# Patient Record
Sex: MALE | Race: WHITE | NOT HISPANIC OR LATINO | URBAN - METROPOLITAN AREA
[De-identification: names, ages, dates, MRNs, and addresses within clinical notes are randomized per-mention and may not be internally consistent; named-entity substitution may affect disease eponyms.]

---

## 2018-12-18 ENCOUNTER — TELEPHONE (OUTPATIENT)
Dept: GASTROENTEROLOGY | Facility: CLINIC | Age: 68
End: 2018-12-18

## 2018-12-18 PROBLEM — Z12.11 SCREEN FOR COLON CANCER: Status: ACTIVE | Noted: 2018-12-18

## 2018-12-18 NOTE — TELEPHONE ENCOUNTER
12/18/18  Screened by: Jose Mendez    Referring Provider [facility]    Pre- Screening: There is no height or weight on file to calculate BMI  Has patient been referred for a routine screening Colonoscopy? yes  Is the patient between 39-70 years old? yes    SCHEDULING STAFF   If the patient is between 45yrs-49yrs, please advise patient to confirm benefits/coverage with their insurance company for a routine screening colonoscopy, some insurance carriers will only cover at [address] or older   If the patient is over 66years old, please schedule an office visit  Do you have any of the following symptoms? NO    Have you had a coronary or vascular stent within the last year? no    Have you had a heart attack or stroke in the last 6 months? no    Have you had intestinal surgery in the last 3 months? no    Do you have problems with: NO    Do you use: NO    Have you been hospitalized in the last Month? no    Have you been diagnosed with a bleeding disorder or anemia? no    Have you had chest pain (angina) or breathing problems  (COPD) in the last 3 months? no    Do you have any difficulty walking up a flight of stairs? no    Have you had Kidney failure or insufficiency? no    Have you had heart valve surgery? no    Are you confined to a wheelchair? no    Do you take NO    Have you been diagnosed with Diabetes or are you taking any   Diabetic medications? no    : If patient answers NO to medical questions, then schedule procedure  If patient answers YES to medical questions, then schedule office appointment  Previous Colonoscopy yes  Date and Facility of last colonoscopy?  4/2016    Comments:

## 2019-04-04 ENCOUNTER — ANESTHESIA EVENT (OUTPATIENT)
Dept: PERIOP | Facility: HOSPITAL | Age: 69
End: 2019-04-04
Payer: OTHER GOVERNMENT

## 2019-04-04 RX ORDER — LANOLIN ALCOHOL/MO/W.PET/CERES
CREAM (GRAM) TOPICAL DAILY
COMMUNITY

## 2019-04-04 RX ORDER — HYDROCHLOROTHIAZIDE 25 MG/1
25 TABLET ORAL DAILY
COMMUNITY

## 2019-04-04 RX ORDER — OMEPRAZOLE 20 MG/1
20 CAPSULE, DELAYED RELEASE ORAL DAILY
COMMUNITY

## 2019-04-04 RX ORDER — UREA 10 %
400 LOTION (ML) TOPICAL DAILY
COMMUNITY

## 2019-04-04 RX ORDER — LISINOPRIL 20 MG/1
20 TABLET ORAL DAILY
COMMUNITY

## 2019-04-04 RX ORDER — LORATADINE 10 MG/1
10 TABLET ORAL DAILY
COMMUNITY

## 2019-04-04 RX ORDER — ATORVASTATIN CALCIUM 20 MG/1
10 TABLET, FILM COATED ORAL DAILY
COMMUNITY

## 2019-04-05 ENCOUNTER — ANESTHESIA (OUTPATIENT)
Dept: PERIOP | Facility: HOSPITAL | Age: 69
End: 2019-04-05
Payer: OTHER GOVERNMENT

## 2019-04-05 ENCOUNTER — HOSPITAL ENCOUNTER (OUTPATIENT)
Facility: HOSPITAL | Age: 69
Setting detail: OUTPATIENT SURGERY
Discharge: HOME | End: 2019-04-05
Attending: INTERNAL MEDICINE | Admitting: INTERNAL MEDICINE
Payer: OTHER GOVERNMENT

## 2019-04-05 VITALS
OXYGEN SATURATION: 97 % | HEIGHT: 69 IN | DIASTOLIC BLOOD PRESSURE: 78 MMHG | TEMPERATURE: 97.3 F | HEART RATE: 89 BPM | RESPIRATION RATE: 15 BRPM | SYSTOLIC BLOOD PRESSURE: 118 MMHG | WEIGHT: 227.51 LBS | BODY MASS INDEX: 33.7 KG/M2

## 2019-04-05 DIAGNOSIS — Z12.11 SCREEN FOR COLON CANCER: ICD-10-CM

## 2019-04-05 PROCEDURE — 88305 TISSUE EXAM BY PATHOLOGIST: CPT | Performed by: PATHOLOGY

## 2019-04-05 PROCEDURE — 45385 COLONOSCOPY W/LESION REMOVAL: CPT | Performed by: INTERNAL MEDICINE

## 2019-04-05 RX ORDER — SODIUM CHLORIDE, SODIUM LACTATE, POTASSIUM CHLORIDE, CALCIUM CHLORIDE 600; 310; 30; 20 MG/100ML; MG/100ML; MG/100ML; MG/100ML
125 INJECTION, SOLUTION INTRAVENOUS CONTINUOUS
Status: DISCONTINUED | OUTPATIENT
Start: 2019-04-05 | End: 2019-04-05 | Stop reason: HOSPADM

## 2019-04-05 RX ORDER — PROPOFOL 10 MG/ML
INJECTION, EMULSION INTRAVENOUS AS NEEDED
Status: DISCONTINUED | OUTPATIENT
Start: 2019-04-05 | End: 2019-04-05 | Stop reason: SURG

## 2019-04-05 RX ADMIN — PROPOFOL 40 MG: 10 INJECTION, EMULSION INTRAVENOUS at 12:26

## 2019-04-05 RX ADMIN — PROPOFOL 30 MG: 10 INJECTION, EMULSION INTRAVENOUS at 12:28

## 2019-04-05 RX ADMIN — SODIUM CHLORIDE, SODIUM LACTATE, POTASSIUM CHLORIDE, AND CALCIUM CHLORIDE: .6; .31; .03; .02 INJECTION, SOLUTION INTRAVENOUS at 12:12

## 2019-04-05 RX ADMIN — PROPOFOL 100 MG: 10 INJECTION, EMULSION INTRAVENOUS at 12:23

## 2019-04-05 RX ADMIN — PROPOFOL 10 MG: 10 INJECTION, EMULSION INTRAVENOUS at 12:34

## 2019-04-05 RX ADMIN — PROPOFOL 20 MG: 10 INJECTION, EMULSION INTRAVENOUS at 12:31

## 2019-04-05 RX ADMIN — SODIUM CHLORIDE, SODIUM LACTATE, POTASSIUM CHLORIDE, AND CALCIUM CHLORIDE 125 ML/HR: .6; .31; .03; .02 INJECTION, SOLUTION INTRAVENOUS at 11:51

## 2019-06-21 ENCOUNTER — OFFICE VISIT (OUTPATIENT)
Dept: DERMATOLOGY | Facility: CLINIC | Age: 69
End: 2019-06-21
Payer: OTHER GOVERNMENT

## 2019-06-21 DIAGNOSIS — Z13.89 SCREENING FOR SKIN CONDITION: ICD-10-CM

## 2019-06-21 DIAGNOSIS — B35.4 TINEA CORPORIS: Primary | ICD-10-CM

## 2019-06-21 DIAGNOSIS — B35.1 ONYCHOMYCOSIS: ICD-10-CM

## 2019-06-21 PROCEDURE — 99204 OFFICE O/P NEW MOD 45 MIN: CPT | Performed by: DERMATOLOGY

## 2019-06-21 RX ORDER — TERBINAFINE HYDROCHLORIDE 250 MG/1
250 TABLET ORAL DAILY
Qty: 30 TABLET | Refills: 2 | Status: SHIPPED | OUTPATIENT
Start: 2019-06-21 | End: 2019-09-19

## 2019-07-29 ENCOUNTER — APPOINTMENT (OUTPATIENT)
Dept: LAB | Facility: CLINIC | Age: 69
End: 2019-07-29
Payer: OTHER GOVERNMENT

## 2019-07-29 ENCOUNTER — OFFICE VISIT (OUTPATIENT)
Dept: DERMATOLOGY | Facility: CLINIC | Age: 69
End: 2019-07-29
Payer: COMMERCIAL

## 2019-07-29 DIAGNOSIS — B35.1 ONYCHOMYCOSIS: ICD-10-CM

## 2019-07-29 DIAGNOSIS — B35.4 TINEA CORPORIS: Primary | ICD-10-CM

## 2019-07-29 DIAGNOSIS — B35.4 TINEA CORPORIS: ICD-10-CM

## 2019-07-29 LAB
ALBUMIN SERPL BCP-MCNC: 4.2 G/DL (ref 3.5–5)
ALP SERPL-CCNC: 78 U/L (ref 46–116)
ALT SERPL W P-5'-P-CCNC: 37 U/L (ref 12–78)
AST SERPL W P-5'-P-CCNC: 29 U/L (ref 5–45)
BASOPHILS # BLD AUTO: 0.03 THOUSANDS/ΜL (ref 0–0.1)
BASOPHILS NFR BLD AUTO: 1 % (ref 0–1)
BILIRUB DIRECT SERPL-MCNC: 0.13 MG/DL (ref 0–0.2)
BILIRUB SERPL-MCNC: 0.46 MG/DL (ref 0.2–1)
EOSINOPHIL # BLD AUTO: 0.18 THOUSAND/ΜL (ref 0–0.61)
EOSINOPHIL NFR BLD AUTO: 3 % (ref 0–6)
ERYTHROCYTE [DISTWIDTH] IN BLOOD BY AUTOMATED COUNT: 12.6 % (ref 11.6–15.1)
HCT VFR BLD AUTO: 44.5 % (ref 36.5–49.3)
HGB BLD-MCNC: 14.4 G/DL (ref 12–17)
IMM GRANULOCYTES # BLD AUTO: 0.03 THOUSAND/UL (ref 0–0.2)
IMM GRANULOCYTES NFR BLD AUTO: 1 % (ref 0–2)
LYMPHOCYTES # BLD AUTO: 1.14 THOUSANDS/ΜL (ref 0.6–4.47)
LYMPHOCYTES NFR BLD AUTO: 20 % (ref 14–44)
MCH RBC QN AUTO: 31.6 PG (ref 26.8–34.3)
MCHC RBC AUTO-ENTMCNC: 32.4 G/DL (ref 31.4–37.4)
MCV RBC AUTO: 98 FL (ref 82–98)
MONOCYTES # BLD AUTO: 0.68 THOUSAND/ΜL (ref 0.17–1.22)
MONOCYTES NFR BLD AUTO: 12 % (ref 4–12)
NEUTROPHILS # BLD AUTO: 3.52 THOUSANDS/ΜL (ref 1.85–7.62)
NEUTS SEG NFR BLD AUTO: 63 % (ref 43–75)
NRBC BLD AUTO-RTO: 0 /100 WBCS
PLATELET # BLD AUTO: 259 THOUSANDS/UL (ref 149–390)
PMV BLD AUTO: 9.8 FL (ref 8.9–12.7)
PROT SERPL-MCNC: 7.9 G/DL (ref 6.4–8.2)
RBC # BLD AUTO: 4.55 MILLION/UL (ref 3.88–5.62)
WBC # BLD AUTO: 5.58 THOUSAND/UL (ref 4.31–10.16)

## 2019-07-29 PROCEDURE — 85025 COMPLETE CBC W/AUTO DIFF WBC: CPT

## 2019-07-29 PROCEDURE — 36415 COLL VENOUS BLD VENIPUNCTURE: CPT

## 2019-07-29 PROCEDURE — 99213 OFFICE O/P EST LOW 20 MIN: CPT | Performed by: DERMATOLOGY

## 2019-07-29 PROCEDURE — 80076 HEPATIC FUNCTION PANEL: CPT

## 2019-07-29 NOTE — PATIENT INSTRUCTIONS
Will obtain blood work at this point to make sure is no has no problems with the terbinafine continue treatment for his 90 days if no problems with is noted

## 2019-07-29 NOTE — PROGRESS NOTES
[facility] DERMATOLOGY  [address] [place] [id number]  [phone]     MRN: [id number] : 1950  Encounter: [id number]  Patient Information: Brigid Akbar  Chief complaint:  Follow-up for onychomycosis tinea corporis    History of present illness:  70-year-old male presents for follow-up secondary to sun ago mycosis and tinea corporis doing well tolerating the medication noted has some type of flare up for an id type reaction about 2 weeks ago but now subsequent appears to be getting better tolerating medication no problems noted  Past Medical History:   Diagnosis Date    GERD (gastroesophageal reflux disease)     Hyperlipidemia     Hypertension      Past Surgical History:   Procedure Laterality Date    COLONOSCOPY      HERNIA REPAIR      [place] COLONOSCOPY FLX DX W/COLLJ SPEC WHEN PFRMD N/A 2019    Procedure: COLONOSCOPY;  Surgeon: Casimiro Veronica DO;  [facility]; Service: Gastroenterology    ROTATOR CUFF REPAIR Left     TONSILLECTOMY       Social History   Social History     Substance and Sexual Activity   Alcohol Use Yes    Alcohol/week: 6 0 standard drinks    Types: 6 Cans of beer per week    Frequency: 2-3 times a week     Social History     Substance and Sexual Activity   Drug Use Not on file     Social History     Tobacco Use   Smoking Status Never Smoker   Smokeless Tobacco Never Used     History reviewed  No pertinent family history  Meds/Allergies   No Known Allergies    Meds:  Prior to Admission medications    Medication Sig Start Date End Date Taking?  Authorizing Provider   atorvastatin (LIPITOR) 20 mg tablet Take 10 mg by mouth daily   Yes Historical Provider, MD   calcium-vitamin D (OSCAL) 250-125 MG-UNIT per tablet Take 1 tablet by mouth daily   Yes Historical Provider, MD   cyanocobalamin (VITAMIN B-12) 1,000 mcg tablet Take by mouth daily   Yes Historical Provider, MD   folic acid (FOLVITE) 847 MCG tablet Take 400 mcg by mouth daily   Yes Historical Provider, MD   hydrochlorothiazide (HYDRODIURIL) 25 mg tablet Take 25 mg by mouth daily   Yes Historical Provider, MD   lisinopril (ZESTRIL) 20 mg tablet Take 20 mg by mouth daily   Yes Historical Provider, MD   loratadine (CLARITIN) 10 mg tablet Take 10 mg by mouth daily   Yes Historical Provider, MD   omeprazole (PriLOSEC) 20 mg delayed release capsule Take 20 mg by mouth daily   Yes Historical Provider, MD   terbinafine (LamISIL) 250 mg tablet Take 1 tablet (250 mg total) by mouth daily for 90 days 6/21/19 9/19/19 Yes Latisha Fitzpatrick MD       Subjective:     Review of Systems:    General: negative for - chills, fatigue, fever,  weight gain or weight loss  Psychological: negative for - anxiety, behavioral disorder, concentration difficulties, decreased libido, depression, irritability, memory difficulties, mood swings, sleep disturbances or suicidal ideation  ENT: negative for - hearing difficulties , nasal congestion, nasal discharge, oral lesions, sinus pain, sneezing, sore throat  Allergy and Immunology: negative for - hives, insect bite sensitivity,  Hematological and Lymphatic: negative for - bleeding problems, blood clots,bruising, swollen lymph nodes  Endocrine: negative for - hair pattern changes, hot flashes, malaise/lethargy, mood swings, palpitations, polydipsia/polyuria, skin changes, temperature intolerance or unexpected weight change  Respiratory: negative for - cough, hemoptysis, orthopnea, shortness of breath, or wheezing  Cardiovascular: negative for - chest pain, dyspnea on exertion, edema,  Gastrointestinal: negative for - abdominal pain, nausea/vomiting  Genito-Urinary: negative for - dysuria, incontinence, irregular/heavy menses or urinary frequency/urgency  Musculoskeletal: negative for - gait disturbance, joint pain, joint stiffness, joint swelling, muscle pain, muscular weakness  Dermatological:  As in HPI  Neurological: negative for confusion, dizziness, headaches, impaired coordination/balance, memory loss, numbness/tingling, seizures, speech problems, tremors or weakness       Objective: There were no vitals taken for this visit  Physical Exam:    General Appearance:    Alert, cooperative, no distress   Head:    Normocephalic, without obvious abnormality, atraumatic   Lymphatics:    No lymphadenopathy noted      Abdomen:   No hepatosplenomegaly   Skin:   A full skin exam was performed including scalp, head scalp, eyes, ears, nose, lips, neck, chest, axilla, abdomen, back, buttocks, bilateral upper extremities, bilateral lower extremities, hands, feet, fingers, toes, fingernails, and toenails no real discernible fungus in the skin nails as noted previously     Assessment:     1  Tinea corporis     2  Onychomycosis           Plan: Will obtain blood work at this point to make sure is no has no problems with the terbinafine continue treatment for his 90 days if no problems with is noted    Luma Patterson MD  7/29/2019,4:38 PM    Portions of the record may have been created with voice recognition software   Occasional wrong word or "sound a like" substitutions may have occurred due to the inherent limitations of voice recognition software   Read the chart carefully and recognize, using context, where substitutions have occurred

## 2019-07-30 ENCOUNTER — TELEPHONE (OUTPATIENT)
Dept: DERMATOLOGY | Facility: CLINIC | Age: 69
End: 2019-07-30

## 2019-09-18 ENCOUNTER — OFFICE VISIT (OUTPATIENT)
Dept: DERMATOLOGY | Facility: CLINIC | Age: 69
End: 2019-09-18
Payer: COMMERCIAL

## 2019-09-18 DIAGNOSIS — B35.4 TINEA CORPORIS: Primary | ICD-10-CM

## 2019-09-18 DIAGNOSIS — Z13.89 SCREENING FOR SKIN CONDITION: ICD-10-CM

## 2019-09-18 DIAGNOSIS — R21 RASH: ICD-10-CM

## 2019-09-18 PROCEDURE — 88312 SPECIAL STAINS GROUP 1: CPT | Performed by: PATHOLOGY

## 2019-09-18 PROCEDURE — 99213 OFFICE O/P EST LOW 20 MIN: CPT | Performed by: DERMATOLOGY

## 2019-09-18 PROCEDURE — 88305 TISSUE EXAM BY PATHOLOGIST: CPT | Performed by: PATHOLOGY

## 2019-09-18 PROCEDURE — 11102 TANGNTL BX SKIN SINGLE LES: CPT | Performed by: DERMATOLOGY

## 2019-09-18 NOTE — PROGRESS NOTES
[facility] DERMATOLOGY  [facility] [address]  [phone]  [phone]     MRN: [id number] : 1950  Encounter: [id number]  Patient Information: Rajan Willis  Chief complaint:  Skin rash    History of present illness:  78-year-old male presents for follow-up secondary to continued problems with itching burning irritated skin seems to come and go at times patient was noted  Patient notes that and his problems again started last February at which point he had blistering type rash on on his legs and was given both prednisone and topical steroids for treatment which seems to come down not process however by the time the patient came to see me in  he had a widespread dermatophyte infection which we had treated with terbinafine patient is about to complete his course and requested more prescription because he still feels stinging burning sensation in the skin  Past Medical History:   Diagnosis Date    GERD (gastroesophageal reflux disease)     Hyperlipidemia     Hypertension      Past Surgical History:   Procedure Laterality Date    COLONOSCOPY      HERNIA REPAIR      [place] COLONOSCOPY FLX DX W/COLLJ SPEC WHEN PFRMD N/A 2019    Procedure: COLONOSCOPY;  Surgeon: Glenn Smith DO;  Location[facility]; Service: Gastroenterology    ROTATOR CUFF REPAIR Left     TONSILLECTOMY       Social History   Social History     Substance and Sexual Activity   Alcohol Use Yes    Alcohol/week: 6 0 standard drinks    Types: 6 Cans of beer per week    Frequency: 2-3 times a week    Drinks per session: 1 or 2    Binge frequency: Never     Social History     Substance and Sexual Activity   Drug Use Not on file     Social History     Tobacco Use   Smoking Status Never Smoker   Smokeless Tobacco Never Used     History reviewed  No pertinent family history    Meds/Allergies   No Known Allergies    Meds:  Prior to Admission medications    Medication Sig Start Date End Date Taking?  Authorizing Provider   atorvastatin (LIPITOR) 20 mg tablet Take 10 mg by mouth daily   Yes Historical Provider, MD   calcium-vitamin D (OSCAL) 250-125 MG-UNIT per tablet Take 1 tablet by mouth daily   Yes Historical Provider, MD   cyanocobalamin (VITAMIN B-12) 1,000 mcg tablet Take by mouth daily   Yes Historical Provider, MD   folic acid (FOLVITE) 305 MCG tablet Take 400 mcg by mouth daily   Yes Historical Provider, MD   hydrochlorothiazide (HYDRODIURIL) 25 mg tablet Take 25 mg by mouth daily   Yes Historical Provider, MD   lisinopril (ZESTRIL) 20 mg tablet Take 20 mg by mouth daily   Yes Historical Provider, MD   loratadine (CLARITIN) 10 mg tablet Take 10 mg by mouth daily   Yes Historical Provider, MD   omeprazole (PriLOSEC) 20 mg delayed release capsule Take 20 mg by mouth daily   Yes Historical Provider, MD   terbinafine (LamISIL) 250 mg tablet Take 1 tablet (250 mg total) by mouth daily for 90 days 6/21/19 9/19/19 Yes Daphnie Pacheco MD       Subjective:     Review of Systems:    General: negative for - chills, fatigue, fever,  weight gain or weight loss  Psychological: negative for - anxiety, behavioral disorder, concentration difficulties, decreased libido, depression, irritability, memory difficulties, mood swings, sleep disturbances or suicidal ideation  ENT: negative for - hearing difficulties , nasal congestion, nasal discharge, oral lesions, sinus pain, sneezing, sore throat  Allergy and Immunology: negative for - hives, insect bite sensitivity,  Hematological and Lymphatic: negative for - bleeding problems, blood clots,bruising, swollen lymph nodes  Endocrine: negative for - hair pattern changes, hot flashes, malaise/lethargy, mood swings, palpitations, polydipsia/polyuria, skin changes, temperature intolerance or unexpected weight change  Respiratory: negative for - cough, hemoptysis, orthopnea, shortness of breath, or wheezing  Cardiovascular: negative for - chest pain, dyspnea on exertion, edema,  Gastrointestinal: negative for - abdominal pain, nausea/vomiting  Genito-Urinary: negative for - dysuria, incontinence, irregular/heavy menses or urinary frequency/urgency  Musculoskeletal: negative for - gait disturbance, joint pain, joint stiffness, joint swelling, muscle pain, muscular weakness  Dermatological:  As in HPI  Neurological: negative for confusion, dizziness, headaches, impaired coordination/balance, memory loss, numbness/tingling, seizures, speech problems, tremors or weakness       Objective: There were no vitals taken for this visit  Physical Exam:    General Appearance:    Alert, cooperative, no distress   Head:    Normocephalic, without obvious abnormality, atraumatic           Skin:   A full skin exam was performed including scalp, head scalp, eyes, ears, nose, lips, neck, chest, axilla, abdomen, back, buttocks, bilateral upper extremities, bilateral lower extremities, hands, feet, fingers, toes, fingernails, and toenails erythematous scaling patches noted on the buttocks on arms slightly in slightly on the legs not really discernible plaques or arciform appearance at this point we did a KOH prep which was negative  Shave Biopsy Procedure Note    Pre-operative Diagnosis:  Eczematous process rule out atypia    Plan:  1  Instructed to keep the wound dry and covered for 24 and clean thereafter  2  Warning signs of infection were reviewed  3  Recommended that the patient use OTC acetaminophen as needed for pain  4  Return  Pending results of biopsy(ies)    Locations:  Left buttocks  Indications:  Delineate rash    Anesthesia: Lidocaine 1% without epinephrine without added sodium bicarbonate    Procedure Details     Patient informed of the risks (including bleeding and infection) and benefits of the   procedure and Verbal informed consent obtained      The lesion and surrounding area were given a sterile prep using alcohol and draped in the usual sterile fashion  A Blue blade razor was used to obtain a specimen  Hemostasis achieved with aluminum chloride  Petrolatum and a sterile dressing applied  The specimen was sent for pathologic examination  The patient tolerated the procedure(s) well  Complications:  none  Assessment:     1  Tinea corporis     2  Rash     3  Screening for skin condition           Plan:   Previous noted tinea corporis appears to be resolved at this probably was exacerbated by his steroid treatment however the rash that he was initially concerned with a seems to be still present  This appears to be consistent with an eczematous process some not clear will hold off on any intervention at this point and see if the biopsy issues this anything more specific will plan follow-up again once we get the results are will go from there    Jeffrey Edwards MD  9/18/2019,2:30 PM    Portions of the record may have been created with voice recognition software   Occasional wrong word or "sound a like" substitutions may have occurred due to the inherent limitations of voice recognition software   Read the chart carefully and recognize, using context, where substitutions have occurred

## 2019-09-18 NOTE — PATIENT INSTRUCTIONS
Previous noted tinea corporis appears to be resolved at this probably was exacerbated by his steroid treatment however the rash that he was initially concerned with a seems to be still present  This appears to be consistent with an eczematous process some not clear will hold off on any intervention at this point and see if the biopsy issues this anything more specific will plan follow-up again once we get the results are will go from there

## 2019-10-07 NOTE — RESULT ENCOUNTER NOTE
Discussed with patient   Patient advise if the rash does not go away in Another 2 weeks let me know he has to use the triamcinolone on the area

## 2022-08-01 ENCOUNTER — OFFICE VISIT (OUTPATIENT)
Dept: GASTROENTEROLOGY | Facility: CLINIC | Age: 72
End: 2022-08-01
Payer: OTHER GOVERNMENT

## 2022-08-01 VITALS
OXYGEN SATURATION: 100 % | WEIGHT: 240 LBS | HEART RATE: 88 BPM | BODY MASS INDEX: 35.55 KG/M2 | DIASTOLIC BLOOD PRESSURE: 82 MMHG | HEIGHT: 69 IN | SYSTOLIC BLOOD PRESSURE: 130 MMHG

## 2022-08-01 DIAGNOSIS — Z86.010 HISTORY OF COLON POLYPS: ICD-10-CM

## 2022-08-01 DIAGNOSIS — Z12.11 SCREEN FOR COLON CANCER: Primary | ICD-10-CM

## 2022-08-01 PROCEDURE — 99213 OFFICE O/P EST LOW 20 MIN: CPT | Performed by: PHYSICIAN ASSISTANT

## 2022-08-01 RX ORDER — FLUTICASONE PROPIONATE 50 MCG
SPRAY, SUSPENSION (ML) NASAL
COMMUNITY
Start: 2022-06-14

## 2022-08-01 RX ORDER — ALBUTEROL SULFATE 90 UG/1
AEROSOL, METERED RESPIRATORY (INHALATION)
COMMUNITY
Start: 2022-06-14

## 2022-08-01 NOTE — PROGRESS NOTES
[facility] - Outpatient Follow-up Note  Prisca Rutherford 67 y o  male MRN: [id number]  Encounter: [id number]          ASSESSMENT AND PLAN:      1  Screen for colon cancer  2  History of colon polyps  His last colonoscopy was in April of 2019 with a small hyperplastic polyp removed  He has no personal or family history of colon cancer  He is not due for a colonoscopy until April of 2024 - recall is in system for this  Reviewed with patient who is agreeable to this plan    ______________________________________________________________________    SUBJECTIVE:  75-year-old male with a history colon polyps presents for evaluation prior to scheduling a screening colonoscopy  His last colonoscopy was in April 2019 with a small hyperplastic polyp removed  He denies any gastrointestinal symptoms at present such as abdominal pain, diarrhea, constipation rectal bleeding  He denies any family history of colorectal cancer  He denies any personal history of colon cancer  REVIEW OF SYSTEMS IS OTHERWISE NEGATIVE  Historical Information   Past Medical History:   Diagnosis Date    GERD (gastroesophageal reflux disease)     Hyperlipidemia     Hypertension      Past Surgical History:   Procedure Laterality Date    COLONOSCOPY      HERNIA REPAIR      [place] COLONOSCOPY FLX DX W/COLLJ SPEC WHEN PFRMD N/A 4/5/2019    Procedure: COLONOSCOPY;  Surgeon: Nya Harrison DO;  Location: [facility]; Service: Gastroenterology    ROTATOR CUFF REPAIR Left     TONSILLECTOMY       Social History   Social History     Substance and Sexual Activity   Alcohol Use Yes    Alcohol/week: 6 0 standard drinks    Types: 6 Cans of beer per week     Social History     Substance and Sexual Activity   Drug Use Never     Social History     Tobacco Use   Smoking Status Never Smoker   Smokeless Tobacco Never Used     History reviewed  No pertinent family history      Meds/Allergies       Current Outpatient Medications:   albuterol (PROVENTIL HFA,VENTOLIN HFA) 90 mcg/act inhaler    atorvastatin (LIPITOR) 20 mg tablet    calcium-vitamin D (OSCAL) 250-125 MG-UNIT per tablet    cyanocobalamin (VITAMIN B-12) 1,000 mcg tablet    fluticasone (FLONASE) 50 mcg/act nasal spray    folic acid (FOLVITE) 111 MCG tablet    hydrochlorothiazide (HYDRODIURIL) 25 mg tablet    lisinopril (ZESTRIL) 20 mg tablet    loratadine (CLARITIN) 10 mg tablet    omeprazole (PriLOSEC) 20 mg delayed release capsule    No Known Allergies        Objective     Blood pressure 130/82, pulse 88, height 5' 9" (1 753 m), weight 109 kg (240 lb), SpO2 100 %  Body mass index is 35 44 kg/m²  PHYSICAL EXAM:      General Appearance:   Alert, cooperative, no distress   HEENT:   Normocephalic, atraumatic, anicteric      Neck:  Supple, symmetrical, trachea midline   Lungs:   Clear to auscultation bilaterally; no rales, rhonchi or wheezing; respirations unlabored    Heart[de-identified]   Regular rate and rhythm; no murmur, rub, or gallop  Abdomen:   Soft, non-tender, non-distended; normal bowel sounds; no masses, no organomegaly    Genitalia:   Deferred    Rectal:   Deferred    Extremities:  No cyanosis, clubbing or edema    Pulses:  2+ and symmetric    Skin:  No jaundice, rashes, or lesions    Lymph nodes:  No palpable cervical lymphadenopathy        Lab Results:   No visits with results within 1 Day(s) from this visit     Latest known visit with results is:   Office Visit on 09/18/2019   Component Date Value    Case Report 09/18/2019                      Value:Surgical Pathology Report                         Case: [id number]                                   Authorizing Provider:  Hakan Garces MD          Collected:           09/18/2019 1449              Ordering Location:     [facility]           Received:            09/18/2019 [address] [facility]                                                           Pathologist:           Brian Higginbotham MD                                                             Specimen:    Skin, Other, lower back                                                                    Final Diagnosis 09/18/2019                      Value: This result contains rich text formatting which cannot be displayed here   Additional Information 09/18/2019                      Value: This result contains rich text formatting which cannot be displayed here  [facility] Description 09/18/2019                      Value: This result contains rich text formatting which cannot be displayed here   Clinical Information 09/18/2019                      Value:eczematous process r/o atypia[shave         Radiology Results:   No results found

## 2023-05-17 LAB — HBA1C MFR BLD HPLC: 5.9 %

## 2023-06-13 ENCOUNTER — HOSPITAL ENCOUNTER (OUTPATIENT)
Dept: MRI IMAGING | Facility: HOSPITAL | Age: 73
Discharge: HOME | End: 2023-06-13
Payer: COMMERCIAL

## 2023-06-13 DIAGNOSIS — M54.12 CERVICAL RADICULOPATHY: ICD-10-CM

## 2023-06-13 PROCEDURE — 72141 MRI NECK SPINE W/O DYE: CPT

## 2023-06-13 PROCEDURE — G1004 CDSM NDSC: HCPCS

## 2023-06-30 ENCOUNTER — HOSPITAL ENCOUNTER (OUTPATIENT)
Dept: CT IMAGING | Facility: CLINIC | Age: 73
Discharge: HOME | End: 2023-06-30
Payer: COMMERCIAL

## 2023-06-30 DIAGNOSIS — R93.89 ABNORMAL X-RAY: ICD-10-CM

## 2023-06-30 PROCEDURE — 71260 CT THORAX DX C+: CPT

## 2023-06-30 RX ADMIN — IOHEXOL 85 ML: 350 INJECTION, SOLUTION INTRAVENOUS at 11:38

## 2023-08-21 ENCOUNTER — OFFICE VISIT (OUTPATIENT)
Dept: NEUROSURGERY | Facility: CLINIC | Age: 73
End: 2023-08-21
Payer: COMMERCIAL

## 2023-08-21 VITALS
HEART RATE: 100 BPM | DIASTOLIC BLOOD PRESSURE: 71 MMHG | SYSTOLIC BLOOD PRESSURE: 112 MMHG | OXYGEN SATURATION: 97 % | TEMPERATURE: 98.1 F

## 2023-08-21 DIAGNOSIS — M47.12 OSTEOARTHRITIS OF CERVICAL SPINE WITH MYELOPATHY: Primary | ICD-10-CM

## 2023-08-21 PROCEDURE — 99204 OFFICE O/P NEW MOD 45 MIN: CPT | Performed by: PHYSICIAN ASSISTANT

## 2023-08-21 NOTE — PROGRESS NOTES
MRI shows signal change opposite C4-6. (report states approx C3/4)  There is slight reversal of the normal cervical lordosis  I would like to see cervical flex/ext X-rays  On exam, he has decreased intrinsic fxn in his L > R hand, with difficulty buttoning and tying. His gait is abnormal, worse on steps. He has brisk reflexes. See Heath's note for details  He would be a candidate for posterior cervical decompression and fusion, C3-C6 at a minimum, possibly to include C7, T1 after review of X-rays  He has symptomatic cervical spondylosis with myelopathy (spinal cord dysfunction) from stenosis, narrowing in the spinal canal.   I explained, to him and his sister with Penelope Magana present,  that this will continue to get worse neurologically without surgical decompression of the cervical spinal cord. There is no other modality that will make more room for her spinal cord. PT, chiropractic, injections, medications and other cannot make more room. There is signal change in the spinal cord. This is a combination of swelling, which has the potential to heal, and scarring, which does not. There is no way to know how much of each is present. It could be all scar. Therefore, while surgery gives the best chance at improvement, this cannot be guaranteed. The goal of surgery is to prevent neurologic decline, and this can almost always be accomplished, but is also not guaranteed. He would like some time to digest all of this, to use his words  He was also told that he has neuropathy in his feet and hands. There is probably some overlap of these complaints with cervical cord compression. There is likely a component of both myelopathy and neuropathy  I will see him back in approx 2 weeks with X-rays for further discussions.   He knows to call sooner with any questions or worsening  I answered all of his and his sister's questions to the best of my abilities and in layman's terms

## 2023-08-21 NOTE — PROGRESS NOTES
Neurosurgery Office Note  Magdalena Halsted 68 y.o. male MRN: [id number]      Assessment/Plan      Patient is a 68 yrs old pleasant right-handed gentleman with Hx of HTN controlled with antihypertensive medications, and chronic osteoarthritis of cervical spine referred by PCP for evaluation of progressive neck pain with signs of myelopathy. Patient reports history of fall seated on chair in March and since then he gradually developed paresthesias in his bilateral feet and then gradually fingertips. Has also weakness, numbness left more than right upper extremity, associated with numbness more in the left arm and also left lower extremity. Has fine motor dysfunction and also  weakness more in the left hand. progressive gait issues, difficulty  going up stairs and intermittent bilateral LEs twitchings/spasms. He was told he has peripheral neuropathy. Patient denies radicular pain to upper extremities. No B/B issues. He had MRI of cervical spine on 6/13/2023 which demonstrates diffuse degenerative disc disease and facet osteoarthritis with marked central canal narrowing at C4-5 and C5-6 with cord compression. Also noted abnormal cord signal intensity from C3-4 through C4-C5, compatible with edema and/or myelomalacia. Marked foraminal narrowing from C3-4 through C5-6 with mild exiting nerve root impingement. Patient tried Aleve without much help. No pain management or physical therapy. Exam-A&OX3, Saúl, strength: Left  , iOS, elbow flexion extension and shoulder abduction 4/5, decreased LT in the left forearm, hands and fingers. Left LE strength 4+/5 including DF/PF/and KF/KE. DTR 2+ in the right UE, 3+ in the LUE, no clonus. Unstable gait with tilting to the right side , non tender on palpation of posterior cervical spine. Hx, PEx and images reviewed with the patient. Mx plan discussed. Dr Diana Underwood also discussed with the patient and his sister, who accompanied the patient.  Given significant images findings and a corresponding myelopathic symptoms, surgical option is indicated to prevent further worsening of cord injury and myelopathic  Symptoms. Patient wants to think over and re-read about the procedure and all factors related and come back with in 2 weeks to see Dr Trudi Bell. Flexion -extension of cervical spine x-rays ordered to be completed prior to his next visit. All questions and concerns were answered to patient's satisfaction. Both Patient and his sister  expressed their understandings and agreed with the plan. Plan:  1. Flexion-extension cervical spine x-rays-Already ordered by Dr Trudi Bell  2. F/U with Dr Trudi Bell in 2 weeks to discuss surgical options  3. Fall precautions, avoid strenuous activities  4. Call with questions or concerns      I have spent a total time of 45  minutes on 08/21/23 in caring for this patient including Diagnostic results, Prognosis, Risks and benefits of tx options, Instructions for management, Patient and family education, Importance of tx compliance, Risk factor reductions, Impressions, Counseling / Coordination of care, Documenting in the medical record, Reviewing / ordering tests, medicine, procedures   and Obtaining or reviewing history  . Chief Complaint   Patient presents with   • Consult     NP-- Spinal Stenosis   Neck pain, non radiating x 4 months. History of Present Illness     C/C: " Patient referred by his PCP for evaluation of cervical myelopathy"    HPI    See detailed discussion above      REVIEW OF SYSTEMS  ROS personally reviwed and updated as follows:  Review of Systems   Constitutional: Negative. HENT: Negative. Eyes: Negative. Respiratory: Negative. Cardiovascular: Negative. Gastrointestinal: Negative. Endocrine: Negative. Genitourinary: Negative. Musculoskeletal: Positive for myalgias, neck pain and neck stiffness.         NP-- Spinal Stenosis   Neck pain, non radiating x 4 months. + N/W on left arm/ B/L hands and B/l Feet. Difficulty raising his arms. His pain is a intermittent, throbbing pain and he rates it a 9/10 today. He also has Difficulty fine finger motion on left hand, difficulty with , buttoning, drops things. Decrerase ROM, worse when turning his head to the left and looking up. Difficulty walking, BLE N/W and swelling. Ocassionally R>L gives out but hasn't has any falls  NO CONSERVATIVE MANAGEMENT   Chiropractor 1x wk for 2 months- No relief. C/S MRI  DONE 6/13/23   Luis Daniel 81m/ Non Smoker/ No previous C/S Sx   Skin: Negative. Allergic/Immunologic: Negative. Neurological: Positive for weakness, numbness and headaches. Hematological: Negative. Psychiatric/Behavioral: Positive for sleep disturbance (due to pain). All other systems reviewed and are negative. ROS obtained by MA. Reviewed. See HPI. Meds/Allergies     Current Outpatient Medications   Medication Sig Dispense Refill   • albuterol (PROVENTIL HFA,VENTOLIN HFA) 90 mcg/act inhaler INHALE 2 PUFFS BY MOUTH EVERY 6 HOURS AS NEEDED FOR WHEEZING AND SHORTNESS OF BREATH     • atorvastatin (LIPITOR) 20 mg tablet Take 10 mg by mouth daily     • calcium-vitamin D (OSCAL) 250-125 MG-UNIT per tablet Take 1 tablet by mouth daily     • cyanocobalamin (VITAMIN B-12) 1,000 mcg tablet Take by mouth daily     • fluticasone (FLONASE) 50 mcg/act nasal spray into each nostril     • folic acid (FOLVITE) 903 MCG tablet Take 400 mcg by mouth daily     • hydrochlorothiazide (HYDRODIURIL) 25 mg tablet Take 25 mg by mouth daily     • lisinopril (ZESTRIL) 20 mg tablet Take 20 mg by mouth daily     • loratadine (CLARITIN) 10 mg tablet Take 10 mg by mouth daily     • omeprazole (PriLOSEC) 20 mg delayed release capsule Take 20 mg by mouth daily       No current facility-administered medications for this visit.        No Known Allergies    PAST HISTORY    Past Medical History:   Diagnosis Date   • GERD (gastroesophageal reflux disease)    • Hyperlipidemia    • Hypertension        Past Surgical History:   Procedure Laterality Date   • COLONOSCOPY     • HERNIA REPAIR     • CA COLONOSCOPY FLX DX W/COLLJ SPEC WHEN PFRMD N/A 4/5/2019    Procedure: COLONOSCOPY;  Surgeon: Jc Lora DO;  Location: [facility]; Service: Gastroenterology   • ROTATOR CUFF REPAIR Left    • TONSILLECTOMY         Social History     Tobacco Use   • Smoking status: Never   • Smokeless tobacco: Never   Vaping Use   • Vaping Use: Never used   Substance Use Topics   • Alcohol use: Yes     Alcohol/week: 6.0 standard drinks of alcohol     Types: 6 Cans of beer per week   • Drug use: Never       History reviewed. No pertinent family history. Above history personally reviewed. EXAM    Vitals:Blood pressure 112/71, pulse 100, temperature 98.1 °F (36.7 °C), temperature source Temporal, SpO2 97 %. ,There is no height or weight on file to calculate BMI. Physical Exam  Constitutional:       Appearance: Normal appearance. HENT:      Head: Normocephalic and atraumatic. Cardiovascular:      Rate and Rhythm: Normal rate. Pulses: Normal pulses. Pulmonary:      Effort: Pulmonary effort is normal.   Musculoskeletal:         General: No tenderness. Cervical back: No tenderness. Neurological:      Mental Status: He is alert and oriented to person, place, and time. GCS: GCS eye subscore is 4. GCS verbal subscore is 5. GCS motor subscore is 6. Cranial Nerves: Cranial nerves 2-12 are intact. Sensory: Sensory deficit present. Motor: Weakness present. Coordination: Coordination abnormal.      Gait: Gait abnormal.      Deep Tendon Reflexes: Reflexes abnormal.      Reflex Scores:       Tricep reflexes are 2+ on the right side and 3+ on the left side. Bicep reflexes are 2+ on the right side and 3+ on the left side. Brachioradialis reflexes are 2+ on the right side and 3+ on the left side.        Patellar reflexes are 2+ on the right side and 3+ on the left side. Achilles reflexes are 2+ on the right side and 3+ on the left side. Psychiatric:         Speech: Speech normal.         Neurologic Exam     Mental Status   Oriented to person, place, and time. Speech: speech is normal   Level of consciousness: alert    Cranial Nerves   Cranial nerves II through XII intact. CN III, IV, VI   Nystagmus: none     CN XI   CN XI normal.     Motor Exam   Muscle bulk: normal  Overall muscle tone: normal  Right arm tone: normal  Left arm tone: normal  Right arm pronator drift: absent  Left arm pronator drift: absent  Right leg tone: normal  Left leg tone: normal    Sensory Exam   Right arm light touch: normal  Left arm light touch: decreased from elbow  Right leg light touch: normal  Left leg light touch: normal    Gait, Coordination, and Reflexes     Reflexes   Right brachioradialis: 2+  Left brachioradialis: 3+  Right biceps: 2+  Left biceps: 3+  Right triceps: 2+  Left triceps: 3+  Right patellar: 2+  Left patellar: 3+  Right achilles: 2+  Left achilles: 3+  Right Bonilla: absent  Left Bonilla: present  Right ankle clonus: absent  Left pendular knee jerk: absent        MEDICAL DECISION MAKING    Imaging Studies:     No results found.     I have personally reviewed pertinent reports.  , I have personally reviewed pertinent films in PACS and I have personally reviewed pertinent films in PACS with a Radiologist.

## 2023-08-22 ENCOUNTER — APPOINTMENT (OUTPATIENT)
Dept: RADIOLOGY | Facility: CLINIC | Age: 73
End: 2023-08-22
Payer: COMMERCIAL

## 2023-08-22 DIAGNOSIS — M47.12 OSTEOARTHRITIS OF CERVICAL SPINE WITH MYELOPATHY: ICD-10-CM

## 2023-08-22 PROCEDURE — 72050 X-RAY EXAM NECK SPINE 4/5VWS: CPT

## 2023-09-11 ENCOUNTER — HOSPITAL ENCOUNTER (OUTPATIENT)
Dept: RADIOLOGY | Facility: HOSPITAL | Age: 73
Discharge: HOME | End: 2023-09-11
Payer: COMMERCIAL

## 2023-09-11 ENCOUNTER — OFFICE VISIT (OUTPATIENT)
Dept: NEUROSURGERY | Facility: CLINIC | Age: 73
End: 2023-09-11
Payer: COMMERCIAL

## 2023-09-11 VITALS
TEMPERATURE: 98.2 F | BODY MASS INDEX: 36.29 KG/M2 | WEIGHT: 245 LBS | SYSTOLIC BLOOD PRESSURE: 118 MMHG | DIASTOLIC BLOOD PRESSURE: 77 MMHG | OXYGEN SATURATION: 97 % | HEIGHT: 69 IN | HEART RATE: 100 BPM

## 2023-09-11 DIAGNOSIS — M47.812 SPONDYLOSIS OF CERVICAL REGION WITHOUT MYELOPATHY OR RADICULOPATHY: Primary | ICD-10-CM

## 2023-09-11 DIAGNOSIS — M47.812 SPONDYLOSIS OF CERVICAL REGION WITHOUT MYELOPATHY OR RADICULOPATHY: ICD-10-CM

## 2023-09-11 PROCEDURE — 99213 OFFICE O/P EST LOW 20 MIN: CPT | Performed by: NEUROLOGICAL SURGERY

## 2023-09-11 PROCEDURE — 72050 X-RAY EXAM NECK SPINE 4/5VWS: CPT

## 2023-09-11 RX ORDER — CHLORHEXIDINE GLUCONATE ORAL RINSE 1.2 MG/ML
15 SOLUTION DENTAL ONCE
OUTPATIENT
Start: 2023-09-11 | End: 2023-09-11

## 2023-09-11 NOTE — PROGRESS NOTES
Review of Systems   Constitutional: Negative. HENT: Negative. Eyes: Negative. Respiratory: Negative. Cardiovascular: Negative. Gastrointestinal: Negative. Endocrine: Negative. Genitourinary: Negative. Musculoskeletal: Positive for gait problem, neck pain and neck stiffness. 2 Wk F/U - Discuss surgical options-- Spinal Stenosis   Neck pain, non radiating x 4 months. + N/W on left arm/ B/L hands and B/l Feet. Difficulty raising his arms. His pain is a intermittent, throbbing pain and he rates it a 5/10 today. He also has Difficulty fine finger motion on left hand, difficulty with , buttoning, drops things. Decrease ROM, worse when turning his head to the left and looking up. Difficulty walking, BLE N/W and swelling. Ocassionally R>L gives out but hasn't has any falls  NO CONSERVATIVE MANAGEMENT   Chiropractor 1x wk for 2 months- No relief. C/S MRI  DONE 6/13/23   Luis Daniel 81m/ Non Smoker/ No previous C/S Sx   Skin: Negative. Allergic/Immunologic: Negative. Neurological: Positive for weakness and numbness. Hematological: Negative. Psychiatric/Behavioral: Negative. All other systems reviewed and are negative.          Current Outpatient Medications:   •  albuterol (PROVENTIL HFA,VENTOLIN HFA) 90 mcg/act inhaler, INHALE 2 PUFFS BY MOUTH EVERY 6 HOURS AS NEEDED FOR WHEEZING AND SHORTNESS OF BREATH, Disp: , Rfl:   •  atorvastatin (LIPITOR) 20 mg tablet, Take 10 mg by mouth daily, Disp: , Rfl:   •  calcium-vitamin D (OSCAL) 250-125 MG-UNIT per tablet, Take 1 tablet by mouth daily, Disp: , Rfl:   •  cyanocobalamin (VITAMIN B-12) 1,000 mcg tablet, Take by mouth daily, Disp: , Rfl:   •  fluticasone (FLONASE) 50 mcg/act nasal spray, into each nostril, Disp: , Rfl:   •  folic acid (FOLVITE) 327 MCG tablet, Take 400 mcg by mouth daily, Disp: , Rfl:   •  hydrochlorothiazide (HYDRODIURIL) 25 mg tablet, Take 25 mg by mouth daily, Disp: , Rfl:   •  lisinopril (ZESTRIL) 20 mg tablet, Take 20 mg by mouth daily, Disp: , Rfl:   •  loratadine (CLARITIN) 10 mg tablet, Take 10 mg by mouth daily, Disp: , Rfl:   •  omeprazole (PriLOSEC) 20 mg delayed release capsule, Take 20 mg by mouth daily, Disp: , Rfl:

## 2023-09-11 NOTE — PROGRESS NOTES
X-ray Images and Report of the Cervical were not done with flex/ext. There is degenerative change at multiple levels    I sent him for flex/ext cervical today and reviewed those images. There is no instability at C2/3 or any other level. He is having increasing difficulty buttoning buttons on his shirt and walking steady. I think he is a good candidate for C3-6 posterior decompression and fusion. He has symptomatic cervical myelopathy. I explained that this will continue to get worse neurologically without surgical decompression of the cervical spinal cord. There is no other modality that will make more room for her spinal cord. PT, chiropractic, injections, medications and other cannot make more room. There is signal change in the spinal cord. This is a combination of swelling, which has the potential to heal, and scarring, which does not. There is no way to know how much of each is present. It could be all scar. Therefore, while surgery gives the best chance at improvement, this cannot be guaranteed. The goal of surgery is to prevent neurologic decline, and this can almost always be accomplished, but is also not guaranteed. I discussed the nature of the procedure and reasonable expectations with the patient and his sister. The risks benefits and alternatives were explained, including but not limited to: general anesthesia, bleeding, infection, stroke, coma, death, CSF leak, nerve damage, brain damage, spinal cord damage, failure to improve, failure of hardware, failure of fusion, neurologic deficit including paralysis, need for reoperation. All questions were answered. No guarantees were given.     20 minutes were spent caring for this patient

## 2023-10-05 ENCOUNTER — HOSPITAL ENCOUNTER (OUTPATIENT)
Dept: RADIOLOGY | Facility: HOSPITAL | Age: 73
End: 2023-10-05
Payer: COMMERCIAL

## 2023-10-05 ENCOUNTER — LAB REQUISITION (OUTPATIENT)
Dept: LAB | Facility: HOSPITAL | Age: 73
End: 2023-10-05
Payer: COMMERCIAL

## 2023-10-05 ENCOUNTER — OFFICE VISIT (OUTPATIENT)
Dept: LAB | Facility: HOSPITAL | Age: 73
End: 2023-10-05
Payer: COMMERCIAL

## 2023-10-05 ENCOUNTER — APPOINTMENT (OUTPATIENT)
Dept: LAB | Facility: HOSPITAL | Age: 73
End: 2023-10-05
Payer: COMMERCIAL

## 2023-10-05 DIAGNOSIS — M47.812 SPONDYLOSIS OF CERVICAL REGION WITHOUT MYELOPATHY OR RADICULOPATHY: ICD-10-CM

## 2023-10-05 DIAGNOSIS — Z01.818 ENCOUNTER FOR OTHER PREPROCEDURAL EXAMINATION: ICD-10-CM

## 2023-10-05 DIAGNOSIS — Z01.818 PRE-PROCEDURAL EXAMINATION: ICD-10-CM

## 2023-10-05 DIAGNOSIS — Z01.818 PRE-OP EVALUATION: ICD-10-CM

## 2023-10-05 LAB
ABO GROUP BLD: NORMAL
ALBUMIN SERPL BCP-MCNC: 4.2 G/DL (ref 3.5–5)
ALP SERPL-CCNC: 60 U/L (ref 34–104)
ALT SERPL W P-5'-P-CCNC: 23 U/L (ref 7–52)
ANION GAP SERPL CALCULATED.3IONS-SCNC: 5 MMOL/L
APTT PPP: 30 SECONDS (ref 23–37)
AST SERPL W P-5'-P-CCNC: 27 U/L (ref 13–39)
ATRIAL RATE: 93 BPM
BASOPHILS # BLD AUTO: 0.04 THOUSANDS/ÂΜL (ref 0–0.1)
BASOPHILS NFR BLD AUTO: 1 % (ref 0–1)
BILIRUB SERPL-MCNC: 0.68 MG/DL (ref 0.2–1)
BLD GP AB SCN SERPL QL: NEGATIVE
BUN SERPL-MCNC: 18 MG/DL (ref 5–25)
CALCIUM SERPL-MCNC: 9.5 MG/DL (ref 8.4–10.2)
CHLORIDE SERPL-SCNC: 96 MMOL/L (ref 96–108)
CO2 SERPL-SCNC: 33 MMOL/L (ref 21–32)
CREAT SERPL-MCNC: 1.06 MG/DL (ref 0.6–1.3)
EOSINOPHIL # BLD AUTO: 0.12 THOUSAND/ÂΜL (ref 0–0.61)
EOSINOPHIL NFR BLD AUTO: 2 % (ref 0–6)
ERYTHROCYTE [DISTWIDTH] IN BLOOD BY AUTOMATED COUNT: 12.3 % (ref 11.6–15.1)
EST. AVERAGE GLUCOSE BLD GHB EST-MCNC: 126 MG/DL
GFR SERPL CREATININE-BSD FRML MDRD: 69 ML/MIN/1.73SQ M
GLUCOSE P FAST SERPL-MCNC: 102 MG/DL (ref 65–99)
HBA1C MFR BLD: 6 %
HCT VFR BLD AUTO: 41.9 % (ref 36.5–49.3)
HGB BLD-MCNC: 14.1 G/DL (ref 12–17)
IMM GRANULOCYTES # BLD AUTO: 0.03 THOUSAND/UL (ref 0–0.2)
IMM GRANULOCYTES NFR BLD AUTO: 0 % (ref 0–2)
INR PPP: 1.04 (ref 0.84–1.19)
LYMPHOCYTES # BLD AUTO: 0.91 THOUSANDS/ÂΜL (ref 0.6–4.47)
LYMPHOCYTES NFR BLD AUTO: 14 % (ref 14–44)
MCH RBC QN AUTO: 32.6 PG (ref 26.8–34.3)
MCHC RBC AUTO-ENTMCNC: 33.7 G/DL (ref 31.4–37.4)
MCV RBC AUTO: 97 FL (ref 82–98)
MONOCYTES # BLD AUTO: 0.55 THOUSAND/ÂΜL (ref 0.17–1.22)
MONOCYTES NFR BLD AUTO: 8 % (ref 4–12)
NEUTROPHILS # BLD AUTO: 5.03 THOUSANDS/ÂΜL (ref 1.85–7.62)
NEUTS SEG NFR BLD AUTO: 75 % (ref 43–75)
NRBC BLD AUTO-RTO: 0 /100 WBCS
P AXIS: 41 DEGREES
PLATELET # BLD AUTO: 233 THOUSANDS/UL (ref 149–390)
PMV BLD AUTO: 9.3 FL (ref 8.9–12.7)
POTASSIUM SERPL-SCNC: 4.4 MMOL/L (ref 3.5–5.3)
PR INTERVAL: 192 MS
PROT SERPL-MCNC: 6.8 G/DL (ref 6.4–8.4)
PROTHROMBIN TIME: 14.2 SECONDS (ref 11.6–14.5)
QRS AXIS: -29 DEGREES
QRSD INTERVAL: 94 MS
QT INTERVAL: 350 MS
QTC INTERVAL: 435 MS
RBC # BLD AUTO: 4.33 MILLION/UL (ref 3.88–5.62)
RH BLD: POSITIVE
SODIUM SERPL-SCNC: 134 MMOL/L (ref 135–147)
SPECIMEN EXPIRATION DATE: NORMAL
T WAVE AXIS: 18 DEGREES
VENTRICULAR RATE: 93 BPM
WBC # BLD AUTO: 6.68 THOUSAND/UL (ref 4.31–10.16)

## 2023-10-05 PROCEDURE — 85730 THROMBOPLASTIN TIME PARTIAL: CPT

## 2023-10-05 PROCEDURE — 86850 RBC ANTIBODY SCREEN: CPT | Performed by: NEUROLOGICAL SURGERY

## 2023-10-05 PROCEDURE — 86900 BLOOD TYPING SEROLOGIC ABO: CPT | Performed by: NEUROLOGICAL SURGERY

## 2023-10-05 PROCEDURE — 36415 COLL VENOUS BLD VENIPUNCTURE: CPT

## 2023-10-05 PROCEDURE — 93010 ELECTROCARDIOGRAM REPORT: CPT | Performed by: INTERNAL MEDICINE

## 2023-10-05 PROCEDURE — 85610 PROTHROMBIN TIME: CPT

## 2023-10-05 PROCEDURE — 85025 COMPLETE CBC W/AUTO DIFF WBC: CPT

## 2023-10-05 PROCEDURE — 93005 ELECTROCARDIOGRAM TRACING: CPT

## 2023-10-05 PROCEDURE — 86901 BLOOD TYPING SEROLOGIC RH(D): CPT | Performed by: NEUROLOGICAL SURGERY

## 2023-10-05 PROCEDURE — 80053 COMPREHEN METABOLIC PANEL: CPT

## 2023-10-05 PROCEDURE — 83036 HEMOGLOBIN GLYCOSYLATED A1C: CPT

## 2023-10-05 PROCEDURE — 87081 CULTURE SCREEN ONLY: CPT

## 2023-10-05 PROCEDURE — 71046 X-RAY EXAM CHEST 2 VIEWS: CPT

## 2023-10-06 LAB — MRSA NOSE QL CULT: NORMAL

## 2023-10-20 ENCOUNTER — HOSPITAL ENCOUNTER (INPATIENT)
Facility: HOSPITAL | Age: 73
LOS: 2 days | DRG: 552 | End: 2023-10-23
Attending: EMERGENCY MEDICINE | Admitting: STUDENT IN AN ORGANIZED HEALTH CARE EDUCATION/TRAINING PROGRAM
Payer: MEDICARE

## 2023-10-20 ENCOUNTER — APPOINTMENT (EMERGENCY)
Dept: RADIOLOGY | Facility: HOSPITAL | Age: 73
DRG: 552 | End: 2023-10-20
Payer: MEDICARE

## 2023-10-20 ENCOUNTER — APPOINTMENT (EMERGENCY)
Dept: CT IMAGING | Facility: HOSPITAL | Age: 73
DRG: 552 | End: 2023-10-20
Payer: MEDICARE

## 2023-10-20 DIAGNOSIS — R26.2 AMBULATORY DYSFUNCTION: Primary | ICD-10-CM

## 2023-10-20 DIAGNOSIS — R53.1 WEAKNESS: ICD-10-CM
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
## 2023-10-20 PROBLEM — K21.9 GASTROESOPHAGEAL REFLUX DISEASE: Status: ACTIVE | Noted: 2023-10-20

## 2023-10-20 PROBLEM — M54.9 DORSALGIA, UNSPECIFIED: Status: ACTIVE | Noted: 2023-10-20

## 2023-10-20 PROBLEM — E78.5 HYPERLIPIDEMIA: Status: ACTIVE | Noted: 2023-10-20

## 2023-10-20 PROBLEM — I10 BENIGN ESSENTIAL HYPERTENSION: Status: ACTIVE | Noted: 2023-10-20

## 2023-10-20 PROBLEM — D64.9 ANEMIA: Status: ACTIVE | Noted: 2023-10-20

## 2023-10-20 LAB
ALBUMIN SERPL BCP-MCNC: 4.3 G/DL (ref 3.5–5)
ALP SERPL-CCNC: 59 U/L (ref 34–104)
ALT SERPL W P-5'-P-CCNC: 28 U/L (ref 7–52)
ANION GAP SERPL CALCULATED.3IONS-SCNC: 9 MMOL/L
AST SERPL W P-5'-P-CCNC: 33 U/L (ref 13–39)
ATRIAL RATE: 115 BPM
ATRIAL RATE: 115 BPM
BASOPHILS # BLD AUTO: 0.05 THOUSANDS/ÂΜL (ref 0–0.1)
BASOPHILS NFR BLD AUTO: 1 % (ref 0–1)
BILIRUB SERPL-MCNC: 0.69 MG/DL (ref 0.2–1)
BUN SERPL-MCNC: 22 MG/DL (ref 5–25)
CALCIUM SERPL-MCNC: 9.6 MG/DL (ref 8.4–10.2)
CHLORIDE SERPL-SCNC: 97 MMOL/L (ref 96–108)
CO2 SERPL-SCNC: 29 MMOL/L (ref 21–32)
CREAT SERPL-MCNC: 1.13 MG/DL (ref 0.6–1.3)
EOSINOPHIL # BLD AUTO: 0.16 THOUSAND/ÂΜL (ref 0–0.61)
EOSINOPHIL NFR BLD AUTO: 2 % (ref 0–6)
ERYTHROCYTE [DISTWIDTH] IN BLOOD BY AUTOMATED COUNT: 12.4 % (ref 11.6–15.1)
FLUAV RNA RESP QL NAA+PROBE: NEGATIVE
FLUBV RNA RESP QL NAA+PROBE: NEGATIVE
FOLATE SERPL-MCNC: 20.7 NG/ML
GFR SERPL CREATININE-BSD FRML MDRD: 64 ML/MIN/1.73SQ M
GLUCOSE SERPL-MCNC: 103 MG/DL (ref 65–140)
HCT VFR BLD AUTO: 41.8 % (ref 36.5–49.3)
HGB BLD-MCNC: 13.9 G/DL (ref 12–17)
IMM GRANULOCYTES # BLD AUTO: 0.03 THOUSAND/UL (ref 0–0.2)
IMM GRANULOCYTES NFR BLD AUTO: 0 % (ref 0–2)
LYMPHOCYTES # BLD AUTO: 1.44 THOUSANDS/ÂΜL (ref 0.6–4.47)
LYMPHOCYTES NFR BLD AUTO: 20 % (ref 14–44)
MCH RBC QN AUTO: 32.3 PG (ref 26.8–34.3)
MCHC RBC AUTO-ENTMCNC: 33.3 G/DL (ref 31.4–37.4)
MCV RBC AUTO: 97 FL (ref 82–98)
MONOCYTES # BLD AUTO: 0.68 THOUSAND/ÂΜL (ref 0.17–1.22)
MONOCYTES NFR BLD AUTO: 9 % (ref 4–12)
NEUTROPHILS # BLD AUTO: 5.02 THOUSANDS/ÂΜL (ref 1.85–7.62)
NEUTS SEG NFR BLD AUTO: 68 % (ref 43–75)
NRBC BLD AUTO-RTO: 0 /100 WBCS
P AXIS: 113 DEGREES
P AXIS: 40 DEGREES
PLATELET # BLD AUTO: 240 THOUSANDS/UL (ref 149–390)
PMV BLD AUTO: 9.8 FL (ref 8.9–12.7)
POTASSIUM SERPL-SCNC: 4.1 MMOL/L (ref 3.5–5.3)
PR INTERVAL: 176 MS
PR INTERVAL: 208 MS
PROT SERPL-MCNC: 7.3 G/DL (ref 6.4–8.4)
QRS AXIS: -22 DEGREES
QRS AXIS: 227 DEGREES
QRSD INTERVAL: 82 MS
QRSD INTERVAL: 86 MS
QT INTERVAL: 334 MS
QT INTERVAL: 340 MS
QTC INTERVAL: 462 MS
QTC INTERVAL: 470 MS
RBC # BLD AUTO: 4.31 MILLION/UL (ref 3.88–5.62)
RSV RNA RESP QL NAA+PROBE: NEGATIVE
SARS-COV-2 RNA RESP QL NAA+PROBE: NEGATIVE
SODIUM SERPL-SCNC: 135 MMOL/L (ref 135–147)
T WAVE AXIS: 16 DEGREES
T WAVE AXIS: 160 DEGREES
TSH SERPL DL<=0.05 MIU/L-ACNC: 1.19 UIU/ML (ref 0.45–4.5)
VENTRICULAR RATE: 115 BPM
VENTRICULAR RATE: 115 BPM
VIT B12 SERPL-MCNC: 403 PG/ML (ref 180–914)
WBC # BLD AUTO: 7.38 THOUSAND/UL (ref 4.31–10.16)

## 2023-10-20 PROCEDURE — 93005 ELECTROCARDIOGRAM TRACING: CPT

## 2023-10-20 PROCEDURE — 99285 EMERGENCY DEPT VISIT HI MDM: CPT

## 2023-10-20 PROCEDURE — 36415 COLL VENOUS BLD VENIPUNCTURE: CPT

## 2023-10-20 PROCEDURE — 72131 CT LUMBAR SPINE W/O DYE: CPT

## 2023-10-20 PROCEDURE — 72128 CT CHEST SPINE W/O DYE: CPT

## 2023-10-20 PROCEDURE — 72125 CT NECK SPINE W/O DYE: CPT

## 2023-10-20 PROCEDURE — 99222 1ST HOSP IP/OBS MODERATE 55: CPT | Performed by: PHYSICIAN ASSISTANT

## 2023-10-20 PROCEDURE — G1004 CDSM NDSC: HCPCS

## 2023-10-20 PROCEDURE — 73564 X-RAY EXAM KNEE 4 OR MORE: CPT

## 2023-10-20 PROCEDURE — 82607 VITAMIN B-12: CPT | Performed by: STUDENT IN AN ORGANIZED HEALTH CARE EDUCATION/TRAINING PROGRAM

## 2023-10-20 PROCEDURE — 82746 ASSAY OF FOLIC ACID SERUM: CPT | Performed by: STUDENT IN AN ORGANIZED HEALTH CARE EDUCATION/TRAINING PROGRAM

## 2023-10-20 PROCEDURE — 93010 ELECTROCARDIOGRAM REPORT: CPT | Performed by: INTERNAL MEDICINE

## 2023-10-20 PROCEDURE — 99222 1ST HOSP IP/OBS MODERATE 55: CPT | Performed by: STUDENT IN AN ORGANIZED HEALTH CARE EDUCATION/TRAINING PROGRAM

## 2023-10-20 PROCEDURE — 85025 COMPLETE CBC W/AUTO DIFF WBC: CPT

## 2023-10-20 PROCEDURE — 80053 COMPREHEN METABOLIC PANEL: CPT

## 2023-10-20 PROCEDURE — 84443 ASSAY THYROID STIM HORMONE: CPT | Performed by: STUDENT IN AN ORGANIZED HEALTH CARE EDUCATION/TRAINING PROGRAM

## 2023-10-20 PROCEDURE — 70450 CT HEAD/BRAIN W/O DYE: CPT

## 2023-10-20 PROCEDURE — 0241U HB NFCT DS VIR RESP RNA 4 TRGT: CPT | Performed by: STUDENT IN AN ORGANIZED HEALTH CARE EDUCATION/TRAINING PROGRAM

## 2023-10-20 RX ORDER — LORAZEPAM 2 MG/ML
1 INJECTION INTRAMUSCULAR ONCE
Status: COMPLETED | OUTPATIENT
Start: 2023-10-20 | End: 2023-10-21

## 2023-10-20 RX ORDER — LISINOPRIL 20 MG/1
20 TABLET ORAL DAILY
Status: DISCONTINUED | OUTPATIENT
Start: 2023-10-20 | End: 2023-10-23 | Stop reason: HOSPADM

## 2023-10-20 RX ORDER — OXYCODONE HYDROCHLORIDE 10 MG/1
10 TABLET ORAL EVERY 6 HOURS PRN
Status: DISCONTINUED | OUTPATIENT
Start: 2023-10-20 | End: 2023-10-23 | Stop reason: HOSPADM

## 2023-10-20 RX ORDER — HYDROCHLOROTHIAZIDE 25 MG/1
25 TABLET ORAL DAILY
Status: DISCONTINUED | OUTPATIENT
Start: 2023-10-20 | End: 2023-10-23 | Stop reason: HOSPADM

## 2023-10-20 RX ORDER — FLUTICASONE PROPIONATE 50 MCG
2 SPRAY, SUSPENSION (ML) NASAL DAILY PRN
Status: DISCONTINUED | OUTPATIENT
Start: 2023-10-20 | End: 2023-10-23 | Stop reason: HOSPADM

## 2023-10-20 RX ORDER — PANTOPRAZOLE SODIUM 40 MG/1
40 TABLET, DELAYED RELEASE ORAL DAILY
Status: DISCONTINUED | OUTPATIENT
Start: 2023-10-20 | End: 2023-10-23 | Stop reason: HOSPADM

## 2023-10-20 RX ORDER — GABAPENTIN 100 MG/1
100 CAPSULE ORAL 3 TIMES DAILY
Status: DISCONTINUED | OUTPATIENT
Start: 2023-10-20 | End: 2023-10-23 | Stop reason: HOSPADM

## 2023-10-20 RX ORDER — ATORVASTATIN CALCIUM 10 MG/1
10 TABLET, FILM COATED ORAL DAILY
Status: DISCONTINUED | OUTPATIENT
Start: 2023-10-20 | End: 2023-10-23 | Stop reason: HOSPADM

## 2023-10-20 RX ORDER — OXYCODONE HYDROCHLORIDE 5 MG/1
5 TABLET ORAL EVERY 4 HOURS PRN
Status: DISCONTINUED | OUTPATIENT
Start: 2023-10-20 | End: 2023-10-23 | Stop reason: HOSPADM

## 2023-10-20 RX ORDER — ENOXAPARIN SODIUM 100 MG/ML
40 INJECTION SUBCUTANEOUS DAILY
Status: DISCONTINUED | OUTPATIENT
Start: 2023-10-20 | End: 2023-10-23 | Stop reason: HOSPADM

## 2023-10-20 RX ORDER — LANOLIN ALCOHOL/MO/W.PET/CERES
400 CREAM (GRAM) TOPICAL DAILY
Status: DISCONTINUED | OUTPATIENT
Start: 2023-10-20 | End: 2023-10-23 | Stop reason: HOSPADM

## 2023-10-20 RX ORDER — BACITRACIN, NEOMYCIN, POLYMYXIN B 400; 3.5; 5 [USP'U]/G; MG/G; [USP'U]/G
1 OINTMENT TOPICAL ONCE
Status: COMPLETED | OUTPATIENT
Start: 2023-10-20 | End: 2023-10-20

## 2023-10-20 RX ORDER — LANOLIN ALCOHOL/MO/W.PET/CERES
3 CREAM (GRAM) TOPICAL
Status: DISCONTINUED | OUTPATIENT
Start: 2023-10-20 | End: 2023-10-23 | Stop reason: HOSPADM

## 2023-10-20 RX ORDER — MAGNESIUM HYDROXIDE/ALUMINUM HYDROXICE/SIMETHICONE 120; 1200; 1200 MG/30ML; MG/30ML; MG/30ML
30 SUSPENSION ORAL EVERY 6 HOURS PRN
Status: DISCONTINUED | OUTPATIENT
Start: 2023-10-20 | End: 2023-10-23 | Stop reason: HOSPADM

## 2023-10-20 RX ORDER — LORATADINE 10 MG/1
10 TABLET ORAL DAILY
Status: DISCONTINUED | OUTPATIENT
Start: 2023-10-20 | End: 2023-10-23 | Stop reason: HOSPADM

## 2023-10-20 RX ORDER — TRAMADOL HYDROCHLORIDE 50 MG/1
50 TABLET ORAL EVERY 4 HOURS PRN
Status: DISCONTINUED | OUTPATIENT
Start: 2023-10-20 | End: 2023-10-23 | Stop reason: HOSPADM

## 2023-10-20 RX ORDER — ALBUTEROL SULFATE 90 UG/1
2 AEROSOL, METERED RESPIRATORY (INHALATION) EVERY 6 HOURS PRN
Status: DISCONTINUED | OUTPATIENT
Start: 2023-10-20 | End: 2023-10-23 | Stop reason: HOSPADM

## 2023-10-20 RX ORDER — METHOCARBAMOL 500 MG/1
500 TABLET, FILM COATED ORAL EVERY 8 HOURS SCHEDULED
Status: DISCONTINUED | OUTPATIENT
Start: 2023-10-20 | End: 2023-10-23 | Stop reason: HOSPADM

## 2023-10-20 RX ORDER — ACETAMINOPHEN 325 MG/1
650 TABLET ORAL EVERY 6 HOURS PRN
Status: DISCONTINUED | OUTPATIENT
Start: 2023-10-20 | End: 2023-10-23 | Stop reason: HOSPADM

## 2023-10-20 RX ADMIN — BACITRACIN ZINC, NEOMYCIN, POLYMYXIN B 1 SMALL APPLICATION: 400; 3.5; 5 OINTMENT TOPICAL at 05:40

## 2023-10-20 RX ADMIN — GABAPENTIN 100 MG: 100 CAPSULE ORAL at 17:48

## 2023-10-20 RX ADMIN — ENOXAPARIN SODIUM 40 MG: 40 INJECTION SUBCUTANEOUS at 08:40

## 2023-10-20 RX ADMIN — LORATADINE 10 MG: 10 TABLET ORAL at 08:37

## 2023-10-20 RX ADMIN — ATORVASTATIN CALCIUM 10 MG: 10 TABLET, FILM COATED ORAL at 08:39

## 2023-10-20 RX ADMIN — METHOCARBAMOL 500 MG: 500 TABLET ORAL at 22:07

## 2023-10-20 RX ADMIN — Medication 400 MCG: at 08:40

## 2023-10-20 RX ADMIN — PANTOPRAZOLE SODIUM 40 MG: 40 TABLET, DELAYED RELEASE ORAL at 08:40

## 2023-10-20 RX ADMIN — GABAPENTIN 100 MG: 100 CAPSULE ORAL at 22:07

## 2023-10-20 RX ADMIN — HYDROCHLOROTHIAZIDE 25 MG: 25 TABLET ORAL at 08:39

## 2023-10-20 RX ADMIN — METHOCARBAMOL 500 MG: 500 TABLET ORAL at 17:48

## 2023-10-20 RX ADMIN — Medication 3 MG: at 22:07

## 2023-10-20 NOTE — ASSESSMENT & PLAN NOTE
41-year-old male patient with past medical history of cervical radiculopathy, C4-C6 cord with marked stenosis with compression and has outpatient surgery scheduled on 10/30/23  Status post multiple falls at home due to legs giving out, reports hitting head with fall  Denies worsening numbness or tingling from baseline or any worse then over the last few months tonight, denies incontinence of bowels or urine  CT spine and CT head negative for acute change, x-ray left knee without fracture    Currently patient is afebrile, hemodynamically stable. Patient reports that he is " miserable"  Complaining of bilateral lower extremity involuntary muscle twitching at times, neuropathic pain weakness. Follow-up with thoracic and lumbar MRI. Fall precautions  PT/OT eval  Continue with analgesics as needed.

## 2023-10-20 NOTE — ASSESSMENT & PLAN NOTE
Present on admission history of hypertension. Blood pressure is reasonably controlled. Resume home dose of hydrochlorothiazide, lisinopril.

## 2023-10-20 NOTE — PHYSICAL THERAPY NOTE
Physical Therapy Cancellation Note    PT order received. Chart review performed. At this time, PT evaluation cancelled as pt pending thoracic and lumbar spine MRI. PT will follow and evaluate as appropriate. 10/20/23 1203   PT Last Visit   PT Visit Date 10/20/23   Note Type   Note type Evaluation; Cancelled Session   Cancel Reasons Other         Leslee Prescott, PT

## 2023-10-20 NOTE — PROGRESS NOTES
[address]  Progress Note  Name: Preethi Slaughter  MRN: [id number]  Unit/Bed#: -01 I Date of Admission: 10/20/2023   Date of Service: 10/21/2023 I Hospital Day: 0    Assessment/Plan   * Ambulatory dysfunction  Assessment & Plan  66-year-old male patient with past medical history of cervical radiculopathy, C4-C6 cord with marked stenosis with compression and has outpatient surgery scheduled on 10/30/23  Status post multiple falls at home due to legs giving out, reports hitting head with fall  Denies worsening numbness or tingling from baseline or any worse then over the last few months tonight, denies incontinence of bowels or urine  CT spine and CT head negative for acute change, x-ray left knee without fracture    Currently patient is afebrile, hemodynamically stable. Patient reports that he is " miserable"  Complaining of bilateral lower extremity involuntary muscle twitching at times, neuropathic pain weakness. Follow-up with thoracic and lumbar MRI. Fall precautions  PT/OT eval  Continue with analgesics as needed. Osteoarthritis of cervical spine with myelopathy  Assessment & Plan  Patient currently scheduled for cervical laminectomy on 10/30 at [facility] with neurosurgery    Urinary retention  Assessment & Plan  Patient is having urinary retention requiring straight catheter x2 per nursing report. Likely secondary to immobilization and pain. Currently on retention protocol. place Christensen catheter if noted with retention with repeat bladder scan this afternoon. Hyperlipidemia  Assessment & Plan  Present on admission with history of hyperlipidemia. Resume home dose of Lipitor. Gastroesophageal reflux disease  Assessment & Plan  Present on admission history of GERD. Continue Protonix. Benign essential hypertension  Assessment & Plan  Present on admission history of hypertension. Blood pressure is reasonably controlled.   Resume home dose of hydrochlorothiazide, lisinopril. VTE Pharmacologic Prophylaxis: VTE Score: 3 Moderate Risk (Score 3-4) - Pharmacological DVT Prophylaxis Ordered: enoxaparin (Lovenox). Patient Centered Rounds: I performed bedside rounds with nursing staff today. Education and Discussions with Family / Patient: Patient declined call to . Offered to call family however patient declined stating he has already informed his sister. Current Length of Stay: 0 day(s)  Current Patient Status: Inpatient   Certification Statement: The patient will continue to require additional inpatient hospital stay due to awaiting thoracic and lumbar MRI. Discharge Plan: Anticipate discharge in 24-48 hrs to discharge location to be determined pending rehab evaluations. Code Status: Level 1 - Full Code    Subjective:   Seen during AM rounds. Patient appears comfortable and not in distress however fidgeting in bed trying to find comfortable position. Reports that he is " miserable" due to lower extremity pain as well as back pain. Denies chest pain, dyspnea, fever, chills, nausea, vomiting, any other illness. Seems that patient was noted to have urinary retention on nursing evaluation. No other events reported. Objective:     Vitals:   Temp (24hrs), Av.6 °F (36.4 °C), Min:97.2 °F (36.2 °C), Max:98.1 °F (36.7 °C)    Temp:  [97.2 °F (36.2 °C)-98.1 °F (36.7 °C)] 97.2 °F (36.2 °C)  HR:  [74-89] 74  Resp:  [20-22] 20  BP: ()/(57-84) 122/73  SpO2:  [93 %-99 %] 99 %  Body mass index is 36.14 kg/m². Input and Output Summary (last 24 hours): Intake/Output Summary (Last 24 hours) at 10/21/2023 1307  Last data filed at 10/21/2023 0810  Gross per 24 hour   Intake 940 ml   Output 2075 ml   Net -1135 ml       Physical Exam:   Physical Exam  Constitutional:       General: He is not in acute distress. Appearance: Normal appearance. He is obese. He is not ill-appearing, toxic-appearing or diaphoretic. HENT:      Head: Normocephalic and atraumatic. Eyes:      Pupils: Pupils are equal, round, and reactive to light. Cardiovascular:      Rate and Rhythm: Normal rate. Pulses: Normal pulses. Pulmonary:      Effort: Pulmonary effort is normal. No respiratory distress. Breath sounds: Normal breath sounds. No wheezing. Abdominal:      General: Bowel sounds are normal. There is distension. Palpations: Abdomen is soft. Tenderness: There is no abdominal tenderness. Musculoskeletal:      Cervical back: No rigidity. Neurological:      Mental Status: He is alert and oriented to person, place, and time. Mental status is at baseline.    Psychiatric:         Mood and Affect: Mood normal.         Behavior: Behavior normal.          Additional Data:     Labs:  Results from last 7 days   Lab Units 10/20/23  0323   WBC Thousand/uL 7.38   HEMOGLOBIN g/dL 13.9   HEMATOCRIT % 41.8   PLATELETS Thousands/uL 240   NEUTROS PCT % 68   LYMPHS PCT % 20   MONOS PCT % 9   EOS PCT % 2     Results from last 7 days   Lab Units 10/20/23  0323   SODIUM mmol/L 135   POTASSIUM mmol/L 4.1   CHLORIDE mmol/L 97   CO2 mmol/L 29   BUN mg/dL 22   CREATININE mg/dL 1.13   ANION GAP mmol/L 9   CALCIUM mg/dL 9.6   ALBUMIN g/dL 4.3   TOTAL BILIRUBIN mg/dL 0.69   ALK PHOS U/L 59   ALT U/L 28   AST U/L 33   GLUCOSE RANDOM mg/dL 103                       Lines/Drains:  Invasive Devices       Peripheral Intravenous Line  Duration             Peripheral IV 10/20/23 Distal;Right;Upper;Ventral (anterior) Arm 1 day              Drain  Duration             External Urinary Catheter Medium <1 day                          Recent Cultures (last 7 days):         Last 24 Hours Medication List:   Current Facility-Administered Medications   Medication Dose Route Frequency Provider Last Rate    acetaminophen  650 mg Oral Q6H PRN Laura Galloway PA-C      albuterol  2 puff Inhalation Q6H PRN Laura Galloway PA-C      aluminum-magnesium hydroxide-simethicone  30 mL Oral Q6H PRN Jennifer Coleman PA-C      atorvastatin  10 mg Oral Daily Jennifer Coleman PA-C      enoxaparin  40 mg Subcutaneous Daily Jennifer Coleman PA-C      fluticasone  2 spray Each Nare Daily PRN Jennifer Coleman PA-C      folic acid  548 mcg Oral Daily Cristela Basurto PA-C      gabapentin  100 mg Oral TID Jasmine SMITH MD      hydrochlorothiazide  25 mg Oral Daily Jennifer Coleman PA-C      lisinopril  20 mg Oral Daily Jennifer Coleman PA-C      loratadine  10 mg Oral Daily Cristela Basurto PA-C      melatonin  3 mg Oral HS Sharon Baptiste PA-C      methocarbamol  500 mg Oral Q8H [address] Rafael SMITH MD      oxyCODONE  5 mg Oral Q4H PRN Rafael SMITH MD      Or    oxyCODONE  10 mg Oral Q6H PRN Rafael SMITH MD      pantoprazole  40 mg Oral Daily Cristela Basurto PA-C      traMADol  50 mg Oral Q4H PRN Patrick Lopez MD          Today, Patient Was Seen By: Patrick Lopez MD    **Please Note: This note may have been constructed using a voice recognition system. **

## 2023-10-20 NOTE — OCCUPATIONAL THERAPY NOTE
Occupational Therapy Cancellation Note        Patient Name: Lisa Dunaway  [id number] Date: 10/20/2023        OT consult received. Chart reviewed, OT to cancel patient as pt is pending MRI of thoracic and lumbar spine. Will continue to follow as appropriate.      Namita Worthington MS, OTR/L

## 2023-10-20 NOTE — ED PROVIDER NOTES
History  Chief Complaint   Patient presents with    Fall     Pt came by EMS c/o a fall onto carpet that happened about 1 hour ago. +HS on dresser, -LOC. Pt takes 1 ASA per day. Pt states he is getting progressively weaker. History of Present Illness   68 y.o. male with PMHx of hypertension, hyperlipidemia, cervical radiculopathy, extremity weakness and numbness bilaterally presents to the ED after unwitnessed fall. Patient fell striking his head and had no loss of consciousness. Patient states the fall occurred in his house around 1:40am and 2am. Patient states that he fell around 1:40 AM, he then got up and went to the bathroom. Coming back from the bathroom the patient proceeded to fall again around 2 AM hitting his head on a dresser. Patient states that he felt like his legs gave out both times leading to the falls. Patient currently complains of left knee pain. ROS: Patient denies any headache, abdominal pain, chest pain, fever/chils, nausea/vomiting, visual changes, diarrhea, dyspnea, cough, arthralgia, dysuria. All other systems reviewed and are negative. PHYSICAL EXAM:   Primary Exam   A: Patent   B: Bilateral equal breath sounds   C: Pulses intact in all extremities, no active bleeding   D: No signs of gross motor or cognitive neurologic impairment     Secondary Exam   Constitutional: No acute distress. HENT: Normocephalic and atraumatic. Normal pharyngeal exam. No hemotympanum, raccoon eyes or Ferrer sign. Eyes: No hyphema. EOMI. PERRL. Neck: No midline tenderness, supple. No midline tenderness. CV: Regular rate and rhythm, no murmur. Peripheral pulses intact. Respiratory: No traumatic findings. Lungs clear to auscultation bilaterally. Chest nontender. Abdomen: No traumatic findings. Soft, Non-tender, non-distended. Back: No vertebral tenderness, step-offs or crepitus. Skin: Normal color, warm and dry   Extremities: Non-tender, no deformities.    Neuro: Awake, alert, no gross sensory or motor deficits          History provided by:  Patient   used: No    Fall  Mechanism of injury: fall    Injury location:  Head/neck and leg  Head/neck injury location:  Head (Left occipital region)  Leg injury location:  L knee  Fall:     Fall occurred:  Standing    Impact surface:  Furniture and carpet    Point of impact:  Head and knees    Entrapped after fall: no    Suspicion of alcohol use: no    Suspicion of drug use: no    Associated symptoms: back pain and neck pain    Associated symptoms: no abdominal pain, no chest pain, no headaches, no nausea, no seizures and no vomiting        Prior to Admission Medications   Prescriptions Last Dose Informant Patient Reported? Taking?    albuterol (PROVENTIL HFA,VENTOLIN HFA) 90 mcg/act inhaler  Self Yes No   Sig: INHALE 2 PUFFS BY MOUTH EVERY 6 HOURS AS NEEDED FOR WHEEZING AND SHORTNESS OF BREATH   aspirin (ECOTRIN LOW STRENGTH) 81 mg EC tablet   Yes No   Sig: Take 81 mg by mouth daily   atorvastatin (LIPITOR) 20 mg tablet  Self Yes No   Sig: Take 10 mg by mouth daily   calcium-vitamin D (OSCAL) 250-125 MG-UNIT per tablet  Self Yes No   Sig: Take 1 tablet by mouth daily   cyanocobalamin (VITAMIN B-12) 1,000 mcg tablet  Self Yes No   Sig: Take by mouth daily   fluticasone (FLONASE) 50 mcg/act nasal spray  Self Yes No   Sig: into each nostril   folic acid (FOLVITE) 466 MCG tablet  Self Yes No   Sig: Take 400 mcg by mouth daily   hydrochlorothiazide (HYDRODIURIL) 25 mg tablet  Self Yes No   Sig: Take 25 mg by mouth daily   lisinopril (ZESTRIL) 20 mg tablet  Self Yes No   Sig: Take 20 mg by mouth daily   loratadine (CLARITIN) 10 mg tablet  Self Yes No   Sig: Take 10 mg by mouth daily   omeprazole (PriLOSEC) 20 mg delayed release capsule  Self Yes No   Sig: Take 20 mg by mouth daily      Facility-Administered Medications: None       Past Medical History:   Diagnosis Date    GERD (gastroesophageal reflux disease)     Hyperlipidemia Hypertension        Past Surgical History:   Procedure Laterality Date    COLONOSCOPY      HERNIA REPAIR      ID COLONOSCOPY FLX DX W/COLLJ SPEC WHEN PFRMD N/A 4/5/2019    Procedure: COLONOSCOPY;  Surgeon: Jerilyn Holman DO;  Location: [facility]; Service: Gastroenterology    ROTATOR CUFF REPAIR Left     TONSILLECTOMY         No family history on file. I have reviewed and agree with the history as documented. E-Cigarette/Vaping    E-Cigarette Use Never User      E-Cigarette/Vaping Substances    Nicotine No     THC No     CBD No     Flavoring No     Other No     Unknown No      Social History     Tobacco Use    Smoking status: Never    Smokeless tobacco: Never   Vaping Use    Vaping Use: Never used   Substance Use Topics    Alcohol use: Yes     Alcohol/week: 6.0 standard drinks of alcohol     Types: 6 Cans of beer per week     Comment: beer 4-5 days a week    Drug use: Never       Review of Systems   Constitutional:  Negative for activity change, appetite change, chills, fatigue and fever. HENT:  Negative for drooling, facial swelling, mouth sores, sinus pressure, sinus pain and tinnitus. Eyes:  Negative for photophobia, redness and visual disturbance. Respiratory:  Negative for cough, chest tightness, shortness of breath, wheezing and stridor. Cardiovascular:  Negative for chest pain, palpitations and leg swelling. Gastrointestinal:  Negative for abdominal distention, abdominal pain, constipation, diarrhea, nausea and vomiting. Genitourinary:  Negative for difficulty urinating, flank pain, frequency, hematuria and urgency. Musculoskeletal:  Positive for arthralgias, back pain, gait problem and neck pain. Negative for joint swelling and myalgias. Neurological:  Positive for weakness and numbness. Negative for dizziness, tremors, seizures, syncope, facial asymmetry, speech difficulty, light-headedness and headaches. Psychiatric/Behavioral:  Negative for confusion.         Physical Exam  Physical Exam  Constitutional:       Appearance: Normal appearance. HENT:      Head: Normocephalic. Abrasion and contusion present. No raccoon eyes, Ferrer's sign, right periorbital erythema, left periorbital erythema or laceration. Jaw: No trismus or tenderness. Right Ear: Tympanic membrane normal.      Left Ear: Tympanic membrane normal.      Nose: Nose normal. No nasal deformity, laceration or rhinorrhea. Right Nostril: No epistaxis. Left Nostril: No epistaxis. Eyes:      Extraocular Movements: Extraocular movements intact. Conjunctiva/sclera: Conjunctivae normal.      Pupils: Pupils are equal, round, and reactive to light. Cardiovascular:      Rate and Rhythm: Normal rate. Pulses:           Radial pulses are 2+ on the right side and 2+ on the left side. Dorsalis pedis pulses are 1+ on the right side and 1+ on the left side. Heart sounds: Normal heart sounds. Pulmonary:      Effort: Pulmonary effort is normal.      Breath sounds: Normal breath sounds. Abdominal:      General: Abdomen is flat. Bowel sounds are normal. There is no distension. Palpations: Abdomen is soft. There is no mass. Tenderness: There is no abdominal tenderness. There is no right CVA tenderness, left CVA tenderness, guarding or rebound. Negative signs include Vogel's sign, Rovsing's sign and McBurney's sign. Musculoskeletal:      Right knee: No lacerations. No tenderness. Left knee: Laceration and bony tenderness present. Tenderness present over the medial joint line. Neurological:      Mental Status: He is alert.          Vital Signs  ED Triage Vitals   Temperature Pulse Respirations Blood Pressure SpO2   10/20/23 0255 10/20/23 0250 10/20/23 0250 10/20/23 0250 10/20/23 0250   97.9 °F (36.6 °C) 98 21 121/72 93 %      Temp Source Heart Rate Source Patient Position - Orthostatic VS BP Location FiO2 (%)   10/20/23 0255 10/20/23 0250 10/20/23 0250 10/20/23 0250 --   Oral Monitor Lying Right arm       Pain Score       --                  Vitals:    10/20/23 0250 10/20/23 0330 10/20/23 0430 10/20/23 0530   BP: 121/72 159/82 139/87 127/63   Pulse: 98 94 100 99   Patient Position - Orthostatic VS: Lying   Lying         Visual Acuity  Visual Acuity      Flowsheet Row Most Recent Value   L Pupil Size (mm) 3   R Pupil Size (mm) 3            ED Medications  Medications   albuterol (PROVENTIL HFA,VENTOLIN HFA) inhaler 2 puff (has no administration in time range)   aspirin (ECOTRIN LOW STRENGTH) EC tablet 81 mg (has no administration in time range)   atorvastatin (LIPITOR) tablet 10 mg (has no administration in time range)   fluticasone (FLONASE) 50 mcg/act nasal spray 2 spray (has no administration in time range)   folic acid (FOLVITE) tablet 400 mcg (has no administration in time range)   hydrochlorothiazide (HYDRODIURIL) tablet 25 mg (has no administration in time range)   lisinopril (ZESTRIL) tablet 20 mg (has no administration in time range)   loratadine (CLARITIN) tablet 10 mg (has no administration in time range)   pantoprazole (PROTONIX) EC tablet 40 mg (has no administration in time range)   acetaminophen (TYLENOL) tablet 650 mg (has no administration in time range)   aluminum-magnesium hydroxide-simethicone (MAALOX) oral suspension 30 mL (has no administration in time range)   enoxaparin (LOVENOX) subcutaneous injection 40 mg (has no administration in time range)   neomycin-bacitracin-polymyxin b (NEOSPORIN) ointment 1 small application (1 small application Topical Given 10/20/23 1940)       Diagnostic Studies  Results Reviewed       Procedure Component Value Units Date/Time    Comprehensive metabolic panel [[id number]] Collected: 10/20/23 6376    Lab Status: Final result Specimen: Blood from Arm, Right Updated: 10/20/23 0346     Sodium 135 mmol/L      Potassium 4.1 mmol/L      Chloride 97 mmol/L      CO2 29 mmol/L      ANION GAP 9 mmol/L      BUN 22 mg/dL      Creatinine 1.13 mg/dL      Glucose 103 mg/dL      Calcium 9.6 mg/dL      AST 33 U/L      ALT 28 U/L      Alkaline Phosphatase 59 U/L      Total Protein 7.3 g/dL      Albumin 4.3 g/dL      Total Bilirubin 0.69 mg/dL      eGFR 64 ml/min/1.73sq m     Narrative:      [place] guidelines for Chronic Kidney Disease (CKD):     Stage 1 with normal or high GFR (GFR > 90 mL/min/1.73 square meters)    Stage 2 Mild CKD (GFR = 60-89 mL/min/1.73 square meters)    Stage 3A Moderate CKD (GFR = 45-59 mL/min/1.73 square meters)    Stage 3B Moderate CKD (GFR = 30-44 mL/min/1.73 square meters)    Stage 4 Severe CKD (GFR = 15-29 mL/min/1.73 square meters)    Stage 5 End Stage CKD (GFR <15 mL/min/1.73 square meters)  Note: GFR calculation is accurate only with a steady state creatinine    CBC and differential [[id number]] Collected: 10/20/23 0323    Lab Status: Final result Specimen: Blood from Arm, Right Updated: 10/20/23 0327     WBC 7.38 Thousand/uL      RBC 4.31 Million/uL      Hemoglobin 13.9 g/dL      Hematocrit 41.8 %      MCV 97 fL      MCH 32.3 pg      MCHC 33.3 g/dL      RDW 12.4 %      MPV 9.8 fL      Platelets 276 Thousands/uL      nRBC 0 /100 WBCs      Neutrophils Relative 68 %      Immat GRANS % 0 %      Lymphocytes Relative 20 %      Monocytes Relative 9 %      Eosinophils Relative 2 %      Basophils Relative 1 %      Neutrophils Absolute 5.02 Thousands/µL      Immature Grans Absolute 0.03 Thousand/uL      Lymphocytes Absolute 1.44 Thousands/µL      Monocytes Absolute 0.68 Thousand/µL      Eosinophils Absolute 0.16 Thousand/µL      Basophils Absolute 0.05 Thousands/µL                    XR knee 4+ vw left injury   ED Interpretation by Ethan Ross PA-C (10/20 7493)   No acute osseous abnormalities noted, narrowing of joint space likely secondary to arthritis       CT head without contrast   Final Result by Del Montez MD (10/20 0712)      No intracranial hemorrhage or calvarial fracture.          Workstation performed: [id number] CT spine cervical without contrast   Final Result by Lelo Sullivan MD (10/20 0402)      No cervical spine fracture or traumatic malalignment. Workstation performed: [id number]         CT spine thoracic & lumbar wo contrast   Final Result by Lelo Sullivan MD (10/20 0402)      No acute fracture or traumatic listhesis of the visualized thoracic and lumbar spine      Workstation performed: [id number]                    Procedures  Procedures         ED Course  ED Course as of 10/20/23 0704   Fri Oct 20, 2023   0330 CBC and differential  normal   0348 Creatinine: 1.13   0352 XR knee 4+ vw left injury   0352 Comprehensive metabolic panel  normal   6245 CT head without contrast  No intracranial hemorrhage or calvarial fracture. 5139 CT spine cervical without contrast  No cervical spine fracture or traumatic malalignment. 8925 CT spine thoracic & lumbar wo contrast  No acute fracture or traumatic listhesis of the visualized thoracic and lumbar spine                           SBIRT 22yo+      Flowsheet Row Most Recent Value   Initial Alcohol Screen: US AUDIT-C     1. How often do you have a drink containing alcohol? 0 Filed at: 10/20/2023 0252   2. How many drinks containing alcohol do you have on a typical day you are drinking? 2 Filed at: 10/20/2023 0252   3b. FEMALE Any Age, or MALE 65+: How often do you have 4 or more drinks on one occassion? 4 Filed at: 10/20/2023 0252   Audit-C Score 6 Filed at: 10/20/2023 6771                      Medical Decision Making  Medical Decision Making: This is a 68-year-old male PMHx of hypertension, hyperlipidemia, cervical radiculopathy, extremity weakness and numbness bilaterally presents for falls with head strike. Patient on baby aspirin. Patient has a baseline weakness and numbness in all four extremities which he is seeing neurosurgery for, he is not complaining of nay new or worsening numbness or weakness.  Patient denies loss of bladder or bowel control, constipation, urinary retention, fever, chills, night sweats, immunosuppression, headache. Impression:   Fall   - Patient has no focal neurological deficits on exam  - No new numbness, weakness, or dyspnea. Plan:   - CBC, CMP, CT head and spine w/o contrast   - Xray of the left knee shows no osseous abnormalities with some joint space narrowing likely secondary to arthritis   - Ct head/spine show no acute abnormalities; degenerative disc disease  - Discussed with [facility] and will admit patient under obs under Dr. Susana Rivas  - Discussed having case management speak with the patient about help at home with hygiene given increasing weakness and falls  - Discussed possibility of PT and rehab with patient who is amenable to the idea given his decreasing independence; patient states that he has surgery on October 30th    Amount and/or Complexity of Data Reviewed  Labs: ordered. Decision-making details documented in ED Course. Radiology: ordered and independent interpretation performed. Decision-making details documented in ED Course. Risk  Decision regarding hospitalization. Disposition  Final diagnoses:   Fall, initial encounter   Weakness     Time reflects when diagnosis was documented in both MDM as applicable and the Disposition within this note       Time User Action Codes Description Comment    10/20/2023  4:25 AM Karley Levin Add [I84. DRQM] Fall, initial encounter     10/20/2023  4:25 AM Rachid Lyons Add [R53.1] Weakness     10/20/2023  4:53 AM Oral Bald Add [R26.2] Ambulatory dysfunction           ED Disposition       ED Disposition   Admit    Condition   Stable    Date/Time   Fri Oct 20, 2023 0425    Comment   Case was discussed with Oral Bald and the patient's admission status was agreed to be Admission Status: observation status to the service of Dr. Susana Rivas .                Follow-up Information    None         Patient's Medications   Discharge Prescriptions    No medications on file       No discharge procedures on file.     PDMP Review       None            ED Provider  Electronically Signed by             Mag Hay PA-C  10/20/23 9000

## 2023-10-20 NOTE — ASSESSMENT & PLAN NOTE
Patient currently scheduled for cervical laminectomy on 10/30 at [facility] with neurosurgery

## 2023-10-20 NOTE — H&P
[address]  H&P  Name: Magdalena Halsted 68 y.o. male I MRN: [id number]  Unit/Bed#: ED 15 I Date of Admission: 10/20/2023   Date of Service: 10/20/2023 I Hospital Day: 0      Assessment/Plan   * Ambulatory dysfunction  Assessment & Plan  Status post multiple falls tonight at home due to legs giving out, reports hitting head with fall  Denies worsening numbness or tingling from baseline or any worse then over the last few months tonight, denies incontinence of bowels or urine  CT spine and CT head negative for acute change, x-ray left knee without fracture  Fall precautions  PT/OT eval  Case management consult to help with discharge planning    Osteoarthritis of cervical spine with myelopathy  Assessment & Plan  Patient currently scheduled for cervical laminectomy on 10/30 at [facility] with neurosurgery           VTE Pharmacologic Prophylaxis: VTE Score: 3 Moderate Risk (Score 3-4) - Pharmacological DVT Prophylaxis Ordered: enoxaparin (Lovenox). Code Status: Level 1 - Full Code   Discussion with family:  pt. Anticipated Length of Stay: Patient will be admitted on an observation basis with an anticipated length of stay of less than 2 midnights secondary to see above. Total Time Spent on Date of Encounter in care of patient: 60 mins. This time was spent on one or more of the following: performing physical exam; counseling and coordination of care; obtaining or reviewing history; documenting in the medical record; reviewing/ordering tests, medications or procedures; communicating with other healthcare professionals and discussing with patient's family/caregivers. Chief Complaint:    Chief Complaint   Patient presents with    Fall     Pt came by EMS c/o a fall onto carpet that happened about 1 hour ago. +HS on dresser, -LOC. Pt takes 1 ASA per day. Pt states he is getting progressively weaker.          History of Present Illness:  Magdalena Halsted is a 68 y.o. male with a PMH of cervical spine osteoarthritis with myelopathy, hypertension, GERD, hyperlipidemia who presents with complaint of sudden onset multiple falls that happened early this morning. Patient reports over the last few months he has had gradual worsening of numbness and tingling in left lower extremity and now over the last month or so worsening in right lower extremity. He reports he did see neurosurgery for an appointment about 5 weeks ago to a month ago and is scheduled for a laminectomy at 10/30 at [facility]. He denies tonight any sudden onset worsening numbness or tingling from his most recent baseline, denies any worsening pain in legs. Denies any incontinence of stool or urine. He reports initially he woke up tonight to go to the bathroom and fell going to the bathroom and then was able to get himself back up and sit on the bed and then he tried to get up and fell again. He does report he hit the left side of his head when he fell. Denies any headache, chest pain, abdominal pain, hip pain. Does also admit he has had difficulty with some ADLs due to limited mobility and he lives alone. Review of Systems:  Review of Systems   Constitutional:  Negative for activity change. Cardiovascular:  Negative for chest pain. Gastrointestinal:  Negative for abdominal pain. Musculoskeletal:  Positive for arthralgias, gait problem and neck pain. Neurological:  Positive for numbness. Negative for weakness and headaches. Past Medical and Surgical History:   Past Medical History:   Diagnosis Date    GERD (gastroesophageal reflux disease)     Hyperlipidemia     Hypertension        Past Surgical History:   Procedure Laterality Date    COLONOSCOPY      HERNIA REPAIR      FL COLONOSCOPY FLX DX W/COLLJ SPEC WHEN PFRMD N/A 4/5/2019    Procedure: COLONOSCOPY;  Surgeon: Francisco Herrmann DO;  Location: [facility];   Service: Gastroenterology    ROTATOR CUFF REPAIR Left     TONSILLECTOMY         Meds/Allergies:  Prior to Admission medications    Medication Sig Start Date End Date Taking? Authorizing Provider   albuterol (PROVENTIL HFA,VENTOLIN HFA) 90 mcg/act inhaler INHALE 2 PUFFS BY MOUTH EVERY 6 HOURS AS NEEDED FOR WHEEZING AND SHORTNESS OF BREATH 6/14/22   Historical Provider, MD   aspirin (ECOTRIN LOW STRENGTH) 81 mg EC tablet Take 81 mg by mouth daily    Historical Provider, MD   atorvastatin (LIPITOR) 20 mg tablet Take 10 mg by mouth daily    Historical Provider, MD   calcium-vitamin D (OSCAL) 250-125 MG-UNIT per tablet Take 1 tablet by mouth daily    Historical Provider, MD   cyanocobalamin (VITAMIN B-12) 1,000 mcg tablet Take by mouth daily    Historical Provider, MD   fluticasone (FLONASE) 50 mcg/act nasal spray into each nostril 6/14/22   Historical Provider, MD   folic acid (FOLVITE) 994 MCG tablet Take 400 mcg by mouth daily    Historical Provider, MD   hydrochlorothiazide (HYDRODIURIL) 25 mg tablet Take 25 mg by mouth daily    Historical Provider, MD   lisinopril (ZESTRIL) 20 mg tablet Take 20 mg by mouth daily    Historical Provider, MD   loratadine (CLARITIN) 10 mg tablet Take 10 mg by mouth daily    Historical Provider, MD   omeprazole (PriLOSEC) 20 mg delayed release capsule Take 20 mg by mouth daily    Historical Provider, MD     I have reviewed her medications per review of chart and partially with patient.      Allergies: No Known Allergies    Social History:  Marital Status: Single     Patient Pre-hospital Living Situation: Home  Patient Pre-hospital Level of Mobility: walks  Patient Pre-hospital Diet Restrictions: n/a  Substance Use History:   Social History     Substance and Sexual Activity   Alcohol Use Yes    Alcohol/week: 6.0 standard drinks of alcohol    Types: 6 Cans of beer per week    Comment: beer 4-5 days a week     Social History     Tobacco Use   Smoking Status Never   Smokeless Tobacco Never     Social History     Substance and Sexual Activity   Drug Use Never       Family History:  No family history on file. Physical Exam:     Vitals:   Blood Pressure: 159/82 (10/20/23 0330)  Pulse: 94 (10/20/23 0330)  Temperature: 97.9 °F (36.6 °C) (10/20/23 0255)  Temp Source: Oral (10/20/23 0255)  Respirations: 22 (10/20/23 0330)  SpO2: 95 % (10/20/23 0330)    Physical Exam  Vitals and nursing note reviewed. Constitutional:       General: He is not in acute distress. Appearance: Normal appearance. He is not diaphoretic. HENT:      Head: Normocephalic and atraumatic. Nose: Nose normal.   Cardiovascular:      Rate and Rhythm: Normal rate and regular rhythm. Pulmonary:      Effort: Pulmonary effort is normal. No respiratory distress. Breath sounds: Normal breath sounds. Abdominal:      General: Abdomen is flat. Bowel sounds are normal.      Palpations: Abdomen is soft. Musculoskeletal:         General: No tenderness. Right lower leg: Edema present. Left lower leg: Edema present. Comments: Small abrasions noted on left knee and left second toe. No tenderness to palpation of bilateral lower extremities. Skin:     General: Skin is warm and dry. Neurological:      General: No focal deficit present. Mental Status: He is alert and oriented to person, place, and time. Sensory: No sensory deficit. Psychiatric:         Mood and Affect: Mood normal.         Behavior: Behavior normal.         Thought Content:  Thought content normal.          Additional Data:     Lab Results:  Results from last 7 days   Lab Units 10/20/23  0323   WBC Thousand/uL 7.38   HEMOGLOBIN g/dL 13.9   HEMATOCRIT % 41.8   PLATELETS Thousands/uL 240   NEUTROS PCT % 68   LYMPHS PCT % 20   MONOS PCT % 9   EOS PCT % 2     Results from last 7 days   Lab Units 10/20/23  0323   SODIUM mmol/L 135   POTASSIUM mmol/L 4.1   CHLORIDE mmol/L 97   CO2 mmol/L 29   BUN mg/dL 22   CREATININE mg/dL 1.13   ANION GAP mmol/L 9   CALCIUM mg/dL 9.6   ALBUMIN g/dL 4.3   TOTAL BILIRUBIN mg/dL 0.69   ALK PHOS U/L 59   ALT U/L 28   AST U/L 33   GLUCOSE RANDOM mg/dL 103                       Lines/Drains:  Invasive Devices       None                       Imaging: Reviewed radiology reports from this admission including: CT head and CT C-spine and CT T-spine and Personally reviewed the following imaging: xray(s)  XR knee 4+ vw left injury   ED Interpretation by Love Rider PA-C (10/20 0711)   No acute osseous abnormalities noted, narrowing of joint space likely secondary to arthritis       CT head without contrast   Final Result by Carlo Hackett MD (10/20 0402)      No intracranial hemorrhage or calvarial fracture. Workstation performed: [id number]         CT spine cervical without contrast   Final Result by Carlo Hackett MD (10/20 0402)      No cervical spine fracture or traumatic malalignment. Workstation performed: [id number]         CT spine thoracic & lumbar wo contrast   Final Result by Carlo Hackett MD (10/20 0402)      No acute fracture or traumatic listhesis of the visualized thoracic and lumbar spine      Workstation performed: [id number]             EKG and Other Studies Reviewed on Admission:   EKG:  Sinus tachycardia. ** Please Note: This note has been constructed using a voice recognition system.  **

## 2023-10-20 NOTE — ASSESSMENT & PLAN NOTE
Patient currently scheduled for cervical laminectomy on 10/30 at [facility] with neurosurgery

## 2023-10-20 NOTE — ASSESSMENT & PLAN NOTE
Status post multiple falls tonight at home due to legs giving out, reports hitting head with fall  Denies worsening numbness or tingling from baseline or any worse then over the last few months tonight, denies incontinence of bowels or urine  CT spine and CT head negative for acute change, x-ray left knee without fracture  Fall precautions  PT/OT eval  Case management consult to help with discharge planning

## 2023-10-21 ENCOUNTER — APPOINTMENT (INPATIENT)
Dept: MRI IMAGING | Facility: HOSPITAL | Age: 73
DRG: 552 | End: 2023-10-21
Payer: MEDICARE

## 2023-10-21 PROBLEM — R33.9 URINARY RETENTION: Status: ACTIVE | Noted: 2023-10-21

## 2023-10-21 PROCEDURE — 72148 MRI LUMBAR SPINE W/O DYE: CPT

## 2023-10-21 PROCEDURE — 72146 MRI CHEST SPINE W/O DYE: CPT

## 2023-10-21 PROCEDURE — 94664 DEMO&/EVAL PT USE INHALER: CPT

## 2023-10-21 PROCEDURE — 99233 SBSQ HOSP IP/OBS HIGH 50: CPT | Performed by: STUDENT IN AN ORGANIZED HEALTH CARE EDUCATION/TRAINING PROGRAM

## 2023-10-21 PROCEDURE — 94760 N-INVAS EAR/PLS OXIMETRY 1: CPT

## 2023-10-21 RX ADMIN — LISINOPRIL 20 MG: 20 TABLET ORAL at 08:25

## 2023-10-21 RX ADMIN — LORATADINE 10 MG: 10 TABLET ORAL at 08:25

## 2023-10-21 RX ADMIN — ENOXAPARIN SODIUM 40 MG: 40 INJECTION SUBCUTANEOUS at 08:25

## 2023-10-21 RX ADMIN — METHOCARBAMOL 500 MG: 500 TABLET ORAL at 21:15

## 2023-10-21 RX ADMIN — LORAZEPAM 1 MG: 2 INJECTION INTRAMUSCULAR; INTRAVENOUS at 10:19

## 2023-10-21 RX ADMIN — OXYCODONE HYDROCHLORIDE 5 MG: 5 TABLET ORAL at 10:18

## 2023-10-21 RX ADMIN — HYDROCHLOROTHIAZIDE 25 MG: 25 TABLET ORAL at 08:25

## 2023-10-21 RX ADMIN — GABAPENTIN 100 MG: 100 CAPSULE ORAL at 08:25

## 2023-10-21 RX ADMIN — ATORVASTATIN CALCIUM 10 MG: 10 TABLET, FILM COATED ORAL at 08:25

## 2023-10-21 RX ADMIN — GABAPENTIN 100 MG: 100 CAPSULE ORAL at 21:15

## 2023-10-21 RX ADMIN — Medication 400 MCG: at 08:25

## 2023-10-21 RX ADMIN — Medication 3 MG: at 21:15

## 2023-10-21 RX ADMIN — METHOCARBAMOL 500 MG: 500 TABLET ORAL at 15:18

## 2023-10-21 RX ADMIN — GABAPENTIN 100 MG: 100 CAPSULE ORAL at 15:18

## 2023-10-21 RX ADMIN — PANTOPRAZOLE SODIUM 40 MG: 40 TABLET, DELAYED RELEASE ORAL at 08:25

## 2023-10-21 RX ADMIN — METHOCARBAMOL 500 MG: 500 TABLET ORAL at 05:30

## 2023-10-21 NOTE — PLAN OF CARE
Problem: SAFETY ADULT  Goal: Patient will remain free of falls  Description: INTERVENTIONS:  - Educate patient/family on patient safety including physical limitations  - Instruct patient to call for assistance with activity   - Consult OT/PT to assist with strengthening/mobility   - Keep Call bell within reach  - Keep bed low and locked with side rails adjusted as appropriate  - Keep care items and personal belongings within reach  - Initiate and maintain comfort rounds  - Make Fall Risk Sign visible to staff  - Offer Toileting  - Initiate/Maintain bed alarm  - Obtain necessary fall risk management equipment:   - Apply yellow socks and bracelet for high fall risk patients  - Consider moving patient to room near nurses station  10/21/2023 0437 by Joan Villarreal RN  Outcome: Progressing  10/21/2023 0437 by Joan Villarreal RN  Outcome: Progressing     Problem: Knowledge Deficit  Goal: Patient/family/caregiver demonstrates understanding of disease process, treatment plan, medications, and discharge instructions  Description: Complete learning assessment and assess knowledge base.   Interventions:  - Provide teaching at level of understanding  - Provide teaching via preferred learning methods  Outcome: Progressing

## 2023-10-21 NOTE — ASSESSMENT & PLAN NOTE
Patient is having urinary retention requiring straight catheter x2 per nursing report. Likely secondary to immobilization and pain. Currently on retention protocol. place Christensen catheter if noted with retention with repeat bladder scan this afternoon.

## 2023-10-21 NOTE — PLAN OF CARE
Problem: Prexisting or High Potential for Compromised Skin Integrity  Goal: Skin integrity is maintained or improved  Description: INTERVENTIONS:  - Identify patients at risk for skin breakdown  - Assess and monitor skin integrity  - Assess and monitor nutrition and hydration status  - Monitor labs   - Assess for incontinence   - Turn and reposition patient  - Assist with mobility/ambulation  - Relieve pressure over bony prominences  - Avoid friction and shearing  - Provide appropriate hygiene as needed including keeping skin clean and dry  - Evaluate need for skin moisturizer/barrier cream  - Collaborate with interdisciplinary team   - Patient/family teaching  - Consider wound care consult   Outcome: Progressing     Problem: Knowledge Deficit  Goal: Patient/family/caregiver demonstrates understanding of disease process, treatment plan, medications, and discharge instructions  Description: Complete learning assessment and assess knowledge base.   Interventions:  - Provide teaching at level of understanding  - Provide teaching via preferred learning methods  Outcome: Progressing

## 2023-10-21 NOTE — RESPIRATORY THERAPY NOTE
RT Protocol Note  Lana Sim 68 y.o. male MRN: [id number]  Unit/Bed#: -01 Encounter: [id number]    Assessment    Principal Problem:    Ambulatory dysfunction  Active Problems:    Osteoarthritis of cervical spine with myelopathy    Benign essential hypertension    Gastroesophageal reflux disease    Hyperlipidemia      Home Pulmonary Medications:  Prn albuterol       Past Medical History:   Diagnosis Date    GERD (gastroesophageal reflux disease)     Hyperlipidemia     Hypertension      Social History     Socioeconomic History    Marital status: Single     Spouse name: None    Number of children: None    Years of education: None    Highest education level: None   Occupational History    None   Tobacco Use    Smoking status: Never    Smokeless tobacco: Never   Vaping Use    Vaping Use: Never used   Substance and Sexual Activity    Alcohol use: Yes     Alcohol/week: 6.0 standard drinks of alcohol     Types: 6 Cans of beer per week     Comment: beer 4-5 days a week    Drug use: Never    Sexual activity: None   Other Topics Concern    None   Social History Narrative    None     Social Determinants of Health     Financial Resource Strain: Not on file   Food Insecurity: Not on file   Transportation Needs: Not on file   Physical Activity: Not on file   Stress: Not on file   Social Connections: Not on file   Intimate Partner Violence: Not on file   Housing Stability: Not on file       Subjective         Objective    Physical Exam:        Vitals:  Blood pressure 122/73, pulse 74, temperature (!) 97.2 °F (36.2 °C), resp. rate 20, height 5' 9" (1.753 m), weight 111 kg (244 lb 11.4 oz), SpO2 96 %. Imaging and other studies: I have personally reviewed pertinent reports.             Plan    Respiratory Plan: Home Bronchodilator Patient pathway        Resp Comments: pt with no resp hx but uses albuterol prn at home  will continue with this order

## 2023-10-21 NOTE — PHYSICAL THERAPY NOTE
10/21/23 1000   PT Last Visit   PT Visit Date 10/20/23   Note Type   Note type Evaluation; Cancelled Session   Cancel Reasons Other   Additional Comments PT order received. Chart review performed. At this time, PT evaluation cancelled as pt pending thoracic and lumbar spine MRI. PT will follow and evaluate as appropriate.

## 2023-10-22 PROCEDURE — 94664 DEMO&/EVAL PT USE INHALER: CPT

## 2023-10-22 PROCEDURE — 94760 N-INVAS EAR/PLS OXIMETRY 1: CPT

## 2023-10-22 PROCEDURE — 97163 PT EVAL HIGH COMPLEX 45 MIN: CPT

## 2023-10-22 PROCEDURE — 99232 SBSQ HOSP IP/OBS MODERATE 35: CPT | Performed by: STUDENT IN AN ORGANIZED HEALTH CARE EDUCATION/TRAINING PROGRAM

## 2023-10-22 RX ADMIN — ATORVASTATIN CALCIUM 10 MG: 10 TABLET, FILM COATED ORAL at 09:00

## 2023-10-22 RX ADMIN — ENOXAPARIN SODIUM 40 MG: 40 INJECTION SUBCUTANEOUS at 09:17

## 2023-10-22 RX ADMIN — METHOCARBAMOL 500 MG: 500 TABLET ORAL at 14:30

## 2023-10-22 RX ADMIN — METHOCARBAMOL 500 MG: 500 TABLET ORAL at 05:17

## 2023-10-22 RX ADMIN — OXYCODONE HYDROCHLORIDE 5 MG: 5 TABLET ORAL at 14:30

## 2023-10-22 RX ADMIN — Medication 3 MG: at 23:48

## 2023-10-22 RX ADMIN — TRAMADOL HYDROCHLORIDE 50 MG: 50 TABLET, COATED ORAL at 09:00

## 2023-10-22 RX ADMIN — LORATADINE 10 MG: 10 TABLET ORAL at 09:00

## 2023-10-22 RX ADMIN — PANTOPRAZOLE SODIUM 40 MG: 40 TABLET, DELAYED RELEASE ORAL at 09:01

## 2023-10-22 RX ADMIN — METHOCARBAMOL 500 MG: 500 TABLET ORAL at 23:48

## 2023-10-22 RX ADMIN — GABAPENTIN 100 MG: 100 CAPSULE ORAL at 22:07

## 2023-10-22 RX ADMIN — OXYCODONE HYDROCHLORIDE 5 MG: 5 TABLET ORAL at 22:07

## 2023-10-22 RX ADMIN — GABAPENTIN 100 MG: 100 CAPSULE ORAL at 16:31

## 2023-10-22 RX ADMIN — Medication 400 MCG: at 09:00

## 2023-10-22 RX ADMIN — GABAPENTIN 100 MG: 100 CAPSULE ORAL at 08:56

## 2023-10-22 NOTE — ASSESSMENT & PLAN NOTE
Patient is having urinary retention requiring straight catheter x2 per nursing report. Likely secondary to immobilization and pain. patient does report having good urine output.   Continue retention protocol and frequent bladder scanning

## 2023-10-22 NOTE — PLAN OF CARE
Problem: PHYSICAL THERAPY ADULT  Goal: Performs mobility at highest level of function for planned discharge setting. See evaluation for individualized goals. Description: Treatment/Interventions: Functional transfer training, LE strengthening/ROM, Elevations, Therapeutic exercise, Endurance training, Cognitive reorientation, Patient/family training, Gait training, Continued evaluation, Spoke to nursing          See flowsheet documentation for full assessment, interventions and recommendations. Outcome: Progressing  Note: Prognosis: Good  Problem List: Decreased strength, Decreased range of motion, Impaired balance, Decreased coordination, Pain, Decreased endurance  Assessment: Pt is 68 y.o. male seen for PT evaluation s/p admit to [address] [facility] on 10/20/2023 w/ Ambulatory dysfunction. PT consulted to assess pt's functional mobility and d/c needs. Order placed for PT eval and tx, w/ up w/ A order. Comorbidities affecting pt's physical performance at time of assessment include: cervical myelopathy, multiple falls, urinary retention, HLD, GERD, HTN. PTA, pt was independent w/ all functional mobility w/ out AD, ambulates community distances and elevations, ambulates unrestricted distances and all terrain, ambulates household distances, lives in multi-level home, lives alone in single level house, and retired. Personal factors affecting pt at time of IE include: inaccessible home environment, inability to ambulate household distances, inability to navigate community distances, inability to navigate level surfaces w/o external assistance, unable to perform dynamic tasks in community, limited home support, positive fall history, and anxiety.  Please find objective findings from PT assessment regarding body systems outlined above with impairments and limitations including weakness, decreased ROM, impaired balance, decreased endurance, impaired coordination, gait deviations, pain, decreased activity tolerance, altered sensation, and fall risk. The following objective measures performed on IE also reveal limitations: Barthel Index: 50/100, Modified [place]: 4 (moderate/severe disability), and AM-PAC 6-Clicks: 00/22. Pt's clinical presentation is currently unstable/unpredictable seen in pt's presentation of of continued need of medical management and monitoring, decreased strength and balance, leading to an increased risk of falls, decreased sensation, increased need for assistance to transfer and complete ADL's, decreased endurance and activity tolerance limiting mobility. Pt to benefit from continued PT tx to address deficits as defined above and maximize level of functional independent mobility and consistency. From PT/mobility standpoint, recommendation at time of d/c would be post acute rehabilitation services pending progress in order to facilitate return to PLOF. Barriers to Discharge: Inaccessible home environment, Decreased caregiver support     PT Discharge Recommendation: Post acute rehabilitation services    See flowsheet documentation for full assessment.

## 2023-10-22 NOTE — RESPIRATORY THERAPY NOTE
RT Protocol Note  Juan Carlos Pearson 68 y.o. male MRN: [id number]  Unit/Bed#: -01 Encounter: [id number]    Assessment    Principal Problem:    Ambulatory dysfunction  Active Problems:    Osteoarthritis of cervical spine with myelopathy    Benign essential hypertension    Gastroesophageal reflux disease    Hyperlipidemia    Urinary retention      Home Pulmonary Medications:  Albuterol PRN       Past Medical History:   Diagnosis Date    GERD (gastroesophageal reflux disease)     Hyperlipidemia     Hypertension      Social History     Socioeconomic History    Marital status: Single     Spouse name: None    Number of children: None    Years of education: None    Highest education level: None   Occupational History    None   Tobacco Use    Smoking status: Never    Smokeless tobacco: Never   Vaping Use    Vaping Use: Never used   Substance and Sexual Activity    Alcohol use: Yes     Alcohol/week: 6.0 standard drinks of alcohol     Types: 6 Cans of beer per week     Comment: beer 4-5 days a week    Drug use: Never    Sexual activity: None   Other Topics Concern    None   Social History Narrative    None     Social Determinants of Health     Financial Resource Strain: Not on file   Food Insecurity: Not on file   Transportation Needs: Not on file   Physical Activity: Not on file   Stress: Not on file   Social Connections: Not on file   Intimate Partner Violence: Not on file   Housing Stability: Not on file       Subjective         Objective    Physical Exam:   Respiratory Pattern: Normal  Chest Assessment: Chest expansion symmetrical  Bilateral Breath Sounds: Clear    Vitals:  Blood pressure 110/64, pulse 73, temperature (!) 97 °F (36.1 °C), resp. rate 18, height 5' 9" (1.753 m), weight 111 kg (244 lb 11.4 oz), SpO2 95 %. Imaging and other studies: I have personally reviewed pertinent reports.             Plan    Respiratory Plan: Home Bronchodilator Patient pathway        Resp Comments: patient has no pulmonary histoy was [facility].  Baudilio with home meds

## 2023-10-22 NOTE — ASSESSMENT & PLAN NOTE
70-year-old male patient with past medical history of cervical radiculopathy, C4-C6 cord with marked stenosis with compression and has outpatient surgery scheduled on 10/30/23  Status post multiple falls at home due to legs giving out, reports hitting head with fall  Denies worsening numbness or tingling from baseline or any worse then over the last few months tonight, denies incontinence of bowels or urine  CT spine and CT head negative for acute change, x-ray left knee without fracture    Currently patient is afebrile, hemodynamically stable. Patient reports that he is " miserable"  Complaining of bilateral lower extremity involuntary muscle twitching at times, neuropathic pain weakness. Thoracic and lumbar MRI ordered. Case was discussed with on-call neurosurgery and currently recommendation is to transfer patient to [facility] to schedule surgery earlier next week. Patient is in agreement with the plan. Fall precautions  PT/OT eval  Continue with analgesics as needed.

## 2023-10-22 NOTE — PHYSICAL THERAPY NOTE
Physical Therapy Evaluation     Patient's Name: Lora Bailey [facility]       10/22/23 6176   PT Last Visit   PT Visit Date 10/22/23   Note Type   Note type Evaluation   Pain Assessment   Pain Assessment Tool 0-10   Pain Score 5   Pain Location/Orientation Location: Neck   Hospital Pain Intervention(s) Medication (See MAR)   Restrictions/Precautions   Weight Bearing Precautions Per Order No   Braces or Orthoses   (None)   Other Precautions Telemetry; Fall Risk;Multiple lines   Home Living   Type of Home Apartment   Home Layout One level;Stairs to enter without rails  (1 Small step to enter from patio to door.)   Bathroom Shower/Tub Walk-in shower  (slight lip to get in)   [facility]   (None)   [facility]   (None)   Prior Function   Level of [place] Independent with ADLs; Independent with functional mobility  (Pt has really seen a decline in the ability to do things like buttoning a shirt and doing grocery shopping in the past week.)   Lives With [address] [place] [address] Dr Help From Family   IADLs Independent with driving; Independent with medication management; Independent with meal prep   Falls in the last 6 months 1 to 4  (2 Falls PTA, patient also had a fall off a chair in late April or Early May)   Vocational Retired   General   Additional Pertinent History Pt reports that once he a fall from the back of the chairm his numbness has become a lot worse. Falls in the bathroom PTA really was when things came to a head. Pt was not using an assistive device when he fell in the bathroom   Family/Caregiver Present No   Cognition   Overall Cognitive Status WFL   Arousal/Participation Alert   Orientation Level Oriented X4   Memory Within functional limits   Following Commands Follows all commands and directions without difficulty   Subjective   Subjective " I do not feel safe at home".    RLE Assessment   RLE Assessment X   Strength RLE   RLE Overall Strength 4-/5   LLE Assessment   LLE Assessment X   Strength LLE   LLE Overall Strength 3+/5   Vision-Basic Assessment   Current Vision Wears glasses only for reading   Light Touch   RLE Light Touch Impaired   LLE Light Touch Impaired   Transfers   Sit to Stand 4  Minimal assistance   Additional items Assist x 1; Increased time required;Verbal cues  (VC for hand placement)   Stand to Sit 4  Minimal assistance   Additional items Assist x 1; Increased time required;Verbal cues  (VC for hand placement)   Toilet transfer 4  Minimal assistance   Additional items Assist x 1; Increased time required;Verbal cues  (Vc for hand placement)   Ambulation/Elevation   Gait pattern Decreased foot clearance;Shuffling; Short stride; Excessively slow;Decreased hip extension;Decreased heel strike;Decreased toe off;Step through pattern   Gait Assistance 4  Minimal assist   Additional items Assist x 1;Verbal cues   Assistive Device Rolling walker   Distance 20 ft x 2, 15 ft   Stair Management Assistance Not tested   Balance   Static Sitting Good   Dynamic Sitting Fair +   Static Standing Fair +   Dynamic Standing Fair   Ambulatory Fair -   Endurance Deficit   Endurance Deficit Yes   Activity Tolerance   Activity Tolerance Patient limited by fatigue   Nurse Made Aware RN aware   Assessment   Prognosis Good   Problem List Decreased strength;Decreased range of motion; Impaired balance;Decreased coordination;Pain;Decreased endurance   Assessment Pt is 68 y.o. male seen for PT evaluation s/p admit to [address] [facility] on 10/20/2023 w/ Ambulatory dysfunction. PT consulted to assess pt's functional mobility and d/c needs. Order placed for PT eval and tx, w/ up w/ A order. Comorbidities affecting pt's physical performance at time of assessment include: cervical myelopathy, multiple falls, urinary retention, HLD, GERD, HTN.  PTA, pt was independent w/ all functional mobility w/ out AD, ambulates community distances and elevations, ambulates unrestricted distances and all terrain, ambulates household distances, lives in multi-level home, lives alone in single level house, and retired. Personal factors affecting pt at time of IE include: inaccessible home environment, inability to ambulate household distances, inability to navigate community distances, inability to navigate level surfaces w/o external assistance, unable to perform dynamic tasks in community, limited home support, positive fall history, and anxiety. Please find objective findings from PT assessment regarding body systems outlined above with impairments and limitations including weakness, decreased ROM, impaired balance, decreased endurance, impaired coordination, gait deviations, pain, decreased activity tolerance, altered sensation, and fall risk. The following objective measures performed on IE also reveal limitations: Barthel Index: 50/100, Modified [place]: 4 (moderate/severe disability), and AM-PAC 6-Clicks: 93/45. Pt's clinical presentation is currently unstable/unpredictable seen in pt's presentation of of continued need of medical management and monitoring, decreased strength and balance, leading to an increased risk of falls, decreased sensation, increased need for assistance to transfer and complete ADL's, decreased endurance and activity tolerance limiting mobility. Pt to benefit from continued PT tx to address deficits as defined above and maximize level of functional independent mobility and consistency. From PT/mobility standpoint, recommendation at time of d/c would be post acute rehabilitation services pending progress in order to facilitate return to PLOF. Barriers to Discharge Inaccessible home environment;Decreased caregiver support   Goals   Patient Goals "I want to be safer and not fall".    STG Expiration Date 11/01/23   Short Term Goal #1 In 7-10 days: Increase bilateral LE strength 1 grade to facilitate independent mobility, Perform all bed mobility tasks with distant S to decrease caregiver burden, Perform all transfers with distant S to improve independence, Ambulate > 100 ft. with RW with distant S w/o LOB and w/ normalized gait pattern 100% of the time, Navigate 2 stairs with distant S with unilateral handrail to facilitate return to previous living environment, and Increase all balance 1 grade to decrease risk for falls   Plan   Treatment/Interventions Functional transfer training;LE strengthening/ROM; Elevations; Therapeutic exercise; Endurance training;Cognitive reorientation;Patient/family training;Gait training;Continued evaluation;Spoke to nursing   PT Frequency 5-7x/wk   Discharge Recommendation   PT Discharge Recommendation Post acute rehabilitation services   AM-PAC Basic Mobility Inpatient   Turning in Flat Bed Without Bedrails 4   Lying on Back to Sitting on Edge of Flat Bed Without Bedrails 4   Moving Bed to Chair 4   Standing Up From Chair Using Arms 3   Walk in Room 3   Climb 3-5 Stairs With Railing 1   Basic Mobility Inpatient Raw Score 19   Basic Mobility Standardized Score 42.48   Highest Level Of Mobility   JH-HLM Goal 6: Walk 10 steps or more   JH-HLM Achieved 7: Walk 25 feet or more   Modified Rodney Scale   Modified Max Scale 4   Barthel Index   Feeding 5   Bathing 5   Grooming Score 0   Dressing Score 5   Bladder Score 10   Bowels Score 10   Toilet Use Score 5   Transfers (Bed/Chair) Score 10   Mobility (Level Surface) Score 0   Stairs Score 0   Barthel Index Score 50   End of Consult   Patient Position at End of Consult Other (comment)  (Pt was left in bathroom, and asked for nurse to help him get cleaned up. PT made PCA aware and she helped patient get back to recliner.)   Admitting Diagnosis  Weakness [R53.1]  Fall, initial encounter [W19. XXXA]  Ambulatory dysfunction [R26.2]    Problem List  Patient Active Problem List   Diagnosis    Screen for colon cancer    Tinea corporis    Onychomycosis    Osteoarthritis of cervical spine with myelopathy Ambulatory dysfunction    Anemia    Benign essential hypertension    Dorsalgia, unspecified    Gastroesophageal reflux disease    Hyperlipidemia    Urinary retention       Past Medical History  Past Medical History:   Diagnosis Date    GERD (gastroesophageal reflux disease)     Hyperlipidemia     Hypertension        Past Surgical History  Past Surgical History:   Procedure Laterality Date    COLONOSCOPY      HERNIA REPAIR      HI COLONOSCOPY FLX DX W/COLLJ SPEC WHEN PFRMD N/A 4/5/2019    Procedure: COLONOSCOPY;  Surgeon: Autumn Miles DO;  Location: [facility];   Service: Gastroenterology    ROTATOR CUFF REPAIR Left     TONSILLECTOMY               Marleen Salazar, PT

## 2023-10-22 NOTE — ASSESSMENT & PLAN NOTE
Patient currently scheduled for cervical laminectomy on 10/30 at [facility] with neurosurgery  Discussed with neurosurgery and given his symptoms of myelopathy, falls patient is being transferred to [facility].

## 2023-10-22 NOTE — CASE MANAGEMENT
Case Management Assessment & Discharge Planning Note    Patient name Jyoti Rollins  Location /-13 MRN [id number]  : 1950 Date 10/22/2023       Current Admission Date: 10/20/2023  Current Admission Diagnosis:Ambulatory dysfunction   Patient Active Problem List    Diagnosis Date Noted    Urinary retention 10/21/2023    Ambulatory dysfunction 10/20/2023    Anemia 10/20/2023    Benign essential hypertension 10/20/2023    Dorsalgia, unspecified 10/20/2023    Gastroesophageal reflux disease 10/20/2023    Hyperlipidemia 10/20/2023    Osteoarthritis of cervical spine with myelopathy 2023    Tinea corporis 2019    Onychomycosis 2019    Screen for colon cancer 2018      LOS (days): 1  Geometric Mean LOS (GMLOS) (days):   Days to GMLOS:     OBJECTIVE:    Risk of Unplanned Readmission Score: 10.05         Current admission status: Inpatient       Preferred Pharmacy:   [address] [place] [address]  Phone: [phone] Fax: [phone]    Primary Care Provider: Eduardo Hall MD    Primary Insurance: [facility]  Secondary Insurance:     ASSESSMENT:  [facility] Proxies    There are no active Health Care Proxies on file.        Advance Directives  Does patient have a [address]?: No  Was patient offered paperwork?: Yes (declined)  Does patient currently have a Health Care decision maker?: Yes, please see Health Care Proxy section  Does patient have Advance Directives?: No  Was patient offered paperwork?: Yes (declined)  Primary Contact: sister Cristi Quiroga         Readmission Root Cause  30 Day Readmission: No    Patient Information  Admitted from[de-identified] Home  Mental Status: Alert  During Assessment patient was accompanied by: Not accompanied during assessment  Assessment information provided by[de-identified] Patient  Primary Caregiver: Self  Support Systems: Family members (sister Cristi Quiroga)  [facility]: [place]  What city do you live in?: [address] [place] [address] entry access options.  Select all that apply.: No steps to enter home  Type of Current Residence: [place]  In the last 12 months, was there a time when you were not able to pay the mortgage or rent on time?: No  In the last 12 months, how many places have you lived?: 1  In the last 12 months, was there a time when you did not have a steady place to sleep or slept in a shelter (including now)?: No  Homeless/housing insecurity resource given?: No  Living Arrangements: Lives Alone  Is patient a ?: Yes  Is patient active with [facility])?: Yes  Is patient service connected?: Yes    Activities of Daily Living Prior to Admission  Functional Status: Independent  Completes ADLs independently?: Yes  Ambulates independently?: Yes  Does patient use assisted devices?: No  Does patient currently own DME?: No  Does patient have a history of Outpatient Therapy (PT/OT)?: No  Does the patient have a history of Short-Term Rehab?: No  Does patient have a history of HHC?: No  Does patient currently have [facility]?: No         Patient Information Continued  Income Source: Unemployed  Does patient have prescription coverage?: Yes  Within the past 12 months, you worried that your food would run out before you got the money to buy more.: Never true  Within the past 12 months, the food you bought just didn't last and you didn't have money to get more.: Never true  Food insecurity resource given?: No  Does patient receive dialysis treatments?: No  Does patient have a history of substance abuse?: No  Does patient have a history of Mental Health Diagnosis?: No    PHQ 2/9 Screening   Reviewed PHQ 2/9 Depression Screening Score?: No    Means of Transportation  Means of Transport to Appts[de-identified] Drives Self  In the past 12 months, has lack of transportation kept you from medical appointments or from getting medications?: No  In the past 12 months, has lack of transportation kept you from meetings, work, or from getting things needed for daily living?: No  Was application for public transport provided?: No        DISCHARGE DETAILS:    Discharge planning discussed with[de-identified] patient  Freedom of Choice: Yes  Comments - Freedom of Choice: Pt is scheduled for a surgical clearance appointment for neck surgery on 10/25 at the [facility] with surgery scheduled for 10/30/23 at [facility]. Testing has been done and pt waiting to see if he will be transferred to [facility] or if he will be d/marina from [facility] and made to wait until 10/30. Pt agrees that he is not able to care for himself in his present condition and would need STR while waiting for surgery. He has fallen 2 x at home prior to this admission. His legs just give out on him. Pt is interested in [facility] and a referral will be placed.   CM will continue to follow  CM contacted family/caregiver?: No- see comments (pt will update his sister himself)  Were Treatment Team discharge recommendations reviewed with patient/caregiver?: Yes  Did patient/caregiver verbalize understanding of patient care needs?: Yes  Were patient/caregiver advised of the risks associated with not following Treatment Team discharge recommendations?: Yes    Contacts  Patient Contacts: Og Dorantes  Relationship to Patient[de-identified] [facility]          Is the patient interested in [address] [facility] at discharge?: No    DME Referral Provided  Referral made for DME?: No    Other Referral/Resources/Interventions Provided:  Interventions: Short Term Rehab  Referral Comments: [facility] pended    Would you like to participate in our [address] [facility] service program?  : No - Declined    Treatment Team Recommendation: Short Term Rehab  Discharge Destination Plan[de-identified] Short Term Rehab  Transport at Discharge : Wheelchair Jose Alcantara

## 2023-10-22 NOTE — PROGRESS NOTES
[address]  Progress Note  Name: Aaaksh Simmons  MRN: [id number]  Unit/Bed#: -01 I Date of Admission: 10/20/2023   Date of Service: 10/22/2023 I Hospital Day: 1    Assessment/Plan   * Ambulatory dysfunction  Assessment & Plan  80-year-old male patient with past medical history of cervical radiculopathy, C4-C6 cord with marked stenosis with compression and has outpatient surgery scheduled on 10/30/23  Status post multiple falls at home due to legs giving out, reports hitting head with fall  Denies worsening numbness or tingling from baseline or any worse then over the last few months tonight, denies incontinence of bowels or urine  CT spine and CT head negative for acute change, x-ray left knee without fracture    Currently patient is afebrile, hemodynamically stable. Patient reports that he is " miserable"  Complaining of bilateral lower extremity involuntary muscle twitching at times, neuropathic pain weakness. Thoracic and lumbar MRI ordered. Case was discussed with on-call neurosurgery and currently recommendation is to transfer patient to \[facility]"" to schedule surgery earlier next week. Patient is in agreement with the plan. Fall precautions  PT/OT eval  Continue with analgesics as needed. Osteoarthritis of cervical spine with myelopathy  Assessment & Plan  Patient currently scheduled for cervical laminectomy on 10/30 at [facility] with neurosurgery  Discussed with neurosurgery and given his symptoms of myelopathy, falls patient is being transferred to \[facility]\"". Urinary retention  Assessment & Plan  Patient is having urinary retention requiring straight catheter x2 per nursing report. Likely secondary to immobilization and pain. patient does report having good urine output. Continue retention protocol and frequent bladder scanning        Hyperlipidemia  Assessment & Plan  Present on admission with history of hyperlipidemia.   Resume home dose of Lipitor. Gastroesophageal reflux disease  Assessment & Plan  Present on admission history of GERD. Continue Protonix. Benign essential hypertension  Assessment & Plan  Present on admission history of hypertension. Blood pressure is reasonably controlled. Resume home dose of hydrochlorothiazide, lisinopril. VTE Pharmacologic Prophylaxis: VTE Score: 3 Moderate Risk (Score 3-4) - Pharmacological DVT Prophylaxis Ordered: enoxaparin (Lovenox). Patient Centered Rounds: I performed bedside rounds with nursing staff today. Current Length of Stay: 1 day(s)  Current Patient Status: Inpatient   Certification Statement: The patient will continue to require additional inpatient hospital stay due to awaiting transfer to [facility]. Discharge Plan:  awaiting transfer to [facility]    Code Status: Level 1 - Full Code    Subjective:   Currently patient denies any other new complaints or any worsening neurological complaints. Currently has external/[place] Christ[place] catheter with urine output. No other events reported. Awaiting transfer to [facility]. Objective:     Vitals:   Temp (24hrs), Av.4 °F (36.3 °C), Min:97 °F (36.1 °C), Max:97.7 °F (36.5 °C)    Temp:  [97 °F (36.1 °C)-97.7 °F (36.5 °C)] 97.7 °F (36.5 °C)  HR:  [73-84] 84  Resp:  [18] 18  BP: (109-110)/(64-66) 110/66  SpO2:  [95 %-97 %] 97 %  Body mass index is 36.14 kg/m². Input and Output Summary (last 24 hours): Intake/Output Summary (Last 24 hours) at 10/22/2023 1817  Last data filed at 10/22/2023 1727  Gross per 24 hour   Intake 630 ml   Output 1625 ml   Net -995 ml       Physical Exam:   Physical Exam  Constitutional:       General: He is not in acute distress. Appearance: Normal appearance. He is obese. He is not ill-appearing, toxic-appearing or diaphoretic. HENT:      Head: Normocephalic and atraumatic. Eyes:      Pupils: Pupils are equal, round, and reactive to light. Cardiovascular:      Rate and Rhythm: Normal rate.       Pulses: Normal pulses. Pulmonary:      Effort: Pulmonary effort is normal. No respiratory distress. Breath sounds: Normal breath sounds. No wheezing. Abdominal:      General: Bowel sounds are normal. There is distension. Palpations: Abdomen is soft. Tenderness: There is no abdominal tenderness. Musculoskeletal:      Cervical back: No rigidity. Neurological:      Mental Status: He is alert and oriented to person, place, and time. Mental status is at baseline.    Psychiatric:         Mood and Affect: Mood normal.         Behavior: Behavior normal.          Additional Data:     Labs:  Results from last 7 days   Lab Units 10/20/23  0323   WBC Thousand/uL 7.38   HEMOGLOBIN g/dL 13.9   HEMATOCRIT % 41.8   PLATELETS Thousands/uL 240   NEUTROS PCT % 68   LYMPHS PCT % 20   MONOS PCT % 9   EOS PCT % 2     Results from last 7 days   Lab Units 10/20/23  0323   SODIUM mmol/L 135   POTASSIUM mmol/L 4.1   CHLORIDE mmol/L 97   CO2 mmol/L 29   BUN mg/dL 22   CREATININE mg/dL 1.13   ANION GAP mmol/L 9   CALCIUM mg/dL 9.6   ALBUMIN g/dL 4.3   TOTAL BILIRUBIN mg/dL 0.69   ALK PHOS U/L 59   ALT U/L 28   AST U/L 33   GLUCOSE RANDOM mg/dL 103                       Lines/Drains:  Invasive Devices       Peripheral Intravenous Line  Duration             Peripheral IV 10/20/23 Distal;Right;Upper;Ventral (anterior) Arm 2 days              Drain  Duration             External Urinary Catheter Medium 2 days                            Recent Cultures (last 7 days):         Last 24 Hours Medication List:   Current Facility-Administered Medications   Medication Dose Route Frequency Provider Last Rate    acetaminophen  650 mg Oral Q6H PRN Joo Hernandez PA-C      albuterol  2 puff Inhalation Q6H PRN Joo Hernandez PA-C      aluminum-magnesium hydroxide-simethicone  30 mL Oral Q6H PRN Joo Hernandez PA-C      atorvastatin  10 mg Oral Daily Cristela Basurto PA-C      enoxaparin  40 mg Subcutaneous Daily Joo Hernandez PA-C fluticasone  2 spray Each Nare Daily PRN Chaitanya Kasper PA-C      folic acid  912 mcg Oral Daily Cristela Basurto PA-C      gabapentin  100 mg Oral TID Judy SMITH MD      hydrochlorothiazide  25 mg Oral Daily Chaitanya Kasper PA-C      lisinopril  20 mg Oral Daily Chaitanya Kasper PA-C      loratadine  10 mg Oral Daily Cristela Basurto PA-C      melatonin  3 mg Oral HS Sharon Baptiste PA-C      methocarbamol  500 mg Oral Q8H [facility] Rafael SMITH MD      oxyCODONE  5 mg Oral Q4H PRN Rafael SMITH MD      Or    oxyCODONE  10 mg Oral Q6H PRN Rafael SMITH MD      pantoprazole  40 mg Oral Daily Cristela Basurto PA-C      traMADol  50 mg Oral Q4H PRN Sola Moses MD          Today, Patient Was Seen By: Sola Moses MD    **Please Note: This note may have been constructed using a voice recognition system. **

## 2023-10-23 ENCOUNTER — HOSPITAL ENCOUNTER (INPATIENT)
Facility: HOSPITAL | Age: 73
LOS: 9 days | DRG: 471 | End: 2023-11-01
Attending: STUDENT IN AN ORGANIZED HEALTH CARE EDUCATION/TRAINING PROGRAM | Admitting: STUDENT IN AN ORGANIZED HEALTH CARE EDUCATION/TRAINING PROGRAM
Payer: MEDICARE

## 2023-10-23 VITALS
WEIGHT: 244.71 LBS | SYSTOLIC BLOOD PRESSURE: 120 MMHG | HEART RATE: 77 BPM | DIASTOLIC BLOOD PRESSURE: 79 MMHG | OXYGEN SATURATION: 97 % | RESPIRATION RATE: 18 BRPM | BODY MASS INDEX: 36.24 KG/M2 | HEIGHT: 69 IN | TEMPERATURE: 96.1 F

## 2023-10-23 DIAGNOSIS — M47.12 OSTEOARTHRITIS OF CERVICAL SPINE WITH MYELOPATHY: Primary | ICD-10-CM

## 2023-10-23 DIAGNOSIS — R26.2 AMBULATORY DYSFUNCTION: ICD-10-CM

## 2023-10-23 PROBLEM — Z01.818 PREOPERATIVE CLEARANCE: Status: ACTIVE | Noted: 2023-10-23

## 2023-10-23 LAB
PLATELET # BLD AUTO: 228 THOUSANDS/UL (ref 149–390)
PMV BLD AUTO: 9.8 FL (ref 8.9–12.7)

## 2023-10-23 PROCEDURE — 99223 1ST HOSP IP/OBS HIGH 75: CPT | Performed by: STUDENT IN AN ORGANIZED HEALTH CARE EDUCATION/TRAINING PROGRAM

## 2023-10-23 PROCEDURE — 99239 HOSP IP/OBS DSCHRG MGMT >30: CPT | Performed by: STUDENT IN AN ORGANIZED HEALTH CARE EDUCATION/TRAINING PROGRAM

## 2023-10-23 PROCEDURE — 85049 AUTOMATED PLATELET COUNT: CPT | Performed by: STUDENT IN AN ORGANIZED HEALTH CARE EDUCATION/TRAINING PROGRAM

## 2023-10-23 RX ORDER — LORATADINE 10 MG/1
10 TABLET ORAL DAILY
Status: DISCONTINUED | OUTPATIENT
Start: 2023-10-24 | End: 2023-11-01 | Stop reason: HOSPADM

## 2023-10-23 RX ORDER — PANTOPRAZOLE SODIUM 40 MG/1
40 TABLET, DELAYED RELEASE ORAL DAILY
Status: CANCELLED | OUTPATIENT
Start: 2023-10-24

## 2023-10-23 RX ORDER — OXYCODONE HYDROCHLORIDE 5 MG/1
5 TABLET ORAL EVERY 4 HOURS PRN
Status: CANCELLED | OUTPATIENT
Start: 2023-10-23

## 2023-10-23 RX ORDER — HYDROCHLOROTHIAZIDE 25 MG/1
25 TABLET ORAL DAILY
Status: DISCONTINUED | OUTPATIENT
Start: 2023-10-24 | End: 2023-10-24

## 2023-10-23 RX ORDER — ALBUTEROL SULFATE 90 UG/1
2 AEROSOL, METERED RESPIRATORY (INHALATION) EVERY 6 HOURS PRN
Status: CANCELLED | OUTPATIENT
Start: 2023-10-23

## 2023-10-23 RX ORDER — ENOXAPARIN SODIUM 100 MG/ML
40 INJECTION SUBCUTANEOUS DAILY
Status: DISCONTINUED | OUTPATIENT
Start: 2023-10-24 | End: 2023-10-25

## 2023-10-23 RX ORDER — ENOXAPARIN SODIUM 100 MG/ML
40 INJECTION SUBCUTANEOUS DAILY
Status: CANCELLED | OUTPATIENT
Start: 2023-10-24

## 2023-10-23 RX ORDER — ATORVASTATIN CALCIUM 10 MG/1
10 TABLET, FILM COATED ORAL DAILY
Status: DISCONTINUED | OUTPATIENT
Start: 2023-10-24 | End: 2023-11-01 | Stop reason: HOSPADM

## 2023-10-23 RX ORDER — TRAMADOL HYDROCHLORIDE 50 MG/1
50 TABLET ORAL EVERY 4 HOURS PRN
Status: CANCELLED | OUTPATIENT
Start: 2023-10-23

## 2023-10-23 RX ORDER — TRAMADOL HYDROCHLORIDE 50 MG/1
50 TABLET ORAL EVERY 4 HOURS PRN
Status: DISCONTINUED | OUTPATIENT
Start: 2023-10-23 | End: 2023-10-25

## 2023-10-23 RX ORDER — METHOCARBAMOL 500 MG/1
500 TABLET, FILM COATED ORAL EVERY 8 HOURS SCHEDULED
Status: CANCELLED | OUTPATIENT
Start: 2023-10-23

## 2023-10-23 RX ORDER — LANOLIN ALCOHOL/MO/W.PET/CERES
400 CREAM (GRAM) TOPICAL DAILY
Status: CANCELLED | OUTPATIENT
Start: 2023-10-24

## 2023-10-23 RX ORDER — FLUTICASONE PROPIONATE 50 MCG
2 SPRAY, SUSPENSION (ML) NASAL DAILY PRN
Status: CANCELLED | OUTPATIENT
Start: 2023-10-23

## 2023-10-23 RX ORDER — LANOLIN ALCOHOL/MO/W.PET/CERES
3 CREAM (GRAM) TOPICAL
Status: DISCONTINUED | OUTPATIENT
Start: 2023-10-23 | End: 2023-11-01 | Stop reason: HOSPADM

## 2023-10-23 RX ORDER — LISINOPRIL 20 MG/1
20 TABLET ORAL DAILY
Status: CANCELLED | OUTPATIENT
Start: 2023-10-24

## 2023-10-23 RX ORDER — PANTOPRAZOLE SODIUM 40 MG/1
40 TABLET, DELAYED RELEASE ORAL DAILY
Status: DISCONTINUED | OUTPATIENT
Start: 2023-10-24 | End: 2023-11-01 | Stop reason: HOSPADM

## 2023-10-23 RX ORDER — ATORVASTATIN CALCIUM 10 MG/1
10 TABLET, FILM COATED ORAL DAILY
Status: CANCELLED | OUTPATIENT
Start: 2023-10-24

## 2023-10-23 RX ORDER — METHOCARBAMOL 500 MG/1
500 TABLET, FILM COATED ORAL EVERY 8 HOURS SCHEDULED
Status: DISCONTINUED | OUTPATIENT
Start: 2023-10-23 | End: 2023-10-25

## 2023-10-23 RX ORDER — ALBUTEROL SULFATE 90 UG/1
2 AEROSOL, METERED RESPIRATORY (INHALATION) EVERY 6 HOURS PRN
Status: DISCONTINUED | OUTPATIENT
Start: 2023-10-23 | End: 2023-11-01 | Stop reason: HOSPADM

## 2023-10-23 RX ORDER — FLUTICASONE PROPIONATE 50 MCG
2 SPRAY, SUSPENSION (ML) NASAL DAILY PRN
Status: DISCONTINUED | OUTPATIENT
Start: 2023-10-23 | End: 2023-11-01 | Stop reason: HOSPADM

## 2023-10-23 RX ORDER — GABAPENTIN 100 MG/1
100 CAPSULE ORAL 3 TIMES DAILY
Status: CANCELLED | OUTPATIENT
Start: 2023-10-23

## 2023-10-23 RX ORDER — ACETAMINOPHEN 325 MG/1
650 TABLET ORAL EVERY 6 HOURS PRN
Status: CANCELLED | OUTPATIENT
Start: 2023-10-23

## 2023-10-23 RX ORDER — LISINOPRIL 20 MG/1
20 TABLET ORAL DAILY
Status: DISCONTINUED | OUTPATIENT
Start: 2023-10-24 | End: 2023-10-28

## 2023-10-23 RX ORDER — OXYCODONE HYDROCHLORIDE 10 MG/1
10 TABLET ORAL EVERY 6 HOURS PRN
Status: DISCONTINUED | OUTPATIENT
Start: 2023-10-23 | End: 2023-10-25

## 2023-10-23 RX ORDER — ACETAMINOPHEN 325 MG/1
650 TABLET ORAL EVERY 6 HOURS PRN
Status: DISCONTINUED | OUTPATIENT
Start: 2023-10-23 | End: 2023-10-24

## 2023-10-23 RX ORDER — OXYCODONE HYDROCHLORIDE 10 MG/1
10 TABLET ORAL EVERY 6 HOURS PRN
Status: CANCELLED | OUTPATIENT
Start: 2023-10-23

## 2023-10-23 RX ORDER — LANOLIN ALCOHOL/MO/W.PET/CERES
400 CREAM (GRAM) TOPICAL DAILY
Status: DISCONTINUED | OUTPATIENT
Start: 2023-10-24 | End: 2023-11-01 | Stop reason: HOSPADM

## 2023-10-23 RX ORDER — OXYCODONE HYDROCHLORIDE 5 MG/1
5 TABLET ORAL EVERY 4 HOURS PRN
Status: DISCONTINUED | OUTPATIENT
Start: 2023-10-23 | End: 2023-10-25

## 2023-10-23 RX ORDER — MAGNESIUM HYDROXIDE/ALUMINUM HYDROXICE/SIMETHICONE 120; 1200; 1200 MG/30ML; MG/30ML; MG/30ML
30 SUSPENSION ORAL EVERY 6 HOURS PRN
Status: CANCELLED | OUTPATIENT
Start: 2023-10-23

## 2023-10-23 RX ORDER — LANOLIN ALCOHOL/MO/W.PET/CERES
3 CREAM (GRAM) TOPICAL
Status: CANCELLED | OUTPATIENT
Start: 2023-10-23

## 2023-10-23 RX ORDER — LORATADINE 10 MG/1
10 TABLET ORAL DAILY
Status: CANCELLED | OUTPATIENT
Start: 2023-10-24

## 2023-10-23 RX ORDER — HYDROCHLOROTHIAZIDE 25 MG/1
25 TABLET ORAL DAILY
Status: CANCELLED | OUTPATIENT
Start: 2023-10-24

## 2023-10-23 RX ORDER — MAGNESIUM HYDROXIDE/ALUMINUM HYDROXICE/SIMETHICONE 120; 1200; 1200 MG/30ML; MG/30ML; MG/30ML
30 SUSPENSION ORAL EVERY 6 HOURS PRN
Status: DISCONTINUED | OUTPATIENT
Start: 2023-10-23 | End: 2023-11-01 | Stop reason: HOSPADM

## 2023-10-23 RX ORDER — GABAPENTIN 100 MG/1
100 CAPSULE ORAL 3 TIMES DAILY
Status: DISCONTINUED | OUTPATIENT
Start: 2023-10-23 | End: 2023-11-01 | Stop reason: HOSPADM

## 2023-10-23 RX ADMIN — LISINOPRIL 20 MG: 20 TABLET ORAL at 09:00

## 2023-10-23 RX ADMIN — GABAPENTIN 100 MG: 100 CAPSULE ORAL at 15:01

## 2023-10-23 RX ADMIN — METHOCARBAMOL 500 MG: 500 TABLET ORAL at 14:07

## 2023-10-23 RX ADMIN — OXYCODONE HYDROCHLORIDE 5 MG: 5 TABLET ORAL at 14:07

## 2023-10-23 RX ADMIN — METHOCARBAMOL 500 MG: 500 TABLET ORAL at 09:16

## 2023-10-23 RX ADMIN — ATORVASTATIN CALCIUM 10 MG: 10 TABLET, FILM COATED ORAL at 09:16

## 2023-10-23 RX ADMIN — HYDROCHLOROTHIAZIDE 25 MG: 25 TABLET ORAL at 09:16

## 2023-10-23 RX ADMIN — GABAPENTIN 100 MG: 100 CAPSULE ORAL at 21:55

## 2023-10-23 RX ADMIN — LORATADINE 10 MG: 10 TABLET ORAL at 09:17

## 2023-10-23 RX ADMIN — OXYCODONE HYDROCHLORIDE 5 MG: 5 TABLET ORAL at 18:19

## 2023-10-23 RX ADMIN — Medication 400 MCG: at 09:16

## 2023-10-23 RX ADMIN — ENOXAPARIN SODIUM 40 MG: 40 INJECTION SUBCUTANEOUS at 09:21

## 2023-10-23 RX ADMIN — METHOCARBAMOL 500 MG: 500 TABLET ORAL at 21:55

## 2023-10-23 RX ADMIN — GABAPENTIN 100 MG: 100 CAPSULE ORAL at 09:16

## 2023-10-23 RX ADMIN — OXYCODONE HYDROCHLORIDE 5 MG: 5 TABLET ORAL at 09:20

## 2023-10-23 RX ADMIN — PANTOPRAZOLE SODIUM 40 MG: 40 TABLET, DELAYED RELEASE ORAL at 09:16

## 2023-10-23 NOTE — PLAN OF CARE
Problem: MOBILITY - ADULT  Goal: Maintain or return to baseline ADL function  Description: INTERVENTIONS:  -  Assess patient's ability to carry out ADLs; assess patient's baseline for ADL function and identify physical deficits which impact ability to perform ADLs (bathing, care of mouth/teeth, toileting, grooming, dressing, etc.)  - Assess/evaluate cause of self-care deficits   - Assess range of motion  - Assess patient's mobility; develop plan if impaired  - Assess patient's need for assistive devices and provide as appropriate  - Encourage maximum independence but intervene and supervise when necessary  - Involve family in performance of ADLs  - Assess for home care needs following discharge   - Consider OT consult to assist with ADL evaluation and planning for discharge  - Provide patient education as appropriate  Outcome: Progressing    Problem: Knowledge Deficit  Goal: Patient/family/caregiver demonstrates understanding of disease process, treatment plan, medications, and discharge instructions  Description: Complete learning assessment and assess knowledge base.   Interventions:  - Provide teaching at level of understanding  - Provide teaching via preferred learning methods  Outcome: Progressing

## 2023-10-23 NOTE — ASSESSMENT & PLAN NOTE
Patient presented to [address] [place] [address] Road for multiple falls at home. CT spine and CT head negative for any acute changes. Continue fall precautions. PT/OT eval.  Conservative management for symptom relief.

## 2023-10-23 NOTE — ASSESSMENT & PLAN NOTE
Patient is on omeprazole at home, continue alternative equivalent pantoprazole while inpatient. Will also start on MiraLAX as needed.

## 2023-10-23 NOTE — ASSESSMENT & PLAN NOTE
Pt denies any cardiac or pulmonary complaints. Is able to ambulate at baseline with no shortness of breath. Patient has tolerated anesthesia in the past very well without any anticipated side effects. Patient is class II risk on RCRI for medium risk procedure.

## 2023-10-23 NOTE — ASSESSMENT & PLAN NOTE
Patient noted to have urinary retention, [facility]. Continue retention protocol. Voiding trials once ambulation and pain improves, if fails will need urology evaluation.

## 2023-10-23 NOTE — H&P
[address] [facility]  H&P  Name: Nitin Zarate 68 y.o. male I MRN: [id number]  Unit/Bed#: [facility] 711-01 I Date of Admission: 10/23/2023   Date of Service: 10/23/2023  Hospital Day: 0      Assessment/Plan   * Osteoarthritis of cervical spine with myelopathy  Assessment & Plan  Patient has known history of cervical radiculopathy, C4-C6 cord with marked stenosis and compression. Follows with neurosurgery in outpatient setting, was scheduled for outpatient decompression procedure on 10/30 however presented to hospital for frequent falls. MRI thoracic spine with cord compression at C4-5, mild thoracic degenerative changes without significant stenosis. Case was discussed with neurosurgery team, given concerns of worsening myelopathy symptoms including bilateral lower extremity involuntary muscle twitching, neuropathic pain and weakness, decision was made to transfer to hospitals for earlier inpatient procedure. Continue fall precautions. Continue multimodal pain management. Ambulatory dysfunction  Assessment & Plan  Patient presented to [address] [place] [address] for multiple falls at home. CT spine and CT head negative for any acute changes. Continue fall precautions. PT/OT eval.  Conservative management for symptom relief. Preoperative clearance  Assessment & Plan  Pt denies any cardiac or pulmonary complaints. Is able to ambulate at baseline with no shortness of breath. Patient has tolerated anesthesia in the past very well without any anticipated side effects. Patient is class II risk on RCRI for medium risk procedure. Urinary retention  Assessment & Plan  Patient noted to have urinary retention, needing straight cath at [address] [place] [address]. Continue retention protocol. Voiding trials once ambulation and pain improves, if fails will need urology evaluation. Hyperlipidemia  Assessment & Plan  Patient is on atorvastatin 10 mg at home, continue same.     Gastroesophageal reflux disease  Assessment & Plan  Patient is on omeprazole at home, continue alternative equivalent pantoprazole while inpatient. Will also start on MiraLAX as needed. Benign essential hypertension  Assessment & Plan  Blood pressure well controlled on lisinopril 20 mg and hydrochlorothiazide 25, continue same. VTE Pharmacologic Prophylaxis: VTE Score: 3 Moderate Risk (Score 3-4) - Pharmacological DVT Prophylaxis Ordered: enoxaparin (Lovenox). Code Status: Level 1 - Full Code per patient   Discussion with family: Patient declined call to . Anticipated Length of Stay: Patient will be admitted on an inpatient basis with an anticipated length of stay of greater than 2 midnights secondary to transferred for Neurosurgery eval..    Total Time Spent on Date of Encounter in care of patient: 65 mins. This time was spent on one or more of the following: performing physical exam; counseling and coordination of care; obtaining or reviewing history; documenting in the medical record; reviewing/ordering tests, medications or procedures; communicating with other healthcare professionals and discussing with patient's family/caregivers. Chief Complaint: Ambulatory dysfunction and back pain. History of Present Illness:  Lana Sim is a 68 y.o. male with a PMH of hypertension, hyperlipidemia, GERD and cervical radiculopathy who presents with ambulatory dysfunction and multiple falls. Post fall patient had bilateral lower extremity pain, numbness tingling and weakness, that has ongoing since last months but noted worsening of symptoms. Patient follows with neurosurgery and was scheduled for decompression procedure on 10/13, given worsening symptoms and ambulatory dysfunction neurosurgery recommended transfer to [facility] for earlier inpatient procedure. During his hospital stay at [facility] had MRI spine done that was remarkable for cord compression at C4-5, mild thoracic degenerative changes.   His hospital course complicated by urinary retention, needed straight cath twice now Christensen is placed. At time of my evaluation reports pain controlled on current regimen but has been having worsening numbness and tingling associated with weakness making ambulation very hard for him. He has to shuffle a few steps before being able to walk. Does not use any cane or walker at home but has been using support from furniture and walls more often. Given he was scheduled for surgery had appointment with his PCP for preop clearance but unfortunately due to worsening symptoms presented to hospital.  Preop exam done with H&P. Review of Systems:  Review of Systems   Constitutional:  Positive for activity change and fatigue. Cardiovascular:  Negative for chest pain and palpitations. Musculoskeletal:  Positive for back pain. Neurological:  Positive for weakness and numbness. All other systems reviewed and are negative. Past Medical and Surgical History:   Past Medical History:   Diagnosis Date    GERD (gastroesophageal reflux disease)     Hyperlipidemia     Hypertension        Past Surgical History:   Procedure Laterality Date    COLONOSCOPY      HERNIA REPAIR      OK COLONOSCOPY FLX DX W/COLLJ SPEC WHEN PFRMD N/A 4/5/2019    Procedure: COLONOSCOPY;  Surgeon: Gigi Marcus DO;  Location: [facility]; Service: Gastroenterology    ROTATOR CUFF REPAIR Left     TONSILLECTOMY         Meds/Allergies:  Prior to Admission medications    Medication Sig Start Date End Date Taking?  Authorizing Provider   albuterol (PROVENTIL HFA,VENTOLIN HFA) 90 mcg/act inhaler INHALE 2 PUFFS BY MOUTH EVERY 6 HOURS AS NEEDED FOR WHEEZING AND SHORTNESS OF BREATH 6/14/22   Historical Provider, MD   aspirin (ECOTRIN LOW STRENGTH) 81 mg EC tablet Take 81 mg by mouth daily    Historical Provider, MD   atorvastatin (LIPITOR) 20 mg tablet Take 10 mg by mouth daily    Historical Provider, MD   calcium-vitamin D (OSCAL) 250-125 MG-UNIT per tablet Take 1 tablet by mouth daily    Historical Provider, MD   cyanocobalamin (VITAMIN B-12) 1,000 mcg tablet Take by mouth daily    Historical Provider, MD   fluticasone (FLONASE) 50 mcg/act nasal spray into each nostril 6/14/22   Historical Provider, MD   folic acid (FOLVITE) 239 MCG tablet Take 400 mcg by mouth daily    Historical Provider, MD   hydrochlorothiazide (HYDRODIURIL) 25 mg tablet Take 25 mg by mouth daily    Historical Provider, MD   lisinopril (ZESTRIL) 20 mg tablet Take 20 mg by mouth daily    Historical Provider, MD   loratadine (CLARITIN) 10 mg tablet Take 10 mg by mouth daily    Historical Provider, MD   omeprazole (PriLOSEC) 20 mg delayed release capsule Take 20 mg by mouth daily    Historical Provider, MD     I have reviewed home medications with patient personally. Allergies: No Known Allergies    Social History:  Marital Status: Single   Occupation: retired  Patient Pre-hospital Living Situation: Home  Patient Pre-hospital Level of Mobility: walks  Patient Pre-hospital Diet Restrictions: none  Substance Use History:   Social History     Substance and Sexual Activity   Alcohol Use Yes    Alcohol/week: 6.0 standard drinks of alcohol    Types: 6 Cans of beer per week    Comment: beer 4-5 days a week     Social History     Tobacco Use   Smoking Status Never   Smokeless Tobacco Never     Social History     Substance and Sexual Activity   Drug Use Never       Family History:  No family history on file. Physical Exam:     Vitals:   Blood Pressure: 104/72 (10/23/23 1705)  Pulse: 87 (10/23/23 1705)  Temperature: 97.9 °F (36.6 °C) (10/23/23 1705)  Respirations: 19 (10/23/23 1705)  SpO2: 95 % (10/23/23 1705)    Physical Exam  Constitutional:       Appearance: Normal appearance. HENT:      Head: Normocephalic and atraumatic. Mouth/Throat:      Mouth: Mucous membranes are dry. Pharynx: Oropharynx is clear. Eyes:      Extraocular Movements: Extraocular movements intact. Conjunctiva/sclera: Conjunctivae normal.   Neck:      Comments: Tenderness elicited on moving neck. Cardiovascular:      Rate and Rhythm: Normal rate and regular rhythm. Pulses: Normal pulses. Heart sounds: Normal heart sounds. Pulmonary:      Effort: Pulmonary effort is normal.      Breath sounds: Normal breath sounds. Abdominal:      General: Bowel sounds are normal.      Palpations: Abdomen is soft. Musculoskeletal:         General: Normal range of motion. Cervical back: Normal range of motion. Skin:     General: Skin is warm and dry. Neurological:      Mental Status: He is alert and oriented to person, place, and time. Mental status is at baseline. Psychiatric:         Mood and Affect: Mood normal.         Behavior: Behavior normal.          Additional Data:     Lab Results:  Results from last 7 days   Lab Units 10/20/23  0323   WBC Thousand/uL 7.38   HEMOGLOBIN g/dL 13.9   HEMATOCRIT % 41.8   PLATELETS Thousands/uL 240   NEUTROS PCT % 68   LYMPHS PCT % 20   MONOS PCT % 9   EOS PCT % 2     Results from last 7 days   Lab Units 10/20/23  0323   SODIUM mmol/L 135   POTASSIUM mmol/L 4.1   CHLORIDE mmol/L 97   CO2 mmol/L 29   BUN mg/dL 22   CREATININE mg/dL 1.13   ANION GAP mmol/L 9   CALCIUM mg/dL 9.6   ALBUMIN g/dL 4.3   TOTAL BILIRUBIN mg/dL 0.69   ALK PHOS U/L 59   ALT U/L 28   AST U/L 33   GLUCOSE RANDOM mg/dL 103                       Lines/Drains:  Invasive Devices       Peripheral Intravenous Line  Duration             Peripheral IV 10/20/23 Distal;Right;Upper;Ventral (anterior) Arm 3 days              Drain  Duration             External Urinary Catheter Medium 3 days                        Imaging: Reviewed radiology reports from this admission including: MRI spine  No orders to display       EKG and Other Studies Reviewed on Admission:   EKG: Sinus Tachycardia. .    ** Please Note: This note has been constructed using a voice recognition system.  **

## 2023-10-23 NOTE — ASSESSMENT & PLAN NOTE
Patient has known history of cervical radiculopathy, C4-C6 cord with marked stenosis and compression. Follows with neurosurgery in outpatient setting, was scheduled for outpatient decompression procedure on 10/30 however presented to hospital for frequent falls. MRI thoracic spine with cord compression at C4-5, mild thoracic degenerative changes without significant stenosis. Case was discussed with neurosurgery team, given concerns of worsening myelopathy symptoms including bilateral lower extremity involuntary muscle twitching, neuropathic pain and weakness, decision was made to transfer to [facility] for earlier inpatient procedure. Continue fall precautions. Continue multimodal pain management.

## 2023-10-23 NOTE — CASE MANAGEMENT
Case Management Progress Note    Patient name Gale Martínez  Location /-18 MRN [id number]  : 1950 Date 10/23/2023       LOS (days): 2  Geometric Mean LOS (GMLOS) (days):   Days to [address]:        OBJECTIVE:        Current admission status: Inpatient  Preferred Pharmacy:   [address]  Phone: [phone] Fax: [phone]    Primary Care Provider: Mal Colon MD    Primary Insurance: [facility]  Secondary Insurance: MEDICARE    PROGRESS NOTE:  [facility] and will not make appt on 10/25. Spoke with Dr Edvin Boothe office to relay information.

## 2023-10-23 NOTE — ASSESSMENT & PLAN NOTE
Patient currently scheduled for cervical laminectomy on 10/30 at [facility] with neurosurgery  Discussed with neurosurgery and given his symptoms of myelopathy, falls patient is being transferred to [facility].

## 2023-10-23 NOTE — ASSESSMENT & PLAN NOTE
79-year-old male patient with past medical history of cervical radiculopathy, C4-C6 cord with marked stenosis with compression and has outpatient surgery scheduled on 10/30/23  Status post multiple falls at home due to legs giving out, reports hitting head with fall  Denies worsening numbness or tingling from baseline or any worse then over the last few months tonight, denies incontinence of bowels or urine  CT spine and CT head negative for acute change, x-ray left knee without fracture    Currently patient is afebrile, hemodynamically stable. Patient reports that he is " miserable"  Complaining of bilateral lower extremity involuntary muscle twitching at times, neuropathic pain weakness. Thoracic and lumbar MRI ordered. Case was discussed with on-call neurosurgery and currently recommendation is to transfer patient to \[facility]\"" to schedule surgery earlier next week. Patient is in agreement with the plan. Fall precautions  PT/OT eval  Continue with analgesics as needed.

## 2023-10-23 NOTE — DISCHARGE SUMMARY
[address]  Discharge- [address] 1950, 68 y.o. male MRN: [id number]  Unit/Bed#: -[facility] Encounter: [id number]  Primary Care Provider: Hong Mcdonald MD   Date and time admitted to hospital: 10/20/2023  2:46 AM    * Ambulatory dysfunction  Assessment & Plan  63-year-old male patient with past medical history of cervical radiculopathy, C4-C6 cord with marked stenosis with compression and has outpatient surgery scheduled on 10/30/23  Status post multiple falls at home due to legs giving out, reports hitting head with fall  Denies worsening numbness or tingling from baseline or any worse then over the last few months tonight, denies incontinence of bowels or urine  CT spine and CT head negative for acute change, x-ray left knee without fracture    Currently patient is afebrile, hemodynamically stable. Patient reports that he is " miserable"  Complaining of bilateral lower extremity involuntary muscle twitching at times, neuropathic pain weakness. Thoracic and lumbar MRI ordered. Case was discussed with on-call neurosurgery and currently recommendation is to transfer patient to [facility] to schedule surgery earlier next week. Patient is in agreement with the plan. Patient is being transitioned to [facility]      Osteoarthritis of cervical spine with myelopathy  Assessment & Plan  Patient currently scheduled for cervical laminectomy on 10/30 at [facility] with neurosurgery  Discussed with neurosurgery and given his symptoms of myelopathy, falls patient is being transferred to [facility]. Urinary retention  Assessment & Plan  Patient is having urinary retention requiring straight catheter x2 per nursing report. Likely secondary to immobilization and pain. patient does report having good urine output. Continue retention protocol and frequent bladder scanning        Hyperlipidemia  Assessment & Plan  Present on admission with history of hyperlipidemia.   Resume home dose of Lipitor. Gastroesophageal reflux disease  Assessment & Plan  Present on admission history of GERD. Continue Protonix. Benign essential hypertension  Assessment & Plan  Present on admission history of hypertension. Blood pressure is reasonably controlled. Resume home dose of hydrochlorothiazide, lisinopril. Discharging Physician / Practitioner: Kartik Ayala MD  PCP: Bianca Espinosa MD  Admission Date:   Admission Orders (From admission, onward)       Ordered        10/21/23 0843  Inpatient Admission  Once            10/20/23 0431  Place in Observation  Once                          Discharge Date: 10/23/23    Medical Problems       Resolved Problems  Date Reviewed: 10/23/2023   None           Reason for Admission: Fall due to bilateral extremity pain, numbness, tingling, weakness. Hospital Course:     Mariah March is a 68 y.o. male patient with past medical history of obesity, cervical radiculopathy with C5-C6 stenosis with compression with planned decompression surgery with Dr. Nereyda Easley on 10/30/2023, hypertension, hyperlipidemia GERD, who originally presented to the hospital on 10/20/2023 due to fall due to bilateral lower extremity pain, numbness, tingling, weakness that has been ongoing for few months and now gradually worsening. Patient reports that he is feeling overall miserable. COVID-19/flu/RSV negative. CBC, CMP within normal limits. CT head negative for acute finding. CT cervical spine reported negative for acute finding, CT thoracic and lumbar spine noted with moderate multilevel degenerative changes. Thoracic MRI report noted with severe canal stenosis with cord compression at C 4-5, lumbar MRI reported with degenerative spondylosis most pronounced at L5-S1 and S1-S2. Case was discussed with neurosurgery and recommendation is to transfer him to [place] on campus for inpatient intervention.   While hospitalized patient was noted with episodes of urinary retention requiring straight catheter x2. Recommended to continue bladder scan and retention protocol. Patient is requesting and not keen on having indwelling Christensen catheter as long as possible. Patient is in agreement with above plan going to [facility]. Kendrick Lennox Patient had been awaiting bed over the weekend and today he is being transitioned to [facility]. No other events reported. Refer to earlier notes shortness of clarification. Please see above list of diagnoses and related plan for additional information. Condition at Discharge: good     Discharge Day Visit / Exam:     Subjective: Appears comfortable nondistressed. Denies chest pain, dyspnea, fever, chills, nausea, vomiting, any other new comments. Being transitioned to [facility] today. Vitals: Blood Pressure: 120/79 (10/23/23 0753)  Pulse: 77 (10/23/23 0753)  Temperature: (!) 96.1 °F (35.6 °C) (10/23/23 0753)  Temp Source: Oral (10/20/23 1544)  Respirations: 18 (10/22/23 1414)  Height: 5' 9" (175.3 cm) (10/20/23 1544)  Weight - Scale: 111 kg (244 lb 11.4 oz) (10/20/23 1544)  SpO2: 97 % (10/23/23 0753)  Exam:   Physical Exam  Constitutional:       General: He is not in acute distress. Appearance: Normal appearance. He is obese. He is not ill-appearing, toxic-appearing or diaphoretic. HENT:      Head: Normocephalic and atraumatic. Eyes:      Pupils: Pupils are equal, round, and reactive to light. Cardiovascular:      Rate and Rhythm: Normal rate. Pulses: Normal pulses. Pulmonary:      Effort: Pulmonary effort is normal. No respiratory distress. Breath sounds: Normal breath sounds. No wheezing. Abdominal:      General: Bowel sounds are normal. There is distension. Palpations: Abdomen is soft. Tenderness: There is no abdominal tenderness. Musculoskeletal:      Cervical back: No rigidity. Neurological:      Mental Status: He is alert and oriented to person, place, and time. Mental status is at baseline.    Psychiatric:         Mood and Affect: Mood normal.         Behavior: Behavior normal.           Discharge instructions/Information to patient and family:   See after visit summary for information provided to patient and family. Provisions for Follow-Up Care:  See after visit summary for information related to follow-up care and any pertinent home health orders. Disposition:     Other: [facility]    Planned Readmission:      Discharge Statement:  I spent 35 minutes discharging the patient. This time was spent on the day of discharge. I had direct contact with the patient on the day of discharge. Greater than 50% of the total time was spent examining patient, answering all patient questions, arranging and discussing plan of care with patient as well as directly providing post-discharge instructions. Additional time then spent on discharge activities. Discharge Medications:  See after visit summary for reconciled discharge medications provided to patient and family.       ** Please Note: This note has been constructed using a voice recognition system **

## 2023-10-24 ENCOUNTER — ANESTHESIA EVENT (INPATIENT)
Dept: PERIOP | Facility: HOSPITAL | Age: 73
DRG: 471 | End: 2023-10-24
Payer: MEDICARE

## 2023-10-24 ENCOUNTER — TELEPHONE (OUTPATIENT)
Dept: NEUROSURGERY | Facility: CLINIC | Age: 73
End: 2023-10-24

## 2023-10-24 PROBLEM — K59.00 CONSTIPATION: Status: ACTIVE | Noted: 2023-10-24

## 2023-10-24 LAB
ABO GROUP BLD: NORMAL
ANION GAP SERPL CALCULATED.3IONS-SCNC: 6 MMOL/L
BLD GP AB SCN SERPL QL: NEGATIVE
BUN SERPL-MCNC: 19 MG/DL (ref 5–25)
CALCIUM SERPL-MCNC: 9 MG/DL (ref 8.4–10.2)
CHLORIDE SERPL-SCNC: 91 MMOL/L (ref 96–108)
CO2 SERPL-SCNC: 32 MMOL/L (ref 21–32)
CREAT SERPL-MCNC: 0.9 MG/DL (ref 0.6–1.3)
ERYTHROCYTE [DISTWIDTH] IN BLOOD BY AUTOMATED COUNT: 12.5 % (ref 11.6–15.1)
GFR SERPL CREATININE-BSD FRML MDRD: 84 ML/MIN/1.73SQ M
GLUCOSE SERPL-MCNC: 82 MG/DL (ref 65–140)
GLUCOSE SERPL-MCNC: 88 MG/DL (ref 65–140)
HCT VFR BLD AUTO: 40 % (ref 36.5–49.3)
HGB BLD-MCNC: 13.3 G/DL (ref 12–17)
MCH RBC QN AUTO: 32.5 PG (ref 26.8–34.3)
MCHC RBC AUTO-ENTMCNC: 33.3 G/DL (ref 31.4–37.4)
MCV RBC AUTO: 98 FL (ref 82–98)
OSMOLALITY UR: 735 MMOL/KG
PLATELET # BLD AUTO: 222 THOUSANDS/UL (ref 149–390)
PMV BLD AUTO: 10.3 FL (ref 8.9–12.7)
POTASSIUM SERPL-SCNC: 3.9 MMOL/L (ref 3.5–5.3)
RBC # BLD AUTO: 4.09 MILLION/UL (ref 3.88–5.62)
RH BLD: POSITIVE
SODIUM 24H UR-SCNC: 46 MOL/L
SODIUM SERPL-SCNC: 129 MMOL/L (ref 135–147)
SPECIMEN EXPIRATION DATE: NORMAL
URATE SERPL-MCNC: 4.2 MG/DL (ref 3.5–8.5)
WBC # BLD AUTO: 6.81 THOUSAND/UL (ref 4.31–10.16)

## 2023-10-24 PROCEDURE — 80048 BASIC METABOLIC PNL TOTAL CA: CPT | Performed by: STUDENT IN AN ORGANIZED HEALTH CARE EDUCATION/TRAINING PROGRAM

## 2023-10-24 PROCEDURE — 87081 CULTURE SCREEN ONLY: CPT | Performed by: NURSE PRACTITIONER

## 2023-10-24 PROCEDURE — 85027 COMPLETE CBC AUTOMATED: CPT | Performed by: STUDENT IN AN ORGANIZED HEALTH CARE EDUCATION/TRAINING PROGRAM

## 2023-10-24 PROCEDURE — 63015 REMOVE SPINE LAMINA >2 CRVCL: CPT | Performed by: STUDENT IN AN ORGANIZED HEALTH CARE EDUCATION/TRAINING PROGRAM

## 2023-10-24 PROCEDURE — 82948 REAGENT STRIP/BLOOD GLUCOSE: CPT

## 2023-10-24 PROCEDURE — 99223 1ST HOSP IP/OBS HIGH 75: CPT | Performed by: STUDENT IN AN ORGANIZED HEALTH CARE EDUCATION/TRAINING PROGRAM

## 2023-10-24 PROCEDURE — 86850 RBC ANTIBODY SCREEN: CPT | Performed by: NURSE PRACTITIONER

## 2023-10-24 PROCEDURE — 86900 BLOOD TYPING SEROLOGIC ABO: CPT | Performed by: NURSE PRACTITIONER

## 2023-10-24 PROCEDURE — 99232 SBSQ HOSP IP/OBS MODERATE 35: CPT | Performed by: INTERNAL MEDICINE

## 2023-10-24 PROCEDURE — 84300 ASSAY OF URINE SODIUM: CPT | Performed by: INTERNAL MEDICINE

## 2023-10-24 PROCEDURE — 86901 BLOOD TYPING SEROLOGIC RH(D): CPT | Performed by: NURSE PRACTITIONER

## 2023-10-24 PROCEDURE — 84550 ASSAY OF BLOOD/URIC ACID: CPT | Performed by: INTERNAL MEDICINE

## 2023-10-24 PROCEDURE — 83935 ASSAY OF URINE OSMOLALITY: CPT | Performed by: INTERNAL MEDICINE

## 2023-10-24 RX ORDER — BISACODYL 10 MG
10 SUPPOSITORY, RECTAL RECTAL DAILY PRN
Status: DISCONTINUED | OUTPATIENT
Start: 2023-10-24 | End: 2023-11-01 | Stop reason: HOSPADM

## 2023-10-24 RX ORDER — POLYETHYLENE GLYCOL 3350 17 G/17G
17 POWDER, FOR SOLUTION ORAL 2 TIMES DAILY
Status: DISCONTINUED | OUTPATIENT
Start: 2023-10-24 | End: 2023-11-01 | Stop reason: HOSPADM

## 2023-10-24 RX ORDER — CEFAZOLIN SODIUM 2 G/50ML
2000 SOLUTION INTRAVENOUS ONCE
Status: COMPLETED | OUTPATIENT
Start: 2023-10-25 | End: 2023-10-25

## 2023-10-24 RX ORDER — POLYETHYLENE GLYCOL 3350 17 G/17G
17 POWDER, FOR SOLUTION ORAL DAILY
Status: DISCONTINUED | OUTPATIENT
Start: 2023-10-24 | End: 2023-10-24

## 2023-10-24 RX ORDER — ACETAMINOPHEN 325 MG/1
975 TABLET ORAL EVERY 8 HOURS SCHEDULED
Status: DISCONTINUED | OUTPATIENT
Start: 2023-10-24 | End: 2023-11-01 | Stop reason: HOSPADM

## 2023-10-24 RX ORDER — CHLORHEXIDINE GLUCONATE ORAL RINSE 1.2 MG/ML
15 SOLUTION DENTAL ONCE
Status: COMPLETED | OUTPATIENT
Start: 2023-10-24 | End: 2023-10-25

## 2023-10-24 RX ORDER — LIDOCAINE 50 MG/G
1 PATCH TOPICAL DAILY
Status: DISCONTINUED | OUTPATIENT
Start: 2023-10-24 | End: 2023-11-01 | Stop reason: HOSPADM

## 2023-10-24 RX ORDER — SODIUM CHLORIDE 9 MG/ML
125 INJECTION, SOLUTION INTRAVENOUS CONTINUOUS
Status: DISCONTINUED | OUTPATIENT
Start: 2023-10-24 | End: 2023-10-25

## 2023-10-24 RX ORDER — AMOXICILLIN 250 MG
1 CAPSULE ORAL 2 TIMES DAILY
Status: DISCONTINUED | OUTPATIENT
Start: 2023-10-24 | End: 2023-11-01 | Stop reason: HOSPADM

## 2023-10-24 RX ADMIN — OXYCODONE HYDROCHLORIDE 5 MG: 5 TABLET ORAL at 00:33

## 2023-10-24 RX ADMIN — POLYETHYLENE GLYCOL 3350 17 G: 17 POWDER, FOR SOLUTION ORAL at 10:32

## 2023-10-24 RX ADMIN — PANTOPRAZOLE SODIUM 40 MG: 40 TABLET, DELAYED RELEASE ORAL at 05:15

## 2023-10-24 RX ADMIN — MELATONIN 3 MG: at 00:33

## 2023-10-24 RX ADMIN — GABAPENTIN 100 MG: 100 CAPSULE ORAL at 17:26

## 2023-10-24 RX ADMIN — ENOXAPARIN SODIUM 40 MG: 40 INJECTION SUBCUTANEOUS at 08:19

## 2023-10-24 RX ADMIN — LIDOCAINE 1 PATCH: 50 PATCH TOPICAL at 13:52

## 2023-10-24 RX ADMIN — METHOCARBAMOL 500 MG: 500 TABLET ORAL at 05:15

## 2023-10-24 RX ADMIN — ACETAMINOPHEN 975 MG: 325 TABLET, FILM COATED ORAL at 13:52

## 2023-10-24 RX ADMIN — FOLIC ACID TAB 400 MCG 400 MCG: 400 TAB at 08:20

## 2023-10-24 RX ADMIN — OXYCODONE HYDROCHLORIDE 5 MG: 5 TABLET ORAL at 11:39

## 2023-10-24 RX ADMIN — GABAPENTIN 100 MG: 100 CAPSULE ORAL at 21:54

## 2023-10-24 RX ADMIN — SENNOSIDES, DOCUSATE SODIUM 1 TABLET: 8.6; 5 TABLET ORAL at 10:32

## 2023-10-24 RX ADMIN — METHOCARBAMOL 500 MG: 500 TABLET ORAL at 21:54

## 2023-10-24 RX ADMIN — GABAPENTIN 100 MG: 100 CAPSULE ORAL at 08:20

## 2023-10-24 RX ADMIN — ACETAMINOPHEN 975 MG: 325 TABLET, FILM COATED ORAL at 21:54

## 2023-10-24 RX ADMIN — ATORVASTATIN CALCIUM 10 MG: 10 TABLET, FILM COATED ORAL at 08:20

## 2023-10-24 RX ADMIN — LORATADINE 10 MG: 10 TABLET ORAL at 08:21

## 2023-10-24 RX ADMIN — OXYCODONE HYDROCHLORIDE 5 MG: 5 TABLET ORAL at 17:28

## 2023-10-24 RX ADMIN — METHOCARBAMOL 500 MG: 500 TABLET ORAL at 13:52

## 2023-10-24 NOTE — ASSESSMENT & PLAN NOTE
Patient has known history of cervical radiculopathy, C4-C6 cord with marked stenosis and compression. Follows with neurosurgery in outpatient setting, was scheduled for outpatient decompression procedure on 10/30 however presented to hospital for frequent falls. MRI thoracic spine with cord compression at C4-5, mild thoracic degenerative changes without significant stenosis. Case was discussed with neurosurgery team, given concerns of worsening myelopathy symptoms including bilateral lower extremity involuntary muscle twitching, neuropathic pain and weakness, decision was made to transfer to [facility] for earlier inpatient procedure. Neurosurgery following, patient is scheduled for cervical decompression and fusion on 1025. Preop clearance provided by medicine team provider on 10/23/23. Continue fall precautions. Continue multimodal pain management.

## 2023-10-24 NOTE — RESTORATIVE TECHNICIAN NOTE
Restorative Technician Note      Patient Name: Francesca Mcmillan     Note Type: Mobility  Patient Position Upon Consult: Supine  Activity Performed: Ambulated; [id number]; Stood  Assistive Device: Roller walker  Education Provided: Yes  Patient Position at [employer] of Consult: Bedside chair;  All needs within reach; Bed/Chair alarm activated      Rocio PUENTES, Restorative Technician, [employer]

## 2023-10-24 NOTE — ASSESSMENT & PLAN NOTE
Blood pressure on the lower side  on lisinopril 20 mg with holding parameters  HCTZ discontinued given hyponatremia.

## 2023-10-24 NOTE — CONSULTS
[address] [facility]  Consult  Name: Atul Leone 68 y.o. male I MRN: [id number]  Unit/Bed#: [facility] 711-01 I Date of Admission: 10/23/2023   Date of Service: 10/24/2023 I Hospital Day: 1    Inpatient consult to Neurosurgery  Consult performed by: AAKASH Valencia  Consult ordered by: Charisma Francis MD          Assessment/Plan   Preoperative clearance  Assessment & Plan  Previously on baby aspirin daily, last dose 10/19  Per primary team patient is class II risk on RCRI for medium risk procedure. No previous reactions to anesthesia. Denies any shortness of breath. * Osteoarthritis of cervical spine with myelopathy  Assessment & Plan  C4-C6 severe central cord compression/stenosis  Follows with Dr. Eliza Villaseñor and was scheduled for posterior cervical decompression 10/30. Unfortunately sustained 2 falls at home (lives by himself) and presented to [address] [facility] ED for evaluation on 10/20. Transferred to [facility] on 10/23 for surgical evaluation. On exam left upper extremity weakness 4/5, left lower extremity weakness +4/5, bilateral Dinora's, ataxia. Paresthesias to bilateral hands and legs, worse on left. Grossly myelopathic. Imaging:  CT cervical spine, 10/20/2023: Moderate multilevel cervical degenerative changes are noted. No critical central canal stenosis. MRI cervical spine w/o, 6/13/2023: Diffuse degenerative disc disease and facet osteoarthritis. Details above. Marked central canal narrowing at C4-C5 and C5-C6 with cord compression. Abnormal cord signal intensity from C3-C4 through C4-C5 most compatible with edema and/or myelomalacia. Marked foraminal narrowing from C3-C4 through C5-C6 with probable exiting nerve root impingement. Plan:  Continue to monitor neurologic exam closely. Continue to hold aspirin. Per primary team patient is class II risk on RCRI for medium risk procedure.   Plan for OR tomorrow with Dr. Grayson Alexis, C3-C6/7 posterior cervical decompression and fusion 10/25. Continue to mobilize as tolerated with PT/OT. Urinary retention protocol. Multimodal pain regimen. DVT prophylaxis: SCDs, Lovenox. Neurosurgery will continue to follow. Plan for OR tomorrow 10/25. Call with questions. History of Present Illness     HPI: Bebeto Yoon is a 68 y.o. male with PMH including hypertension, hyperlipidemia, GERD, who is known to our practice is a patient of Dr. Dada Mayorga for history of cervical canal stenosis and myelopathy. He was scheduled for outpatient posterior cervical decompression and fusion on 10/30. He lives alone and unfortunately over the past week sustained 2 falls secondary to his bilateral lower extremity pain, numbness tingling and weakness. He relates that the symptoms all began in April and have progressively worsened. He has also developed some urinary retention and constipation. He presented to [address] emergency department on 10/20 for evaluation of the symptoms. He had MRIs of his thoracic and lumbar spines completed which were unremarkable. He was then transferred to [address] [place] [address] for consideration of performing his elective surgery more emergently while inpatient secondary to the severity of his symptoms. He relates that his symptoms remain unchanged. He is right-hand dominant. He has been on gabapentin. He describes paresthesias and numbness and tingling to his bilateral hands and bilateral lower extremities. He is weak in his legs and has trouble walking and falls frequently. He has to hold onto furniture. He unfortunately lives alone. Review of Systems   Constitutional: Negative. Negative for activity change, appetite change and fatigue. HENT:  Negative for ear pain, hearing loss, nosebleeds, postnasal drip, tinnitus, trouble swallowing and voice change. Eyes:  Negative for pain and visual disturbance.    Respiratory:  Negative for chest tightness and shortness of breath. Cardiovascular:  Negative for chest pain, palpitations and leg swelling. Gastrointestinal:  Positive for constipation. Negative for abdominal pain, diarrhea, nausea and vomiting. Genitourinary:  Positive for decreased urine volume. Musculoskeletal:  Positive for neck pain. Negative for back pain and neck stiffness. Skin:  Negative for color change and pallor. Neurological:  Positive for weakness and numbness. Negative for dizziness, tremors, seizures, syncope, facial asymmetry, speech difficulty, light-headedness and headaches. Psychiatric/Behavioral:  Negative for agitation, behavioral problems and confusion. Historical Information   Past Medical History:   Diagnosis Date    GERD (gastroesophageal reflux disease)     Hyperlipidemia     Hypertension      Past Surgical History:   Procedure Laterality Date    COLONOSCOPY      HERNIA REPAIR      IN COLONOSCOPY FLX DX W/COLLJ SPEC WHEN PFRMD N/A 4/5/2019    Procedure: COLONOSCOPY;  Surgeon: El Osborne DO;  Location: [facility]; Service: Gastroenterology    ROTATOR CUFF REPAIR Left     TONSILLECTOMY       Social History     Substance and Sexual Activity   Alcohol Use Yes    Alcohol/week: 6.0 standard drinks of alcohol    Types: 6 Cans of beer per week    Comment: beer 4-5 days a week     Social History     Substance and Sexual Activity   Drug Use Never     Social History     Tobacco Use   Smoking Status Never   Smokeless Tobacco Never     No family history on file.     Meds/Allergies   all current active meds have been reviewed and current meds:   Current Facility-Administered Medications   Medication Dose Route Frequency    acetaminophen (TYLENOL) tablet 650 mg  650 mg Oral Q6H PRN    albuterol (PROVENTIL HFA,VENTOLIN HFA) inhaler 2 puff  2 puff Inhalation Q6H PRN    aluminum-magnesium hydroxide-simethicone (MAALOX) oral suspension 30 mL  30 mL Oral Q6H PRN    atorvastatin (LIPITOR) tablet 10 mg  10 mg Oral Daily    enoxaparin (LOVENOX) subcutaneous injection 40 mg  40 mg Subcutaneous Daily    fluticasone (FLONASE) 50 mcg/act nasal spray 2 spray  2 spray Each Nare Daily PRN    folic acid (FOLVITE) tablet 400 mcg  400 mcg Oral Daily    gabapentin (NEURONTIN) capsule 100 mg  100 mg Oral TID    lisinopril (ZESTRIL) tablet 20 mg  20 mg Oral Daily    loratadine (CLARITIN) tablet 10 mg  10 mg Oral Daily    melatonin tablet 3 mg  3 mg Oral HS    methocarbamol (ROBAXIN) tablet 500 mg  500 mg Oral Q8H [address]    oxyCODONE (ROXICODONE) IR tablet 5 mg  5 mg Oral Q4H PRN    Or    oxyCODONE (ROXICODONE) immediate release tablet 10 mg  10 mg Oral Q6H PRN    pantoprazole (PROTONIX) EC tablet 40 mg  40 mg Oral Daily    polyethylene glycol (MIRALAX) packet 17 g  17 g Oral Daily    senna-docusate sodium (SENOKOT S) 8.6-50 mg per tablet 1 tablet  1 tablet Oral BID    traMADol (ULTRAM) tablet 50 mg  50 mg Oral Q4H PRN     No Known Allergies    Objective   I/O         10/22 0701  10/23 0700 10/23 0701  10/24 0700 10/24 0701  10/25 0700    P. O.   240    Total Intake   240    Urine  675     Total Output  675     Net  -675 +240                   Physical Exam  Constitutional:       General: He is not in acute distress. Appearance: He is well-developed. He is obese. He is not diaphoretic. Eyes:      General:         Right eye: No discharge. Left eye: No discharge. Extraocular Movements: EOM normal.      Conjunctiva/sclera: Conjunctivae normal.      Pupils: Pupils are equal, round, and reactive to light. Pulmonary:      Effort: Pulmonary effort is normal. No respiratory distress. Abdominal:      General: There is no distension. Tenderness: There is no abdominal tenderness. Musculoskeletal:         General: Normal range of motion. Cervical back: Normal range of motion. Skin:     General: Skin is warm and dry. Neurological:      Mental Status: He is alert and oriented to person, place, and time. Mental status is at baseline. Cranial Nerves: Cranial nerves 2-12 are intact. No cranial nerve deficit. Sensory: Sensory deficit present. Motor: Weakness present. Coordination: Coordination abnormal. Finger-Nose-Finger Test normal.      Gait: Gait abnormal.      Deep Tendon Reflexes: Reflexes abnormal.      Reflex Scores:       Tricep reflexes are 2+ on the right side and 2+ on the left side. Bicep reflexes are 2+ on the right side and 2+ on the left side. Brachioradialis reflexes are 2+ on the right side and 2+ on the left side. Patellar reflexes are 2+ on the right side and 2+ on the left side. Achilles reflexes are 2+ on the right side and 2+ on the left side. Psychiatric:         Speech: Speech normal.         Behavior: Behavior normal.         Thought Content: Thought content normal.         Judgment: Judgment normal.       Neurologic Exam     Mental Status   Oriented to person, place, and time. Oriented to person. Oriented to place. Oriented to time. Oriented to year, month and date. Attention: normal. Concentration: normal.   Speech: speech is normal   Level of consciousness: alert    Cranial Nerves   Cranial nerves II through XII intact. CN III, IV, VI   Pupils are equal, round, and reactive to light. Extraocular motions are normal.   Right pupil: Size: 3 mm. Shape: regular. Reactivity: brisk. Consensual response: intact. Accommodation: intact. Left pupil: Size: 3 mm. Shape: regular. Reactivity: brisk. Consensual response: intact. Accommodation: intact. Nystagmus: none   Diplopia: none  Conjugate gaze: present    CN V   Right facial sensation deficit: none  Left facial sensation deficit: none    CN VII   Facial expression full, symmetric.      CN VIII   Hearing: intact    CN IX, X   Palate: symmetric    CN XI   Right sternocleidomastoid strength: normal  Left sternocleidomastoid strength: normal  Right trapezius strength: normal  Left trapezius strength: normal    CN XII   Tongue: not atrophic  Fasciculations: absent  Tongue deviation: none    Motor Exam   Muscle bulk: normal  Overall muscle tone: normal  Right arm pronator drift: absent  Left arm pronator drift: absent    Strength   Right deltoid: 5/5  Left deltoid: 4/5  Right biceps: 5/5  Left biceps: 4/5  Right triceps: 5/5  Left triceps: 4/5  Right wrist flexion: 5/5  Left wrist flexion: 3/5  Right wrist extension: 5/5  Left wrist extension: 3/5  Right interossei: 5/5  Left interossei: 3/5  Right iliopsoas: 5/5  Left iliopsoas: 4/5  Right quadriceps: 5/5  Left quadriceps: 4/5  Right hamstrin/5  Left hamstrin/5  Right glutei: 5/5  Left glutei: 4/5  Right anterior tibial: 5/5  Left anterior tibial: 4/5  Right posterior tibial: 5/5  Left posterior tibial: 4/5  Right peroneal: 5/5  Left peroneal: 4/5  Right gastroc: 5/5  Left gastroc: 4/5    Sensory Exam   Right arm light touch: decreased from fingers  Left arm light touch: decreased from fingers  Right leg light touch: decreased from toes  Left leg light touch: decreased from toes  Proprioception normal.   Right arm pinprick: decreased from fingers  Left arm pinprick: decreased from fingers  Right leg pinprick: decreased from toes  Left leg pinprick: decreased from toes    Gait, Coordination, and Reflexes     Coordination   Finger to nose coordination: normal    Tremor   Resting tremor: absent  Intention tremor: absent  Action tremor: absent    Reflexes   Right brachioradialis: 2+  Left brachioradialis: 2+  Right biceps: 2+  Left biceps: 2+  Right triceps: 2+  Left triceps: 2+  Right patellar: 2+  Left patellar: 2+  Right achilles: 2+  Left achilles: 2+  Right : 2+  Left : 2+  Right Bonilla: present  Left Bonilla: present  Right ankle clonus: absent  Left ankle clonus: absent      Vitals:Blood pressure 95/59, pulse 71, temperature 97.9 °F (36.6 °C), resp. rate 16, SpO2 90 %. ,There is no height or weight on file to calculate BMI.      Lab Results:   Results from last 7 days Lab Units 10/24/23  0514 10/23/23  1927 10/20/23  0323   WBC Thousand/uL 6.81  --  7.38   HEMOGLOBIN g/dL 13.3  --  13.9   HEMATOCRIT % 40.0  --  41.8   PLATELETS Thousands/uL 222 228 240   NEUTROS PCT %  --   --  68   MONOS PCT %  --   --  9   EOS PCT %  --   --  2     Results from last 7 days   Lab Units 10/24/23  0514 10/20/23  0323   POTASSIUM mmol/L 3.9 4.1   CHLORIDE mmol/L 91* 97   CO2 mmol/L 32 29   BUN mg/dL 19 22   CREATININE mg/dL 0.90 1.13   CALCIUM mg/dL 9.0 9.6   ALK PHOS U/L  --  59   ALT U/L  --  28   AST U/L  --  33                 No results found for: "TROPONINT"  ABG:No results found for: "PHART", "ZMF3CDK", "PO2ART", "LEI9XGU", "H8QPCLQK", "BEART", "SOURCE"    Imaging Studies: I have personally reviewed pertinent reports. and I have personally reviewed pertinent films in PACS    No results found. EKG, Pathology, and Other Studies: I have personally reviewed pertinent reports. and I have personally reviewed pertinent films in PACS    VTE Prophylaxis: Sequential compression device (Venodyne)  and Enoxaparin (Lovenox)    Code Status: Level 1 - Full Code  Advance Directive and Living Will:      Power of :    POLST:      Counseling / Coordination of Care  I spent 20 minutes with the patient.

## 2023-10-24 NOTE — ASSESSMENT & PLAN NOTE
Patient presented to [address] [place] [address] Road for multiple falls at home. CT spine and CT head negative for any acute changes. For neurosurgery intervention on 10.25  Continue fall precautions. PT/OT eval.  Conservative management for symptom relief.

## 2023-10-24 NOTE — APP STUDENT NOTE
JIGNESH STUDENT  Inpatient Progress Note for TRAINING ONLY  Not Part of Legal Medical Record     Progress Note - Daniela Busby 68 y.o. male MRN: [id number]  Unit/Bed#: [facility] 711-01 Encounter: [id number]       Assessment/Plan:   Osteoarthritis of cervical spine with myelopathy:   Patient has known cervical radiculopathy and stenosis of C4-C6 presented with frequent falls  Follows outpatient neurosurgery with scheduled decompression procedure on 10/30  Neurosurgery consulted. Recommended moving scheduled procedure forward. Planning for surgery tomorrow. NPO at midnight   Continue fall precautions. Continue multimodal pain management. Case management for post-operative rehab. Patient lives alone. Unknown if he has support. Ambulatory dysfunction:   Patient history of falls due to progressive leg weakness  PT/OT consulted. Hyponatremia  Serum NA: 129   Likely secondary to urinary retention. Urine sodium and osmolality pending    Urinary retention:   Patient noted urinary retention since injury on 10/20/2023  Continue retention protocol  Urology consult if continues to persists    Hyperlipidemia: Continue atorvastatin 10 mg daily    GERD: Continue pantoprazole 40 mg prn. Benign essential hypertension:   Continue lisinopril 20mg  Hold SBP <110    VTE Pharmacologic Prophylaxis:   Pharmacologic: Enoxaparin (Lovenox)  Mechanical VTE Prophylaxis in Place: Yes    Patient Centered Rounds: I have performed bedside rounds with nursing staff today. Discussions with Specialists or Other Care Team Provider: Neurosurgical team    Education and Discussions with Family / Patient:     Time Spent for Care: 30 minutes. More than 50% of total time spent on counseling and coordination of care as described above.     Current Length of Stay: 1 day(s)    Current Patient Status: Inpatient   Certification Statement: The patient will continue to require additional inpatient hospital stay due to posterior laminectomy coordinating with neurosurgery    Discharge Plan: Patients discharge likely within 72 hours pending surgical treatment and recovery. Code Status: Level 1 - Full Code    Subjective:   Patient reports he is feeling "okay". His pain today is 6/10 with occasional radiation to his shoulder. Patient reports he has had 3 falls within the past year. He states he has had progressive leg weakness and feels as if they give out on him which has led to his hospitalization. He states he feels more stable with utilization of a walker. Patient lives alone in an apartment. He additionally is complaining of urinary retention since his injury. He states he has the sensation to urinate but is not able to. He has also been constipated x2 days. He denies neck numbness or tingling, dizziness, SOB, CP, palpitations, fever, chills, headache, vision changes, vomiting, diarrhea, or abdominal pain. Objective:     Vitals:   Temp (24hrs), Av.9 °F (36.6 °C), Min:97.9 °F (36.6 °C), Max:97.9 °F (36.6 °C)    Temp:  [97.9 °F (36.6 °C)] 97.9 °F (36.6 °C)  HR:  [71-87] 71  Resp:  [16-19] 16  BP: ()/(59-72) 95/59  SpO2:  [90 %-98 %] 90 %  There is no height or weight on file to calculate BMI. Input and Output Summary (last 24 hours): Intake/Output Summary (Last 24 hours) at 10/24/2023 1116  Last data filed at 10/24/2023 0702  Gross per 24 hour   Intake 240 ml   Output 675 ml   Net -435 ml       Physical Exam:     Physical Exam  Constitutional:       Appearance: Normal appearance. Comments: Patient sitting in bed side chair comfortably   HENT:      Head: Normocephalic and atraumatic. Mouth/Throat:      Mouth: Mucous membranes are moist.   Cardiovascular:      Rate and Rhythm: Normal rate and regular rhythm. Pulses: Normal pulses. Heart sounds: Normal heart sounds. Pulmonary:      Effort: Pulmonary effort is normal.      Breath sounds: Normal breath sounds.    Musculoskeletal:         General: Normal range of motion. Cervical back: Normal range of motion and neck supple. Comments: Sensation in B/L upper extremity intact  Sensation decreased in L lower extremity at baseline     Sensation in B/L LE intact  Strength 5/5 in B/L LE   Skin:     General: Skin is warm and dry. Capillary Refill: Capillary refill takes less than 2 seconds. Neurological:      Mental Status: He is alert and oriented to person, place, and time. Psychiatric:         Mood and Affect: Mood normal.       (  Be Sure to Include Physical Exam: Delete this entire line when you have entered your exam)    Historical Information   Past Medical History:   Diagnosis Date    GERD (gastroesophageal reflux disease)     Hyperlipidemia     Hypertension      Past Surgical History:   Procedure Laterality Date    COLONOSCOPY      HERNIA REPAIR      OR COLONOSCOPY FLX DX W/COLLJ SPEC WHEN PFRMD N/A 4/5/2019    Procedure: COLONOSCOPY;  Surgeon: Francisco Herrmann DO;  Location: [facility]; Service: Gastroenterology    ROTATOR CUFF REPAIR Left     TONSILLECTOMY       Social History   Social History     Substance and Sexual Activity   Alcohol Use Yes    Alcohol/week: 6.0 standard drinks of alcohol    Types: 6 Cans of beer per week    Comment: beer 4-5 days a week     Social History     Substance and Sexual Activity   Drug Use Never     Social History     Tobacco Use   Smoking Status Never   Smokeless Tobacco Never     Family History: {LOUIS FONTANEZ FAM HISTORY SMARTLIST:[id number]}    Meds/Allergies   {LOUIS FONTANEZ H&P ZJAK:[id number]}  No Known Allergies    Additional Data:     Labs:    Results from last 7 days   Lab Units 10/24/23  0514 10/23/23  1927 10/20/23  0323   WBC Thousand/uL 6.81  --  7.38   HEMOGLOBIN g/dL 13.3  --  13.9   HEMATOCRIT % 40.0  --  41.8   PLATELETS Thousands/uL 222   < > 240   NEUTROS PCT %  --   --  68   LYMPHS PCT %  --   --  20   MONOS PCT %  --   --  9   EOS PCT %  --   --  2    < > = values in this interval not displayed.      Results from last 7 days   Lab Units 10/24/23  0514 10/20/23  0323   SODIUM mmol/L 129* 135   POTASSIUM mmol/L 3.9 4.1   CHLORIDE mmol/L 91* 97   CO2 mmol/L 32 29   BUN mg/dL 19 22   CREATININE mg/dL 0.90 1.13   ANION GAP mmol/L 6 9   CALCIUM mg/dL 9.0 9.6   ALBUMIN g/dL  --  4.3   TOTAL BILIRUBIN mg/dL  --  0.69   ALK PHOS U/L  --  59   ALT U/L  --  28   AST U/L  --  33   GLUCOSE RANDOM mg/dL 88 103                         * I Have Reviewed All Lab Data Listed Above.   * Additional Pertinent Lab Tests Reviewed: {Labreview:[id number]}    Imaging:    Imaging Reports Reviewed Today Include: ***  Imaging Personally Reviewed by Myself Includes:  ***    Recent Cultures (last 7 days):           Last 24 Hours Medication List:   Current Facility-Administered Medications   Medication Dose Route Frequency Provider Last Rate    acetaminophen  650 mg Oral Q6H PRN Tawny Frazier MD      albuterol  2 puff Inhalation Q6H PRN Tawny Frazier MD      aluminum-magnesium hydroxide-simethicone  30 mL Oral Q6H PRN Tawny Frazier MD      atorvastatin  10 mg Oral Daily Tawny Frazier MD      enoxaparin  40 mg Subcutaneous Daily Tawny Frazier MD      fluticasone  2 spray Each Nare Daily PRN Tawny Frazier MD      folic acid  383 mcg Oral Daily Tawny Frazier MD      gabapentin  100 mg Oral TID Tawny Frazier MD      lisinopril  20 mg Oral Daily Tawny Frazier MD      loratadine  10 mg Oral Daily Tawny Frazier MD      melatonin  3 mg Oral HS Tawny Frazier MD      methocarbamol  500 mg Oral Q8H Rosita Soliman MD      oxyCODONE  5 mg Oral Q4H PRN Tawny Frazier MD      Or    oxyCODONE  10 mg Oral Q6H PRN Tawny Frazier MD      pantoprazole  40 mg Oral Daily Tawny Frazier MD      polyethylene glycol  17 g Oral Daily Umer River MD      senna-docusate sodium  1 tablet Oral BID Umer River MD      traMADol  50 mg Oral Q4H PRN Tawny Frazier MD          Today, Patient Was Seen By: Natalie Casillas    ** Please Note: Dictation voice to text software may have been used in the creation of this document.  **

## 2023-10-24 NOTE — ASSESSMENT & PLAN NOTE
Previously on baby aspirin daily, last dose 10/19  Per primary team patient is class II risk on RCRI for medium risk procedure. No previous reactions to anesthesia. Denies any shortness of breath.

## 2023-10-24 NOTE — PROGRESS NOTES
[address] [facility]  Progress Note  Name: Andre Campa  MRN: [id number]  [facility] Date of Admission: 10/23/2023   Date of Service: 10/24/2023 I Hospital Day: 1    Assessment/Plan   * Osteoarthritis of cervical spine with myelopathy  Assessment & Plan  Patient has known history of cervical radiculopathy, C4-C6 cord with marked stenosis and compression. Follows with neurosurgery in outpatient setting, was scheduled for outpatient decompression procedure on 10/30 however presented to hospital for frequent falls. MRI thoracic spine with cord compression at C4-5, mild thoracic degenerative changes without significant stenosis. Case was discussed with neurosurgery team, given concerns of worsening myelopathy symptoms including bilateral lower extremity involuntary muscle twitching, neuropathic pain and weakness, decision was made to transfer to [facility] for earlier inpatient procedure. Neurosurgery following, patient is scheduled for cervical decompression and fusion on 1025. Preop clearance provided by medicine team provider on 10/23/23. Continue fall precautions. Continue multimodal pain management. Constipation  Assessment & Plan  Bowel regimen including senna, colace and miralax  Prn suppository    Urinary retention  Assessment & Plan  Patient noted to have urinary retention, needing straight cath at [address] [place] [address]. Continue retention protocol. Now with Christensen catheter given multiple straight cath attempts. Likely in setting of myelopathy. Plan for neurosurgical intervention tomorrow. Hyperlipidemia  Assessment & Plan  Patient is on atorvastatin 10 mg at home, continue same. Gastroesophageal reflux disease  Assessment & Plan  Patient is on omeprazole at home, continue alternative equivalent pantoprazole while inpatient.       Benign essential hypertension  Assessment & Plan  Blood pressure on the lower side  on lisinopril 20 mg with holding parameters  HCTZ discontinued given hyponatremia. Ambulatory dysfunction  Assessment & Plan  Patient presented to [address] [place] [address] for multiple falls at home. CT spine and CT head negative for any acute changes. For neurosurgery intervention on 10.25  Continue fall precautions. PT/OT eval.  Conservative management for symptom relief. VTE Pharmacologic Prophylaxis:   Pharmacologic: Enoxaparin (Lovenox)  Mechanical VTE Prophylaxis in Place: Yes    Patient Centered Rounds: I have performed bedside rounds with nursing staff today. Discussions with Specialists or Other Care Team Provider: Neurosurgery,     Education and Discussions with Family / Patient: Cussed plan of care with patient. He declined to call . Time Spent for Care: 30 minutes. More than 50% of total time spent on counseling and coordination of care as described above. Current Length of Stay: 1 day(s)    Current Patient Status: Inpatient   Certification Statement: The patient will continue to require additional inpatient hospital stay due to not medically stable given plan for or tomorrow    Discharge Plan: Neurosurgical intervention tomorrow, needs PT OT eval after neurosurgical intervention    Code Status: Level 1 - Full Code      Subjective:   Quired straight cath overnight couple of times. Total for straight cath required so far. Also reports constipation. Denies any abdominal pain. No new weakness or numbness. Objective:     Vitals:   Temp (24hrs), Av.9 °F (36.6 °C), Min:97.9 °F (36.6 °C), Max:97.9 °F (36.6 °C)    Temp:  [97.9 °F (36.6 °C)] 97.9 °F (36.6 °C)  HR:  [71-85] 85  Resp:  [16] 16  BP: ()/(59-71) 93/66  SpO2:  [90 %-98 %] 97 %  There is no height or weight on file to calculate BMI. Input and Output Summary (last 24 hours):        Intake/Output Summary (Last 24 hours) at 10/24/2023 1727  Last data filed at 10/24/2023 1139  Gross per 24 hour   Intake 898 ml   Output 675 ml Net 223 ml       Physical Exam:     Physical Exam  Constitutional:       General: He is not in acute distress. Cardiovascular:      Rate and Rhythm: Normal rate and regular rhythm. Heart sounds: Normal heart sounds. No murmur heard. Pulmonary:      Effort: No respiratory distress. Breath sounds: Normal breath sounds. No wheezing or rales. Abdominal:      General: Bowel sounds are normal. There is no distension. Palpations: Abdomen is soft. Tenderness: There is no abdominal tenderness. Skin:     General: Skin is warm. Neurological:      Mental Status: He is alert. Comments: Awake alert communicative  Moves all extremities            Additional Data:     Labs:    Results from last 7 days   Lab Units 10/24/23  0514 10/23/23  1927 10/20/23  0323   WBC Thousand/uL 6.81  --  7.38   HEMOGLOBIN g/dL 13.3  --  13.9   HEMATOCRIT % 40.0  --  41.8   PLATELETS Thousands/uL 222   < > 240   NEUTROS PCT %  --   --  68   LYMPHS PCT %  --   --  20   MONOS PCT %  --   --  9   EOS PCT %  --   --  2    < > = values in this interval not displayed.      Results from last 7 days   Lab Units 10/24/23  0514 10/20/23  0323   SODIUM mmol/L 129* 135   POTASSIUM mmol/L 3.9 4.1   CHLORIDE mmol/L 91* 97   CO2 mmol/L 32 29   BUN mg/dL 19 22   CREATININE mg/dL 0.90 1.13   ANION GAP mmol/L 6 9   CALCIUM mg/dL 9.0 9.6   ALBUMIN g/dL  --  4.3   TOTAL BILIRUBIN mg/dL  --  0.69   ALK PHOS U/L  --  59   ALT U/L  --  28   AST U/L  --  33   GLUCOSE RANDOM mg/dL 88 103                           Recent Cultures (last 7 days):           Last 24 Hours Medication List:   Current Facility-Administered Medications   Medication Dose Route Frequency Provider Last Rate    acetaminophen  975 mg Oral Q8H Leola Anton MD      albuterol  2 puff Inhalation Q6H PRN Elen Ortega MD      aluminum-magnesium hydroxide-simethicone  30 mL Oral Q6H PRN Elen Ortega MD      atorvastatin  10 mg Oral Daily Elen Ortega MD      bisacodyl  10 mg Rectal Daily PRN Jennifer Cancino MD      [START ON 10/25/2023] cefazolin  2,000 mg Intravenous Once AAKASH Brito      chlorhexidine  15 mL Swish & Spit Once [employer] Health SolutionsAAKASH      enoxaparin  40 mg Subcutaneous Daily Oral MD Brittany      fluticasone  2 spray Each Nare Daily PRN Oral MD Brittany      folic acid  110 mcg Oral Daily Oral Staggleny, MD      gabapentin  100 mg Oral TID Oral Brittany, MD      lidocaine  1 patch Topical Daily Jennifer Cancino MD      lisinopril  20 mg Oral Daily Oral StaggMD leny      loratadine  10 mg Oral Daily Oral StaggMD leny      melatonin  3 mg Oral HS Oral StaMD john      methocarbamol  500 mg Oral [facility] Oral StaggMD leny      oxyCODONE  5 mg Oral Q4H PRN Oral MD Brittany      Or    oxyCODONE  10 mg Oral Q6H PRN Oral MD Brittany      pantoprazole  40 mg Oral Daily Oral MD Brittany      polyethylene glycol  17 g Oral BID Jennifer Cancino MD      senna-docusate sodium  1 tablet Oral BID Jennifer Cancino MD      sodium chloride  125 mL/hr Intravenous Continuous AAKASH Brito      traMADol  50 mg Oral Q4H PRN Oral MD Brittany          Today, Patient Was Seen By: Jennifer Cancino MD    ** Please Note: Dictation voice to text software may have been used in the creation of this document.  **

## 2023-10-24 NOTE — PLAN OF CARE
Problem: MOBILITY - ADULT  Goal: Maintain or return to baseline ADL function  Description: INTERVENTIONS:  -  Assess patient's ability to carry out ADLs; assess patient's baseline for ADL function and identify physical deficits which impact ability to perform ADLs (bathing, care of mouth/teeth, toileting, grooming, dressing, etc.)  - Assess/evaluate cause of self-care deficits   - Assess range of motion  - Assess patient's mobility; develop plan if impaired  - Assess patient's need for assistive devices and provide as appropriate  - Encourage maximum independence but intervene and supervise when necessary  - Involve family in performance of ADLs  - Assess for home care needs following discharge   - Consider OT consult to assist with ADL evaluation and planning for discharge  - Provide patient education as appropriate  Outcome: Progressing  Goal: Maintains/Returns to pre admission functional level  Description: INTERVENTIONS:  - Perform BMAT or MOVE assessment daily.   - Set and communicate daily mobility goal to care team and patient/family/caregiver.    - Collaborate with rehabilitation services on mobility goals if consulted  - Out of bed for toileting  - Record patient progress and toleration of activity level   Outcome: Progressing     Problem: PAIN - ADULT  Goal: Verbalizes/displays adequate comfort level or baseline comfort level  Description: Interventions:  - Encourage patient to monitor pain and request assistance  - Assess pain using appropriate pain scale  - Administer analgesics based on type and severity of pain and evaluate response  - Implement non-pharmacological measures as appropriate and evaluate response  - Consider cultural and social influences on pain and pain management  - Notify physician/advanced practitioner if interventions unsuccessful or patient reports new pain  Outcome: Progressing     Problem: INFECTION - ADULT  Goal: Absence or prevention of progression during hospitalization  Description: INTERVENTIONS:  - Assess and monitor for signs and symptoms of infection  - Monitor lab/diagnostic results  - Monitor all insertion sites, i.e. indwelling lines, tubes, and drains  - Monitor endotracheal if appropriate and nasal secretions for changes in amount and color  - [place] appropriate cooling/warming therapies per order  - Administer medications as ordered  - Instruct and encourage patient and family to use good hand hygiene technique  - Identify and instruct in appropriate isolation precautions for identified infection/condition  Outcome: Progressing  Goal: Absence of fever/infection during neutropenic period  Description: INTERVENTIONS:  - Monitor WBC    Outcome: Progressing     Problem: SAFETY ADULT  Goal: Maintain or return to baseline ADL function  Description: INTERVENTIONS:  -  Assess patient's ability to carry out ADLs; assess patient's baseline for ADL function and identify physical deficits which impact ability to perform ADLs (bathing, care of mouth/teeth, toileting, grooming, dressing, etc.)  - Assess/evaluate cause of self-care deficits   - Assess range of motion  - Assess patient's mobility; develop plan if impaired  - Assess patient's need for assistive devices and provide as appropriate  - Encourage maximum independence but intervene and supervise when necessary  - Involve family in performance of ADLs  - Assess for home care needs following discharge   - Consider OT consult to assist with ADL evaluation and planning for discharge  - Provide patient education as appropriate  Outcome: Progressing  Goal: Maintains/Returns to pre admission functional level  Description: INTERVENTIONS:  - Perform BMAT or MOVE assessment daily.   - Set and communicate daily mobility goal to care team and patient/family/caregiver.    - Collaborate with rehabilitation services on mobility goals if consulted  - Ambulate patient 3 times a day    - Out of bed for toileting  - Record patient progress and toleration of activity level   Outcome: Progressing  Goal: Patient will remain free of falls  Description: INTERVENTIONS:  - Educate patient/family on patient safety including physical limitations  - Instruct patient to call for assistance with activity   - Consult OT/PT to assist with strengthening/mobility   - Keep Call bell within reach  - Keep bed low and locked with side rails adjusted as appropriate  - Keep care items and personal belongings within reach  - Initiate and maintain comfort rounds  - Make Fall Risk Sign visible to staff  - Offer Toileting every 2 Hours, in advance of need  - Initiate/Maintain BED alarm  - Obtain necessary fall risk management equipment:   - Apply yellow socks and bracelet for high fall risk patients  - Consider moving patient to room near nurses station  Outcome: Progressing     Problem: DISCHARGE PLANNING  Goal: Discharge to home or other facility with appropriate resources  Description: INTERVENTIONS:  - Identify barriers to discharge w/patient and caregiver  - Arrange for needed discharge resources and transportation as appropriate  - Identify discharge learning needs (meds, wound care, etc.)  - Arrange for interpretive services to assist at discharge as needed  - Refer to Case Management Department for coordinating discharge planning if the patient needs post-hospital services based on physician/advanced practitioner order or complex needs related to functional status, cognitive ability, or social support system  Outcome: Progressing     Problem: Knowledge Deficit  Goal: Patient/family/caregiver demonstrates understanding of disease process, treatment plan, medications, and discharge instructions  Description: Complete learning assessment and assess knowledge base.   Interventions:  - Provide teaching at level of understanding  - Provide teaching via preferred learning methods  Outcome: Progressing

## 2023-10-24 NOTE — ASSESSMENT & PLAN NOTE
C4-C6 severe central cord compression/stenosis  Follows with Dr. Jannet Fernanedz and was scheduled for posterior cervical decompression 10/30. Unfortunately sustained 2 falls at home (lives by himself) and presented to [address] [facility] ED for evaluation on 10/20. Transferred to [address] [place] [address] on 10/23 for surgical evaluation. On exam left upper extremity weakness 4/5, left lower extremity weakness +4/5, bilateral Dinora's, ataxia. Paresthesias to bilateral hands and legs, worse on left. Grossly myelopathic. Imaging:  CT cervical spine, 10/20/2023: Moderate multilevel cervical degenerative changes are noted. No critical central canal stenosis. MRI cervical spine w/o, 6/13/2023: Diffuse degenerative disc disease and facet osteoarthritis. Details above. Marked central canal narrowing at C4-C5 and C5-C6 with cord compression. Abnormal cord signal intensity from C3-C4 through C4-C5 most compatible with edema and/or myelomalacia. Marked foraminal narrowing from C3-C4 through C5-C6 with probable exiting nerve root impingement. Plan:  Continue to monitor neurologic exam closely. Continue to hold aspirin. Per primary team patient is class II risk on RCRI for medium risk procedure. Plan for OR tomorrow with Dr. Nica Nails, C3-C6/7 posterior cervical decompression and fusion 10/25. Continue to mobilize as tolerated with PT/OT. Urinary retention protocol. Multimodal pain regimen. DVT prophylaxis: SCDs, Lovenox. Neurosurgery will continue to follow. Plan for OR tomorrow 10/25. Call with questions.

## 2023-10-24 NOTE — OCCUPATIONAL THERAPY NOTE
Occupational Therapy Cancel Note        Patient Name: Lori Wong  FMNNU'V Date: 10/24/2023       10/24/23 8927   OT Last Visit   OT Visit Date 10/24/23   Note Type   Note type Cancelled Session   Cancel Reasons Other     OT consulted, chart reviewed. Pt admitted with osteoarthritis of cervical spine awaiting neurosurgery consult for possible surgical intervention. Will continue to follow.  Gini OLEA, OTR/L

## 2023-10-24 NOTE — TELEPHONE ENCOUNTER
10/26/2023-PT [address]    10/25/2023-PT [address]    10/24/2023-PT STILL IN HOSPITAL  11/13/2023-2 WK POV W/NURSE IN [facility]   12/11/2023-6 WK POV W/GOLDBERG IN [facility]

## 2023-10-24 NOTE — PHYSICAL THERAPY NOTE
PHYSICAL THERAPY CANCEL NOTE          Patient Name: Demetrio Webster  ZKNXT'A Date: 10/24/2023       10/24/23 0744   PT Last Visit   PT Visit Date 10/24/23   Note Type   Note type Cancelled Session; Evaluation   Cancel Reasons Other   Additional Comments Pt transfered from [facility] to [facility] for surgical intervention. Pending neurosx plan. Will follow as appropriate. Thank you.      Radha Rudd, PT, DPT

## 2023-10-24 NOTE — ASSESSMENT & PLAN NOTE
Patient noted to have urinary retention, needing straight cath at [facility]. Continue retention protocol. Now with Christensen catheter given multiple straight cath attempts. Likely in setting of myelopathy. Plan for neurosurgical intervention tomorrow.

## 2023-10-25 ENCOUNTER — ANESTHESIA (INPATIENT)
Dept: PERIOP | Facility: HOSPITAL | Age: 73
DRG: 471 | End: 2023-10-25
Payer: MEDICARE

## 2023-10-25 ENCOUNTER — APPOINTMENT (INPATIENT)
Dept: RADIOLOGY | Facility: HOSPITAL | Age: 73
DRG: 471 | End: 2023-10-25
Payer: MEDICARE

## 2023-10-25 PROBLEM — E87.1 HYPONATREMIA: Status: ACTIVE | Noted: 2023-10-25

## 2023-10-25 PROBLEM — R20.2 PARESTHESIA: Status: ACTIVE | Noted: 2023-10-25

## 2023-10-25 PROBLEM — R29.898 HAND WEAKNESS: Status: ACTIVE | Noted: 2023-10-25

## 2023-10-25 LAB
APTT PPP: 31 SECONDS (ref 23–37)
GLUCOSE SERPL-MCNC: 109 MG/DL (ref 65–140)
GLUCOSE SERPL-MCNC: 154 MG/DL (ref 65–140)
INR PPP: 0.96 (ref 0.84–1.19)
MRSA NOSE QL CULT: NORMAL
PROTHROMBIN TIME: 12.7 SECONDS (ref 11.6–14.5)

## 2023-10-25 PROCEDURE — C1713 ANCHOR/SCREW BN/BN,TIS/BN: HCPCS | Performed by: STUDENT IN AN ORGANIZED HEALTH CARE EDUCATION/TRAINING PROGRAM

## 2023-10-25 PROCEDURE — 20936 SP BONE AGRFT LOCAL ADD-ON: CPT | Performed by: STUDENT IN AN ORGANIZED HEALTH CARE EDUCATION/TRAINING PROGRAM

## 2023-10-25 PROCEDURE — 63015 REMOVE SPINE LAMINA >2 CRVCL: CPT | Performed by: PHYSICIAN ASSISTANT

## 2023-10-25 PROCEDURE — 22842 INSERT SPINE FIXATION DEVICE: CPT | Performed by: PHYSICIAN ASSISTANT

## 2023-10-25 PROCEDURE — 20930 SP BONE ALGRFT MORSEL ADD-ON: CPT | Performed by: STUDENT IN AN ORGANIZED HEALTH CARE EDUCATION/TRAINING PROGRAM

## 2023-10-25 PROCEDURE — 72040 X-RAY EXAM NECK SPINE 2-3 VW: CPT

## 2023-10-25 PROCEDURE — 22842 INSERT SPINE FIXATION DEVICE: CPT | Performed by: STUDENT IN AN ORGANIZED HEALTH CARE EDUCATION/TRAINING PROGRAM

## 2023-10-25 PROCEDURE — 82948 REAGENT STRIP/BLOOD GLUCOSE: CPT

## 2023-10-25 PROCEDURE — 22614 ARTHRD PST TQ 1NTRSPC EA ADD: CPT | Performed by: PHYSICIAN ASSISTANT

## 2023-10-25 PROCEDURE — 63015 REMOVE SPINE LAMINA >2 CRVCL: CPT | Performed by: STUDENT IN AN ORGANIZED HEALTH CARE EDUCATION/TRAINING PROGRAM

## 2023-10-25 PROCEDURE — 20936 SP BONE AGRFT LOCAL ADD-ON: CPT | Performed by: PHYSICIAN ASSISTANT

## 2023-10-25 PROCEDURE — 20930 SP BONE ALGRFT MORSEL ADD-ON: CPT | Performed by: PHYSICIAN ASSISTANT

## 2023-10-25 PROCEDURE — 85730 THROMBOPLASTIN TIME PARTIAL: CPT | Performed by: NURSE PRACTITIONER

## 2023-10-25 PROCEDURE — 01N10ZZ RELEASE CERVICAL NERVE, OPEN APPROACH: ICD-10-PCS | Performed by: STUDENT IN AN ORGANIZED HEALTH CARE EDUCATION/TRAINING PROGRAM

## 2023-10-25 PROCEDURE — 22600 ARTHRD PST TQ 1NTRSPC CRV: CPT | Performed by: PHYSICIAN ASSISTANT

## 2023-10-25 PROCEDURE — 22614 ARTHRD PST TQ 1NTRSPC EA ADD: CPT | Performed by: STUDENT IN AN ORGANIZED HEALTH CARE EDUCATION/TRAINING PROGRAM

## 2023-10-25 PROCEDURE — 4A11X4G MONITORING OF PERIPHERAL NERVOUS ELECTRICAL ACTIVITY, INTRAOPERATIVE, EXTERNAL APPROACH: ICD-10-PCS | Performed by: STUDENT IN AN ORGANIZED HEALTH CARE EDUCATION/TRAINING PROGRAM

## 2023-10-25 PROCEDURE — 85610 PROTHROMBIN TIME: CPT | Performed by: NURSE PRACTITIONER

## 2023-10-25 PROCEDURE — 22600 ARTHRD PST TQ 1NTRSPC CRV: CPT | Performed by: STUDENT IN AN ORGANIZED HEALTH CARE EDUCATION/TRAINING PROGRAM

## 2023-10-25 PROCEDURE — 0RG20KJ FUSION OF 2 OR MORE CERVICAL VERTEBRAL JOINTS WITH NONAUTOLOGOUS TISSUE SUBSTITUTE, POSTERIOR APPROACH, ANTERIOR COLUMN, OPEN APPROACH: ICD-10-PCS | Performed by: STUDENT IN AN ORGANIZED HEALTH CARE EDUCATION/TRAINING PROGRAM

## 2023-10-25 PROCEDURE — 99232 SBSQ HOSP IP/OBS MODERATE 35: CPT | Performed by: INTERNAL MEDICINE

## 2023-10-25 DEVICE — DBM T42275 8MMX1CMX10CM 2 EACH GRAFTON M
Type: IMPLANTABLE DEVICE | Site: SPINE CERVICAL | Status: FUNCTIONAL
Brand: GRAFTON®AND GRAFTON PLUS®DEMINERALIZED BONE MATRIX (DBM)

## 2023-10-25 DEVICE — MULTI AXIAL SCREW 3603514 3.5 X 14MM
Type: IMPLANTABLE DEVICE | Site: SPINE CERVICAL | Status: FUNCTIONAL
Brand: INFINITY™ OCCIPITOCERVICAL UPPER THORACIC SYSTEM

## 2023-10-25 RX ORDER — SODIUM CHLORIDE 9 MG/ML
INJECTION, SOLUTION INTRAVENOUS CONTINUOUS PRN
Status: DISCONTINUED | OUTPATIENT
Start: 2023-10-25 | End: 2023-10-25

## 2023-10-25 RX ORDER — OXYCODONE HYDROCHLORIDE 10 MG/1
10 TABLET ORAL EVERY 4 HOURS PRN
Status: DISCONTINUED | OUTPATIENT
Start: 2023-10-25 | End: 2023-11-01 | Stop reason: HOSPADM

## 2023-10-25 RX ORDER — SUCCINYLCHOLINE/SOD CL,ISO/PF 100 MG/5ML
SYRINGE (ML) INTRAVENOUS AS NEEDED
Status: DISCONTINUED | OUTPATIENT
Start: 2023-10-25 | End: 2023-10-25

## 2023-10-25 RX ORDER — SODIUM CHLORIDE, SODIUM LACTATE, POTASSIUM CHLORIDE, CALCIUM CHLORIDE 600; 310; 30; 20 MG/100ML; MG/100ML; MG/100ML; MG/100ML
INJECTION, SOLUTION INTRAVENOUS CONTINUOUS PRN
Status: DISCONTINUED | OUTPATIENT
Start: 2023-10-25 | End: 2023-10-25

## 2023-10-25 RX ORDER — HYDROMORPHONE HCL IN WATER/PF 6 MG/30 ML
0.2 PATIENT CONTROLLED ANALGESIA SYRINGE INTRAVENOUS
Status: DISCONTINUED | OUTPATIENT
Start: 2023-10-25 | End: 2023-10-25 | Stop reason: HOSPADM

## 2023-10-25 RX ORDER — ONDANSETRON 2 MG/ML
4 INJECTION INTRAMUSCULAR; INTRAVENOUS ONCE
Status: DISCONTINUED | OUTPATIENT
Start: 2023-10-25 | End: 2023-10-25

## 2023-10-25 RX ORDER — FENTANYL CITRATE 50 UG/ML
INJECTION, SOLUTION INTRAMUSCULAR; INTRAVENOUS AS NEEDED
Status: DISCONTINUED | OUTPATIENT
Start: 2023-10-25 | End: 2023-10-25

## 2023-10-25 RX ORDER — ONDANSETRON 2 MG/ML
4 INJECTION INTRAMUSCULAR; INTRAVENOUS ONCE AS NEEDED
Status: DISCONTINUED | OUTPATIENT
Start: 2023-10-25 | End: 2023-10-25 | Stop reason: HOSPADM

## 2023-10-25 RX ORDER — DEXAMETHASONE SODIUM PHOSPHATE 10 MG/ML
INJECTION, SOLUTION INTRAMUSCULAR; INTRAVENOUS AS NEEDED
Status: DISCONTINUED | OUTPATIENT
Start: 2023-10-25 | End: 2023-10-25

## 2023-10-25 RX ORDER — ONDANSETRON 2 MG/ML
INJECTION INTRAMUSCULAR; INTRAVENOUS AS NEEDED
Status: DISCONTINUED | OUTPATIENT
Start: 2023-10-25 | End: 2023-10-25

## 2023-10-25 RX ORDER — GLYCOPYRROLATE 0.2 MG/ML
INJECTION INTRAMUSCULAR; INTRAVENOUS AS NEEDED
Status: DISCONTINUED | OUTPATIENT
Start: 2023-10-25 | End: 2023-10-25

## 2023-10-25 RX ORDER — CEPHALEXIN 500 MG/1
500 CAPSULE ORAL EVERY 8 HOURS SCHEDULED
Status: COMPLETED | OUTPATIENT
Start: 2023-10-26 | End: 2023-10-31

## 2023-10-25 RX ORDER — BACITRACIN, NEOMYCIN, POLYMYXIN B 400; 3.5; 5 [USP'U]/G; MG/G; [USP'U]/G
OINTMENT TOPICAL AS NEEDED
Status: DISCONTINUED | OUTPATIENT
Start: 2023-10-25 | End: 2023-10-25 | Stop reason: HOSPADM

## 2023-10-25 RX ORDER — CEFAZOLIN SODIUM 2 G/50ML
2000 SOLUTION INTRAVENOUS EVERY 8 HOURS
Status: COMPLETED | OUTPATIENT
Start: 2023-10-25 | End: 2023-10-26

## 2023-10-25 RX ORDER — HYDROMORPHONE HCL/PF 1 MG/ML
0.2 SYRINGE (ML) INJECTION
Status: DISCONTINUED | OUTPATIENT
Start: 2023-10-25 | End: 2023-10-29

## 2023-10-25 RX ORDER — OXYCODONE HYDROCHLORIDE 5 MG/1
5 TABLET ORAL EVERY 4 HOURS PRN
Status: DISCONTINUED | OUTPATIENT
Start: 2023-10-25 | End: 2023-11-01 | Stop reason: HOSPADM

## 2023-10-25 RX ORDER — PROPOFOL 10 MG/ML
INJECTION, EMULSION INTRAVENOUS CONTINUOUS PRN
Status: DISCONTINUED | OUTPATIENT
Start: 2023-10-25 | End: 2023-10-25

## 2023-10-25 RX ORDER — ONDANSETRON 2 MG/ML
4 INJECTION INTRAMUSCULAR; INTRAVENOUS EVERY 6 HOURS PRN
Status: DISCONTINUED | OUTPATIENT
Start: 2023-10-25 | End: 2023-11-01 | Stop reason: HOSPADM

## 2023-10-25 RX ORDER — SODIUM CHLORIDE 9 MG/ML
75 INJECTION, SOLUTION INTRAVENOUS CONTINUOUS
Status: DISCONTINUED | OUTPATIENT
Start: 2023-10-25 | End: 2023-10-26

## 2023-10-25 RX ORDER — LIDOCAINE HYDROCHLORIDE AND EPINEPHRINE 10; 10 MG/ML; UG/ML
INJECTION, SOLUTION INFILTRATION; PERINEURAL AS NEEDED
Status: DISCONTINUED | OUTPATIENT
Start: 2023-10-25 | End: 2023-10-25 | Stop reason: HOSPADM

## 2023-10-25 RX ORDER — TIZANIDINE 4 MG/1
4 TABLET ORAL 3 TIMES DAILY
Status: DISCONTINUED | OUTPATIENT
Start: 2023-10-25 | End: 2023-11-01 | Stop reason: HOSPADM

## 2023-10-25 RX ORDER — HEPARIN SODIUM 5000 [USP'U]/ML
5000 INJECTION, SOLUTION INTRAVENOUS; SUBCUTANEOUS EVERY 8 HOURS SCHEDULED
Status: DISCONTINUED | OUTPATIENT
Start: 2023-10-26 | End: 2023-11-01 | Stop reason: HOSPADM

## 2023-10-25 RX ORDER — HYDROMORPHONE HCL/PF 1 MG/ML
0.5 SYRINGE (ML) INJECTION
Status: DISCONTINUED | OUTPATIENT
Start: 2023-10-25 | End: 2023-10-25

## 2023-10-25 RX ORDER — ALBUMIN, HUMAN INJ 5% 5 %
SOLUTION INTRAVENOUS CONTINUOUS PRN
Status: DISCONTINUED | OUTPATIENT
Start: 2023-10-25 | End: 2023-10-25

## 2023-10-25 RX ORDER — FENTANYL CITRATE/PF 50 MCG/ML
25 SYRINGE (ML) INJECTION
Status: DISCONTINUED | OUTPATIENT
Start: 2023-10-25 | End: 2023-10-25

## 2023-10-25 RX ADMIN — OXYCODONE HYDROCHLORIDE 10 MG: 10 TABLET ORAL at 18:09

## 2023-10-25 RX ADMIN — MELATONIN 3 MG: at 00:11

## 2023-10-25 RX ADMIN — PANTOPRAZOLE SODIUM 40 MG: 40 TABLET, DELAYED RELEASE ORAL at 05:01

## 2023-10-25 RX ADMIN — METHOCARBAMOL 500 MG: 500 TABLET ORAL at 05:01

## 2023-10-25 RX ADMIN — PHENYLEPHRINE HYDROCHLORIDE 100 MCG: 10 INJECTION INTRAVENOUS at 12:17

## 2023-10-25 RX ADMIN — PROPOFOL 120 MCG/KG/MIN: 10 INJECTION, EMULSION INTRAVENOUS at 12:17

## 2023-10-25 RX ADMIN — GABAPENTIN 100 MG: 100 CAPSULE ORAL at 22:47

## 2023-10-25 RX ADMIN — GABAPENTIN 100 MG: 100 CAPSULE ORAL at 08:36

## 2023-10-25 RX ADMIN — PHENYLEPHRINE HYDROCHLORIDE 120 MCG: 10 INJECTION INTRAVENOUS at 12:23

## 2023-10-25 RX ADMIN — HYDROMORPHONE HYDROCHLORIDE 0.2 MG: 0.2 INJECTION, SOLUTION INTRAMUSCULAR; INTRAVENOUS; SUBCUTANEOUS at 16:51

## 2023-10-25 RX ADMIN — ACETAMINOPHEN 975 MG: 325 TABLET, FILM COATED ORAL at 22:47

## 2023-10-25 RX ADMIN — HYDROMORPHONE HYDROCHLORIDE 0.2 MG: 0.2 INJECTION, SOLUTION INTRAMUSCULAR; INTRAVENOUS; SUBCUTANEOUS at 17:15

## 2023-10-25 RX ADMIN — LORATADINE 10 MG: 10 TABLET ORAL at 08:36

## 2023-10-25 RX ADMIN — SODIUM CHLORIDE, SODIUM LACTATE, POTASSIUM CHLORIDE, AND CALCIUM CHLORIDE: .6; .31; .03; .02 INJECTION, SOLUTION INTRAVENOUS at 12:05

## 2023-10-25 RX ADMIN — FENTANYL CITRATE 25 MCG: 50 INJECTION INTRAMUSCULAR; INTRAVENOUS at 16:36

## 2023-10-25 RX ADMIN — FENTANYL CITRATE 50 MCG: 50 INJECTION, SOLUTION INTRAMUSCULAR; INTRAVENOUS at 12:09

## 2023-10-25 RX ADMIN — ATORVASTATIN CALCIUM 10 MG: 10 TABLET, FILM COATED ORAL at 08:36

## 2023-10-25 RX ADMIN — ENOXAPARIN SODIUM 40 MG: 40 INJECTION SUBCUTANEOUS at 08:36

## 2023-10-25 RX ADMIN — PHENYLEPHRINE HYDROCHLORIDE 100 MCG: 10 INJECTION INTRAVENOUS at 12:32

## 2023-10-25 RX ADMIN — NOREPINEPHRINE BITARTRATE 8 MCG: 1 INJECTION INTRAVENOUS at 12:58

## 2023-10-25 RX ADMIN — MELATONIN 3 MG: at 22:48

## 2023-10-25 RX ADMIN — SODIUM CHLORIDE: 0.9 INJECTION, SOLUTION INTRAVENOUS at 12:20

## 2023-10-25 RX ADMIN — TIZANIDINE 4 MG: 4 TABLET ORAL at 22:47

## 2023-10-25 RX ADMIN — DEXAMETHASONE SODIUM PHOSPHATE 10 MG: 10 INJECTION, SOLUTION INTRAMUSCULAR; INTRAVENOUS at 12:10

## 2023-10-25 RX ADMIN — CEFAZOLIN SODIUM 2000 MG: 2 SOLUTION INTRAVENOUS at 12:50

## 2023-10-25 RX ADMIN — OXYCODONE HYDROCHLORIDE 5 MG: 5 TABLET ORAL at 08:36

## 2023-10-25 RX ADMIN — ACETAMINOPHEN 975 MG: 325 TABLET, FILM COATED ORAL at 05:01

## 2023-10-25 RX ADMIN — FOLIC ACID TAB 400 MCG 400 MCG: 400 TAB at 08:36

## 2023-10-25 RX ADMIN — ALBUMIN (HUMAN): 12.5 INJECTION, SOLUTION INTRAVENOUS at 13:27

## 2023-10-25 RX ADMIN — PROPOFOL 50 MG: 10 INJECTION, EMULSION INTRAVENOUS at 12:29

## 2023-10-25 RX ADMIN — PROPOFOL 50 MCG/KG/MIN: 10 INJECTION, EMULSION INTRAVENOUS at 12:23

## 2023-10-25 RX ADMIN — FENTANYL CITRATE 50 MCG: 50 INJECTION, SOLUTION INTRAMUSCULAR; INTRAVENOUS at 12:29

## 2023-10-25 RX ADMIN — SODIUM CHLORIDE 75 ML/HR: 0.9 INJECTION, SOLUTION INTRAVENOUS at 17:36

## 2023-10-25 RX ADMIN — Medication 180 MG: at 12:10

## 2023-10-25 RX ADMIN — LISINOPRIL 20 MG: 20 TABLET ORAL at 08:36

## 2023-10-25 RX ADMIN — SENNOSIDES, DOCUSATE SODIUM 1 TABLET: 8.6; 5 TABLET ORAL at 08:36

## 2023-10-25 RX ADMIN — CEFAZOLIN SODIUM 2000 MG: 2 SOLUTION INTRAVENOUS at 22:49

## 2023-10-25 RX ADMIN — REMIFENTANIL HYDROCHLORIDE 0.2 MCG/KG/MIN: 1 INJECTION, POWDER, LYOPHILIZED, FOR SOLUTION INTRAVENOUS at 12:17

## 2023-10-25 RX ADMIN — CHLORHEXIDINE GLUCONATE 15 ML: 1.2 SOLUTION ORAL at 06:37

## 2023-10-25 RX ADMIN — OXYCODONE HYDROCHLORIDE 5 MG: 5 TABLET ORAL at 00:11

## 2023-10-25 RX ADMIN — FENTANYL CITRATE 25 MCG: 50 INJECTION INTRAMUSCULAR; INTRAVENOUS at 16:27

## 2023-10-25 RX ADMIN — ONDANSETRON 4 MG: 2 INJECTION INTRAMUSCULAR; INTRAVENOUS at 15:45

## 2023-10-25 RX ADMIN — SODIUM CHLORIDE 125 ML/HR: 0.9 INJECTION, SOLUTION INTRAVENOUS at 00:11

## 2023-10-25 NOTE — PLAN OF CARE
Problem: MOBILITY - ADULT  Goal: Maintain or return to baseline ADL function  Description: INTERVENTIONS:  -  Assess patient's ability to carry out ADLs; assess patient's baseline for ADL function and identify physical deficits which impact ability to perform ADLs (bathing, care of mouth/teeth, toileting, grooming, dressing, etc.)  - Assess/evaluate cause of self-care deficits   - Assess range of motion  - Assess patient's mobility; develop plan if impaired  - Assess patient's need for assistive devices and provide as appropriate  - Encourage maximum independence but intervene and supervise when necessary  - Involve family in performance of ADLs  - Assess for home care needs following discharge   - Consider OT consult to assist with ADL evaluation and planning for discharge  - Provide patient education as appropriate  Outcome: Progressing  Goal: Maintains/Returns to pre admission functional level  Description: INTERVENTIONS:  - Perform BMAT or MOVE assessment daily.   - Set and communicate daily mobility goal to care team and patient/family/caregiver. - Collaborate with rehabilitation services on mobility goals if consulted  - Perform Range of Motion  times a day. - Reposition patient every  hours.   - Dangle patient  times a day  - Stand patient  times a day  - Ambulate patient  times a day  - Out of bed to chair  times a day   - Out of bed for meals  times a day  - Out of bed for toileting  - Record patient progress and toleration of activity level   Outcome: Progressing     Problem: PAIN - ADULT  Goal: Verbalizes/displays adequate comfort level or baseline comfort level  Description: Interventions:  - Encourage patient to monitor pain and request assistance  - Assess pain using appropriate pain scale  - Administer analgesics based on type and severity of pain and evaluate response  - Implement non-pharmacological measures as appropriate and evaluate response  - Consider cultural and social influences on pain and pain management  - Notify physician/advanced practitioner if interventions unsuccessful or patient reports new pain  Outcome: Progressing     Problem: INFECTION - ADULT  Goal: Absence or prevention of progression during hospitalization  Description: INTERVENTIONS:  - Assess and monitor for signs and symptoms of infection  - Monitor lab/diagnostic results  - Monitor all insertion sites, i.e. indwelling lines, tubes, and drains  - Monitor endotracheal if appropriate and nasal secretions for changes in amount and color  - [place] appropriate cooling/warming therapies per order  - Administer medications as ordered  - Instruct and encourage patient and family to use good hand hygiene technique  - Identify and instruct in appropriate isolation precautions for identified infection/condition  Outcome: Progressing  Goal: Absence of fever/infection during neutropenic period  Description: INTERVENTIONS:  - Monitor WBC    Outcome: Progressing     Problem: SAFETY ADULT  Goal: Maintain or return to baseline ADL function  Description: INTERVENTIONS:  -  Assess patient's ability to carry out ADLs; assess patient's baseline for ADL function and identify physical deficits which impact ability to perform ADLs (bathing, care of mouth/teeth, toileting, grooming, dressing, etc.)  - Assess/evaluate cause of self-care deficits   - Assess range of motion  - Assess patient's mobility; develop plan if impaired  - Assess patient's need for assistive devices and provide as appropriate  - Encourage maximum independence but intervene and supervise when necessary  - Involve family in performance of ADLs  - Assess for home care needs following discharge   - Consider OT consult to assist with ADL evaluation and planning for discharge  - Provide patient education as appropriate  Outcome: Progressing  Goal: Maintains/Returns to pre admission functional level  Description: INTERVENTIONS:  - Perform BMAT or MOVE assessment daily.   - Set and communicate daily mobility goal to care team and patient/family/caregiver. - Collaborate with rehabilitation services on mobility goals if consulted  - Perform Range of Motion  times a day. - Reposition patient every  hours.   - Dangle patient  times a day  - Stand patient  times a day  - Ambulate patient  times a day  - Out of bed to chair  times a day   - Out of bed for meals  times a day  - Out of bed for toileting  - Record patient progress and toleration of activity level   Outcome: Progressing  Goal: Patient will remain free of falls  Description: INTERVENTIONS:  - Educate patient/family on patient safety including physical limitations  - Instruct patient to call for assistance with activity   - Consult OT/PT to assist with strengthening/mobility   - Keep Call bell within reach  - Keep bed low and locked with side rails adjusted as appropriate  - Keep care items and personal belongings within reach  - Initiate and maintain comfort rounds  - Make Fall Risk Sign visible to staff  - Offer Toileting every  Hours, in advance of need  - Initiate/Maintain alarm  - Obtain necessary fall risk management equipment:   - Apply yellow socks and bracelet for high fall risk patients  - Consider moving patient to room near nurses station  Outcome: Progressing     Problem: DISCHARGE PLANNING  Goal: Discharge to home or other facility with appropriate resources  Description: INTERVENTIONS:  - Identify barriers to discharge w/patient and caregiver  - Arrange for needed discharge resources and transportation as appropriate  - Identify discharge learning needs (meds, wound care, etc.)  - Arrange for interpretive services to assist at discharge as needed  - Refer to Case Management Department for coordinating discharge planning if the patient needs post-hospital services based on physician/advanced practitioner order or complex needs related to functional status, cognitive ability, or social support system  Outcome: Progressing Problem: Knowledge Deficit  Goal: Patient/family/caregiver demonstrates understanding of disease process, treatment plan, medications, and discharge instructions  Description: Complete learning assessment and assess knowledge base.   Interventions:  - Provide teaching at level of understanding  - Provide teaching via preferred learning methods  Outcome: Progressing     Problem: Prexisting or High Potential for Compromised Skin Integrity  Goal: Skin integrity is maintained or improved  Description: INTERVENTIONS:  - Identify patients at risk for skin breakdown  - Assess and monitor skin integrity  - Assess and monitor nutrition and hydration status  - Monitor labs   - Assess for incontinence   - Turn and reposition patient  - Assist with mobility/ambulation  - Relieve pressure over bony prominences  - Avoid friction and shearing  - Provide appropriate hygiene as needed including keeping skin clean and dry  - Evaluate need for skin moisturizer/barrier cream  - Collaborate with interdisciplinary team   - Patient/family teaching  - Consider wound care consult   Outcome: Progressing

## 2023-10-25 NOTE — ANESTHESIA POSTPROCEDURE EVALUATION
Post-Op Assessment Note    CV Status:  Stable  Pain Score: 0    Pain management: adequate     Mental Status:  Alert and awake   Hydration Status:  Euvolemic   PONV Controlled:  Controlled   Airway Patency:  Patent      Post Op Vitals Reviewed: Yes      Staff: CRNA   Comments: aox3, following commands, HOB elevated,         There were no known notable events for this encounter.     /57 (10/25/23 1616)    Temp 97.6 °F (36.4 °C) (10/25/23 1616)    Pulse (!) 107 (10/25/23 1616)   Resp 12 (10/25/23 1616)    SpO2 99 % (10/25/23 1616)

## 2023-10-25 NOTE — ANESTHESIA PROCEDURE NOTES
Arterial Line Insertion    Performed by: Desi Da Silva CRNA  Authorized by: Cristino Quinones CRNA  Consent: Verbal consent obtained. Written consent obtained. Risks and benefits: risks, benefits and alternatives were discussed  Consent given by: patient  Patient understanding: patient states understanding of the procedure being performed  Patient consent: the patient's understanding of the procedure matches consent given  Procedure consent: procedure consent matches procedure scheduled  Relevant documents: relevant documents present and verified  Site marked: the operative site was marked  Preparation: Patient was prepped and draped in the usual sterile fashion.   Indications: hemodynamic monitoring  Orientation:  Right  Location: radial artery  Procedure Details:  Needle gauge: 20  Seldinger technique: Seldinger technique used  Number of attempts: 1    Post-procedure:  Post-procedure: dressing applied  Waveform: good waveform  Post-procedure CNS: normal and unchanged  Patient tolerance: patient tolerated the procedure well with no immediate complications

## 2023-10-25 NOTE — ASSESSMENT & PLAN NOTE
Patient presented to [facility] for multiple falls at home. CT spine and CT head negative for any acute changes. For neurosurgery intervention on 10.25  Continue fall precautions. PT/OT eval.  Conservative management for symptom relief.

## 2023-10-25 NOTE — RESTORATIVE TECHNICIAN NOTE
Restorative Technician Note      Patient Name: Humaira Chapa     Note Type: Mobility  Patient Position Upon Consult: Supine  Activity Performed: Ambulated; ZAYRFQL; Stood  Assistive Device: Roller walker  Education Provided: Yes  Patient Position at [employer] of Consult: Bedside chair;  All needs within reach; Bed/Chair alarm activated    Nia Carlisle BS, Restorative Technician, [employer]

## 2023-10-25 NOTE — ASSESSMENT & PLAN NOTE
Patient has known history of cervical radiculopathy, C4-C6 cord with marked stenosis and compression. Follows with neurosurgery in outpatient setting, was scheduled for outpatient decompression procedure on 10/30 however presented to hospital for frequent falls. MRI thoracic spine with cord compression at C4-5, mild thoracic degenerative changes without significant stenosis. Case was discussed with neurosurgery team, given concerns of worsening myelopathy symptoms including bilateral lower extremity involuntary muscle twitching, neuropathic pain and weakness, decision was made to transfer to [facility] for earlier inpatient procedure. Neurosurgery following, patient is scheduled for cervical decompression and fusion on 10/25. Preop clearance provided by medicine team provider on 10/23/23. Continue fall precautions. Continue multimodal pain management.

## 2023-10-25 NOTE — ASSESSMENT & PLAN NOTE
Suspect SIADH. Note hydrochlorothiazide use and pain. Uric acid normal, urine sodium 46, urine osm 735  Hold HCTZ  Trend BMP  Fluid Restriction when on diet.

## 2023-10-25 NOTE — ASSESSMENT & PLAN NOTE
Patient noted to have urinary retention, needing straight cath at [email or web address]. Continue retention protocol. Now with Christensen catheter given multiple straight cath attempts. Likely in setting of myelopathy. Plan for neurosurgical intervention tomorrow.

## 2023-10-25 NOTE — ASSESSMENT & PLAN NOTE
[id number] Patient is on omeprazole at home, continue alternative equivalent pantoprazole while inpatient.

## 2023-10-25 NOTE — OCCUPATIONAL THERAPY NOTE
Occupational Therapy Cancel Note        Patient Name: Jyoti Rollins  JKLSX'B Date: 10/25/2023       10/25/23 0700   OT Last Visit   OT Visit Date 10/25/23   Note Type   Note type Cancelled Session; Evaluation   Cancel Reasons Patient to operating room   OT consulted, chart reviewed. Pt admitted with osteoarthritis of cervical spine with myelopathy. Per Neurosurgery pt to OR today for spinal decompression and fusion. Will continue to follow.  Alfonso French MOT, OTR/L

## 2023-10-25 NOTE — PLAN OF CARE
Problem: PAIN - ADULT  Goal: Verbalizes/displays adequate comfort level or baseline comfort level  Description: Interventions:  - Encourage patient to monitor pain and request assistance  - Assess pain using appropriate pain scale  - Administer analgesics based on type and severity of pain and evaluate response  - Implement non-pharmacological measures as appropriate and evaluate response  - Consider cultural and social influences on pain and pain management  - Notify physician/advanced practitioner if interventions unsuccessful or patient reports new pain  Outcome: Progressing     Problem: INFECTION - ADULT  Goal: Absence or prevention of progression during hospitalization  Description: INTERVENTIONS:  - Assess and monitor for signs and symptoms of infection  - Monitor lab/diagnostic results  - Monitor all insertion sites, i.e. indwelling lines, tubes, and drains  - Monitor endotracheal if appropriate and nasal secretions for changes in amount and color  - [place] appropriate cooling/warming therapies per order  - Administer medications as ordered  - Instruct and encourage patient and family to use good hand hygiene technique  - Identify and instruct in appropriate isolation precautions for identified infection/condition  Outcome: Progressing     Problem: DISCHARGE PLANNING  Goal: Discharge to home or other facility with appropriate resources  Description: INTERVENTIONS:  - Identify barriers to discharge w/patient and caregiver  - Arrange for needed discharge resources and transportation as appropriate  - Identify discharge learning needs (meds, wound care, etc.)  - Arrange for interpretive services to assist at discharge as needed  - Refer to Case Management Department for coordinating discharge planning if the patient needs post-hospital services based on physician/advanced practitioner order or complex needs related to functional status, cognitive ability, or social support system  Outcome: Progressing Problem: Knowledge Deficit  Goal: Patient/family/caregiver demonstrates understanding of disease process, treatment plan, medications, and discharge instructions  Description: Complete learning assessment and assess knowledge base. Interventions:  - Provide teaching at level of understanding  - Provide teaching via preferred learning methods  Outcome: Progressing     Problem: NEUROSENSORY - ADULT  Goal: Achieves maximal functionality and self care  Description: INTERVENTIONS  - Monitor swallowing and airway patency with patient fatigue and changes in neurological status  - Encourage and assist patient to increase activity and self care.    - Encourage visually impaired, hearing impaired and aphasic patients to use assistive/communication devices  Outcome: Progressing     Problem: CARDIOVASCULAR - ADULT  Goal: Maintains optimal cardiac output and hemodynamic stability  Description: INTERVENTIONS:  - Monitor I/O, vital signs and rhythm  - Monitor for S/S and trends of decreased cardiac output  - Administer and titrate ordered vasoactive medications to optimize hemodynamic stability  - Assess quality of pulses, skin color and temperature  - Assess for signs of decreased coronary artery perfusion  - Instruct patient to report change in severity of symptoms  Outcome: Progressing  Goal: Absence of cardiac dysrhythmias or at baseline rhythm  Description: INTERVENTIONS:  - Continuous cardiac monitoring, vital signs, obtain 12 lead EKG if ordered  - Administer antiarrhythmic and heart rate control medications as ordered  - Monitor electrolytes and administer replacement therapy as ordered  Outcome: Progressing     Problem: METABOLIC, FLUID AND ELECTROLYTES - ADULT  Goal: Electrolytes maintained within normal limits  Description: INTERVENTIONS:  - Monitor labs and assess patient for signs and symptoms of electrolyte imbalances  - Administer electrolyte replacement as ordered  - Monitor response to electrolyte replacements, including repeat lab results as appropriate  - Instruct patient on fluid and nutrition as appropriate  Outcome: Progressing     Problem: MUSCULOSKELETAL - ADULT  Goal: Maintain or return mobility to safest level of function  Description: INTERVENTIONS:  - Assess patient's ability to carry out ADLs; assess patient's baseline for ADL function and identify physical deficits which impact ability to perform ADLs (bathing, care of mouth/teeth, toileting, grooming, dressing, etc.)  - Assess/evaluate cause of self-care deficits   - Assess range of motion  - Assess patient's mobility  - Assess patient's need for assistive devices and provide as appropriate  - Encourage maximum independence but intervene and supervise when necessary  - Involve family in performance of ADLs  - Assess for home care needs following discharge   - Consider OT consult to assist with ADL evaluation and planning for discharge  - Provide patient education as appropriate  Outcome: Progressing  Goal: Maintain proper alignment of affected body part  Description: INTERVENTIONS:  - Support, maintain and protect limb and body alignment  - Provide patient/ family with appropriate education  Outcome: Progressing

## 2023-10-25 NOTE — PROGRESS NOTES
[facility]  Progress Note  Name: Ted Goins  MRN: [id number]  Unit/Bed#: OR POOL I Date of Admission: 10/23/2023   Date of Service: 10/25/2023 I Hospital Day: 2    Assessment/Plan   * Osteoarthritis of cervical spine with myelopathy  Assessment & Plan  Patient has known history of cervical radiculopathy, C4-C6 cord with marked stenosis and compression. Follows with neurosurgery in outpatient setting, was scheduled for outpatient decompression procedure on 10/30 however presented to hospital for frequent falls. MRI thoracic spine with cord compression at C4-5, mild thoracic degenerative changes without significant stenosis. Case was discussed with neurosurgery team, given concerns of worsening myelopathy symptoms including bilateral lower extremity involuntary muscle twitching, neuropathic pain and weakness, decision was made to transfer to [facility] for earlier inpatient procedure. Neurosurgery following, patient is scheduled for cervical decompression and fusion on 10/25. Preop clearance provided by medicine team provider on 10/23/23. Continue fall precautions. Continue multimodal pain management. Hyponatremia  Assessment & Plan  Suspect SIADH. Note hydrochlorothiazide use and pain. Uric acid normal, urine sodium 46, urine osm 735  Hold HCTZ  Trend BMP  Fluid Restriction when on diet. Constipation  Assessment & Plan  Bowel regimen including senna, colace and miralax  Prn suppository    Urinary retention  Assessment & Plan  Patient noted to have urinary retention, needing straight cath at [address] [place] [address] Road. Continue retention protocol. Now with Christensen catheter given multiple straight cath attempts. Likely in setting of myelopathy. Plan for neurosurgical intervention tomorrow. Hyperlipidemia  Assessment & Plan  Patient is on atorvastatin 10 mg at home, continue same.     Gastroesophageal reflux disease  Assessment & Plan  Patient is on omeprazole at home, continue alternative equivalent pantoprazole while inpatient. Benign essential hypertension  Assessment & Plan  Blood pressure on the lower side  on lisinopril 20 mg with holding parameters  HCTZ discontinued given hyponatremia. Ambulatory dysfunction  Assessment & Plan  Patient presented to [address] [place] [address] for multiple falls at home. CT spine and CT head negative for any acute changes. For neurosurgery intervention on 10.25  Continue fall precautions. PT/OT eval.  Conservative management for symptom relief. VTE Pharmacologic Prophylaxis:   Pharmacologic:  Hold for OR  Mechanical VTE Prophylaxis in Place: Yes    Patient Centered Rounds: I have performed bedside rounds with nursing staff today. Discussions with Specialists or Other Care Team Provider:     Education and Discussions with Family / Patient: Plan of care with patient. Still in the OR, he declined to call family prior to OR they are aware that he is going for surgery today. Time Spent for Care: 30 minutes. More than 50% of total time spent on counseling and coordination of care as described above. Current Length of Stay: 2 day(s)    Current Patient Status: Inpatient   Certification Statement: The patient will continue to require additional inpatient hospital stay due to not medically stable pending OR today    Discharge Plan: PT OT evaluation post OR    Code Status: Level 1 - Full Code      Subjective:   Overnight events. He is anxious about surgery today. No other acute discomfort. Still with constipation. No nausea or abdominal pain. Objective:     Vitals:   Temp (24hrs), Av.9 °F (36.6 °C), Min:96.7 °F (35.9 °C), Max:98.6 °F (37 °C)    Temp:  [96.7 °F (35.9 °C)-98.6 °F (37 °C)] 97.6 °F (36.4 °C)  HR:  [] 98  Resp:  [12-19] 19  BP: ()/(57-68) 103/59  SpO2:  [94 %-99 %] 97 %  There is no height or weight on file to calculate BMI.      Input and Output Summary (last 24 hours): Intake/Output Summary (Last 24 hours) at 10/25/2023 1636  Last data filed at 10/25/2023 1622  Gross per 24 hour   Intake 3174.09 ml   Output 5700 ml   Net -2525.91 ml       Physical Exam:     Physical Exam  Constitutional:       General: He is not in acute distress. Cardiovascular:      Rate and Rhythm: Normal rate and regular rhythm. Heart sounds: Normal heart sounds. No murmur heard. Pulmonary:      Effort: No respiratory distress. Breath sounds: Normal breath sounds. No wheezing or rales. Abdominal:      General: Bowel sounds are normal. There is no distension. Palpations: Abdomen is soft. Tenderness: There is no abdominal tenderness. Skin:     General: Skin is warm. Neurological:      Mental Status: He is alert. Comments: Awake alert communicative  Moves all extremities      Additional Data:     Labs:    Results from last 7 days   Lab Units 10/24/23  0514 10/23/23  1927 10/20/23  0323   WBC Thousand/uL 6.81  --  7.38   HEMOGLOBIN g/dL 13.3  --  13.9   HEMATOCRIT % 40.0  --  41.8   PLATELETS Thousands/uL 222   < > 240   NEUTROS PCT %  --   --  68   LYMPHS PCT %  --   --  20   MONOS PCT %  --   --  9   EOS PCT %  --   --  2    < > = values in this interval not displayed.      Results from last 7 days   Lab Units 10/24/23  0514 10/20/23  0323   SODIUM mmol/L 129* 135   POTASSIUM mmol/L 3.9 4.1   CHLORIDE mmol/L 91* 97   CO2 mmol/L 32 29   BUN mg/dL 19 22   CREATININE mg/dL 0.90 1.13   ANION GAP mmol/L 6 9   CALCIUM mg/dL 9.0 9.6   ALBUMIN g/dL  --  4.3   TOTAL BILIRUBIN mg/dL  --  0.69   ALK PHOS U/L  --  59   ALT U/L  --  28   AST U/L  --  33   GLUCOSE RANDOM mg/dL 88 103     Results from last 7 days   Lab Units 10/25/23  0500   INR  0.96     Results from last 7 days   Lab Units 10/25/23  1621 10/25/23  0708 10/24/23  2127   POC GLUCOSE mg/dl 154* 109 82                 Recent Cultures (last 7 days):           Last 24 Hours Medication List:   Current Facility-Administered Medications   Medication Dose Route Frequency Provider Last Rate    acetaminophen  975 mg Oral Q8H [facility] Maureen Ching PA-C      albuterol  2 puff Inhalation Q6H PRN Maureen Ching PA-C      aluminum-magnesium hydroxide-simethicone  30 mL Oral Q6H PRN Maureen Chnig PA-C      atorvastatin  10 mg Oral Daily Cullen Day ZION Henry      bisacodyl  10 mg Rectal Daily PRN Maureen Ching PA-C      cefazolin  2,000 mg Intravenous Q8H Kadie Henry PA-C      [START ON 10/26/2023] cephalexin  500 mg Oral Q8H [facility] Kadie Henry PA-C      fluticasone  2 spray Each Nare Daily PRN Maureen Ching PA-C      folic acid  385 mcg Oral Daily Maureen Ching, ZION      gabapentin  100 mg Oral TID Maureen Ching PA-C      [START ON 10/26/2023] heparin (porcine)  5,000 Units Subcutaneous Q8H [facility] Cullen Day ZION Henry      HYDROmorphone  0.2 mg Intravenous Q1H PRN Maureen Ching PA-C      lidocaine  1 patch Topical Daily Kadie Henry PA-C      lisinopril  20 mg Oral Daily Cullen Day ZION Henry      loratadine  10 mg Oral Daily Kadie Henry PA-C      melatonin  3 mg Oral HS Kadie Henry PA-C      ondansetron  4 mg Intravenous Q6H PRN Maureennette Ching PA-C      oxyCODONE  5 mg Oral Q4H PRN Maureen Ching PA-C      Or    oxyCODONE  10 mg Oral Q4H PRN Maureen Ching PA-C      pantoprazole  40 mg Oral Daily Kadie Henry PA-C      polyethylene glycol  17 g Oral BID Maureennette Ching PA-C      senna-docusate sodium  1 tablet Oral BID Cullen Day Carl, ZION      sodium chloride  75 mL/hr Intravenous Continuous Maureen Ching PA-C      tiZANidine  4 mg Oral TID Maureen Ching PA-C          Today, Patient Was Seen By: Jerry Natarajan MD    ** Please Note: Dictation voice to text software may have been used in the creation of this document.  **

## 2023-10-25 NOTE — ANESTHESIA PREPROCEDURE EVALUATION
Procedure:  C3-6 PCDF (Spine Cervical)    Relevant Problems   ANESTHESIA (within normal limits)      CARDIO   (+) Benign essential hypertension   (+) Hyperlipidemia      ENDO (within normal limits)      GI/HEPATIC   (+) Gastroesophageal reflux disease      /RENAL (within normal limits)      HEMATOLOGY   (+) Anemia      MUSCULOSKELETAL   (+) Dorsalgia, unspecified   (+) Osteoarthritis of cervical spine with myelopathy      NEURO/PSYCH   (+) Paresthesia   (+) Weakness of extremity      PULMONARY (within normal limits)      Genitourinary   (+) Urinary retention      Other   (+) Ambulatory dysfunction      EKG 10/20/2023:  Sinus tachycardia with occasional and consecutive Premature ventricular complexes and Fusion complexes  Inferior infarct (cited on or before 20-OCT-2023)  Abnormal ECG    MRI Cervical Spine 6/13/2023:  iffuse degenerative disc disease and facet osteoarthritis. Details above. Marked central canal narrowing at C4-C5 and C5-C6 with cord compression. Abnormal cord signal intensity from C3-C4 through C4-C5 most compatible with edema and/or myelomalacia. Marked foraminal narrowing from C3-C4 through C5-C6 with probable exiting nerve root impingement.     Lab Results   Component Value Date    WBC 6.81 10/24/2023    HGB 13.3 10/24/2023    HCT 40.0 10/24/2023    MCV 98 10/24/2023     10/24/2023     Lab Results   Component Value Date    SODIUM 129 (L) 10/24/2023    K 3.9 10/24/2023    CL 91 (L) 10/24/2023    CO2 32 10/24/2023    BUN 19 10/24/2023    CREATININE 0.90 10/24/2023    GLUC 88 10/24/2023    CALCIUM 9.0 10/24/2023     Lab Results   Component Value Date    INR 0.96 10/25/2023    INR 1.04 10/05/2023    PROTIME 12.7 10/25/2023    PROTIME 14.2 10/05/2023     Lab Results   Component Value Date    HGBA1C 6.0 (H) 10/05/2023          Physical Exam    Airway    Mallampati score: II  TM Distance: >3 FB  Neck ROM: limited     Dental   No notable dental hx     Cardiovascular  Cardiovascular exam normal    Pulmonary  Pulmonary exam normal     Other Findings        Anesthesia Plan  ASA Score- 3     Anesthesia Type- general with ASA Monitors. Additional Monitors: arterial line. Airway Plan: ETT. Comment: Discussed with patient plan for anesthesia, as well as risks/benefits, including likely chance of PONV and sore throat, as well as rare possibilities of dental/oropharyngeal/ocular injuries, aspiration, and severe/life-threatening surgical and anesthetic emergencies. Patient expressed understanding and agreement. .       Plan Factors-    Chart reviewed. EKG reviewed. Imaging results reviewed. Existing labs reviewed. Patient summary reviewed. Induction- intravenous. Postoperative Plan- Plan for postoperative opioid use. Planned trial extubation    Informed Consent- Anesthetic plan and risks discussed with patient. I personally reviewed this patient with the CRNA. Discussed and agreed on the Anesthesia Plan with the CRNA. Betito Rock

## 2023-10-25 NOTE — OP NOTE
OPERATIVE REPORT  PATIENT NAME: Lisa Box    :  1950  MRN: [id number]  Pt Location:  OR ROOM 09    SURGERY DATE: 10/25/2023    Surgeon(s) and Role:     * Chuck Em MD - Primary    Preop Diagnosis:  Osteoarthritis of cervical spine with myelopathy [M47.12]    Post-Op Diagnosis Codes:     * Osteoarthritis of cervical spine with myelopathy [M47.12]    Procedure(s):  Posterior cervical laminectomies at C4, C5, C6, partial C3 laminectomy  Posterior cervical arthrodesis from C3-C6  Posterior cervical instrumentation from C3-C6  With use of allograft for fusion  With use of intraoperative neuromonitoring (SSEPs, motors)  With use of intra-operative fluoroscopic guidance    Specimen(s):  None    Estimated Blood Loss:   Minimal    Drains:  DIANA x 1    Anesthesia Type:   General    Operative Indications:  Osteoarthritis of cervical spine with myelopathy [M47.12]  This is a 68year old with known cervical stenosis from C3-C6 with myelopathic symptoms including gait instability and weakness in his arms, hands and legs. He was originally seen in clinic on 23 and signed up for elective C3-6PCDF. Unfortunately in the previous weeks he has fallen multiple times and feels that his weakness was worsening. He was admitted to the hospital with recommendation to proceed with surgical intervention in a more urgent manner given his worsening symptoms. We discussed C3-6 PCDF along with the risks and benefits and he was agreeable to proceed. Operative Findings:  Good decompression of the spinal cord from the inferior aspect of C3 through C6. Hardware placed without issue. Intra-operative monitoring remained stable throughout the case. Complications:   None    Procedure and Technique:    The patient was brought to the OR on a stretcher and placed under general anesthesia. He was then positioned prone on a regular table with gel rolls in a Gil head clamp.  Neuromonitoring was established at this time with SSEPs and motors signals. SSEPs were noted to be significantly dampened at baseline. Fluoroscopy was used for incision planning and to insure adequate visualization of C3-6. He was then prepped and draped in standard fashion. A timeout was held to confirm patient identity and surgical plan. To begin the incision was infiltrated with local anesthetic. The incision was opened sharply with a 15 blade and carried down through fascia to the presumed spinous processes of C3 through C6. The soft tissue and muscle was dissected away from the spinous processes, lamina and lateral masses at these levels. Fluoroscopy was used to confirm these levels as C3 through C6. The spinous process and the middle aspect of the lamina at C4, C5, and C6 were bitten away with a leksell rongeur. The laminectomies at these levels were completed by drilling away the lamina at C4, C5, and C6 and using kerrison punch to carry the laminectomies out laterally. The inferior aspect of the C3 lamina was bitten away with kerrison rongeur to complete a partial C3 laminectomy as the stenosis at the top of this level was not as significant radiographically. The cord appeared well decompressed after completion of the laminectomies. Attention was then turned to placing the lateral mass screws. Entry points for each lateral mass screw were identified just infero-medially to the central aspect of the lateral massess. In sequence with fluoroscopic guidance a  hole with drill with the high speed drill at C3, C4, C5, C6 on the right, and subsequently at C3-6 on the left. A 14mm drill bit was used to drill the trajectory of the lateral mass screws at each of these levels bilaterally. There was a questionable breach at the right C3 drilled trajectory depth, and so it was planned to place 12mm screws at this level.  Subsequently 12mm 3.5 lateral mass screws were placed bilaterally at C3, 14mm 3.5 lateral mass screws were placed at C4,  14mm 3.5 lateral mass screws were placed at C5,  14mm 3.5 lateral mass screws were placed at C6 bilaterally. A 3.5mm x 60mm paulette was placed bilaterally into the lateral mass screws, secured with caps and final tightened. Neuromonitoring remained stable after completion of the instrumentation. The site was copiously irrigated. For arthrodesis, ground up autograft from the spinous processes and lamina was mixed with allograft and placed along the posterolateral aspects along the instrumentation. Vancomycin powder was placed in the wound. A DIANA drain was passed out of the inferior aspect of the wound and placed in the subfascial space. The muscle and fascial layers were closed with 0-0 vicryls. The deep dermal layer was closed with 2-0 vicryls. The skin was closed with staples. Final neuromonitoring remained at baseline. A dressing was placed over the wound. The drapes were taken down and he was flipped supine on a stretcher and taken out of the Sinton head clamp. He was extubated and transferred to the PACU in stable condition. All counts were correct. Anshul LUQUE was present for the entire procedure, and provided essential assistance with with proper exposure, retraction, hemostasis, and wound closure. BALBINA was present for the entire procedure. Patient Disposition:  PACU         SIGNATURE: Fiona Agosto MD  DATE: October 25, 2023  TIME: 10:59 AM    Implant Name Type Inv.  Item Serial No.  Lot No. LRB No. Used Action   SCREW 3.5 X 12MM INFINITY - [id number]  SCREW 3.5 X 12MM INFINITY  [employer]  N/A 2 Implanted   BRAD STRIP 8MM X 1 X 10CM - [id number]  BRAD STRIP 8MM X 1 X 10CM [id number] [employer]  N/A 1 Implanted   SCREW 3.5 X 14MM INFINITY - [id number]  SCREW 3.5 X 14MM INFINITY  [employer]  N/A 6 Implanted   SCREW SET M6 - [id number]  SCREW SET M6  [employer]  N/A 8 Implanted   PAULETTE PRBNT 3.5 X 60MM - [id number]  PAULETTE PRBNT 3.5 X 60MM  [employer]  N/A 2 Implanted

## 2023-10-26 ENCOUNTER — APPOINTMENT (INPATIENT)
Dept: RADIOLOGY | Facility: HOSPITAL | Age: 73
DRG: 471 | End: 2023-10-26
Payer: MEDICARE

## 2023-10-26 LAB
ANION GAP SERPL CALCULATED.3IONS-SCNC: 3 MMOL/L
BUN SERPL-MCNC: 18 MG/DL (ref 5–25)
CALCIUM SERPL-MCNC: 8.3 MG/DL (ref 8.4–10.2)
CHLORIDE SERPL-SCNC: 98 MMOL/L (ref 96–108)
CO2 SERPL-SCNC: 31 MMOL/L (ref 21–32)
CREAT SERPL-MCNC: 0.84 MG/DL (ref 0.6–1.3)
ERYTHROCYTE [DISTWIDTH] IN BLOOD BY AUTOMATED COUNT: 12.5 % (ref 11.6–15.1)
GFR SERPL CREATININE-BSD FRML MDRD: 86 ML/MIN/1.73SQ M
GLUCOSE SERPL-MCNC: 120 MG/DL (ref 65–140)
HCT VFR BLD AUTO: 33.9 % (ref 36.5–49.3)
HGB BLD-MCNC: 11 G/DL (ref 12–17)
MCH RBC QN AUTO: 32.6 PG (ref 26.8–34.3)
MCHC RBC AUTO-ENTMCNC: 32.4 G/DL (ref 31.4–37.4)
MCV RBC AUTO: 101 FL (ref 82–98)
PLATELET # BLD AUTO: 207 THOUSANDS/UL (ref 149–390)
PMV BLD AUTO: 10.4 FL (ref 8.9–12.7)
POTASSIUM SERPL-SCNC: 4.5 MMOL/L (ref 3.5–5.3)
RBC # BLD AUTO: 3.37 MILLION/UL (ref 3.88–5.62)
SODIUM SERPL-SCNC: 132 MMOL/L (ref 135–147)
WBC # BLD AUTO: 9.45 THOUSAND/UL (ref 4.31–10.16)

## 2023-10-26 PROCEDURE — 99232 SBSQ HOSP IP/OBS MODERATE 35: CPT | Performed by: INTERNAL MEDICINE

## 2023-10-26 PROCEDURE — 80048 BASIC METABOLIC PNL TOTAL CA: CPT | Performed by: PHYSICIAN ASSISTANT

## 2023-10-26 PROCEDURE — 85027 COMPLETE CBC AUTOMATED: CPT | Performed by: PHYSICIAN ASSISTANT

## 2023-10-26 PROCEDURE — 97167 OT EVAL HIGH COMPLEX 60 MIN: CPT

## 2023-10-26 PROCEDURE — 72040 X-RAY EXAM NECK SPINE 2-3 VW: CPT

## 2023-10-26 PROCEDURE — 97163 PT EVAL HIGH COMPLEX 45 MIN: CPT

## 2023-10-26 PROCEDURE — 99024 POSTOP FOLLOW-UP VISIT: CPT | Performed by: STUDENT IN AN ORGANIZED HEALTH CARE EDUCATION/TRAINING PROGRAM

## 2023-10-26 PROCEDURE — 97530 THERAPEUTIC ACTIVITIES: CPT

## 2023-10-26 RX ADMIN — POLYETHYLENE GLYCOL 3350 17 G: 17 POWDER, FOR SOLUTION ORAL at 17:31

## 2023-10-26 RX ADMIN — CEFAZOLIN SODIUM 2000 MG: 2 SOLUTION INTRAVENOUS at 04:42

## 2023-10-26 RX ADMIN — ACETAMINOPHEN 975 MG: 325 TABLET, FILM COATED ORAL at 15:00

## 2023-10-26 RX ADMIN — OXYCODONE HYDROCHLORIDE 10 MG: 10 TABLET ORAL at 15:02

## 2023-10-26 RX ADMIN — ATORVASTATIN CALCIUM 10 MG: 10 TABLET, FILM COATED ORAL at 08:38

## 2023-10-26 RX ADMIN — OXYCODONE HYDROCHLORIDE 10 MG: 10 TABLET ORAL at 04:41

## 2023-10-26 RX ADMIN — CEPHALEXIN 500 MG: 500 CAPSULE ORAL at 15:00

## 2023-10-26 RX ADMIN — GABAPENTIN 100 MG: 100 CAPSULE ORAL at 08:38

## 2023-10-26 RX ADMIN — GABAPENTIN 100 MG: 100 CAPSULE ORAL at 15:00

## 2023-10-26 RX ADMIN — TIZANIDINE 4 MG: 4 TABLET ORAL at 08:39

## 2023-10-26 RX ADMIN — ACETAMINOPHEN 975 MG: 325 TABLET, FILM COATED ORAL at 05:02

## 2023-10-26 RX ADMIN — GABAPENTIN 100 MG: 100 CAPSULE ORAL at 21:18

## 2023-10-26 RX ADMIN — HEPARIN SODIUM 5000 UNITS: 5000 INJECTION INTRAVENOUS; SUBCUTANEOUS at 21:18

## 2023-10-26 RX ADMIN — TIZANIDINE 4 MG: 4 TABLET ORAL at 21:18

## 2023-10-26 RX ADMIN — FOLIC ACID TAB 400 MCG 400 MCG: 400 TAB at 08:39

## 2023-10-26 RX ADMIN — ACETAMINOPHEN 975 MG: 325 TABLET, FILM COATED ORAL at 21:18

## 2023-10-26 RX ADMIN — TIZANIDINE 4 MG: 4 TABLET ORAL at 15:00

## 2023-10-26 RX ADMIN — CEPHALEXIN 500 MG: 500 CAPSULE ORAL at 21:18

## 2023-10-26 RX ADMIN — OXYCODONE HYDROCHLORIDE 10 MG: 10 TABLET ORAL at 08:38

## 2023-10-26 RX ADMIN — PANTOPRAZOLE SODIUM 40 MG: 40 TABLET, DELAYED RELEASE ORAL at 05:02

## 2023-10-26 RX ADMIN — LORATADINE 10 MG: 10 TABLET ORAL at 08:38

## 2023-10-26 RX ADMIN — SODIUM CHLORIDE 75 ML/HR: 0.9 INJECTION, SOLUTION INTRAVENOUS at 08:50

## 2023-10-26 RX ADMIN — HEPARIN SODIUM 5000 UNITS: 5000 INJECTION INTRAVENOUS; SUBCUTANEOUS at 15:00

## 2023-10-26 RX ADMIN — SENNOSIDES, DOCUSATE SODIUM 1 TABLET: 8.6; 5 TABLET ORAL at 17:32

## 2023-10-26 NOTE — PROGRESS NOTES
[facility]  Progress Note  Name: Emory Banerjee  MRN: [id number]  Unit/Bed#: PPHP 039-18 I Date of Admission: 10/23/2023   Date of Service: 10/26/2023 I Hospital Day: 3    Assessment/Plan   Preoperative clearance  Assessment & Plan  Previously on baby aspirin daily, last dose 10/19  Per primary team patient is class II risk on RCRI for medium risk procedure. No previous reactions to anesthesia. Denies any shortness of breath. Ambulatory dysfunction  Assessment & Plan  See above. * Osteoarthritis of cervical spine with myelopathy  Assessment & Plan  POD 1 C3-6 PCDF (BK, 10/25)  C4-C6 severe central cord compression/stenosis  Follows with Dr. Leonides Ordaz and was scheduled for posterior cervical decompression 10/30. Unfortunately sustained 2 falls at home (lives by himself) and presented to [id number] [place] [address] ED for evaluation on 10/20. Transferred to [address] [place] [address] on 10/23 for surgical evaluation. On exam left upper extremity weakness 4/5, left lower extremity weakness +4/5, bilateral Dinora's, ataxia. Paresthesias to bilateral hands and legs, worse on left. Grossly myelopathic. Imaging:  Postoperative cervical x-rays, 10/26/2023: read pending. Per my review, stable appearance C3-6 posterior fusion hardware. Plan:  Continue to monitor neurologic exam closely. Continue to hold aspirin x 2 weeks postoperatively. No bracing needed. 1 DIANA drain to full suction - 175 mL bloody drainage since OR. Continue to mobilize as tolerated with PT/OT. Urinary retention protocol - fountain d/c, attempting voiding trial.  Multimodal pain regimen. DVT prophylaxis: SCDs, Lovenox. Neurosurgery will continue to follow. Call with questions. Subjective/Objective   Chief Complaint: My neck hurts a lot    Subjective: Patient complains of significant incisional pain. Relates that he does not feel that differently in terms of weakness or numbness.   Continues to endorse paresthesias to his bilateral hands and legs. Worse on the left. He has been tolerating p.o. He is currently undergoing voiding trial.    Objective: NAD. Posterior cervical incision dressing clean dry and intact. 1 DIANA drain in place to full suction. I/O         10/24 0701  10/25 0700 10/25 0701  10/26 0700 10/26 0701  10/27 0700    P. O. 898 60 240    I.V. (mL/kg)  2874.1 (26.1)     IV Piggyback  300     Total Intake(mL/kg) 898 3234.1 (29.4) 240 (2.2)    Urine (mL/kg/hr) 2100 4350 (1.6)     Drains  175     Total Output 2100 4525     Net -1202 -1290.9 +240                   Invasive Devices       Peripheral Intravenous Line  Duration             Peripheral IV 10/24/23 Distal;Right;Ventral (anterior) Forearm 2 days    Peripheral IV 10/25/23 Left Wrist <1 day    Peripheral IV 10/25/23 Right Hand <1 day              Drain  Duration             Urethral Catheter 16 Fr. 1 day    Closed/Suction Drain Right Back Bulb 7 Fr. <1 day                    Physical Exam:  Vitals: Blood pressure 93/54, pulse 86, temperature 98.1 °F (36.7 °C), resp. rate 16, weight 110 kg (242 lb 15.2 oz), SpO2 94 %. ,Body mass index is 35.88 kg/m².         General appearance: alert, appears stated age, cooperative and no distress  Head: Normocephalic, without obvious abnormality, atraumatic  Eyes: EOMI, PERRL  Neck: Posterior cervical incision clean dry and intact, 1 DIANA drain in place  Back: no kyphosis present, no tenderness to percussion or palpation  Lungs: non labored breathing  Heart: regular heart rate  Neurologic:   Mental status: Alert, oriented x3, thought content appropriate  Cranial nerves: grossly intact (Cranial nerves II-XII)  Sensory: Bilateral paresthesias to hands and lower extremities  Motor: R UE: 5/5, L UE delt/tri/bi: 4/5, L wrist/IO: 3/5, bilateral LE: 4/5  Reflexes: 2+ and symmetric, bilateral Bonilla's  Coordination: ataxia      Lab Results:  Results from last 7 days   Lab Units 10/26/23  0459 10/24/23  8312 10/23/23  1927 10/20/23  0323   WBC Thousand/uL 9.45 6.81  --  7.38   HEMOGLOBIN g/dL 11.0* 13.3  --  13.9   HEMATOCRIT % 33.9* 40.0  --  41.8   PLATELETS Thousands/uL 207 222 228 240   NEUTROS PCT %  --   --   --  68   MONOS PCT %  --   --   --  9   EOS PCT %  --   --   --  2     Results from last 7 days   Lab Units 10/26/23  0459 10/24/23  0514 10/20/23  0323   SODIUM mmol/L 132* 129* 135   POTASSIUM mmol/L 4.5 3.9 4.1   CHLORIDE mmol/L 98 91* 97   CO2 mmol/L 31 32 29   BUN mg/dL 18 19 22   CREATININE mg/dL 0.84 0.90 1.13   CALCIUM mg/dL 8.3* 9.0 9.6   ALK PHOS U/L  --   --  59   ALT U/L  --   --  28   AST U/L  --   --  33             Results from last 7 days   Lab Units 10/25/23  0500   INR  0.96   PTT seconds 31     No results found for: "TROPONINT"  ABG:No results found for: "PHART", "BUO5POV", "PO2ART", "WLM8WXU", "S5MWYKOP", "BEART", "SOURCE"    Imaging Studies: I have personally reviewed pertinent reports. and I have personally reviewed pertinent films in PACS    XR spine cervical 2 or 3 vw injury    Result Date: 10/25/2023  Impression: Fluoroscopic guidance provided for surgical procedure. Please refer to the separate procedure notes for additional details. Localization procedure was performed, with the OR notified of the level at approximately 1:50 p.m. on  10/25/2023 via telephone conversation with the OR x-ray technologist . Workstation performed: [id number]       EKG, Pathology, and Other Studies: I have personally reviewed pertinent reports.       VTE Pharmacologic Prophylaxis: Sequential compression device (Venodyne)  and Enoxaparin (Lovenox)    VTE Mechanical Prophylaxis: sequential compression device

## 2023-10-26 NOTE — PHYSICAL THERAPY NOTE
Physical Therapy Evaluation    Patient Name: Faisal Salgado    KTMEV'R Date: 10/26/2023     Problem List  Principal Problem:    Osteoarthritis of cervical spine with myelopathy  Active Problems:    Ambulatory dysfunction    Benign essential hypertension    Gastroesophageal reflux disease    Hyperlipidemia    Urinary retention    Preoperative clearance    Constipation    Paresthesia    Weakness of extremity    Hyponatremia       Past Medical History  Past Medical History:   Diagnosis Date    GERD (gastroesophageal reflux disease)     Hyperlipidemia     Hypertension         Past Surgical History  Past Surgical History:   Procedure Laterality Date    COLONOSCOPY      HERNIA REPAIR      DC COLONOSCOPY FLX DX W/COLLJ SPEC WHEN PFRMD N/A 4/5/2019    Procedure: COLONOSCOPY;  Surgeon: Gregory Tao DO;  Location: [facility]; Service: Gastroenterology    ROTATOR CUFF REPAIR Left     TONSILLECTOMY          10/26/23 1050   PT Last Visit   PT Visit Date 10/26/23   Note Type   Note type Evaluation   Pain Assessment   Pain Assessment Tool 0-10   Pain Location/Orientation Location: Neck;Orientation: Bilateral   Restrictions/Precautions   Weight Bearing Precautions Per Order No   Other Precautions Telemetry; Bed Alarm; Chair Alarm;Cognitive;Spinal precautions; Fall Risk;Pain;Multiple lines   Home Living   Type of [address] [facility]   (0STE first floor apartment)   Bathroom Shower/Tub Walk-in shower   Bathroom Toilet Standard   Bathroom Accessibility Accessible   Additional Comments no AD use PTA   Prior Function   Level of [place] Independent with functional mobility; Independent with ADLs; Independent with IADLS   Lives With (S)  Alone   Receives Help From Family;Friend(s)  (Sister and Friend)   IADLs Independent with driving; Independent with meal prep; Independent with medication management   Falls in the last 6 months 1 to 4  (3)   Vocational Retired   [place] Family/Caregiver Present No   Cognition   Overall Cognitive Status WFL   Arousal/Participation Cooperative   Attention Attends with cues to redirect   Orientation Level Oriented X4   Memory Within functional limits   Following Commands Follows all commands and directions without difficulty   Subjective   Subjective (S)  "My neck is really bothering me"   RLE Assessment   RLE Assessment WFL   Strength RLE   RLE Overall Strength 4/5   R Hip Flexion 4-/5   R Knee Flexion 4+/5   R Knee Extension 4+/5   R Hip ADduction 4+/5   R Ankle Dorsiflexion 4+/5   R Ankle Plantar Flexion 4+/5   LLE Assessment   LLE Assessment WFL   Strength LLE   LLE Overall Strength 4/5   L Hip Flexion 4-/5   L Knee Flexion 4+/5   L Knee Extension 4+/5   L Ankle Dorsiflexion 4+/5   L Ankle Plantar Flexion 4+/5   Vision-Basic Assessment   Current Vision Wears glasses only for reading   Coordination   Movements are Fluid and Coordinated 0   Light Touch   RLE Light Touch Impaired   LLE Light Touch Impaired   LLE Light Touch Comments L worse than R   Sharp/Dull   RLE Sharp/Dull Impaired  (foot-reports neuropathy)   LLE Sharp/Dull Impaired  (foot- reports neuropathy)   Proprioception   RLE Proprioception Grossly intact   LLE Proprioception Impaired  (L Great toe assessed, Completely absent)   Bed Mobility   Rolling R 3  Moderate assistance   Additional items Assist x 2; Increased time required;LE management;HOB elevated   Supine to Sit 3  Moderate assistance   Additional items HOB elevated; Increased time required;Verbal cues;LE management   Sit to Supine Unable to assess   Transfers   Sit to Stand 3  Moderate assistance   Additional items Assist x 2   Stand to Sit 3  Moderate assistance   Additional items Assist x 2   Additional Comments c RW   Ambulation/Elevation   Gait pattern Improper Weight shift;Decreased foot clearance; Short stride; Step to;Excessively slow   Gait Assistance 3  Moderate assist   Additional items Assist x 2;Verbal cues Assistive Device Rolling walker   Distance 3'   Balance   Static Sitting Fair -   Dynamic Sitting Fair -   Static Standing Poor   Dynamic Standing Poor   Ambulatory Poor -   Endurance Deficit   Endurance Deficit Yes   Endurance Deficit Description weakness, pain, fatigue   Activity Tolerance   Activity Tolerance Patient limited by pain; Patient limited by fatigue   Medical Staff Made Aware OT CHRISTAL REYES-assisted with documentation   Nurse Made Aware RN aware   Assessment   Prognosis Good   Problem List Decreased strength;Decreased range of motion;Decreased endurance; Impaired balance;Decreased coordination;Decreased mobility;Pain; Impaired sensation;Orthopedic restrictions   Assessment Pt is a 68 y.o. male transferred from [facility] and admitted to [facility] for surgery on 10/23/2023 with frequent falls and concern for myelopathy. Pt received a primary medical dx of osteoarthritis of c/s with myelopathy. Pt has the following comorbidities which affect their treatment: active problems listed above  has a past medical history of GERD (gastroesophageal reflux disease), Hyperlipidemia, and Hypertension. , as well as personal factors including living alone. Pt has a high complexity clinical presentation due to Ongoing medical management for primary dx, Increased reliance on more restrictive AD compared to baseline, Decreased activity tolerance compared to baseline, Fall risk, Increased assistance needed from caregiver at current time, Ongoing telemetry monitoring, Trending lab values, Spinal precautions at current time, Diagnostic imaging pending, Continuous pulse oximetry monitoring , DIANA drain in place at current time, s/p surgical intervention , and PMH. PT was consulted to evaluate pt's functional mobility and discharge needs. Upon evaluation, patient required modAx2 for all mobility as outlined above.  Body system impairments include impaired b/l UE>LE strength, impaired L>R light touch and sharp dull sensation, impaired proprioception, impaired neck ROM. Pt's functional activity impairments include: impaired balance, endurance, activity tolerance, and mobility. Participation restrictions include inability to safety access home/community, perform ADLS/IADLS. At conclusion of eval, pt remained seated in chair with chair alarm, phone, call bell, and all other personal needs within reach. Pt would benefit from skilled PT to address their functional mobility limitations. The patient's AM-PAC Basic Mobility Inpatient Short Form Raw Score is 11. A Raw score of less than or equal to 16 suggests the patient may benefit from discharge to post-acute rehabilitation services. Please also refer to the recommendation of the Physical Therapist for safe discharge planning. D/C recommendations are acute rehab. Barriers to Discharge Inaccessible home environment;Decreased caregiver support   Goals   Patient Goals to improve   STG Expiration Date 11/09/23   Short Term Goal #1 In 14 days, pt will: 1) perform bed mobility with S to promote functional independence and decrease caregiver burden. 2) perform transfers to<>from all surfaces with S to promote functional independence and decrease caregiver burden. 3) ambulate 100' with S and least restrictive device to promote safe return to home. 4) negotiate 3 stairs with S to promote safe access of community. 5) improve balance grades by 1/2 to promote safety with functional mobility. 6) improve strength grades by 1/2 to promote safety with functional mobility. PT Treatment Day 0   Plan   Treatment/Interventions Functional transfer training;LE strengthening/ROM; Endurance training;Gait training;Bed mobility;Spoke to nursing;Spoke to case management;OT; Therapeutic exercise;Patient/family training;Equipment eval/education   PT Frequency 3-5x/wk   Discharge Recommendation   PT Discharge Recommendation Post acute rehabilitation services  (PMR)   Equipment Recommended Michelle Torres Package Recommended Wheeled walker   AM-PAC Basic Mobility Inpatient   Turning in Flat Bed Without Bedrails 2   Lying on Back to Sitting on Edge of Flat Bed Without Bedrails 2   Moving Bed to Chair 2   Standing Up From Chair Using Arms 2   Walk in Room 2   Climb 3-5 Stairs With Railing 1   Basic Mobility Inpatient Raw Score 11   Basic Mobility Standardized Score 30.25   Highest Level Of Mobility   -[facility] Goal 4: Move to chair/commode   -HL Achieved 5: Stand (1 or more minutes)   Modified Rodney Scale   Modified Rodney Scale 4   Barthel Index   Feeding 5   Bathing 0   Grooming Score 0   Dressing Score 0   Bladder Score 5   Bowels Score 5   Toilet Use Score 5   Transfers (Bed/Chair) Score 5   Mobility (Level Surface) Score 0   Stairs Score 0   Barthel Index Score 25   Elvin Mcghee, PT, DPT

## 2023-10-26 NOTE — ASSESSMENT & PLAN NOTE
Patient noted to have urinary retention, needing straight [facility]. Continue retention protocol.   Was with fountain, removed on 10/26>> monitor on retention protcol

## 2023-10-26 NOTE — ASSESSMENT & PLAN NOTE
POD 1 C3-6 PCDF (BK, 10/25)  C4-C6 severe central cord compression/stenosis  Follows with Dr. Leonides Ordaz and was scheduled for posterior cervical decompression 10/30. Unfortunately sustained 2 falls at home (lives by himself) and presented to [facility] ED for evaluation on 10/20. Transferred to [address] [place] [address] on 10/23 for surgical evaluation. On exam left upper extremity weakness 4/5, left lower extremity weakness +4/5, bilateral Dinora's, ataxia. Paresthesias to bilateral hands and legs, worse on left. Grossly myelopathic. Imaging:  Postoperative cervical x-rays, 10/26/2023: read pending. Per my review, stable appearance C3-6 posterior fusion hardware. Plan:  Continue to monitor neurologic exam closely. Continue to hold aspirin x 2 weeks postoperatively. No bracing needed. 1 DIANA drain to full suction - 175 mL bloody drainage since OR. Continue to mobilize as tolerated with PT/OT. Urinary retention protocol - fountain d/c, attempting voiding trial.  Multimodal pain regimen. DVT prophylaxis: SCDs, Lovenox. Neurosurgery will continue to follow. Call with questions.

## 2023-10-26 NOTE — ASSESSMENT & PLAN NOTE
Patient presented to [address] [place] [address] for multiple falls at home. CT spine and CT head negative for any acute changes. Status post neurosurgery intervention on 10.25  Continue fall precautions. PT/OT eval.>> acute rehab on dc  Conservative management for symptom relief.

## 2023-10-26 NOTE — APP STUDENT NOTE
JIGNESH STUDENT  Inpatient Progress Note for TRAINING ONLY  Not Part of Legal Medical Record     Progress Note - Pushpa Gibbons 68 y.o. male MRN: [id number]  Unit/Bed#: [facility] 711-01 Encounter: [id number]         Osteoarthritis of cervical spine with myelopathy   POD 1 from C3-C6 PCDF   Continue pain management    Continue fall precautions    Hyponatremia   Improving with discontinuation of HCTZ   Most recent value 132   Urine sodium and osmolality WNL   Trend BMP   Fluids d/c    Constipation   Patient has not had BM since admission   Continue bowel regimen of senna, colace, and miralax   Patient passing gas    Urinary retention   Christensen catheter in place   Retention protocol in place   Likely due to myelopathy. Monitor after PT/OT evaluations  GERD   Continue pantoprazole 40mg PO daily    Benign essential HTN   Blood pressure on lower end of normal   Lisinopril currently held within parameters   HCTZ d/c due to hyponatremia    Ambulatory dysfunction   Patient hx of multiple falls at home which has led to current hospitalization   CT spine and head negative   PT/OT eval      VTE Pharmacologic Prophylaxis:   Pharmacologic: Heparin  Mechanical VTE Prophylaxis in Place: Yes    Patient Centered Rounds: {Patient Centered Rounds:61228}    Discussions with Specialists or Other Care Team Provider: ***    Education and Discussions with Family / Patient: ***    Time Spent for Care: {Time; Time 15 min - 1 hour:95377}. More than 50% of total time spent on counseling and coordination of care as described above. Current Length of Stay: 3 day(s)    Current Patient Status: Inpatient   Certification Statement: {Certification [id number]}    Discharge Plan: ***    Code Status: Level 1 - Full Code    Subjective:   Patient is doing well. His pain is maintained at a 6/10 which he states feels well controlled. He has yet to have a bowel movement and would like to hold off on laxatives until he attempts moving with PT.  He is passing gas. He is still experiencing urinary rentention. He is tolerating eating and drinking well. He denies fever, chills, nausea, vomiting, chest pain, SOB. Objective:     Vitals:   Temp (24hrs), Av.6 °F (36.4 °C), Min:96.7 °F (35.9 °C), Max:98.1 °F (36.7 °C)    Temp:  [96.7 °F (35.9 °C)-98.1 °F (36.7 °C)] 98.1 °F (36.7 °C)  HR:  [] 96  Resp:  [12-22] 16  BP: (101-121)/(57-77) 101/61  SpO2:  [92 %-99 %] 92 %  Body mass index is 35.88 kg/m². Input and Output Summary (last 24 hours): Intake/Output Summary (Last 24 hours) at 10/26/2023 0901  Last data filed at 10/26/2023 0601  Gross per 24 hour   Intake 3234.09 ml   Output 4525 ml   Net -1290.91 ml       Physical Exam:     Physical Exam  Constitutional:       Appearance: Normal appearance. HENT:      Head: Normocephalic and atraumatic. Mouth/Throat:      Mouth: Mucous membranes are moist.   Cardiovascular:      Rate and Rhythm: Normal rate and regular rhythm. Pulses: Normal pulses. Heart sounds: Normal heart sounds. Pulmonary:      Effort: Pulmonary effort is normal.      Breath sounds: Normal breath sounds. Abdominal:      General: Bowel sounds are normal.      Palpations: Abdomen is soft. Musculoskeletal:      Cervical back: Normal range of motion. Skin:     General: Skin is warm. Neurological:      Mental Status: He is alert and oriented to person, place, and time. ( Be Sure to Include Physical Exam: Delete this entire line when you have entered your exam)    Historical Information   Past Medical History:   Diagnosis Date    GERD (gastroesophageal reflux disease)     Hyperlipidemia     Hypertension      Past Surgical History:   Procedure Laterality Date    COLONOSCOPY      HERNIA REPAIR      [place] COLONOSCOPY FLX DX W/COLLJ SPEC WHEN PFRMD N/A 2019    Procedure: COLONOSCOPY;  Surgeon: Sushma Shay DO;  Location: [facility];   Service: Gastroenterology    ROTATOR CUFF REPAIR Left     TONSILLECTOMY Social History   Social History     Substance and Sexual Activity   Alcohol Use Yes    Alcohol/week: 6.0 standard drinks of alcohol    Types: 6 Cans of beer per week    Comment: beer 4-5 days a week     Social History     Substance and Sexual Activity   Drug Use Never     Social History     Tobacco Use   Smoking Status Never   Smokeless Tobacco Never     Family History:  non-contributory     Meds/Allergies      No Known Allergies. Additional Data:     Labs:    Results from last 7 days   Lab Units 10/26/23  0459 10/23/23  1927 10/20/23  0323   WBC Thousand/uL 9.45   < > 7.38   HEMOGLOBIN g/dL 11.0*   < > 13.9   HEMATOCRIT % 33.9*   < > 41.8   PLATELETS Thousands/uL 207   < > 240   NEUTROS PCT %  --   --  68   LYMPHS PCT %  --   --  20   MONOS PCT %  --   --  9   EOS PCT %  --   --  2    < > = values in this interval not displayed. Results from last 7 days   Lab Units 10/26/23  0459 10/24/23  0514 10/20/23  0323   SODIUM mmol/L 132*   < > 135   POTASSIUM mmol/L 4.5   < > 4.1   CHLORIDE mmol/L 98   < > 97   CO2 mmol/L 31   < > 29   BUN mg/dL 18   < > 22   CREATININE mg/dL 0.84   < > 1.13   ANION GAP mmol/L 3   < > 9   CALCIUM mg/dL 8.3*   < > 9.6   ALBUMIN g/dL  --   --  4.3   TOTAL BILIRUBIN mg/dL  --   --  0.69   ALK PHOS U/L  --   --  59   ALT U/L  --   --  28   AST U/L  --   --  33   GLUCOSE RANDOM mg/dL 120   < > 103    < > = values in this interval not displayed. Results from last 7 days   Lab Units 10/25/23  0500   INR  0.96     Results from last 7 days   Lab Units 10/25/23  1621 10/25/23  0708 10/24/23  2127   POC GLUCOSE mg/dl 154* 109 82                 * I Have Reviewed All Lab Data Listed Above. * Additional Pertinent Lab Tests Reviewed:  [facility] Admission Reviewed    Imaging:    Imaging Reports Reviewed Today Include:   Imaging Personally Reviewed by Myself Includes:      Recent Cultures (last 7 days):           Last 24 Hours Medication List:   Current Facility-Administered Medications   Medication Dose Route Frequency Provider Last Rate    acetaminophen  975 mg Oral Q8H [facility] Diana Schultz PA-C      albuterol  2 puff Inhalation Q6H PRN Diana EvZION schilling      aluminum-magnesium hydroxide-simethicone  30 mL Oral Q6H PRN Diana Evtonie, ZION      atorvastatin  10 mg Oral Daily Kadie Henry PA-C      bisacodyl  10 mg Rectal Daily PRN Diana Eves, ZION      cephalexin  500 mg Oral Q8H [facility] Kadie Henry PA-C      fluticasone  2 spray Each Nare Daily PRN Diana EvZION schilling      folic acid  086 mcg Oral Daily Edwige Henry PA-C      gabapentin  100 mg Oral TID Diana EvZION schilling      heparin (porcine)  5,000 Units Subcutaneous Q8H [facility] Diana ZION Schultz      HYDROmorphone  0.2 mg Intravenous Q1H PRN Diana EvZION schilling      lidocaine  1 patch Topical Daily Kadie Henry PA-C      lisinopril  20 mg Oral Daily Edwige Henry PA-C      loratadine  10 mg Oral Daily Kadie Henry PA-C      melatonin  3 mg Oral HS Kadie Henry PA-C      ondansetron  4 mg Intravenous Q6H PRN Diana EvesZION      oxyCODONE  5 mg Oral Q4H PRN Diana ZION Schultz      Or    oxyCODONE  10 mg Oral Q4H PRN Diana EvesZION      pantoprazole  40 mg Oral Daily Kadie Henry PA-C      polyethylene glycol  17 g Oral BID Diana EvZION schilling      senna-docusate sodium  1 tablet Oral BID Edwige Henry PA-C      sodium chloride  75 mL/hr Intravenous Continuous Diana ZION Schultz 75 mL/hr (10/26/23 0850)    tiZANidine  4 mg Oral TID Diana ZION Schultz          Today, Patient Was Seen By: Aidan Valdez    ** Please Note: Dictation voice to text software may have been used in the creation of this document.  **

## 2023-10-26 NOTE — PLAN OF CARE
Problem: OCCUPATIONAL THERAPY ADULT  Goal: Performs self-care activities at highest level of function for planned discharge setting. See evaluation for individualized goals. Description: Treatment Interventions: ADL retraining, Functional transfer training, UE strengthening/ROM, Endurance training, Cognitive reorientation, Patient/family training, Equipment evaluation/education, Fine motor coordination activities, Compensatory technique education, Continued evaluation, Energy conservation, Activityengagement  Equipment Recommended: Bedside commode, Shower/Tub chair with back ($) (if discharges home)       See flowsheet documentation for full assessment, interventions and recommendations. Note: Limitation: Decreased ADL status, Decreased UE ROM, Decreased UE strength, Decreased Safe judgement during ADL, Decreased endurance, Decreased sensation, Decreased fine motor control, Decreased self-care trans, Decreased high-level ADLs  Prognosis: Good  Assessment: Pt is a 68 y.o. male who was admitted to [address] [place] [address] on 10/23/2023 with falls legs giving out +HS, numbness/tingling to B/L UE Osteoarthritis of cervical spine with myelopathy  s/p Posterior cervical laminectomies at C4, C5, C6, partial C3 laminectomy on 10/25 +cervical spinal precautions, ambulatory dysfunction . Patient  has a past medical history of GERD (gastroesophageal reflux disease), Hyperlipidemia, and Hypertension. At baseline pt was completing mod I with ADL's/IADL's, no AD with functional mobility. Pt lives alone in a first floor apartment with 0STE. Currently pt requires  for overall ADLS and  for functional mobility/transfers. Pt currently presents with impairments in the following categories -limited home support, difficulty performing ADLS, and difficulty performing IADLS \ activity tolerance and endurance.  These impairments, as well as pt's fatigue, pain, spinal precautions, (R) UE dysmetria, (L) UE dysmetria , and risk for falls limit pt's ability to safely engage in all baseline areas of occupation, includingeating, grooming, bathing, dressing, toileting, functional mobility/transfers, community mobility, laundry , driving, house maintenance, medication management, meal prep, cleaning, social participation , and leisure activities  The patient's raw score on the AM-PAC Daily Activity Inpatient Short Form is 13. A raw score of less than 19 suggests the patient may benefit from discharge to post-acute rehabilitation services. Please refer to the recommendation of the Occupational Therapist for safe discharge planning. From OT standpoint, recommend STR upon D/C. OT will continue to follow to address the below stated goals.      OT Discharge Recommendation: Post acute rehabilitation services

## 2023-10-26 NOTE — PLAN OF CARE
Problem: PHYSICAL THERAPY ADULT  Goal: Performs mobility at highest level of function for planned discharge setting. See evaluation for individualized goals. Description: Treatment/Interventions: Functional transfer training, LE strengthening/ROM, Endurance training, Gait training, Bed mobility, Spoke to nursing, Spoke to case management, OT, Therapeutic exercise, Patient/family training, Equipment eval/education  Equipment Recommended: Gaye Juarez       See flowsheet documentation for full assessment, interventions and recommendations. Note: Prognosis: Good  Problem List: Decreased strength, Decreased range of motion, Decreased endurance, Impaired balance, Decreased coordination, Decreased mobility, Pain, Impaired sensation, Orthopedic restrictions  Assessment: Pt is a 68 y.o. male transferred from [facility] and [facility] to [facility] for surgery on 10/23/2023 with frequent falls and concern for myelopathy. Pt received a primary medical dx of osteoarthritis of c/s with myelopathy. Pt has the following comorbidities which affect their treatment: active problems listed above  has a past medical history of GERD (gastroesophageal reflux disease), Hyperlipidemia, and Hypertension. , as well as personal factors including living alone. Pt has a high complexity clinical presentation due to Ongoing medical management for primary dx, Increased reliance on more restrictive AD compared to baseline, Decreased activity tolerance compared to baseline, Fall risk, Increased assistance needed from caregiver at current time, Ongoing telemetry monitoring, Trending lab values, Spinal precautions at current time, Diagnostic imaging pending, Continuous pulse oximetry monitoring , DIANA drain in place at current time, s/p surgical intervention , and PMH. PT was consulted to evaluate pt's functional mobility and discharge needs. Upon evaluation, patient required modAx2 for all mobility as outlined above.  Body system impairments include impaired b/l UE>LE strength, impaired L>R light touch and sharp dull sensation, impaired proprioception, impaired neck ROM. Pt's functional activity impairments include: impaired balance, endurance, activity tolerance, and mobility. Participation restrictions include inability to safety access home/community, perform ADLS/IADLS. At conclusion of eval, pt remained seated in chair with chair alarm, phone, call bell, and all other personal needs within reach. Pt would benefit from skilled PT to address their functional mobility limitations. The patient's AM-[facility] Basic Mobility Inpatient Short Form Raw Score is 11. A Raw score of less than or equal to 16 suggests the patient may benefit from discharge to post-acute rehabilitation services. Please also refer to the recommendation of the Physical Therapist for safe discharge planning. D/C recommendations are acute rehab. Barriers to Discharge: Inaccessible home environment, Decreased caregiver support     PT Discharge Recommendation: Post acute rehabilitation services (PMR)    See flowsheet documentation for full assessment.

## 2023-10-26 NOTE — ASSESSMENT & PLAN NOTE
Suspect SIADH. Note hydrochlorothiazide use and pain.   Uric acid normal, urine sodium 46, urine osm 735  129>> 132  Hold HCTZ  Trend BMP  Fluid Restriction

## 2023-10-26 NOTE — RESTORATIVE TECHNICIAN NOTE
Restorative Technician Note      Patient Name: Theo Rubalcava     Note Type: Mobility  Patient Position Upon Consult: Supine  Activity Performed: Ambulated; OTNIHHF; Stood  Assistive Device: Roller walker  Education Provided: Yes  Patient Position at [employer] of Consult: Bedside chair;  All needs within reach; Bed/Chair alarm activated      Sunil PUENTES, Restorative Technician, [employer]

## 2023-10-26 NOTE — ASSESSMENT & PLAN NOTE
Blood pressure acceptable  on lisinopril 20 mg with holding parameters  HCTZ discontinued given hyponatremia.

## 2023-10-26 NOTE — PROGRESS NOTES
[facility]  Progress Note  Name: Nilda Jane  MRN: [id number]  [facility] Date of Admission: 10/23/2023   Date of Service: 10/26/2023 I Hospital Day: 3    Assessment/Plan   * Osteoarthritis of cervical spine with myelopathy  Assessment & Plan  Patient has known history of cervical radiculopathy, C4-C6 cord with marked stenosis and compression. Follows with neurosurgery in outpatient setting, was scheduled for outpatient decompression procedure on 10/30 however presented to hospital for frequent falls. MRI thoracic spine with cord compression at C4-5, mild thoracic degenerative changes without significant stenosis. Case was discussed with neurosurgery team, given concerns of worsening myelopathy symptoms including bilateral lower extremity involuntary muscle twitching, neuropathic pain and weakness, decision was made to transfer to [facility] for earlier inpatient procedure. Neurosurgery following, patient is status post  cervical decompression and fusion on 10/25. DIANA drain management per neurosurgery. Fountain removed >> monitor on retention protocol     Continue fall precautions. Continue multimodal pain management. Acute rehab on discharge    Hyponatremia  Assessment & Plan  Suspect SIADH. Note hydrochlorothiazide use and pain. Uric acid normal, urine sodium 46, urine osm 735  129>> 132  Hold HCTZ  Trend BMP  Fluid Restriction    Constipation  Assessment & Plan  Bowel regimen including senna, colace and miralax  Prn suppository    Urinary retention  Assessment & Plan  Patient noted to have urinary retention, needing straight cath at [address] [place] [address] Road. Continue retention protocol. Was with fountain, removed on 10/26>> monitor on retention protcol    Hyperlipidemia  Assessment & Plan  Patient is on atorvastatin 10 mg at home, continue same.     Gastroesophageal reflux disease  Assessment & Plan  Patient is on omeprazole at home, continue alternative equivalent pantoprazole while inpatient. Benign essential hypertension  Assessment & Plan  Blood pressure acceptable  on lisinopril 20 mg with holding parameters  HCTZ discontinued given hyponatremia. Ambulatory dysfunction  Assessment & Plan  Patient presented to [address] [place] [address] for multiple falls at home. CT spine and CT head negative for any acute changes. Status post neurosurgery intervention on 10.25  Continue fall precautions. PT/OT eval.>> acute rehab on dc  Conservative management for symptom relief. VTE Pharmacologic Prophylaxis:   Pharmacologic: Heparin  Mechanical VTE Prophylaxis in Place: Yes    Patient Centered Rounds: I have performed bedside rounds with nursing staff today. Discussions with Specialists or Other Care Team Provider: CM, neurosurgery    Education and Discussions with Family / Patient: plan of acre, patient. He declined to call family for updates today. Time Spent for Care: 30 minutes. More than 50% of total time spent on counseling and coordination of care as described above. Current Length of Stay: 3 day(s)    Current Patient Status: Inpatient   Certification Statement: The patient will continue to require additional inpatient hospital stay due to will need acute rehab pending neurosurgery clearance    Discharge Plan: acute rehab when cleared by neurosurgery, likely will be here for 48 hours      Code Status: Level 1 - Full Code      Subjective:   No overnight events. No BM yet. Out of bed in chair. He is comfortable. Drain in place. Christensen was removed this morning. Objective:     Vitals:   Temp (24hrs), Av.9 °F (36.6 °C), Min:97.7 °F (36.5 °C), Max:98.1 °F (36.7 °C)    Temp:  [97.7 °F (36.5 °C)-98.1 °F (36.7 °C)] 98.1 °F (36.7 °C)  HR:  [] 74  Resp:  [12-22] 16  BP: ()/(51-77) 104/55  SpO2:  [92 %-99 %] 96 %  Body mass index is 35.88 kg/m². Input and Output Summary (last 24 hours):        Intake/Output Summary (Last 24 hours) at 10/26/2023 5 [facility] filed at 10/26/2023 1100  Gross per 24 hour   Intake 780 ml   Output 925 ml   Net -145 ml       Physical Exam:   Constitutional:       General: He is not in acute distress. Cardiovascular:      Rate and Rhythm: Normal rate and regular rhythm. Heart sounds: Normal heart sounds. No murmur heard. Pulmonary:      Effort: No respiratory distress. Breath sounds: Normal breath sounds. No wheezing or rales. Abdominal:      General: Bowel sounds are normal. There is no distension. Palpations: Abdomen is soft. Tenderness: There is no abdominal tenderness. Skin:     General: Skin is warm. Neurological:      Mental Status: He is alert. Comments: Awake alert communicative  Moves all extremities           Additional Data:     Labs:    Results from last 7 days   Lab Units 10/26/23  0459 10/23/23  1927 10/20/23  0323   WBC Thousand/uL 9.45   < > 7.38   HEMOGLOBIN g/dL 11.0*   < > 13.9   HEMATOCRIT % 33.9*   < > 41.8   PLATELETS Thousands/uL 207   < > 240   NEUTROS PCT %  --   --  68   LYMPHS PCT %  --   --  20   MONOS PCT %  --   --  9   EOS PCT %  --   --  2    < > = values in this interval not displayed. Results from last 7 days   Lab Units 10/26/23  0459 10/24/23  0514 10/20/23  0323   SODIUM mmol/L 132*   < > 135   POTASSIUM mmol/L 4.5   < > 4.1   CHLORIDE mmol/L 98   < > 97   CO2 mmol/L 31   < > 29   BUN mg/dL 18   < > 22   CREATININE mg/dL 0.84   < > 1.13   ANION GAP mmol/L 3   < > 9   CALCIUM mg/dL 8.3*   < > 9.6   ALBUMIN g/dL  --   --  4.3   TOTAL BILIRUBIN mg/dL  --   --  0.69   ALK PHOS U/L  --   --  59   ALT U/L  --   --  28   AST U/L  --   --  33   GLUCOSE RANDOM mg/dL 120   < > 103    < > = values in this interval not displayed.      Results from last 7 days   Lab Units 10/25/23  0500   INR  0.96     Results from last 7 days   Lab Units 10/25/23  1621 10/25/23  0708 10/24/23  2127   POC GLUCOSE mg/dl 154* 109 82                   Recent Cultures (last 7 days): Last 24 Hours Medication List:   Current Facility-Administered Medications   Medication Dose Route Frequency Provider Last Rate    acetaminophen  975 mg Oral Q8H [facility] Jinger Session, ZION      albuterol  2 puff Inhalation Q6H PRN Jinger Session, ZION      aluminum-magnesium hydroxide-simethicone  30 mL Oral Q6H PRN Jinger Session, ZION      atorvastatin  10 mg Oral Daily Kadie  Carl, ZION      bisacodyl  10 mg Rectal Daily PRN Jinger Session, ZION      cephalexin  500 mg Oral Q8H [facility] Kadie RUBEN Henry, ZION      fluticasone  2 spray Each Nare Daily PRN Jinger Session, ZION      folic acid  399 mcg Oral Daily Angie Flakes [place], ZION      gabapentin  100 mg Oral TID Jinger Session, ZION      heparin (porcine)  5,000 Units Subcutaneous Q8H [facility], ZION      HYDROmorphone  0.2 mg Intravenous Q1H PRN Jinger Session, ZION      lidocaine  1 patch Topical Daily Kadie Godinezma, ZION      lisinopril  20 mg Oral Daily [facility], ZION      loratadine  10 mg Oral Daily [place]  Herbertma, ZION      melatonin  3 mg Oral HS Kadie  Herbertma, ZION      ondansetron  4 mg Intravenous Q6H PRN Jinger Session, ZION      oxyCODONE  5 mg Oral Q4H PRN Jinger Session, ZION      Or    oxyCODONE  10 mg Oral Q4H PRN Jinger Session, ZION      pantoprazole  40 mg Oral Daily Kadie Godinezma, ZION      polyethylene glycol  17 g Oral BID Jinger Session, ZION      senna-docusate sodium  1 tablet Oral BID Jinger Session, IZON      tiZANidine  4 mg Oral TID Jinger Session, ZION          Today, Patient Was Seen By: Lurlene Fleischer, MD    ** Please Note: Dictation voice to text software may have been used in the creation of this document.  **

## 2023-10-26 NOTE — OCCUPATIONAL THERAPY NOTE
Occupational Therapy Evaluation     Patient Name: Lori Wong  JLOOF'N Date: 10/26/2023  Problem List  Principal Problem:    Osteoarthritis of cervical spine with myelopathy  Active Problems:    Ambulatory dysfunction    Benign essential hypertension    Gastroesophageal reflux disease    Hyperlipidemia    Urinary retention    Preoperative clearance    Constipation    Paresthesia    Weakness of extremity    Hyponatremia    Past Medical History  Past Medical History:   Diagnosis Date    GERD (gastroesophageal reflux disease)     Hyperlipidemia     Hypertension      Past Surgical History  Past Surgical History:   Procedure Laterality Date    COLONOSCOPY      HERNIA REPAIR      SC COLONOSCOPY FLX DX W/COLLJ SPEC WHEN PFRMD N/A 4/5/2019    Procedure: COLONOSCOPY;  Surgeon: Chayito Camacho DO;  Location: [facility]; Service: Gastroenterology    ROTATOR CUFF REPAIR Left     TONSILLECTOMY           10/26/23 1010   OT Last Visit   OT Visit Date 10/26/23   Note Type   Note type Evaluation   Pain Assessment   Pain Assessment Tool 0-10   Pain Score 6   Pain Location/Orientation Location: Neck  (surgical site)   Hospital Pain Intervention(s) Emotional support; Ambulation/increased activity;Repositioned; Rest   Restrictions/Precautions   Weight Bearing Precautions Per Order No   Braces or Orthoses (Per neurosurgery no Cervical brace needed.)   Other Precautions Chair Alarm;Cognitive; Bed Alarm;Telemetry;Multiple lines; Fall Risk;Pain;Spinal precautions  (+DIANA drain)   Home Living   Type of [address] [place] [address] Avenue One level;Stairs to enter with rails   Bathroom Shower/Tub Walk-in shower   Bathroom Toilet Standard   Bathroom Accessibility Accessible   Prior Function   Level of [place] Independent with ADLs; Independent with functional mobility   Lives With Alone   Receives Help From Family   IADLs Independent with driving; Independent with medication management; Independent with meal prep   Falls in the last 6 months 1 to 4   Vocational Retired   Lifestyle   Autonomy Mod I with increased to for self care tasks (with recent symptoms)/mod I with ADL's, no AD with functional mobility, +drives   Reciprocal Relationships friend, brother/SNL   Service to Others retired   Intrinsic Gratification spending time outside   [facility] Present No   ADL   Eating Assistance 5  Supervision/Setup   Grooming Assistance 4  [address] [place] 3  Moderate Assistance    [address] 2  Maximal [place] 3  Moderate Assistance   [address] [facility] [address] 2  Maximal Assistance   LB Dressing Deficit Don/doff R sock; Don/doff L sock  (assistance to cross right leg over left unable to thread socks able to pull up over heel with min a)   Toileting Assistance  2  Maximal Assistance   Bed Mobility   Supine to Sit 3  Moderate assistance   Additional items Assist x 2   Sit to Supine 3  Moderate assistance   Additional items Assist x 2   Transfers   Sit to Stand 3  Moderate assistance   Additional items Assist x 2   Stand to Sit 3  Moderate assistance   Additional items Assist x 2   Stand pivot 3  Moderate assistance   Additional items Assist x 2; Increased time required;Verbal cues   Additional Comments +RW   Functional Mobility   Functional Mobility 3  Moderate assistance   Additional Comments mod a x 2 with RW 2 small steps from bed to chair and increased time. Balance   Static Sitting Fair -   Dynamic Sitting Poor +   Static Standing Poor   Dynamic Standing Poor   Ambulatory Poor -   Activity Tolerance   Activity Tolerance Patient limited by fatigue;Patient limited by pain   Medical Staff Made Aware PT kike due to the patient's co-morbidities, clinically unstable presentation, and present impairments which are a regression from the patient's baseline.     Nurse Made Aware RN cleared pt for therapy   RUE Assessment   RUE Assessment X Right hand dominant shoulder strength 2/5, elbow 4-/5,  4/5   LUE Assessment   LUE Assessment X shoulder strength 2/5, elbow 3-/5,  3+/5  right UE strength>left UE   Hand Function   Gross Motor Coordination Impaired  (finger to nose impaired B/l Hands improved with repetition.)   Fine Motor Coordination Impaired-pt reports recent deficits of numbness with diffiuclty buttoning shirts/pants. (impaired B/l Hands left >right +dorsal edema to hand unable to oppose thumb to small finger. pt provided edema managment education -BUE exercises/HEP and elevated on pillow at end of session.)   Sensation   Light Touch Partial deficits in the RUE;Partial deficits in the LUE  (left>right)   Sharp/Dull Severe deficits in the RUE;Severe deficits in the LUE  (left>right WFL at shoulder otherwise impaired throughout UE's.)   Vision-Basic Assessment   Current Vision Wears glasses only for reading   Perception   Inattention/Neglect Appears intact   Cognition   Overall Cognitive Status [facility]   Arousal/Participation Alert; Responsive; Cooperative   Attention Within functional limits   Orientation Level Oriented X4   Memory Within functional limits   Following Commands Follows all commands and directions without difficulty   Comments pt pleasant and cooperative with therapy, good carryover of new learning with RW/cervical spinal precautions, good attention/memory with evaluaton. Assessment   Limitation Decreased ADL status; Decreased UE ROM; Decreased UE strength;Decreased Safe judgement during ADL;Decreased endurance;Decreased sensation;Decreased fine motor control;Decreased self-care trans;Decreased high-level ADLs   Prognosis Good   Assessment Pt is a 68 y.o. male who was admitted to [facility] on 10/23/2023 with falls legs giving out +HS, numbness/tingling to B/L UE Osteoarthritis of cervical spine with myelopathy  s/p Posterior cervical laminectomies at C4, C5, C6, partial C3 laminectomy on 10/25 +cervical spinal precautions, ambulatory dysfunction .  Patient  has a past medical history of GERD (gastroesophageal reflux disease), Hyperlipidemia, and Hypertension. At baseline pt was completing mod I with ADL's/IADL's, no AD with functional mobility. Pt lives alone in a first floor apartment with 0STE. Currently pt requires  for overall ADLS and  for functional mobility/transfers. Pt currently presents with impairments in the following categories -limited home support, difficulty performing ADLS, and difficulty performing IADLS \ activity tolerance and endurance. These impairments, as well as pt's fatigue, pain, spinal precautions, (R) UE dysmetria, (L) UE dysmetria , and risk for falls  limit pt's ability to safely engage in all baseline areas of occupation, includingeating, grooming, bathing, dressing, toileting, functional mobility/transfers, community mobility, laundry , driving, house maintenance, medication management, meal prep, cleaning, social participation , and leisure activities  The patient's raw score on the -PAC Daily Activity Inpatient Short Form is 13. A raw score of less than 19 suggests the patient may benefit from discharge to post-acute rehabilitation services. Please refer to the recommendation of the Occupational Therapist for safe discharge planning. From OT standpoint, recommend STR upon D/C. OT will continue to follow to address the below stated goals. Goals   Patient Goals get stronger   LTG Time Frame 10-14   Long Term Goal #1 see goals below   Plan   Treatment Interventions ADL retraining;Functional transfer training;UE strengthening/ROM; Endurance training;Cognitive reorientation;Patient/family training;Equipment evaluation/education; Fine motor coordination activities; Compensatory technique education;Continued evaluation; Energy conservation; Activityengagement   Goal Expiration Date 11/09/23   OT Frequency 2-3x/wk   Discharge Recommendation   OT Discharge Recommendation Post acute rehabilitation services   Equipment Recommended Bedside commode; Shower/Tub chair with back ($)  (if discharges home)   Commode Type Standard   AM-PAC Daily Activity Inpatient   Lower Body Dressing 2   Bathing 2   Toileting 2   Upper Body Dressing 2   Grooming 2   Eating 3   Daily Activity Raw Score 13   Daily Activity Standardized Score (Calc for Raw Score >=11) 32.03   AM-PAC Applied Cognition Inpatient   Following a Speech/Presentation 4   Understanding Ordinary Conversation 4   Taking Medications 4   Remembering Where Things Are Placed or Put Away 4   Remembering List of 4-5 Errands 4   Taking Care of Complicated Tasks 4   Applied Cognition Raw Score 24   Applied Cognition Standardized Score 62.21   End of Consult   Patient Position at End of Consult Bed/Chair alarm activated; All needs within reach; Bedside chair   Nurse Communication Nurse aware of consult  Initiated educated on HEP for B UE strengthening/edema management   Additional treatment session: ([id number]) pt seen for a OT session for transfer training. Pt performed sit to stand with min a x 2 with RW from beside chair with mod a x 1 with RW 3 steps to stretcher, min a supine to sit onto stretcher. Pt making progress with session, continues to function below baseline  levels. Continue plan of care to maximize functional I with all ADL's/mobility tasks. Occupational Therapy Goals:    *Mod I with bed mobility to engage in functional tasks.   *Mod I Adl's after setup with use of AE PRN  *Mod I toileting and clothing management   *Mod I functional mobility and transfers to/from all surfaces with Fair + dynamic balance and safety for participation in dynamic adls and iadl tasks   *Demonstrate good carryover with safe use of RW during functional tasks   *Assess DME needs   *Increase activity tolerance to 25-30 minutes for participation in adls and enjoyable activities  *Pt to participate in further cognitive testing with good attention and participation to assist with safe d/c recommendations  *Demonstrate good carryover of pt/family education and training with good tolerance for increased safety and independence with ADL's/ADl's. *Pt will improve standing balance to 4-5 minutes with functional tasks to increase I with toileting/transfers.   *Patient will demonstrate 100% carryover of energy conservation techniques t/o functional I/ADL/leisure tasks w/o cues s/p skilled education to increase endurance during functional tasks    Ann Elliott MOT, OTR/L

## 2023-10-26 NOTE — ASSESSMENT & PLAN NOTE
Patient has known history of cervical radiculopathy, C4-C6 cord with marked stenosis and compression. Follows with neurosurgery in outpatient setting, was scheduled for outpatient decompression procedure on 10/30 however presented to hospital for frequent falls. MRI thoracic spine with cord compression at C4-5, mild thoracic degenerative changes without significant stenosis. Case was discussed with neurosurgery team, given concerns of worsening myelopathy symptoms including bilateral lower extremity involuntary muscle twitching, neuropathic pain and weakness, decision was made to transfer to \[facility]\"" for earlier inpatient procedure. Neurosurgery following, patient is status post  cervical decompression and fusion on 10/25. DIANA drain management per neurosurgery. Christensen removed >> monitor on retention protocol     Continue fall precautions. Continue multimodal pain management.   Acute rehab on discharge

## 2023-10-27 LAB
ANION GAP SERPL CALCULATED.3IONS-SCNC: 5 MMOL/L
BUN SERPL-MCNC: 23 MG/DL (ref 5–25)
CALCIUM SERPL-MCNC: 8.5 MG/DL (ref 8.4–10.2)
CHLORIDE SERPL-SCNC: 96 MMOL/L (ref 96–108)
CO2 SERPL-SCNC: 30 MMOL/L (ref 21–32)
CREAT SERPL-MCNC: 0.75 MG/DL (ref 0.6–1.3)
ERYTHROCYTE [DISTWIDTH] IN BLOOD BY AUTOMATED COUNT: 12.6 % (ref 11.6–15.1)
GFR SERPL CREATININE-BSD FRML MDRD: 91 ML/MIN/1.73SQ M
GLUCOSE SERPL-MCNC: 87 MG/DL (ref 65–140)
HCT VFR BLD AUTO: 33.8 % (ref 36.5–49.3)
HGB BLD-MCNC: 10.9 G/DL (ref 12–17)
MCH RBC QN AUTO: 32 PG (ref 26.8–34.3)
MCHC RBC AUTO-ENTMCNC: 32.2 G/DL (ref 31.4–37.4)
MCV RBC AUTO: 99 FL (ref 82–98)
PLATELET # BLD AUTO: 190 THOUSANDS/UL (ref 149–390)
PMV BLD AUTO: 10.7 FL (ref 8.9–12.7)
POTASSIUM SERPL-SCNC: 4.1 MMOL/L (ref 3.5–5.3)
RBC # BLD AUTO: 3.41 MILLION/UL (ref 3.88–5.62)
SODIUM SERPL-SCNC: 131 MMOL/L (ref 135–147)
WBC # BLD AUTO: 10.62 THOUSAND/UL (ref 4.31–10.16)

## 2023-10-27 PROCEDURE — 99232 SBSQ HOSP IP/OBS MODERATE 35: CPT | Performed by: INTERNAL MEDICINE

## 2023-10-27 PROCEDURE — 99024 POSTOP FOLLOW-UP VISIT: CPT | Performed by: STUDENT IN AN ORGANIZED HEALTH CARE EDUCATION/TRAINING PROGRAM

## 2023-10-27 PROCEDURE — 85027 COMPLETE CBC AUTOMATED: CPT | Performed by: INTERNAL MEDICINE

## 2023-10-27 PROCEDURE — 80048 BASIC METABOLIC PNL TOTAL CA: CPT | Performed by: INTERNAL MEDICINE

## 2023-10-27 RX ADMIN — POLYETHYLENE GLYCOL 3350 17 G: 17 POWDER, FOR SOLUTION ORAL at 08:03

## 2023-10-27 RX ADMIN — OXYCODONE HYDROCHLORIDE 10 MG: 10 TABLET ORAL at 13:39

## 2023-10-27 RX ADMIN — OXYCODONE HYDROCHLORIDE 10 MG: 10 TABLET ORAL at 08:03

## 2023-10-27 RX ADMIN — CEPHALEXIN 500 MG: 500 CAPSULE ORAL at 05:39

## 2023-10-27 RX ADMIN — HEPARIN SODIUM 5000 UNITS: 5000 INJECTION INTRAVENOUS; SUBCUTANEOUS at 05:40

## 2023-10-27 RX ADMIN — ACETAMINOPHEN 975 MG: 325 TABLET, FILM COATED ORAL at 05:39

## 2023-10-27 RX ADMIN — ACETAMINOPHEN 975 MG: 325 TABLET, FILM COATED ORAL at 22:14

## 2023-10-27 RX ADMIN — LORATADINE 10 MG: 10 TABLET ORAL at 08:03

## 2023-10-27 RX ADMIN — CEPHALEXIN 500 MG: 500 CAPSULE ORAL at 22:17

## 2023-10-27 RX ADMIN — SENNOSIDES, DOCUSATE SODIUM 1 TABLET: 8.6; 5 TABLET ORAL at 08:03

## 2023-10-27 RX ADMIN — FOLIC ACID TAB 400 MCG 400 MCG: 400 TAB at 08:03

## 2023-10-27 RX ADMIN — POLYETHYLENE GLYCOL 3350 17 G: 17 POWDER, FOR SOLUTION ORAL at 17:06

## 2023-10-27 RX ADMIN — HEPARIN SODIUM 5000 UNITS: 5000 INJECTION INTRAVENOUS; SUBCUTANEOUS at 13:34

## 2023-10-27 RX ADMIN — TIZANIDINE 4 MG: 4 TABLET ORAL at 17:06

## 2023-10-27 RX ADMIN — ACETAMINOPHEN 975 MG: 325 TABLET, FILM COATED ORAL at 13:34

## 2023-10-27 RX ADMIN — GABAPENTIN 100 MG: 100 CAPSULE ORAL at 17:06

## 2023-10-27 RX ADMIN — HEPARIN SODIUM 5000 UNITS: 5000 INJECTION INTRAVENOUS; SUBCUTANEOUS at 22:17

## 2023-10-27 RX ADMIN — MELATONIN 3 MG: at 00:21

## 2023-10-27 RX ADMIN — CEPHALEXIN 500 MG: 500 CAPSULE ORAL at 13:34

## 2023-10-27 RX ADMIN — MELATONIN 3 MG: at 22:17

## 2023-10-27 RX ADMIN — SENNOSIDES, DOCUSATE SODIUM 1 TABLET: 8.6; 5 TABLET ORAL at 17:06

## 2023-10-27 RX ADMIN — GABAPENTIN 100 MG: 100 CAPSULE ORAL at 22:17

## 2023-10-27 RX ADMIN — OXYCODONE HYDROCHLORIDE 10 MG: 10 TABLET ORAL at 00:27

## 2023-10-27 RX ADMIN — PANTOPRAZOLE SODIUM 40 MG: 40 TABLET, DELAYED RELEASE ORAL at 05:39

## 2023-10-27 RX ADMIN — TIZANIDINE 4 MG: 4 TABLET ORAL at 08:03

## 2023-10-27 RX ADMIN — LISINOPRIL 20 MG: 20 TABLET ORAL at 08:03

## 2023-10-27 RX ADMIN — GABAPENTIN 100 MG: 100 CAPSULE ORAL at 08:03

## 2023-10-27 RX ADMIN — ATORVASTATIN CALCIUM 10 MG: 10 TABLET, FILM COATED ORAL at 08:03

## 2023-10-27 RX ADMIN — TIZANIDINE 4 MG: 4 TABLET ORAL at 22:17

## 2023-10-27 NOTE — CASE MANAGEMENT
Case Management Discharge Planning Note    Patient name Atul Leone  Location [facility] 711/[facility] 955-66 MRN [id number]  : 1950 Date 10/27/2023       Current Admission Date: 10/23/2023  Current Admission Diagnosis:Osteoarthritis of cervical spine with myelopathy   Patient Active Problem List    Diagnosis Date Noted    Paresthesia 10/25/2023    Weakness of extremity 10/25/2023    Hyponatremia 10/25/2023    Constipation 10/24/2023    Preoperative clearance 10/23/2023    Urinary retention 10/21/2023    Ambulatory dysfunction 10/20/2023    Anemia 10/20/2023    Benign essential hypertension 10/20/2023    Dorsalgia, unspecified 10/20/2023    Gastroesophageal reflux disease 10/20/2023    Hyperlipidemia 10/20/2023    Osteoarthritis of cervical spine with myelopathy 2023    Tinea corporis 2019    Onychomycosis 2019    Screen for colon cancer 2018      LOS (days): 4  Geometric Mean LOS (GMLOS) (days):   Days to GMLOS:     OBJECTIVE:  Risk of Unplanned Readmission Score: 14.7         Current admission status: Inpatient   Preferred Pharmacy:   [facility]  Phone: [phone] Fax: [phone]    Primary Care Provider: Sonia Reina MD    Primary Insurance: [facility]  Secondary Insurance:     DISCHARGE DETAILS:    Discharge planning discussed with[de-identified] Patient  Freedom of Choice: Yes  Comments - Freedom of Choice: FOC discussed     Other Referral/Resources/Interventions Provided:  Referral Comments: CM spoke with pt at bedside. Pt is not sure if he is service connected, call was made to [address] [place] [address] to verify, left message for call back. Pt then spoke to this CM and said he does have Medicare A. It is not listed on the FS so CM sent email to confirm insurance. Pt requested referral be sent to [facility]. Referral sent in Aidin. Pending insurance confirmation, bed, and transportation.

## 2023-10-27 NOTE — CASE MANAGEMENT
Case Management Assessment & Discharge Planning Note    Patient name Humaira Chapa  Location [facility] 711/[facility] 997-29 MRN [id number]  : 1950 Date 10/27/2023       Current Admission Date: 10/23/2023  Current Admission Diagnosis:Osteoarthritis of cervical spine with myelopathy   Patient Active Problem List    Diagnosis Date Noted    Paresthesia 10/25/2023    Weakness of extremity 10/25/2023    Hyponatremia 10/25/2023    Constipation 10/24/2023    Preoperative clearance 10/23/2023    Urinary retention 10/21/2023    Ambulatory dysfunction 10/20/2023    Anemia 10/20/2023    Benign essential hypertension 10/20/2023    Dorsalgia, unspecified 10/20/2023    Gastroesophageal reflux disease 10/20/2023    Hyperlipidemia 10/20/2023    Osteoarthritis of cervical spine with myelopathy 2023    Tinea corporis 2019    Onychomycosis 2019    Screen for colon cancer 2018      LOS (days): 4  Geometric Mean LOS (GMLOS) (days):   Days to GMLOS:     OBJECTIVE:    Risk of Unplanned Readmission Score: 16.87         Current admission status: Inpatient       Preferred Pharmacy:   [address] [place] [address]  Phone: [phone] Fax: [phone]    Primary Care Provider: Jasper Johns MD    Primary Insurance: [facility]  Secondary Insurance:     ASSESSMENT:  Active Health Care Proxies    There are no active Health Care Proxies on file. Patient Information  Mental Status: Alert         DISCHARGE DETAILS:       Other Referral/Resources/Interventions Provided:  Referral Comments: See Open from 10/22/23, all is up to date, no changes. Choice is [facility], reserved in Aidin. Pending medical clearance.

## 2023-10-27 NOTE — ASSESSMENT & PLAN NOTE
Suspect SIADH. Note hydrochlorothiazide use and pain.   Uric acid normal, urine sodium 46, urine osm 735  129>> 132>> 131  Hold HCTZ  Trend BMP  Fluid Restriction

## 2023-10-27 NOTE — PROGRESS NOTES
[facility]  Progress Note  Name: Elvia Martinez  MRN: [id number]  Unit/Bed#: [facility] 547-21 I Date of Admission: 10/23/2023   Date of Service: 10/27/2023 I Hospital Day: 4    Assessment/Plan   * Osteoarthritis of cervical spine with myelopathy  Assessment & Plan  Patient has known history of cervical radiculopathy, C4-C6 cord with marked stenosis and compression. Follows with neurosurgery in outpatient setting, was scheduled for outpatient decompression procedure on 10/30 however presented to hospital for frequent falls. MRI thoracic spine with cord compression at C4-5, mild thoracic degenerative changes without significant stenosis. Case was discussed with neurosurgery team, given concerns of worsening myelopathy symptoms including bilateral lower extremity involuntary muscle twitching, neuropathic pain and weakness, decision was made to transfer to [facility] for earlier inpatient procedure. Neurosurgery following, patient is status post  cervical decompression and fusion on 10/25. DIANA drain removed on 10/27 by neurosurgery. Fountain removed >> monitor on retention protocol     Continue fall precautions. Continue multimodal pain management. Acute rehab on discharge, cm following, dc planning once he has a BM. Hyponatremia  Assessment & Plan  Suspect SIADH. Note hydrochlorothiazide use and pain. Uric acid normal, urine sodium 46, urine osm 735  129>> 132>> 131  Hold HCTZ  Trend BMP  Fluid Restriction    Constipation  Assessment & Plan  Bowel regimen including senna, colace and miralax  Prn suppository    Urinary retention  Assessment & Plan  Patient noted to have urinary retention, needing straight cath at [employer]. Continue retention protocol. Was with fountain, removed on 10/26>> monitor on retention protcol    Hyperlipidemia  Assessment & Plan  Patient is on atorvastatin 10 mg at home, continue same.     Gastroesophageal reflux disease  Assessment & Plan  Patient is on omeprazole at home, continue alternative equivalent pantoprazole while inpatient. Benign essential hypertension  Assessment & Plan  Blood pressure acceptable  on lisinopril 20 mg with holding parameters  HCTZ discontinued given hyponatremia. Ambulatory dysfunction  Assessment & Plan  Patient presented to [address] [place] [address] for multiple falls at home. CT spine and CT head negative for any acute changes. Status post neurosurgery intervention on 10.25  Continue fall precautions. PT/OT eval.>> acute rehab on dc  Conservative management for symptom relief. VTE Pharmacologic Prophylaxis:   Pharmacologic: Heparin  Mechanical VTE Prophylaxis in Place: Yes    Patient Centered Rounds: I have performed bedside rounds with nursing staff today. Discussions with Specialists or Other Care Team Provider: neurosurgery, cm    Education and Discussions with Family / Patient: plan of care, patient. He declined to call family for updates. Time Spent for Care: 30 minutes. More than 50% of total time spent on counseling and coordination of care as described above. Current Length of Stay: 4 day(s)    Current Patient Status: Inpatient   Certification Statement: The patient will continue to require additional inpatient hospital stay due to pending BM and rehab placement    Discharge Plan: pending BM and rehab placement    Code Status: Level 1 - Full Code      Subjective:   Pain is controlled. Drain has been removed. Voiding well per patient. Still no BM, but is passing lot of gas. Objective:     Vitals:   Temp (24hrs), Av.8 °F (36.6 °C), Min:97.5 °F (36.4 °C), Max:98.1 °F (36.7 °C)    Temp:  [97.5 °F (36.4 °C)-98.1 °F (36.7 °C)] 97.5 °F (36.4 °C)  HR:  [] 102  Resp:  [19] 19  BP: (100-114)/(55-63) 114/63  SpO2:  [95 %-97 %] 97 %  Body mass index is 36.18 kg/m². Input and Output Summary (last 24 hours):        Intake/Output Summary (Last 24 hours) at 10/27/2023 1326  Last data filed at 10/27/2023 0800  Gross per 24 hour   Intake 580 ml   Output 822.5 ml   Net -242.5 ml       Physical Exam:     Physical Exam  Constitutional:       General: He is not in acute distress. Cardiovascular:      Rate and Rhythm: Normal rate and regular rhythm. Heart sounds: Normal heart sounds. No murmur heard. Pulmonary:      Effort: No respiratory distress. Breath sounds: Normal breath sounds. No wheezing or rales. Abdominal:      General: Bowel sounds are normal. There is no distension. Palpations: Abdomen is soft. Tenderness: There is no abdominal tenderness. Skin:     General: Skin is warm. Neurological:      Mental Status: He is alert.       Comments: Awake alert communicative  Moves all extremities      Additional Data:     Labs:    Results from last 7 days   Lab Units 10/27/23  0538   WBC Thousand/uL 10.62*   HEMOGLOBIN g/dL 10.9*   HEMATOCRIT % 33.8*   PLATELETS Thousands/uL 190     Results from last 7 days   Lab Units 10/27/23  0538   SODIUM mmol/L 131*   POTASSIUM mmol/L 4.1   CHLORIDE mmol/L 96   CO2 mmol/L 30   BUN mg/dL 23   CREATININE mg/dL 0.75   ANION GAP mmol/L 5   CALCIUM mg/dL 8.5   GLUCOSE RANDOM mg/dL 87     Results from last 7 days   Lab Units 10/25/23  0500   INR  0.96     Results from last 7 days   Lab Units 10/25/23  1621 10/25/23  0708 10/24/23  2127   POC GLUCOSE mg/dl 154* 109 82                   Recent Cultures (last 7 days):           Last 24 Hours Medication List:   Current Facility-Administered Medications   Medication Dose Route Frequency Provider Last Rate    acetaminophen  975 mg Oral Q8H [address] Charu Hodgson PA-C      albuterol  2 puff Inhalation Q6H PRN Robb Dominik Henry PA-C      aluminum-magnesium hydroxide-simethicone  30 mL Oral Q6H PRN Charu Hodgson PA-C      atorvastatin  10 mg Oral Daily Kadie Henry PA-C      bisacodyl  10 mg Rectal Daily PRN Charu Hodgson PA-C      cephalexin  500 mg Oral Q8H [address] Kadie Henry PA-C      fluticasone  2 spray Each Nare Daily PRN Myranda Orozco PA-C      folic acid  515 mcg Oral Daily Sukhdev Henry PA-C      gabapentin  100 mg Oral TID Myranda Orozco PA-C      heparin (porcine)  5,000 Units Subcutaneous [facility] Sukhdev Henry PA-C      HYDROmorphone  0.2 mg Intravenous Q1H PRN Myranda Orozco PA-C      lidocaine  1 patch Topical Daily Sukhdev [place] ZION Henry      lisinopril  20 mg Oral Daily Sukhdev [place] ZION Henry      loratadine  10 mg Oral Daily Kadie Henry PA-C      melatonin  3 mg Oral HS Kadie Henry PA-C      ondansetron  4 mg Intravenous Q6H PRN Myranda Orozco PA-C      oxyCODONE  5 mg Oral Q4H PRN Myranda Orozco PA-C      Or    oxyCODONE  10 mg Oral Q4H PRN Myranda Orozco PA-C      pantoprazole  40 mg Oral Daily Kadie Henry PA-C      polyethylene glycol  17 g Oral BID Myranda Orozco PA-C      senna-docusate sodium  1 tablet Oral BID Myranda Orozco PA-C      tiZANidine  4 mg Oral TID Myranda Orozco PA-C          Today, Patient Was Seen By: Amy Nj MD    ** Please Note: Dictation voice to text software may have been used in the creation of this document.  **

## 2023-10-27 NOTE — ASSESSMENT & PLAN NOTE
Patient presented to [address] [place] [address] for multiple falls at home. CT spine and CT head negative for any acute changes. Status post neurosurgery intervention on 10.25  Continue fall precautions. PT/OT eval.>> acute rehab on dc  Conservative management for symptom relief.

## 2023-10-27 NOTE — ASSESSMENT & PLAN NOTE
POD 2 C3-6 PCDF (BK, 10/25)  C4-C6 severe central cord compression/stenosis  Follows with Dr. Nabor Loving and was scheduled for posterior cervical decompression 10/30. Unfortunately sustained 2 falls at home (lives by himself) and presented to [address] [facility] ED for evaluation on 10/20. Transferred to [address] [place] [address] on 10/23 for surgical evaluation. On exam left upper extremity weakness 4/5, left lower extremity weakness +4/5, bilateral Dinora's, ataxia. Paresthesias to bilateral hands and legs, L > R significantly improving since surgery. Imaging:  Postoperative cervical x-rays, 10/26/2023: read pending. Per my review, stable appearance C3-6 posterior fusion hardware. Plan:  Continue to monitor neurologic exam closely. Continue to hold aspirin x 2 weeks postoperatively. No bracing needed. DIANA drain discontinued 10/27. Continue to mobilize as tolerated with PT/OT - recommending acute rehab. Multimodal pain regimen. DVT prophylaxis: SCDs, Lovenox. Neurosurgery will sign off. Follow up as scheduled for 2 and 6 week postoperative visits. Call with questions.

## 2023-10-27 NOTE — ASSESSMENT & PLAN NOTE
Patient has known history of cervical radiculopathy, C4-C6 cord with marked stenosis and compression. Follows with neurosurgery in outpatient setting, was scheduled for outpatient decompression procedure on 10/30 however presented to hospital for frequent falls. MRI thoracic spine with cord compression at C4-5, mild thoracic degenerative changes without significant stenosis. Case was discussed with neurosurgery team, given concerns of worsening myelopathy symptoms including bilateral lower extremity involuntary muscle twitching, neuropathic pain and weakness, decision was made to transfer to [facility] for earlier inpatient procedure. Neurosurgery following, patient is status post  cervical decompression and fusion on 10/25. DIANA drain removed on 10/27 by neurosurgery. Christensen removed >> monitor on retention protocol     Continue fall precautions. Continue multimodal pain management. Acute rehab on discharge, cm following, dc planning once he has a BM.

## 2023-10-27 NOTE — [facility] ADMISSION
Referral received for consideration of patient for Inpatient Acute Rehab, will review patients case and continue to follow patients functional progress until a determination can be made.

## 2023-10-27 NOTE — PROGRESS NOTES
[address] [facility]  Progress Note  Name: Berto Wade  MRN: [id number]  Unit/Bed#: [facility] 319-51 I Date of Admission: 10/23/2023   Date of Service: 10/27/2023 I Hospital Day: 4    Assessment/Plan   Preoperative clearance  Assessment & Plan  Previously on baby aspirin daily, last dose 10/19  Per primary team patient is class II risk on RCRI for medium risk procedure. No previous reactions to anesthesia. Denies any shortness of breath. Ambulatory dysfunction  Assessment & Plan  See above. * Osteoarthritis of cervical spine with myelopathy  Assessment & Plan  POD 2 C3-6 PCDF (BK, 10/25)  C4-C6 severe central cord compression/stenosis  Follows with Dr. Smitha Osborne and was scheduled for posterior cervical decompression 10/30. Unfortunately sustained 2 falls at home (lives by himself) and presented to [address] [place] [address] ED for evaluation on 10/20. Transferred to [facility] on 10/23 for surgical evaluation. On exam left upper extremity weakness 4/5, left lower extremity weakness +4/5, bilateral Dinora's, ataxia. Paresthesias to bilateral hands and legs, L > R significantly improving since surgery. Imaging:  Postoperative cervical x-rays, 10/26/2023: read pending. Per my review, stable appearance C3-6 posterior fusion hardware. Plan:  Continue to monitor neurologic exam closely. Continue to hold aspirin x 2 weeks postoperatively. No bracing needed. DIANA drain discontinued 10/27. Continue to mobilize as tolerated with PT/OT - recommending acute rehab. Multimodal pain regimen. DVT prophylaxis: SCDs, Lovenox. Neurosurgery will sign off. Follow up as scheduled for 2 and 6 week postoperative visits. Call with questions. Subjective/Objective   Chief Complaint: I am doing okay. Subjective: Patient relates that numbness and tingling in hands and legs has gotten significantly better since his surgery.   Continues to endorse some incisional pain but states that it is well controlled with medications. He has been ambulating with his walker and with physical therapy. Objective: NAD. Sitting in recliner. Posterior cervical incision clean dry and intact. DIANA drain discontinued. No dressing needed. I/O         10/25 0701  10/26 0700 10/26 0701  10/27 0700 10/27 0701  10/28 0700    P. O. 60 1060 240    I.V. (mL/kg) 2874.1 (26.1)      IV Piggyback 300      Total Intake(mL/kg) 3234.1 (29.4) 1060 (9.6) 240 (2.2)    Urine (mL/kg/hr) 4350 (1.6) 740 (0.3)     Drains 175 82.5     Total Output 4525 822.5     Net -1290.9 +237.5 +240                   Invasive Devices       Peripheral Intravenous Line  Duration             Peripheral IV 10/24/23 Distal;Right;Ventral (anterior) Forearm 3 days    Peripheral IV 10/25/23 Right Hand 1 day              Drain  Duration             Closed/Suction Drain Right Back Bulb 7 Fr. 1 day                    Physical Exam:  Vitals: Blood pressure 114/63, pulse 102, temperature 97.5 °F (36.4 °C), resp. rate 19, height 5' 9" (1.753 m), weight 111 kg (245 lb), SpO2 97 %. ,Body mass index is 36.18 kg/m². General appearance: alert, appears stated age, cooperative and no distress  Head: Normocephalic, without obvious abnormality, atraumatic  Eyes: EOMI, PERRL  Neck: Posterior cervical incision clean dry and intact  Back: no kyphosis present, no tenderness to percussion or palpation  Lungs: non labored breathing  Heart: regular heart rate  Neurologic:   Mental status: Alert, oriented x3, thought content appropriate  Cranial nerves: grossly intact (Cranial nerves II-XII)  Sensory: normal to LT  Motor: Left delt/tri/biceps 4/5, wrist/IO 3/5, bilateral LE 4/5.  RUE 5/5  Reflexes: bilateral Bonilla's        Lab Results:  Results from last 7 days   Lab Units 10/27/23  0538 10/26/23  0459 10/24/23  0514   WBC Thousand/uL 10.62* 9.45 6.81   HEMOGLOBIN g/dL 10.9* 11.0* 13.3   HEMATOCRIT % 33.8* 33.9* 40.0   PLATELETS Thousands/uL 190 207 222 Results from last 7 days   Lab Units 10/27/23  0538 10/26/23  0459 10/24/23  0514   SODIUM mmol/L 131* 132* 129*   POTASSIUM mmol/L 4.1 4.5 3.9   CHLORIDE mmol/L 96 98 91*   CO2 mmol/L 30 31 32   BUN mg/dL 23 18 19   CREATININE mg/dL 0.75 0.84 0.90   CALCIUM mg/dL 8.5 8.3* 9.0             Results from last 7 days   Lab Units 10/25/23  0500   INR  0.96   PTT seconds 31     No results found for: "TROPONINT"  ABG:No results found for: "PHART", "XDD6ZIY", "PO2ART", "NSV1SDY", "C6FNOOHJ", "BEART", "SOURCE"    Imaging Studies: I have personally reviewed pertinent reports. and I have personally reviewed pertinent films in PACS    XR spine cervical 2 or 3 vw injury    Result Date: 10/25/2023  Impression: Fluoroscopic guidance provided for surgical procedure. Please refer to the separate procedure notes for additional details. Localization procedure was performed, with the OR notified of the level at approximately 1:50 p.m. on  10/25/2023 via telephone conversation with the OR x-ray technologist . Workstation performed: [id number]       EKG, Pathology, and Other Studies: I have personally reviewed pertinent reports.       VTE Pharmacologic Prophylaxis: Sequential compression device (Venodyne)  and Heparin    VTE Mechanical Prophylaxis: sequential compression device

## 2023-10-27 NOTE — RESTORATIVE TECHNICIAN NOTE
Restorative Technician Note      Patient Name: Nathaniel Whitten     Note Type: Mobility  Patient Position Upon Consult: Supine  Activity Performed: Ambulated; ODFHJBN; Stood  Assistive Device: Roller walker  Education Provided: Yes  Patient Position at [employer] of Consult: Bedside chair;  All needs within reach; Bed/Chair alarm activated    Sia PUENTES, Restorative Technician, [employer]

## 2023-10-27 NOTE — APP STUDENT NOTE
Osteoarthritis of cervical spine with myelopathy    POD 2 from C3-C6 PCDF   Continue pain management   Continue fall precautions   Drain was removed by neurosurgery this AM   Continue incentive spirometry 10x Q1H     Hyponatremia   Most recent value 131   Likely SIADH and secondary to HCTZ   HCTZ held   Monitor bmp    Constipation    Continue bowel regimen senna, colace, miralax   Prn suppository    Urinary retention    Patient had a straight cath before bed   Patient was able to urinate this AM   Continue to monitor PVR    Hyperlipidemia    Continue atorvastatin 10mg    Gastroesophageal reflux disease    Continue pantoprazole 40mg PO daily    Benign essential hypertension    Patients BP in normal range   Continue Lisinopril for SBP>110    Ambulatory dysfunction    POD 2 C3-C6 PCDF   Patient reports tingling has decreased from baseline in legs and hands     VTE Pharmacologic Prophylaxis: VTE Score: 3  Heparin    Patient Centered Rounds: I performed bedside rounds with nursing staff today. Discussions with Specialists or Other Care Team Provider: Neurosurgery    Education and Discussions with Family / Patient:   . Total Time Spent on Date of Encounter in care of patient: 30 mins. This time was spent on one or more of the following: performing physical exam; counseling and coordination of care; obtaining or reviewing history; documenting in the medical record; reviewing/ordering tests, medications or procedures; communicating with other healthcare professionals and discussing with patient's family/caregivers. Current Length of Stay: 4 day(s)  Current Patient Status: Inpatient   Certification Statement: The patient will continue to require additional inpatient hospital stay due to clearance from neurosurgery. Discharge Plan: Anticipate discharge in 48 hrs to rehab facility. Code Status: Level 1 - Full Code    Subjective:   Patient feels well overall. His pain is improving and remaining well-controlled. Patient was able to urinate on his own this morning. Patient has not had a bowel movement yet. He states he feels like he's doing well with PT/OT. He is tolerating eating and drinking. He denies fever, chills, dizziness, CP, palpitations, SOB, cough. Objective:     Vitals:   Temp (24hrs), Av.8 °F (36.6 °C), Min:97.5 °F (36.4 °C), Max:98.1 °F (36.7 °C)    Temp:  [97.5 °F (36.4 °C)-98.1 °F (36.7 °C)] 97.5 °F (36.4 °C)  HR:  [] 102  Resp:  [19] 19  BP: ()/(51-63) 114/63  SpO2:  [94 %-97 %] 97 %  Body mass index is 35.91 kg/m². Input and Output Summary (last 24 hours): Intake/Output Summary (Last 24 hours) at 10/27/2023 0832  Last data filed at 10/27/2023 0530  Gross per 24 hour   Intake 1060 ml   Output 822.5 ml   Net 237.5 ml       Physical Exam:   Physical Exam  Constitutional:       Appearance: Normal appearance. HENT:      Mouth/Throat:      Mouth: Mucous membranes are moist.   Cardiovascular:      Rate and Rhythm: Normal rate and regular rhythm. Pulses: Normal pulses. Heart sounds: Normal heart sounds. Pulmonary:      Effort: Pulmonary effort is normal.      Breath sounds: Normal breath sounds. Abdominal:      General: Bowel sounds are normal.      Palpations: Abdomen is soft. Musculoskeletal:         General: Normal range of motion. Cervical back: Normal range of motion and neck supple. Comments: Strength 5/5 B/L LE   Skin:     General: Skin is warm. Neurological:      Mental Status: He is alert and oriented to person, place, and time.       Comments: Sensation intact on B/L LE           Additional Data:     Labs:  Results from last 7 days   Lab Units 10/27/23  0538   WBC Thousand/uL 10.62*   HEMOGLOBIN g/dL 10.9*   HEMATOCRIT % 33.8*   PLATELETS Thousands/uL 190     Results from last 7 days   Lab Units 10/26/23  0459   SODIUM mmol/L 132*   POTASSIUM mmol/L 4.5   CHLORIDE mmol/L 98   CO2 mmol/L 31   BUN mg/dL 18   CREATININE mg/dL 0.84   ANION GAP mmol/L 3 CALCIUM mg/dL 8.3*   GLUCOSE RANDOM mg/dL 120     Results from last 7 days   Lab Units 10/25/23  0500   INR  0.96     Results from last 7 days   Lab Units 10/25/23  1621 10/25/23  0708 10/24/23  2127   POC GLUCOSE mg/dl 154* 109 82               Lines/Drains:  Invasive Devices       Peripheral Intravenous Line  Duration             Peripheral IV 10/24/23 Distal;Right;Ventral (anterior) Forearm 3 days    Peripheral IV 10/25/23 Right Hand 1 day              Drain  Duration             Closed/Suction Drain Right Back Bulb 7 Fr. 1 day                          Imaging: Reviewed radiology reports from this admission including: xray(s)    Recent Cultures (last 7 days):         Last 24 Hours Medication List:   Current Facility-Administered Medications   Medication Dose Route Frequency Provider Last Rate    acetaminophen  975 mg Oral Q8H [address] [facility] ZION Saleh      albuterol  2 puff Inhalation Q6H PRN [facility] JanZION      aluminum-magnesium hydroxide-simethicone  30 mL Oral Q6H PRN [facility] JanZION      atorvastatin  10 mg Oral Daily Kadie Henry PA-C      bisacodyl  10 mg Rectal Daily PRN [facility] JanZION      cephalexin  500 mg Oral Q8H [address] [facility] Kadie Henry PA-C      fluticasone  2 spray Each Nare Daily PRN [facility] JanZION      folic acid  158 mcg Oral Daily [facility] ZION Henry      gabapentin  100 mg Oral TID [facility] JanZION      heparin (porcine)  5,000 Units Subcutaneous Q8H [address] [facility] ZION Saleh      HYDROmorphone  0.2 mg Intravenous Q1H PRN [facility] JanZION      lidocaine  1 patch Topical Daily Kadie Henry PA-C      lisinopril  20 mg Oral Daily Kadie Henry PA-C      loratadine  10 mg Oral Daily Kadie Henry PA-C      melatonin  3 mg Oral HS Kadie Henry PA-C      ondansetron  4 mg Intravenous Q6H PRN [facility] JanZION      oxyCODONE  5 mg Oral Q4H PRN [facility] JanZION      Or    oxyCODONE  10 mg Oral Q4H PRN [facility] JanZION      pantoprazole  40 mg Oral Daily [facility] Jan ZION      polyethylene glycol  17 g Oral BID Valetta ParisZION      senna-docusate sodium  1 tablet Oral BID Kati Henry PA-C      tiZANidine  4 mg Oral TID Valetta Paris, PA-C          Today, Patient Was Seen By: Phu WATSON    **Please Note: This note may have been constructed using a voice recognition system. **

## 2023-10-27 NOTE — ASSESSMENT & PLAN NOTE
Patient noted to have urinary retention, [facility]. Continue retention protocol.   Was with fountain, removed on 10/26>> monitor on retention protcol

## 2023-10-27 NOTE — PROGRESS NOTES
Patient:    MRN:  [id number]    Coral Request ID:  [id number]    Level of care reserved:  [address]    Partner Reserved:  [id number] [facility] [place] ([place]), [address] [facility] [phone]    Clinical needs requested:    Geography searched:  10 miles around [id number]    Start of Service:    Request sent:  9:12am EDT on 10/22/2023 by Valerie Todd    Partner reserved:  10:15am EDT on 10/27/2023 by Urszula Topete    Choice list shared:  10:15am EDT on 10/27/2023 by Urszula Topete

## 2023-10-28 LAB
ANION GAP SERPL CALCULATED.3IONS-SCNC: 5 MMOL/L
BASOPHILS # BLD AUTO: 0.03 THOUSANDS/ÂΜL (ref 0–0.1)
BASOPHILS NFR BLD AUTO: 0 % (ref 0–1)
BUN SERPL-MCNC: 28 MG/DL (ref 5–25)
CALCIUM SERPL-MCNC: 8.2 MG/DL (ref 8.4–10.2)
CHLORIDE SERPL-SCNC: 95 MMOL/L (ref 96–108)
CO2 SERPL-SCNC: 30 MMOL/L (ref 21–32)
CORTIS SERPL-MCNC: 19.4 UG/DL
CORTIS SERPL-MCNC: 24.3 UG/DL
CORTIS SERPL-MCNC: 6.1 UG/DL
CREAT SERPL-MCNC: 1.01 MG/DL (ref 0.6–1.3)
EOSINOPHIL # BLD AUTO: 0.1 THOUSAND/ÂΜL (ref 0–0.61)
EOSINOPHIL NFR BLD AUTO: 1 % (ref 0–6)
ERYTHROCYTE [DISTWIDTH] IN BLOOD BY AUTOMATED COUNT: 12.7 % (ref 11.6–15.1)
GFR SERPL CREATININE-BSD FRML MDRD: 73 ML/MIN/1.73SQ M
GLUCOSE SERPL-MCNC: 99 MG/DL (ref 65–140)
HCT VFR BLD AUTO: 30.6 % (ref 36.5–49.3)
HGB BLD-MCNC: 10 G/DL (ref 12–17)
IMM GRANULOCYTES # BLD AUTO: 0.04 THOUSAND/UL (ref 0–0.2)
IMM GRANULOCYTES NFR BLD AUTO: 1 % (ref 0–2)
LYMPHOCYTES # BLD AUTO: 1.01 THOUSANDS/ÂΜL (ref 0.6–4.47)
LYMPHOCYTES NFR BLD AUTO: 14 % (ref 14–44)
MCH RBC QN AUTO: 33.1 PG (ref 26.8–34.3)
MCHC RBC AUTO-ENTMCNC: 32.7 G/DL (ref 31.4–37.4)
MCV RBC AUTO: 101 FL (ref 82–98)
MONOCYTES # BLD AUTO: 0.77 THOUSAND/ÂΜL (ref 0.17–1.22)
MONOCYTES NFR BLD AUTO: 11 % (ref 4–12)
NEUTROPHILS # BLD AUTO: 5.41 THOUSANDS/ÂΜL (ref 1.85–7.62)
NEUTS SEG NFR BLD AUTO: 73 % (ref 43–75)
NRBC BLD AUTO-RTO: 0 /100 WBCS
OSMOLALITY UR/SERPL-RTO: 278 MMOL/KG (ref 282–298)
PLATELET # BLD AUTO: 195 THOUSANDS/UL (ref 149–390)
PMV BLD AUTO: 10.2 FL (ref 8.9–12.7)
POTASSIUM SERPL-SCNC: 4.1 MMOL/L (ref 3.5–5.3)
RBC # BLD AUTO: 3.02 MILLION/UL (ref 3.88–5.62)
SODIUM SERPL-SCNC: 130 MMOL/L (ref 135–147)
WBC # BLD AUTO: 7.36 THOUSAND/UL (ref 4.31–10.16)

## 2023-10-28 PROCEDURE — 85025 COMPLETE CBC W/AUTO DIFF WBC: CPT | Performed by: INTERNAL MEDICINE

## 2023-10-28 PROCEDURE — 99232 SBSQ HOSP IP/OBS MODERATE 35: CPT | Performed by: INTERNAL MEDICINE

## 2023-10-28 PROCEDURE — 80048 BASIC METABOLIC PNL TOTAL CA: CPT | Performed by: INTERNAL MEDICINE

## 2023-10-28 PROCEDURE — 82533 TOTAL CORTISOL: CPT | Performed by: INTERNAL MEDICINE

## 2023-10-28 PROCEDURE — 82533 TOTAL CORTISOL: CPT | Performed by: STUDENT IN AN ORGANIZED HEALTH CARE EDUCATION/TRAINING PROGRAM

## 2023-10-28 PROCEDURE — 83930 ASSAY OF BLOOD OSMOLALITY: CPT | Performed by: INTERNAL MEDICINE

## 2023-10-28 RX ORDER — COSYNTROPIN 0.25 MG/ML
0.25 INJECTION, POWDER, FOR SOLUTION INTRAMUSCULAR; INTRAVENOUS ONCE
Status: COMPLETED | OUTPATIENT
Start: 2023-10-28 | End: 2023-10-28

## 2023-10-28 RX ORDER — SODIUM CHLORIDE 1 G/1
1 TABLET ORAL 2 TIMES DAILY WITH MEALS
Status: DISCONTINUED | OUTPATIENT
Start: 2023-10-28 | End: 2023-10-28

## 2023-10-28 RX ORDER — LACTULOSE 20 G/30ML
30 SOLUTION ORAL ONCE
Status: COMPLETED | OUTPATIENT
Start: 2023-10-28 | End: 2023-10-28

## 2023-10-28 RX ORDER — LISINOPRIL 10 MG/1
10 TABLET ORAL DAILY
Status: DISCONTINUED | OUTPATIENT
Start: 2023-10-29 | End: 2023-11-01 | Stop reason: HOSPADM

## 2023-10-28 RX ORDER — SODIUM CHLORIDE 1 G/1
1 TABLET ORAL
Status: DISCONTINUED | OUTPATIENT
Start: 2023-10-28 | End: 2023-10-29

## 2023-10-28 RX ADMIN — ATORVASTATIN CALCIUM 10 MG: 10 TABLET, FILM COATED ORAL at 08:43

## 2023-10-28 RX ADMIN — CEPHALEXIN 500 MG: 500 CAPSULE ORAL at 13:19

## 2023-10-28 RX ADMIN — TIZANIDINE 4 MG: 4 TABLET ORAL at 16:34

## 2023-10-28 RX ADMIN — SODIUM CHLORIDE 1 G: 1 TABLET ORAL at 16:34

## 2023-10-28 RX ADMIN — HEPARIN SODIUM 5000 UNITS: 5000 INJECTION INTRAVENOUS; SUBCUTANEOUS at 06:49

## 2023-10-28 RX ADMIN — POLYETHYLENE GLYCOL 3350 17 G: 17 POWDER, FOR SOLUTION ORAL at 18:12

## 2023-10-28 RX ADMIN — POLYETHYLENE GLYCOL 3350 17 G: 17 POWDER, FOR SOLUTION ORAL at 08:43

## 2023-10-28 RX ADMIN — HEPARIN SODIUM 5000 UNITS: 5000 INJECTION INTRAVENOUS; SUBCUTANEOUS at 13:19

## 2023-10-28 RX ADMIN — SENNOSIDES, DOCUSATE SODIUM 1 TABLET: 8.6; 5 TABLET ORAL at 08:43

## 2023-10-28 RX ADMIN — ACETAMINOPHEN 975 MG: 325 TABLET, FILM COATED ORAL at 06:49

## 2023-10-28 RX ADMIN — TIZANIDINE 4 MG: 4 TABLET ORAL at 21:06

## 2023-10-28 RX ADMIN — GABAPENTIN 100 MG: 100 CAPSULE ORAL at 16:34

## 2023-10-28 RX ADMIN — GABAPENTIN 100 MG: 100 CAPSULE ORAL at 21:06

## 2023-10-28 RX ADMIN — MELATONIN 3 MG: at 21:06

## 2023-10-28 RX ADMIN — LORATADINE 10 MG: 10 TABLET ORAL at 08:44

## 2023-10-28 RX ADMIN — OXYCODONE HYDROCHLORIDE 10 MG: 10 TABLET ORAL at 22:31

## 2023-10-28 RX ADMIN — ACETAMINOPHEN 975 MG: 325 TABLET, FILM COATED ORAL at 21:06

## 2023-10-28 RX ADMIN — OXYCODONE HYDROCHLORIDE 10 MG: 10 TABLET ORAL at 18:13

## 2023-10-28 RX ADMIN — FOLIC ACID TAB 400 MCG 400 MCG: 400 TAB at 08:44

## 2023-10-28 RX ADMIN — CEPHALEXIN 500 MG: 500 CAPSULE ORAL at 06:49

## 2023-10-28 RX ADMIN — ACETAMINOPHEN 975 MG: 325 TABLET, FILM COATED ORAL at 13:19

## 2023-10-28 RX ADMIN — HEPARIN SODIUM 5000 UNITS: 5000 INJECTION INTRAVENOUS; SUBCUTANEOUS at 21:06

## 2023-10-28 RX ADMIN — COSYNTROPIN 0.25 MG: 0.25 INJECTION, POWDER, LYOPHILIZED, FOR SOLUTION INTRAVENOUS at 19:57

## 2023-10-28 RX ADMIN — SENNOSIDES, DOCUSATE SODIUM 1 TABLET: 8.6; 5 TABLET ORAL at 18:13

## 2023-10-28 RX ADMIN — CEPHALEXIN 500 MG: 500 CAPSULE ORAL at 21:06

## 2023-10-28 RX ADMIN — GABAPENTIN 100 MG: 100 CAPSULE ORAL at 08:43

## 2023-10-28 RX ADMIN — PANTOPRAZOLE SODIUM 40 MG: 40 TABLET, DELAYED RELEASE ORAL at 06:49

## 2023-10-28 RX ADMIN — OXYCODONE HYDROCHLORIDE 10 MG: 10 TABLET ORAL at 08:43

## 2023-10-28 RX ADMIN — OXYCODONE HYDROCHLORIDE 5 MG: 5 TABLET ORAL at 13:19

## 2023-10-28 RX ADMIN — LACTULOSE 30 G: 20 SOLUTION ORAL at 12:13

## 2023-10-28 RX ADMIN — TIZANIDINE 4 MG: 4 TABLET ORAL at 08:44

## 2023-10-28 NOTE — ASSESSMENT & PLAN NOTE
Blood pressure on the lower side  on lisinopril 20 mg with holding parameters, decrease dose  HCTZ discontinued given hyponatremia.

## 2023-10-28 NOTE — ASSESSMENT & PLAN NOTE
Patient has known history of cervical radiculopathy, C4-C6 cord with marked stenosis and compression. Follows with neurosurgery in outpatient setting, was scheduled for outpatient decompression procedure on 10/30 however presented to hospital for frequent falls. MRI thoracic spine with cord compression at C4-5, mild thoracic degenerative changes without significant stenosis. Case was discussed with neurosurgery team, given concerns of worsening myelopathy symptoms including bilateral lower extremity involuntary muscle twitching, neuropathic pain and weakness, decision was made to [facility]. Neurosurgery following, patient is status post  cervical decompression and fusion on 10/25. DIANA drain removed on 10/27 by neurosurgery. Christensen removed >> monitor on retention protocol     Continue fall precautions. Continue multimodal pain management. Acute rehab on discharge, cm following, dc planning once he has a BM. Likely dc plan to [facility].

## 2023-10-28 NOTE — ASSESSMENT & PLAN NOTE
Bowel regimen including senna, colace and miralax  Prn suppository  Will add lactulose today as patient does not want to try suppository yet and he is passing gas   Might need enema

## 2023-10-28 NOTE — [facility] ADMISSION
Reviewed patient's case with [facility] physician - patient is approved for [facility] pending medical stability, functional progress and bed availability. PM&R consult has also been requested. CM has been updated. Will continue to follow patient's case at this time.

## 2023-10-28 NOTE — ASSESSMENT & PLAN NOTE
Patient presented to [address] [place] [address] for multiple falls at home. CT spine and CT head negative for any acute changes. Status post neurosurgery intervention on 10.25  Continue fall precautions.   PT/OT eval.>> acute rehab on dc

## 2023-10-28 NOTE — ASSESSMENT & PLAN NOTE
Suspect SIADH. Note hydrochlorothiazide use and pain.   Uric acid normal, urine sodium 46, urine osm 735, serum osm 278  129>> 132>> 131>>130  Hold HCTZ  Trend BMP  Fluid Restriction  Added salt tabs  Cortisol 6.2>> given low BP and hypoNa, will obtain cosyntropin stim test.

## 2023-10-28 NOTE — PROGRESS NOTES
[address] [facility]  Progress Note  Name: Wu Mares  MRN: [id number]  [facility] Date of Admission: 10/23/2023   Date of Service: 10/28/2023 I Hospital Day: 5    Assessment/Plan   * Osteoarthritis of cervical spine with myelopathy  Assessment & Plan  Patient has known history of cervical radiculopathy, C4-C6 cord with marked stenosis and compression. Follows with neurosurgery in outpatient setting, was scheduled for outpatient decompression procedure on 10/30 however presented to hospital for frequent falls. MRI thoracic spine with cord compression at C4-5, mild thoracic degenerative changes without significant stenosis. Case was discussed with neurosurgery team, given concerns of worsening myelopathy symptoms including bilateral lower extremity involuntary muscle twitching, neuropathic pain and weakness, decision was made to transfer to [facility] for earlier inpatient procedure. Neurosurgery following, patient is status post  cervical decompression and fusion on 10/25. DIANA drain removed on 10/27 by neurosurgery. Christensen removed >> monitor on retention protocol     Continue fall precautions. Continue multimodal pain management. Acute rehab on discharge, cm following, dc planning once he has a BM. Likely dc plan to [facility]. Hyponatremia  Assessment & Plan  Suspect SIADH. Note hydrochlorothiazide use and pain. Uric acid normal, urine sodium 46, urine osm 735, serum osm 278  129>> 132>> 131>>130  Hold HCTZ  Trend BMP  Fluid Restriction  Added salt tabs  Cortisol 6.2>> given low BP and hypoNa, will obtain cosyntropin stim test.     Constipation  Assessment & Plan  Bowel regimen including senna, colace and miralax  Prn suppository  Will add lactulose today as patient does not want to try suppository yet and he is passing gas   Might need enema     Hyperlipidemia  Assessment & Plan  Patient is on atorvastatin 10 mg at home, continue same.     Gastroesophageal reflux disease  Assessment & Plan  Patient is on omeprazole at home, continue alternative equivalent pantoprazole while inpatient. Benign essential hypertension  Assessment & Plan  Blood pressure on the lower side  on lisinopril 20 mg with holding parameters, decrease dose  HCTZ discontinued given hyponatremia. Ambulatory dysfunction  Assessment & Plan  Patient presented to [address] [place] [address] for multiple falls at home. CT spine and CT head negative for any acute changes. Status post neurosurgery intervention on 10.25  Continue fall precautions. PT/OT eval.>> acute rehab on dc               ,VTE Pharmacologic Prophylaxis:   Pharmacologic: Heparin  Mechanical VTE Prophylaxis in Place: Yes    Patient Centered Rounds: I have performed bedside rounds with nursing staff today. Discussions with Specialists or Other Care Team Provider: CM    Education and Discussions with Family / Patient: plan of care, patient. Patient declined to call family for updates. Time Spent for Care: 30 minutes. More than 50% of total time spent on counseling and coordination of care as described above. Current Length of Stay: 5 day(s)    Current Patient Status: Inpatient   Certification Statement: The patient will continue to require additional inpatient hospital stay due to pending Na improvement and rehab placement    Discharge Plan: likely to [facility] when has a BM, when na is stable and when bed available     Code Status: Level 1 - Full Code      Subjective:   No overnight events. Patient did not have a BM yet. He is passing a lot of gas. Also had some retention this morning but di not require straight cath yet per nursing staff. Objective:     Vitals:   Temp (24hrs), Av °F (36.7 °C), Min:97.6 °F (36.4 °C), Max:98.1 °F (36.7 °C)    Temp:  [97.6 °F (36.4 °C)-98.1 °F (36.7 °C)] 98.1 °F (36.7 °C)  HR:  [72-94] 90  Resp:  [14-18] 18  BP: ()/(50-64) 92/53  SpO2:  [95 %-97 %] 96 %  Body mass index is 36.17 kg/m².      Input and Output Summary (last 24 hours): Intake/Output Summary (Last 24 hours) at 10/28/2023 1500  Last data filed at 10/28/2023 1345  Gross per 24 hour   Intake 360 ml   Output 1516 ml   Net -1156 ml       Physical Exam:     Physical Exam  Constitutional:       General: He is not in acute distress. Cardiovascular:      Rate and Rhythm: Normal rate and regular rhythm. Heart sounds: Normal heart sounds. No murmur heard. Pulmonary:      Effort: No respiratory distress. Breath sounds: Normal breath sounds. No wheezing or rales. Abdominal:      General: Bowel sounds are normal. There is no distension. Palpations: Abdomen is soft. Tenderness: There is no abdominal tenderness. Skin:     General: Skin is warm. Neurological:      Mental Status: He is alert.       Comments: Awake alert communicative  Moves all extremities      Additional Data:     Labs:    Results from last 7 days   Lab Units 10/28/23  1245   WBC Thousand/uL 7.36   HEMOGLOBIN g/dL 10.0*   HEMATOCRIT % 30.6*   PLATELETS Thousands/uL 195   NEUTROS PCT % 73   LYMPHS PCT % 14   MONOS PCT % 11   EOS PCT % 1     Results from last 7 days   Lab Units 10/28/23  1021   SODIUM mmol/L 130*   POTASSIUM mmol/L 4.1   CHLORIDE mmol/L 95*   CO2 mmol/L 30   BUN mg/dL 28*   CREATININE mg/dL 1.01   ANION GAP mmol/L 5   CALCIUM mg/dL 8.2*   GLUCOSE RANDOM mg/dL 99     Results from last 7 days   Lab Units 10/25/23  0500   INR  0.96     Results from last 7 days   Lab Units 10/25/23  1621 10/25/23  0708 10/24/23  2127   POC GLUCOSE mg/dl 154* 109 82           Recent Cultures (last 7 days):           Last 24 Hours Medication List:   Current Facility-Administered Medications   Medication Dose Route Frequency Provider Last Rate    acetaminophen  975 mg Oral Q8H [address] Kadie Henry PA-C      albuterol  2 puff Inhalation Q6H PRN Tmi Henry PA-C      aluminum-magnesium hydroxide-simethicone  30 mL Oral Q6H PRN Dharmesh Maravilla PA-C      atorvastatin  10 mg Oral Daily Cullen Day Carl, ZION      bisacodyl  10 mg Rectal Daily PRN Maureen Ching, ZION      cephalexin  500 mg Oral [facility], ZION      cosyntropin  0.25 mg Intravenous Once Jerry Natarajan MD      fluticasone  2 spray Each Nare Daily PRN Maureen Ching, ZION      folic acid  095 mcg Oral Daily Cullen Day Carl, ZION      gabapentin  100 mg Oral TID Maureen Ching PA-C      heparin (porcine)  5,000 Units Subcutaneous [facility] Maureen Jeane, ZION      HYDROmorphone  0.2 mg Intravenous Q1H PRN Maureennette Ching, ZION      lidocaine  1 patch Topical Daily Maureen Ching PA-C      [START ON 10/29/2023] lisinopril  10 mg Oral Daily Jerry Natarajan MD      loratadine  10 mg Oral Daily Cullen Day Carl, ZION      melatonin  3 mg Oral HS Kadie Henry PA-C      ondansetron  4 mg Intravenous Q6H PRN Maureen ZION Ching      oxyCODONE  5 mg Oral Q4H PRN Maureennette Ching PA-C      Or    oxyCODONE  10 mg Oral Q4H PRN Maureennette Ching, ZION      pantoprazole  40 mg Oral Daily Kadie Henry PA-C      polyethylene glycol  17 g Oral BID Maureennette Ching PA-C      senna-docusate sodium  1 tablet Oral BID Maureen Jeane, ZION      sodium chloride  1 g Oral BID With Meals Jerry Natarajan MD      tiZANidine  4 mg Oral TID Maureen Ching PA-C          Today, Patient Was Seen By: Jerry Natarajan MD    ** Please Note: Dictation voice to text software may have been used in the creation of this document.  **

## 2023-10-29 LAB
ANION GAP SERPL CALCULATED.3IONS-SCNC: 5 MMOL/L
BUN SERPL-MCNC: 24 MG/DL (ref 5–25)
CALCIUM SERPL-MCNC: 8.3 MG/DL (ref 8.4–10.2)
CHLORIDE SERPL-SCNC: 93 MMOL/L (ref 96–108)
CO2 SERPL-SCNC: 29 MMOL/L (ref 21–32)
CREAT SERPL-MCNC: 0.93 MG/DL (ref 0.6–1.3)
GFR SERPL CREATININE-BSD FRML MDRD: 81 ML/MIN/1.73SQ M
GLUCOSE SERPL-MCNC: 93 MG/DL (ref 65–140)
POTASSIUM SERPL-SCNC: 4.4 MMOL/L (ref 3.5–5.3)
SODIUM SERPL-SCNC: 127 MMOL/L (ref 135–147)

## 2023-10-29 PROCEDURE — 97112 NEUROMUSCULAR REEDUCATION: CPT

## 2023-10-29 PROCEDURE — 97535 SELF CARE MNGMENT TRAINING: CPT

## 2023-10-29 PROCEDURE — 99232 SBSQ HOSP IP/OBS MODERATE 35: CPT | Performed by: INTERNAL MEDICINE

## 2023-10-29 PROCEDURE — 97530 THERAPEUTIC ACTIVITIES: CPT

## 2023-10-29 PROCEDURE — 80048 BASIC METABOLIC PNL TOTAL CA: CPT | Performed by: INTERNAL MEDICINE

## 2023-10-29 PROCEDURE — 97116 GAIT TRAINING THERAPY: CPT

## 2023-10-29 RX ORDER — SODIUM CHLORIDE 1 G/1
2 TABLET ORAL
Status: DISCONTINUED | OUTPATIENT
Start: 2023-10-29 | End: 2023-10-30

## 2023-10-29 RX ADMIN — ACETAMINOPHEN 975 MG: 325 TABLET, FILM COATED ORAL at 22:11

## 2023-10-29 RX ADMIN — SODIUM CHLORIDE 2 G: 1 TABLET ORAL at 11:48

## 2023-10-29 RX ADMIN — SODIUM CHLORIDE 1 G: 1 TABLET ORAL at 08:27

## 2023-10-29 RX ADMIN — PANTOPRAZOLE SODIUM 40 MG: 40 TABLET, DELAYED RELEASE ORAL at 05:11

## 2023-10-29 RX ADMIN — GABAPENTIN 100 MG: 100 CAPSULE ORAL at 08:26

## 2023-10-29 RX ADMIN — GABAPENTIN 100 MG: 100 CAPSULE ORAL at 22:13

## 2023-10-29 RX ADMIN — FOLIC ACID TAB 400 MCG 400 MCG: 400 TAB at 08:27

## 2023-10-29 RX ADMIN — SODIUM CHLORIDE 2 G: 1 TABLET ORAL at 17:13

## 2023-10-29 RX ADMIN — CEPHALEXIN 500 MG: 500 CAPSULE ORAL at 05:11

## 2023-10-29 RX ADMIN — ACETAMINOPHEN 975 MG: 325 TABLET, FILM COATED ORAL at 14:29

## 2023-10-29 RX ADMIN — SENNOSIDES, DOCUSATE SODIUM 1 TABLET: 8.6; 5 TABLET ORAL at 08:26

## 2023-10-29 RX ADMIN — SENNOSIDES, DOCUSATE SODIUM 1 TABLET: 8.6; 5 TABLET ORAL at 17:13

## 2023-10-29 RX ADMIN — OXYCODONE HYDROCHLORIDE 10 MG: 10 TABLET ORAL at 05:14

## 2023-10-29 RX ADMIN — LISINOPRIL 10 MG: 10 TABLET ORAL at 08:31

## 2023-10-29 RX ADMIN — TIZANIDINE 4 MG: 4 TABLET ORAL at 17:13

## 2023-10-29 RX ADMIN — HEPARIN SODIUM 5000 UNITS: 5000 INJECTION INTRAVENOUS; SUBCUTANEOUS at 14:29

## 2023-10-29 RX ADMIN — OXYCODONE HYDROCHLORIDE 5 MG: 5 TABLET ORAL at 17:20

## 2023-10-29 RX ADMIN — HEPARIN SODIUM 5000 UNITS: 5000 INJECTION INTRAVENOUS; SUBCUTANEOUS at 05:16

## 2023-10-29 RX ADMIN — POLYETHYLENE GLYCOL 3350 17 G: 17 POWDER, FOR SOLUTION ORAL at 17:12

## 2023-10-29 RX ADMIN — LORATADINE 10 MG: 10 TABLET ORAL at 08:28

## 2023-10-29 RX ADMIN — POLYETHYLENE GLYCOL 3350 17 G: 17 POWDER, FOR SOLUTION ORAL at 08:26

## 2023-10-29 RX ADMIN — HEPARIN SODIUM 5000 UNITS: 5000 INJECTION INTRAVENOUS; SUBCUTANEOUS at 22:11

## 2023-10-29 RX ADMIN — TIZANIDINE 4 MG: 4 TABLET ORAL at 22:13

## 2023-10-29 RX ADMIN — CEPHALEXIN 500 MG: 500 CAPSULE ORAL at 14:29

## 2023-10-29 RX ADMIN — OXYCODONE HYDROCHLORIDE 5 MG: 5 TABLET ORAL at 08:26

## 2023-10-29 RX ADMIN — ACETAMINOPHEN 975 MG: 325 TABLET, FILM COATED ORAL at 05:11

## 2023-10-29 RX ADMIN — ATORVASTATIN CALCIUM 10 MG: 10 TABLET, FILM COATED ORAL at 08:26

## 2023-10-29 RX ADMIN — CEPHALEXIN 500 MG: 500 CAPSULE ORAL at 22:11

## 2023-10-29 RX ADMIN — MELATONIN 3 MG: at 22:11

## 2023-10-29 RX ADMIN — TIZANIDINE 4 MG: 4 TABLET ORAL at 08:28

## 2023-10-29 RX ADMIN — GABAPENTIN 100 MG: 100 CAPSULE ORAL at 17:14

## 2023-10-29 NOTE — PROGRESS NOTES
[address] [facility]  Progress Note  Name: Nataliia Khan  MRN: [id number]  [facility] Date of Admission: 10/23/2023   Date of Service: 10/29/2023 I Hospital Day: 6    Assessment/Plan   * Osteoarthritis of cervical spine with myelopathy  Assessment & Plan  Patient has known history of cervical radiculopathy, C4-C6 cord with marked stenosis and compression. Follows with neurosurgery in outpatient setting, was scheduled for outpatient decompression procedure on 10/30 however presented to hospital for frequent falls. MRI thoracic spine with cord compression at C4-5, mild thoracic degenerative changes without significant stenosis. Case was discussed with neurosurgery team, given concerns of worsening myelopathy symptoms including bilateral lower extremity involuntary muscle twitching, neuropathic pain and weakness, decision was made to transfer to [facility] for earlier inpatient procedure. Neurosurgery following, patient is status post  cervical decompression and fusion on 10/25. DIANA drain removed on 10/27 by neurosurgery. Christensen removed >> monitor on retention protocol     Continue fall precautions. Continue multimodal pain management. Acute rehab on discharge, cm following, Likely dc plan to [facility]. If Na improving. Hyponatremia  Assessment & Plan  Patient reports that this has been a chronic issue. Suspect SIADH. Note hydrochlorothiazide use and pain. Uric acid normal, urine sodium 46, urine osm 735, serum osm 278  129>> 132>> 131>>130>> 127  Hold HCTZ  Trend BMP  Fluid Restriction to 1800cc  increased salt tabs  Might need to add low dose lasix if continues to remain low  Cortisol 6.2>> given low BP and hypoNa, cosyntropin stim test obtained>> not concerning for adrenal insufficieny .      Constipation  Assessment & Plan  Last BM 10/28/23  Bowel regimen including senna, colace and miralax  Prn suppository      Urinary retention  Assessment & Plan  Patient noted to have urinary retention, needing straight cath at [employer]. Continue retention protocol. Was with fountain, removed on 10/26>> monitor on retention protocol. Voiding but also retaining urine during PVR checks, monitor on retention protocol for now. Hopefully retention will improve with improvement in constipation. Hyperlipidemia  Assessment & Plan  Patient is on atorvastatin 10 mg at home, continue same. Gastroesophageal reflux disease  Assessment & Plan  Patient is on omeprazole at home, continue alternative equivalent pantoprazole while inpatient. Benign essential hypertension  Assessment & Plan  Blood pressure on the lower side  on lisinopril 20 mg with holding parameters, decrease dose  HCTZ discontinued given hyponatremia. Ambulatory dysfunction  Assessment & Plan  Patient presented to [facility] [employer] for multiple falls at home. CT spine and CT head negative for any acute changes. Status post neurosurgery intervention on 10.25  Continue fall precautions. PT/OT eval.>> acute rehab on dc             VTE Pharmacologic Prophylaxis:   Pharmacologic: Heparin  Mechanical VTE Prophylaxis in Place: Yes    Patient Centered Rounds: I have performed bedside rounds with nursing staff today. Discussions with Specialists or Other Care Team Provider: CM    Education and Discussions with Family / Patient: plan of care, patient,. He declined to call family fr updates. Time Spent for Care: 30 minutes. More than 50% of total time spent on counseling and coordination of care as described above. Current Length of Stay: 6 day(s)    Current Patient Status: Inpatient   Certification Statement: to acute rehab when na is improving    Discharge Plan: likely dc to rehab in next 24-48 hours once Na is improving     Code Status: Level 1 - Full Code      Subjective:   Required straight cath yesterday. Had couple of bowel movements yesterday. Pain is relatively controlled.   Has been trying to ambulate as much as he can. He reports that  he has chronic issue with hyponatremia. Objective:     Vitals:   Temp (24hrs), Av.3 °F (36.8 °C), Min:98.2 °F (36.8 °C), Max:98.3 °F (36.8 °C)    Temp:  [98.2 °F (36.8 °C)-98.3 °F (36.8 °C)] 98.3 °F (36.8 °C)  HR:  [48-91] 91  Resp:  [18] 18  BP: ()/(53-74) 112/74  SpO2:  [81 %-98 %] 98 %  Body mass index is 36.17 kg/m². Input and Output Summary (last 24 hours): Intake/Output Summary (Last 24 hours) at 10/29/2023 1410  Last data filed at 10/29/2023 1152  Gross per 24 hour   Intake 1172 ml   Output 1673 ml   Net -501 ml       Physical Exam:     Physical Exam  Constitutional:       General: He is not in acute distress. Cardiovascular:      Rate and Rhythm: Normal rate and regular rhythm. Heart sounds: Normal heart sounds. No murmur heard. Pulmonary:      Effort: No respiratory distress. Breath sounds: Normal breath sounds. No wheezing or rales. Abdominal:      General: Bowel sounds are normal. There is no distension. Palpations: Abdomen is soft. Tenderness: There is no abdominal tenderness. Skin:     General: Skin is warm. Neurological:      Mental Status: He is alert.       Comments: Awake alert communicative  Moves all extremities        Additional Data:     Labs:    Results from last 7 days   Lab Units 10/28/23  1245   WBC Thousand/uL 7.36   HEMOGLOBIN g/dL 10.0*   HEMATOCRIT % 30.6*   PLATELETS Thousands/uL 195   NEUTROS PCT % 73   LYMPHS PCT % 14   MONOS PCT % 11   EOS PCT % 1     Results from last 7 days   Lab Units 10/29/23  0508   SODIUM mmol/L 127*   POTASSIUM mmol/L 4.4   CHLORIDE mmol/L 93*   CO2 mmol/L 29   BUN mg/dL 24   CREATININE mg/dL 0.93   ANION GAP mmol/L 5   CALCIUM mg/dL 8.3*   GLUCOSE RANDOM mg/dL 93     Results from last 7 days   Lab Units 10/25/23  0500   INR  0.96     Results from last 7 days   Lab Units 10/25/23  1621 10/25/23  0708 10/24/23  2127   POC GLUCOSE mg/dl 154* 109 82                   Recent Cultures (last 7 days):           Last 24 Hours Medication List:   Current Facility-Administered Medications   Medication Dose Route Frequency Provider Last Rate    acetaminophen  975 mg Oral Q8H [address] [facility] ZION Saleh      albuterol  2 puff Inhalation Q6H PRN [facility] JanZION      aluminum-magnesium hydroxide-simethicone  30 mL Oral Q6H PRN [facility] JanZION      atorvastatin  10 mg Oral Daily Kadie Henry PA-C      bisacodyl  10 mg Rectal Daily PRN [facility] JanZION      cephalexin  500 mg Oral Q8H [address] Kdaie Henry PA-C      fluticasone  2 spray Each Nare Daily PRN [facility] JanZION      folic acid  936 mcg Oral Daily [facility]      gabapentin  100 mg Oral TID [facility] JanZION      heparin (porcine)  5,000 Units Subcutaneous Q8H [address] [facility] ZION Saelh      lidocaine  1 patch Topical Daily Manjit Shawnee Henry PA-C      lisinopril  10 mg Oral Daily Jesus Donis MD      loratadine  10 mg Oral Daily Kadie Henry PA-C      melatonin  3 mg Oral HS Kadie Henry PA-C      ondansetron  4 mg Intravenous Q6H PRN [facility] JanZION      oxyCODONE  5 mg Oral Q4H PRN [facility] JanZION      Or    oxyCODONE  10 mg Oral Q4H PRN [facility] JanZION      pantoprazole  40 mg Oral Daily Kadie Henry PA-C      polyethylene glycol  17 g Oral BID [facility] JanZION      senna-docusate sodium  1 tablet Oral BID [facility]      sodium chloride  2 g Oral TID With Meals Jesus Donis MD      tiZANidine  4 mg Oral TID [facility] ZION Saleh          Today, Patient Was Seen By: Jesus Donis MD    ** Please Note: Dictation voice to text software may have been used in the creation of this document.  **

## 2023-10-29 NOTE — OCCUPATIONAL THERAPY NOTE
Occupational Therapy Treatment Note:      10/29/23 1555   OT Last Visit   OT Visit Date 10/29/23   Note Type   Note Type Treatment   Pain Assessment   Pain Assessment Tool 0-10   Pain Score 6   Lifestyle   Reciprocal Relationships pt reports golfing several times a week. meets his friends out at times   ADL   Where Assessed Chair   Grooming Assistance 3  Moderate Assistance   UB Dressing Assistance 3  Moderate Assistance   LB Dressing Assistance 2  Maximal [address]  2  Maximal Assistance   Transfers   Sit to Stand 3  Moderate assistance   Additional items   (rw, limited hand placement)   Stand to Sit 3  Moderate assistance   Functional Mobility   Functional Mobility 3  Moderate assistance   Additional Comments rw mod a x 1 close chair follow   Cognition   Overall Cognitive Status WFL   Activity Tolerance   Activity Tolerance Patient tolerated treatment well   Assessment   Assessment pt participated in pm ot session and was seen focusing on  adl tasks, functional mobility and transfers. pt tolerated standing for 1 min x 2 c cues for fotting / balance. pt required mod asst x 1 to stand from armchair. pt tends to "pull" on walker to rise. pt with limited r shoulder arom unable to lift arm much above head during dressing. l shoulder has more arom but is also limited. pt with slightly less swelling b hands. pt is motivated and cooperative. he was oob all day in chair and walks to bathroom with nsg. will follow focusing on goals from ie. pt has limited hand coordination and function. was able to grasp walker however remains weak. min difficulty with l 5th digit during finger opposition. Plan   Treatment Interventions ADL retraining;Functional transfer training; Endurance training;Patient/family training;Equipment evaluation/education; Activityengagement   Goal Expiration Date 11/09/23   OT Treatment Day 1   OT Frequency 2-3x/wk   Discharge Recommendation   OT Discharge Recommendation Post acute rehabilitation services   AM-PAC Daily Activity Inpatient   Bathing 2   Toileting 2   Upper Body Dressing 2   Grooming 2   Eating 3   AM-PAC Applied Cognition Inpatient   Following a Speech/Presentation 4   Understanding Ordinary Conversation 4   Taking Medications 4   Remembering Where Things Are Placed or Put Away 4   Remembering List of 4-5 Errands 4   Taking Care of Complicated Tasks 4   Applied Cognition Raw Score 24   Applied Cognition Standardized Score 62.21     April A Storm

## 2023-10-29 NOTE — ASSESSMENT & PLAN NOTE
Patient reports that this has been a chronic issue. Suspect SIADH. Note hydrochlorothiazide use and pain. Uric acid normal, urine sodium 46, urine osm 735, serum osm 278  129>> 132>> 131>>130>> 127  Hold HCTZ  Trend BMP  Fluid Restriction to 1800cc  increased salt tabs  Might need to add low dose lasix if continues to remain low  Cortisol 6.2>> given low BP and hypoNa, cosyntropin stim test obtained>> not concerning for adrenal insufficieny .

## 2023-10-29 NOTE — ASSESSMENT & PLAN NOTE
Patient noted to have urinary retention, needing straight cath at [employer]. Continue retention protocol. Was with fountain, removed on 10/26>> monitor on retention protocol. Voiding but also retaining urine during PVR checks, monitor on retention protocol for now. Hopefully retention will improve with improvement in constipation.

## 2023-10-29 NOTE — PLAN OF CARE
Problem: PHYSICAL THERAPY ADULT  Goal: Performs mobility at highest level of function for planned discharge setting. See evaluation for individualized goals. Description: Treatment/Interventions: Functional transfer training, LE strengthening/ROM, Endurance training, Gait training, Bed mobility, Spoke to nursing, Spoke to case management, OT, Therapeutic exercise, Patient/family training, Equipment eval/education  Equipment Recommended: Cholo Casanova       See flowsheet documentation for full assessment, interventions and recommendations. Outcome: Progressing  Note: Prognosis: Good  Problem List: Decreased strength, Decreased range of motion, Decreased endurance, Impaired balance, Decreased coordination, Decreased mobility, Pain, Impaired sensation, Orthopedic restrictions  Assessment: pt demonstrated improved functional mobiilty, performing all transfers with decreased assist, then ambulated short distances with use of RW & no LOB. Does demosntrate LE weakness & impaired funcitonal endurance, thus remains RW reliant at this time. Recovers with brief standing rests during each trial to regain upright posture & reduce UE reliance on UEs. Pt demonstrated improved pace, stride length & RW control during 2nd trial, but remains limited overall by acute deficits. Anticipate pt will continue to make steady progress in all aspects of mobilty with practice & ongoing mobiilty outside of therapy sessions. Continue to recommend rehab at d/c to address deficits & progress to PLOF. Barriers to Discharge: Inaccessible home environment, Decreased caregiver support     PT Discharge Recommendation: Post acute rehabilitation services (PMR consult)    See flowsheet documentation for full assessment.

## 2023-10-29 NOTE — PHYSICAL THERAPY NOTE
Physical Therapy Progress Note     10/29/23 1238   PT Last Visit   PT Visit Date 10/29/23   Note Type   Note Type Treatment   Pain Assessment   Pain Score No Pain   Restrictions/Precautions   Other Precautions Chair Alarm; Fall Risk;Pain;Spinal precautions   Subjective   Subjective pt encountered seated in recliner, where he reported sleeping overnight. He states he has walked to & from bathroom multiple times this AM with staff. Reports controlled pain when prompted. Demonstrates dyspnea with activity, but denies change in status. Transfers   Sit to Stand 3  Moderate assistance   Additional items Assist x 1; Armrests; Increased time required   Stand to Sit 3  Moderate assistance   Additional items Assist x 1   Ambulation/Elevation   Gait pattern Short stride; Step to; Inconsistent kami; Shuffling;Decreased L stance;Decreased foot clearance; Antalgic; Improper Weight shift; Poor UE support; Foward flexed   Gait Assistance 3  Moderate assist   Additional items Assist x 1  (+ chair follow)   Assistive Device Rolling walker  (may need cooper RW for wide stance/proximity to aD)   Distance 10', 13'   Balance   Static Sitting Fair   Static Standing Poor +   Ambulatory Poor   Activity Tolerance   Activity Tolerance Patient tolerated treatment well;Patient limited by fatigue;Patient limited by pain   Nurse Made Aware yes   Assessment   Prognosis Good   Problem List Decreased strength;Decreased range of motion;Decreased endurance; Impaired balance;Decreased coordination;Decreased mobility;Pain; Impaired sensation;Orthopedic restrictions   Assessment pt demonstrated improved functional mobiilty, performing all transfers with decreased assist, then ambulated short distances with use of RW & no LOB. Does demosntrate LE weakness & impaired funcitonal endurance, thus remains RW reliant at this time. Recovers with brief standing rests during each trial to regain upright posture & reduce UE reliance on UEs.   Pt demonstrated improved pace, stride length & RW control during 2nd trial, but remains limited overall by acute deficits. Anticipate pt will continue to make steady progress in all aspects of mobilty with practice & ongoing mobiilty outside of therapy sessions. Continue to recommend rehab at d/c to address deficits & progress to PLOF. Goals   Patient Goals to get better & be able to take care of himself   STG Expiration Date 11/09/23   PT Treatment Day 1   Plan   Treatment/Interventions Functional transfer training;LE strengthening/ROM; Elevations; Therapeutic exercise; Endurance training;Patient/family training;Equipment eval/education; Bed mobility;Gait training   Progress Progressing toward goals   PT Frequency 3-5x/wk   Discharge Recommendation   PT Discharge Recommendation Post acute rehabilitation services  (PMR consult)   Equipment Recommended [address] [facility] [facility] Recommended Wheeled walker   AM-PAC Basic Mobility Inpatient   Turning in Flat Bed Without Bedrails 2   Lying on Back to Sitting on Edge of Flat Bed Without Bedrails 2   Moving Bed to Chair 2   Standing Up From Chair Using Arms 2   Walk in Room 2   Climb 3-5 Stairs With Railing 1   Basic Mobility Inpatient Raw Score 11   Basic Mobility Standardized Score 30.25   Highest Level Of Mobility   JH-HLM Goal 4: Move to chair/commode   JH-HLM Achieved 6: Walk 10 steps or more       Petty Hartline, PTA    An [facility] Basic Mobility Raw Score less than 17 suggests pt would benefit from post acute rehab. Please also refer to the recommendation of the Physical Therapist for safe discharge planning. Bexarotene Pregnancy And Lactation Text: This medication is Pregnancy Category X and should not be given to women who are pregnant or may become pregnant. This medication should not be used if you are breast feeding.

## 2023-10-29 NOTE — ASSESSMENT & PLAN NOTE
Patient presented to [address] [place] [address] for multiple falls at home. CT spine and CT head negative for any acute changes. Status post neurosurgery intervention on 10.25  Continue fall precautions.   PT/OT eval.>> acute rehab on dc

## 2023-10-29 NOTE — ASSESSMENT & PLAN NOTE
Patient has known history of cervical radiculopathy, C4-C6 cord with marked stenosis and compression. Follows with neurosurgery in outpatient setting, was scheduled for outpatient decompression procedure on 10/30 however presented to hospital for frequent falls. MRI thoracic spine with cord compression at C4-5, mild thoracic degenerative changes without significant stenosis. Case was discussed with neurosurgery team, given concerns of worsening myelopathy symptoms including bilateral lower extremity involuntary muscle twitching, neuropathic pain and weakness, decision was made to transfer to [facility] for earlier inpatient procedure. Neurosurgery following, patient is status post  cervical decompression and fusion on 10/25. DIANA drain removed on 10/27 by neurosurgery. Christensen removed >> monitor on retention protocol     Continue fall precautions. Continue multimodal pain management. Acute rehab on discharge, cm following, Likely dc plan to [facility]. If Na improving.

## 2023-10-30 LAB
ANION GAP SERPL CALCULATED.3IONS-SCNC: 7 MMOL/L
BUN SERPL-MCNC: 23 MG/DL (ref 5–25)
CALCIUM SERPL-MCNC: 8.6 MG/DL (ref 8.4–10.2)
CHLORIDE SERPL-SCNC: 95 MMOL/L (ref 96–108)
CO2 SERPL-SCNC: 30 MMOL/L (ref 21–32)
CREAT SERPL-MCNC: 0.84 MG/DL (ref 0.6–1.3)
FLUAV RNA RESP QL NAA+PROBE: NEGATIVE
FLUBV RNA RESP QL NAA+PROBE: NEGATIVE
GFR SERPL CREATININE-BSD FRML MDRD: 86 ML/MIN/1.73SQ M
GLUCOSE SERPL-MCNC: 101 MG/DL (ref 65–140)
POTASSIUM SERPL-SCNC: 4.4 MMOL/L (ref 3.5–5.3)
RSV RNA RESP QL NAA+PROBE: NEGATIVE
SARS-COV-2 RNA RESP QL NAA+PROBE: NEGATIVE
SODIUM SERPL-SCNC: 132 MMOL/L (ref 135–147)

## 2023-10-30 PROCEDURE — 97116 GAIT TRAINING THERAPY: CPT

## 2023-10-30 PROCEDURE — 80048 BASIC METABOLIC PNL TOTAL CA: CPT | Performed by: INTERNAL MEDICINE

## 2023-10-30 PROCEDURE — 97530 THERAPEUTIC ACTIVITIES: CPT

## 2023-10-30 PROCEDURE — 0241U HB NFCT DS VIR RESP RNA 4 TRGT: CPT | Performed by: INTERNAL MEDICINE

## 2023-10-30 PROCEDURE — 99232 SBSQ HOSP IP/OBS MODERATE 35: CPT | Performed by: INTERNAL MEDICINE

## 2023-10-30 PROCEDURE — 99222 1ST HOSP IP/OBS MODERATE 55: CPT | Performed by: PHYSICAL MEDICINE & REHABILITATION

## 2023-10-30 RX ORDER — SODIUM CHLORIDE 1 G/1
1 TABLET ORAL 2 TIMES DAILY WITH MEALS
Status: DISCONTINUED | OUTPATIENT
Start: 2023-10-30 | End: 2023-10-30

## 2023-10-30 RX ORDER — SODIUM CHLORIDE 1 G/1
1 TABLET ORAL 2 TIMES DAILY WITH MEALS
Status: COMPLETED | OUTPATIENT
Start: 2023-10-30 | End: 2023-10-31

## 2023-10-30 RX ORDER — FINASTERIDE 5 MG/1
5 TABLET, FILM COATED ORAL DAILY
Status: DISCONTINUED | OUTPATIENT
Start: 2023-10-31 | End: 2023-11-01 | Stop reason: HOSPADM

## 2023-10-30 RX ORDER — LACTULOSE 20 G/30ML
30 SOLUTION ORAL ONCE
Status: COMPLETED | OUTPATIENT
Start: 2023-10-30 | End: 2023-10-30

## 2023-10-30 RX ORDER — SODIUM CHLORIDE 1 G/1
1 TABLET ORAL
Status: DISCONTINUED | OUTPATIENT
Start: 2023-10-30 | End: 2023-10-30

## 2023-10-30 RX ADMIN — POLYETHYLENE GLYCOL 3350 17 G: 17 POWDER, FOR SOLUTION ORAL at 16:49

## 2023-10-30 RX ADMIN — LISINOPRIL 10 MG: 10 TABLET ORAL at 10:13

## 2023-10-30 RX ADMIN — GABAPENTIN 100 MG: 100 CAPSULE ORAL at 10:06

## 2023-10-30 RX ADMIN — POLYETHYLENE GLYCOL 3350 17 G: 17 POWDER, FOR SOLUTION ORAL at 10:06

## 2023-10-30 RX ADMIN — HEPARIN SODIUM 5000 UNITS: 5000 INJECTION INTRAVENOUS; SUBCUTANEOUS at 06:06

## 2023-10-30 RX ADMIN — CEPHALEXIN 500 MG: 500 CAPSULE ORAL at 06:06

## 2023-10-30 RX ADMIN — LORATADINE 10 MG: 10 TABLET ORAL at 10:07

## 2023-10-30 RX ADMIN — MELATONIN 3 MG: at 22:32

## 2023-10-30 RX ADMIN — ACETAMINOPHEN 975 MG: 325 TABLET, FILM COATED ORAL at 15:06

## 2023-10-30 RX ADMIN — LACTULOSE 30 G: 20 SOLUTION ORAL at 11:54

## 2023-10-30 RX ADMIN — CEPHALEXIN 500 MG: 500 CAPSULE ORAL at 22:32

## 2023-10-30 RX ADMIN — OXYCODONE HYDROCHLORIDE 5 MG: 5 TABLET ORAL at 15:06

## 2023-10-30 RX ADMIN — OXYCODONE HYDROCHLORIDE 5 MG: 5 TABLET ORAL at 00:50

## 2023-10-30 RX ADMIN — TIZANIDINE 4 MG: 4 TABLET ORAL at 22:32

## 2023-10-30 RX ADMIN — ACETAMINOPHEN 975 MG: 325 TABLET, FILM COATED ORAL at 22:32

## 2023-10-30 RX ADMIN — HEPARIN SODIUM 5000 UNITS: 5000 INJECTION INTRAVENOUS; SUBCUTANEOUS at 15:06

## 2023-10-30 RX ADMIN — OXYCODONE HYDROCHLORIDE 5 MG: 5 TABLET ORAL at 10:07

## 2023-10-30 RX ADMIN — CEPHALEXIN 500 MG: 500 CAPSULE ORAL at 15:06

## 2023-10-30 RX ADMIN — SODIUM CHLORIDE 1 G: 1 TABLET ORAL at 16:55

## 2023-10-30 RX ADMIN — TIZANIDINE 4 MG: 4 TABLET ORAL at 10:06

## 2023-10-30 RX ADMIN — GABAPENTIN 100 MG: 100 CAPSULE ORAL at 16:49

## 2023-10-30 RX ADMIN — SODIUM CHLORIDE 1 G: 1 TABLET ORAL at 11:54

## 2023-10-30 RX ADMIN — TIZANIDINE 4 MG: 4 TABLET ORAL at 16:49

## 2023-10-30 RX ADMIN — HEPARIN SODIUM 5000 UNITS: 5000 INJECTION INTRAVENOUS; SUBCUTANEOUS at 22:32

## 2023-10-30 RX ADMIN — PANTOPRAZOLE SODIUM 40 MG: 40 TABLET, DELAYED RELEASE ORAL at 06:06

## 2023-10-30 RX ADMIN — ACETAMINOPHEN 975 MG: 325 TABLET, FILM COATED ORAL at 06:06

## 2023-10-30 RX ADMIN — OXYCODONE HYDROCHLORIDE 10 MG: 10 TABLET ORAL at 22:32

## 2023-10-30 RX ADMIN — SENNOSIDES, DOCUSATE SODIUM 1 TABLET: 8.6; 5 TABLET ORAL at 16:49

## 2023-10-30 RX ADMIN — GABAPENTIN 100 MG: 100 CAPSULE ORAL at 22:32

## 2023-10-30 RX ADMIN — ATORVASTATIN CALCIUM 10 MG: 10 TABLET, FILM COATED ORAL at 10:06

## 2023-10-30 RX ADMIN — SENNOSIDES, DOCUSATE SODIUM 1 TABLET: 8.6; 5 TABLET ORAL at 10:06

## 2023-10-30 RX ADMIN — FOLIC ACID TAB 400 MCG 400 MCG: 400 TAB at 10:07

## 2023-10-30 NOTE — ASSESSMENT & PLAN NOTE
Blood pressure was on the lower side  on lisinopril 20 mg with holding parameters, decrease dose  HCTZ discontinued given hyponatremia.

## 2023-10-30 NOTE — ASSESSMENT & PLAN NOTE
Last BM 10/28/23  Bowel regimen including senna, colace and miralax  Lactulose added intermittently  Prn suppository

## 2023-10-30 NOTE — ASSESSMENT & PLAN NOTE
Patient has known history of cervical radiculopathy, C4-C6 cord with marked stenosis and compression. Follows with neurosurgery in outpatient setting, was scheduled for outpatient decompression procedure on 10/30 however presented to hospital for frequent falls. MRI thoracic spine with cord compression at C4-5, mild thoracic degenerative changes without significant stenosis. Case was discussed with neurosurgery team, given concerns of worsening myelopathy symptoms including bilateral lower extremity involuntary muscle twitching, neuropathic pain and weakness, decision was made to transfer to \[facility]\"" for earlier inpatient procedure. Neurosurgery following, patient is status post  cervical decompression and fusion on 10/25. DIANA drain removed on 10/27 by neurosurgery. Christensen removed >> monitor on retention protocol     Continue fall precautions. Continue multimodal pain management. Acute rehab on discharge, cm following, Likely dc plan to [facility].

## 2023-10-30 NOTE — ASSESSMENT & PLAN NOTE
Patient reports that this has been a chronic issue. Suspect SIADH. Note hydrochlorothiazide use and pain. Uric acid normal, urine sodium 46, urine osm 735, serum osm 278  129>> 132>> 131>>130>> 127>>132  TSH normal  Hold HCTZ  Trend BMP  Fluid Restriction to 1800cc  Continue salt tabs  Cortisol 6.2>> given low BP and hypoNa, cosyntropin stim test obtained>> not concerning for adrenal insufficieny .

## 2023-10-30 NOTE — ASSESSMENT & PLAN NOTE
Patient noted to have urinary retention, needing straight cath at [facility]. Continue retention protocol. Was with fountain, removed on 10/26>> monitor on retention protocol. Voiding but also retaining urine during PVR checks, monitor on retention protocol for now. Hopefully retention will improve with improvement in constipation.     Now again with fountain on 10.30>> TOV at [facility] once more ambulatory   Start finasteride  OP urology follow up

## 2023-10-30 NOTE — PHYSICAL THERAPY NOTE
PHYSICAL THERAPY NOTE          Patient Name: Keyonna HERRING Date: 10/30/2023       10/30/23 1328   PT Last Visit   PT Visit Date 10/30/23   Note Type   Note Type Treatment   Pain Assessment   Pain Assessment Tool 0-10   Pain Score 6   Pain Location/Orientation Location: Neck   Pain Onset/Description Onset: Ongoing;Frequency: Constant/Continuous; Descriptor: Aching;Descriptor: Discomfort   Effect of Pain on Daily Activities limits comfort   Patient's Stated Pain Goal No pain   Hospital Pain Intervention(s) Repositioned; Ambulation/increased activity; Emotional support   Restrictions/Precautions   Weight Bearing Precautions Per Order No   Other Precautions Chair Alarm; Fall Risk;Pain;Spinal precautions   General   Chart Reviewed Yes   Family/Caregiver Present No   Cognition   Overall Cognitive Status WFL   Arousal/Participation Alert; Responsive; Cooperative   Attention Attends with cues to redirect   Orientation Level Oriented X4   Memory Within functional limits   Following Commands Follows all commands and directions without difficulty   Comments Patient pleasant and cooperative   Subjective   Subjective Patient agreeable to PT tx   Bed Mobility   Additional Comments OOB in bathroom on arrival   Transfers   Sit to Stand 3  Moderate assistance   Additional items Assist x 1; Increased time required;Verbal cues   Stand to Sit 3  Moderate assistance   Additional items Assist x 1; Increased time required;Verbal cues   Stand pivot 3  Moderate assistance   Additional items Assist x 1; Increased time required;Verbal cues   Toilet transfer 2  Maximal assistance   Additional items Assist x 1; Increased time required;Verbal cues;Standard toilet   Additional Comments RW   Ambulation/Elevation   Gait pattern Short stride; Step to; Inconsistent kami; Shuffling;Decreased L stance;Decreased foot clearance; Antalgic; Improper Weight shift; Poor UE support; Foward flexed   Gait Assistance 3  Moderate assist   Additional items Assist x 1;Verbal cues  (+ chair follow)   Assistive Device Rolling walker   Distance 83'+74'+97'   Stair Management Assistance Not tested   Ambulation/Elevation Additional Comments cues needed for slowing gait speed to improve upright stability and safety   Balance   Static Sitting Fair   Dynamic Sitting Fair -   Static Standing Poor +   Dynamic Standing Poor   Ambulatory Poor   Endurance Deficit   Endurance Deficit Yes   Endurance Deficit Description weakness, pain, fatigue   Activity Tolerance   Activity Tolerance Patient tolerated treatment well;Patient limited by fatigue;Patient limited by pain   Medical Staff Made Aware Restorative   Nurse Made Aware RN cleared   Assessment   Prognosis Good   Problem List Decreased strength;Decreased range of motion;Decreased endurance; Impaired balance;Decreased coordination;Decreased mobility;Pain; Impaired sensation;Orthopedic restrictions   Assessment Patient received in bathroom. Patient agreeable to therapy. Patient performs toilet transfer from std toilet with Max Ax1 using RW. Patient performs functional transfers with moderate assistance x1 using rolling walker. Patient performs ambulation with moderate assistance x1 using rolling walker, 10'+25'+15'. Standing rest periods between gait trials. Patient left in bedside chair with all needs met and call bell/personal items within reach. The patient's AM-PAC Basic Mobility Inpatient Short Form Raw Score is 11 showing further need for skilled PT services in order to improve functional mobility, decrease need for assistance, and return to PLOF. PT continues to recommend Post Acute Rehab Services on d/c. Will continue to follow as able.    Barriers to Discharge Inaccessible home environment;Decreased caregiver support   Goals   Patient Goals to improve independence   PT Treatment Day 2   Plan   Treatment/Interventions Functional transfer training;LE strengthening/ROM; Elevations; Therapeutic exercise; Endurance training;Patient/family training;Equipment eval/education; Bed mobility;Gait training   Progress Progressing toward goals   PT Frequency 3-5x/wk   Discharge Recommendation   Rehab Resource Intensity Level, PT I (Maximum Resource Intensity)   Equipment Recommended Walker   Walker Package Recommended Wheeled walker   AM-PAC Basic Mobility Inpatient   Turning in Flat Bed Without Bedrails 2   Lying on Back to Sitting on Edge of Flat Bed Without Bedrails 2   Moving Bed to Chair 2   Standing Up From Chair Using Arms 2   Walk in Room 2   Climb 3-5 Stairs With Railing 1   Basic Mobility Inpatient Raw Score 11   Basic Mobility Standardized Score 30.25   Highest Level Of Mobility   JH-HLM Goal 4: Move to chair/commode   JH-HLM Achieved 7: Walk 25 feet or more   End of Consult   Patient Position at End of Consult Bedside chair; All needs within reach;Bed/Chair alarm activated     Terrance Goldman, PT, DPT

## 2023-10-30 NOTE — PROGRESS NOTES
PHYSICAL MEDICINE AND REHABILITATION   PREADMISSION ASSESSMENT     Projected [facility] Diagnoses:  Impairment of mobility, safety and Activities of Daily Living (ADLs) due to Spinal Cord Dysfunction:  Non-Traumatic:  04.130 Other Non-Traumatic    Etiologic: Non-traumatic incomplete tetraplegia s/p C3-C6 PCDF   Date of Onset: 10/23/23  Date of surgery: 10/25/23    PATIENT INFORMATION  Name: Francesca Mcmillan Phone #: [phone] (home)   Address: [facility] [address]  YOB: 1950 Age: 68 y.o. #   Marital Status: Single  Ethnicity: White  Employment Status: retired  Extended Emergency Contact Information  Primary Emergency Contact: [address] [facility] Sudhakar Moralesd Phone: [phone]  Mobile Phone: [phone]  Relation: Sister  Secondary Emergency Contact: [facility]  Mobile Phone: [phone]  Relation: Nephew  Advance Directive: Level 1 Full Code (no ACP docs)    INSURANCE/COVERAGE:     Primary Payor: MEDICARE / Plan: MEDICARE PART A ONLY / Product Type: Medicare /   [employer] [place]  ID# [id number]   Payer Contact:  Payer Contact:   Contact Phone:  Contact Phone:       MEDICARE #: [id number]  Medicare Days: 60/30/60  Medical Record #: [id number]    REFERRAL SOURCE:   Referring provider: Laz Wilder MD  Referring facility: [address] [place] [address]  Room: [place] [address]/[place] [address]  PCP: Di Holter, MD PCP phone number: [phone]    MEDICAL INFORMATION  HPI: : Pt is a 68year old male with PMH including hypertension, hyperlipidemia, GERD, who has known history of cervical radiculopathy, C4-C6 cord with marked stenosis and compression. Follows with neurosurgery in outpatient setting, was scheduled for outpatient decompression procedure on 10/30 however presented to hospital on 10/23 for frequent falls.  MRI thoracic spine with cord compression at C4-5, mild thoracic degenerative changes without significant stenosis. Case was discussed with neurosurgery team, given concerns of worsening myelopathy symptoms including bilateral lower extremity involuntary muscle twitching, neuropathic pain and weakness, decision was made to transfer to [facility] for earlier inpatient procedure. Patient is status post cervical decompression and fusion on 10/25. DIANA drain removed on 10/27 by neurosurgery. Paresthesias to bilateral hands and legs, L > R significantly improving since surgery. Postoperative cervical x-rays, 10/26/2023, stable appearance C3-6 posterior fusion hardware. Continue to hold aspirin x 2 weeks postoperatively. No bracing needed. Hyponatremia- Suspect SIADH. Note hydrochlorothiazide use and pain. Currently 132. His fountain was removed and void trial attempted which he failed. Fountain was placed. Pt with Hyponatremia- patient reports that this has been a chronic issue. Suspect SIADH. Note hydrochlorothiazide use and pain. Fluid Restriction to 1800cc. Continue salt tabs. Cortisol 6.2>> given low BP and hypoNa, cosyntropin stim test obtained>> not concerning for adrenal insufficieny . PT and OT have been consulted and are recommending post-acute rehab services. Patient's case has been reviewed with [facility] medical director, patient meets medical criteria for acute rehab and has demonstrated the ability to tolerate three or more hours of therapy per day. Patient is medically stable and ready for discharge to [facility]. Past Medical History:   Past Surgical History:    Allergies:     Past Medical History:   Diagnosis Date    GERD (gastroesophageal reflux disease)     Hyperlipidemia     Hypertension     Past Surgical History:   Procedure Laterality Date    COLONOSCOPY      HERNIA REPAIR      ND ARTHRD PST/PSTLAT TQ 1NTRSPC CRV BELW C2 SEGMENT N/A 10/25/2023    Procedure: C3-6 PCDF;  Surgeon: Natalie Negrete MD;  Location: [facility];  Service: Neurosurgery    ND COLONOSCOPY FLX DX W/COLLJ SPEC WHEN PFRMD N/A 4/5/2019    Procedure: COLONOSCOPY;  Surgeon: Mike Murray DO;  Location: [facility]; Service: Gastroenterology    ROTATOR CUFF REPAIR Left     TONSILLECTOMY       No Known Allergies      Medical/functional conditions requiring inpatient rehabilitation: Non-traumatic incomplete tetraplegia s/p C3-C6 PCDF, Impaired mobility and self care, Impaired cognition     Risk for medical/clinical complications: Risk for falls, Risk for skin breakdown secondary to decreased mobility, Risk for hypertensive episodes, Risk for increased pain    Comorbidities:   Urinary Retention/Possible Neurogenic Bladder   Constipation/Possible Neurogenic Bowel   Post-operative pain   Hypotension   Hyponatremia   Skin/Pressure Injury Prevention  DVT Prophylaxis: HSQ, SCDs  GI Prophylaxis: PPI for GERD    Surgeries in the last 100 days: On 10/25, Dr. Julia Matute performed a PCDF C3-6 (laminectomes C4,5,6 and partial C3)     CURRENT VITAL SIGNS:   Temp:  [98.1 °F (36.7 °C)-98.7 °F (37.1 °C)] 98.1 °F (36.7 °C)  HR:  [78-88] 88  Resp:  [16] 16  BP: ()/(61-86) 144/86   Intake/Output Summary (Last 24 hours) at 11/1/2023 1129  Last data filed at 11/1/2023 1123  Gross per 24 hour   Intake 960 ml   Output 1250 ml   Net -290 ml        LABORATORY RESULTS:      Lab Results   Component Value Date    HGB 10.0 (L) 10/28/2023    HCT 30.6 (L) 10/28/2023    WBC 7.36 10/28/2023     Lab Results   Component Value Date    BUN 20 10/31/2023    K 4.3 10/31/2023    CL 91 (L) 10/31/2023    CREATININE 0.87 10/31/2023     Lab Results   Component Value Date    PROTIME 12.7 10/25/2023    INR 0.96 10/25/2023        DIAGNOSTIC STUDIES:  XR cervical spine 2 or 3 views    Result Date: 10/27/2023  Impression: Straightening with posterior spinal fixation from C3-C6 is in alignment with soft tissue changes Workstation performed: [id number]     XR spine cervical 2 or 3 vw injury    Result Date: 10/25/2023  Impression: Fluoroscopic guidance provided for surgical procedure.   Please refer to the separate procedure notes for additional details. Localization procedure was performed, with the OR notified of the level at approximately 1:50 p.m. on  10/25/2023 via telephone conversation with the OR x-ray technologist . Workstation performed: [id number]       PRECAUTIONS/SPECIAL NEEDS:  Anticoagulation:  heparin, Edema Management, Safety Concerns, Pain Management, Dietary Restrictions: Cardiac Diet, Fluid restriction 1800ML, and Language Preference: English.  Fall precautions    MEDICATIONS:     Current Facility-Administered Medications:     acetaminophen (TYLENOL) tablet 975 mg, 975 mg, Oral, Q8H [address], REBEL Bradshaw-TITO, 975 mg at 11/01/23 0522    albuterol (PROVENTIL HFA,VENTOLIN HFA) inhaler 2 puff, 2 puff, Inhalation, Q6H PRN, Robb Henry PA-C    aluminum-magnesium hydroxide-simethicone (MAALOX) oral suspension 30 mL, 30 mL, Oral, Q6H PRN, Robb Henry PA-C    atorvastatin (LIPITOR) tablet 10 mg, 10 mg, Oral, Daily, REBEL Wang-TITO, 10 mg at 11/01/23 0810    bisacodyl (DULCOLAX) rectal suppository 10 mg, 10 mg, Rectal, Daily PRN, Charu Hodgson PA-C    finasteride (PROSCAR) tablet 5 mg, 5 mg, Oral, Daily, Tapan De Santiago MD, 5 mg at 11/01/23 0809    fluticasone (FLONASE) 50 mcg/act nasal spray 2 spray, 2 spray, Each Nare, Daily PRN, Charu Hodgson PA-C    folic acid (FOLVITE) tablet 400 mcg, 400 mcg, Oral, Daily, Charu Hodgson PA-C, 400 mcg at 11/01/23 0810    furosemide (LASIX) tablet 40 mg, 40 mg, Oral, Daily, AAKASH Quarles, 40 mg at 11/01/23 1054    gabapentin (NEURONTIN) capsule 100 mg, 100 mg, Oral, TID, REBEL Wang-C, 100 mg at 11/01/23 0809    heparin (porcine) subcutaneous injection 5,000 Units, 5,000 Units, Subcutaneous, Q8H [address], 5,000 Units at 11/01/23 0522 **AND** [CANCELED] Platelet count, , , AM Draw, Robb Henry PA-C    lactulose oral solution 20 g, 20 g, Oral, BID, Love M AAKASH Tenorio, 20 g at 11/01/23 1054    lidocaine (LIDODERM) 5 % patch 1 patch, 1 patch, Topical, Daily, Robb Second Ouma, PA-C, 1 patch at 10/24/23 1352    lisinopril (ZESTRIL) tablet 10 mg, 10 mg, Oral, Daily, Tapan De Santiago MD, 10 mg at 11/01/23 0809    loratadine (CLARITIN) tablet 10 mg, 10 mg, Oral, Daily, Robb Second Ouma, PA-C, 10 mg at 11/01/23 0809    melatonin tablet 3 mg, 3 mg, Oral, HS, Robb Second Ouma, PA-C, 3 mg at 10/31/23 2246    ondansetron (ZOFRAN) injection 4 mg, 4 mg, Intravenous, Q6H PRN, Robb Second Ouma, PA-C    oxyCODONE (ROXICODONE) IR tablet 5 mg, 5 mg, Oral, Q4H PRN, 5 mg at 10/31/23 2246 **OR** oxyCODONE (ROXICODONE) immediate release tablet 10 mg, 10 mg, Oral, Q4H PRN, Robb Second Ouma, PA-C, 10 mg at 11/01/23 0750    pantoprazole (PROTONIX) EC tablet 40 mg, 40 mg, Oral, Daily, Robb Second Ouma, PA-C, 40 mg at 11/01/23 0522    polyethylene glycol (MIRALAX) packet 17 g, 17 g, Oral, BID, Robb Second Ouma, PA-C, 17 g at 11/01/23 8483    senna-docusate sodium (SENOKOT S) 8.6-50 mg per tablet 1 tablet, 1 tablet, Oral, BID, Robb Second Ouma, PA-C, 1 tablet at 11/01/23 1053    tiZANidine (ZANAFLEX) tablet 4 mg, 4 mg, Oral, TID, Robb Second Ouma, PA-C, 4 mg at 11/01/23 0809    SKIN INTEGRITY:   Wound 10/20/23 Abrasion(s) Knee Anterior; Left  Wound 10/20/23 Abrasion(s) Toe (Comment which one) Anterior; Left  Wound 10/25/23 Head Bilateral  Wound 10/25/23 Cervical Neck Posterior    PRIOR LEVEL OF FUNCTION:  He lives in [facility] apartment  Nena Castrejon is single and lives alone. Self Care: Independent, Indoor Mobility: Independent, Stairs (in/outdoor): Independent, and Cognition: Independent    FALLS IN THE LAST 6 MONTHS: 3    HOME ENVIRONMENT:  The living area: can live on one level  There are No steps to enter the home. The patient will have 24 hour supervision/physical assistance available upon discharge.     PREVIOUS DME:  Equipment in home (previous DME): None    FUNCTIONAL STATUS:  Physical Therapy Occupational Therapy Speech Therapy   10/30/23 1328    PT Last Visit   PT Visit Date 10/30/23 Note Type   Note Type Treatment   Pain Assessment   Pain Assessment Tool 0-10   Pain Score 6   Pain Location/Orientation Location: Neck   Pain Onset/Description Onset: Ongoing;Frequency: Constant/Continuous; Descriptor: Aching;Descriptor: Discomfort   Effect of Pain on Daily Activities limits comfort   Patient's Stated Pain Goal No pain   Hospital Pain Intervention(s) Repositioned; Ambulation/increased activity; Emotional support   Restrictions/Precautions   Weight Bearing Precautions Per Order No   Other Precautions Chair Alarm; Fall Risk;Pain;Spinal precautions   General   Chart Reviewed Yes   Family/Caregiver Present No   Cognition   Overall Cognitive Status WFL   Arousal/Participation Alert; Responsive; Cooperative   Attention Attends with cues to redirect   Orientation Level Oriented X4   Memory Within functional limits   Following Commands Follows all commands and directions without difficulty   Comments Patient pleasant and cooperative   Subjective   Subjective Patient agreeable to PT tx   Bed Mobility   Additional Comments OOB in bathroom on arrival   Transfers   Sit to Stand 3  Moderate assistance   Additional items Assist x 1; Increased time required;Verbal cues   Stand to Sit 3  Moderate assistance   Additional items Assist x 1; Increased time required;Verbal cues   Stand pivot 3  Moderate assistance   Additional items Assist x 1; Increased time required;Verbal cues   Toilet transfer 2  Maximal assistance   Additional items Assist x 1; Increased time required;Verbal cues;Standard toilet   Additional Comments RW   Ambulation/Elevation   Gait pattern Short stride; Step to; Inconsistent kami; Shuffling;Decreased L stance;Decreased foot clearance; Antalgic; Improper Weight shift; Poor UE support; Foward flexed   Gait Assistance 3  Moderate assist   Additional items Assist x 1;Verbal cues  (+ chair follow)   Assistive Device Rolling walker   Distance 23'+01'+66'   Stair Management Assistance Not tested Ambulation/Elevation Additional Comments cues needed for slowing gait speed to improve upright stability and safety   Balance   Static Sitting Fair   Dynamic Sitting Fair -   Static Standing Poor +   Dynamic Standing Poor   Ambulatory Poor   Endurance Deficit   Endurance Deficit Yes   Endurance Deficit Description weakness, pain, fatigue   Activity Tolerance   Activity Tolerance Patient tolerated treatment well;Patient limited by fatigue;Patient limited by pain   Medical Staff Made Aware Restorative   Nurse Made Aware RN cleared   Assessment   Prognosis Good   Problem List Decreased strength;Decreased range of motion;Decreased endurance; Impaired balance;Decreased coordination;Decreased mobility;Pain; Impaired sensation;Orthopedic restrictions   Assessment Patient received in bathroom. Patient agreeable to therapy. Patient performs toilet transfer from [facility] toilet with Max Ax1 using RW. Patient performs functional transfers with moderate assistance x1 using rolling walker. Patient performs ambulation with moderate assistance x1 using rolling walker, 10'+25'+15'. Standing rest periods between gait trials. Patient left in bedside chair with all needs met and call bell/personal items within reach. The patient's AM-PAC Basic Mobility Inpatient Short Form Raw Score is 11 showing further need for skilled PT services in order to improve functional mobility, decrease need for assistance, and return to PLOF. PT continues to recommend Post Acute Rehab Services on d/c. Will continue to follow as able. 10/31/23 1418    OT Last Visit   OT Visit Date 10/31/23   Note Type   Note Type Treatment   Pain Assessment   Pain Assessment Tool 0-10   Pain Score No Pain   Restrictions/Precautions   Weight Bearing Precautions Per Order No   Braces or Orthoses    (Per neurosurgery no Cervical brace needed.)   Other Precautions Chair Alarm; Fall Risk;Pain;Spinal precautions   Lifestyle   Autonomy Mod I with increased to for self care tasks (with recent symptoms)/mod I with ADL's, no AD with functional mobility, +drives   Reciprocal Relationships pt reports golfing several times a week. meets his friends out at times   Service to Others retired   Intrinsic Gratification spending time outside   ADL   Where [place] of bed   [address] [facility] 5  [address] [facility] 4  Minimal Assistance   UB Bathing Deficit Chest;Right arm;Left arm; Abdomen   LB Bathing Assistance 2  Maximal Assistance   LB Bathing Deficit Buttocks; Perineal area;Right upper leg;Left upper leg   UB Dressing Assistance 4  Minimal Assistance   UB Dressing Deficit Thread RUE; Thread LUE;Pull over head   LB Dressing Assistance 3  Moderate Assistance   LB Dressing Deficit Thread RLE into pants; Thread LLE into pants   Toileting Assistance  2  Maximal Assistance   Transfers   Sit to Stand 3  Moderate assistance   Additional items Assist x 1   Stand to Sit 4  Minimal assistance   Additional items Assist x 1   Toilet transfer 3  Moderate assistance   Functional Mobility   Functional Mobility 3  Moderate assistance   Additional items Rolling walker   Cognition   Overall Cognitive Status WFL   Arousal/Participation Alert; Responsive; Cooperative   Attention Attends with cues to redirect   Orientation Level Oriented X4   Memory Within functional limits   Following Commands Follows all commands and directions without difficulty   Activity Tolerance   Activity Tolerance Patient tolerated treatment well   Assessment   Assessment Pt seen for Occupational Therapy session with focus on activity tolerance, functional transfers/mob, sitting balance and tolerance and standing tolerance and balance for pt engagement in UB/LB self-care tasks. Pt cleared by RN/ Kiersten Quiroz for pt participated in OT session. Pt presented sitting out of bed to bedside chair and pt identifiers confirmed. Pt reported his/her therapy goal to become independent and return home alone.  Pt required assist for UB/LB self-care 2* limited activity tolerance/pt was easily short of breath with self-care tasks, decreased strength, balance and coordination. He was able to tolerate sitting edge of pt bed for LB dressing. Pt will require post acute rehab service to continue to address these above noted pt deficit which currently impair pt ADL and functional mob. Pt set up to bedside chair post session, chair alarm activated and all needs within pt reach         CARE SCORES:  Self Care:  Eatin: Supervision or touching  assistance  Oral hygiene: 04: Supervision or touching  assistance  Toilet hygiene: 02: Substantial/maximal assistance  Shower/bathing self: 02: Substantial/maximal assistance  Upper body dressin: Partial/moderate assistance  Lower body dressin: Partial/moderate assistance  Putting on/taking off footwear: 02: Substantial/maximal assistance  Transfers:  Roll left and right: 03: Partial/moderate assistance  Sit to lyin: Partial/moderate assistance  Lying to sitting on side of bed: 03: Partial/moderate assistance  Sit to stand: 03: Partial/moderate assistance  Chair/bed to chair transfer: 03: Partial/moderate assistance  Toilet transfer: 02: Substantial/maximal assistance  Mobility:  Walk 10 ft: 03: Partial/moderate assistance  Walk 50 ft with two turns: 88: Not attempted due to medical conditions or safety concerns  Walk 150ft: 88: Not attempted due to medical conditions or safety concerns    CURRENT GAP IN FUNCTION  Prior to Admission: Functional Status: Patient was independent with mobility/ambulation, transfers, ADL's, IADL's. Expected functional outcomes: It is expected that with skilled acute rehabilitation services the patient will progress to Independent for self care and Independent for mobility     Estimated length of stay: 10 to 14 days    Anticipated Post-Discharge Disposition/Treatment  Disposition: Return to previous home/apartment.   Outpatient Services: Physical Therapy (PT) and Occupational Therapy (OT)    BARRIERS TO DISCHARGE  Weakness, Pain, Balance Difficulty, Fatigue, Home Accessibility, Caregiver Accessibility, and Equipment Needs    INTERVENTIONS FOR DISCHARGE  Adaptive equipment, Patient/Family/Caregiver Education, [employer], Support Group, Arrange DME needs, Therapy exercises, and Energy conservation education     REQUIRED THERAPY:  Patient will require PT and OT 90 minutes each per day, five days per week to achieve rehab goals. REQUIRED FUNCTIONAL AND MEDICAL MANAGEMENT FOR INPATIENT REHABILITATION:  Skin:  Monitor skin for breakdown, Pain Management: Overall pain is moderately controlled, Deep Vein Thrombosis (DVT) Prophylaxis:  Per MD orders , further internal medicine management of additional medical conditions while on ARC, PT/OT intervention, patient/family education and training, and any needed consults PRN. RECOMMENDED LEVEL OF CARE:    Pt is a 68 y.o. male with medical history of GERD< HTN, HLD, cervical stenosis with myelopathy who presented to [facility] on 10/20 after a fall. He got up, and he fell againk, hitting his head on the dresser - both times his legs giving out from under him. He has baseline weakness for which he sees Dr. Lulu Howe and was scheduled for surgery (elective PCDF) on 10/30 at [facility]. UA negative, CBC, CMP unremarkable, negative for COVID/Flu/RSV, CTH unremarkable, CT C-Spine without acute findings, and CT T-L spine showed moderate multilevel degenerative changes. L Knee XR similarly with degenerative changes. He had some difficulty urinating on admission and required straight catheterization and ultimately fountain placement. Thoracic and lumbar MRI were ordered and he was transferred to [facility]. MRI showed severe stenosis C3-4, C4-5 and C5-6 with mild cord signal changes. On 10/25, Dr. Suzi Harrington performed a PCDF C3-6 (laminectomes C4,5,6 and partial C3). At baseline, SSEPs noted to be dampened.  Post-op no bracing required, but has cervical precautions. Course c/b pain and hyponatremia and hypotension (felt 2/2 SIADH, also getting cosyntropin stim test which was not concerning for adrenal insufficiency). His fountain was removed and void trial attempted which he failed. Fountain was placed. Pt was independent PTA with transfers, amb, and ADLs. Pt lives with alone in an apartment with 0 KARLA. Pt is currently functioning below baseline needing Max A for ADLs and Mod A for transfers/amb. Pt would benefit from [facility] admission to have close medical management while participating in 3 hours of therapy per day that will include physical and occupational therapy. Physical and occupational therapists will address functional mobility deficits and assist patient in improving their strength, endurance, ROM, and self care. Pt will have 24/7 nursing care to monitor routine vitals, blood sugars, I/Os, skin integrity, and overall condition. The rehab nursing staff will follow therapy recommendations to have 24 hour follow through during non-therapy hours. PM&R to maximize function and provide medical oversight. The MD will monitor patient co-morbidities while on the unit as well as order any additional tests, labs, and consults needed. The [facility] specialized interdisciplinary team will meet weekly to discuss patient overall medical status and rehab goals in preparation for D/C home. Inpatient acute rehab is recommended for patient to maximize overall strength, endurance, self care, and mobility for a safe and timely transition back home.

## 2023-10-30 NOTE — CASE MANAGEMENT
Case Management Discharge Planning Note    Patient name Lisa First  [facility] MRN [id number]  : 1950 Date 10/30/2023       Current Admission Date: 10/23/2023  Current Admission Diagnosis:Osteoarthritis of cervical spine with myelopathy   Patient Active Problem List    Diagnosis Date Noted    Paresthesia 10/25/2023    Weakness of extremity 10/25/2023    Hyponatremia 10/25/2023    Constipation 10/24/2023    Preoperative clearance 10/23/2023    Urinary retention 10/21/2023    Ambulatory dysfunction 10/20/2023    Anemia 10/20/2023    Benign essential hypertension 10/20/2023    Dorsalgia, unspecified 10/20/2023    Gastroesophageal reflux disease 10/20/2023    Hyperlipidemia 10/20/2023    Osteoarthritis of cervical spine with myelopathy 2023    Tinea corporis 2019    Onychomycosis 2019    Screen for colon cancer 2018      LOS (days): 7  Geometric Mean LOS (GMLOS) (days):   Days to GMLOS:     OBJECTIVE:  Risk of Unplanned Readmission Score: 15.41         Current admission status: Inpatient   Preferred Pharmacy:   [address]  Phone: [phone] Fax: [phone]    Primary Care Provider: Lida Gilman MD    Primary Insurance: [facility]  Secondary Insurance: MEDICARE    DISCHARGE DETAILS:     CM received message from [address] [place] [address] stating pt primary insurance is [address] and Medicare is primary. They did not know if pt could use Medicare for ARC. Email was sent to Inpatient insurance verifiers to see if they had feedback, they are now waiting to see if [place] or Medicare is primary. Pt is currently using [facility] with [facility], call placed to [facility] at [phone], This writer was told that patient is 0% service connected and I was transferred to Eligibility and benefits, left a detailed message on a secure line. Pending call back.   DC pending which insurance will cover rehab.

## 2023-10-30 NOTE — PLAN OF CARE
Problem: PHYSICAL THERAPY ADULT  Goal: Performs mobility at highest level of function for planned discharge setting. See evaluation for individualized goals. Description: Treatment/Interventions: Functional transfer training, LE strengthening/ROM, Endurance training, Gait training, Bed mobility, Spoke to nursing, Spoke to case management, OT, Therapeutic exercise, Patient/family training, Equipment eval/education  Equipment Recommended: [employer]       See flowsheet documentation for full assessment, interventions and recommendations. Outcome: Progressing  Note: Prognosis: Good  Problem List: Decreased strength, Decreased range of motion, Decreased endurance, Impaired balance, Decreased coordination, Decreased mobility, Pain, Impaired sensation, Orthopedic restrictions  Assessment: Patient received in bathroom. Patient agreeable to therapy. Patient performs toilet transfer from std toilet with Max Ax1 using RW. Patient performs functional transfers with moderate assistance x1 using rolling walker. Patient performs ambulation with moderate assistance x1 using rolling walker, 10'+25'+15'. Standing rest periods between gait trials. Patient left in bedside chair with all needs met and call bell/personal items within reach. The patient's AM-[facility] Basic Mobility Inpatient Short Form Raw Score is 11 showing further need for skilled PT services in order to improve functional mobility, decrease need for assistance, and return to PLOF. PT continues to recommend Post Acute Rehab Services on d/c. Will continue to follow as able. Barriers to Discharge: Inaccessible home environment, Decreased caregiver support     Rehab Resource Intensity Level, PT: I (Maximum Resource Intensity)    See flowsheet documentation for full assessment.

## 2023-10-30 NOTE — RESTORATIVE TECHNICIAN NOTE
Restorative Technician Note      Patient Name: Altagracia Hand     Note Type: Mobility  Patient Position Upon Consult: Other (Comment) (Seated on toilet.)  Activity Performed: Ambulated; Dangled; Stood  Assistive Device: Roller walker; Other (Comment) (Assist x1 with chair follow. Assisted PT Madeline Reyes)  Education Provided: Yes  Patient Position at End of Consult: Bedside chair;  All needs within reach; Bed/Chair alarm activated    Steph PUENTES, Restorative Technician, [employer]

## 2023-10-30 NOTE — PLAN OF CARE
Problem: PAIN - ADULT  Goal: Verbalizes/displays adequate comfort level or baseline comfort level  Description: Interventions:  - Encourage patient to monitor pain and request assistance  - Assess pain using appropriate pain scale  - Administer analgesics based on type and severity of pain and evaluate response  - Implement non-pharmacological measures as appropriate and evaluate response  - Consider cultural and social influences on pain and pain management  - Notify physician/advanced practitioner if interventions unsuccessful or patient reports new pain  Outcome: Progressing     Problem: INFECTION - ADULT  Goal: Absence or prevention of progression during hospitalization  Description: INTERVENTIONS:  - Assess and monitor for signs and symptoms of infection  - Monitor lab/diagnostic results  - Monitor all insertion sites, i.e. indwelling lines, tubes, and drains  - Monitor endotracheal if appropriate and nasal secretions for changes in amount and color  - [place] appropriate cooling/warming therapies per order  - Administer medications as ordered  - Instruct and encourage patient and family to use good hand hygiene technique  - Identify and instruct in appropriate isolation precautions for identified infection/condition  Outcome: Progressing     Problem: DISCHARGE PLANNING  Goal: Discharge to home or other facility with appropriate resources  Description: INTERVENTIONS:  - Identify barriers to discharge w/patient and caregiver  - Arrange for needed discharge resources and transportation as appropriate  - Identify discharge learning needs (meds, wound care, etc.)  - Arrange for interpretive services to assist at discharge as needed  - Refer to Case Management Department for coordinating discharge planning if the patient needs post-hospital services based on physician/advanced practitioner order or complex needs related to functional status, cognitive ability, or social support system  Outcome: Progressing Problem: NEUROSENSORY - ADULT  Goal: Achieves maximal functionality and self care  Description: INTERVENTIONS  - Monitor swallowing and airway patency with patient fatigue and changes in neurological status  - Encourage and assist patient to increase activity and self care.    - Encourage visually impaired, hearing impaired and aphasic patients to use assistive/communication devices  Outcome: Progressing

## 2023-10-30 NOTE — CONSULTS
PHYSICAL MEDICINE AND REHABILITATION CONSULT NOTE  Magdalena Halsted 68 y.o. male MRN: [id number]  Unit/Bed#: [facility] 711-01 Encounter: [id number]    Requested by (Physician/Service): Jonathon Christy MD  Reason for Consultation:  Assessment of rehabilitation needs  Chief Complaint:  Sensory impairment, weakness most notable LUE, urinary retention    Assessment:  Rehabilitation Diagnosis:   Non-traumatic incomplete tetraplegia s/p C3-C6 PCDF  Examines C5 AIS D   Proprioception intact in great toes   LT intact in UE, PP impaired starting C7 on the R and C6 on the L. Possible neurogenic bladder with urinary retention, and post-op vs. Neurogenic bowel with constipation requiring bowel regimen  Has hypotension as well - which could be related to his myelopathy, risk of autonomic dysfunction including orthostasis. Impaired mobility and self care  Impaired cognition     Recommendations:  Rehabilitation Plan:  Continue PT/OT (SLP) while on acute care. Should have good recovery, does not need to go to specialty SCI unit, his preference is [facility]. He has been admitted to [address] [place] [address]. Requires close medical oversight for: hyponatremia, hypotension (new), bowel/bladder dysfunction as detailed below, pain management, incisional care. At this time can medically be managed at post-acute setting. Medical Co-morbidities Pending Issues:    Urinary Retention/Possible Neurogenic Bladder  - Last required straight cath yesterday for 750cc  - Random scan today 443. Unclear if he voided or cathed for 500cc  - Regardless fountain replaced  - At rehab would institute Timed Voids Q6hr, and check strict PVRs to better evaluate retention  - Straight cath > 500cc and record catheterized volume. Constipation/Possible Neurogenic Bowel  Last  10/28  - Senokot-S 1 tablet BID and Miralax BID  - Has PRN suppository.      Post-operative pain  Scheduled Tylenol  Gabapentin 100mg TID  Lidoderm patch   Tizanidine 4mg TID (please note this can also lower BP - monitor). Oxycodone 5-10mg Q4hr PRN     Hypotension  - Recommend abdominal binder/TEDs if symptomatic or orthostatic when OOB. - BP normal to high prior to admission. Around 10/23 has had SBP 80-low 100s. - Could be related to cervical myelopathy as well as meds (scheduled Tizanidine started during hospitalization)    Hyponatremia  - FR, Salt tabs  - May need Lasix and eval by Nephrology  - 10/30 Sodium improved to 132. #Skin/Pressure Injury Prevention: Turn Q2hr in bed, with weight shifts [id number] in wheelchair. #DVT Prophylaxis: HSQ, SCDs  #GI Prophylaxis: PPI for GERD    Thank you for allowing the PM&R service to participate in the care of this patient. We will continue to follow [facility] [place] progress with you. Please do not hesitate to call with questions or concerns    History of Present Illness:  Gale Martínez is a 68 y.o. male with medical history of GERD< HTN, HLD, cervical stenosis with myelopathy who presented to [facility] on 10/20 after a fall. He got up, and he fell againk, hitting his head on the dresser - both times his legs giving out from under him. He has baseline weakness for which he sees Dr. Sandra Parkinson and was scheduled for surgery (elective PCDF) on 10/30 at [facility]. UA negative, CBC, CMP unremarkable, negative for COVID/Flu/RSV, CTH unremarkable, CT C-Spine without acute findings, and CT T-L spine showed moderate multilevel degenerative changes. L Knee XR similarly with degenerative changes. He had some difficulty urinating on admission and required straight catheterization and ultimately fountain placement. Thoracic and lumbar MRI were ordered and he was transferred to [facility]. MRI showed severe stenosis C3-4, C4-5 and C5-6 with mild cord signal changes. On 10/25, Dr. Troy Proctor performed a PCDF C3-6 (laminectomes C4,5,6 and partial C3). At baseline, SSEPs noted to be dampened. Post-op no bracing required, but has cervical precautions.  Course c/b pain and hyponatremia and hypotension (felt 2/2 SIADH, also getting cosyntropin stim test which was not concerning for adrenal insufficiency). His fountain was removed and void trial attempted which he failed. Fountain was placed. He denies any dizziness/lightheadedness. He is R handed, reports decreased strength in LUE and paresthesias worse in L hand. He denies any new CP, SOB, fevers, chills, N/V, abdominal pain. NO positional dizziness/lightheadedness. He denies issues with bladder leading up to hospitalization, and reports some worsening constipation leading up to this hospitalization. He feels strength in LLE is improving. He did better with therapy over the weekend. Review of Systems: 10 point ROS negative except for what is noted in HPI    CURRENT GAP IN FUNCTION     Prior to Admission:   Independent with ADLs, functional mobility, and IADLs. But had noticed decline in hand dexterity and ambulation in the past week leading up to hospitalization. FUNCTIONAL STATUS:  Physical Therapy: modA x1 bed mobility, modA x1 transfers, modA x1 ambulation 10-13'    Occupational Therapy: Sup eating, Tonja grooming, modA UB bathing/dressing, maxA LB bathing/dressing     Social History:    Eloise Buerger Lives with: lives alone. He lives in [facility]  The living area: can live on one level  Equipment in home:  walk in shower  There 1 step to enter the home. Patient/family's goals: Return to previous home/apartment. The patient will not have 24 hour [facility] Supervision/physical assistance: supervision/physical assistance available upon discharge.   Social History     Socioeconomic History    Marital status: Single     Spouse name: Not on file    Number of children: Not on file    Years of education: Not on file    Highest education level: Not on file   Occupational History    Not on file   Tobacco Use    Smoking status: Never    Smokeless tobacco: Never   Vaping Use    Vaping Use: Never used   Substance and Sexual Activity    Alcohol use: Yes     Alcohol/week: 6.0 standard drinks of alcohol     Types: 6 Cans of beer per week     Comment: beer 4-5 days a week    Drug use: Never    Sexual activity: Not on file   Other Topics Concern    Not on file   Social History Narrative    Not on file     Social Determinants of Health     Financial Resource Strain: Not on file   Food Insecurity: No Food Insecurity (10/22/2023)    Hunger Vital Sign     Worried About Running Out of Food in the Last Year: Never true     Ran Out of Food in the Last Year: Never true   Transportation Needs: No Transportation Needs (10/22/2023)    PRAPARE - Transportation     Lack of Transportation (Medical): No     Lack of Transportation (Non-Medical): No   Physical Activity: Not on file   Stress: Not on file   Social Connections: Not on file   Intimate Partner Violence: Not on file   Housing Stability: Low Risk  (10/22/2023)    Housing Stability Vital Sign     Unable to Pay for Housing in the Last Year: No     Number of Places Lived in the Last Year: 1     Unstable Housing in the Last Year: No        Family History:    No family history on file.       MEDICATIONS:     Current Facility-Administered Medications:     acetaminophen (TYLENOL) tablet 975 mg, 975 mg, Oral, Q8H [address], REBEL [place]-T[place]O, 975 mg at 10/30/23 0606    albuterol (PROVENTIL HFA,VENTOLIN HFA) inhaler 2 puff, 2 puff, Inhalation, Q6H PRN, Velasquez Henry PA-C    aluminum-magnesium hydroxide-simethicone (MAALOX) oral suspension 30 mL, 30 mL, Oral, Q6H PRN, REBEL Almonte-C    atorvastatin (LIPITOR) tablet 10 mg, 10 mg, Oral, Daily, Velasquez Henry PA-C, 10 mg at 10/29/23 5765    bisacodyl (DULCOLAX) rectal suppository 10 mg, 10 mg, Rectal, Daily PRN, REBEL Almonte-C    cephalexin (KEFLEX) capsule 500 mg, 500 mg, Oral, Q8H [facility], Kadie Henry PA-C, 500 mg at 10/30/23 0606    fluticasone (FLONASE) 50 mcg/act nasal spray 2 spray, 2 spray, Each Nare, Daily PRN, Velasquez Henry PA-C    folic acid (FOLVITE) tablet 400 mcg, 400 mcg, Oral, Daily, Cristin Harvey PA-C, 400 mcg at 10/29/23 0827    gabapentin (NEURONTIN) capsule 100 mg, 100 mg, Oral, TID, Lovely Schirmer Ouma, PA-C, 100 mg at 10/29/23 2213    heparin (porcine) subcutaneous injection 5,000 Units, 5,000 Units, Subcutaneous, Q8H [facility], 5,000 Units at 10/30/23 0606 **AND** [CANCELED] Platelet count, , , AM Draw, Lovely Schirmer Ouma, PA-C    lidocaine (LIDODERM) 5 % patch 1 patch, 1 patch, Topical, Daily, Lovely Schirmer Ouma, PA-C, 1 patch at 10/24/23 1352    lisinopril (ZESTRIL) tablet 10 mg, 10 mg, Oral, Daily, Umer River MD, 10 mg at 10/29/23 0831    loratadine (CLARITIN) tablet 10 mg, 10 mg, Oral, Daily, Lovely Schirmer Ouma, PA-C, 10 mg at 10/29/23 9605    melatonin tablet 3 mg, 3 mg, Oral, HS, Lovely Schirmer Ouma, PA-C, 3 mg at 10/29/23 2211    ondansetron (ZOFRAN) injection 4 mg, 4 mg, Intravenous, Q6H PRN, Lovely Schirmer Ouma, PA-C    oxyCODONE (ROXICODONE) IR tablet 5 mg, 5 mg, Oral, Q4H PRN, 5 mg at 10/30/23 0050 **OR** oxyCODONE (ROXICODONE) IR tablet 10 mg, 10 mg, Oral, Q4H PRN, Lovely Schirmer Ouma, PA-C, 10 mg at 10/29/23 0514    pantoprazole (PROTONIX) EC tablet 40 mg, 40 mg, Oral, Daily, Lovely Schirmer Ouma, PA-C, 40 mg at 10/30/23 0606    polyethylene glycol (MIRALAX) packet 17 g, 17 g, Oral, BID, Kadie Henry PA-C, 17 g at 10/29/23 1712    senna-docusate sodium (SENOKOT S) 8.6-50 mg per tablet 1 tablet, 1 tablet, Oral, BID, Lovely Schirmer Ouma, PA-C, 1 tablet at 10/29/23 1713    sodium chloride tablet 2 g, 2 g, Oral, TID With Meals, Umer River MD, 2 g at 10/29/23 1713    tiZANidine (ZANAFLEX) tablet 4 mg, 4 mg, Oral, TID, Cristin Harvey PA-C, 4 mg at 10/29/23 4133    Past Medical History:     Past Medical History:   Diagnosis Date    GERD (gastroesophageal reflux disease)     Hyperlipidemia     Hypertension         Past Surgical History:     Past Surgical History:   Procedure Laterality Date    COLONOSCOPY      HERNIA REPAIR      NV ARTHRD PST/PSTLAT TQ 1NTRSPC CRV BELW C2 SEGMENT N/A 10/25/2023 Procedure: C3-6 PCDF;  Surgeon: Andrae Johnson MD;  Location: [facility];  Service: Neurosurgery    CA COLONOSCOPY FLX DX W/COLLJ [facility] INPATIENT REHABILITATION WHEN PFRMD N/A 4/5/2019    Procedure: COLONOSCOPY;  Surgeon: Lex Galindo DO;  Location: [facility]; Service: Gastroenterology    ROTATOR CUFF REPAIR Left     TONSILLECTOMY           Allergies:     No Known Allergies        Physical Exam:  /66   Pulse 77   Temp 98.4 °F (36.9 °C)   Resp 16   Ht 5' 9" (1.753 m)   Wt 111 kg (244 lb 14.9 oz)   SpO2 99%   BMI 36.17 kg/m²        Intake/Output Summary (Last 24 hours) at 10/30/2023 0919  Last data filed at 10/30/2023 0601  Gross per 24 hour   Intake 1076 ml   Output 2023 ml   Net -947 ml       Body mass index is 36.17 kg/m². Gen: No acute distress, Well-nourished, well-appearing. HEENT: Moist mucus membranes, Normocephalic/Atraumatic  Cardiovascular: Regular rate, rhythm, S1/S2. Distal pulses palpable  Heme/Extr: LUE edema. Pulmonary: Non-labored breathing. Lungs CTAB  : + fountain  GI: Soft, non-tender, non-distended. BS+  MSK: ROM is WFL in all extremities. No effusions or deformities. Bulk is symmetric. See below for MMT scores. Integumentary: Skin is warm, dry. Neuro: AAOx3, CN 2-12 intact. Sensation intact to LT from C2-T1 bilaterally, Pinp[place] impaired starting at C7 on the R and C6 on the L (expedited ISNCSCI only). Appropriate to questioning. Tone is normal. Babinski up on the L, down on the R, no clonus. Rectal exam deferred. Patient currently in chair. ISNCSCI strength:  R: [id number]  L: [id number]  Psych: Normal mood and affect.        LABORATORY RESULTS:      Lab Results   Component Value Date    HGB 10.0 (L) 10/28/2023    HCT 30.6 (L) 10/28/2023    WBC 7.36 10/28/2023     Lab Results   Component Value Date    BUN 24 10/29/2023    K 4.4 10/29/2023    CL 93 (L) 10/29/2023    CREATININE 0.93 10/29/2023     Lab Results   Component Value Date    PROTIME 12.7 10/25/2023    INR 0.96 10/25/2023 DIAGNOSTIC STUDIES: Reviewed  XR cervical spine 2 or 3 views    Result Date: 10/27/2023  Impression: Straightening with posterior spinal fixation from C3-C6 is in alignment with soft tissue changes Workstation performed: [id number]     XR spine cervical 2 or 3 vw injury    Result Date: 10/25/2023  Impression: Fluoroscopic guidance provided for surgical procedure. Please refer to the separate procedure notes for additional details. Localization procedure was performed, with the OR notified of the level at approximately 1:50 p.m. on  10/25/2023 via telephone conversation with the OR x-ray technologist . Workstation performed: [id number]     6/13 MRI C-Spine:  Diffuse degenerative disc disease and facet osteoarthritis. Details above. Marked central canal narrowing at C4-C5 and C5-C6 with cord compression. Abnormal cord signal intensity from C3-C4 through C4-C5 most compatible with edema and/or myelomalacia. Marked foraminal narrowing from C3-C4 through C5-C6 with probable exiting nerve root impingement. 10/20 CTH:  No intracranial hemorrhage or calvarial fracture. 10/20 CT C-Spine:  No cervical spine fracture or traumatic malalignment. 10/20 CT T/L Spine:  No acute fracture or traumatic listhesis of the visualized thoracic and lumbar spine     10/20 XR L Knee:  No acute osseous abnormality. Degenerative changes as described. 10/20 MRI T-Spine:  Mild thoracic degenerative changes without significant stenosis or compression of the thoracic cord. Severe canal stenosis with cord compression at C4-5 suggested on sagittal large field-of-view localizer sequence with questionable abnormal cord signal. Incompletely assessed. Recommend MRI of the cervical spine for further evaluation. 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   Degenerative spondylosis most pronounced at L5-S1 and S1-S2.

## 2023-10-30 NOTE — PROGRESS NOTES
[facility]  Progress Note  Name: Elizabeth Mustafa  MRN: [id number]  Unit/Bed#: [facility]PHP 977-11 I Date of Admission: 10/23/2023   Date of Service: 10/30/2023 I Hospital Day: 7    Assessment/Plan   * Osteoarthritis of cervical spine with myelopathy  Assessment & Plan  Patient has known history of cervical radiculopathy, C4-C6 cord with marked stenosis and compression. Follows with neurosurgery in outpatient setting, was scheduled for outpatient decompression procedure on 10/30 however presented to hospital for frequent falls. MRI thoracic spine with cord compression at C4-5, mild thoracic degenerative changes without significant stenosis. Case was discussed with neurosurgery team, given concerns of worsening myelopathy symptoms including bilateral lower extremity involuntary muscle twitching, neuropathic pain and weakness, decision was made to transfer to [facility] for earlier inpatient procedure. Neurosurgery following, patient is status post  cervical decompression and fusion on 10/25. DIANA drain removed on 10/27 by neurosurgery. Christensen removed >> monitor on retention protocol     Continue fall precautions. Continue multimodal pain management. Acute rehab on discharge, cm following, Likely dc plan to [facility]. Hyponatremia  Assessment & Plan  Patient reports that this has been a chronic issue. Suspect SIADH. Note hydrochlorothiazide use and pain. Uric acid normal, urine sodium 46, urine osm 735, serum osm 278  129>> 132>> 131>>130>> 127>>132  TSH normal  Hold HCTZ  Trend BMP  Fluid Restriction to 1800cc  Continue salt tabs  Cortisol 6.2>> given low BP and hypoNa, cosyntropin stim test obtained>> not concerning for adrenal insufficieny .      Constipation  Assessment & Plan  Last BM 10/28/23  Bowel regimen including senna, colace and miralax  Lactulose added intermittently  Prn suppository      Urinary retention  Assessment & Plan  Patient noted to have urinary retention, needing straight cath at [facility]. Continue retention protocol. Was with fountain, removed on 10/26>> monitor on retention protocol. Voiding but also retaining urine during PVR checks, monitor on retention protocol for now. Hopefully retention will improve with improvement in constipation. Now again with fountain on 10.30>> TOV at [facility] once more ambulatory   Start finasteride  OP urology follow up    Gastroesophageal reflux disease  Assessment & Plan  Patient is on omeprazole at home, continue alternative equivalent pantoprazole while inpatient. Benign essential hypertension  Assessment & Plan  Blood pressure was on the lower side  on lisinopril 20 mg with holding parameters, decrease dose  HCTZ discontinued given hyponatremia. VTE Pharmacologic Prophylaxis:   Pharmacologic: Heparin  Mechanical VTE Prophylaxis in Place: Yes    Patient Centered Rounds: I have performed bedside rounds with nursing staff today. Discussions with Specialists or Other Care Team Provider: AMARILIS    Education and Discussions with Family / Patient: plan  of care, patient. He declined to call family for updates. Time Spent for Care: 30 minutes. More than 50% of total time spent on counseling and coordination of care as described above. Current Length of Stay: 7 day(s)    Current Patient Status: Inpatient   Certification Statement: The patient will continue to require additional inpatient hospital stay due to pending placement    Discharge Plan: pending placement to [facility] pending insurance approval    Code Status: Level 1 - Full Code      Subjective: And ended up with Fountain catheter again due to urinary retention. Had a bowel movement on day before yesterday. Still feeling constipated. No nausea or vomiting or abdominal pain but he has to go. He is passing flatus. Ambulated with therapy today.     Objective:     Vitals:   Temp (24hrs), Av.2 °F (36.8 °C), Min:98 °F (36.7 °C), Max:98.4 °F (36.9 °C)    Temp:  [18 °F (36.7 °C)-98.4 °F (36.9 °C)] 98 °F (36.7 °C)  HR:  [69-80] 80  Resp:  [16] 16  BP: ()/(50-81) 134/79  SpO2:  [93 %-99 %] 93 %  Body mass index is 36.17 kg/m². Input and Output Summary (last 24 hours): Intake/Output Summary (Last 24 hours) at 10/30/2023 1704  Last data filed at 10/30/2023 1700  Gross per 24 hour   Intake 1058 ml   Output 1800 ml   Net -742 ml       Physical Exam:     Physical Exam  Constitutional:       General: He is not in acute distress. Cardiovascular:      Rate and Rhythm: Normal rate and regular rhythm. Heart sounds: Normal heart sounds. No murmur heard. Pulmonary:      Effort: No respiratory distress. Breath sounds: Normal breath sounds. No wheezing or rales. Abdominal:      General: Bowel sounds are normal. There is no distension. Palpations: Abdomen is soft. Tenderness: There is no abdominal tenderness. Skin:     General: Skin is warm. Neurological:      Mental Status: He is alert.       Comments: Awake alert communicative  Moves all extremities      Additional Data:     Labs:    Results from last 7 days   Lab Units 10/28/23  1245   WBC Thousand/uL 7.36   HEMOGLOBIN g/dL 10.0*   HEMATOCRIT % 30.6*   PLATELETS Thousands/uL 195   NEUTROS PCT % 73   LYMPHS PCT % 14   MONOS PCT % 11   EOS PCT % 1     Results from last 7 days   Lab Units 10/30/23  0605   SODIUM mmol/L 132*   POTASSIUM mmol/L 4.4   CHLORIDE mmol/L 95*   CO2 mmol/L 30   BUN mg/dL 23   CREATININE mg/dL 0.84   ANION GAP mmol/L 7   CALCIUM mg/dL 8.6   GLUCOSE RANDOM mg/dL 101     Results from last 7 days   Lab Units 10/25/23  0500   INR  0.96     Results from last 7 days   Lab Units 10/25/23  1621 10/25/23  0708 10/24/23  2127   POC GLUCOSE mg/dl 154* 109 82                 Recent Cultures (last 7 days):           Last 24 Hours Medication List:   Current Facility-Administered Medications   Medication Dose Route Frequency Provider Last Rate    acetaminophen  975 mg Oral Q8H [facility] John Sic Carl, ZION      albuterol  2 puff Inhalation Q6H PRN Murlean Cake, PA-C      aluminum-magnesium hydroxide-simethicone  30 mL Oral Q6H PRN Murlean Cake, PA-C      atorvastatin  10 mg Oral Daily [facility] Carl, PA-TITO      bisacodyl  10 mg Rectal Daily PRN Murlean Cake, PA-C      cephalexin  500 mg Oral Q8H [address] Nathalielean Cake, ZION      [START ON 10/31/2023] finasteride  5 mg Oral Daily Karen Whatley MD      fluticasone  2 spray Each Nare Daily PRN Murlean Cake, PA-TITO      folic acid  772 mcg Oral Daily [facility] Carl, ZION      gabapentin  100 mg Oral TID Murlean Cake, ZION      heparin (porcine)  5,000 Units [facility] Murlean Cake, PA-C      lidocaine  1 patch Topical Daily [facility] Carl, ZION      lisinopril  10 mg Oral Daily Karen Whatley MD      loratadine  10 mg Oral Daily [facility] Carl, ZION      melatonin  3 mg Oral HS Kadie Henry PA-C      ondansetron  4 mg Intravenous Q6H PRN Murlean Cake, PA-TITO      oxyCODONE  5 mg Oral Q4H PRN Murlean Cake, ZION      Or    oxyCODONE  10 mg Oral Q4H PRN Murlean Cake, PA-C      pantoprazole  40 mg Oral Daily Kadie Henry PA-C      polyethylene glycol  17 g Oral BID Murlean Cake, PA-TITO      senna-docusate sodium  1 tablet Oral BID Murlean Cake, PA-C      sodium chloride  1 g Oral BID With Meals Karen Whatley MD      tiZANidine  4 mg Oral TID Murlean Cake, ZION          Today, Patient Was Seen By: Karen Whatley MD    ** Please Note: Dictation voice to text software may have been used in the creation of this document.  **

## 2023-10-31 LAB
ANION GAP SERPL CALCULATED.3IONS-SCNC: 7 MMOL/L
BUN SERPL-MCNC: 20 MG/DL (ref 5–25)
CALCIUM SERPL-MCNC: 8.8 MG/DL (ref 8.4–10.2)
CHLORIDE SERPL-SCNC: 91 MMOL/L (ref 96–108)
CO2 SERPL-SCNC: 31 MMOL/L (ref 21–32)
CREAT SERPL-MCNC: 0.87 MG/DL (ref 0.6–1.3)
GFR SERPL CREATININE-BSD FRML MDRD: 85 ML/MIN/1.73SQ M
GLUCOSE SERPL-MCNC: 97 MG/DL (ref 65–140)
POTASSIUM SERPL-SCNC: 4.3 MMOL/L (ref 3.5–5.3)
SODIUM SERPL-SCNC: 129 MMOL/L (ref 135–147)

## 2023-10-31 PROCEDURE — 80048 BASIC METABOLIC PNL TOTAL CA: CPT | Performed by: INTERNAL MEDICINE

## 2023-10-31 PROCEDURE — 99232 SBSQ HOSP IP/OBS MODERATE 35: CPT | Performed by: FAMILY MEDICINE

## 2023-10-31 PROCEDURE — 97535 SELF CARE MNGMENT TRAINING: CPT

## 2023-10-31 RX ADMIN — GABAPENTIN 100 MG: 100 CAPSULE ORAL at 16:37

## 2023-10-31 RX ADMIN — ACETAMINOPHEN 975 MG: 325 TABLET, FILM COATED ORAL at 22:46

## 2023-10-31 RX ADMIN — FINASTERIDE 5 MG: 5 TABLET, FILM COATED ORAL at 09:08

## 2023-10-31 RX ADMIN — TIZANIDINE 4 MG: 4 TABLET ORAL at 22:46

## 2023-10-31 RX ADMIN — HEPARIN SODIUM 5000 UNITS: 5000 INJECTION INTRAVENOUS; SUBCUTANEOUS at 06:06

## 2023-10-31 RX ADMIN — POLYETHYLENE GLYCOL 3350 17 G: 17 POWDER, FOR SOLUTION ORAL at 09:09

## 2023-10-31 RX ADMIN — CEPHALEXIN 500 MG: 500 CAPSULE ORAL at 06:06

## 2023-10-31 RX ADMIN — POLYETHYLENE GLYCOL 3350 17 G: 17 POWDER, FOR SOLUTION ORAL at 16:37

## 2023-10-31 RX ADMIN — SENNOSIDES, DOCUSATE SODIUM 1 TABLET: 8.6; 5 TABLET ORAL at 16:36

## 2023-10-31 RX ADMIN — SODIUM CHLORIDE 1 G: 1 TABLET ORAL at 09:08

## 2023-10-31 RX ADMIN — GABAPENTIN 100 MG: 100 CAPSULE ORAL at 22:46

## 2023-10-31 RX ADMIN — SODIUM CHLORIDE 1 G: 1 TABLET ORAL at 16:37

## 2023-10-31 RX ADMIN — OXYCODONE HYDROCHLORIDE 5 MG: 5 TABLET ORAL at 09:12

## 2023-10-31 RX ADMIN — MELATONIN 3 MG: at 22:46

## 2023-10-31 RX ADMIN — GABAPENTIN 100 MG: 100 CAPSULE ORAL at 09:08

## 2023-10-31 RX ADMIN — ATORVASTATIN CALCIUM 10 MG: 10 TABLET, FILM COATED ORAL at 09:08

## 2023-10-31 RX ADMIN — TIZANIDINE 4 MG: 4 TABLET ORAL at 09:08

## 2023-10-31 RX ADMIN — OXYCODONE HYDROCHLORIDE 5 MG: 5 TABLET ORAL at 22:46

## 2023-10-31 RX ADMIN — HEPARIN SODIUM 5000 UNITS: 5000 INJECTION INTRAVENOUS; SUBCUTANEOUS at 14:20

## 2023-10-31 RX ADMIN — FOLIC ACID TAB 400 MCG 400 MCG: 400 TAB at 09:08

## 2023-10-31 RX ADMIN — PANTOPRAZOLE SODIUM 40 MG: 40 TABLET, DELAYED RELEASE ORAL at 06:06

## 2023-10-31 RX ADMIN — SENNOSIDES, DOCUSATE SODIUM 1 TABLET: 8.6; 5 TABLET ORAL at 09:08

## 2023-10-31 RX ADMIN — ACETAMINOPHEN 975 MG: 325 TABLET, FILM COATED ORAL at 14:20

## 2023-10-31 RX ADMIN — LISINOPRIL 10 MG: 10 TABLET ORAL at 09:08

## 2023-10-31 RX ADMIN — HEPARIN SODIUM 5000 UNITS: 5000 INJECTION INTRAVENOUS; SUBCUTANEOUS at 22:45

## 2023-10-31 RX ADMIN — LORATADINE 10 MG: 10 TABLET ORAL at 09:08

## 2023-10-31 RX ADMIN — TIZANIDINE 4 MG: 4 TABLET ORAL at 16:37

## 2023-10-31 RX ADMIN — ACETAMINOPHEN 975 MG: 325 TABLET, FILM COATED ORAL at 06:06

## 2023-10-31 NOTE — OCCUPATIONAL THERAPY NOTE
Occupational Therapy Progress Note     Patient Name: Bebeto Yoon  [id number] Date: 10/31/2023  Problem List  Principal Problem:    Osteoarthritis of cervical spine with myelopathy  Active Problems:    Ambulatory dysfunction    Benign essential hypertension    Gastroesophageal reflux disease    Hyperlipidemia    Urinary retention    Preoperative clearance    Constipation    Paresthesia    Weakness of extremity    Hyponatremia         Occupational Therapy Treatment Note     10/31/23 1418   OT Last Visit   OT Visit Date 10/31/23   Note Type   Note Type Treatment   Pain Assessment   Pain Assessment Tool 0-10   Pain Score No Pain   Restrictions/Precautions   Weight Bearing Precautions Per Order No   Braces or Orthoses   (Per neurosurgery no Cervical brace needed.)   Other Precautions Chair Alarm; Fall Risk;Pain;Spinal precautions   Lifestyle   Autonomy Mod I with increased to for self care tasks (with recent symptoms)/mod I with ADL's, no AD with functional mobility, +drives   Reciprocal Relationships pt reports golfing several times a week. meets his friends out at times   Service to Others retired   Intrinsic Gratification spending time outside   ADL   Where [place] of bed   [address] [facility] [address]  [address] [place] [address] 4  Minimal Assistance   UB Bathing Deficit Chest;Right arm;Left arm; Abdomen   LB Bathing Assistance 2  Maximal Assistance   LB Bathing Deficit Buttocks; Perineal area;Right upper leg;Left upper leg   UB Dressing Assistance 4  Minimal Assistance   UB Dressing Deficit Thread RUE; Thread LUE;Pull over head   LB Dressing Assistance 3  Moderate Assistance   LB Dressing Deficit Thread RLE into pants; Thread LLE into pants   Toileting Assistance  2  Maximal Assistance   Transfers   Sit to Stand 3  Moderate assistance   Additional items Assist x 1   Stand to Sit 4  Minimal assistance   Additional items Assist x 1   Toilet transfer 3  Moderate assistance   Functional Mobility Functional Mobility 3  Moderate assistance   Additional items Rolling walker   Cognition   Overall Cognitive Status WFL   Arousal/Participation Alert; Responsive; Cooperative   Attention Attends with cues to redirect   Orientation Level Oriented X4   Memory Within functional limits   Following Commands Follows all commands and directions without difficulty   Activity Tolerance   Activity Tolerance Patient tolerated treatment well   Assessment   Assessment Pt seen for Occupational Therapy session with focus on activity tolerance, functional transfers/mob, sitting balance and tolerance and standing tolerance and balance for pt engagement in UB/LB self-care tasks. Pt cleared by RN/ Karley Hanna for pt participated in OT session. Pt presented sitting out of bed to bedside chair and pt identifiers confirmed. Pt reported his/her therapy goal to become independent and return home alone. Pt required assist for UB/LB self-care 2* limited activity tolerance/pt was easily short of breath with self-care tasks, decreased strength, balance and coordination. He was able to tolerate sitting edge of pt bed for LB dressing. Pt will require post acute rehab service to continue to address these above noted pt deficit which currently impair pt ADL and functional mob.  Pt set up to bedside chair post session, chair alarm activated and all needs within pt reach   Plan   Treatment Interventions ADL retraining   Goal Expiration Date 11/09/23   OT Treatment Day 2   OT Frequency 2-3x/wk   Discharge Recommendation   Rehab Resource Intensity Level, OT I (Maximum Resource Intensity)   AM-PAC Daily Activity Inpatient   Lower Body Dressing 2   Bathing 2   Toileting 2   Upper Body Dressing 2   Grooming 2   Eating 3   Daily Activity Raw Score 13   Daily Activity Standardized Score (Calc for Raw Score >=11) 32.03   AM-PAC Applied Cognition Inpatient   Following a Speech/Presentation 4   Understanding Ordinary Conversation 4   Taking Medications 4 Remembering Where Things Are Placed or Put Away 4   Remembering List of 4-5 Errands 4   Taking Care of Complicated Tasks 4   Applied Cognition Raw Score 24   Applied Cognition Standardized Score 62.21   Barthel Index   Feeding 5   Bathing 0   Grooming Score 0   Dressing Score 0   Bladder Score 5   Bowels Score 5   Toilet Use Score 5   Transfers (Bed/Chair) Score 5   Mobility (Level Surface) Score 0   Stairs Score 0   Barthel Index Score 25   Modified [place] Scale   Modified Rodney Scale 4       Yanely ZAPIEN/JANAK

## 2023-10-31 NOTE — PLAN OF CARE
Problem: MOBILITY - ADULT  Goal: Maintain or return to baseline ADL function  Description: INTERVENTIONS:  -  Assess patient's ability to carry out ADLs; assess patient's baseline for ADL function and identify physical deficits which impact ability to perform ADLs (bathing, care of mouth/teeth, toileting, grooming, dressing, etc.)  - Assess/evaluate cause of self-care deficits   - Assess range of motion  - Assess patient's mobility; develop plan if impaired  - Assess patient's need for assistive devices and provide as appropriate  - Encourage maximum independence but intervene and supervise when necessary  - Involve family in performance of ADLs  - Assess for home care needs following discharge   - Consider OT consult to assist with ADL evaluation and planning for discharge  - Provide patient education as appropriate  Outcome: Progressing  Goal: Maintains/Returns to pre admission functional level  Description: INTERVENTIONS:  - Perform BMAT or MOVE assessment daily.   - Set and communicate daily mobility goal to care team and patient/family/caregiver. - Collaborate with rehabilitation services on mobility goals if consulted  - Perform Range of Motion 3 times a day. - Reposition patient every 2 hours.   - Dangle patient 3 times a day  - Stand patient 3 times a day  - Ambulate patient 3 times a day  - Out of bed to chair 3 times a day   - Out of bed for meals 3 times a day  - Out of bed for toileting  - Record patient progress and toleration of activity level   Outcome: Progressing     Problem: PAIN - ADULT  Goal: Verbalizes/displays adequate comfort level or baseline comfort level  Description: Interventions:  - Encourage patient to monitor pain and request assistance  - Assess pain using appropriate pain scale  - Administer analgesics based on type and severity of pain and evaluate response  - Implement non-pharmacological measures as appropriate and evaluate response  - Consider cultural and social influences on pain and pain management  - Notify physician/advanced practitioner if interventions unsuccessful or patient reports new pain  Outcome: Progressing     Problem: INFECTION - ADULT  Goal: Absence or prevention of progression during hospitalization  Description: INTERVENTIONS:  - Assess and monitor for signs and symptoms of infection  - Monitor lab/diagnostic results  - Monitor all insertion sites, i.e. indwelling lines, tubes, and drains  - Monitor endotracheal if appropriate and nasal secretions for changes in amount and color  - [place] appropriate cooling/warming therapies per order  - Administer medications as ordered  - Instruct and encourage patient and family to use good hand hygiene technique  - Identify and instruct in appropriate isolation precautions for identified infection/condition  Outcome: Progressing     Problem: DISCHARGE PLANNING  Goal: Discharge to home or other facility with appropriate resources  Description: INTERVENTIONS:  - Identify barriers to discharge w/patient and caregiver  - Arrange for needed discharge resources and transportation as appropriate  - Identify discharge learning needs (meds, wound care, etc.)  - Arrange for interpretive services to assist at discharge as needed  - Refer to Case Management Department for coordinating discharge planning if the patient needs post-hospital services based on physician/advanced practitioner order or complex needs related to functional status, cognitive ability, or social support system  Outcome: Progressing

## 2023-10-31 NOTE — ASSESSMENT & PLAN NOTE
Patient noted to have urinary retention, needing straight cath at [facility] [employer] [facility] [employer]. Continue retention protocol. Was with fountain, removed on 10/26>> monitor on retention protocol. Voiding but also retaining urine during PVR checks, monitor on retention protocol for now. Hopefully retention will improve with improvement in constipation.     Now again with fountain on 10.30>> TOV at [facility] once more ambulatory   Start finasteride  OP urology follow up

## 2023-10-31 NOTE — RESTORATIVE TECHNICIAN NOTE
Restorative Technician Note      Patient Name: Lana Sim     Note Type: Mobility  Patient Position Upon Consult: Bedside chair  Activity Performed: Ambulated; APYLVSF; Stood  Assistive Device: Roller walker  Education Provided: Yes  Patient Position at [employer] of Consult: Bedside chair;  All needs within reach; Bed/Chair alarm activated    [facility] ELVIS PUENTES, Restorative Technician, [employer]

## 2023-10-31 NOTE — PLAN OF CARE
Problem: OCCUPATIONAL THERAPY ADULT  Goal: Performs self-care activities at highest level of function for planned discharge setting. See evaluation for individualized goals. Description: Treatment Interventions: ADL retraining, Functional transfer training, UE strengthening/ROM, Endurance training, Cognitive reorientation, Patient/family training, Equipment evaluation/education, Fine motor coordination activities, Compensatory technique education, Continued evaluation, Energy conservation, Activityengagement  Equipment Recommended: Bedside commode, Shower/Tub chair with back ($) (if discharges home)       See flowsheet documentation for full assessment, interventions and recommendations. 10/31/2023 1536 by CURRY See  Outcome: Progressing  Note: Limitation: Decreased ADL status, Decreased UE ROM, Decreased UE strength, Decreased Safe judgement during ADL, Decreased endurance, Decreased sensation, Decreased fine motor control, Decreased self-care trans, Decreased high-level ADLs  Prognosis: Good  Assessment: Pt seen for Occupational Therapy session with focus on activity tolerance, functional transfers/mob, sitting balance and tolerance and standing tolerance and balance for pt engagement in UB/LB self-care tasks. Pt cleared by ROSY/ Lisa Dela Cruz for pt participated in OT session. Pt presented sitting out of bed to bedside chair and pt identifiers confirmed. Pt reported his/her therapy goal to become independent and return home alone. Pt required assist for UB/LB self-care 2* limited activity tolerance/pt was easily short of breath with self-care tasks, decreased strength, balance and coordination. He was able to tolerate sitting edge of pt bed for LB dressing. Pt will require post acute rehab service to continue to address these above noted pt deficit which currently impair pt ADL and functional mob.  Pt set up to bedside chair post session, chair alarm activated and all needs within pt reach     Rehab Resource Intensity Level, OT: I (Maximum Resource Intensity)       10/31/2023 1536 by Myrtice Dancer, COTA  Outcome: Progressing  Note: Limitation: Decreased ADL status, Decreased UE ROM, Decreased UE strength, Decreased Safe judgement during ADL, Decreased endurance, Decreased sensation, Decreased fine motor control, Decreased self-care trans, Decreased high-level ADLs  Prognosis: Good  Assessment: Pt seen for Occupational Therapy session with focus on activity tolerance, functional transfers/mob, sitting balance and tolerance and standing tolerance and balance for pt engagement in UB/LB self-care tasks. Pt cleared by RN/ Angelita Lee for pt participated in OT session. Pt presented sitting out of bed to bedside chair and pt identifiers confirmed. Pt reported his/her therapy goal to become independent and return home alone. Pt required assist for UB/LB self-care 2* limited activity tolerance/pt was easily short of breath with self-care tasks, decreased strength, balance and coordination. He was able to tolerate sitting edge of pt bed for LB dressing. Pt will require post acute rehab service to continue to address these above noted pt deficit which currently impair pt ADL and functional mob.  Pt set up to bedside chair post session, chair alarm activated and all needs within pt reach     Rehab Resource Intensity Level, OT: I (Maximum Resource Intensity)

## 2023-10-31 NOTE — PLAN OF CARE
Problem: OCCUPATIONAL THERAPY ADULT  Goal: Performs self-care activities at highest level of function for planned discharge setting. See evaluation for individualized goals. Description: Treatment Interventions: ADL retraining, Functional transfer training, UE strengthening/ROM, Endurance training, Cognitive reorientation, Patient/family training, Equipment evaluation/education, Fine motor coordination activities, Compensatory technique education, Continued evaluation, Energy conservation, Activityengagement  Equipment Recommended: Bedside commode, Shower/Tub chair with back ($) (if discharges home)       See flowsheet documentation for full assessment, interventions and recommendations. Outcome: Progressing  Note: Limitation: Decreased ADL status, Decreased UE ROM, Decreased UE strength, Decreased Safe judgement during ADL, Decreased endurance, Decreased sensation, Decreased fine motor control, Decreased self-care trans, Decreased high-level ADLs  Prognosis: Good  Assessment: Pt seen for Occupational Therapy session with focus on activity tolerance, functional transfers/mob, sitting balance and tolerance and standing tolerance and balance for pt engagement in UB/LB self-care tasks. Pt cleared by RN/ Saulo Links for pt participated in OT session. Pt presented sitting out of bed to bedside chair and pt identifiers confirmed. Pt reported his/her therapy goal to become independent and return home alone. Pt required assist for UB/LB self-care 2* limited activity tolerance/pt was easily short of breath with self-care tasks, decreased strength, balance and coordination. He was able to tolerate sitting edge of pt bed for LB dressing. Pt will require post acute rehab service to continue to address these above noted pt deficit which currently impair pt ADL and functional mob.  Pt set up to bedside chair post session, chair alarm activated and all needs within pt reach     Rehab Resource Intensity Level, OT: No post-acute rehabilitation needs

## 2023-10-31 NOTE — RESTORATIVE TECHNICIAN NOTE
Restorative Technician Note      Patient Name: Lisa Dunaway     Note Type: Mobility  Patient Position Upon Consult: Bedside chair  Activity Performed: Ambulated; BOCHSNV; Stood  Assistive Device: Roller walker  Education Provided: Yes  Patient Position at [employer] of Consult: Bedside chair;  All needs within reach; Bed/Chair alarm activated    Frank PUENTES, Restorative Technician, [employer]

## 2023-10-31 NOTE — PROGRESS NOTES
[address] [facility]  Progress Note  Name: Ted Goins  MRN: [id number]  [facility] Date of Admission: 10/23/2023   Date of Service: 10/31/2023 I Hospital Day: 8    Assessment/Plan   * Osteoarthritis of cervical spine with myelopathy  Assessment & Plan  Patient has known history of cervical radiculopathy, C4-C6 cord with marked stenosis and compression. Follows with neurosurgery in outpatient setting, was scheduled for outpatient decompression procedure on 10/30 however presented to hospital for frequent falls. MRI thoracic spine with cord compression at C4-5, mild thoracic degenerative changes without significant stenosis. Case was discussed with neurosurgery team, given concerns of worsening myelopathy symptoms including bilateral lower extremity involuntary muscle twitching, neuropathic pain and weakness, decision was made to transfer to [facility] for earlier inpatient procedure. Neurosurgery following, patient is status post  cervical decompression and fusion on 10/25. DIANA drain removed on 10/27 by neurosurgery. Christensen removed >> monitor on retention protocol     Continue fall precautions. Continue multimodal pain management. Acute rehab on discharge, cm following, Likely dc plan to [facility]. Hyponatremia  Assessment & Plan  Patient reports that this has been a chronic issue. Suspect SIADH. Note hydrochlorothiazide use and pain. Uric acid normal, urine sodium 46, urine osm 735, serum osm 278  129>> 132>> 131>>130>> 127>>132  TSH normal  Hold HCTZ  Trend BMP  Fluid Restriction to 1800cc  Continue salt tabs  Cortisol 6.2>> given low BP and hypoNa, cosyntropin stim test obtained>> not concerning for adrenal insufficieny .      Constipation  Assessment & Plan  Last BM 10/28/23  Bowel regimen including senna, colace and miralax  Lactulose added intermittently  Prn suppository      Preoperative clearance  Assessment & Plan  Pt denies any cardiac or pulmonary complaints. Is able to ambulate at baseline with no shortness of breath. Patient has tolerated anesthesia in the past very well without any anticipated side effects. Patient is class II risk on RCRI for medium risk procedure. Urinary retention  Assessment & Plan  Patient noted to have urinary retention, needing straight cath at [address] [place] [address]. Continue retention protocol. Was with fountain, removed on 10/26>> monitor on retention protocol. Voiding but also retaining urine during PVR checks, monitor on retention protocol for now. Hopefully retention will improve with improvement in constipation. Now again with fountain on 10.30>> TOV at [facility] once more ambulatory   Start finasteride  OP urology follow up    Hyperlipidemia  Assessment & Plan  Patient is on atorvastatin 10 mg at home, continue same. Gastroesophageal reflux disease  Assessment & Plan  Patient is on omeprazole at home, continue alternative equivalent pantoprazole while inpatient. Benign essential hypertension  Assessment & Plan  Blood pressure was on the lower side  on lisinopril 20 mg with holding parameters, decrease dose  HCTZ discontinued given hyponatremia. Ambulatory dysfunction  Assessment & Plan  Patient presented to [address] [place] [address] for multiple falls at home. CT spine and CT head negative for any acute changes. Status post neurosurgery intervention on 10.25  Continue fall precautions. PT/OT eval.>> acute rehab on dc                 VTE Pharmacologic Prophylaxis: VTE Score: 3 Moderate Risk (Score 3-4) - Pharmacological DVT Prophylaxis Ordered: heparin. Patient Centered Rounds: I performed bedside rounds with nursing staff today. Discussions with Specialists or Other Care Team Provider:     Education and Discussions with Family / Patient: Patient declined call to . Total Time Spent on Date of Encounter in care of patient: 45 mins.  This time was spent on one or more of the following: performing physical exam; counseling and coordination of care; obtaining or reviewing history; documenting in the medical record; reviewing/ordering tests, medications or procedures; communicating with other healthcare professionals and discussing with patient's family/caregivers. Current Length of Stay: 8 day(s)  Current Patient Status: Inpatient   Certification Statement: The patient will continue to require additional inpatient hospital stay due to rehab  Discharge Plan: Anticipate discharge in 24-48 hrs to rehab facility. Code Status: Level 1 - Full Code    Subjective:   Patient seen and examined. No events overnight. Reported that he is feeling well. Plan for rehab noted. Waiting for insurance authorization for rehab    Objective:     Vitals:   Temp (24hrs), Av.5 °F (36.9 °C), Min:97.9 °F (36.6 °C), Max:99.1 °F (37.3 °C)    Temp:  [97.9 °F (36.6 °C)-99.1 °F (37.3 °C)] 97.9 °F (36.6 °C)  HR:  [82-84] 84  BP: (136-139)/(81-83) 139/83  SpO2:  [97 %-98 %] 98 %  Body mass index is 38.29 kg/m². Input and Output Summary (last 24 hours): Intake/Output Summary (Last 24 hours) at 10/31/2023 1616  Last data filed at 10/31/2023 1355  Gross per 24 hour   Intake 358 ml   Output 600 ml   Net -242 ml       Physical Exam:   Physical Exam  Constitutional:       General: He is not in acute distress. HENT:      Head: Normocephalic and atraumatic. Nose: Nose normal.   Eyes:      Conjunctiva/sclera: Conjunctivae normal.   Neck:      Comments: Posterior neck surgical site with staples. Cardiovascular:      Rate and Rhythm: Normal rate and regular rhythm. Heart sounds: Normal heart sounds. Pulmonary:      Comments: Decreased breath sounds bilateral  Abdominal:      General: Bowel sounds are normal.   Musculoskeletal:         General: No swelling. Normal range of motion. Skin:     General: Skin is warm. Neurological:      Mental Status: He is alert. Mental status is at baseline.          Additional Data:     Labs:  Results from last 7 days   Lab Units 10/28/23  1245   WBC Thousand/uL 7.36   HEMOGLOBIN g/dL 10.0*   HEMATOCRIT % 30.6*   PLATELETS Thousands/uL 195   NEUTROS PCT % 73   LYMPHS PCT % 14   MONOS PCT % 11   EOS PCT % 1     Results from last 7 days   Lab Units 10/31/23  0528   SODIUM mmol/L 129*   POTASSIUM mmol/L 4.3   CHLORIDE mmol/L 91*   CO2 mmol/L 31   BUN mg/dL 20   CREATININE mg/dL 0.87   ANION GAP mmol/L 7   CALCIUM mg/dL 8.8   GLUCOSE RANDOM mg/dL 97     Results from last 7 days   Lab Units 10/25/23  0500   INR  0.96     Results from last 7 days   Lab Units 10/25/23  1621 10/25/23  0708 10/24/23  2127   POC GLUCOSE mg/dl 154* 109 82               Lines/Drains:  Invasive Devices       Peripheral Intravenous Line  Duration             Peripheral IV 10/28/23 Distal;Dorsal (posterior); Left Forearm 3 days              Drain  Duration             Urethral Catheter 16 Fr. 1 day                  Urinary Catheter:  Goal for removal: Voiding trial when ambulation improves               Imaging: Reviewed radiology reports from this admission including: xray(s)    Recent Cultures (last 7 days):         Last 24 Hours Medication List:   Current Facility-Administered Medications   Medication Dose Route Frequency Provider Last Rate    acetaminophen  975 mg Oral Q8H [facility] Regina Arnold PA-C      albuterol  2 puff Inhalation Q6H PRN Regina Arnold PA-C      aluminum-magnesium hydroxide-simethicone  30 mL Oral Q6H PRN Regina Arnold PA-C      atorvastatin  10 mg Oral Daily Kadie Henry PA-C      bisacodyl  10 mg Rectal Daily PRN Regina Arnold PA-C      finasteride  5 mg Oral Daily Nyasia Morgan MD      fluticasone  2 spray Each Nare Daily PRN Regina Arnold PA-C      folic acid  030 mcg Oral Daily Jaiden Henry PA-C      gabapentin  100 mg Oral TID Regina Arnold PA-C      heparin (porcine)  5,000 Units Subcutaneous Q8H [facility] Jaiden Henry PA-C      lidocaine  1 patch Topical Daily Kadie Henry PA-C      lisinopril  10 mg Oral Daily Ryder Read MD      loratadine  10 mg Oral Daily Liane Henry PA-C      melatonin  3 mg Oral HS Liane Henry PA-C      ondansetron  4 mg Intravenous Q6H PRN Donna Orr PA-C      oxyCODONE  5 mg Oral Q4H PRN Donna Orr PA-C      Or    oxyCODONE  10 mg Oral Q4H PRN Donna Orr PA-C      pantoprazole  40 mg Oral Daily Kadie Henry PA-C      polyethylene glycol  17 g Oral BID Donna Orr PA-C      senna-docusate sodium  1 tablet Oral BID Donna Orr PA-C      sodium chloride  1 g Oral BID With Meals Ryder Read MD      tiZANidine  4 mg Oral TID Donna Orr PA-C          Today, Patient Was Seen By: Bipin Hutchins MD    **Please Note: This note may have been constructed using a voice recognition system. **

## 2023-10-31 NOTE — PLAN OF CARE
Problem: MOBILITY - ADULT  Goal: Maintain or return to baseline ADL function  Description: INTERVENTIONS:  -  Assess patient's ability to carry out ADLs; assess patient's baseline for ADL function and identify physical deficits which impact ability to perform ADLs (bathing, care of mouth/teeth, toileting, grooming, dressing, etc.)  - Assess/evaluate cause of self-care deficits   - Assess range of motion  - Assess patient's mobility; develop plan if impaired  - Assess patient's need for assistive devices and provide as appropriate  - Encourage maximum independence but intervene and supervise when necessary  - Involve family in performance of ADLs  - Assess for home care needs following discharge   - Consider OT consult to assist with ADL evaluation and planning for discharge  - Provide patient education as appropriate  Outcome: Progressing  Goal: Maintains/Returns to pre admission functional level  Description: INTERVENTIONS:  - Perform BMAT or MOVE assessment daily.   - Set and communicate daily mobility goal to care team and patient/family/caregiver.    - Collaborate with rehabilitation services on mobility goals if  - Out of bed for toileting  - Record patient progress and toleration of activity level   Outcome: Progressing     Problem: PAIN - ADULT  Goal: Verbalizes/displays adequate comfort level or baseline comfort level  Description: Interventions:  - Encourage patient to monitor pain and request assistance  - Assess pain using appropriate pain scale  - Administer analgesics based on type and severity of pain and evaluate response  - Implement non-pharmacological measures as appropriate and evaluate response  - Consider cultural and social influences on pain and pain management  - Notify physician/advanced practitioner if interventions unsuccessful or patient reports new pain  Outcome: Progressing     Problem: INFECTION - ADULT  Goal: Absence or prevention of progression during hospitalization  Description: INTERVENTIONS:  - Assess and monitor for signs and symptoms of infection  - Monitor lab/diagnostic results  - Monitor all insertion sites, i.e. indwelling lines, tubes, and drains  - Monitor endotracheal if appropriate and nasal secretions for changes in amount and color  - [place] appropriate cooling/warming therapies per order  - Administer medications as ordered  - Instruct and encourage patient and family to use good hand hygiene technique  - Identify and instruct in appropriate isolation precautions for identified infection/condition  Outcome: Progressing     Problem: DISCHARGE PLANNING  Goal: Discharge to home or other facility with appropriate resources  Description: INTERVENTIONS:  - Identify barriers to discharge w/patient and caregiver  - Arrange for needed discharge resources and transportation as appropriate  - Identify discharge learning needs (meds, wound care, etc.)  - Arrange for interpretive services to assist at discharge as needed  - Refer to Case Management Department for coordinating discharge planning if the patient needs post-hospital services based on physician/advanced practitioner order or complex needs related to functional status, cognitive ability, or social support system  Outcome: Progressing     Problem: Knowledge Deficit  Goal: Patient/family/caregiver demonstrates understanding of disease process, treatment plan, medications, and discharge instructions  Description: Complete learning assessment and assess knowledge base. Interventions:  - Provide teaching at level of understanding  - Provide teaching via preferred learning methods  Outcome: Progressing     Problem: NEUROSENSORY - ADULT  Goal: Achieves maximal functionality and self care  Description: INTERVENTIONS  - Monitor swallowing and airway patency with patient fatigue and changes in neurological status  - Encourage and assist patient to increase activity and self care.    - Encourage visually impaired, hearing impaired and aphasic patients to use assistive/communication devices  Outcome: Progressing     Problem: METABOLIC, FLUID AND ELECTROLYTES - ADULT  Goal: Electrolytes maintained within normal limits  Description: INTERVENTIONS:  - Monitor labs and assess patient for signs and symptoms of electrolyte imbalances  - Administer electrolyte replacement as ordered  - Monitor response to electrolyte replacements, including repeat lab results as appropriate  - Instruct patient on fluid and nutrition as appropriate  Outcome: Progressing  Goal: Fluid balance maintained  Description: INTERVENTIONS:  - Monitor labs   - Monitor I/O and WT  - Instruct patient on fluid and nutrition as appropriate  - Assess for signs & symptoms of volume excess or deficit  Outcome: Progressing     Problem: MUSCULOSKELETAL - ADULT  Goal: Maintain or return mobility to safest level of function  Description: INTERVENTIONS:  - Assess patient's ability to carry out ADLs; assess patient's baseline for ADL function and identify physical deficits which impact ability to perform ADLs (bathing, care of mouth/teeth, toileting, grooming, dressing, etc.)  - Assess/evaluate cause of self-care deficits   - Assess range of motion  - Assess patient's mobility  - Assess patient's need for assistive devices and provide as appropriate  - Encourage maximum independence but intervene and supervise when necessary  - Involve family in performance of ADLs  - Assess for home care needs following discharge   - Consider OT consult to assist with ADL evaluation and planning for discharge  - Provide patient education as appropriate  Outcome: Progressing  Goal: Maintain proper alignment of affected body part  Description: INTERVENTIONS:  - Support, maintain and protect limb and body alignment  - Provide patient/ family with appropriate education  Outcome: Progressing     Problem: Potential for Falls  Goal: Patient will remain free of falls  Description: INTERVENTIONS:  - Educate patient/family on patient safety including physical limitations  - Instruct patient to call for assistance with activity   - Consult OT/PT to assist with strengthening/mobility   - Keep Call bell within reach  - Keep bed low and locked with side rails adjusted as appropriate  - Keep care items and personal belongings within reach  - Initiate and maintain comfort rounds  - Make Fall Risk Sign visible to staff  - Offer Toileting every 2 Hours, in advance of need  - Initiate/Maintain bed alarm  - Obtain necessary fall risk management equipment:   - Apply yellow socks and bracelet for high fall risk patients  - Consider moving patient to room near nurses station  Outcome: Progressing     Problem: RESPIRATORY - ADULT  Goal: Achieves optimal ventilation and oxygenation  Description: INTERVENTIONS:  - Assess for changes in respiratory status  - Assess for changes in mentation and behavior  - Position to facilitate oxygenation and minimize respiratory effort  - Oxygen administered by appropriate delivery if ordered  - Initiate smoking cessation education as indicated  - Encourage broncho-pulmonary hygiene including cough, deep breathe, Incentive Spirometry  - Assess the need for suctioning and aspirate as needed  - Assess and instruct to report SOB or any respiratory difficulty  - Respiratory Therapy support as indicated  Outcome: Progressing     Problem: GASTROINTESTINAL - ADULT  Goal: Maintains or returns to baseline bowel function  Description: INTERVENTIONS:  - Assess bowel function  - Encourage oral fluids to ensure adequate hydration  - Administer IV fluids if ordered to ensure adequate hydration  - Administer ordered medications as needed  - Encourage mobilization and activity  - Consider nutritional services referral to assist patient with adequate nutrition and appropriate food choices  Outcome: Progressing     Problem: GENITOURINARY - ADULT  Goal: Maintains or returns to baseline urinary function  Description: INTERVENTIONS:  - Assess urinary function  - Encourage oral fluids to ensure adequate hydration if ordered  - Administer IV fluids as ordered to ensure adequate hydration  - Administer ordered medications as needed  - Offer frequent toileting  - Follow urinary retention protocol if ordered  Outcome: Progressing  Goal: Urinary catheter remains patent  Description: INTERVENTIONS:  - Assess patency of urinary catheter  - If patient has a chronic fountain, consider changing catheter if non-functioning  - Follow guidelines for intermittent irrigation of non-functioning urinary catheter  Outcome: Progressing     Problem: SKIN/TISSUE INTEGRITY - ADULT  Goal: Incision(s), wounds(s) or drain site(s) healing without S/S of infection  Description: INTERVENTIONS  - Assess and document dressing, incision, wound bed, drain sites and surrounding tissue  - Provide patient and family education  - Perform skin care/dressing changes every   Outcome: Progressing

## 2023-10-31 NOTE — ASSESSMENT & PLAN NOTE
Patient presented to [address] [place] [address] for multiple falls at home. CT spine and CT head negative for any acute changes. Status post neurosurgery intervention on 10.25  Continue fall precautions.   PT/OT eval.>> acute rehab on dc

## 2023-10-31 NOTE — ASSESSMENT & PLAN NOTE
Patient has known history of cervical radiculopathy, C4-C6 cord with marked stenosis and compression. Follows with neurosurgery in outpatient setting, was scheduled for outpatient decompression procedure on 10/30 however presented to hospital for frequent falls. MRI thoracic spine with cord compression at C4-5, mild thoracic degenerative changes without significant stenosis. Case was discussed with neurosurgery team, given concerns of worsening myelopathy symptoms including bilateral lower extremity involuntary muscle twitching, neuropathic pain and weakness, decision was made to [facility]. Neurosurgery following, patient is status post  cervical decompression and fusion on 10/25. DIANA drain removed on 10/27 by neurosurgery. Christensen removed >> monitor on retention protocol     Continue fall precautions. Continue multimodal pain management. Acute rehab on discharge, cm following, Likely dc plan to [facility].

## 2023-10-31 NOTE — PLAN OF CARE
Problem: MOBILITY - ADULT  Goal: Maintain or return to baseline ADL function  Description: INTERVENTIONS:  -  Assess patient's ability to carry out ADLs; assess patient's baseline for ADL function and identify physical deficits which impact ability to perform ADLs (bathing, care of mouth/teeth, toileting, grooming, dressing, etc.)  - Assess/evaluate cause of self-care deficits   - Assess range of motion  - Assess patient's mobility; develop plan if impaired  - Assess patient's need for assistive devices and provide as appropriate  - Encourage maximum independence but intervene and supervise when necessary  - Involve family in performance of ADLs  - Assess for home care needs following discharge   - Consider OT consult to assist with ADL evaluation and planning for discharge  - Provide patient education as appropriate  Outcome: Progressing  Goal: Maintains/Returns to pre admission functional level  Description: INTERVENTIONS:  - Perform BMAT or MOVE assessment daily.   - Set and communicate daily mobility goal to care team and patient/family/caregiver.    - Collaborate with rehabilitation services on mobility goals if consulted  - Ambulate patient 3 times a day  - Out of bed to chair 3 times a day   - Out of bed for meals 3 times a day  - Out of bed for toileting  - Record patient progress and toleration of activity level   Outcome: Progressing     Problem: PAIN - ADULT  Goal: Verbalizes/displays adequate comfort level or baseline comfort level  Description: Interventions:  - Encourage patient to monitor pain and request assistance  - Assess pain using appropriate pain scale  - Administer analgesics based on type and severity of pain and evaluate response  - Implement non-pharmacological measures as appropriate and evaluate response  - Consider cultural and social influences on pain and pain management  - Notify physician/advanced practitioner if interventions unsuccessful or patient reports new pain  Outcome: Progressing     Problem: INFECTION - ADULT  Goal: Absence or prevention of progression during hospitalization  Description: INTERVENTIONS:  - Assess and monitor for signs and symptoms of infection  - Monitor lab/diagnostic results  - Monitor all insertion sites, i.e. indwelling lines, tubes, and drains  - Monitor endotracheal if appropriate and nasal secretions for changes in amount and color  - [place] appropriate cooling/warming therapies per order  - Administer medications as ordered  - Instruct and encourage patient and family to use good hand hygiene technique  - Identify and instruct in appropriate isolation precautions for identified infection/condition  Outcome: Progressing     Problem: DISCHARGE PLANNING  Goal: Discharge to home or other facility with appropriate resources  Description: INTERVENTIONS:  - Identify barriers to discharge w/patient and caregiver  - Arrange for needed discharge resources and transportation as appropriate  - Identify discharge learning needs (meds, wound care, etc.)  - Arrange for interpretive services to assist at discharge as needed  - Refer to Case Management Department for coordinating discharge planning if the patient needs post-hospital services based on physician/advanced practitioner order or complex needs related to functional status, cognitive ability, or social support system  Outcome: Progressing     Problem: Knowledge Deficit  Goal: Patient/family/caregiver demonstrates understanding of disease process, treatment plan, medications, and discharge instructions  Description: Complete learning assessment and assess knowledge base.   Interventions:  - Provide teaching at level of understanding  - Provide teaching via preferred learning methods  Outcome: Progressing     Problem: NEUROSENSORY - ADULT  Goal: Achieves maximal functionality and self care  Description: INTERVENTIONS  - Monitor swallowing and airway patency with patient fatigue and changes in neurological status  - Encourage and assist patient to increase activity and self care.    - Encourage visually impaired, hearing impaired and aphasic patients to use assistive/communication devices  Outcome: Progressing     Problem: METABOLIC, FLUID AND ELECTROLYTES - ADULT  Goal: Electrolytes maintained within normal limits  Description: INTERVENTIONS:  - Monitor labs and assess patient for signs and symptoms of electrolyte imbalances  - Administer electrolyte replacement as ordered  - Monitor response to electrolyte replacements, including repeat lab results as appropriate  - Instruct patient on fluid and nutrition as appropriate  Outcome: Progressing  Goal: Fluid balance maintained  Description: INTERVENTIONS:  - Monitor labs   - Monitor I/O and WT  - Instruct patient on fluid and nutrition as appropriate  - Assess for signs & symptoms of volume excess or deficit  Outcome: Progressing     Problem: MUSCULOSKELETAL - ADULT  Goal: Maintain or return mobility to safest level of function  Description: INTERVENTIONS:  - Assess patient's ability to carry out ADLs; assess patient's baseline for ADL function and identify physical deficits which impact ability to perform ADLs (bathing, care of mouth/teeth, toileting, grooming, dressing, etc.)  - Assess/evaluate cause of self-care deficits   - Assess range of motion  - Assess patient's mobility  - Assess patient's need for assistive devices and provide as appropriate  - Encourage maximum independence but intervene and supervise when necessary  - Involve family in performance of ADLs  - Assess for home care needs following discharge   - Consider OT consult to assist with ADL evaluation and planning for discharge  - Provide patient education as appropriate  Outcome: Progressing  Goal: Maintain proper alignment of affected body part  Description: INTERVENTIONS:  - Support, maintain and protect limb and body alignment  - Provide patient/ family with appropriate education  Outcome: Progressing     Problem: Potential for Falls  Goal: Patient will remain free of falls  Description: INTERVENTIONS:  - Educate patient/family on patient safety including physical limitations  - Instruct patient to call for assistance with activity   - Consult OT/PT to assist with strengthening/mobility   - Keep Call bell within reach  - Keep bed low and locked with side rails adjusted as appropriate  - Keep care items and personal belongings within reach  - Initiate and maintain comfort rounds  - Make Fall Risk Sign visible to staff  - Offer Toileting every 2 Hours, in advance of need  - Initiate/Maintain bed/chair alarm  - Obtain necessary fall risk management equipment: alarm  - Apply yellow socks and bracelet for high fall risk patients  - Consider moving patient to room near nurses station  Outcome: Progressing     Problem: RESPIRATORY - ADULT  Goal: Achieves optimal ventilation and oxygenation  Description: INTERVENTIONS:  - Assess for changes in respiratory status  - Assess for changes in mentation and behavior  - Position to facilitate oxygenation and minimize respiratory effort  - Oxygen administered by appropriate delivery if ordered  - Initiate smoking cessation education as indicated  - Encourage broncho-pulmonary hygiene including cough, deep breathe, Incentive Spirometry  - Assess the need for suctioning and aspirate as needed  - Assess and instruct to report SOB or any respiratory difficulty  - Respiratory Therapy support as indicated  Outcome: Progressing     Problem: GASTROINTESTINAL - ADULT  Goal: Maintains or returns to baseline bowel function  Description: INTERVENTIONS:  - Assess bowel function  - Encourage oral fluids to ensure adequate hydration  - Administer IV fluids if ordered to ensure adequate hydration  - Administer ordered medications as needed  - Encourage mobilization and activity  - Consider nutritional services referral to assist patient with adequate nutrition and appropriate food choices  Outcome: Progressing     Problem: GENITOURINARY - ADULT  Goal: Urinary catheter remains patent  Description: INTERVENTIONS:  - Assess patency of urinary catheter  - If patient has a chronic fountain, consider changing catheter if non-functioning  - Follow guidelines for intermittent irrigation of non-functioning urinary catheter  Outcome: Progressing     Problem: SKIN/TISSUE INTEGRITY - ADULT  Goal: Incision(s), wounds(s) or drain site(s) healing without S/S of infection  Description: INTERVENTIONS  - Assess and document dressing, incision, wound bed, drain sites and surrounding tissue  - Provide patient and family education  - Perform skin care/dressing changes every day  Outcome: Progressing     Problem: GENITOURINARY - ADULT  Goal: Maintains or returns to baseline urinary function  Description: INTERVENTIONS:  - Assess urinary function  - Encourage oral fluids to ensure adequate hydration if ordered  - Administer IV fluids as ordered to ensure adequate hydration  - Administer ordered medications as needed  - Offer frequent toileting  - Follow urinary retention protocol if ordered  Outcome: Not Progressing     Problem: CARDIOVASCULAR - ADULT  Goal: Maintains optimal cardiac output and hemodynamic stability  Description: INTERVENTIONS:  - Monitor I/O, vital signs and rhythm  - Monitor for S/S and trends of decreased cardiac output  - Administer and titrate ordered vasoactive medications to optimize hemodynamic stability  - Assess quality of pulses, skin color and temperature  - Assess for signs of decreased coronary artery perfusion  - Instruct patient to report change in severity of symptoms  Outcome: Completed  Goal: Absence of cardiac dysrhythmias or at baseline rhythm  Description: INTERVENTIONS:  - Continuous cardiac monitoring, vital signs, obtain 12 lead EKG if ordered  - Administer antiarrhythmic and heart rate control medications as ordered  - Monitor electrolytes and administer replacement therapy as ordered  Outcome: Completed

## 2023-10-31 NOTE — OCCUPATIONAL THERAPY NOTE
Occupational Therapy Progress Note     Patient Name: Humaira Chapa  YTLZN'N Date: 10/31/2023  Problem List  Principal Problem:    Osteoarthritis of cervical spine with myelopathy  Active Problems:    Ambulatory dysfunction    Benign essential hypertension    Gastroesophageal reflux disease    Hyperlipidemia    Urinary retention    Preoperative clearance    Constipation    Paresthesia    Weakness of extremity    Hyponatremia         Occupational Therapy Treatment Note     10/31/23 1418   OT Last Visit   OT Visit Date 10/31/23   Note Type   Note Type Treatment   Pain Assessment   Pain Assessment Tool 0-10   Pain Score No Pain   Restrictions/Precautions   Weight Bearing Precautions Per Order No   Braces or Orthoses   (Per neurosurgery no Cervical brace needed.)   Other Precautions Chair Alarm; Fall Risk;Pain;Spinal precautions   Lifestyle   Autonomy Mod I with increased to for self care tasks (with recent symptoms)/mod I with ADL's, no AD with functional mobility, +drives   Reciprocal Relationships pt reports golfing several times a week. meets his friends out at times   Service to Others retired   Intrinsic Gratification spending time outside   ADL   Where [place] of bed   [address] [facility] [address]  [address] [place] [address] 4  Minimal Assistance   UB Bathing Deficit Chest;Right arm;Left arm; Abdomen   LB Bathing Assistance 2  Maximal Assistance   LB Bathing Deficit Buttocks; Perineal area;Right upper leg;Left upper leg   UB Dressing Assistance 4  Minimal Assistance   UB Dressing Deficit Thread RUE; Thread LUE;Pull over head   LB Dressing Assistance 3  Moderate Assistance   LB Dressing Deficit Thread RLE into pants; Thread LLE into pants   Toileting Assistance  2  Maximal Assistance   Transfers   Sit to Stand 3  Moderate assistance   Additional items Assist x 1   Stand to Sit 4  Minimal assistance   Additional items Assist x 1   Toilet transfer 3  Moderate assistance   Functional Mobility Functional Mobility 3  Moderate assistance   Additional items Rolling walker   Cognition   Overall Cognitive Status WFL   Arousal/Participation Alert; Responsive; Cooperative   Attention Attends with cues to redirect   Orientation Level Oriented X4   Memory Within functional limits   Following Commands Follows all commands and directions without difficulty   Activity Tolerance   Activity Tolerance Patient tolerated treatment well   Assessment   Assessment Pt seen for Occupational Therapy session with focus on activity tolerance, functional transfers/mob, sitting balance and tolerance and standing tolerance and balance for pt engagement in UB/LB self-care tasks. Pt cleared by RN/ Karley Hanna for pt participated in OT session. Pt presented sitting out of bed to bedside chair and pt identifiers confirmed. Pt reported his/her therapy goal to become independent and return home alone. Pt required assist for UB/LB self-care 2* limited activity tolerance/pt was easily short of breath with self-care tasks, decreased strength, balance and coordination. He was able to tolerate sitting edge of pt bed for LB dressing. Pt will require post acute rehab service to continue to address these above noted pt deficit which currently impair pt ADL and functional mob.  Pt set up to bedside chair post session, chair alarm activated and all needs within pt reach   Plan   Treatment Interventions ADL retraining   Goal Expiration Date 11/09/23   OT Treatment Day 2   OT Frequency 2-3x/wk   Discharge Recommendation   Rehab Resource Intensity Level, OT No post-acute rehabilitation needs   AM-PAC Daily Activity Inpatient   Lower Body Dressing 2   Bathing 2   Toileting 2   Upper Body Dressing 2   Grooming 2   Eating 3   Daily Activity Raw Score 13   Daily Activity Standardized Score (Calc for Raw Score >=11) 32.03   AM-PAC Applied Cognition Inpatient   Following a Speech/Presentation 4   Understanding Ordinary Conversation 4   Taking Medications 4 Remembering Where Things Are Placed or Put Away 4   Remembering List of 4-5 Errands 4   Taking Care of Complicated Tasks 4   Applied Cognition Raw Score 24   Applied Cognition Standardized Score 62.21   Barthel Index   Feeding 5   Bathing 0   Grooming Score 0   Dressing Score 0   Bladder Score 5   Bowels Score 5   Toilet Use Score 5   Transfers (Bed/Chair) Score 5   Mobility (Level Surface) Score 0   Stairs Score 0   Barthel Index Score 25   Modified [place] Scale   Modified Rodney Scale 4       Sabine MUNOZ

## 2023-11-01 ENCOUNTER — HOSPITAL ENCOUNTER (INPATIENT)
Facility: HOSPITAL | Age: 73
LOS: 1 days | End: 2023-11-02
Attending: PHYSICAL MEDICINE & REHABILITATION | Admitting: PHYSICAL MEDICINE & REHABILITATION
Payer: MEDICARE

## 2023-11-01 VITALS
HEART RATE: 88 BPM | HEIGHT: 69 IN | SYSTOLIC BLOOD PRESSURE: 144 MMHG | TEMPERATURE: 98.1 F | DIASTOLIC BLOOD PRESSURE: 86 MMHG | OXYGEN SATURATION: 96 % | RESPIRATION RATE: 16 BRPM | BODY MASS INDEX: 38.4 KG/M2 | WEIGHT: 259.26 LBS

## 2023-11-01 DIAGNOSIS — E87.1 HYPONATREMIA: Primary | ICD-10-CM

## 2023-11-01 DIAGNOSIS — M47.12 OSTEOARTHRITIS OF CERVICAL SPINE WITH MYELOPATHY: ICD-10-CM

## 2023-11-01 DIAGNOSIS — I10 BENIGN ESSENTIAL HYPERTENSION: ICD-10-CM

## 2023-11-01 DIAGNOSIS — K59.00 CONSTIPATION, UNSPECIFIED CONSTIPATION TYPE: ICD-10-CM

## 2023-11-01 PROBLEM — E78.5 HYPERLIPIDEMIA: Chronic | Status: ACTIVE | Noted: 2023-10-20

## 2023-11-01 PROBLEM — Z12.11 SCREEN FOR COLON CANCER: Status: RESOLVED | Noted: 2018-12-18 | Resolved: 2023-11-01

## 2023-11-01 PROBLEM — Z78.9 IMPAIRED MOBILITY AND ACTIVITIES OF DAILY LIVING: Status: ACTIVE | Noted: 2023-10-20

## 2023-11-01 PROBLEM — K59.2 NEUROGENIC BOWEL: Status: ACTIVE | Noted: 2023-11-01

## 2023-11-01 PROBLEM — J30.2 SEASONAL ALLERGIES: Chronic | Status: ACTIVE | Noted: 2023-11-01

## 2023-11-01 PROBLEM — Z74.09 IMPAIRED MOBILITY AND ACTIVITIES OF DAILY LIVING: Status: ACTIVE | Noted: 2023-10-20

## 2023-11-01 PROBLEM — R60.1 ANASARCA: Status: ACTIVE | Noted: 2023-11-01

## 2023-11-01 PROBLEM — N31.9 NEUROGENIC BLADDER: Status: ACTIVE | Noted: 2023-11-01

## 2023-11-01 PROBLEM — Z01.818 PREOPERATIVE CLEARANCE: Status: RESOLVED | Noted: 2023-10-23 | Resolved: 2023-11-01

## 2023-11-01 LAB — SARS-COV-2 RNA RESP QL NAA+PROBE: NEGATIVE

## 2023-11-01 PROCEDURE — NC001 PR NO CHARGE

## 2023-11-01 PROCEDURE — 97530 THERAPEUTIC ACTIVITIES: CPT

## 2023-11-01 PROCEDURE — 97535 SELF CARE MNGMENT TRAINING: CPT

## 2023-11-01 PROCEDURE — 99239 HOSP IP/OBS DSCHRG MGMT >30: CPT | Performed by: NURSE PRACTITIONER

## 2023-11-01 PROCEDURE — 97116 GAIT TRAINING THERAPY: CPT

## 2023-11-01 PROCEDURE — 99222 1ST HOSP IP/OBS MODERATE 55: CPT | Performed by: INTERNAL MEDICINE

## 2023-11-01 PROCEDURE — 87635 SARS-COV-2 COVID-19 AMP PRB: CPT | Performed by: NURSE PRACTITIONER

## 2023-11-01 PROCEDURE — 99223 1ST HOSP IP/OBS HIGH 75: CPT | Performed by: PHYSICAL MEDICINE & REHABILITATION

## 2023-11-01 RX ORDER — BISACODYL 10 MG
10 SUPPOSITORY, RECTAL RECTAL DAILY PRN
Status: DISCONTINUED | OUTPATIENT
Start: 2023-11-01 | End: 2023-11-02 | Stop reason: HOSPADM

## 2023-11-01 RX ORDER — ATORVASTATIN CALCIUM 10 MG/1
10 TABLET, FILM COATED ORAL
Status: DISCONTINUED | OUTPATIENT
Start: 2023-11-01 | End: 2023-11-02 | Stop reason: HOSPADM

## 2023-11-01 RX ORDER — POLYETHYLENE GLYCOL 3350 17 G/17G
17 POWDER, FOR SOLUTION ORAL DAILY
Status: DISCONTINUED | OUTPATIENT
Start: 2023-11-02 | End: 2023-11-02 | Stop reason: HOSPADM

## 2023-11-01 RX ORDER — SENNOSIDES 8.6 MG
2 TABLET ORAL
Status: DISCONTINUED | OUTPATIENT
Start: 2023-11-01 | End: 2023-11-02 | Stop reason: HOSPADM

## 2023-11-01 RX ORDER — OXYCODONE HYDROCHLORIDE 10 MG/1
10 TABLET ORAL EVERY 4 HOURS PRN
Status: DISCONTINUED | OUTPATIENT
Start: 2023-11-01 | End: 2023-11-02 | Stop reason: HOSPADM

## 2023-11-01 RX ORDER — TIZANIDINE 4 MG/1
4 TABLET ORAL 3 TIMES DAILY
Refills: 0 | Status: ON HOLD
Start: 2023-11-01

## 2023-11-01 RX ORDER — LACTULOSE 20 G/30ML
20 SOLUTION ORAL 2 TIMES DAILY
Status: DISCONTINUED | OUTPATIENT
Start: 2023-11-01 | End: 2023-11-01 | Stop reason: HOSPADM

## 2023-11-01 RX ORDER — HEPARIN SODIUM 5000 [USP'U]/ML
5000 INJECTION, SOLUTION INTRAVENOUS; SUBCUTANEOUS EVERY 8 HOURS SCHEDULED
Qty: 1 ML | Refills: 0 | Status: ON HOLD
Start: 2023-11-01

## 2023-11-01 RX ORDER — POLYETHYLENE GLYCOL 3350 17 G/17G
17 POWDER, FOR SOLUTION ORAL 2 TIMES DAILY
Refills: 0
Start: 2023-11-01 | End: 2023-11-02

## 2023-11-01 RX ORDER — OXYCODONE HYDROCHLORIDE 5 MG/1
5 TABLET ORAL EVERY 4 HOURS PRN
Status: DISCONTINUED | OUTPATIENT
Start: 2023-11-01 | End: 2023-11-02 | Stop reason: HOSPADM

## 2023-11-01 RX ORDER — LACTULOSE 20 G/30ML
20 SOLUTION ORAL 2 TIMES DAILY
Refills: 0
Start: 2023-11-01 | End: 2023-11-02

## 2023-11-01 RX ORDER — ACETAMINOPHEN 325 MG/1
975 TABLET ORAL EVERY 8 HOURS SCHEDULED
Refills: 0 | Status: ON HOLD
Start: 2023-11-01

## 2023-11-01 RX ORDER — LIDOCAINE 50 MG/G
1 PATCH TOPICAL DAILY
Refills: 0
Start: 2023-11-02 | End: 2023-11-02

## 2023-11-01 RX ORDER — LANOLIN ALCOHOL/MO/W.PET/CERES
3 CREAM (GRAM) TOPICAL
Refills: 0 | Status: ON HOLD
Start: 2023-11-01

## 2023-11-01 RX ORDER — FUROSEMIDE 40 MG/1
40 TABLET ORAL DAILY
Qty: 2 TABLET | Refills: 0 | Status: ON HOLD
Start: 2023-11-02 | End: 2023-11-04

## 2023-11-01 RX ORDER — MAGNESIUM HYDROXIDE/ALUMINUM HYDROXICE/SIMETHICONE 120; 1200; 1200 MG/30ML; MG/30ML; MG/30ML
30 SUSPENSION ORAL EVERY 6 HOURS PRN
Status: DISCONTINUED | OUTPATIENT
Start: 2023-11-01 | End: 2023-11-02 | Stop reason: HOSPADM

## 2023-11-01 RX ORDER — MAGNESIUM HYDROXIDE/ALUMINUM HYDROXICE/SIMETHICONE 120; 1200; 1200 MG/30ML; MG/30ML; MG/30ML
30 SUSPENSION ORAL EVERY 6 HOURS PRN
Refills: 0 | Status: ON HOLD
Start: 2023-11-01

## 2023-11-01 RX ORDER — LANOLIN ALCOHOL/MO/W.PET/CERES
3 CREAM (GRAM) TOPICAL
Status: DISCONTINUED | OUTPATIENT
Start: 2023-11-01 | End: 2023-11-02 | Stop reason: HOSPADM

## 2023-11-01 RX ORDER — BISACODYL 10 MG
10 SUPPOSITORY, RECTAL RECTAL ONCE
Status: COMPLETED | OUTPATIENT
Start: 2023-11-01 | End: 2023-11-01

## 2023-11-01 RX ORDER — FINASTERIDE 5 MG/1
5 TABLET, FILM COATED ORAL DAILY
Refills: 0 | Status: ON HOLD
Start: 2023-11-02

## 2023-11-01 RX ORDER — OXYCODONE HYDROCHLORIDE 5 MG/1
5 TABLET ORAL EVERY 4 HOURS PRN
Refills: 0
Start: 2023-11-01 | End: 2023-11-02

## 2023-11-01 RX ORDER — FUROSEMIDE 40 MG/1
40 TABLET ORAL DAILY
Status: DISCONTINUED | OUTPATIENT
Start: 2023-11-01 | End: 2023-11-01 | Stop reason: HOSPADM

## 2023-11-01 RX ORDER — GABAPENTIN 100 MG/1
100 CAPSULE ORAL 3 TIMES DAILY
Refills: 0 | Status: ON HOLD
Start: 2023-11-01

## 2023-11-01 RX ORDER — ACETAMINOPHEN 325 MG/1
975 TABLET ORAL EVERY 8 HOURS SCHEDULED
Status: DISCONTINUED | OUTPATIENT
Start: 2023-11-01 | End: 2023-11-02 | Stop reason: HOSPADM

## 2023-11-01 RX ORDER — OXYCODONE HYDROCHLORIDE 10 MG/1
10 TABLET ORAL EVERY 4 HOURS PRN
Refills: 0
Start: 2023-11-01 | End: 2023-11-02

## 2023-11-01 RX ORDER — BISACODYL 10 MG
10 SUPPOSITORY, RECTAL RECTAL DAILY PRN
Qty: 12 SUPPOSITORY | Refills: 0 | Status: ON HOLD
Start: 2023-11-01

## 2023-11-01 RX ORDER — LIDOCAINE 50 MG/G
1 PATCH TOPICAL DAILY
Status: DISCONTINUED | OUTPATIENT
Start: 2023-11-02 | End: 2023-11-02 | Stop reason: HOSPADM

## 2023-11-01 RX ORDER — LISINOPRIL 10 MG/1
10 TABLET ORAL DAILY
Refills: 0
Start: 2023-11-02 | End: 2023-11-02

## 2023-11-01 RX ORDER — HEPARIN SODIUM 5000 [USP'U]/ML
5000 INJECTION, SOLUTION INTRAVENOUS; SUBCUTANEOUS EVERY 8 HOURS SCHEDULED
Status: DISCONTINUED | OUTPATIENT
Start: 2023-11-01 | End: 2023-11-02 | Stop reason: HOSPADM

## 2023-11-01 RX ORDER — LISINOPRIL 10 MG/1
10 TABLET ORAL DAILY
Status: DISCONTINUED | OUTPATIENT
Start: 2023-11-02 | End: 2023-11-02 | Stop reason: HOSPADM

## 2023-11-01 RX ORDER — LORATADINE 10 MG/1
10 TABLET ORAL DAILY
Status: DISCONTINUED | OUTPATIENT
Start: 2023-11-02 | End: 2023-11-02 | Stop reason: HOSPADM

## 2023-11-01 RX ORDER — LISINOPRIL 10 MG/1
10 TABLET ORAL DAILY
Status: DISCONTINUED | OUTPATIENT
Start: 2023-11-02 | End: 2023-11-01

## 2023-11-01 RX ORDER — TIZANIDINE 4 MG/1
4 TABLET ORAL 3 TIMES DAILY
Status: DISCONTINUED | OUTPATIENT
Start: 2023-11-01 | End: 2023-11-02 | Stop reason: HOSPADM

## 2023-11-01 RX ORDER — FUROSEMIDE 40 MG/1
40 TABLET ORAL DAILY
Status: DISCONTINUED | OUTPATIENT
Start: 2023-11-02 | End: 2023-11-01

## 2023-11-01 RX ORDER — FUROSEMIDE 40 MG/1
40 TABLET ORAL DAILY
Status: DISCONTINUED | OUTPATIENT
Start: 2023-11-02 | End: 2023-11-02 | Stop reason: HOSPADM

## 2023-11-01 RX ORDER — FLUTICASONE PROPIONATE 50 MCG
2 SPRAY, SUSPENSION (ML) NASAL DAILY PRN
Status: DISCONTINUED | OUTPATIENT
Start: 2023-11-01 | End: 2023-11-02 | Stop reason: HOSPADM

## 2023-11-01 RX ORDER — LANOLIN ALCOHOL/MO/W.PET/CERES
400 CREAM (GRAM) TOPICAL DAILY
Status: DISCONTINUED | OUTPATIENT
Start: 2023-11-02 | End: 2023-11-02 | Stop reason: HOSPADM

## 2023-11-01 RX ORDER — GABAPENTIN 100 MG/1
100 CAPSULE ORAL 3 TIMES DAILY
Status: DISCONTINUED | OUTPATIENT
Start: 2023-11-01 | End: 2023-11-02 | Stop reason: HOSPADM

## 2023-11-01 RX ORDER — ALBUTEROL SULFATE 90 UG/1
2 AEROSOL, METERED RESPIRATORY (INHALATION) EVERY 6 HOURS PRN
Status: DISCONTINUED | OUTPATIENT
Start: 2023-11-01 | End: 2023-11-02 | Stop reason: HOSPADM

## 2023-11-01 RX ORDER — AMOXICILLIN 250 MG
1 CAPSULE ORAL 2 TIMES DAILY
Refills: 0
Start: 2023-11-01 | End: 2023-11-02

## 2023-11-01 RX ORDER — LACTULOSE 20 G/30ML
20 SOLUTION ORAL 2 TIMES DAILY
Status: DISCONTINUED | OUTPATIENT
Start: 2023-11-01 | End: 2023-11-02 | Stop reason: HOSPADM

## 2023-11-01 RX ORDER — FINASTERIDE 5 MG/1
5 TABLET, FILM COATED ORAL DAILY
Status: DISCONTINUED | OUTPATIENT
Start: 2023-11-02 | End: 2023-11-02 | Stop reason: HOSPADM

## 2023-11-01 RX ADMIN — ACETAMINOPHEN 975 MG: 325 TABLET, FILM COATED ORAL at 05:22

## 2023-11-01 RX ADMIN — TIZANIDINE 4 MG: 4 TABLET ORAL at 22:01

## 2023-11-01 RX ADMIN — ATORVASTATIN CALCIUM 10 MG: 10 TABLET, FILM COATED ORAL at 17:19

## 2023-11-01 RX ADMIN — Medication 3 MG: at 22:02

## 2023-11-01 RX ADMIN — ATORVASTATIN CALCIUM 10 MG: 10 TABLET, FILM COATED ORAL at 08:10

## 2023-11-01 RX ADMIN — TIZANIDINE 4 MG: 4 TABLET ORAL at 16:17

## 2023-11-01 RX ADMIN — FINASTERIDE 5 MG: 5 TABLET, FILM COATED ORAL at 08:09

## 2023-11-01 RX ADMIN — SENNOSIDES 17.2 MG: 8.6 TABLET, FILM COATED ORAL at 22:02

## 2023-11-01 RX ADMIN — OXYCODONE HYDROCHLORIDE 10 MG: 10 TABLET ORAL at 07:50

## 2023-11-01 RX ADMIN — GABAPENTIN 100 MG: 100 CAPSULE ORAL at 22:02

## 2023-11-01 RX ADMIN — HEPARIN SODIUM 5000 UNITS: 5000 INJECTION INTRAVENOUS; SUBCUTANEOUS at 22:02

## 2023-11-01 RX ADMIN — PANTOPRAZOLE SODIUM 40 MG: 40 TABLET, DELAYED RELEASE ORAL at 05:22

## 2023-11-01 RX ADMIN — FOLIC ACID TAB 400 MCG 400 MCG: 400 TAB at 08:10

## 2023-11-01 RX ADMIN — OXYCODONE HYDROCHLORIDE 10 MG: 10 TABLET ORAL at 11:48

## 2023-11-01 RX ADMIN — GABAPENTIN 100 MG: 100 CAPSULE ORAL at 08:09

## 2023-11-01 RX ADMIN — ACETAMINOPHEN 975 MG: 325 TABLET, FILM COATED ORAL at 22:01

## 2023-11-01 RX ADMIN — TIZANIDINE 4 MG: 4 TABLET ORAL at 08:09

## 2023-11-01 RX ADMIN — LISINOPRIL 10 MG: 10 TABLET ORAL at 08:09

## 2023-11-01 RX ADMIN — LORATADINE 10 MG: 10 TABLET ORAL at 08:09

## 2023-11-01 RX ADMIN — POLYETHYLENE GLYCOL 3350 17 G: 17 POWDER, FOR SOLUTION ORAL at 08:07

## 2023-11-01 RX ADMIN — GABAPENTIN 100 MG: 100 CAPSULE ORAL at 16:17

## 2023-11-01 RX ADMIN — HEPARIN SODIUM 5000 UNITS: 5000 INJECTION INTRAVENOUS; SUBCUTANEOUS at 05:22

## 2023-11-01 RX ADMIN — FUROSEMIDE 40 MG: 40 TABLET ORAL at 10:54

## 2023-11-01 RX ADMIN — SENNOSIDES, DOCUSATE SODIUM 1 TABLET: 8.6; 5 TABLET ORAL at 10:53

## 2023-11-01 RX ADMIN — LACTULOSE 20 G: 20 SOLUTION ORAL at 10:54

## 2023-11-01 RX ADMIN — BISACODYL 10 MG: 10 SUPPOSITORY RECTAL at 10:56

## 2023-11-01 RX ADMIN — ACETAMINOPHEN 975 MG: 325 TABLET, FILM COATED ORAL at 16:17

## 2023-11-01 NOTE — PLAN OF CARE
Problem: OCCUPATIONAL THERAPY ADULT  Goal: Performs self-care activities at highest level of function for planned discharge setting. See evaluation for individualized goals. Description: Treatment Interventions: ADL retraining, Functional transfer training, UE strengthening/ROM, Endurance training, Cognitive reorientation, Patient/family training, Equipment evaluation/education, Fine motor coordination activities, Compensatory technique education, Continued evaluation, Energy conservation, Activityengagement  Equipment Recommended: Bedside commode, Shower/Tub chair with back ($) (if discharges home)       See flowsheet documentation for full assessment, interventions and recommendations. Outcome: Progressing  Note: Limitation: Decreased ADL status, Decreased UE ROM, Decreased UE strength, Decreased Safe judgement during ADL, Decreased endurance, Decreased sensation, Decreased fine motor control, Decreased self-care trans, Decreased high-level ADLs  Prognosis: Good  Assessment: PT SEEN FOR OT TX SESSION WITH FOCUS ON ADLS, FUNCTIONAL TXFS/MOBILITY AND PT EDUCATION. PT REQUIRED ADDITIONAL ASSIST WITH UB DRESSING TO MOD A LEVEL AND LB DRESSING TO MAX A LEVEL COMPARED TO PREVIOUS SESSION. PT CONTS TO REQUIRE MOD A FOR TXFS INCLUDING TOILET TXFS. IMPROVEMENT NOTED IN FUNCTIONAL MOBILITY TO MIN A WITH USE OF RW. PT REMAINS SIGNIFICANTLY LIMITED 2' PAIN, FATIGUE, IMPAIRED BALANCE, EDEMA, AND DECREASED STRENGTH AND ENDURANCE. PT REMAINS MOTIVATED TO PARTICIPATE. PT EDUCATED ON D/C PLAN WITH ALL CURRENT QUESTIONS/CONCERNS ADDRESSED. CONT TO RECOMMEND LEVEL 1 RESOURCES UPON D/C. WILL CONT TO FOLLOW TO ADDRESS THE INITIALLY ESTABLISHED OT GOALS WITH POC UPDATED TO 3-5X/WK.      Rehab Resource Intensity Level, OT: I (Maximum Resource Intensity)

## 2023-11-01 NOTE — PLAN OF CARE
Problem: MOBILITY - ADULT  Goal: Maintain or return to baseline ADL function  Description: INTERVENTIONS:  -  Assess patient's ability to carry out ADLs; assess patient's baseline for ADL function and identify physical deficits which impact ability to perform ADLs (bathing, care of mouth/teeth, toileting, grooming, dressing, etc.)  - Assess/evaluate cause of self-care deficits   - Assess range of motion  - Assess patient's mobility; develop plan if impaired  - Assess patient's need for assistive devices and provide as appropriate  - Encourage maximum independence but intervene and supervise when necessary  - Involve family in performance of ADLs  - Assess for home care needs following discharge   - Consider OT consult to assist with ADL evaluation and planning for discharge  - Provide patient education as appropriate  Outcome: Adequate for Discharge  Goal: Maintains/Returns to pre admission functional level  Description: INTERVENTIONS:  - Perform BMAT or MOVE assessment daily.   - Set and communicate daily mobility goal to care team and patient/family/caregiver.    - Collaborate with rehabilitation services on mobility goals if consulted    Problem: PAIN - ADULT  Goal: Verbalizes/displays adequate comfort level or baseline comfort level  Description: Interventions:  - Encourage patient to monitor pain and request assistance  - Assess pain using appropriate pain scale  - Administer analgesics based on type and severity of pain and evaluate response  - Implement non-pharmacological measures as appropriate and evaluate response  - Consider cultural and social influences on pain and pain management  - Notify physician/advanced practitioner if interventions unsuccessful or patient reports new pain  Outcome: Adequate for Discharge     - Out of bed for toileting  - Record patient progress and toleration of activity level   Outcome: Adequate for Discharge

## 2023-11-01 NOTE — ASSESSMENT & PLAN NOTE
- Rehabilitation medicine physician for daily monitoring of care, 24 hour availability for acute  medical issues, medication management, and therapeutic and diagnostic assessments.   - 24 hour rehabilitation nursing 7 days per week for: management/teaching of medications,  bowel/bladder routine, skin care.  - PT, OT for 2-3 hours per day, 5 to 6 days per week; 15 hours per week  - Rehabilitation Psychology as needed for adjustment and coping  - MSW for barriers to discharge, community resources, and family support  - Discharge planning following to help ensure a safe and efficient discharge

## 2023-11-01 NOTE — PLAN OF CARE
Problem: PAIN - ADULT  Goal: Verbalizes/displays adequate comfort level or baseline comfort level  Description: Interventions:  - Encourage patient to monitor pain and request assistance  - Assess pain using appropriate pain scale  - Administer analgesics based on type and severity of pain and evaluate response  - Implement non-pharmacological measures as appropriate and evaluate response  - Consider cultural and social influences on pain and pain management  - Notify physician/advanced practitioner if interventions unsuccessful or patient reports new pain  Outcome: Progressing     Problem: INFECTION - ADULT  Goal: Absence or prevention of progression during hospitalization  Description: INTERVENTIONS:  - Assess and monitor for signs and symptoms of infection  - Monitor lab/diagnostic results  - Monitor all insertion sites, i.e. indwelling lines, tubes, and drains  - Monitor endotracheal if appropriate and nasal secretions for changes in amount and color  - [place] appropriate cooling/warming therapies per order  - Administer medications as ordered  - Instruct and encourage patient and family to use good hand hygiene technique  - Identify and instruct in appropriate isolation precautions for identified infection/condition  Outcome: Progressing  Goal: Absence of fever/infection during neutropenic period  Description: INTERVENTIONS:  - Monitor WBC    Outcome: Progressing     Problem: SAFETY ADULT  Goal: Patient will remain free of falls  Description: INTERVENTIONS:  - Educate patient/family on patient safety including physical limitations  - Instruct patient to call for assistance with activity   - Consult OT/PT to assist with strengthening/mobility   - Keep Call bell within reach  - Keep bed low and locked with side rails adjusted as appropriate  - Keep care items and personal belongings within reach  - Initiate and maintain comfort rounds  - Make Fall Risk Sign visible to staff  - Offer Toileting every 2 Hours, in advance of need  - Initiate/Maintain bed/chair alarm  - Obtain necessary fall risk management equipment: nonskid socks  - Apply yellow socks and bracelet for high fall risk patients  - Consider moving patient to room near nurses station  Outcome: Progressing  Goal: Maintain or return to baseline ADL function  Description: INTERVENTIONS:  -  Assess patient's ability to carry out ADLs; assess patient's baseline for ADL function and identify physical deficits which impact ability to perform ADLs (bathing, care of mouth/teeth, toileting, grooming, dressing, etc.)  - Assess/evaluate cause of self-care deficits   - Assess range of motion  - Assess patient's mobility; develop plan if impaired  - Assess patient's need for assistive devices and provide as appropriate  - Encourage maximum independence but intervene and supervise when necessary  - Involve family in performance of ADLs  - Assess for home care needs following discharge   - Consider OT consult to assist with ADL evaluation and planning for discharge  - Provide patient education as appropriate  Outcome: Progressing  Goal: Maintains/Returns to pre admission functional level  Description: INTERVENTIONS:  - Perform BMAT or MOVE assessment daily.   - Set and communicate daily mobility goal to care team and patient/family/caregiver. - Collaborate with rehabilitation services on mobility goals if consulted  - Perform Range of Motion 3 times a day. - Reposition patient every 2 hours.   - Dangle patient 3 times a day  - Stand patient 3 times a day  - Ambulate patient 3 times a day  - Out of bed to chair 3 times a day   - Out of bed for meals 3 times a day  - Out of bed for toileting  - Record patient progress and toleration of activity level   Outcome: Progressing     Problem: DISCHARGE PLANNING  Goal: Discharge to home or other facility with appropriate resources  Description: INTERVENTIONS:  - Identify barriers to discharge w/patient and caregiver  - Arrange for needed discharge resources and transportation as appropriate  - Identify discharge learning needs (meds, wound care, etc.)  - Arrange for interpretive services to assist at discharge as needed  - Refer to Case Management Department for coordinating discharge planning if the patient needs post-hospital services based on physician/advanced practitioner order or complex needs related to functional status, cognitive ability, or social support system  Outcome: Progressing

## 2023-11-01 NOTE — OCCUPATIONAL THERAPY NOTE
Occupational Therapy Progress Note     Patient Name: Juan Carlos MUNOZ Date: 11/1/2023  Problem List  Principal Problem:    Osteoarthritis of cervical spine with myelopathy  Active Problems:    Impaired mobility and activities of daily living    Benign essential hypertension    Gastroesophageal reflux disease    Hyperlipidemia    Urinary retention    Paresthesia    Weakness of extremity    Hyponatremia        11/01/23 1101   OT Last Visit   OT Visit Date 11/01/23   Note Type   Note Type Treatment   Pain Assessment   Pain Assessment Tool 0-10   Pain Score 8   Pain Location/Orientation Location: Neck   Patient's Stated Pain Goal No pain   Hospital Pain Intervention(s) Repositioned; Ambulation/increased activity; Emotional support   Restrictions/Precautions   Weight Bearing Precautions Per Order No   Braces or Orthoses   (none)   Other Precautions Cognitive; Chair Alarm;Multiple lines; Fall Risk;Pain;Spinal precautions   Lifestyle   Autonomy Mod I with increased to for self care tasks (with recent symptoms)/mod I with ADL's, no AD with functional mobility, +drives   Reciprocal Relationships pt reports golfing several times a week. meets his friends out at times   Service to Others retired   Intrinsic Gratification spending time outside   [address] 3  Moderate Assistance   [address]; Thread LUE;Pull around back   LB Dressing Assistance 2  Maximal Assistance   LB Dressing Deficit Setup;Supervision/safety; Increased time to complete; Don/doff R sock; Don/doff L sock   Toileting Assistance  2  Maximal Assistance   Toileting Deficit Setup; Clothing management up;Clothing management down;Perineal hygiene   Transfers   Sit to Stand 3  Moderate assistance   Additional items Assist x 1; Increased time required;Verbal cues   Stand to Sit 3  Moderate assistance   Additional items Assist x 1; Increased time required;Verbal cues   Functional Mobility   Functional Mobility 4  Minimal assistance   Additional items Rolling walker   Toilet Transfers   Toilet Transfer From Rolling walker   Toilet Transfer Type To   Toilet Transfer to [employer] Transfers Moderate assistance   Cognition   Overall Cognitive Status WFL   Arousal/Participation Cooperative   Attention Attends with cues to redirect   Orientation Level Oriented X4   Memory Within functional limits   Following Commands Follows multistep commands without difficulty   Comments PT IS PLEASANT AND COOPERATIVE   Activity Tolerance   Activity Tolerance Patient limited by pain   Medical Staff Made Aware PT SEEN FOR PORTION OF CO-SESSION WITH SKILLED PHYSICAL THERAPIST WITH DIFFERENT AREAS OF FOCUS 2' CLINICALLY UNSTABLE PRESENTATION,  NEW PRECAUTIONS/LIMITATIONS, LIMITED ACTIVITY TOLERANCE AND PRESENT IMPAIRMENTS WHICH ARE A REGRESSION FROM THE PT'S BASELINE AND IMPACTING OVERALL OCCUPATIONAL PERFORMANCE. Assessment   Assessment PT SEEN FOR OT TX SESSION WITH FOCUS ON ADLS, FUNCTIONAL TXFS/MOBILITY AND PT EDUCATION. PT REQUIRED ADDITIONAL ASSIST WITH UB DRESSING TO MOD A LEVEL AND LB DRESSING TO MAX A LEVEL COMPARED TO PREVIOUS SESSION. PT CONTS TO REQUIRE MOD A FOR TXFS INCLUDING TOILET TXFS. IMPROVEMENT NOTED IN FUNCTIONAL MOBILITY TO MIN A WITH USE OF RW. PT REMAINS SIGNIFICANTLY LIMITED 2' PAIN, FATIGUE, IMPAIRED BALANCE, EDEMA, AND DECREASED STRENGTH AND ENDURANCE. PT REMAINS MOTIVATED TO PARTICIPATE. PT EDUCATED ON D/C PLAN WITH ALL CURRENT QUESTIONS/CONCERNS ADDRESSED. CONT TO RECOMMEND LEVEL 1 RESOURCES UPON D/C. WILL CONT TO FOLLOW TO ADDRESS THE INITIALLY ESTABLISHED OT GOALS WITH POC UPDATED TO 3-5X/WK. Plan   Treatment Interventions ADL retraining;Functional transfer training; Endurance training;Cognitive reorientation;Patient/family training;Equipment evaluation/education; Compensatory technique education; Energy conservation; Activityengagement   Goal Expiration Date 11/09/23   OT Treatment Day 3   OT Frequency 3-5x/wk   Discharge Recommendation   Rehab Resource Intensity Level, OT I (Maximum Resource Intensity)   AM-PAC Daily Activity Inpatient   Lower Body Dressing 2   Bathing 2   Toileting 2   Upper Body Dressing 2   Grooming 3   Eating 3   Daily Activity Raw Score 14   Daily Activity Standardized Score (Calc for Raw Score >=11) 33.39   AM-PAC Applied Cognition Inpatient   Following a Speech/Presentation 4   Understanding Ordinary Conversation 4   Taking Medications 4   Remembering Where Things Are Placed or Put Away 4   Remembering List of 4-5 Errands 4   Taking Care of Complicated Tasks 4   Applied Cognition Raw Score 24   Applied Cognition Standardized Score 62.21       Documentation completed by EMMIE Fried, OTR/L  [address] [place] [address] Certified ID# [id number]

## 2023-11-01 NOTE — CASE MANAGEMENT
Case Management Discharge Planning Note    Patient name Nitin Zarate  Location [facility] 629/[facility] 889-65 MRN [id number]  : 1950 Date 2023       Current Admission Date: 10/23/2023  Current Admission Diagnosis:Osteoarthritis of cervical spine with myelopathy   Patient Active Problem List    Diagnosis Date Noted    Paresthesia 10/25/2023    Weakness of extremity 10/25/2023    Hyponatremia 10/25/2023    Constipation 10/24/2023    Preoperative clearance 10/23/2023    Urinary retention 10/21/2023    Ambulatory dysfunction 10/20/2023    Anemia 10/20/2023    Benign essential hypertension 10/20/2023    Dorsalgia, unspecified 10/20/2023    Gastroesophageal reflux disease 10/20/2023    Hyperlipidemia 10/20/2023    Osteoarthritis of cervical spine with myelopathy 2023    Tinea corporis 2019    Onychomycosis 2019    Screen for colon cancer 2018      LOS (days): 9  Geometric Mean LOS (GMLOS) (days):   Days to GMLOS:     OBJECTIVE:  Risk of Unplanned Readmission Score: 14.25         Current admission status: Inpatient   Preferred Pharmacy:   [address]  [address] [place] [address]  Phone: [phone] Fax: [phone]    Primary Care Provider: Estephanie Doshi MD    Primary Insurance: MEDICARE  Secondary Insurance: VETERANS    DISCHARGE DETAILS: Patient will DC today to [facility] room 968 at 1300, patient aware and does not need this CM to call anyone, as he will notify [facility]

## 2023-11-01 NOTE — PHYSICAL THERAPY NOTE
PHYSICAL THERAPY NOTE          Patient Name: Faisal Salgado  PSVAS'B Date: 11/1/2023 11/01/23 1104   Note Type   Note Type Treatment   Pain Assessment   Pain Assessment Tool 0-10   Pain Score 2   Pain Location/Orientation Location: Neck   Pain Onset/Description Onset: Ongoing;Frequency: Constant/Continuous; Descriptor: Aching   Effect of Pain on Daily Activities increased pain with activity   Patient's Stated Pain Goal No pain   Hospital Pain Intervention(s) Ambulation/increased activity;Repositioned   Restrictions/Precautions   Weight Bearing Precautions Per Order No   Braces or Orthoses   (no c/s collar required per neurosx)   Other Precautions Chair Alarm; Bed Alarm; Fall Risk;Pain;Spinal precautions   General   Chart Reviewed Yes   Response to Previous Treatment Patient with no complaints from previous session. Family/Caregiver Present No   Cognition   Overall Cognitive Status WFL   Arousal/Participation Alert   Attention Within functional limits   Orientation Level Oriented X4   Memory Within functional limits   Following Commands Follows all commands and directions without difficulty   Subjective   Subjective Patient willing and motivated participate in PT   Bed Mobility   Additional Comments NA, Pt seated OOB in chair at time of PT treatment session   Transfers   Sit to Stand 3  Moderate assistance   Additional items Assist x 1; Increased time required;Verbal cues   Stand to Sit 3  Moderate assistance   Additional items Assist x 1; Increased time required;Verbal cues   Ambulation/Elevation   Gait pattern Excessively slow; Short stride; Foward flexed; Inconsistent kami   Gait Assistance 4  Minimal assist   Additional items Assist x 1   Assistive Device Rolling walker   Distance 15ft x 2  (limited due to need to have a BM)   Balance   Static Sitting Fair   Static Standing Fair -   Ambulatory Poor +   Endurance Deficit Endurance Deficit Yes   Endurance Deficit Description fatigue   Activity Tolerance   Activity Tolerance Patient limited by fatigue   Nurse Made Aware Patient appropriate to be seen and mobilize per nursing   Assessment   Prognosis Good   Problem List Decreased strength;Decreased range of motion;Decreased endurance;Decreased mobility;Pain;Orthopedic restrictions   Assessment Patient resting out of bed in chair at time PT treatment session. Patient is able perform all transfers with mod a x1 with slight improved compared to previous session however slight cueing still required for proper hand placement during transfers. Patient was able to tolerate ambulation with min a x1 however distances were limited due to patient requesting to try to have a BM. Patient was assisted onto toilet due to patient requesting increased time to try to have a BM patient was instructed that to pull call bell cord when finished. Nursing was made aware patient was on toilet and that patient was to call for nursing when finished. Overall patient continues to make progress with PT and continue to benefit from skilled PT services during hospital stay. D/C recommendation when medically cleared is rehab. Goals   Patient Goals " to go to rehab"   STG Expiration Date 11/09/23   PT Treatment Day 3   Plan   Treatment/Interventions Functional transfer training;LE strengthening/ROM; Elevations; Therapeutic exercise; Endurance training;Patient/family training;Equipment eval/education; Bed mobility;Gait training;Spoke to nursing;OT   Progress Progressing toward goals   PT Frequency 3-5x/wk   Discharge Recommendation   Rehab Resource Intensity Level, PT I (Maximum Resource Intensity)   Equipment Recommended [facility] Recommended Wheeled walker   AM-PAC Basic Mobility Inpatient   Turning in Flat Bed Without Bedrails 2   Lying on Back to Sitting on Edge of Flat Bed Without Bedrails 2   Moving Bed to Chair 2   Standing Up From Chair Using Arms 2   Walk in Room 3   Climb 3-5 Stairs With Railing 3   Basic Mobility Inpatient Raw Score 14   Basic Mobility Standardized Score 35.55   Highest Level Of Mobility   -HLM Goal 4: Move to chair/commode   JH-HLM Achieved 6: Walk 10 steps or more     Portions of the documentation may have been created using voice recognition software. Occasional wrong word or sound alike substitutions may have occurred due to the inherent limitations of the voice recognition software. Read the chart carefully and recognize, using context, where substitutions have occurred.     Walter Clonts, PT, DPT

## 2023-11-01 NOTE — PLAN OF CARE
Problem: PHYSICAL THERAPY ADULT  Goal: Performs mobility at highest level of function for planned discharge setting. See evaluation for individualized goals. Description: Treatment/Interventions: Functional transfer training, LE strengthening/ROM, Endurance training, Gait training, Bed mobility, Spoke to nursing, Spoke to case management, OT, Therapeutic exercise, Patient/family training, Equipment eval/education  Equipment Recommended: Eva Bolton       See flowsheet documentation for full assessment, interventions and recommendations. Outcome: Progressing  Note: Prognosis: Good  Problem List: Decreased strength, Decreased range of motion, Decreased endurance, Decreased mobility, Pain, Orthopedic restrictions  Assessment: Patient resting out of bed in chair at time PT treatment session. Patient is able perform all transfers with mod a x1 with slight improved compared to previous session however slight cueing still required for proper hand placement during transfers. Patient was able to tolerate ambulation with min a x1 however distances were limited due to patient requesting to try to have a BM. Patient was assisted onto toilet due to patient requesting increased time to try to have a BM patient was instructed that to pull call bell cord when finished. Nursing was made aware patient was on toilet and that patient was to call for nursing when finished. Overall patient continues to make progress with PT and continue to benefit from skilled PT services during hospital stay. D/C recommendation when medically cleared is rehab. Barriers to Discharge: Inaccessible home environment, Decreased caregiver support     Rehab Resource Intensity Level, PT: I (Maximum Resource Intensity)    See flowsheet documentation for full assessment.

## 2023-11-01 NOTE — DISCHARGE SUMMARY
Discharge Summary - [facility]    Patient Information: Juan Carlos Pearson 68 y.o. male MRN: [id number]  Unit/Bed#: [facility] 629-01 Encounter: [id number]    Discharging Physician / Practitioner: Raza Shepherd, [address] [place] [address]  PCP: Aimee Aschoff, MD  Admission Date: 10/23/2023  Discharge Date: 11/01/23    Reason for Admission: Cervical stenosis and myelopathy s/p cervical decompression with nsx 10/25    Discharge Diagnoses:     Principal Problem:    Osteoarthritis of cervical spine with myelopathy  Active Problems:    Ambulatory dysfunction    Benign essential hypertension    Gastroesophageal reflux disease    Hyperlipidemia    Urinary retention    Preoperative clearance    Constipation    Paresthesia    Weakness of extremity    Hyponatremia  Resolved Problems:    * No resolved hospital problems. *      Consultations During Hospital Stay:  Neurosurgery  PMR  PT/OT   Case management     Procedures Performed:     cord compression at C4-5, mild thoracic degenerative changes without significant stenosis. Significant Findings / Test Results:     CTH without negative  CT C spine without negative  CT Thoracic and lumbar without negative  Xray left knee degenerative changes  MRI thoracic spine without Mild thoracic degenerative changes without significant stenosis or compression of the thoracic cord. Severe canal stenosis with cord compression at C4-5 suggested on sagittal large field-of-view localizer sequence with questionable abnormal cord signal. Incompletely assessed. Recommend MRI of the cervical spine for further evaluation   MRI lumbar spine without Transitional S1 vertebra with hypoplastic ribs at L1. Degenerative spondylosis most pronounced at L5-S1 and S1-S2.    Xray cervical spine Straightening with posterior spinal fixation from C3-C6 is in alignment with soft tissue changes     Incidental Findings:   None      Test Results Pending at Discharge (will require follow up):   none     Outpatient Tests Requested: Outpatient f/u with PCP  Outpatient f/u with NSX as scheduled for post op visits, 11/13 and 77/80    Complications:  none     Hospital Course:     Eloise Buerger is a 68 y.o. male patient with a past medical history of cervical radiculopathy known to neurosurgery outpatient, hyponatremia, hypertension, GERD, hyperlipidemia who originally presented to the hospital on 10/23/2023 due to falls. Was scheduled for outpatient decompression procedure with neurosurgery 10/30. MRI imaging showed cord compression, was seen by neurosurgery and underwent cervical decompression and fusion 10/25. Had DIANA drain postoperatively which was removed 10/27. He had a Christensen catheter postop which was removed however failed void trial and needed to be replaced. Will plan for void trial at rehab once constipation and ambulatory function improved. Patient has history of hyponatremia, uric acid and TSH normal.  Urine sodium, urine osmolality and serum osmolality. Has been overall stable however dropping slightly today with peripheral edema noted. We decrease fluid restriction to 1500. We will give 3 days of oral Lasix and monitor. Would recommend BMP tomorrow morning. Patient is stable for discharge to acute rehab today. He has follow-up scheduled with neurosurgery 11/13 and again 12/11. No bracing needed. Mobilize as tolerated. Condition at Discharge: stable     Discharge Day Visit / Exam:     Physical Exam  Vitals and nursing note reviewed. Constitutional:       General: He is not in acute distress. Cardiovascular:      Rate and Rhythm: Normal rate. Pulmonary:      Effort: No respiratory distress. Breath sounds: Normal breath sounds. Abdominal:      General: There is distension. Palpations: Abdomen is soft. Tenderness: There is no abdominal tenderness.    Genitourinary:     Comments: Christensen catheter draining clear yellow urine  Musculoskeletal:         General: Swelling (bilateral hands, bilateral LE) present. Skin:     General: Skin is warm. Capillary Refill: Capillary refill takes less than 2 seconds. Findings: Erythema (B/L hands) present. Neurological:      Mental Status: He is alert and oriented to person, place, and time. Mental status is at baseline. Psychiatric:         Mood and Affect: Mood normal.          Discussion with Family: Patient, Declined update to family. Discharge instructions/Information to patient and family:   See after visit summary for information provided to patient and family. Provisions for Follow-Up Care:  See after visit summary for information related to follow-up care and any pertinent home health orders. Disposition:     Acute Rehab at [address] [place] [address] to [address] [place] [address] SNF:   Not Applicable to this Patient - Not Applicable to this Patient    Planned Readmission: no    Discharge Statement:  I spent 45 minutes discharging the patient. This time was spent on the day of discharge. I had direct contact with the patient on the day of discharge. Greater than 50% of the total time was spent examining patient, answering all patient questions, arranging and discussing plan of care with patient as well as directly providing post-discharge instructions. Additional time then spent on discharge activities. Discharge Medications:  See after visit summary for reconciled discharge medications provided to patient and family. ** Please Note: This note has been constructed using a voice recognition system.  **

## 2023-11-01 NOTE — PROGRESS NOTES
Patient:    MRN:  [id number]    Coral Request ID:  [id number]    Level of care reserved:  Inpatient [facility]    Partner Reserved:  [facility], [facility] [phone]    Clinical needs requested:    Geography searched:  10 miles around [address] [place] [address] 53 of Service:    Request sent:  3:38pm EDT on 10/27/2023 by Cesar Carlin    Partner reserved:  9:06am EDT on 11/1/2023 by Abigail Tobin    Choice list shared:  10:23am EDT on 10/31/2023 by Cesar Cariln

## 2023-11-01 NOTE — PLAN OF CARE
Problem: MOBILITY - ADULT  Goal: Maintain or return to baseline ADL function  Description: INTERVENTIONS:  -  Assess patient's ability to carry out ADLs; assess patient's baseline for ADL function and identify physical deficits which impact ability to perform ADLs (bathing, care of mouth/teeth, toileting, grooming, dressing, etc.)  - Assess/evaluate cause of self-care deficits   - Assess range of motion  - Assess patient's mobility; develop plan if impaired  - Assess patient's need for assistive devices and provide as appropriate  - Encourage maximum independence but intervene and supervise when necessary  - Involve family in performance of ADLs  - Assess for home care needs following discharge   - Consider OT consult to assist with ADL evaluation and planning for discharge  - Provide patient education as appropriate  Outcome: Progressing  Goal: Maintains/Returns to pre admission functional level  Description: INTERVENTIONS:  - Perform BMAT or MOVE assessment daily.   - Set and communicate daily mobility goal to care team and patient/family/caregiver. - Collaborate with rehabilitation services on mobility goals if consulted  - Perform Range of Motion 3 times a day. - Reposition patient every 2 hours.   - Dangle patient 3 times a day  - Stand patient 3 times a day  - Ambulate patient 3 times a day  - Out of bed to chair 3 times a day   - Out of bed for meals 3 times a day  - Out of bed for toileting  - Record patient progress and toleration of activity level   Outcome: Progressing     Problem: PAIN - ADULT  Goal: Verbalizes/displays adequate comfort level or baseline comfort level  Description: Interventions:  - Encourage patient to monitor pain and request assistance  - Assess pain using appropriate pain scale  - Administer analgesics based on type and severity of pain and evaluate response  - Implement non-pharmacological measures as appropriate and evaluate response  - Consider cultural and social influences on pain and pain management  - Notify physician/advanced practitioner if interventions unsuccessful or patient reports new pain  Outcome: Progressing     Problem: INFECTION - ADULT  Goal: Absence or prevention of progression during hospitalization  Description: INTERVENTIONS:  - Assess and monitor for signs and symptoms of infection  - Monitor lab/diagnostic results  - Monitor all insertion sites, i.e. indwelling lines, tubes, and drains  - Monitor endotracheal if appropriate and nasal secretions for changes in amount and color  - [place] appropriate cooling/warming therapies per order  - Administer medications as ordered  - Instruct and encourage patient and family to use good hand hygiene technique  - Identify and instruct in appropriate isolation precautions for identified infection/condition  Outcome: Progressing     Problem: DISCHARGE PLANNING  Goal: Discharge to home or other facility with appropriate resources  Description: INTERVENTIONS:  - Identify barriers to discharge w/patient and caregiver  - Arrange for needed discharge resources and transportation as appropriate  - Identify discharge learning needs (meds, wound care, etc.)  - Arrange for interpretive services to assist at discharge as needed  - Refer to Case Management Department for coordinating discharge planning if the patient needs post-hospital services based on physician/advanced practitioner order or complex needs related to functional status, cognitive ability, or social support system  Outcome: Progressing     Problem: Knowledge Deficit  Goal: Patient/family/caregiver demonstrates understanding of disease process, treatment plan, medications, and discharge instructions  Description: Complete learning assessment and assess knowledge base.   Interventions:  - Provide teaching at level of understanding  - Provide teaching via preferred learning methods  Outcome: Progressing     Problem: NEUROSENSORY - ADULT  Goal: Achieves maximal functionality and self care  Description: INTERVENTIONS  - Monitor swallowing and airway patency with patient fatigue and changes in neurological status  - Encourage and assist patient to increase activity and self care.    - Encourage visually impaired, hearing impaired and aphasic patients to use assistive/communication devices  Outcome: Progressing     Problem: METABOLIC, FLUID AND ELECTROLYTES - ADULT  Goal: Electrolytes maintained within normal limits  Description: INTERVENTIONS:  - Monitor labs and assess patient for signs and symptoms of electrolyte imbalances  - Administer electrolyte replacement as ordered  - Monitor response to electrolyte replacements, including repeat lab results as appropriate  - Instruct patient on fluid and nutrition as appropriate  Outcome: Progressing  Goal: Fluid balance maintained  Description: INTERVENTIONS:  - Monitor labs   - Monitor I/O and WT  - Instruct patient on fluid and nutrition as appropriate  - Assess for signs & symptoms of volume excess or deficit  Outcome: Progressing     Problem: MUSCULOSKELETAL - ADULT  Goal: Maintain or return mobility to safest level of function  Description: INTERVENTIONS:  - Assess patient's ability to carry out ADLs; assess patient's baseline for ADL function and identify physical deficits which impact ability to perform ADLs (bathing, care of mouth/teeth, toileting, grooming, dressing, etc.)  - Assess/evaluate cause of self-care deficits   - Assess range of motion  - Assess patient's mobility  - Assess patient's need for assistive devices and provide as appropriate  - Encourage maximum independence but intervene and supervise when necessary  - Involve family in performance of ADLs  - Assess for home care needs following discharge   - Consider OT consult to assist with ADL evaluation and planning for discharge  - Provide patient education as appropriate  Outcome: Progressing  Goal: Maintain proper alignment of affected body part  Description: INTERVENTIONS:  - Support, maintain and protect limb and body alignment  - Provide patient/ family with appropriate education  Outcome: Progressing     Problem: Potential for Falls  Goal: Patient will remain free of falls  Description: INTERVENTIONS:  - Educate patient/family on patient safety including physical limitations  - Instruct patient to call for assistance with activity   - Consult OT/PT to assist with strengthening/mobility   - Keep Call bell within reach  - Keep bed low and locked with side rails adjusted as appropriate  - Keep care items and personal belongings within reach  - Initiate and maintain comfort rounds  - Make Fall Risk Sign visible to staff  - Offer Toileting every 2 Hours, in advance of need  - Initiate/Maintain alarm  - Obtain necessary fall risk management equipment:   - Apply yellow socks and bracelet for high fall risk patients  - Consider moving patient to room near nurses station  Outcome: Progressing     Problem: RESPIRATORY - ADULT  Goal: Achieves optimal ventilation and oxygenation  Description: INTERVENTIONS:  - Assess for changes in respiratory status  - Assess for changes in mentation and behavior  - Position to facilitate oxygenation and minimize respiratory effort  - Oxygen administered by appropriate delivery if ordered  - Initiate smoking cessation education as indicated  - Encourage broncho-pulmonary hygiene including cough, deep breathe, Incentive Spirometry  - Assess the need for suctioning and aspirate as needed  - Assess and instruct to report SOB or any respiratory difficulty  - Respiratory Therapy support as indicated  Outcome: Progressing     Problem: GASTROINTESTINAL - ADULT  Goal: Maintains or returns to baseline bowel function  Description: INTERVENTIONS:  - Assess bowel function  - Encourage oral fluids to ensure adequate hydration  - Administer IV fluids if ordered to ensure adequate hydration  - Administer ordered medications as needed  - Encourage mobilization and activity  - Consider nutritional services referral to assist patient with adequate nutrition and appropriate food choices  Outcome: Progressing     Problem: GENITOURINARY - ADULT  Goal: Maintains or returns to baseline urinary function  Description: INTERVENTIONS:  - Assess urinary function  - Encourage oral fluids to ensure adequate hydration if ordered  - Administer IV fluids as ordered to ensure adequate hydration  - Administer ordered medications as needed  - Offer frequent toileting  - Follow urinary retention protocol if ordered  Outcome: Progressing  Goal: Urinary catheter remains patent  Description: INTERVENTIONS:  - Assess patency of urinary catheter  - If patient has a chronic fountain, consider changing catheter if non-functioning  - Follow guidelines for intermittent irrigation of non-functioning urinary catheter  Outcome: Progressing     Problem: SKIN/TISSUE INTEGRITY - ADULT  Goal: Incision(s), wounds(s) or drain site(s) healing without S/S of infection  Description: INTERVENTIONS  - Assess and document dressing, incision, wound bed, drain sites and surrounding tissue  - Provide patient and family education  - Perform skin care/dressing changes every   Outcome: Progressing

## 2023-11-01 NOTE — CONSULTS
Internal Medicine Consultation Note    Patient: Francesca Mcmillan  Age/sex: 68 y.o. male  Medical Record #: [id number]      Assessment/Plan:     Cervical spine stenosis with radiculopathy  Had a fall PTA 2/2 bilateral leg pain with numbness, tingling and weakness that had been worsening. He had been to see Neurosurgery as an outpatient and was scheduled for a PCDF on 10/30/23. 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 Depending on response may need BID. HLD  Continue statin    Urine retention  Failed VT in hospital and Flomax was started  VT here per PMR    ABLA  No transfusion given  Will check CBC 11/2/23    Discharge date:  Team      Subjective/HPI:     Lillian Rivera  is a 68year old patient with a history of C4-6 cervical stenosis/cord compression, HTN, HLD and GERD who presented to [facility] on 10/20/23 after a fall with head strike. He fell 2/2 bilateral leg pain with numbness, tingling and weakness that had been worsening. He had been to see Neurosurgery as an outpatient and was scheduled for a PCDF on 10/30/23. MRI on admission showed cord compression at C4-5. He underwent a PCDF C3-6 on 10/25/23. During his hospital stay, he developed hyponatremia. Uric acid was normal, urine sodium 46, urine osmolarity, TSH normal.  Random Cortisol was 6.2 but cosyntropin stimulation test was done but not indicative of adrenal insufficiency. HCTZ was stopped. He was placed on a fluid restriction, salt tabs and to get Lasix 40mg qd x 3. His BPs were running a bit soft, so his Lisinopril was decreased from 20mg daily to 10mg daily. He failed a void trial and had his fountain reinserted and Flomax started. He is for a repeat void trial here in [facility]. Patient is now in [facility] for inpatient acute rehabilitation and we are asked to assist with medical management. Currently there are no complaints of CP, SOB, dizziness, N/V/D. +edema hands/legs/scrotum/abdomen    ROS:   A 10 point ROS was performed; negative except as noted above. Social History:    Substance Use History:   Social History     Substance and Sexual Activity   Alcohol Use Yes    Alcohol/week: 6.0 standard drinks of alcohol    Types: 6 Cans of beer per week    Comment: beer 4-5 days a week     Social History     Tobacco Use   Smoking Status Never   Smokeless Tobacco Never     Social History     Substance and Sexual Activity   Drug Use Never       Family History:    No family history on file.       Review of Scheduled Meds:  Current Facility-Administered Medications   Medication Dose Route Frequency Provider Last Rate    acetaminophen  975 mg Oral [facility]ri Scott MD      albuterol  2 puff Inhalation Q6H PRN Carri Scott MD      aluminum-magnesium hydroxide-simethicone  30 mL Oral Q6H PRN Carri Scott MD      atorvastatin  10 mg Oral After Kiley De Santiago MD      bisacodyl  10 mg Rectal Daily PRN Carri Scott MD      [START ON 11/2/2023] finasteride  5 mg Oral Daily Carri Scott MD      fluticasone  2 spray Each Nare Daily PRN Carri Scott MD      [START ON 17/1/6725] folic acid  781 mcg Oral Daily Carri Scott MD      [START ON 11/2/2023] furosemide  40 mg Oral Daily Carri Scott MD      gabapentin  100 mg Oral TID Carri Scott MD      heparin (porcine)  5,000 Units Subcutaneous [facility] Carri Scott MD      influenza vaccine  0.7 mL Intramuscular Once Carri Scott MD      lactulose  20 g Oral BID Carri Scott MD      [START ON 11/2/2023] lidocaine  1 patch Topical Daily Carri Scott MD      [START ON 11/2/2023] lisinopril  10 mg Oral Daily Reina Garrido MD      [START ON 2023] loratadine  10 mg Oral Daily Reina Garrido MD      melatonin  3 mg Oral HS Reina Garrido MD      oxyCODONE  5 mg Oral Q4H PRN Reina Garrido MD      Or    oxyCODONE  10 mg Oral Q4H PRN Reina Garrido MD      [START ON 2023] polyethylene glycol  17 g Oral Daily Reina Garrido MD      senna  2 tablet Oral HS Reina Garrido MD      tiZANidine  4 mg Oral TID Reina Garrido MD         Labs:     Results from last 7 days   Lab Units 10/28/23  1245 10/27/23  0538   WBC Thousand/uL 7.36 10.62*   HEMOGLOBIN g/dL 10.0* 10.9*   HEMATOCRIT % 30.6* 33.8*   PLATELETS Thousands/uL 195 190     Results from last 7 days   Lab Units 10/31/23  0528 10/30/23  0605   SODIUM mmol/L 129* 132*   POTASSIUM mmol/L 4.3 4.4   CHLORIDE mmol/L 91* 95*   CO2 mmol/L 31 30   BUN mg/dL 20 23   CREATININE mg/dL 0.87 0.84   CALCIUM mg/dL 8.8 8.6                      No results found for: "Mosetta Sieve", "Reinaldo Kawasaki", "WOUNDCULT", "SPUTUMCULTUR"    Input and Output Summary (last 24 hours):     No intake or output data in the 24 hours ending 23 7594    Imaging:     No orders to display       *Labs /Radiology studiesReviewed, no new imaging  *Medications reviewed and reconciled as needed  *Please refer to order section for additional ordered labs studies  *Case discussed with primary attending during morning huddle case rounds    Vitals:   Temp (24hrs), Av.3 °F (36.8 °C), Min:98 °F (36.7 °C), Max:98.7 °F (37.1 °C)    Temp:  [98 °F (36.7 °C)-98.7 °F (37.1 °C)] 98 °F (36.7 °C)  HR:  [78-93] 93  Resp:  [16] 16  BP: ()/(61-86) 116/76  SpO2:  [94 %-96 %] 96 %  Body mass index is 38.25 kg/m².      Physical Exam:   General Appearance: no distress, non toxic appearing  HEENT: PERRLA, conjuctiva normal; oropharynx clear; mucous membranes moist   Neck:  incision well approximated w/o erythema/drainge  Lungs: CTA, normal respiratory effort, no retractions, expiratory effort normal  CV: regular rate and rhythm; no rubs/murmurs/gallops, PMI normal   ABD: very firm and distended, nontender; +BS  EXT: +  edema legs into scrotum/abdominal wall/sacrum. Also has edema hands and lower arms  Skin: normal turgor, normal texture, no rashes  Psych: affect normal, mood normal  Neuro: AAO          Invasive Devices       Peripheral Intravenous Line  Duration             Peripheral IV 10/28/23 Distal;Dorsal (posterior); Left Forearm 4 days              Drain  Duration             Urethral Catheter 16 Fr. 2 days                     VTE Pharmacologic Prophylaxis: Heparin  Code Status: Level 1 - Full Code  Current Length of Stay: 0 day(s)    Total floor / unit time spent today 30 minutes with more than 50% spent counseling/coordinating care. Counseling includes discussion with patient re: progress  and discussion with patient of his/her current medical state/information. Coordination of patient's care was performed in conjunction with primary service. Time invested included review of patient's labs, vitals, and management of their comorbidities with continued monitoring. In addition, this patient was discussed with medical team including physician and advanced extenders. The care of the patient was extensively discussed and appropriate treatment plan was formulated unique for this patient by supervising physician unless stated otherwise in their attestation statement. ** Please Note: voice to text software may have been used in the creation of this document.  Audio transcription errors may occur**

## 2023-11-02 ENCOUNTER — APPOINTMENT (INPATIENT)
Dept: RADIOLOGY | Facility: HOSPITAL | Age: 73
End: 2023-11-02
Payer: MEDICARE

## 2023-11-02 ENCOUNTER — APPOINTMENT (INPATIENT)
Dept: RADIOLOGY | Facility: HOSPITAL | Age: 73
DRG: 641 | End: 2023-11-02
Payer: COMMERCIAL

## 2023-11-02 ENCOUNTER — APPOINTMENT (INPATIENT)
Dept: NON INVASIVE DIAGNOSTICS | Facility: HOSPITAL | Age: 73
DRG: 641 | End: 2023-11-02
Payer: COMMERCIAL

## 2023-11-02 ENCOUNTER — HOSPITAL ENCOUNTER (INPATIENT)
Facility: HOSPITAL | Age: 73
LOS: 8 days | DRG: 641 | End: 2023-11-10
Attending: INTERNAL MEDICINE | Admitting: INTERNAL MEDICINE
Payer: COMMERCIAL

## 2023-11-02 VITALS
HEART RATE: 91 BPM | TEMPERATURE: 97.2 F | SYSTOLIC BLOOD PRESSURE: 113 MMHG | WEIGHT: 259 LBS | RESPIRATION RATE: 26 BRPM | OXYGEN SATURATION: 96 % | HEIGHT: 69 IN | BODY MASS INDEX: 38.36 KG/M2 | DIASTOLIC BLOOD PRESSURE: 70 MMHG

## 2023-11-02 DIAGNOSIS — M47.12 OSTEOARTHRITIS OF CERVICAL SPINE WITH MYELOPATHY: ICD-10-CM

## 2023-11-02 DIAGNOSIS — I82.443 ACUTE DEEP VEIN THROMBOSIS (DVT) OF TIBIAL VEIN OF BOTH LOWER EXTREMITIES (HCC): ICD-10-CM

## 2023-11-02 DIAGNOSIS — R19.7 DIARRHEA: ICD-10-CM

## 2023-11-02 DIAGNOSIS — Z74.09 IMPAIRED MOBILITY AND ACTIVITIES OF DAILY LIVING: Primary | ICD-10-CM

## 2023-11-02 DIAGNOSIS — Z78.9 IMPAIRED MOBILITY AND ACTIVITIES OF DAILY LIVING: Primary | ICD-10-CM

## 2023-11-02 PROBLEM — R33.9 URINARY RETENTION: Status: ACTIVE | Noted: 2023-11-01

## 2023-11-02 PROBLEM — R06.89 ACUTE RESPIRATORY INSUFFICIENCY: Status: ACTIVE | Noted: 2023-11-02

## 2023-11-02 PROBLEM — K56.7 ILEUS (HCC): Status: ACTIVE | Noted: 2023-11-02

## 2023-11-02 LAB
ANION GAP SERPL CALCULATED.3IONS-SCNC: 10 MMOL/L
ANION GAP SERPL CALCULATED.3IONS-SCNC: 8 MMOL/L
AORTIC ROOT: 2.8 CM
APICAL FOUR CHAMBER EJECTION FRACTION: 65 %
BUN SERPL-MCNC: 22 MG/DL (ref 5–25)
BUN SERPL-MCNC: 25 MG/DL (ref 5–25)
C DIFF TOX B TCDB STL QL NAA+PROBE: NEGATIVE
CALCIUM SERPL-MCNC: 8.5 MG/DL (ref 8.4–10.2)
CALCIUM SERPL-MCNC: 9.3 MG/DL (ref 8.4–10.2)
CHLORIDE SERPL-SCNC: 91 MMOL/L (ref 96–108)
CHLORIDE SERPL-SCNC: 92 MMOL/L (ref 96–108)
CO2 SERPL-SCNC: 27 MMOL/L (ref 21–32)
CO2 SERPL-SCNC: 28 MMOL/L (ref 21–32)
CREAT SERPL-MCNC: 0.84 MG/DL (ref 0.6–1.3)
CREAT SERPL-MCNC: 0.94 MG/DL (ref 0.6–1.3)
E WAVE DECELERATION TIME: 162 MS
E/A RATIO: 0.93
ERYTHROCYTE [DISTWIDTH] IN BLOOD BY AUTOMATED COUNT: 12.4 % (ref 11.6–15.1)
FRACTIONAL SHORTENING: 37 (ref 28–44)
GFR SERPL CREATININE-BSD FRML MDRD: 80 ML/MIN/1.73SQ M
GFR SERPL CREATININE-BSD FRML MDRD: 86 ML/MIN/1.73SQ M
GLUCOSE SERPL-MCNC: 109 MG/DL (ref 65–140)
GLUCOSE SERPL-MCNC: 115 MG/DL (ref 65–140)
GLUCOSE SERPL-MCNC: 124 MG/DL (ref 65–140)
HCT VFR BLD AUTO: 37.1 % (ref 36.5–49.3)
HGB BLD-MCNC: 12 G/DL (ref 12–17)
INTERVENTRICULAR SEPTUM IN DIASTOLE (PARASTERNAL SHORT AXIS VIEW): 1.1 CM
INTERVENTRICULAR SEPTUM: 1.1 CM (ref 0.6–1.1)
LAAS-AP2: 18 CM2
LAAS-AP4: 21.5 CM2
LEFT ATRIUM SIZE: 3.5 CM
LEFT ATRIUM VOLUME (MOD BIPLANE): 59 ML
LEFT ATRIUM VOLUME INDEX (MOD BIPLANE): 25.3 ML/M2
LEFT INTERNAL DIMENSION IN SYSTOLE: 2.2 CM (ref 2.1–4)
LEFT VENTRICLE DIASTOLIC VOLUME (MOD BIPLANE): 45 ML
LEFT VENTRICLE SYSTOLIC VOLUME (MOD BIPLANE): 18 ML
LEFT VENTRICULAR INTERNAL DIMENSION IN DIASTOLE: 3.5 CM (ref 3.5–6)
LEFT VENTRICULAR POSTERIOR WALL IN END DIASTOLE: 1.1 CM
LEFT VENTRICULAR STROKE VOLUME: 34 ML
LV EF: 60 %
LVSV (TEICH): 34 ML
MCH RBC QN AUTO: 31.7 PG (ref 26.8–34.3)
MCHC RBC AUTO-ENTMCNC: 32.3 G/DL (ref 31.4–37.4)
MCV RBC AUTO: 98 FL (ref 82–98)
MV E'TISSUE VEL-SEP: 10 CM/S
MV PEAK A VEL: 0.61 M/S
MV PEAK E VEL: 57 CM/S
MV STENOSIS PRESSURE HALF TIME: 47 MS
MV VALVE AREA P 1/2 METHOD: 4.68
PLATELET # BLD AUTO: 332 THOUSANDS/UL (ref 149–390)
PMV BLD AUTO: 9.2 FL (ref 8.9–12.7)
POTASSIUM SERPL-SCNC: 4.1 MMOL/L (ref 3.5–5.3)
POTASSIUM SERPL-SCNC: 4.7 MMOL/L (ref 3.5–5.3)
RBC # BLD AUTO: 3.78 MILLION/UL (ref 3.88–5.62)
RIGHT ATRIUM AREA SYSTOLE A4C: 18.5 CM2
SL CV LEFT ATRIUM LENGTH A2C: 5.2 CM
SL CV LV EF: 65
SL CV PED ECHO LEFT VENTRICLE DIASTOLIC VOLUME (MOD BIPLANE) 2D: 51 ML
SL CV PED ECHO LEFT VENTRICLE SYSTOLIC VOLUME (MOD BIPLANE) 2D: 17 ML
SODIUM SERPL-SCNC: 127 MMOL/L (ref 135–147)
SODIUM SERPL-SCNC: 129 MMOL/L (ref 135–147)
TRICUSPID ANNULAR PLANE SYSTOLIC EXCURSION: 2.6 CM
WBC # BLD AUTO: 10.51 THOUSAND/UL (ref 4.31–10.16)

## 2023-11-02 PROCEDURE — 80048 BASIC METABOLIC PNL TOTAL CA: CPT | Performed by: NURSE PRACTITIONER

## 2023-11-02 PROCEDURE — 94760 N-INVAS EAR/PLS OXIMETRY 1: CPT

## 2023-11-02 PROCEDURE — 94664 DEMO&/EVAL PT USE INHALER: CPT

## 2023-11-02 PROCEDURE — 87493 C DIFF AMPLIFIED PROBE: CPT | Performed by: INTERNAL MEDICINE

## 2023-11-02 PROCEDURE — G1004 CDSM NDSC: HCPCS

## 2023-11-02 PROCEDURE — 93306 TTE W/DOPPLER COMPLETE: CPT

## 2023-11-02 PROCEDURE — 80048 BASIC METABOLIC PNL TOTAL CA: CPT | Performed by: INTERNAL MEDICINE

## 2023-11-02 PROCEDURE — 82948 REAGENT STRIP/BLOOD GLUCOSE: CPT

## 2023-11-02 PROCEDURE — 74177 CT ABD & PELVIS W/CONTRAST: CPT

## 2023-11-02 PROCEDURE — 99223 1ST HOSP IP/OBS HIGH 75: CPT | Performed by: INTERNAL MEDICINE

## 2023-11-02 PROCEDURE — 93005 ELECTROCARDIOGRAM TRACING: CPT

## 2023-11-02 PROCEDURE — 94640 AIRWAY INHALATION TREATMENT: CPT

## 2023-11-02 PROCEDURE — 71045 X-RAY EXAM CHEST 1 VIEW: CPT

## 2023-11-02 PROCEDURE — 93306 TTE W/DOPPLER COMPLETE: CPT | Performed by: INTERNAL MEDICINE

## 2023-11-02 PROCEDURE — RECHECK: Performed by: PHYSICIAN ASSISTANT

## 2023-11-02 PROCEDURE — 99232 SBSQ HOSP IP/OBS MODERATE 35: CPT | Performed by: EMERGENCY MEDICINE

## 2023-11-02 PROCEDURE — 85027 COMPLETE CBC AUTOMATED: CPT | Performed by: INTERNAL MEDICINE

## 2023-11-02 PROCEDURE — 74018 RADEX ABDOMEN 1 VIEW: CPT

## 2023-11-02 RX ORDER — SENNOSIDES 8.6 MG
17.2 TABLET ORAL
Refills: 0 | Status: ON HOLD
Start: 2023-11-02

## 2023-11-02 RX ORDER — LIDOCAINE 50 MG/G
1 PATCH TOPICAL DAILY
Refills: 0 | Status: ON HOLD
Start: 2023-11-02

## 2023-11-02 RX ORDER — FUROSEMIDE 10 MG/ML
20 INJECTION INTRAMUSCULAR; INTRAVENOUS ONCE
Status: DISCONTINUED | OUTPATIENT
Start: 2023-11-02 | End: 2023-11-02 | Stop reason: HOSPADM

## 2023-11-02 RX ORDER — LANOLIN ALCOHOL/MO/W.PET/CERES
3 CREAM (GRAM) TOPICAL
Status: DISCONTINUED | OUTPATIENT
Start: 2023-11-02 | End: 2023-11-10 | Stop reason: HOSPADM

## 2023-11-02 RX ORDER — LORATADINE 10 MG/1
10 TABLET ORAL DAILY
Status: DISCONTINUED | OUTPATIENT
Start: 2023-11-02 | End: 2023-11-10 | Stop reason: HOSPADM

## 2023-11-02 RX ORDER — FUROSEMIDE 10 MG/ML
40 INJECTION INTRAMUSCULAR; INTRAVENOUS 2 TIMES DAILY
Status: DISCONTINUED | OUTPATIENT
Start: 2023-11-02 | End: 2023-11-04

## 2023-11-02 RX ORDER — ATORVASTATIN CALCIUM 10 MG/1
10 TABLET, FILM COATED ORAL DAILY
Status: DISCONTINUED | OUTPATIENT
Start: 2023-11-02 | End: 2023-11-10 | Stop reason: HOSPADM

## 2023-11-02 RX ORDER — FUROSEMIDE 10 MG/ML
40 INJECTION INTRAMUSCULAR; INTRAVENOUS ONCE
Status: COMPLETED | OUTPATIENT
Start: 2023-11-02 | End: 2023-11-02

## 2023-11-02 RX ORDER — HYDROMORPHONE HCL IN WATER/PF 6 MG/30 ML
0.2 PATIENT CONTROLLED ANALGESIA SYRINGE INTRAVENOUS EVERY 4 HOURS PRN
Status: DISCONTINUED | OUTPATIENT
Start: 2023-11-02 | End: 2023-11-03

## 2023-11-02 RX ORDER — OXYCODONE HYDROCHLORIDE 10 MG/1
10 TABLET ORAL EVERY 4 HOURS PRN
Refills: 0 | Status: ON HOLD
Start: 2023-11-02 | End: 2023-11-12

## 2023-11-02 RX ORDER — ACETAMINOPHEN 325 MG/1
650 TABLET ORAL EVERY 6 HOURS PRN
Status: DISCONTINUED | OUTPATIENT
Start: 2023-11-02 | End: 2023-11-10 | Stop reason: HOSPADM

## 2023-11-02 RX ORDER — HYDROMORPHONE HCL/PF 1 MG/ML
0.5 SYRINGE (ML) INJECTION EVERY 4 HOURS PRN
Status: DISCONTINUED | OUTPATIENT
Start: 2023-11-02 | End: 2023-11-03

## 2023-11-02 RX ORDER — TIZANIDINE 4 MG/1
4 TABLET ORAL 3 TIMES DAILY
Status: DISCONTINUED | OUTPATIENT
Start: 2023-11-02 | End: 2023-11-10 | Stop reason: HOSPADM

## 2023-11-02 RX ORDER — LANOLIN ALCOHOL/MO/W.PET/CERES
400 CREAM (GRAM) TOPICAL DAILY
Status: DISCONTINUED | OUTPATIENT
Start: 2023-11-02 | End: 2023-11-10 | Stop reason: HOSPADM

## 2023-11-02 RX ORDER — LISINOPRIL 10 MG/1
10 TABLET ORAL DAILY
Status: DISCONTINUED | OUTPATIENT
Start: 2023-11-02 | End: 2023-11-02

## 2023-11-02 RX ORDER — LISINOPRIL 10 MG/1
10 TABLET ORAL DAILY
Refills: 0 | Status: ON HOLD
Start: 2023-11-02

## 2023-11-02 RX ORDER — ALBUTEROL SULFATE 2.5 MG/3ML
SOLUTION RESPIRATORY (INHALATION)
Status: COMPLETED
Start: 2023-11-02 | End: 2023-11-02

## 2023-11-02 RX ORDER — FINASTERIDE 5 MG/1
5 TABLET, FILM COATED ORAL DAILY
Status: DISCONTINUED | OUTPATIENT
Start: 2023-11-02 | End: 2023-11-10 | Stop reason: HOSPADM

## 2023-11-02 RX ORDER — LACTULOSE 20 G/30ML
20 SOLUTION ORAL 2 TIMES DAILY
Refills: 0 | Status: ON HOLD
Start: 2023-11-02

## 2023-11-02 RX ORDER — OXYCODONE HYDROCHLORIDE 5 MG/1
5 TABLET ORAL EVERY 4 HOURS PRN
Refills: 0 | Status: ON HOLD
Start: 2023-11-02 | End: 2023-11-12

## 2023-11-02 RX ORDER — MAGNESIUM HYDROXIDE/ALUMINUM HYDROXICE/SIMETHICONE 120; 1200; 1200 MG/30ML; MG/30ML; MG/30ML
30 SUSPENSION ORAL EVERY 6 HOURS PRN
Status: DISCONTINUED | OUTPATIENT
Start: 2023-11-02 | End: 2023-11-02

## 2023-11-02 RX ORDER — GABAPENTIN 100 MG/1
100 CAPSULE ORAL 3 TIMES DAILY
Status: DISCONTINUED | OUTPATIENT
Start: 2023-11-02 | End: 2023-11-10 | Stop reason: HOSPADM

## 2023-11-02 RX ORDER — FLUTICASONE PROPIONATE 50 MCG
1 SPRAY, SUSPENSION (ML) NASAL 2 TIMES DAILY
Status: DISCONTINUED | OUTPATIENT
Start: 2023-11-02 | End: 2023-11-10 | Stop reason: HOSPADM

## 2023-11-02 RX ORDER — HEPARIN SODIUM 5000 [USP'U]/ML
5000 INJECTION, SOLUTION INTRAVENOUS; SUBCUTANEOUS EVERY 8 HOURS SCHEDULED
Status: DISCONTINUED | OUTPATIENT
Start: 2023-11-02 | End: 2023-11-03

## 2023-11-02 RX ORDER — ONDANSETRON 2 MG/ML
4 INJECTION INTRAMUSCULAR; INTRAVENOUS EVERY 6 HOURS PRN
Status: DISCONTINUED | OUTPATIENT
Start: 2023-11-02 | End: 2023-11-10 | Stop reason: HOSPADM

## 2023-11-02 RX ORDER — POLYETHYLENE GLYCOL 3350 17 G/17G
17 POWDER, FOR SOLUTION ORAL DAILY
Refills: 0 | Status: ON HOLD
Start: 2023-11-02

## 2023-11-02 RX ADMIN — GABAPENTIN 100 MG: 100 CAPSULE ORAL at 22:27

## 2023-11-02 RX ADMIN — TIZANIDINE 4 MG: 4 TABLET ORAL at 22:27

## 2023-11-02 RX ADMIN — FUROSEMIDE 40 MG: 10 INJECTION, SOLUTION INTRAMUSCULAR; INTRAVENOUS at 03:48

## 2023-11-02 RX ADMIN — HEPARIN SODIUM 5000 UNITS: 5000 INJECTION INTRAVENOUS; SUBCUTANEOUS at 06:40

## 2023-11-02 RX ADMIN — FUROSEMIDE 40 MG: 10 INJECTION, SOLUTION INTRAMUSCULAR; INTRAVENOUS at 17:45

## 2023-11-02 RX ADMIN — MELATONIN 3 MG: at 22:27

## 2023-11-02 RX ADMIN — ALBUTEROL SULFATE: 2.5 SOLUTION RESPIRATORY (INHALATION) at 03:43

## 2023-11-02 RX ADMIN — HEPARIN SODIUM 5000 UNITS: 5000 INJECTION INTRAVENOUS; SUBCUTANEOUS at 22:26

## 2023-11-02 RX ADMIN — IOHEXOL 100 ML: 350 INJECTION, SOLUTION INTRAVENOUS at 06:18

## 2023-11-02 RX ADMIN — HEPARIN SODIUM 5000 UNITS: 5000 INJECTION INTRAVENOUS; SUBCUTANEOUS at 13:51

## 2023-11-02 NOTE — ASSESSMENT & PLAN NOTE
Patient previously reported this was chronic issue, however sodium 127 now down from previous 132  Work-up in prior hospitalization revealing normal uric acid, urine sodium 46, urine awesome 7035, serum awesome 278, TSH normal.  Cortisol was noted low however subsequent cosyntropin stimulation test was not concerning for adrenal insufficiency  Suspected SIADH as etiology, however also suspect volume overload is playing role  IV diuresis as above, patient will be n.p.o. for now.   Initiate fluid restriction once okay for oral diet  Repeat BMP this afternoon

## 2023-11-02 NOTE — H&P
[facility] [facility]  H&P  Name: Mukund Hale 68 y.o. male I MRN: [id number]  [facility] I Date of Admission: 11/2/2023   Date of Service: 11/2/2023 I Hospital Day: 0      Assessment/Plan   * Acute respiratory insufficiency  Assessment & Plan  Patient was RRT at [facility] [address] [place] [address] with respiratory distress, mildly hypoxic requiring 2 LNC to maintain appropriate saturations  Suspect in the setting of volume overload/anasarca with at least ileus as below contributing, see plans below for management with diuresis, further imaging, and n.p.o./pain control  Transferred to [address] [facility] for further management given situation  Continue supplemental oxygen as needed to maintain SaO2 at least 88%  Incentive spirometry, pulmonary toileting    Anasarca  Assessment & Plan  Patient complaining of increased diffuse edema, appears he has gained at least 17 pounds since hospitalization and appears grossly volume overloaded on examination with CXR with some evidence of pulmonary vascular congestion.   Patient himself denies any prior history of cardiac disease  S/p 40 mg IV Lasix during rapid response, continue IV diuresis with Lasix 40 mg twice daily for now, adjust dependent on response  Check TTE  Monitor daily weights, I's/O, volume status  Patient currently n.p.o. given concomitant ileus  Daily BMP on IV diuresis, replace electrolytes as needed    Ileus [facility])  Assessment & Plan  Patient reporting marked abdominal distention, now with at least 3 episodes of watery diarrhea this admission  X-ray abdomen wet read with significantly distended colon, suspect is contributing to respiratory insufficiency  Obtain CT abdomen/pelvis to better delineate  Keep n.p.o. for now  Receiving IV Lasix for anasarca as above  Pain control regimen initiated       Urinary retention  Assessment & Plan  S/p Christensen catheter placement at prior hospitalization, initially removed 10/26 however replaced 10/30 as patient failing urinary retention protocol  Maintain Christensen catheter for now and continue finasteride. TOV once more ambulatory  Eventually will need outpatient urology follow-up    Hyponatremia  Assessment & Plan  Patient previously reported this was chronic issue, however sodium 127 now down from previous 132  Work-up in prior hospitalization revealing normal uric acid, urine sodium 46, urine awesome 7035, serum awesome 278, TSH normal.  Cortisol was noted low however subsequent cosyntropin stimulation test was not concerning for adrenal insufficiency  Suspected SIADH as etiology, however also suspect volume overload is playing role  IV diuresis as above, patient will be n.p.o. for now. Initiate fluid restriction once okay for oral diet  Repeat BMP this afternoon    Benign essential hypertension  Assessment & Plan  Restart lisinopril once okay for p.o. Monitor blood pressure per protocol  Previously patient was on HCTZ however discontinued due to hyponatremia    Osteoarthritis of cervical spine with myelopathy  Assessment & Plan  Patient s/p C3-C6 PCDF 10/25/2023 in setting of cervical stenosis/radiculopathy with cord compression at C4-5 level with severe canal stenosis  Continue with fall precautions, multimodal pain regimen  CM consult for assistance with disposition once stabilized, potential return to [facility]           VTE Prophylaxis: Heparin  / sequential compression device   Code Status: Level 1 - Full Code   POLST: POLST form is not discussed and not completed at this time. Discussion with family: Patient declines at this time    Anticipated Length of Stay:  Patient will be admitted on an Inpatient basis with an anticipated length of stay of greater than 2 midnights. Justification for Hospital Stay: Please see detailed plans noted above.     Chief Complaint:     Shortness of breath  History of Present Illness:  Mendel Moloney is a 68 y.o. male who originally presented here in transfer from [address] [place] [address] 10/23/2023 due to falls noted with cervical cord compression, transferred here for C3-C6 PCDF performed 10/25/2023 by neurosurgery team with DIANA drain placed postoperatively 10/27/2023. Hospitalization was complicated by development of hyponatremia suspected SIADH requiring HCTZ discontinuation and fluid restriction, additionally experiencing urinary retention requiring indwelling Christensen catheter placement after successful trial of spontaneous voiding and constipation requiring intensification of bowel regimen. Ultimately he was discharged 11/1/2023 to [facility] for inpatient acute rehabilitation. This evening however patient was a rapid response due to degree of shortness of breath with increased work of breathing, noted unable to complete long sentences. He additionally stated increased abdominal pain and distention but noted he was passing gas and had at least 2-3 rounds diarrhea this evening. Concern was noted for anasarca and potential ileus, and patient received IV Lasix with albuterol breathing treatment and transferred back to medicine service for further management given the degree of shortness of breath additionally requiring 2 LNC. Currently he is lying in bed uncomfortable appearing, reporting ongoing shortness of breath and abdominal distention. Denies fever/chills, chest pain/pressure, dizziness/lightheadedness, or nausea/vomiting. Expresses concern regarding his edema believing he has gained at least 15-17 pounds since his hospitalization, and additionally with his abdominal distention stating he had never been this big before. Denies personal history of cardiac or pulmonary disease.     Review of Systems:    Constitutional:  Denies fever or chills   Eyes:  Denies change in visual acuity   HENT:  Denies nasal congestion or sore throat   Respiratory:  Denies cough but reports shortness of breath  Cardiovascular:  Denies chest pain but reports edema  GI:  Denies nausea, vomiting, bloody stools but reports abdominal pain, abdominal distention, and diarrhea  :  Denies dysuria   Musculoskeletal:  Denies back pain or joint pain   Integument:  Denies rash   Neurologic:  Denies headache, focal weakness or sensory changes   Endocrine:  Denies polyuria or polydipsia   Lymphatic:  Denies swollen glands   Psychiatric:  Denies depression or anxiety     Past Medical and Surgical History:   Past Medical History:   Diagnosis Date    GERD (gastroesophageal reflux disease)     Hyperlipidemia     Hypertension      Past Surgical History:   Procedure Laterality Date    COLONOSCOPY      HERNIA REPAIR      WI ARTHRD PST/PSTLAT TQ 1NTRSPC CRV BELW C2 SEGMENT N/A 10/25/2023    Procedure: C3-6 PCDF;  Surgeon: Tyesha Perez MD;  Location: [facility];  Service: Neurosurgery    WI COLONOSCOPY FLX DX W/[facility]IT[facility] WHEN PFRMD N/A 4/5/2019    Procedure: COLONOSCOPY;  Surgeon: Jc Lora DO;  Location: [facility];   Service: Gastroenterology    ROTATOR CUFF REPAIR Left     TONSILLECTOMY         Meds/Allergies:  Medications Prior to Admission   Medication    acetaminophen (TYLENOL) 325 mg tablet    albuterol (PROVENTIL HFA,VENTOLIN HFA) 90 mcg/act inhaler    aluminum-magnesium hydroxide-simethicone (MAALOX) 5025-2183-774 mg/30 mL suspension    atorvastatin (LIPITOR) 20 mg tablet    bisacodyl (DULCOLAX) 10 mg suppository    finasteride (PROSCAR) 5 mg tablet    fluticasone (FLONASE) 50 mcg/act nasal spray    folic acid (FOLVITE) 896 MCG tablet    furosemide (LASIX) 40 mg tablet    gabapentin (NEURONTIN) 100 mg capsule    Heparin Sodium, Porcine, (heparin, porcine,) 5,000 units/mL    lactulose 20 g/30 mL    lidocaine (LIDODERM) 5 %    lisinopril (ZESTRIL) 10 mg tablet    loratadine (CLARITIN) 10 mg tablet    melatonin 3 mg    oxyCODONE (ROXICODONE) 10 MG TABS    oxyCODONE (ROXICODONE) 5 immediate release tablet    polyethylene glycol (MIRALAX) 17 g packet    senna (SENOKOT) 8.6 mg    tiZANidine (ZANAFLEX) 4 mg tablet       Allergies: No Known Allergies  History:  Marital Status: Single     Substance Use History:   Social History     Substance and Sexual Activity   Alcohol Use Yes    Alcohol/week: 6.0 standard drinks of alcohol    Types: 6 Cans of beer per week    Comment: beer 4-5 days a week     Social History     Tobacco Use   Smoking Status Never   Smokeless Tobacco Never     Social History     Substance and Sexual Activity   Drug Use Never       Family History:  Noncontributory    Physical Exam:     Vitals:   Blood Pressure: 107/67 (11/02/23 0458)  Pulse: 92 (11/02/23 0458)  Temperature: 98 °F (36.7 °C) (11/02/23 0458)  Respirations: 20 (11/02/23 0458)  SpO2: 96 % (11/02/23 0458)    Constitutional:  Well developed, well nourished, uncomfortable appearing, non-toxic appearance   Eyes:  PERRL, conjunctiva normal   HENT:  Atraumatic, external ears normal, nose normal, oropharynx moist, no pharyngeal exudates. Neck- normal range of motion, no tenderness, supple   Respiratory: Tachypneic appearing, mildly diminished breath sounds with bibasilar Rales and slight wheezing  Cardiovascular:  Normal rate, normal rhythm, no murmurs, no gallops, no rubs   GI:  Soft, distended, hypoactive bowel sounds, diffusely tender to palpation, no organomegaly, no mass, no rebound, no guarding   :  No costovertebral angle tenderness   Musculoskeletal: Bilateral 2+ lower extremity edema and 1+ bilateral upper extremity edema, no tenderness, no deformities. Back- no tenderness  Integument:  Well hydrated, no rash   Lymphatic:  No lymphadenopathy noted   Neurologic:  Alert &awake, communicative, CN 2-12 normal, normal motor function, normal sensory function, no focal deficits noted   Psychiatric:  Speech and behavior appropriate       Lab Results: I have personally reviewed pertinent reports.       Results from last 7 days   Lab Units 10/28/23  1245   WBC Thousand/uL 7.36   HEMOGLOBIN g/dL 10.0*   HEMATOCRIT % 30.6*   PLATELETS Thousands/uL 195   NEUTROS PCT % 73   LYMPHS PCT % 14   MONOS PCT % 11   EOS PCT % 1     Results from last 7 days   Lab Units 11/02/23  0331   POTASSIUM mmol/L 4.7   CHLORIDE mmol/L 91*   CO2 mmol/L 28   BUN mg/dL 22   CREATININE mg/dL 0.84   CALCIUM mg/dL 9.3           EKG: Reviewed    Imaging: I have personally reviewed pertinent reports. and I have personally reviewed pertinent films in PACS    CXR 11/2/2023: Personally reviewed, mild pulmonary vascular congestion visualized. Final radiologist read is pending. X-ray abdomen 11/2/2023: Personally reviewed, significantly distended colon visualized. Final radiologist read is pending. XR cervical spine 2 or 3 views    Result Date: 10/27/2023  Narrative: CERVICAL SPINE INDICATION:   s/p PCDF C3-6. COMPARISON: Compared with 9/11/2023 VIEWS:  XR SPINE CERVICAL 2 OR 3 VW INJURY FINDINGS: No acute fracture or subluxation. Straightening of lordosis. Posterior spinal fixation from C3-C6 through pedicles. Alignment is maintained. Degenerative disc disease with loss of disc height and osteophytes. Postop soft tissue changes posteriorly with overlying skin staples. Surgical drain posteriorly inferiorly. Normal prevertebral soft tissues. Clear lung apices. Impression: Straightening with posterior spinal fixation from C3-C6 is in alignment with soft tissue changes Workstation performed: [id number]     XR spine cervical 2 or 3 vw injury    Result Date: 10/25/2023  Narrative: C-ARM -cervical spine INDICATION: Osteoarthritis of cervical spine with myelopathy. Procedure guidance. COMPARISON: X-ray cervical spine 9/11/2023 TECHNIQUE: FLUOROSCOPY TIME:   1 minute and 23 seconds FINDINGS: Fluoroscopic guidance provided for surgical procedure. The marker on image 3 projects over posterior aspect of the level C3-4. Osseous and soft tissue detail limited by technique. Impression: Fluoroscopic guidance provided for surgical procedure. Please refer to the separate procedure notes for additional details.  Localization procedure was performed, with the OR notified of the level at approximately 1:50 p.m. on  10/25/2023 via telephone conversation with the OR x-ray technologist . Workstation performed: [id number]     MRI lumbar spine wo contrast    Result Date: 10/21/2023  Narrative: MRI LUMBAR SPINE WITHOUT CONTRAST INDICATION: rt leg weakness. COMPARISON: CT from yesterday. Concurrent MR thoracic spine TECHNIQUE:  Multiplanar, multisequence imaging of the lumbar spine was performed. . 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 No spondylolysis or spondylolisthesis. No compression. Hemangiomas at L1 and L2. SACRUM:  Normal signal within the sacrum. No evidence of insufficiency or stress fracture. DISTAL CORD AND CONUS:  Normal size and signal within the distal cord and conus. PARASPINAL SOFT TISSUES:  Paraspinal soft tissues are unremarkable. LOWER THORACIC DISC SPACES:  Normal disc height and signal.  No disc herniation, canal stenosis or foraminal narrowing. LUMBAR DISC SPACES: Prominent ventral osteophytes suggesting diffuse idiopathic skeletal hyperostosis. L1-L2: No disc herniation, canal or foraminal stenosis. L2-L3: No disc herniation. Mild facet arthropathy. No significant canal stenosis. L3-L4: Disc bulge and facet arthropathy. Mild canal stenosis. No significant foraminal stenosis. L4-L5: Disc bulge and marginal osteophyte. Tiny central protrusion. Mild canal stenosis. No significant foraminal stenosis. L5-S1: Disc bulge and facet arthropathy. Congenitally shortened pedicles. Mild canal stenosis. Subarticular stenosis. Mild right and moderate left foraminal stenosis. S1-S2: Disc bulge and marginal osteophyte with superimposed left subarticular protrusion. Facet arthropathy. Mild canal stenosis. Subarticular/lateral recess stenosis, left worse than right. Moderate right and mild-moderate left foraminal stenosis. OTHER FINDINGS:  None.      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 Degenerative spondylosis most pronounced at L5-S1 and S1-S2. Workstation performed: [id number]     MRI thoracic spine wo contrast    Result Date: 10/21/2023  Narrative: MRI THORACIC SPINE WITHOUT CONTRAST INDICATION: rt leg weakness. COMPARISON: CT from yesterday. TECHNIQUE:  Multiplanar, multisequence imaging of the thoracic spine was performed. . IMAGE QUALITY: Diagnostic. FINDINGS: ALIGNMENT: Normal alignment of the thoracic spine. No compression fracture. No subluxation. No scoliosis. MARROW SIGNAL: Subcentimeter lesion in the T7 vertebral body favored represent atypical hemangioma. Hemangioma also seen in the L1 vertebral body. No other focal lesions or suspicious abnormal marrow signal. Multilevel Modic type II endplate degenerative  changes. THORACIC CORD: Normal signal within the thoracic cord. PARAVERTEBRAL SOFT TISSUES:  Normal. THORACIC DEGENERATIVE CHANGE: Right subarticular disc protrusion/osteophyte that mildly indents the thecal sac. Mild right foraminal stenosis at T2-3. No other thoracic disc herniation, canal or foraminal stenosis. OTHER FINDINGS: Severe canal stenosis with cord compression at C4-5 suggested on sagittal large field-of-view localizer sequence with questionable abnormal cord signal. Incompletely assessed. Recommend cervical MRI for further evaluation. .     Impression: Mild thoracic degenerative changes without significant stenosis or compression of the thoracic cord. Severe canal stenosis with cord compression at C4-5 suggested on sagittal large field-of-view localizer sequence with questionable abnormal cord signal. Incompletely assessed. Recommend MRI of the cervical spine for further evaluation. The study was marked in [facility] for immediate notification. Workstation performed: [id number]     XR knee 4+ vw left injury    Result Date: 10/20/2023  Narrative: LEFT KNEE INDICATION:   trauma.  COMPARISON:  None VIEWS:  XR KNEE 4+ VW LEFT INJURY FINDINGS: There is no acute fracture or dislocation. There is no joint effusion. Moderate tricompartmental osteoarthritis as evidenced by joint space narrowing, osteophyte formation and subchondral sclerosis. No lytic or blastic osseous lesion. Soft tissues are unremarkable. Impression: No acute osseous abnormality. Degenerative changes as described. Workstation performed: [id number]     CT head without contrast    Result Date: 10/20/2023  Narrative: CT BRAIN - WITHOUT CONTRAST INDICATION:   Head trauma, moderate-severe trauma. COMPARISON:  None. TECHNIQUE:  CT examination of the brain was performed. Multiplanar 2D reformatted images were created from the source data. Radiation dose length product (DLP) for this visit:  884 mGy-cm . This examination, like all CT scans performed in the [facility], was performed utilizing techniques to minimize radiation dose exposure, including the use of iterative reconstruction and automated exposure control. IMAGE QUALITY:  Diagnostic. FINDINGS: PARENCHYMA:  No intracranial mass, mass effect or midline shift. No CT signs of acute infarction. No acute parenchymal hemorrhage. VENTRICLES AND EXTRA-AXIAL SPACES:  Normal for the patient's age. VISUALIZED ORBITS: Normal visualized orbits. PARANASAL SINUSES: Mild mucosal thickening within bilateral maxillary sinuses. CALVARIUM AND EXTRACRANIAL SOFT TISSUES:  Normal.     Impression: No intracranial hemorrhage or calvarial fracture. Workstation performed: [id number]     CT spine cervical without contrast    Result Date: 10/20/2023  Narrative: CT CERVICAL SPINE - WITHOUT CONTRAST INDICATION:   Neck trauma (Age >= 65y) trauma. COMPARISON:  None. TECHNIQUE:  CT examination of the cervical spine was performed without intravenous contrast.  Contiguous axial images were obtained. Multiplanar 2D reformatted images were created from the source data. Radiation dose length product (DLP) for this visit:  502 mGy-cm . This examination, like all CT scans performed in the [facility], was performed utilizing techniques to minimize radiation dose exposure, including the use of iterative reconstruction and automated exposure control. IMAGE QUALITY:  Diagnostic. FINDINGS: ALIGNMENT: Straightening of the expected cervical lordosis. No significant subluxation. Leftward rotation of C1 on C2 likely positional. VERTEBRAE: No acute fracture. DEGENERATIVE CHANGES: Moderate multilevel cervical degenerative changes are noted. No critical central canal stenosis. PREVERTEBRAL AND PARASPINAL SOFT TISSUES: Unremarkable THORACIC INLET: Minimal secretions within the trachea. Impression: No cervical spine fracture or traumatic malalignment. Workstation performed: [id number]     CT spine thoracic & lumbar wo contrast    Result Date: 10/20/2023  Narrative: CT THORACIC AND LUMBAR SPINE INDICATION:   trauma. COMPARISON: CT of the chest on June 30, 2023. TECHNIQUE:  Contiguous axial images were obtained. Sagittal and coronal reconstructions were performed. Radiation dose length product (DLP) for this visit:  2586 mGy-cm . This examination, like all CT scans performed in the [facility], was performed utilizing techniques to minimize radiation dose exposure, including the use of iterative reconstruction and automated exposure control. IMAGE QUALITY:  Diagnostic. FINDINGS: ALIGNMENT:  Normal alignment of the thoracolumbar spine. No subluxation. VERTEBRAE: No acute fracture. DEGENERATIVE CHANGES: Moderate multilevel degenerative disc disease. PARASPINAL SOFT TISSUES:  Unremarkable.      Impression: No acute fracture or traumatic listhesis of the visualized thoracic and lumbar spine Workstation performed: [id number]     XR chest pa & lateral    Result Date: 10/8/2023  Narrative: CHEST INDICATION:   Z01.818: Encounter for other preprocedural examination M47.812: Spondylosis without myelopathy or radiculopathy, cervical region. COMPARISON: Chest CT 6/30/2023. EXAM PERFORMED/VIEWS:  XR CHEST PA & LATERAL. FINDINGS: Heart upper limit of normal in size. Calcified lymph nodes in the anterior mediastinum. Lungs clear. No effusion or pneumothorax. Upper abdomen normal. Bones normal for age. Impression: No acute cardiopulmonary disease. Workstation performed: [id number]         ** Please Note: Dragon 360 Dictation voice to text software was used in the creation of this document.  **

## 2023-11-02 NOTE — TREATMENT PLAN
Individualized Plan of [address] [place] [address]  Saran Kennedyarisarash 68 y.o. male MRN: [id number]  Unit/Bed#: -01 Encounter: [id number]     PATIENT INFORMATION  ADMISSION DATE: 11/1/2023  2:02 PM ALBERT CATEGORY:Spinal Cord Dysfunction:  Non-Traumatic:  55.4078  Quadriplegia, Incomplete C5-8    ADMISSION DIAGNOSIS: H/O excision of lamina of cervical vertebra for decompression of spinal cord [Z98.890]  EXPECTED LOS: 10 to 14 days     MEDICAL/FUNCTIONAL PROGNOSIS  Based on my assessment of the patient's medical conditions and current functional status, the prognosis for attaining medical and functional goals or the IRF stay is:  Good    Medical Goals: Patient will be medically stable for discharge to [facility] upon completion of rehab program and Patient will be able to manage medical conditions and comorbid conditions with medications and follow up upon completion of rehab program    Salvador Dye: Home - Assistance    Is a 24-hr caregiver available? Yes  Has discharge plan been discussed with primary caregiver? No    ANTICIPATED FOLLOW-UP SERVICE:   Outpatient Therapy Services: PT and OT         DISCIPLINE SPECIFIC PLANS:  Required Disciplines & Services: Rehabillitation Nursing, Case Management, Dietay/Nutrition, Prosthetics/Orthostics, and Psychology    REQUIRED THERAPY:  Therapy Hours per Day Days per Week Total Days   Physical Therapy 1.5 5-6 10 to 14   Occupational Therapy 1.5 5-6 10 to 14   NOTE: Additional therapy time(s) may be added as appropriate to meet patient needs and to achieve functional goals.     ANTICIPATED FUNCTIONAL OUTCOMES:  ADL:  Modified independent with the least restrictive device   Bladder/Bowel:  Modified independent with the least restrictive device   Transfers:  Modified independent with the least restrictive device   Locomotion:  Modified independent with the least restrictive device   Cognitive: Modified independent with the least restrictive device     DISCHARGE PLANNING NEEDS  Equipment needs: Discharge needs to be reviewed with team      REHAB ANTICIPATED PARTICIPATION RESTRICTIONS:  None    Francisco J Crenshaw MD  Attending Physician  Physical Medicine and [place]

## 2023-11-02 NOTE — ASSESSMENT & PLAN NOTE
- On home lisinopril 20 mg daily, HCTZ 25 mg daily  - Lisinopril 10 mg daily  - HCTZ held due to hyponatremia  - Monitor and maintain normotension

## 2023-11-02 NOTE — ASSESSMENT & PLAN NOTE
Patient s/p C3-C6 PCDF 10/25/2023 in setting of cervical stenosis/radiculopathy with cord compression at C4-5 level with severe canal stenosis  Continue with fall precautions, multimodal pain regimen  CM consult for assistance with disposition once stabilized, potential return to [facility]  PT OT eval.

## 2023-11-02 NOTE — ASSESSMENT & PLAN NOTE
Patient s/p C3-C6 PCDF 10/25/2023 in setting of cervical stenosis/radiculopathy with cord compression at C4-5 level with severe canal stenosis  Continue with fall precautions, multimodal pain regimen  CM consult for assistance with disposition once stabilized, potential return to [facility]

## 2023-11-02 NOTE — PROGRESS NOTES
11/02/23 1525   Hello, [Guardian’s Name / Patient’s Christo Rowell, this is [Caller Christo Rowell from [facility], and our clinical care team wanted to check on you / your child after your recent visit to the hospital. It will only take 3-5 minutes. Is this a good time? Discharge Call Type/ Specific Diagnosis: General Call   Call Complete   Attempted Number of Calls 1   Discharge phone call complete?  Does not meet criteria  (D/C to acute hospital)

## 2023-11-02 NOTE — ASSESSMENT & PLAN NOTE
Patient was RRT at [facility] with respiratory distress, mildly hypoxic requiring 2 LNC to maintain appropriate saturations  Suspect in the setting of volume overload/anasarca with at least ileus as below contributing, see plans below for management with diuresis, further imaging, and n.p.o./pain control  Transferred to [address] [facility] for further management given situation  CXR unremarkable   Continue supplemental oxygen as needed to maintain SaO2 at least 88%  Incentive spirometry, pulmonary toileting

## 2023-11-02 NOTE — ASSESSMENT & PLAN NOTE
- Secondary to pain medication, SCI, constipation  - Dixie-operative indwelling urinary catheter removed but unable to void spontaneously so replaced 10/29 after multiple ISCs performed  - Known BPH history, continue finasteride 5 mg daily  - Bladder/anatomy rest until 11/3 at which time repeat void trial can be considered

## 2023-11-02 NOTE — H&P
PHYSICAL MEDICINE AND REHABILITATION H&P/ADMISSION NOTE  Abiola De Oliveira 68 y.o. male MRN: [id number]  Unit/Bed#: -79 Encounter: [id number]     Rehab Diagnosis: Impairment of mobility, safety and Activities of Daily Living (ADLs) due to Spinal Cord Dysfunction:  Non-Traumatic:  96.3792  Quadriplegia, Incomplete C5-8    History of Present Illness:   Abiola De Oliveira is a 68 y.o. male who presented to the [address] [place] [address] on 10/20/23 with progressively worsening weakness of all four extremities and recurrent falls at home. Notably, he had a history of cervical stenosis and radiculopathy and was scheduled for an elective cervical decompression surgery on 10/30/23 with neurosurgery. MR revealed cord compression at the C4-5 level with severe canal stenosis. He was ultimately transferred to [facility] on 10/23/23 for neurosurgical evaluation and underwent C3-C6 PCDF on 10/25/23. Incision closed suction bulb drain was removed on 10/27/23. 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 He has also experienced urinary retention requiring indwelling urinary catheter placement after unsuccessful trials of spontaneous voiding. Subjective: He says that he is currently feeling constipated which is causing significant discomfort and he is looking forward to relieving this and also being able to eventually trial voiding again. He denies chest pain, SOB, nausea, vomiting, dysuria. Oriented to the unit. Discussed plan of care in detail and answered follow-up questions. Review of Systems: A 10-point review of systems was performed. Negative except as listed above.     Plan:     * Osteoarthritis of cervical spine with myelopathy  Assessment & Plan  - With known cervical stenosis and radiculopathy with a planned elective cervical decompression on 10/30, presented on 10/20 with progressive upper and lower extremity weakness and falls at home, transferred to \[facility]\"" on 10/23 for neurosurgical evaluation  - MR with cervical cord compression, now s/p C3-6 PCDF on 10/25  - Closed suction bulb incision drain removed 10/27    At risk of development of autonomic dysreflexia:  If patient begins to have hypertension a/w yoseph/tachycardia, HA, diaphoresis, flushing,  pilorection:  - sit patient upright  - loosen any tight clothing/bracing  - check rectal vault for stool  - search for other noxious stimuli- skin wounds, pressure from lying on wires, ingrown toenail.  - If the above measures don't help, can give topical nitroglycerin and continue to monitor blood  pressure q3-5 minutes until resolution    Analgesia:  - Tylenol 975 mg q8h scheduled  - Gabapentin 100 mg TID scheduled  - Tizanidine 4 mg TID scheduled  - Oxycodone 5 or 10 mg q4h PRN; wean as tolerated      Anasarca  Assessment & Plan  - In the setting of IV fluid resuscitation, SIADH  - Compression garments on BLE  - Strict I/Os, daily weights  - Furosemide 40 mg daily  - Follow sodium carefully in the setting of SIADH/hyponatremia  - Appreciate medicine consultation and recommendations    Neurogenic bowel  Assessment & Plan  - Will follow BMs and adjust bowel regimen to target soft, formed stools q1-2 days, per pt's regular schedule.       Neurogenic bladder  Assessment & Plan  - Secondary to pain medication, SCI, constipation  - Dixie-operative indwelling urinary catheter removed but unable to void spontaneously so replaced 10/29 after multiple ISCs performed  - Known BPH history, continue finasteride 5 mg daily  - Bladder/anatomy rest until 11/3 at which time repeat void trial can be considered    Hyponatremia  Assessment & Plan  - Suspected secondary to SIADH based on sodium/urine studies  - Fluid restricted to 1,500 mL daily  - HCTZ discontinued  - Monitor on BMP  - Appreciate medicine consultation and support    Impaired mobility and activities of daily living  [address] physician for daily monitoring of care, 24 hour availability for acute  medical issues, medication management, and therapeutic and diagnostic assessments. - 24 hour rehabilitation nursing 7 days per week for: management/teaching of medications,  bowel/bladder routine, skin care.  - PT, OT for 2-3 hours per day, 5 to 6 days per week; 15 hours per week  - Rehabilitation Psychology as needed for adjustment and coping  - MSW for barriers to discharge, community resources, and family support  - Discharge planning following to help ensure a safe and efficient discharge    Seasonal allergies  Assessment & Plan  - Loratadine 10 mg daily  - Albuterol inhaler q6h PRN  - Nasal spray PRN    Hyperlipidemia  Assessment & Plan  - Atorvastatin 10 mg qHs    Benign essential hypertension  Assessment & Plan  - On home lisinopril 20 mg daily, HCTZ 25 mg daily  - Lisinopril 10 mg daily  - HCTZ held due to hyponatremia  - Monitor and maintain normotension    Anemia  Assessment & Plan  - Trend Hgb on CBC      Health Maintenance  #Delirium/Sleep: Practice effective sleep hygiene (e.g., consistent night and morning routine, quiet, dark room at night, no electronic devices/screens in bed, avoid large meals/caffeine before bed)  #Skin/Pressure Injury Prevention: Turn Q2hr in bed, with weight shifts [id number] in wheelchair. Float heels in bed. #DVT Prophylaxis: Heparin  #Code Status Level 1 - Full  #FEN Cardiac  #Dispo Pending initial interdisciplinary team meeting, home with assist, ELOS 10 to 14 days     75 minutes or greater spent for this encounter which included a combination of face-to-face time with the patient and non-face-to-face time which in part specifically includes management of non-traumatic motor incomplete spinal cord injury, post-operative and spinal cord injury-related pain, neurogenic bowel, neurogenic bladder, hyponatremia, HTN, anemia, and anasarca.   Face-to-face time included extended discussion with patient regarding current condition, medical history, mood, medical/rehabilitation management, and disposition. Non face-to-face time included coordination of care with patient's co-managing AP and/or physician(s) thru communication and review of their recent documentation as well as reviewing vitals, bowel/bladder function, recent labs, diagnostic imaging, and notes from therapy, CM, and nursing.      Drug regimen reviewed, all potential adverse effects identified and addressed:    Scheduled Meds:  Current Facility-Administered Medications   Medication Dose Route Frequency Provider Last Rate    acetaminophen  975 mg Oral [facility] Sarah Zamora MD      albuterol  2 puff Inhalation Q6H PRN Sarah Zamora MD      aluminum-magnesium hydroxide-simethicone  30 mL Oral Q6H PRN Sarah Zamora MD      atorvastatin  10 mg Oral After Jane Reyes MD      bisacodyl  10 mg Rectal Daily PRN Sarah Zamora MD      [START ON 11/2/2023] finasteride  5 mg Oral Daily Sarah Zamora MD      fluticasone  2 spray Each Nare Daily PRN Sarah Zamora MD      [START ON 08/3/1866] folic acid  025 mcg Oral Daily Sarah Zamora MD      [START ON 11/2/2023] furosemide  40 mg Oral Daily Nateiss SquAAKASH hampton      gabapentin  100 mg Oral TID Sarah Zamora MD      heparin (porcine)  5,000 Units Subcutaneous [facility] Sarah Zamora MD      influenza vaccine  0.7 mL Intramuscular Once Sarah Zamora MD      lactulose  20 g Oral BID Sarah Zamora MD      [START ON 11/2/2023] lidocaine  1 patch Topical Daily Sarah Zamora MD      [START ON 11/2/2023] lisinopril  10 mg Oral Daily AAKASH Grossman      [START ON 11/2/2023] loratadine  10 mg Oral Daily Sarah Zamora MD      melatonin  3 mg Oral HS Sarah Zamora MD      oxyCODONE  5 mg Oral Q4H PRN Sarah Zamora MD      Or    oxyCODONE  10 mg Oral Q4H PRN Sarah Zamora MD      [START ON 11/2/2023] polyethylene glycol  17 g Oral Daily Billy Murillo MD      senna  2 tablet Oral HS Billy Murillo MD      tiZANidine  4 mg Oral TID Billy Murillo MD          Restrictions include:   No lifting >15 lbs  Fall precautions      Functional History/Home Set-up - Prior to Admission:      Functional Status: Patient was independent with mobility/ambulation, transfers, ADL's, IADL's. Functional Status Upon Admission to [facility]:  Self Care:  Eatin: Supervision or touching  assistance  Oral hygiene: 04: Supervision or touching  assistance  Toilet hygiene: 02: Substantial/maximal assistance  Shower/bathing self: 02: Substantial/maximal assistance  Upper body dressin: Partial/moderate assistance  Lower body dressin: Partial/moderate assistance  Putting on/taking off footwear: 02: Substantial/maximal assistance  Transfers:  Roll left and right: 03: Partial/moderate assistance  Sit to lyin: Partial/moderate assistance  Lying to sitting on side of bed: 03: Partial/moderate assistance  Sit to stand: 03: Partial/moderate assistance  Chair/bed to chair transfer: 03: Partial/moderate assistance  Toilet transfer: 02:  Substantial/maximal assistance  Mobility:  Walk 10 ft: 03: Partial/moderate assistance  Walk 50 ft with two turns: 88: Not attempted due to medical conditions or safety concerns  Walk 150ft: 88: Not attempted due to medical conditions or safety concerns     Physical Exam:  Temp:  [97.2 °F (36.2 °C)-98.7 °F (37.1 °C)] 97.2 °F (36.2 °C)  HR:  [] 100  Resp:  [16-18] 18  BP: ()/(61-86) 124/71  SpO2:  [94 %-96 %] 96 %    GEN: Patient seen resting comfortably in bedside chair, NAD  HEENT: NCAT; EOMI; mucous membranes moist  CV: Significant 2 to 3+ pitting edema of the bilateral lower extremities to proximal tibial and almost knee level; there is non-pitting significant edema involving the bilateral hands  PULM: Breathing comfortably on room air  ABD: Distended, firm  SKIN: No obvious rashes or lesions on exposed skin  NEURO:  Awake and alert, answering questions appropriately to context; able to follow multi-step commands with clear consistency  Speech is fluent and organized  CN II-XII grossly  Sensation to light touch impaired in patchy distribution throughout bilateral lower extremities, more significantly in bilateral feet  Strength (MMT, R/L): EF 5/4, EE 5/4, WE 5/4, FF 5/4, FAbd 5/4; HF 4/2, KE 5/4, DF 5/5, PF 5/5  No ankle clonus bilaterally    Laboratory:    Results from last 7 days   Lab Units 10/28/23  1245 10/27/23  0538 10/26/23  0459   HEMOGLOBIN g/dL 10.0* 10.9* 11.0*   HEMATOCRIT % 30.6* 33.8* 33.9*   WBC Thousand/uL 7.36 10.62* 9.45     Results from last 7 days   Lab Units 10/31/23  0528 10/30/23  0605 10/29/23  0508   BUN mg/dL 20 23 24   SODIUM mmol/L 129* 132* 127*   POTASSIUM mmol/L 4.3 4.4 4.4   CHLORIDE mmol/L 91* 95* 93*   CREATININE mg/dL 0.87 0.84 0.93            Wt Readings from Last 1 Encounters:   11/01/23 117 kg (259 lb)     Estimated body mass index is 38.25 kg/m² as calculated from the following:    Height as of this encounter: 5' 9" (1.753 m). Weight as of this encounter: 117 kg (259 lb). Imaging: reviewed     Rehabilitation Prognosis: good     Tolerance for three hours of therapy a day: good     Family/Patient Goals:  Patient/family's goals: Return to previous home/apartment. Patient will receive PT and OT 90 minutes each per day, five days per week to achieve rehab goals or participate in 900 minutes of therapy within a 7 day week period. Mobility Goals: Modified [place]  Transfer Goals: Modified [place]  Activities of Daily Living (ADLs) Goals: Modified [place]    Discharge Planning:  Rehabilitation and discharge goals discussed with the patient and/or family. Case Managment and Social Work to review patient/family resources and to coordinate Discharge Planning.     Estimated length of stay: 10 to 14 days    Patient and Family Education and Training:  Rehabilitation and discharge goals discussed with the patient and/or family. Patient/family education/training needs to be discussed in weekly team meeting. Equipment/DME needs: Therapy teams to assess and evaluate for additional equipment/DME needs throughout rehabilitation stay    Past Medical History:   Past Surgical History:   Family History:   Social history:   Past Medical History:   Diagnosis Date    GERD (gastroesophageal reflux disease)     Hyperlipidemia     Hypertension     Past Surgical History:   Procedure Laterality Date    COLONOSCOPY      HERNIA REPAIR      MI ARTHRD PST/PSTLAT TQ 1NTRSPC CRV BELW C2 SEGMENT N/A 10/25/2023    Procedure: C3-6 PCDF;  Surgeon: Abdoulaye Elliott MD;  Location: [facility];  Service: Neurosurgery    MI COLONOSCOPY FLX DX W/COLLJ [facility] INPATIENT REHABILITATION WHEN PFRMD N/A 4/5/2019    Procedure: COLONOSCOPY;  Surgeon: Alfonso Zambrano DO;  Location: [facility]; Service: Gastroenterology    ROTATOR CUFF REPAIR Left     TONSILLECTOMY       No family history on file. Social History     Socioeconomic History    Marital status: Single     Spouse name: Not on file    Number of children: Not on file    Years of education: Not on file    Highest education level: Not on file   Occupational History    Not on file   Tobacco Use    Smoking status: Never    Smokeless tobacco: Never   Vaping Use    Vaping Use: Never used   Substance and Sexual Activity    Alcohol use:  Yes     Alcohol/week: 6.0 standard drinks of alcohol     Types: 6 Cans of beer per week     Comment: beer 4-5 days a week    Drug use: Never    Sexual activity: Not on file   Other Topics Concern    Not on file   Social History Narrative    Not on file     Social Determinants of Health     Financial Resource Strain: Not on file   Food Insecurity: No Food Insecurity (10/22/2023)    Hunger Vital Sign     Worried About Running Out of Food in the Last Year: Never true     Ran Out of Food in the Last Year: Never true   Transportation Needs: No Transportation Needs (10/22/2023)    PRAPARE - Transportation     Lack of Transportation (Medical): No     Lack of Transportation (Non-Medical): No   Physical Activity: Not on file   Stress: Not on file   Social Connections: Not on file   Intimate Partner Violence: Not on file   Housing Stability: Low Risk  (10/22/2023)    Housing Stability Vital Sign     Unable to Pay for Housing in the Last Year: No     Number of Places Lived in the Last Year: 1     Unstable Housing in the Last Year: No          Current Medical Diagnosis Allergies   Patient Active Problem List   Diagnosis    Tinea corporis    Onychomycosis    Osteoarthritis of cervical spine with myelopathy    Impaired mobility and activities of daily living    Anemia    Benign essential hypertension    Dorsalgia, unspecified    Gastroesophageal reflux disease    Hyperlipidemia    Paresthesia    Weakness of extremity    Hyponatremia    Neurogenic bladder    Neurogenic bowel    Seasonal allergies    Anasarca    No Known Allergies        Medical Necessity Criteria for [facility] Admission: Electrolyte imbalance:  hyponatremia, Hypertension, Bowel/Bladder Management, Incision/Wound care, Urinary retention, and anasarca . In addition, the preadmission screen, post-admission physical evaluation, overall plan of care and admissions order demonstrate a reasonable expectation that the following criteria were met at the time of admission to the [facility]. The patient requires active and ongoing therapeutic intervention of multiple therapy disciplines (physical therapy, occupational therapy, speech-language pathology, or prosthetics/orthotics), one of which is physical or occupational therapy.     Patient requires an intensive rehabilitation therapy program, as defined in Chapter 1, section 110.2.2 of the CMS Medicare Policy Manual. This intensive rehabilitation therapy program will consist of at least 3 hours of therapy per day at least 5 days per week or at least 15 hours of intensive rehabilitation therapy within a 7 consecutive day period, beginning with the date of admission to the [facility]. The patient is reasonably expected to actively participate in, and benefit significantly from, the intensive rehabilitation therapy program as defined in Chapter 1, section 110.2.2 of the CMS Medicare Policy Manual at this time of admission to the [facility]. He can reasonably be expected to make measurable improvement (that will be of practical value to improve the patient’s functional capacity or adaptation to impairments) as a result of the rehabilitation treatment, as defined in section 110.3, and such improvement can be expected to be made within the prescribed period of time. As noted in the CMS Medicare Policy Manual, the patient need not be expected to achieve complete independence in the domain of self-care nor be expected to return to his or her prior level of functioning in order to meet this standard. The patient must require physician supervision by a rehabilitation physician. As such, a rehabilitation physician will conduct face-to-face visits with the patient at least 3 days per week throughout the patient’s stay in the [facility] to assess the patient both medically and functionally, as well as to modify the course of treatment as needed to maximize the patient’s capacity to benefit from the rehabilitation process. The patient requires an intensive and coordinated interdisciplinary approach to providing rehabilitation, as defined in Chapter 1, section 110.2.5 of the CMS Medicare Policy Manual. This will be achieved through periodic team conferences, conducted at least once in a 7-day period, and comprising of an interdisciplinary team of medical professionals consisting of: a rehabilitation physician, registered nurse,  and/or , and a licensed/certified therapist from each therapy discipline involved in treating the patient.      Missy Mccabe MD  Physical Medicine and Rehabilitation    ** Please Note: Fluency Direct voice to text software may have been used in the creation of this document.  **

## 2023-11-02 NOTE — PROGRESS NOTES
[facility]  Progress Note  Name: Faisal Salgado I  MRN: [id number]  Unit/Bed#: PPHP 824-01 I Date of Admission: 11/2/2023   Date of Service: 11/2/2023 I Hospital Day: 0    Assessment/Plan   * Acute respiratory insufficiency  Assessment & Plan  Patient was RRT at [address] [place] [address] with respiratory distress, mildly hypoxic requiring 2 LNC to maintain appropriate saturations  Suspect in the setting of volume overload/anasarca with at least ileus as below contributing, see plans below for management with diuresis, further imaging, and n.p.o./pain control  Transferred to [address] for further management given situation  CXR unremarkable   Continue supplemental oxygen as needed to maintain SaO2 at least 88%  Incentive spirometry, pulmonary toileting    Ileus [facility])  Assessment & Plan  Patient reporting marked abdominal distention, now with at least 3 episodes of watery diarrhea this admission  X-ray abdomen wet read with significantly distended colon, suspect is contributing to respiratory insufficiency  CT a/p: Diffuse moderate distention of entire GI tract. No evidence of obstruction. Nonspecific gastroenteritis and/or ileus   Keep n.p.o. for now  Receiving IV Lasix for anasarca as above  Pain control regimen initiated   Curbside d/w GI to review   Pt having multiple episodes of diarrhea but pt was on miralax, senna, lactulose, and had a dulcolax suppository on 11/1     Anasarca  Assessment & Plan  Patient complaining of increased diffuse edema, appears he has gained at least 17 pounds since hospitalization and appears grossly volume overloaded on examination with CXR with some evidence of pulmonary vascular congestion.   Patient himself denies any prior history of cardiac disease  S/p 40 mg IV Lasix during rapid response, continue IV diuresis with Lasix 40 mg twice daily for now, adjust dependent on response  Check TTE  Monitor daily weights, I's/O, volume status  Patient currently n.p.o. given concomitant ileus  Daily BMP on IV diuresis, replace electrolytes as needed    Urinary retention  Assessment & Plan  S/p Christensen catheter placement at prior hospitalization, initially removed 10/26 however replaced 10/30 as patient failing urinary retention protocol  Maintain Christensen catheter for now and continue finasteride. TOV once more ambulatory  Eventually will need outpatient urology follow-up    Hyponatremia  Assessment & Plan  Patient previously reported this was chronic issue, however sodium 127 now down from previous 132  Work-up in prior hospitalization revealing normal uric acid, urine sodium 46, urine awesome 7035, serum awesome 278, TSH normal.  Cortisol was noted low however subsequent cosyntropin stimulation test was not concerning for adrenal insufficiency  Suspected SIADH as etiology, however also suspect volume overload is playing role  IV diuresis as above, patient will be n.p.o. for now. Initiate fluid restriction once okay for oral diet    Benign essential hypertension  Assessment & Plan  Restart lisinopril once okay for p.o. Monitor blood pressure per protocol  Previously patient was on HCTZ however discontinued due to hyponatremia    Osteoarthritis of cervical spine with myelopathy  Assessment & Plan  Patient s/p C3-C6 PCDF 10/25/2023 in setting of cervical stenosis/radiculopathy with cord compression at C4-5 level with severe canal stenosis  Continue with fall precautions, multimodal pain regimen  CM consult for assistance with disposition once stabilized, potential [facility] to [facility]         POST-ADMIT CHECK     Patient reports to feeling generally unwell. He reports to still feeling SOB and having swelling of hands and feet. Having watery diarrhea. Denies n/v. Denies overt abdominal pain. A&O. Appears uncomfortable. Tachypnea, lungs are clear. Heart RRR. Abd hypoactive BS, +distended, soft, non-tender. Diffuse edema of b/l UE/LE. Called sister with update.

## 2023-11-02 NOTE — ASSESSMENT & PLAN NOTE
Patient reporting marked abdominal distention, now with at least 3 episodes of watery diarrhea this admission  X-ray abdomen wet read with significantly distended colon, suspect is contributing to respiratory insufficiency  CT a/p: Diffuse moderate distention of entire GI tract. No evidence of obstruction.   Nonspecific gastroenteritis and/or ileus   Keep n.p.o. for now  Receiving IV Lasix for anasarca as above  Pain control regimen initiated   Curbside d/w GI to review   Pt having multiple episodes of diarrhea but pt was on miralax, senna, lactulose, and had a dulcolax suppository on 11/1

## 2023-11-02 NOTE — ASSESSMENT & PLAN NOTE
- Suspected secondary to SIADH based on sodium/urine studies  - Fluid restricted to 1,500 mL daily  - HCTZ discontinued  - Monitor on BMP  - Appreciate medicine consultation and support

## 2023-11-02 NOTE — ASSESSMENT & PLAN NOTE
Patient reporting marked abdominal distention, now with at least 3 episodes of watery diarrhea this admission  X-ray abdomen wet read with significantly distended colon, suspect is contributing to respiratory insufficiency  Obtain CT abdomen/pelvis to better delineate  Keep n.p.o. for now  Receiving IV Lasix for anasarca as above  Pain control regimen initiated

## 2023-11-02 NOTE — ASSESSMENT & PLAN NOTE
Patient s/p C3-C6 PCDF 10/25/2023 in setting of cervical stenosis/radiculopathy with cord compression at C4-5 level with severe canal stenosis  Continue with fall precautions, multimodal pain regimen  CM consult for assistance with disposition once stabilized, potential return to [facility]

## 2023-11-02 NOTE — ASSESSMENT & PLAN NOTE
- In the setting of IV fluid resuscitation, SIADH  - Compression garments on BLE  - Strict I/Os, daily weights  - Furosemide 40 mg daily  - Follow sodium carefully in the setting of SIADH/hyponatremia  - Appreciate medicine consultation and recommendations

## 2023-11-02 NOTE — APP STUDENT NOTE
[address] [facility]  H&P  Name: Pushpa Gibbons 68 y.o. male I MRN: [id number]  Unit/Bed#: [facility] 824-01 I Date of Admission: 11/2/2023   Date of Service: 11/2/2023 I Hospital Day: 0           Assessment/Plan   * Acute respiratory insufficiency  Assessment & Plan  Patient was RRT at [facility] last night with respiratory distress, mildly hypoxic requiring 2 L NC to maintain appropriate saturations  Suspect in the setting of volume overload/anasarca with postoperative ileus as below contributing  Continue supplemental oxygen as needed to maintain SaO2 at least 88%  Incentive spirometry, pulmonary toileting     Anasarca  Assessment & Plan  Patient complaining of increased diffuse edema, appears he has gained at least 17 pounds since hospitalization and appears grossly volume overloaded on examination with CXR with some evidence of pulmonary vascular congestion. Patient himself denies any prior history of cardiac disease  S/p 40 mg IV Lasix during rapid response, continue IV diuresis with Lasix 40 mg twice daily for now, adjust dependent on response  Check TTE  Monitor daily weights, I/O's, volume status  Patient currently n.p.o. given concomitant ileus  Daily BMP on IV diuresis, replace electrolytes as needed     Ileus [facility])  Assessment & Plan  Patient is s/p cervical decompression of C4-C6 on 10/25  Patient reporting marked abdominal distention, now with at least 3 episodes of watery diarrhea this admission  X-ray abdomen wet read with significantly distended colon, suspect is contributing to respiratory insufficiency  CT abdomen/pelvis with "diffuse moderate distension of the entire GI tract with fluid, with no evidence of obstruction. Findings may reflect nonspecific gastroenteritis and/or ileus".   Keep n.p.o. for now  Receiving IV Lasix for anasarca as above  Pain control regimen initiated   GI input appreciated      Urinary retention  Assessment & Plan  S/p Christensen catheter placement at prior hospitalization, initially removed 10/26 however replaced 10/30 as patient failing urinary retention protocol  Maintain Christensen catheter for now and continue finasteride. TOV once more ambulatory  Eventually will need outpatient urology follow-up     Hyponatremia  Assessment & Plan  Patient previously reported this was chronic issue, however sodium 127 now down from previous 132  Work-up in prior hospitalization revealing normal uric acid, urine sodium 46, urine awesome 7035, serum awesome 278, TSH normal.  Cortisol was noted low however subsequent cosyntropin stimulation test was not concerning for adrenal insufficiency  Suspected SIADH as etiology, however also suspect volume overload is playing role  IV diuresis as above, patient will be n.p.o. for now. Initiate fluid restriction once okay for oral diet  Repeat BMP      Benign essential hypertension  Assessment & Plan  Restart lisinopril once okay for p.o. Monitor blood pressure per protocol  Previously patient was on HCTZ however discontinued due to hyponatremia     Osteoarthritis of cervical spine with myelopathy  Assessment & Plan  Patient s/p C3-C6 PCDF 10/25/2023 in setting of cervical stenosis/radiculopathy with cord compression at C4-5 level with severe canal stenosis  Continue with fall precautions, multimodal pain regimen  CM consult for assistance with disposition once stabilized, potential return to [facility]          VTE Pharmacologic Prophylaxis: VTE Score: 5 Moderate Risk (Score 3-4) - Pharmacological DVT Prophylaxis Ordered: heparin. Patient Centered Rounds: I performed bedside rounds with nursing staff today. Discussions with Specialists or Other Care Team Provider: nursing    Education and Discussions with Family / Patient: pt at bedside    Total Time Spent on Date of Encounter in care of patient: 60 mins.  This time was spent on one or more of the following: performing physical exam; counseling and coordination of care; obtaining or reviewing history; documenting in the medical record; reviewing/ordering tests, medications or procedures; communicating with other healthcare professionals and discussing with patient's family/caregivers. Current Length of Stay: 0 day(s)  Current Patient Status: Inpatient   Certification Statement: The patient will continue to require additional inpatient hospital stay due to management of suspected ileus  Discharge Plan: Anticipate discharge in 48 hrs to rehab facility. Code Status: Level 1 - Full Code    Subjective:   Patient laying in bed and will communicate but keeps eyes closed. Appears uncomfortable and displays increased work of breathing. Reports no pain with exception of mild pain to palpation of abdomen. Reports no control of bowels and continues to have multiple episodes of diarrhea. Objective:     Vitals:   Temp (24hrs), Av.7 °F (36.5 °C), Min:97.2 °F (36.2 °C), Max:98 °F (36.7 °C)    Temp:  [97.2 °F (36.2 °C)-98 °F (36.7 °C)] 97.7 °F (36.5 °C)  HR:  [] 89  Resp:  [16-26] 16  BP: (101-124)/(67-78) 109/69  SpO2:  [94 %-97 %] 97 %  Body mass index is 39.59 kg/m². Input and Output Summary (last 24 hours):   No intake or output data in the 24 hours ending 23 4439    Physical Exam:   Physical Exam  Constitutional:       Appearance: He is obese. HENT:      Head: Normocephalic and atraumatic. Nose: Nose normal.   Cardiovascular:      Rate and Rhythm: Normal rate and regular rhythm. Pulmonary:      Comments: Increased work of breathing, mild wheezing  Abdominal:      General: Bowel sounds are normal. There is distension. Comments: Slightly tender to palpation   Musculoskeletal:      Cervical back: Normal range of motion and neck supple. Right lower leg: Edema present. Left lower leg: Edema present. Skin:     General: Skin is warm and dry. Neurological:      General: No focal deficit present.       Mental Status: He is alert and oriented to person, place, and time. Psychiatric:         Mood and Affect: Mood normal.         Behavior: Behavior normal.          Additional Data:     Labs:  Results from last 7 days   Lab Units 11/02/23  0633 10/28/23  1245   WBC Thousand/uL 10.51* 7.36   HEMOGLOBIN g/dL 12.0 10.0*   HEMATOCRIT % 37.1 30.6*   PLATELETS Thousands/uL 332 195   NEUTROS PCT %  --  73   LYMPHS PCT %  --  14   MONOS PCT %  --  11   EOS PCT %  --  1     Results from last 7 days   Lab Units 11/02/23  0331   SODIUM mmol/L 127*   POTASSIUM mmol/L 4.7   CHLORIDE mmol/L 91*   CO2 mmol/L 28   BUN mg/dL 22   CREATININE mg/dL 0.84   ANION GAP mmol/L 8   CALCIUM mg/dL 9.3   GLUCOSE RANDOM mg/dL 124         Results from last 7 days   Lab Units 11/02/23  0338   POC GLUCOSE mg/dl 109               Lines/Drains:  Invasive Devices       Peripheral Intravenous Line  Duration             Peripheral IV 10/28/23 Distal;Dorsal (posterior); Left Forearm 4 days              Drain  Duration             Urethral Catheter 16 Fr. 3 days                  Urinary Catheter:  Goal for removal: Remove after 48 hrs of I/O monitoring               Imaging: Reviewed radiology reports from this admission including: abdominal/pelvic CT and procedure reports    Recent Cultures (last 7 days):         Last 24 Hours Medication List:   Current Facility-Administered Medications   Medication Dose Route Frequency Provider Last Rate    acetaminophen  650 mg Oral Q6H PRN [facility], DO      atorvastatin  10 mg Oral Daily [facility], DO      finasteride  5 mg Oral Daily [facility], DO      fluticasone  1 spray Each Nare BID [facility], DO      folic acid  524 mcg Oral Daily [facility], DO      furosemide  40 mg Intravenous BID [facility], DO      gabapentin  100 mg Oral TID [facility], DO      heparin (porcine)  5,000 Units Subcutaneous [facility], DO      HYDROmorphone  0.5 mg Intravenous Q4H PRN Denisha Alberta, DO      HYDROmorphone  0.2 mg Intravenous Q4H PRN Candice Flurry, DO      loratadine  10 mg Oral Daily Candice Flurry, DO      melatonin  3 mg Oral HS Candice Flurry, DO      ondansetron  4 mg Intravenous Q6H PRN Candice Flurry, DO      tiZANidine  4 mg Oral TID Candice Flurry, DO          Today, Patient Was Seen By: Shikha Balbuena    **Please Note: This note may have been constructed using a voice recognition system. **

## 2023-11-02 NOTE — CASE MANAGEMENT
Case Management Assessment & Discharge Planning Note    Patient name Atul Leone  Location [facility] 642-40 MRN [id number]  : 1950 Date 2023       Current Admission Date: 2023  Current Admission Diagnosis:Acute respiratory insufficiency   Patient Active Problem List    Diagnosis Date Noted    Acute respiratory insufficiency 2023    Ileus ([address]) 2023    Urinary retention 2023    Neurogenic bowel 2023    Seasonal allergies 2023    Anasarca 2023    Paresthesia 10/25/2023    Weakness of extremity 10/25/2023    Hyponatremia 10/25/2023    Impaired mobility and activities of daily living 10/20/2023    Anemia 10/20/2023    Benign essential hypertension 10/20/2023    Dorsalgia, unspecified 10/20/2023    Gastroesophageal reflux disease 10/20/2023    Hyperlipidemia 10/20/2023    Osteoarthritis of cervical spine with myelopathy 2023    Tinea corporis 2019    Onychomycosis 2019      LOS (days): 0  Geometric Mean LOS (GMLOS) (days): 2.10  Days to GMLOS:1.6     OBJECTIVE:    Risk of Unplanned Readmission Score: 15.21         Current admission status: Inpatient       Preferred Pharmacy:   [address] [place] [address]  Phone: [phone] Fax: [phone]    Primary Care Provider: Sonia Reina MD    Primary Insurance: [facility]  Secondary Insurance: MEDICARE    ASSESSMENT:  Active Health Care Proxies    There are no active Health Care Proxies on file. Readmission Root Cause  30 Day Readmission: No    Patient Information  Admitted from[de-identified] Facility (transferred from [facility])  Mental Status: Alert  During Assessment patient was accompanied by: Not accompanied during assessment               Pt transferred from [address] d/t respiratory distress. OPEN completed 10/22, see full note. Plan to transfer back to [facility] when medically stable. Referral placed in [address]  Does not require auth     CM to continue to follow.

## 2023-11-02 NOTE — PLAN OF CARE
Problem: MOBILITY - ADULT  Goal: Maintain or return to baseline ADL function  Description: INTERVENTIONS:  -  Assess patient's ability to carry out ADLs; assess patient's baseline for ADL function and identify physical deficits which impact ability to perform ADLs (bathing, care of mouth/teeth, toileting, grooming, dressing, etc.)  - Assess/evaluate cause of self-care deficits   - Assess range of motion  - Assess patient's mobility; develop plan if impaired  - Assess patient's need for assistive devices and provide as appropriate  - Encourage maximum independence but intervene and supervise when necessary  - Involve family in performance of ADLs  - Assess for home care needs following discharge   - Consider OT consult to assist with ADL evaluation and planning for discharge  - Provide patient education as appropriate  Outcome: Progressing  Goal: Maintains/Returns to pre admission functional level  Description: INTERVENTIONS:  - Perform BMAT or MOVE assessment daily.   - Set and communicate daily mobility goal to care team and patient/family/caregiver. - Collaborate with rehabilitation services on mobility goals if consulted  - Perform Range of Motion 3 times a day. - Reposition patient every 3 hours.   - Dangle patient 3 times a day  - Stand patient 3 times a day  - Ambulate patient 3 times a day  - Out of bed to chair 3 times a day   - Out of bed for meals 3 times a day  - Out of bed for toileting  - Record patient progress and toleration of activity level   Outcome: Progressing     Problem: Prexisting or High Potential for Compromised Skin Integrity  Goal: Skin integrity is maintained or improved  Description: INTERVENTIONS:  - Identify patients at risk for skin breakdown  - Assess and monitor skin integrity  - Assess and monitor nutrition and hydration status  - Monitor labs   - Assess for incontinence   - Turn and reposition patient  - Assist with mobility/ambulation  - Relieve pressure over bony prominences  - Avoid friction and shearing  - Provide appropriate hygiene as needed including keeping skin clean and dry  - Evaluate need for skin moisturizer/barrier cream  - Collaborate with interdisciplinary team   - Patient/family teaching  - Consider wound care consult   Outcome: Progressing     Problem: PAIN - ADULT  Goal: Verbalizes/displays adequate comfort level or baseline comfort level  Description: Interventions:  - Encourage patient to monitor pain and request assistance  - Assess pain using appropriate pain scale  - Administer analgesics based on type and severity of pain and evaluate response  - Implement non-pharmacological measures as appropriate and evaluate response  - Consider cultural and social influences on pain and pain management  - Notify physician/advanced practitioner if interventions unsuccessful or patient reports new pain  Outcome: Progressing     Problem: INFECTION - ADULT  Goal: Absence or prevention of progression during hospitalization  Description: INTERVENTIONS:  - Assess and monitor for signs and symptoms of infection  - Monitor lab/diagnostic results  - Monitor all insertion sites, i.e. indwelling lines, tubes, and drains  - Monitor endotracheal if appropriate and nasal secretions for changes in amount and color  - [place] appropriate cooling/warming therapies per order  - Administer medications as ordered  - Instruct and encourage patient and family to use good hand hygiene technique  - Identify and instruct in appropriate isolation precautions for identified infection/condition  Outcome: Progressing  Goal: Absence of fever/infection during neutropenic period  Description: INTERVENTIONS:  - Monitor WBC    Outcome: Progressing     Problem: SAFETY ADULT  Goal: Maintain or return to baseline ADL function  Description: INTERVENTIONS:  -  Assess patient's ability to carry out ADLs; assess patient's baseline for ADL function and identify physical deficits which impact ability to perform ADLs (bathing, care of mouth/teeth, toileting, grooming, dressing, etc.)  - Assess/evaluate cause of self-care deficits   - Assess range of motion  - Assess patient's mobility; develop plan if impaired  - Assess patient's need for assistive devices and provide as appropriate  - Encourage maximum independence but intervene and supervise when necessary  - Involve family in performance of ADLs  - Assess for home care needs following discharge   - Consider OT consult to assist with ADL evaluation and planning for discharge  - Provide patient education as appropriate  Outcome: Progressing  Goal: Maintains/Returns to pre admission functional level  Description: INTERVENTIONS:  - Perform BMAT or MOVE assessment daily.   - Set and communicate daily mobility goal to care team and patient/family/caregiver. - Collaborate with rehabilitation services on mobility goals if consulted  - Perform Range of Motion 3 times a day. - Reposition patient every 3 hours.   - Dangle patient 3 times a day  - Stand patient 3 times a day  - Ambulate patient 3 times a day  - Out of bed to chair 3 times a day   - Out of bed for meals 3 times a day  - Out of bed for toileting  - Record patient progress and toleration of activity level   Outcome: Progressing  Goal: Patient will remain free of falls  Description: INTERVENTIONS:  - Educate patient/family on patient safety including physical limitations  - Instruct patient to call for assistance with activity   - Consult OT/PT to assist with strengthening/mobility   - Keep Call bell within reach  - Keep bed low and locked with side rails adjusted as appropriate  - Keep care items and personal belongings within reach  - Initiate and maintain comfort rounds  - Make Fall Risk Sign visible to staff  - Apply yellow socks and bracelet for high fall risk patients  - Consider moving patient to room near nurses station  Outcome: Progressing     Problem: DISCHARGE PLANNING  Goal: Discharge to home or other facility with appropriate resources  Description: INTERVENTIONS:  - Identify barriers to discharge w/patient and caregiver  - Arrange for needed discharge resources and transportation as appropriate  - Identify discharge learning needs (meds, wound care, etc.)  - Arrange for interpretive services to assist at discharge as needed  - Refer to Case Management Department for coordinating discharge planning if the patient needs post-hospital services based on physician/advanced practitioner order or complex needs related to functional status, cognitive ability, or social support system  Outcome: Progressing     Problem: Knowledge Deficit  Goal: Patient/family/caregiver demonstrates understanding of disease process, treatment plan, medications, and discharge instructions  Description: Complete learning assessment and assess knowledge base.   Interventions:  - Provide teaching at level of understanding  - Provide teaching via preferred learning methods  Outcome: Progressing

## 2023-11-02 NOTE — ASSESSMENT & PLAN NOTE
S/p Christensen catheter placement at prior hospitalization, initially removed 10/26 however replaced 10/30 as patient failing urinary retention protocol  Maintain Christensen catheter for now and continue finasteride.     Voiding trial once more ambulatory  Eventually will need outpatient urology follow-up

## 2023-11-02 NOTE — ASSESSMENT & PLAN NOTE
Patient was RRT at [facility] with respiratory distress, mildly hypoxic requiring 2 LNC to maintain appropriate saturations  Suspect in the setting of volume overload/anasarca with at least ileus as below contributing, see plans below for management with diuresis, further imaging, and n.p.o./pain control  Transferred to [address] [facility] for further management given situation  Continue supplemental oxygen as needed to maintain SaO2 at least 88%  Incentive spirometry, pulmonary toileting

## 2023-11-02 NOTE — NURSING NOTE
2215: pt on bedside commode attempting BM. Pt sat for approx 20 minutes. Pt began feeling lightheaded and diaphoretic. Pt transferred to bed with legs raised and encouraged sips of water. Loose, watery BM. /68. Upon getting into bed pt states he felt better. Rechecked BP at 118/78. Pt denies dizziness and is resting comfortably in bed. Call bell within reach.

## 2023-11-02 NOTE — RAPID RESPONSE
Rapid Response Note  Alivia Mcghee 68 y.o. male MRN: [id number]  Unit/Bed#: [facility] 968-01 Encounter: [id number]    Rapid Response Notification(s):   Response called date/time:  11/2/2023 3:33 AM  Response team arrival date/time:  11/2/2023 3:36 AM  Response end date/time:  11/2/2023 3:57 AM  Location of pre-procedure assessment: [facility] outpatient. Rapid response location:  Acute rehab center  Primary reason for rapid response call: Other (comment) (Increase shortness of breath/work of breathing)    Rapid Response Intervention(s):   Airway:  Positioning  Breathing:  Oxygen  Circulation:  None  Fluids administered:  None  Medications administered:  Albuterol and furosemide       Assessment:   Acute respiratory insufficiency  Volume overload  Diarrhea with abdominal distention    Plan:   Administer albuterol breathing treatment, give 40 IV Lasix  Chest x-ray without significant evidence of overwhelming pulmonary edema though there was some vascular congestion  KUB with significantly distended colon  He is otherwise hemodynamically stable however given his exam findings and radiographic evidence of significantly distended abdomen this is likely contributing to his acute respiratory insufficiency  Recommend admission to internal medicine for further work-up and evaluation of volume overload status and colonic distention. Suspect ileus in the setting of recent surgery. Volume overload would recommend obtaining echo to evaluate for signs of heart failure or pulmonary hypertension.   Could consider CT abdomen/pelvis to further delineate/rule out SBO though thought to be less likely given passing flatulence  Discussed with primary team.  Primary team to reach out to medicine and family     Rapid Response Outcome:   Transfer:  Other (comment) (Transferred to inpatient medicine)  Primary service notified of transfer: Yes    Code Status: Level 1 (Full Code)      Family notified of transfer: no  Family member contacted: Primary team to reach out to family     Background/Situation:   Alivia Mcghee is a 68 y.o. male who originally presented to the hospital on 10/23 for ambulatory dysfunction, falls and back pain. Treated as an inpatient and had a cervical decompression and fusion on 10/25. He was discharged to [facility] on 11/1. Overnight on early a.m. of 11/2 rapid response was called due to increased shortness of breath with increased work of breathing. He was noted to have 2 watery loose bowel movements prior to rapid response called. Upon arrival patient was in mild respiratory distress unable to complete long sentences. Denies any past history of heart disease. Denied any chest pain. Stated that he woke from sleep feeling short of breath. Patient stated that he was passing flatulence and having at least 2 runs of diarrhea    Review of Systems   Respiratory:  Positive for shortness of breath and wheezing. Cardiovascular:  Positive for leg swelling. Negative for chest pain. Gastrointestinal:  Positive for abdominal distention, abdominal pain and diarrhea. Objective:   Vitals:    11/02/23 0001 11/02/23 0342 11/02/23 0343 11/02/23 0352   BP: 118/78 101/72  113/70   BP Location: Left arm      Pulse:  89  91   Resp:    (!) 26   Temp:       TempSrc:       SpO2:  96% 96%    Weight:       Height:         Physical Exam  Constitutional:       General: He is in acute distress. Appearance: He is obese. He is ill-appearing. HENT:      Head: Normocephalic and atraumatic. Eyes:      General: No scleral icterus. Conjunctiva/sclera: Conjunctivae normal.   Cardiovascular:      Rate and Rhythm: Normal rate and regular rhythm. Pulses: Normal pulses. Heart sounds: Normal heart sounds. Pulmonary:      Effort: Respiratory distress present. Breath sounds: Wheezing and rales present. Abdominal:      General: There is distension. Tenderness: There is abdominal tenderness.    Musculoskeletal: Right lower leg: Edema present. Left lower leg: Edema present. Neurological:      Mental Status: He is oriented to person, place, and time. Mental status is at baseline. Portions of the record may have been created with voice recognition software. Occasional wrong word or "sound a like" substitutions may have occurred due to the inherent limitations of voice recognition software. Read the chart carefully and recognize, using context, where substitutions have occurred.     Paul Medley,

## 2023-11-02 NOTE — ASSESSMENT & PLAN NOTE
S/p Christensen catheter placement at prior hospitalization, initially removed 10/26 however replaced 10/30 as patient failing urinary retention protocol  Maintain Christensen catheter for now and continue finasteride.   TOV once more ambulatory  Eventually will need outpatient urology follow-up

## 2023-11-02 NOTE — ASSESSMENT & PLAN NOTE
Patient complaining of increased diffuse edema, appears he has gained at least 17 pounds since hospitalization and appears grossly volume overloaded on examination with CXR with some evidence of pulmonary vascular congestion.   Patient himself denies any prior history of cardiac disease  S/p 40 mg IV Lasix during rapid response, continue IV diuresis with Lasix 40 mg twice daily for now, adjust dependent on response  Check TTE  Monitor daily weights, I's/O, volume status  Patient currently n.p.o. given concomitant ileus  Daily BMP on IV diuresis, replace electrolytes as needed

## 2023-11-02 NOTE — NURSING NOTE
0330 - Patient complaining of SOB and abd pain. Using accessory muscles during breathing. O2 sat: 94%. /78. Pt had labored breathing and wheezing upon inspiration. Abd rounded and rigid. 2L O2 NC. Denied chest pain. Per on-call IM (Kushal Yu), IV placed, rapid response called. 0345 - 40mg IV lasix pushed, EKG, neb treatment, chest and abd xr, and blood work drawn. Pt to be transferred to [facility] service P8.

## 2023-11-02 NOTE — QUICK NOTE
PHYSICAL MEDICINE QUICK NOTE  Altagracia English 68 y.o. male MRN: [id number]  Unit/Bed#: -27 Encounter: [id number]     Rehab Diagnosis: Impairment of mobility, safety and Activities of Daily Living (ADLs) due to Spinal Cord Dysfunction:  Non-Traumatic:  03.3904  Quadriplegia, Incomplete C5-8     History of Present Illness:   Altagracia English is a 68 y.o. male who presented to the [address] [place] [address] on 10/20/23 with progressively worsening weakness of all four extremities and recurrent falls at home. Notably, he had a history of cervical stenosis and radiculopathy and was scheduled for an elective cervical decompression surgery on 10/30/23 with neurosurgery. MR revealed cord compression at the C4-5 level with severe canal stenosis. He was ultimately transferred to [address] [place] [address] on 10/23/23 for neurosurgical evaluation and underwent C3-C6 PCDF on 10/25/23. Incision closed suction bulb drain was removed on 10/27/23. 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 He has also experienced urinary retention requiring indwelling urinary catheter placement after unsuccessful trials of spontaneous voiding. Events: Patient with dyspnea, abdominal distension, evidence for overload and KUB with distended colon.  RRT called, see separate note and patient discharged back to acute care hospital. Contacted accepting physician as well as called patient's sister to provide room number and medical updates    Krista Foster,   Physical Medicine and [place]

## 2023-11-02 NOTE — ASSESSMENT & PLAN NOTE
Patient previously reported this was chronic issue, however sodium 127 now down from previous 132  Work-up in prior hospitalization revealing normal uric acid, urine sodium 46, urine awesome 7035, serum awesome 278, TSH normal.  Cortisol was noted low however subsequent cosyntropin stimulation test was not concerning for adrenal insufficiency  Suspected SIADH as etiology, however also suspect volume overload is playing role  IV diuresis as above, patient will be n.p.o. for now.   Initiate fluid restriction once okay for oral diet

## 2023-11-02 NOTE — PLAN OF CARE
Problem: PAIN - ADULT  Goal: Verbalizes/displays adequate comfort level or baseline comfort level  Description: Interventions:  - Encourage patient to monitor pain and request assistance  - Assess pain using appropriate pain scale  - Administer analgesics based on type and severity of pain and evaluate response  - Implement non-pharmacological measures as appropriate and evaluate response  - Consider cultural and social influences on pain and pain management  - Notify physician/advanced practitioner if interventions unsuccessful or patient reports new pain  Outcome: Progressing     Problem: INFECTION - ADULT  Goal: Absence or prevention of progression during hospitalization  Description: INTERVENTIONS:  - Assess and monitor for signs and symptoms of infection  - Monitor lab/diagnostic results  - Monitor all insertion sites, i.e. indwelling lines, tubes, and drains  - Monitor endotracheal if appropriate and nasal secretions for changes in amount and color  - [place] appropriate cooling/warming therapies per order  - Administer medications as ordered  - Instruct and encourage patient and family to use good hand hygiene technique  - Identify and instruct in appropriate isolation precautions for identified infection/condition  Outcome: Progressing  Goal: Absence of fever/infection during neutropenic period  Description: INTERVENTIONS:  - Monitor WBC    Outcome: Progressing     Problem: SAFETY ADULT  Goal: Patient will remain free of falls  Description: INTERVENTIONS:  - Educate patient/family on patient safety including physical limitations  - Instruct patient to call for assistance with activity   - Consult OT/PT to assist with strengthening/mobility   - Keep Call bell within reach  - Keep bed low and locked with side rails adjusted as appropriate  - Keep care items and personal belongings within reach  - Initiate and maintain comfort rounds  - Make Fall Risk Sign visible to staff  - Offer Toileting every 2 Hours, in advance of need  - Initiate/Maintain bed alarm  - Obtain necessary fall risk management equipment: bed alarm  - Apply yellow socks and bracelet for high fall risk patients  - Consider moving patient to room near nurses station  Outcome: Progressing  Goal: Maintain or return to baseline ADL function  Description: INTERVENTIONS:  -  Assess patient's ability to carry out ADLs; assess patient's baseline for ADL function and identify physical deficits which impact ability to perform ADLs (bathing, care of mouth/teeth, toileting, grooming, dressing, etc.)  - Assess/evaluate cause of self-care deficits   - Assess range of motion  - Assess patient's mobility; develop plan if impaired  - Assess patient's need for assistive devices and provide as appropriate  - Encourage maximum independence but intervene and supervise when necessary  - Involve family in performance of ADLs  - Assess for home care needs following discharge   - Consider OT consult to assist with ADL evaluation and planning for discharge  - Provide patient education as appropriate  Outcome: Progressing  Goal: Maintains/Returns to pre admission functional level  Description: INTERVENTIONS:  - Perform BMAT or MOVE assessment daily.   - Set and communicate daily mobility goal to care team and patient/family/caregiver. - Collaborate with rehabilitation services on mobility goals if consulted  - Perform Range of Motion 3 times a day. - Reposition patient every 2 hours.   - Dangle patient 3 times a day  - Stand patient 3 times a day  - Ambulate patient 3 times a day  - Out of bed to chair 3 times a day   - Out of bed for meals 3 times a day  - Out of bed for toileting  - Record patient progress and toleration of activity level   Outcome: Progressing     Problem: DISCHARGE PLANNING  Goal: Discharge to home or other facility with appropriate resources  Description: INTERVENTIONS:  - Identify barriers to discharge w/patient and caregiver  - Arrange for needed discharge resources and transportation as appropriate  - Identify discharge learning needs (meds, wound care, etc.)  - Arrange for interpretive services to assist at discharge as needed  - Refer to Case Management Department for coordinating discharge planning if the patient needs post-hospital services based on physician/advanced practitioner order or complex needs related to functional status, cognitive ability, or social support system  Outcome: Progressing     Problem: Prexisting or High Potential for Compromised Skin Integrity  Goal: Skin integrity is maintained or improved  Description: INTERVENTIONS:  - Identify patients at risk for skin breakdown  - Assess and monitor skin integrity  - Assess and monitor nutrition and hydration status  - Monitor labs   - Assess for incontinence   - Turn and reposition patient  - Assist with mobility/ambulation  - Relieve pressure over bony prominences  - Avoid friction and shearing  - Provide appropriate hygiene as needed including keeping skin clean and dry  - Evaluate need for skin moisturizer/barrier cream  - Collaborate with interdisciplinary team   - Patient/family teaching  - Consider wound care consult   Outcome: Progressing

## 2023-11-02 NOTE — ASSESSMENT & PLAN NOTE
Restart lisinopril once okay for p.o.   Monitor blood pressure per protocol  Previously patient was on HCTZ however discontinued due to hyponatremia

## 2023-11-02 NOTE — ASSESSMENT & PLAN NOTE
- With known cervical stenosis and radiculopathy with a planned elective cervical decompression on 10/30, presented on 10/20 with progressive upper and lower extremity weakness and falls at home, transferred to [facility] on 10/23 for neurosurgical evaluation  - MR with cervical cord compression, now s/p C3-6 PCDF on 10/25  - Closed suction bulb incision drain removed 10/27    At risk of development of autonomic dysreflexia:  If patient begins to have hypertension a/w yoseph/tachycardia, HA, diaphoresis, flushing,  pilorection:  - sit patient upright  - loosen any tight clothing/bracing  - check rectal vault for stool  - search for other noxious stimuli- skin wounds, pressure from lying on wires, ingrown toenail.  - If the above measures don't help, can give topical nitroglycerin and continue to monitor blood  pressure q3-5 minutes until resolution    Analgesia:  - Tylenol 975 mg q8h scheduled  - Gabapentin 100 mg TID scheduled  - Tizanidine 4 mg TID scheduled  - Oxycodone 5 or 10 mg q4h PRN; wean as tolerated

## 2023-11-03 ENCOUNTER — APPOINTMENT (INPATIENT)
Dept: NON INVASIVE DIAGNOSTICS | Facility: HOSPITAL | Age: 73
DRG: 641 | End: 2023-11-03
Payer: COMMERCIAL

## 2023-11-03 LAB
ABO GROUP BLD: NORMAL
ALBUMIN SERPL BCP-MCNC: 3.2 G/DL (ref 3.5–5)
ALP SERPL-CCNC: 56 U/L (ref 34–104)
ALT SERPL W P-5'-P-CCNC: 29 U/L (ref 7–52)
ANION GAP SERPL CALCULATED.3IONS-SCNC: 11 MMOL/L
ANION GAP SERPL CALCULATED.3IONS-SCNC: 7 MMOL/L
AST SERPL W P-5'-P-CCNC: 22 U/L (ref 13–39)
ATRIAL RATE: 91 BPM
BASOPHILS # BLD AUTO: 0.04 THOUSANDS/ÂΜL (ref 0–0.1)
BASOPHILS NFR BLD AUTO: 0 % (ref 0–1)
BILIRUB SERPL-MCNC: 0.59 MG/DL (ref 0.2–1)
BLD GP AB SCN SERPL QL: NEGATIVE
BUN SERPL-MCNC: 24 MG/DL (ref 5–25)
BUN SERPL-MCNC: 24 MG/DL (ref 5–25)
CALCIUM ALBUM COR SERPL-MCNC: 8.9 MG/DL (ref 8.3–10.1)
CALCIUM SERPL-MCNC: 8.3 MG/DL (ref 8.4–10.2)
CALCIUM SERPL-MCNC: 8.4 MG/DL (ref 8.4–10.2)
CHLORIDE SERPL-SCNC: 91 MMOL/L (ref 96–108)
CHLORIDE SERPL-SCNC: 94 MMOL/L (ref 96–108)
CO2 SERPL-SCNC: 26 MMOL/L (ref 21–32)
CO2 SERPL-SCNC: 31 MMOL/L (ref 21–32)
CREAT SERPL-MCNC: 0.84 MG/DL (ref 0.6–1.3)
CREAT SERPL-MCNC: 0.86 MG/DL (ref 0.6–1.3)
EOSINOPHIL # BLD AUTO: 0.09 THOUSAND/ÂΜL (ref 0–0.61)
EOSINOPHIL NFR BLD AUTO: 1 % (ref 0–6)
ERYTHROCYTE [DISTWIDTH] IN BLOOD BY AUTOMATED COUNT: 12.5 % (ref 11.6–15.1)
ERYTHROCYTE [DISTWIDTH] IN BLOOD BY AUTOMATED COUNT: 12.5 % (ref 11.6–15.1)
GFR SERPL CREATININE-BSD FRML MDRD: 86 ML/MIN/1.73SQ M
GFR SERPL CREATININE-BSD FRML MDRD: 86 ML/MIN/1.73SQ M
GLUCOSE SERPL-MCNC: 101 MG/DL (ref 65–140)
GLUCOSE SERPL-MCNC: 112 MG/DL (ref 65–140)
HCT VFR BLD AUTO: 34.5 % (ref 36.5–49.3)
HCT VFR BLD AUTO: 35.6 % (ref 36.5–49.3)
HGB BLD-MCNC: 11.7 G/DL (ref 12–17)
HGB BLD-MCNC: 11.9 G/DL (ref 12–17)
IMM GRANULOCYTES # BLD AUTO: 0.08 THOUSAND/UL (ref 0–0.2)
IMM GRANULOCYTES NFR BLD AUTO: 1 % (ref 0–2)
INR PPP: 1.11 (ref 0.84–1.19)
LYMPHOCYTES # BLD AUTO: 0.97 THOUSANDS/ÂΜL (ref 0.6–4.47)
LYMPHOCYTES NFR BLD AUTO: 11 % (ref 14–44)
MCH RBC QN AUTO: 32.4 PG (ref 26.8–34.3)
MCH RBC QN AUTO: 32.7 PG (ref 26.8–34.3)
MCHC RBC AUTO-ENTMCNC: 33.4 G/DL (ref 31.4–37.4)
MCHC RBC AUTO-ENTMCNC: 33.9 G/DL (ref 31.4–37.4)
MCV RBC AUTO: 96 FL (ref 82–98)
MCV RBC AUTO: 97 FL (ref 82–98)
MONOCYTES # BLD AUTO: 0.9 THOUSAND/ÂΜL (ref 0.17–1.22)
MONOCYTES NFR BLD AUTO: 10 % (ref 4–12)
NEUTROPHILS # BLD AUTO: 7.2 THOUSANDS/ÂΜL (ref 1.85–7.62)
NEUTS SEG NFR BLD AUTO: 77 % (ref 43–75)
NRBC BLD AUTO-RTO: 0 /100 WBCS
P AXIS: 43 DEGREES
PLATELET # BLD AUTO: 327 THOUSANDS/UL (ref 149–390)
PLATELET # BLD AUTO: 341 THOUSANDS/UL (ref 149–390)
PMV BLD AUTO: 9 FL (ref 8.9–12.7)
PMV BLD AUTO: 9.3 FL (ref 8.9–12.7)
POTASSIUM SERPL-SCNC: 3.3 MMOL/L (ref 3.5–5.3)
POTASSIUM SERPL-SCNC: 3.9 MMOL/L (ref 3.5–5.3)
PR INTERVAL: 190 MS
PROT SERPL-MCNC: 6 G/DL (ref 6.4–8.4)
PROTHROMBIN TIME: 14.2 SECONDS (ref 11.6–14.5)
QRS AXIS: 0 DEGREES
QRSD INTERVAL: 88 MS
QT INTERVAL: 362 MS
QTC INTERVAL: 445 MS
RBC # BLD AUTO: 3.58 MILLION/UL (ref 3.88–5.62)
RBC # BLD AUTO: 3.67 MILLION/UL (ref 3.88–5.62)
RH BLD: POSITIVE
SODIUM SERPL-SCNC: 129 MMOL/L (ref 135–147)
SODIUM SERPL-SCNC: 131 MMOL/L (ref 135–147)
SPECIMEN EXPIRATION DATE: NORMAL
T WAVE AXIS: 19 DEGREES
VENTRICULAR RATE: 91 BPM
WBC # BLD AUTO: 11.24 THOUSAND/UL (ref 4.31–10.16)
WBC # BLD AUTO: 9.28 THOUSAND/UL (ref 4.31–10.16)

## 2023-11-03 PROCEDURE — 80053 COMPREHEN METABOLIC PANEL: CPT | Performed by: NURSE PRACTITIONER

## 2023-11-03 PROCEDURE — 99232 SBSQ HOSP IP/OBS MODERATE 35: CPT | Performed by: INTERNAL MEDICINE

## 2023-11-03 PROCEDURE — 97163 PT EVAL HIGH COMPLEX 45 MIN: CPT

## 2023-11-03 PROCEDURE — 80048 BASIC METABOLIC PNL TOTAL CA: CPT | Performed by: PHYSICIAN ASSISTANT

## 2023-11-03 PROCEDURE — 93970 EXTREMITY STUDY: CPT

## 2023-11-03 PROCEDURE — 86901 BLOOD TYPING SEROLOGIC RH(D): CPT | Performed by: NURSE PRACTITIONER

## 2023-11-03 PROCEDURE — 85025 COMPLETE CBC W/AUTO DIFF WBC: CPT | Performed by: PHYSICIAN ASSISTANT

## 2023-11-03 PROCEDURE — 93010 ELECTROCARDIOGRAM REPORT: CPT | Performed by: INTERNAL MEDICINE

## 2023-11-03 PROCEDURE — 86850 RBC ANTIBODY SCREEN: CPT | Performed by: NURSE PRACTITIONER

## 2023-11-03 PROCEDURE — 85610 PROTHROMBIN TIME: CPT | Performed by: NURSE PRACTITIONER

## 2023-11-03 PROCEDURE — 85027 COMPLETE CBC AUTOMATED: CPT | Performed by: NURSE PRACTITIONER

## 2023-11-03 PROCEDURE — 97167 OT EVAL HIGH COMPLEX 60 MIN: CPT

## 2023-11-03 PROCEDURE — 86900 BLOOD TYPING SEROLOGIC ABO: CPT | Performed by: NURSE PRACTITIONER

## 2023-11-03 RX ORDER — LISINOPRIL 10 MG/1
10 TABLET ORAL DAILY
Status: DISCONTINUED | OUTPATIENT
Start: 2023-11-03 | End: 2023-11-10 | Stop reason: HOSPADM

## 2023-11-03 RX ORDER — POTASSIUM CHLORIDE 20 MEQ/1
40 TABLET, EXTENDED RELEASE ORAL 2 TIMES DAILY
Status: COMPLETED | OUTPATIENT
Start: 2023-11-03 | End: 2023-11-03

## 2023-11-03 RX ORDER — ALBUTEROL SULFATE 90 UG/1
2 AEROSOL, METERED RESPIRATORY (INHALATION) EVERY 6 HOURS PRN
Status: DISCONTINUED | OUTPATIENT
Start: 2023-11-03 | End: 2023-11-10 | Stop reason: HOSPADM

## 2023-11-03 RX ORDER — ENOXAPARIN SODIUM 150 MG/ML
1 INJECTION SUBCUTANEOUS ONCE
Status: COMPLETED | OUTPATIENT
Start: 2023-11-03 | End: 2023-11-03

## 2023-11-03 RX ORDER — ALBUMIN (HUMAN) 12.5 G/50ML
25 SOLUTION INTRAVENOUS ONCE
Status: COMPLETED | OUTPATIENT
Start: 2023-11-03 | End: 2023-11-04

## 2023-11-03 RX ADMIN — ENOXAPARIN SODIUM 105 MG: 120 INJECTION SUBCUTANEOUS at 21:27

## 2023-11-03 RX ADMIN — FINASTERIDE 5 MG: 5 TABLET, FILM COATED ORAL at 09:37

## 2023-11-03 RX ADMIN — LISINOPRIL 10 MG: 10 TABLET ORAL at 17:04

## 2023-11-03 RX ADMIN — ALBUTEROL SULFATE 2 PUFF: 90 AEROSOL, METERED RESPIRATORY (INHALATION) at 05:35

## 2023-11-03 RX ADMIN — TIZANIDINE 4 MG: 4 TABLET ORAL at 09:36

## 2023-11-03 RX ADMIN — POTASSIUM CHLORIDE 40 MEQ: 1500 TABLET, EXTENDED RELEASE ORAL at 17:04

## 2023-11-03 RX ADMIN — FUROSEMIDE 40 MG: 10 INJECTION, SOLUTION INTRAMUSCULAR; INTRAVENOUS at 17:04

## 2023-11-03 RX ADMIN — MELATONIN 3 MG: at 21:26

## 2023-11-03 RX ADMIN — HYDROMORPHONE HYDROCHLORIDE 0.2 MG: 0.2 INJECTION, SOLUTION INTRAMUSCULAR; INTRAVENOUS; SUBCUTANEOUS at 04:10

## 2023-11-03 RX ADMIN — ALBUMIN (HUMAN) 25 G: 0.25 INJECTION, SOLUTION INTRAVENOUS at 23:52

## 2023-11-03 RX ADMIN — TIZANIDINE 4 MG: 4 TABLET ORAL at 17:06

## 2023-11-03 RX ADMIN — TIZANIDINE 4 MG: 4 TABLET ORAL at 21:26

## 2023-11-03 RX ADMIN — GABAPENTIN 100 MG: 100 CAPSULE ORAL at 09:35

## 2023-11-03 RX ADMIN — LORATADINE 10 MG: 10 TABLET ORAL at 09:35

## 2023-11-03 RX ADMIN — GABAPENTIN 100 MG: 100 CAPSULE ORAL at 17:04

## 2023-11-03 RX ADMIN — GABAPENTIN 100 MG: 100 CAPSULE ORAL at 21:26

## 2023-11-03 RX ADMIN — HEPARIN SODIUM 5000 UNITS: 5000 INJECTION INTRAVENOUS; SUBCUTANEOUS at 13:32

## 2023-11-03 RX ADMIN — POTASSIUM CHLORIDE 40 MEQ: 1500 TABLET, EXTENDED RELEASE ORAL at 10:10

## 2023-11-03 RX ADMIN — FUROSEMIDE 40 MG: 10 INJECTION, SOLUTION INTRAMUSCULAR; INTRAVENOUS at 09:36

## 2023-11-03 RX ADMIN — HEPARIN SODIUM 5000 UNITS: 5000 INJECTION INTRAVENOUS; SUBCUTANEOUS at 05:36

## 2023-11-03 NOTE — PHYSICAL THERAPY NOTE
Physical Therapy Evaluation     Patient's Name: Jarvis Skiff [facility]    Admitting Diagnosis  No admission diagnoses are documented for this encounter. Problem List  Patient Active Problem List   Diagnosis    Tinea corporis    Onychomycosis    Osteoarthritis of cervical spine with myelopathy    Impaired mobility and activities of daily living    Anemia    Benign essential hypertension    Dorsalgia, unspecified    Gastroesophageal reflux disease    Hyperlipidemia    Paresthesia    Weakness of extremity    Hyponatremia    Urinary retention    Neurogenic bowel    Seasonal allergies    Anasarca    Acute respiratory insufficiency    Ileus (HCC)       Past Medical History  Past Medical History:   Diagnosis Date    GERD (gastroesophageal reflux disease)     Hyperlipidemia     Hypertension        Past Surgical History  Past Surgical History:   Procedure Laterality Date    COLONOSCOPY      HERNIA REPAIR      CA ARTHRD PST/PSTLAT TQ 1NTRSPC CRV BELW C2 SEGMENT N/A 10/25/2023    Procedure: C3-6 PCDF;  Surgeon: Chuy Felipe MD;  Location: [facility];  Service: Neurosurgery    [facility] WHEN PFR[facility] N/A 4/5/2019    Procedure: COLONOSCOPY;  Surgeon: El Osborne DO;  Location: [facility]; Service: Gastroenterology    ROTATOR CUFF REPAIR Left     TONSILLECTOMY            11/03/23 1324   PT Last Visit   PT Visit Date 11/03/23   Note Type   Note type Evaluation   Education   Education Provided Yes   End of Consult   Patient Position at End of Consult Bedside chair; All needs within reach;Bed/Chair alarm activated   Pain Assessment   Pain Assessment Tool 0-10   Pain Location/Orientation Location: Neck   Restrictions/Precautions   Weight Bearing Precautions Per Order No   Other Precautions Chair Alarm;Pain; Fall Risk;Spinal precautions;Multiple lines;O2  (2L O2): recent Cx spine surgery 10/25, no Cx brace required per previous notes.    Home Living   Type of [address] [place] [address] One level;Performs ADLs on one level  (0 KARLA)   Bathroom Shower/Tub Walk-in shower   Bathroom Toilet Standard   Bathroom Accessibility Accessible   Additional Comments Pt was admitted from [address] [place] [address]. Prior to that, pt lived in apartment with 0 KARLA   Prior Function   Level of [place] Independent with ADLs; Independent with functional mobility; Independent with IADLS   Lives With (S)  Alone   Receives Help From Family;Friend(s)   IADLs Independent with driving; Independent with meal prep; Independent with medication management   Falls in the last 6 months 1 to 4   Vocational Retired   General   Family/Caregiver Present No   Cognition   Overall Cognitive Status WFL   Arousal/Participation Cooperative   Attention Within functional limits   Orientation Level Oriented X4   Following Commands Follows all commands and directions without difficulty   Comments Pt cooperative during session   Subjective   Subjective Agreeable to mobilzie   RLE Assessment   RLE Assessment   (grossly 3+-4/5)   LLE Assessment   LLE Assessment   (grossly 3+-4/5)   Bed Mobility   Supine to Sit 3  Moderate assistance   Additional items Assist x 1;HOB elevated; Increased time required;Verbal cues;LE management   Sit to Supine Unable to assess   Additional Comments Pt noted to be in bed upon arrival.   Transfers   Sit to Stand 4  Minimal assistance   Additional items Assist x 1; Increased time required;Verbal cues   Stand to Sit 4  Minimal assistance   Additional items Assist x 1; Increased time required;Verbal cues   Additional Comments w/RW   Ambulation/Elevation   Gait pattern Step to;Short stride; Excessively slow   Gait Assistance 4  Minimal assist   Additional items Assist x 1;Verbal cues   Assistive Device Rolling walker   Distance 2 ft   Ambulation/Elevation Additional Comments Assisted from bed to chair with min assist X 1, used RW   Balance   Static Sitting Fair +   Dynamic Sitting Fair   Static Standing Fair -   Dynamic Standing Poor +   Ambulatory Poor   Endurance Deficit   Endurance Deficit Yes   Activity Tolerance   Activity Tolerance Patient limited by fatigue   Medical Staff Made Aware OT   Nurse Made Aware RN cleared pt for therapy   Assessment   Prognosis Good   Problem List Decreased strength;Decreased endurance;Decreased mobility;Pain;Orthopedic restrictions   Assessment Pt is a 68 y.o. male seen for PT evaluation s/p admit to [address] [place] [address] on 11/2/2023. Pt was admitted with a primary dx of: Acute respiratory insufficiency, Ileus, Anasarca, Urinary retention, Hyponatremia, with recent spinal surgery C3-C6 PCDF in setting of Cervical stenosis/radiculopathy with cord compression C3-4 with canal stenosis on 10/25. PT now consulted for assessment of mobility and d/c needs. Pt with Ambulate orders. Pts current clinical presentation is Unstable/ Unpredictable (high complexity) due to Ongoing medical management for primary dx, Decreased activity tolerance compared to baseline, Fall risk, Increased O2 via NC from pts baseline, Spinal precautions at current time, s/p surgical intervention. Prior to admission, pt was Ind for mobility and ADLs, pt at this time admitted from [address] [place] [address], states he was there only few hours. Upon evaluation, pt currently is requiring Mod A for bed mobility, but then able to transfer and complete ambulation from bed to chair with assist X 1, using RW. Limited session 2* decreased endurance level, O2 sats on 2L O2 at 96-97% with activity . Pt presents at PT eval functioning below baseline and currently w/ overall mobility deficits 2* to: decreased endurance, gait deviations, pain, decreased activity tolerance compared to baseline, decreased functional mobility tolerance compared to baseline, fall risk, orthopedic restrictions. Pt currently at a fall risk 2* to impairments listed above.   Pt will continue to benefit from skilled acute PT interventions to address stated impairments; to maximize functional mobility; for ongoing pt training; and DME needs. At conclusion of PT session chair alarm engaged, all needs in reach, RN notified of session findings/recommendations, and pt returned back in recliner chair with phone and call bell within reach. Pt denies any further questions at this time. The patient's [facility] Basic Mobility Inpatient Short Form Raw Score is 14. A Raw score of less than or equal to 16 suggests the patient may benefit from discharge to post-acute rehabilitation services. Please also refer to the recommendation of the Physical Therapist for safe discharge planning. Recommend Level I (maximum resources) upon hospital D/C. Barriers to Discharge Inaccessible home environment;Decreased caregiver support   Goals   Patient Goals to return to Rehab   STG Expiration Date 11/17/23   Short Term Goal #1 In 14 days, pt will: 1) perform bed mobility with S to promote functional independence and decrease caregiver burden. 2) perform transfers to<>from all surfaces with S to promote functional independence and decrease caregiver burden. 3) ambulate 100' with S and least restrictive device to promote safe return to home. 4) negotiate 3 stairs with S to promote safe access of community. 5) improve balance grades by 1/2 to promote safety with functional mobility. 6) improve strength grades by 1/2 to promote safety with functional mobility. PT Treatment Day 0   Plan   Treatment/Interventions Functional transfer training; Therapeutic exercise; Bed mobility;Gait training;Patient/family training;Spoke to nursing;OT   PT Frequency 3-5x/wk   Discharge Recommendation   Rehab Resource Intensity Level, PT I (Maximum Resource Intensity)   Equipment Recommended Walker   [facility] Basic Mobility Inpatient   Turning in Flat Bed Without Bedrails 2   Lying on Back to Sitting on Edge of Flat Bed Without Bedrails 2   Moving Bed to Chair 3   Standing Up From Chair Using Arms 3   Walk in Room 2   Climb 3-5 Stairs With Railing 2   Basic Mobility Inpatient Raw Score 14   Basic Mobility Standardized Score 35.55   Highest Level Of Mobility   -[facility] Goal 4: Move to chair/commode   -[facility] Achieved 4: Move to chair/commode   Modified Lamar Scale   Modified Lamar Scale 4   End of Consult   Patient Position at End of Consult Bed/Chair alarm activated; All needs within reach; Bedside chair         Mane Covarrubias, PT DPT

## 2023-11-03 NOTE — OCCUPATIONAL THERAPY NOTE
Occupational Therapy Evaluation     Patient Name: Pushpa Gibbons  EJMXZ'Y Date: 11/3/2023  Problem List  Principal Problem:    Acute respiratory insufficiency  Active Problems:    Osteoarthritis of cervical spine with myelopathy    Benign essential hypertension    Gastroesophageal reflux disease    Hyponatremia    Urinary retention    Anasarca    [facility])    Past Medical History  Past Medical History:   Diagnosis Date    GERD (gastroesophageal reflux disease)     Hyperlipidemia     Hypertension      Past Surgical History  Past Surgical History:   Procedure Laterality Date    COLONOSCOPY      HERNIA REPAIR      DE ARTHRD PST/PSTLAT TQ 1NTRSPC CRV BELW C2 SEGMENT N/A 10/25/2023    Procedure: C3-6 PCDF;  Surgeon: Tisha Kim MD;  Location:  MAIN OR;  Service: Neurosurgery    DE COLONOSCOPY FLX DX W/[facility] INPATIENT REHABILITATION WHEN PFRMD N/A 4/5/2019    Procedure: COLONOSCOPY;  Surgeon: Sushma Shay DO;  Location: [facility]; Service: Gastroenterology    ROTATOR CUFF REPAIR Left     TONSILLECTOMY               11/03/23 1325   OT Last Visit   OT Visit Date 11/03/23   Note Type   Note type Evaluation   Pain Assessment   Pain Assessment Tool 0-10   Pain Score No Pain   Restrictions/Precautions   Weight Bearing Precautions Per Order No   Other Precautions Chair Alarm;Pain; Fall Risk;Spinal precautions   Home Living   Type of [address] One level;Performs ADLs on one level  (0STE)   Bathroom Shower/Tub Walk-in shower   Bathroom Toilet Standard   Bathroom Accessibility Accessible   Additional Comments Pt was admitted for BE ARC. Prior to rehab and hospitalization pt was living in an apartment with 0STE. Prior Function   Level of [place] Independent with functional mobility; Independent with ADLs; Independent with IADLS   Lives With (S)  Alone   Receives Help From Family;Friend(s)   IADLs Independent with driving; Independent with meal prep; Independent with medication management   Falls in the last 6 months 1 to 4  (3)   Vocational Retired   Lifestyle   Autonomy Mod I with increased to for self care tasks (with recent symptoms)/mod I with ADL's, no AD with functional mobility, +drives   Reciprocal Relationships pt reports golfing several times a week. meets his friends out at times   Service to Others retired   Intrinsic Gratification spending time outside   [facility] Present No   Subjective   Subjective "I am just nervous"   ADL   Where Assessed Edge of bed   Eating Assistance 5  Supervision/Setup   Grooming Assistance 4  Minimal Assistance   UB Bathing Assistance 3  Moderate Assistance   LB Bathing Assistance 2  Maximal [place] 3  Moderate Assistance   LB Dressing Assistance 2  Maximal [address]  3  Moderate Assistance   Functional Assistance 2  Maximal Assistance   Bed Mobility   Supine to Sit 3  Moderate assistance   Additional items Assist x 1; Increased time required   Sit to Supine Unable to assess   Additional Comments OOB in the chair at the end of the session   Transfers   Sit to Stand 4  Minimal assistance   Additional items Assist x 1; Increased time required   Stand to Sit 4  Minimal assistance   Additional items Assist x 1; Increased time required   Additional Comments RW   Functional Mobility   Functional Mobility 4  Minimal assistance   Additional Comments Ax1.  Pt was able to demonstrate short distance household mobility   Additional items Rolling walker   Balance   Static Sitting Fair +   Dynamic Sitting Fair   Static Standing Fair -   Dynamic Standing Poor +   Ambulatory Poor +   Activity Tolerance   Activity Tolerance Patient limited by fatigue   Medical Staff Made Aware PT Regina   Nurse Made Aware RN made aware   RUE Assessment   RUE Assessment X  (decreased ROM in shoulder)   LUE Assessment   LUE Assessment X  (decreased ROM in shoulder due to pain)   Hand Function   Gross Motor Coordination Functional   Fine Motor Coordination Functional   Sensation   Light Touch Partial deficits in the LUE   Vision-Basic Assessment   Current Vision Wears glasses only for reading   Psychosocial   Psychosocial (WDL) WDL   Cognition   Overall Cognitive Status WFL   Arousal/Participation Alert; Responsive;Arousable   Attention Within functional limits   Orientation Level Oriented X4   Memory Within functional limits   Following Commands Follows all commands and directions without difficulty   Comments Pt was pleasant and cooperative t/o session   Assessment   Limitation Decreased ADL status; Decreased UE strength;Decreased UE ROM; Decreased endurance;Decreased self-care trans;Decreased high-level ADLs   Prognosis Fair   Assessment Pt is 68 y.o. male admitted to [facility] on 11/2/2023 w/ acute respiratory insufficiency. Pt recently hospitalized for cervical decompression and fusion on 10/25. Pt  has a past medical history of GERD (gastroesophageal reflux disease), Hyperlipidemia, and Hypertension. . Pt with active OT orders and activity orders. Pt resides in a Apartment, with 0 KARLA. Pt lives with alone. Pt was admitted for BE ARC. Pt was I w/ ADLs, IADLs prior to hospitalizations. Pt is currently functioning at S for eating, min A for grooming, and mod A for UB ADLs and max A for LB ADLS. Pt is functioning at mod Ax1 for bed mobility and min Ax1 for transfers and functional mobility with RW. Tilmon Fluke Pt is limited 2* pain, endurance, activity tolerance, functional mobility, balance, functional standing tolerance, and decreased I w/ ADLS/IADLS. The Areas of Occupational Performance Areas to address w/ pt include: eating, grooming, bathing/shower, toilet hygiene, dressing, health maintenance, and functional mobility. Based off of an OT evaulation, assessment, and performance functioning, pt is identified as a high complexity. The patient's raw score on the AM-PAC Daily Activity Inpatient Short Form is 14.  A raw score of less than 19 suggests the patient may benefit from discharge to post-acute rehabilitation services. Please refer to the recommendation of the Occupational Therapist for safe discharge planning. OT recommendation for d/c includes post acute rehab. Pt would benefit from continued acute OT services for  2-3x/week to  w/in 10-14 days: to address functional goals. Goals   Patient Goals to return to rehab   LTG Time Frame 10-14   Long Term Goal see goals below   Plan   Treatment Interventions ADL retraining;Functional transfer training;UE strengthening/ROM; Endurance training;Equipment evaluation/education; Compensatory technique education;Continued evaluation; Energy conservation; Activityengagement   Goal Expiration Date 23   OT Frequency 2-3x/wk   Discharge Recommendation   Rehab Resource Intensity Level, OT I (Maximum Resource Intensity)   AM-PAC Daily Activity Inpatient   Lower Body Dressing 2   Bathing 2   Toileting 2   Upper Body Dressing 2   Grooming 3   Eating 3   Daily Activity Raw Score 14   Daily Activity Standardized Score (Calc for Raw Score >=11) 33.39   AM-PAC Applied Cognition Inpatient   Following a Speech/Presentation 4   Understanding Ordinary Conversation 4   Taking Medications 4   Remembering Where Things Are Placed or Put Away 4   Remembering List of 4-5 Errands 4   Taking Care of Complicated Tasks 4   Applied Cognition Raw Score 24   Applied Cognition Standardized Score 62.21   End of Consult   Education Provided Yes   Patient Position at End of Consult Bedside chair;Bed/Chair alarm activated; All needs within reach   Nurse Communication Nurse aware of consult     Pt w/ mod I will complete daily grooming tasks w/ adaptive equipment as needed. Pt will increase standing tolerance to 10 mins w/ mod I w/ adaptive equipment as needed. Pt will complete functional transfers w/ mod I on and off all surfaces w/ equipment as needed. Pt will complete functional mobility w/ mod I on and off all surfaces w/ equipment as needed.      Pt will complete tolieting w/ mod I while demonstrating safety techniques w/ DME. Pt will complete feeding tasks w/ mod I using adaptive devices. Pt will demonstrate G safety awareness w/ mod I during OT session. Pt will demonstrate LE body dressing w/ mod I w/ adaptive equipment as needed. Pt will demonstrate UE body dressing w/ mod I w/ adaptive equipment as needed. Pt will increase activity tolerance to G during a 30 min OT tx session. Pt will demonstrate bed mobility skills w/ mod I. Pt will participate in a formal/functional cognitive assessment as needed to assist with safe discharge planning.      Shantal Sinclair, OTR/L

## 2023-11-03 NOTE — ASSESSMENT & PLAN NOTE
Patient reporting marked abdominal distention, with episodes of watery diarrhea this admission  CT a/p: Diffuse moderate distention of entire GI tract. No evidence of obstruction.   Nonspecific gastroenteritis and/or ileus   Receiving IV Lasix for anasarca as above  Pain control regimen initiated   Pt having multiple episodes of diarrhea but pt was on miralax, senna, lactulose, and had a dulcolax suppository on 11/1   Out of bed and ambulation  Continue supportive care

## 2023-11-03 NOTE — PLAN OF CARE
Problem: MOBILITY - ADULT  Goal: Maintain or return to baseline ADL function  Description: INTERVENTIONS:  -  Assess patient's ability to carry out ADLs; assess patient's baseline for ADL function and identify physical deficits which impact ability to perform ADLs (bathing, care of mouth/teeth, toileting, grooming, dressing, etc.)  - Assess/evaluate cause of self-care deficits   - Assess range of motion  - Assess patient's mobility; develop plan if impaired  - Assess patient's need for assistive devices and provide as appropriate  - Encourage maximum independence but intervene and supervise when necessary  - Involve family in performance of ADLs  - Assess for home care needs following discharge   - Consider OT consult to assist with ADL evaluation and planning for discharge  - Provide patient education as appropriate  Outcome: Progressing  Goal: Maintains/Returns to pre admission functional level  Description: INTERVENTIONS:  - Perform BMAT or MOVE assessment daily.   - Set and communicate daily mobility goal to care team and patient/family/caregiver. - Collaborate with rehabilitation services on mobility goals if consulted  - Perform Range of Motion 3 times a day. - Reposition patient every 3 hours.   - Dangle patient 3 times a day  - Stand patient 3 times a day  - Ambulate patient 3 times a day  - Out of bed to chair 3 times a day   - Out of bed for meals 3 times a day  - Out of bed for toileting  - Record patient progress and toleration of activity level   Outcome: Progressing     Problem: Prexisting or High Potential for Compromised Skin Integrity  Goal: Skin integrity is maintained or improved  Description: INTERVENTIONS:  - Identify patients at risk for skin breakdown  - Assess and monitor skin integrity  - Assess and monitor nutrition and hydration status  - Monitor labs   - Assess for incontinence   - Turn and reposition patient  - Assist with mobility/ambulation  - Relieve pressure over bony prominences  - Avoid friction and shearing  - Provide appropriate hygiene as needed including keeping skin clean and dry  - Evaluate need for skin moisturizer/barrier cream  - Collaborate with interdisciplinary team   - Patient/family teaching  - Consider wound care consult   Outcome: Progressing     Problem: PAIN - ADULT  Goal: Verbalizes/displays adequate comfort level or baseline comfort level  Description: Interventions:  - Encourage patient to monitor pain and request assistance  - Assess pain using appropriate pain scale  - Administer analgesics based on type and severity of pain and evaluate response  - Implement non-pharmacological measures as appropriate and evaluate response  - Consider cultural and social influences on pain and pain management  - Notify physician/advanced practitioner if interventions unsuccessful or patient reports new pain  Outcome: Progressing     Problem: INFECTION - ADULT  Goal: Absence or prevention of progression during hospitalization  Description: INTERVENTIONS:  - Assess and monitor for signs and symptoms of infection  - Monitor lab/diagnostic results  - Monitor all insertion sites, i.e. indwelling lines, tubes, and drains  - Monitor endotracheal if appropriate and nasal secretions for changes in amount and color  - [place] appropriate cooling/warming therapies per order  - Administer medications as ordered  - Instruct and encourage patient and family to use good hand hygiene technique  - Identify and instruct in appropriate isolation precautions for identified infection/condition  Outcome: Progressing  Goal: Absence of fever/infection during neutropenic period  Description: INTERVENTIONS:  - Monitor WBC    Outcome: Progressing     Problem: SAFETY ADULT  Goal: Maintain or return to baseline ADL function  Description: INTERVENTIONS:  -  Assess patient's ability to carry out ADLs; assess patient's baseline for ADL function and identify physical deficits which impact ability to perform ADLs (bathing, care of mouth/teeth, toileting, grooming, dressing, etc.)  - Assess/evaluate cause of self-care deficits   - Assess range of motion  - Assess patient's mobility; develop plan if impaired  - Assess patient's need for assistive devices and provide as appropriate  - Encourage maximum independence but intervene and supervise when necessary  - Involve family in performance of ADLs  - Assess for home care needs following discharge   - Consider OT consult to assist with ADL evaluation and planning for discharge  - Provide patient education as appropriate  Outcome: Progressing  Goal: Maintains/Returns to pre admission functional level  Description: INTERVENTIONS:  - Perform BMAT or MOVE assessment daily.   - Set and communicate daily mobility goal to care team and patient/family/caregiver. - Collaborate with rehabilitation services on mobility goals if consulted  - Perform Range of Motion 3 times a day. - Reposition patient every 3 hours.   - Dangle patient 3 times a day  - Stand patient 3 times a day  - Ambulate patient 3 times a day  - Out of bed to chair 3 times a day   - Out of bed for meals 3 times a day  - Out of bed for toileting  - Record patient progress and toleration of activity level   Outcome: Progressing  Goal: Patient will remain free of falls  Description: INTERVENTIONS:  - Educate patient/family on patient safety including physical limitations  - Instruct patient to call for assistance with activity   - Consult OT/PT to assist with strengthening/mobility   - Keep Call bell within reach  - Keep bed low and locked with side rails adjusted as appropriate  - Keep care items and personal belongings within reach  - Initiate and maintain comfort rounds  - Make Fall Risk Sign visible to staff  - Offer Toileting every 2 Hours, in advance of need  - Initiate/Maintain bed alarm  - Obtain necessary fall risk management equipment: yellow socks  - Apply yellow socks and bracelet for high fall risk patients  - Consider moving patient to room near nurses station  Outcome: Progressing     Problem: DISCHARGE PLANNING  Goal: Discharge to home or other facility with appropriate resources  Description: INTERVENTIONS:  - Identify barriers to discharge w/patient and caregiver  - Arrange for needed discharge resources and transportation as appropriate  - Identify discharge learning needs (meds, wound care, etc.)  - Arrange for interpretive services to assist at discharge as needed  - Refer to Case Management Department for coordinating discharge planning if the patient needs post-hospital services based on physician/advanced practitioner order or complex needs related to functional status, cognitive ability, or social support system  Outcome: Progressing     Problem: Knowledge Deficit  Goal: Patient/family/caregiver demonstrates understanding of disease process, treatment plan, medications, and discharge instructions  Description: Complete learning assessment and assess knowledge base.   Interventions:  - Provide teaching at level of understanding  - Provide teaching via preferred learning methods  Outcome: Progressing

## 2023-11-03 NOTE — ASSESSMENT & PLAN NOTE
Patient complaining of increased diffuse edema, appears he has gained at least 17 pounds since hospitalization and appears grossly volume overloaded on examination with CXR with some evidence of pulmonary vascular congestion.   Patient himself denies any prior history of cardiac disease  S/p 40 mg IV Lasix during rapid response, continue IV diuresis with Lasix 40 mg twice daily for now, adjust dependent on response  Cardiac echo with EF 60% and grade 1 diastolic dysfunction no major valvular disease  Monitor daily weights, I's/O, volume status  Daily BMP on IV diuresis, replace electrolytes as needed  Patient with negative fluid balance  Check lower extremity venous Doppler

## 2023-11-03 NOTE — ASSESSMENT & PLAN NOTE
Restart lisinopril  Monitor blood pressure per protocol  Previously patient was on HCTZ however discontinued due to hyponatremia

## 2023-11-03 NOTE — PLAN OF CARE
Problem: OCCUPATIONAL THERAPY ADULT  Goal: Performs self-care activities at highest level of function for planned discharge setting. See evaluation for individualized goals. Description: Treatment Interventions: ADL retraining, Functional transfer training, UE strengthening/ROM, Endurance training, Equipment evaluation/education, Compensatory technique education, Continued evaluation, Energy conservation, Activityengagement          See flowsheet documentation for full assessment, interventions and recommendations. Note: Limitation: Decreased ADL status, Decreased UE strength, Decreased UE ROM, Decreased endurance, Decreased self-care trans, Decreased high-level ADLs  Prognosis: Fair  Assessment: Pt is 68 y.o. male admitted to [facility] on 11/2/2023 w/ acute respiratory insufficiency. Pt recently hospitalized for cervical decompression and fusion on 10/25. Pt  has a past medical history of GERD (gastroesophageal reflux disease), Hyperlipidemia, and Hypertension. . Pt with active OT orders and activity orders. Pt resides in a Apartment, with 0 KARLA. Pt lives with alone. Pt was admitted for BE ARC. Pt was I w/ ADLs, IADLs prior to hospitalizations. Pt is currently functioning at S for eating, min A for grooming, and mod A for UB ADLs and max A for LB ADLS. Pt is functioning at mod Ax1 for bed mobility and min Ax1 for transfers and functional mobility with RW. Yashira Angles Pt is limited 2* pain, endurance, activity tolerance, functional mobility, balance, functional standing tolerance, and decreased I w/ ADLS/IADLS. The Areas of Occupational Performance Areas to address w/ pt include: eating, grooming, bathing/shower, toilet hygiene, dressing, health maintenance, and functional mobility. Based off of an OT evaulation, assessment, and performance functioning, pt is identified as a high complexity. The patient's raw score on the AM-PAC Daily Activity Inpatient Short Form is 14.  A raw score of less than 19 suggests the patient may benefit from discharge to post-acute rehabilitation services. Please refer to the recommendation of the Occupational Therapist for safe discharge planning. OT recommendation for d/c includes post acute rehab. Pt would benefit from continued acute OT services for  2-3x/week to  w/in 10-14 days: to address functional goals.      Rehab Resource Intensity Level, OT: I (Maximum Resource Intensity)

## 2023-11-03 NOTE — PLAN OF CARE
Problem: PAIN - ADULT  Goal: Verbalizes/displays adequate comfort level or baseline comfort level  Description: Interventions:  - Encourage patient to monitor pain and request assistance  - Assess pain using appropriate pain scale  - Administer analgesics based on type and severity of pain and evaluate response  - Implement non-pharmacological measures as appropriate and evaluate response  - Consider cultural and social influences on pain and pain management  - Notify physician/advanced practitioner if interventions unsuccessful or patient reports new pain  Outcome: Progressing     Problem: INFECTION - ADULT  Goal: Absence or prevention of progression during hospitalization  Description: INTERVENTIONS:  - Assess and monitor for signs and symptoms of infection  - Monitor lab/diagnostic results  - Monitor all insertion sites, i.e. indwelling lines, tubes, and drains  - Monitor endotracheal if appropriate and nasal secretions for changes in amount and color  - [place] appropriate cooling/warming therapies per order  - Administer medications as ordered  - Instruct and encourage patient and family to use good hand hygiene technique  - Identify and instruct in appropriate isolation precautions for identified infection/condition  Outcome: Progressing

## 2023-11-03 NOTE — PROGRESS NOTES
[address] [place] [address]  Progress Note  Name: Brigid Hatch  MRN: [id number]  Unit/Bed#: [facility] 824-01 I Date of Admission: 11/2/2023   Date of Service: 11/3/2023 I Hospital Day: 1    Assessment/Plan   * Acute respiratory insufficiency  Assessment & Plan  Patient was RRT at [facility] with respiratory distress, mildly hypoxic requiring 2 LNC to maintain appropriate saturations  Suspect in the setting of volume overload/anasarca with at least ileus as below contributing  Transferred to [address] for further management given situation  CXR Low lung volumes and increased lung markings due to crowding. Mild left  basilar density likely due to atelectasis   No acute consolidation or congestion  Continue supplemental oxygen as needed to maintain SaO2 at least 88%  Incentive spirometry, pulmonary toileting    Ileus [facility])  Assessment & Plan  Patient reporting marked abdominal distention, with episodes of watery diarrhea this admission  CT a/p: Diffuse moderate distention of entire GI tract. No evidence of obstruction. Nonspecific gastroenteritis and/or ileus   Receiving IV Lasix for anasarca as above  Pain control regimen initiated   Pt having multiple episodes of diarrhea but pt was on miralax, senna, lactulose, and had a dulcolax suppository on 11/1   Out of bed and ambulation  Continue supportive care    Anasarca  Assessment & Plan  Patient complaining of increased diffuse edema, appears he has gained at least 17 pounds since hospitalization and appears grossly volume overloaded on examination with CXR with some evidence of pulmonary vascular congestion.   Patient himself denies any prior history of cardiac disease  S/p 40 mg IV Lasix during rapid response, continue IV diuresis with Lasix 40 mg twice daily for now, adjust dependent on response  Cardiac echo with EF 60% and grade 1 diastolic dysfunction no major valvular disease  Monitor daily weights, I's/O, volume status  Daily BMP on IV diuresis, replace electrolytes as needed  Patient with negative fluid balance  Check lower extremity venous Doppler    Urinary retention  Assessment & Plan  S/p Christensen catheter placement at prior hospitalization, initially removed 10/26 however replaced 10/30 as patient failing urinary retention protocol  Maintain Christensen catheter for now and continue finasteride. Voiding trial once more ambulatory  Eventually will need outpatient urology follow-up    Hyponatremia  Assessment & Plan  Patient previously reported this was chronic issue, however sodium 127 now down from previous 132  Work-up in prior hospitalization revealing normal uric acid, urine sodium 46, urine osm 735, serum osm 278, TSH normal.  Cortisol was noted low however subsequent cosyntropin stimulation test was not concerning for adrenal insufficiency  Suspected SIADH as etiology, however also suspect volume overload is playing role  IV diuresis as above  Replace potassium   Monitor      Benign essential hypertension  Assessment & Plan  Restart lisinopril  Monitor blood pressure per protocol  Previously patient was on HCTZ however discontinued due to hyponatremia    Osteoarthritis of cervical spine with myelopathy  Assessment & Plan  Patient s/p C3-C6 PCDF 10/25/2023 in setting of cervical stenosis/radiculopathy with cord compression at C4-5 level with severe canal stenosis  Continue with fall precautions, multimodal pain regimen  CM consult for assistance with disposition once stabilized, potential return to [facility]  PT OT eval.             VTE Pharmacologic Prophylaxis: VTE Score: 5 High Risk (Score >/= 5) - Pharmacological DVT Prophylaxis Ordered: heparin. Sequential Compression Devices Ordered. Patient Centered Rounds: I performed bedside rounds with nursing staff today. Discussions with Specialists or Other Care Team Provider: CM    Education and Discussions with Family / Patient: Updated  (sister) via phone.     Total Time Spent on Date of Encounter in care of patient: 35 mins. This time was spent on one or more of the following: performing physical exam; counseling and coordination of care; obtaining or reviewing history; documenting in the medical record; reviewing/ordering tests, medications or procedures; communicating with other healthcare professionals and discussing with patient's family/caregivers. Current Length of Stay: 1 day(s)  Current Patient Status: Inpatient   Certification Statement: The patient will continue to require additional inpatient hospital stay due to management of volume overload and ileus  Discharge Plan: Anticipate discharge in 48-72 hrs to rehab facility. Code Status: Level 1 - Full Code    Subjective:   Seen and examined  Comfortable in bed  Still with mild short of breath, no cough or chest pain  Still with bilateral lower extremity edema      Objective:     Vitals:   Temp (24hrs), Av.7 °F (36.5 °C), Min:97.5 °F (36.4 °C), Max:98 °F (36.7 °C)    Temp:  [97.5 °F (36.4 °C)-98 °F (36.7 °C)] 98 °F (36.7 °C)  HR:  [] 49  Resp:  [17-18] 18  BP: (131-161)/(73-85) 131/78  SpO2:  [88 %-99 %] 89 %  Body mass index is 36.43 kg/m². Input and Output Summary (last 24 hours):      Intake/Output Summary (Last 24 hours) at 11/3/2023 1531  Last data filed at 11/3/2023 1301  Gross per 24 hour   Intake 340 ml   Output 3000 ml   Net -2660 ml       Physical Exam:   Physical Exam     Patient is awake alert in no acute distress  Ill-appearing, comfortable in bed  Lungs with decreased breath sound bilateral  Heart with murmur  Abdomen soft distended, tender to soft palpation  Bilateral lower extremities edema    Additional Data:     Labs:  Results from last 7 days   Lab Units 23  0508   WBC Thousand/uL 9.28   HEMOGLOBIN g/dL 11.9*   HEMATOCRIT % 35.6*   PLATELETS Thousands/uL 341   NEUTROS PCT % 77*   LYMPHS PCT % 11*   MONOS PCT % 10   EOS PCT % 1     Results from last 7 days   Lab Units 23  0508   SODIUM mmol/L 131*   POTASSIUM mmol/L 3.3*   CHLORIDE mmol/L 94*   CO2 mmol/L 26   BUN mg/dL 24   CREATININE mg/dL 0.86   ANION GAP mmol/L 11   CALCIUM mg/dL 8.4   GLUCOSE RANDOM mg/dL 101         Results from last 7 days   Lab Units 11/02/23  0338   POC GLUCOSE mg/dl 109               Lines/Drains:  Invasive Devices       Peripheral Intravenous Line  Duration             Peripheral IV 11/02/23 Proximal;Right;Ventral (anterior) Forearm 1 day              Drain  Duration             Urethral Catheter 16 Fr. 4 days                  Urinary Catheter:  Goal for removal: Voiding trial when ambulation improves               Imaging: Reviewed    Recent Cultures (last 7 days):   Results from last 7 days   Lab Units 11/02/23  0804   C DIFF TOXIN B BY PCR  Negative       Last 24 Hours Medication List:   Current Facility-Administered Medications   Medication Dose Route Frequency Provider Last Rate    acetaminophen  650 mg Oral Q6H PRN Sola Burow, DO      albuterol  2 puff Inhalation Q6H PRN AAKASH Minaya      atorvastatin  10 mg Oral Daily Sola Burow, DO      finasteride  5 mg Oral Daily Sola Burow, DO      fluticasone  1 spray Each Nare BID Sola Burow, DO      folic acid  881 mcg Oral Daily Sola Burow, DO      furosemide  40 mg Intravenous BID Sola Burow, DO      gabapentin  100 mg Oral TID Sola Burow, DO      heparin (porcine)  5,000 Units Subcutaneous [facility] Sola Burow, DO      lisinopril  10 mg Oral Daily Kalani Leaf, DO      loratadine  10 mg Oral Daily Sola Burow, DO      melatonin  3 mg Oral HS Sola Burow, DO      ondansetron  4 mg Intravenous Q6H PRN Sola Burow, DO      oxyCODONE  2.5 mg Oral Q4H PRN Kalani Leaf, DO      potassium chloride  40 mEq Oral BID Kalani Brayton, DO      tiZANidine  4 mg Oral TID Sola Burow, DO          Today, Patient Was Seen By: Kalani Cruz DO    **Please Note: This note may have been constructed using a voice recognition system. **

## 2023-11-03 NOTE — ASSESSMENT & PLAN NOTE
Patient previously reported this was chronic issue, however sodium 127 now down from previous 132  Work-up in prior hospitalization revealing normal uric acid, urine sodium 46, urine osm 735, serum osm 278, TSH normal.  Cortisol was noted low however subsequent cosyntropin stimulation test was not concerning for adrenal insufficiency  Suspected SIADH as etiology, however also suspect volume overload is playing role  IV diuresis as above  Replace potassium   Monitor

## 2023-11-03 NOTE — ASSESSMENT & PLAN NOTE
Patient was RRT at [facility] with respiratory distress, mildly hypoxic requiring 2 LNC to maintain appropriate saturations  Suspect in the setting of volume overload/anasarca with at least ileus as below contributing  Transferred to [address] [facility] for further management given situation  CXR Low lung volumes and increased lung markings due to crowding. Mild left  basilar density likely due to atelectasis   No acute consolidation or congestion  Continue supplemental oxygen as needed to maintain SaO2 at least 88%  Incentive spirometry, pulmonary toileting

## 2023-11-04 ENCOUNTER — APPOINTMENT (INPATIENT)
Dept: RADIOLOGY | Facility: HOSPITAL | Age: 73
DRG: 641 | End: 2023-11-04
Payer: COMMERCIAL

## 2023-11-04 PROBLEM — I82.409 ACUTE DVT (DEEP VENOUS THROMBOSIS) (HCC): Status: ACTIVE | Noted: 2023-11-04

## 2023-11-04 LAB
ANION GAP SERPL CALCULATED.3IONS-SCNC: 7 MMOL/L
BNP SERPL-MCNC: 24 PG/ML (ref 0–100)
BUN SERPL-MCNC: 25 MG/DL (ref 5–25)
CALCIUM SERPL-MCNC: 8.9 MG/DL (ref 8.4–10.2)
CHLORIDE SERPL-SCNC: 93 MMOL/L (ref 96–108)
CO2 SERPL-SCNC: 32 MMOL/L (ref 21–32)
CREAT SERPL-MCNC: 0.85 MG/DL (ref 0.6–1.3)
GFR SERPL CREATININE-BSD FRML MDRD: 86 ML/MIN/1.73SQ M
GLUCOSE SERPL-MCNC: 93 MG/DL (ref 65–140)
MAGNESIUM SERPL-MCNC: 1.9 MG/DL (ref 1.9–2.7)
PHOSPHATE SERPL-MCNC: 3.4 MG/DL (ref 2.3–4.1)
POTASSIUM SERPL-SCNC: 3.9 MMOL/L (ref 3.5–5.3)
SODIUM SERPL-SCNC: 132 MMOL/L (ref 135–147)

## 2023-11-04 PROCEDURE — 84100 ASSAY OF PHOSPHORUS: CPT | Performed by: INTERNAL MEDICINE

## 2023-11-04 PROCEDURE — 83735 ASSAY OF MAGNESIUM: CPT | Performed by: INTERNAL MEDICINE

## 2023-11-04 PROCEDURE — 83880 ASSAY OF NATRIURETIC PEPTIDE: CPT | Performed by: INTERNAL MEDICINE

## 2023-11-04 PROCEDURE — 71275 CT ANGIOGRAPHY CHEST: CPT

## 2023-11-04 PROCEDURE — 99232 SBSQ HOSP IP/OBS MODERATE 35: CPT | Performed by: INTERNAL MEDICINE

## 2023-11-04 PROCEDURE — 80048 BASIC METABOLIC PNL TOTAL CA: CPT | Performed by: INTERNAL MEDICINE

## 2023-11-04 PROCEDURE — G1004 CDSM NDSC: HCPCS

## 2023-11-04 PROCEDURE — 93970 EXTREMITY STUDY: CPT | Performed by: SURGERY

## 2023-11-04 RX ORDER — FUROSEMIDE 10 MG/ML
40 INJECTION INTRAMUSCULAR; INTRAVENOUS DAILY
Status: DISCONTINUED | OUTPATIENT
Start: 2023-11-05 | End: 2023-11-05

## 2023-11-04 RX ADMIN — GABAPENTIN 100 MG: 100 CAPSULE ORAL at 10:11

## 2023-11-04 RX ADMIN — LISINOPRIL 10 MG: 10 TABLET ORAL at 10:12

## 2023-11-04 RX ADMIN — GABAPENTIN 100 MG: 100 CAPSULE ORAL at 17:27

## 2023-11-04 RX ADMIN — LORATADINE 10 MG: 10 TABLET ORAL at 10:11

## 2023-11-04 RX ADMIN — ATORVASTATIN CALCIUM 10 MG: 10 TABLET, FILM COATED ORAL at 10:11

## 2023-11-04 RX ADMIN — APIXABAN 10 MG: 5 TABLET, FILM COATED ORAL at 22:15

## 2023-11-04 RX ADMIN — IOHEXOL 85 ML: 350 INJECTION, SOLUTION INTRAVENOUS at 13:31

## 2023-11-04 RX ADMIN — APIXABAN 10 MG: 5 TABLET, FILM COATED ORAL at 12:23

## 2023-11-04 RX ADMIN — FUROSEMIDE 40 MG: 10 INJECTION, SOLUTION INTRAMUSCULAR; INTRAVENOUS at 10:12

## 2023-11-04 RX ADMIN — GABAPENTIN 100 MG: 100 CAPSULE ORAL at 22:15

## 2023-11-04 RX ADMIN — TIZANIDINE 4 MG: 4 TABLET ORAL at 22:15

## 2023-11-04 RX ADMIN — TIZANIDINE 4 MG: 4 TABLET ORAL at 10:11

## 2023-11-04 RX ADMIN — TIZANIDINE 4 MG: 4 TABLET ORAL at 17:27

## 2023-11-04 RX ADMIN — MELATONIN 3 MG: at 22:15

## 2023-11-04 RX ADMIN — FINASTERIDE 5 MG: 5 TABLET, FILM COATED ORAL at 10:11

## 2023-11-04 NOTE — PLAN OF CARE
Problem: MOBILITY - ADULT  Goal: Maintain or return to baseline ADL function  Description: INTERVENTIONS:  -  Assess patient's ability to carry out ADLs; assess patient's baseline for ADL function and identify physical deficits which impact ability to perform ADLs (bathing, care of mouth/teeth, toileting, grooming, dressing, etc.)  - Assess/evaluate cause of self-care deficits   - Assess range of motion  - Assess patient's mobility; develop plan if impaired  - Assess patient's need for assistive devices and provide as appropriate  - Encourage maximum independence but intervene and supervise when necessary  - Involve family in performance of ADLs  - Assess for home care needs following discharge   - Consider OT consult to assist with ADL evaluation and planning for discharge  - Provide patient education as appropriate  Outcome: Progressing     Problem: Prexisting or High Potential for Compromised Skin Integrity  Goal: Skin integrity is maintained or improved  Description: INTERVENTIONS:  - Identify patients at risk for skin breakdown  - Assess and monitor skin integrity  - Assess and monitor nutrition and hydration status  - Monitor labs   - Assess for incontinence   - Turn and reposition patient  - Assist with mobility/ambulation  - Relieve pressure over bony prominences  - Avoid friction and shearing  - Provide appropriate hygiene as needed including keeping skin clean and dry  - Evaluate need for skin moisturizer/barrier cream  - Collaborate with interdisciplinary team   - Patient/family teaching  - Consider wound care consult   Outcome: Progressing     Problem: PAIN - ADULT  Goal: Verbalizes/displays adequate comfort level or baseline comfort level  Description: Interventions:  - Encourage patient to monitor pain and request assistance  - Assess pain using appropriate pain scale  - Administer analgesics based on type and severity of pain and evaluate response  - Implement non-pharmacological measures as appropriate and evaluate response  - Consider cultural and social influences on pain and pain management  - Notify physician/advanced practitioner if interventions unsuccessful or patient reports new pain  Outcome: Progressing     Problem: INFECTION - ADULT  Goal: Absence or prevention of progression during hospitalization  Description: INTERVENTIONS:  - Assess and monitor for signs and symptoms of infection  - Monitor lab/diagnostic results  - Monitor all insertion sites, i.e. indwelling lines, tubes, and drains  - Monitor endotracheal if appropriate and nasal secretions for changes in amount and color  - [place] appropriate cooling/warming therapies per order  - Administer medications as ordered  - Instruct and encourage patient and family to use good hand hygiene technique  - Identify and instruct in appropriate isolation precautions for identified infection/condition  Outcome: Progressing     Problem: SAFETY ADULT  Goal: Maintain or return to baseline ADL function  Description: INTERVENTIONS:  -  Assess patient's ability to carry out ADLs; assess patient's baseline for ADL function and identify physical deficits which impact ability to perform ADLs (bathing, care of mouth/teeth, toileting, grooming, dressing, etc.)  - Assess/evaluate cause of self-care deficits   - Assess range of motion  - Assess patient's mobility; develop plan if impaired  - Assess patient's need for assistive devices and provide as appropriate  - Encourage maximum independence but intervene and supervise when necessary  - Involve family in performance of ADLs  - Assess for home care needs following discharge   - Consider OT consult to assist with ADL evaluation and planning for discharge  - Provide patient education as appropriate  Outcome: Progressing  Goal: Patient will remain free of falls  Description: INTERVENTIONS:  - Educate patient/family on patient safety including physical limitations  - Instruct patient to call for assistance with activity   - Consult OT/PT to assist with strengthening/mobility   - Keep Call bell within reach  - Keep bed low and locked with side rails adjusted as appropriate  - Keep care items and personal belongings within reach  - Initiate and maintain comfort rounds  - Apply yellow socks and bracelet for high fall risk patients  - Consider moving patient to room near nurses station  Outcome: Progressing     Problem: DISCHARGE PLANNING  Goal: Discharge to home or other facility with appropriate resources  Description: INTERVENTIONS:  - Identify barriers to discharge w/patient and caregiver  - Arrange for needed discharge resources and transportation as appropriate  - Identify discharge learning needs (meds, wound care, etc.)  - Arrange for interpretive services to assist at discharge as needed  - Refer to Case Management Department for coordinating discharge planning if the patient needs post-hospital services based on physician/advanced practitioner order or complex needs related to functional status, cognitive ability, or social support system  Outcome: Progressing     Problem: Knowledge Deficit  Goal: Patient/family/caregiver demonstrates understanding of disease process, treatment plan, medications, and discharge instructions  Description: Complete learning assessment and assess knowledge base.   Interventions:  - Provide teaching at level of understanding  - Provide teaching via preferred learning methods  Outcome: Progressing

## 2023-11-04 NOTE — QUICK NOTE
TT by RN stating she just received a call from the Cath Lab for critical value patient positive for 2 acute venous thrombos. 1 in the right posterior vein of the calf and another in the left peroneal vein of the calf. At this time patient received 5000 subcu heparin at around 1:30 PM.   We will initiate therapeutic dose of Lovenox now.   And await formal report from vascular lab likely tomorrow 11/4

## 2023-11-04 NOTE — ASSESSMENT & PLAN NOTE
Patient complaining of increased diffuse edema, appears he has gained at least 17 pounds since hospitalization and appears grossly volume overloaded on examination with CXR with some evidence of pulmonary vascular congestion.   Patient himself denies any prior history of cardiac disease  S/p 40 mg IV Lasix during rapid response  Cardiac echo with EF 60% and grade 1 diastolic dysfunction no major valvular disease  BNP is 24  Monitor daily weights, I's/O, volume status  Negative fluid balance, change Lasix to once daily

## 2023-11-04 NOTE — TREATMENT PLAN
Notified by Dr. Vannesa Bautista with the primary team that this patient was readmitted from the [facility] after rapid response for respiratory distress in 11/1. He is s/p a C3-6 PCDF on 10/25 with Dr. Ollie Lomas. Lower extremity Doppler studies obtained this admission revealed bilateral lower extremity DVTs. Patient received 1 dose of full dose Lovenox overnight for this finding. CTA PE study is pending this morning. Primary team reaching out and asking if patient is cleared to start Eliquis for anticoagulation given these DVT findings given pt's recent cervical surgery. Case was briefly discussed with Dr. Ollie Lomas. Patient is cleared to initiate anticoagulation therapy at this time. He will need close neuro monitoring once this is started. Please reach out with any further questions or concerns.

## 2023-11-04 NOTE — ASSESSMENT & PLAN NOTE
S/p Christensen catheter placement at prior hospitalization, initially removed 10/26 however replaced 10/30 as patient failing urinary retention protocol  Maintain Christensen catheter for now and continue finasteride.     Voiding trial once more ambulatory  Eventually will need outpatient urology follow-up normal...

## 2023-11-04 NOTE — PROGRESS NOTES
[address] [facility]  Progress Note  Name: Chadd Hernandez  MRN: [id number]  Unit/Bed#: [facility] 824-01 I Date of Admission: 11/2/2023   Date of Service: 11/4/2023 I Hospital Day: 2    Assessment/Plan   * Acute respiratory insufficiency  Assessment & Plan  Patient was RRT at [facility] with respiratory distress, mildly hypoxic requiring 2 LNC to maintain appropriate saturations  Suspect in the setting of volume overload/anasarca with at least ileus as below contributing  Transferred to [address] for further management given situation  CXR Low lung volumes and increased lung markings due to crowding. Mild left  basilar density likely due to atelectasis. No acute consolidation or congestion  Given recent surgery and new bilateral lower extremity DVT, check chest CTA to rule out PE  Continue supplemental oxygen as needed to maintain SaO2 at least 88%  Incentive spirometry, pulmonary toileting    Ileus [facility])  Assessment & Plan  Patient reporting marked abdominal distention, with episodes of watery diarrhea this admission  CT a/p: Diffuse moderate distention of entire GI tract. No evidence of obstruction. Nonspecific gastroenteritis and/or ileus   Receiving IV Lasix for anasarca as above  Pain control regimen initiated   Pt having multiple episodes of diarrhea but pt was on miralax, senna, lactulose, and had a dulcolax suppository on 11/1   Out of bed and ambulation  Continue supportive care    Acute DVT (deep venous thrombosis) ([address])  Assessment & Plan  Acute bilateral lower extremity DVT  Start Eliquis if cleared by neurosurgery    Anasarca  Assessment & Plan  Patient complaining of increased diffuse edema, appears he has gained at least 17 pounds since hospitalization and appears grossly volume overloaded on examination with CXR with some evidence of pulmonary vascular congestion.   Patient himself denies any prior history of cardiac disease  S/p 40 mg IV Lasix during rapid response  Cardiac echo with EF 60% and grade 1 diastolic dysfunction no major valvular disease  BNP is 24  Monitor daily weights, I's/O, volume status  Negative fluid balance, change Lasix to once daily    Urinary retention  Assessment & Plan  S/p Christensen catheter placement at prior hospitalization, initially removed 10/26 however replaced 10/30 as patient failing urinary retention protocol  Maintain Christensen catheter for now and continue finasteride. Voiding trial once more ambulatory  Eventually will need outpatient urology follow-up    Hyponatremia  Assessment & Plan  Patient previously reported this was chronic issue, however sodium 127 now down from previous 132  Work-up in prior hospitalization revealing normal uric acid, urine sodium 46, urine osm 735, serum osm 278, TSH normal.  Cortisol was noted low however subsequent cosyntropin stimulation test was not concerning for adrenal insufficiency  Suspected SIADH as etiology, however also suspect volume overload is playing role  IV diuresis as above  Monitor    Benign essential hypertension  Assessment & Plan  Continue lisinopril  Monitor blood pressure per protocol  Previously patient was on HCTZ however discontinued due to hyponatremia    Osteoarthritis of cervical spine with myelopathy  Assessment & Plan  Patient s/p C3-C6 PCDF 10/25/2023 in setting of cervical stenosis/radiculopathy with cord compression at C4-5 level with severe canal stenosis  Continue with fall precautions, multimodal pain regimen  CM consult for assistance with disposition once stabilized, potential return to [facility]  PT OT eval.           VTE Pharmacologic Prophylaxis: VTE Score: 5 High Risk (Score >/= 5) - Pharmacological DVT Prophylaxis Ordered: heparin. Sequential Compression Devices Ordered. Patient Centered Rounds: I performed bedside rounds with nursing staff today.   Discussions with Specialists or Other Care Team Provider: Neurosurgery    Education and Discussions with Family / Patient: Patient declined call to . Total Time Spent on Date of Encounter in care of patient: 35 mins. This time was spent on one or more of the following: performing physical exam; counseling and coordination of care; obtaining or reviewing history; documenting in the medical record; reviewing/ordering tests, medications or procedures; communicating with other healthcare professionals and discussing with patient's family/caregivers. Current Length of Stay: 2 day(s)  Current Patient Status: Inpatient   Certification Statement: The patient will continue to require additional inpatient hospital stay due to volume overload and VTE  Discharge Plan: Anticipate discharge in 48 hrs to rehab facility. Code Status: Level 1 - Full Code    Subjective:   Patient seen and examined  Comfortable in bed  Hypotensive last night received IV albumin  Still on oxygen  Patient reports chronic shortness of breath    Objective:     Vitals:   Temp (24hrs), Av.2 °F (36.8 °C), Min:98 °F (36.7 °C), Max:98.3 °F (36.8 °C)    Temp:  [98 °F (36.7 °C)-98.3 °F (36.8 °C)] 98.3 °F (36.8 °C)  HR:  [49-97] 97  Resp:  [16-18] 16  BP: ()/(53-78) 134/76  SpO2:  [89 %-99 %] 98 %  Body mass index is 36.43 kg/m². Input and Output Summary (last 24 hours):      Intake/Output Summary (Last 24 hours) at 2023 1038  Last data filed at 11/3/2023 2253  Gross per 24 hour   Intake 462 ml   Output 2650 ml   Net -2188 ml       Physical Exam:   Physical Exam     Patient is awake alert oriented no acute distress  Comfortable in bed  Lungs with decreased breath sound bilateral  Heart positive S1-S2 no murmur  Abdomen less distended soft nontender  Bilateral lower extremities edema      Additional Data:     Labs:  Results from last 7 days   Lab Units 23  2302 23  0508   WBC Thousand/uL 11.24* 9.28   HEMOGLOBIN g/dL 11.7* 11.9*   HEMATOCRIT % 34.5* 35.6*   PLATELETS Thousands/uL 327 341   NEUTROS PCT %  --  77*   LYMPHS PCT %  --  11*   MONOS PCT %  --  10   EOS PCT %  --  1     Results from last 7 days   Lab Units 11/04/23  0548 11/03/23  2302   SODIUM mmol/L 132* 129*   POTASSIUM mmol/L 3.9 3.9   CHLORIDE mmol/L 93* 91*   CO2 mmol/L 32 31   BUN mg/dL 25 24   CREATININE mg/dL 0.85 0.84   ANION GAP mmol/L 7 7   CALCIUM mg/dL 8.9 8.3*   ALBUMIN g/dL  --  3.2*   TOTAL BILIRUBIN mg/dL  --  0.59   ALK PHOS U/L  --  56   ALT U/L  --  29   AST U/L  --  22   GLUCOSE RANDOM mg/dL 93 112     Results from last 7 days   Lab Units 11/03/23  2302   INR  1.11     Results from last 7 days   Lab Units 11/02/23  0338   POC GLUCOSE mg/dl 109               Lines/Drains:  Invasive Devices       Peripheral Intravenous Line  Duration             Peripheral IV 11/02/23 Proximal;Right;Ventral (anterior) Forearm 2 days              Drain  Duration             Urethral Catheter 16 Fr. 5 days                  Urinary Catheter:  Goal for removal: Voiding trial when ambulation improves               Imaging: Extremity venous Doppler with acute bilateral DVT    Recent Cultures (last 7 days):   Results from last 7 days   Lab Units 11/02/23  0804   C DIFF TOXIN B BY PCR  Negative       Last 24 Hours Medication List:   Current Facility-Administered Medications   Medication Dose Route Frequency Provider Last Rate    acetaminophen  650 mg Oral Q6H PRN Beverley Spire, DO      albuterol  2 puff Inhalation Q6H PRN Darylene Ellen, CRNP      atorvastatin  10 mg Oral Daily Beverley Spire, DO      finasteride  5 mg Oral Daily Beverley Spire, DO      fluticasone  1 spray Each Nare BID Beverley Spire, DO      folic acid  284 mcg Oral Daily Beverley Spire, DO      furosemide  40 mg Intravenous BID Beverley Spire, DO      gabapentin  100 mg Oral TID Beverley Spire, DO      lisinopril  10 mg Oral Daily Magdaline Ask, DO      loratadine  10 mg Oral Daily Beverley Spire, DO      melatonin  3 mg Oral HS Beverley Spire, DO      ondansetron  4 mg Intravenous Q6H PRN Beverley Spire, DO      oxyCODONE  2.5 mg Oral Q4H PRN Billy Silva DO      tiZANidine  4 mg Oral TID Mariel Siemens, DO          Today, Patient Was Seen By: Billy Silva DO    **Please Note: This note may have been constructed using a voice recognition system. **

## 2023-11-04 NOTE — ASSESSMENT & PLAN NOTE
Patient was RRT at [facility] with respiratory distress, mildly hypoxic requiring 2 LNC to maintain appropriate saturations  Suspect in the setting of volume overload/anasarca with at least ileus as below contributing  Transferred to [address] [facility] for further management given situation  CXR Low lung volumes and increased lung markings due to crowding. Mild left  basilar density likely due to atelectasis.  No acute consolidation or congestion  Given recent surgery and new bilateral lower extremity DVT, check chest CTA to rule out PE  Continue supplemental oxygen as needed to maintain SaO2 at least 88%  Incentive spirometry, pulmonary toileting

## 2023-11-04 NOTE — ASSESSMENT & PLAN NOTE
Patient previously reported this was chronic issue, however sodium 127 now down from previous 132  Work-up in prior hospitalization revealing normal uric acid, urine sodium 46, urine osm 735, serum osm 278, TSH normal.  Cortisol was noted low however subsequent cosyntropin stimulation test was not concerning for adrenal insufficiency  Suspected SIADH as etiology, however also suspect volume overload is playing role  IV diuresis as above  Monitor

## 2023-11-04 NOTE — ASSESSMENT & PLAN NOTE
Continue lisinopril  Monitor blood pressure per protocol  Previously patient was on HCTZ however discontinued due to hyponatremia

## 2023-11-04 NOTE — ASSESSMENT & PLAN NOTE
Patient s/p C3-C6 PCDF 10/25/2023 in setting of cervical stenosis/radiculopathy with cord compression at C4-5 level with severe canal stenosis  Continue with fall precautions, multimodal pain regimen  CM consult for assistance with disposition once stabilized, potential return to [facility]  PT OT eval.

## 2023-11-05 LAB
ANION GAP SERPL CALCULATED.3IONS-SCNC: 8 MMOL/L
BASOPHILS # BLD AUTO: 0.02 THOUSANDS/ÂΜL (ref 0–0.1)
BASOPHILS NFR BLD AUTO: 0 % (ref 0–1)
BUN SERPL-MCNC: 27 MG/DL (ref 5–25)
CALCIUM SERPL-MCNC: 8.9 MG/DL (ref 8.4–10.2)
CHLORIDE SERPL-SCNC: 91 MMOL/L (ref 96–108)
CO2 SERPL-SCNC: 32 MMOL/L (ref 21–32)
CREAT SERPL-MCNC: 0.81 MG/DL (ref 0.6–1.3)
EOSINOPHIL # BLD AUTO: 0.06 THOUSAND/ÂΜL (ref 0–0.61)
EOSINOPHIL NFR BLD AUTO: 1 % (ref 0–6)
ERYTHROCYTE [DISTWIDTH] IN BLOOD BY AUTOMATED COUNT: 12.4 % (ref 11.6–15.1)
GFR SERPL CREATININE-BSD FRML MDRD: 88 ML/MIN/1.73SQ M
GLUCOSE SERPL-MCNC: 107 MG/DL (ref 65–140)
HCT VFR BLD AUTO: 33.9 % (ref 36.5–49.3)
HGB BLD-MCNC: 11.5 G/DL (ref 12–17)
IMM GRANULOCYTES # BLD AUTO: 0.08 THOUSAND/UL (ref 0–0.2)
IMM GRANULOCYTES NFR BLD AUTO: 1 % (ref 0–2)
LYMPHOCYTES # BLD AUTO: 0.89 THOUSANDS/ÂΜL (ref 0.6–4.47)
LYMPHOCYTES NFR BLD AUTO: 9 % (ref 14–44)
MAGNESIUM SERPL-MCNC: 1.9 MG/DL (ref 1.9–2.7)
MCH RBC QN AUTO: 32.3 PG (ref 26.8–34.3)
MCHC RBC AUTO-ENTMCNC: 33.9 G/DL (ref 31.4–37.4)
MCV RBC AUTO: 95 FL (ref 82–98)
MONOCYTES # BLD AUTO: 1.02 THOUSAND/ÂΜL (ref 0.17–1.22)
MONOCYTES NFR BLD AUTO: 11 % (ref 4–12)
NEUTROPHILS # BLD AUTO: 7.5 THOUSANDS/ÂΜL (ref 1.85–7.62)
NEUTS SEG NFR BLD AUTO: 78 % (ref 43–75)
NRBC BLD AUTO-RTO: 0 /100 WBCS
PLATELET # BLD AUTO: 363 THOUSANDS/UL (ref 149–390)
PMV BLD AUTO: 9.4 FL (ref 8.9–12.7)
POTASSIUM SERPL-SCNC: 3.4 MMOL/L (ref 3.5–5.3)
RBC # BLD AUTO: 3.56 MILLION/UL (ref 3.88–5.62)
SODIUM SERPL-SCNC: 131 MMOL/L (ref 135–147)
WBC # BLD AUTO: 9.57 THOUSAND/UL (ref 4.31–10.16)

## 2023-11-05 PROCEDURE — 99232 SBSQ HOSP IP/OBS MODERATE 35: CPT | Performed by: INTERNAL MEDICINE

## 2023-11-05 PROCEDURE — G0008 ADMIN INFLUENZA VIRUS VAC: HCPCS | Performed by: INTERNAL MEDICINE

## 2023-11-05 PROCEDURE — 97116 GAIT TRAINING THERAPY: CPT

## 2023-11-05 PROCEDURE — 97535 SELF CARE MNGMENT TRAINING: CPT

## 2023-11-05 PROCEDURE — 90662 IIV NO PRSV INCREASED AG IM: CPT | Performed by: INTERNAL MEDICINE

## 2023-11-05 PROCEDURE — 83735 ASSAY OF MAGNESIUM: CPT | Performed by: INTERNAL MEDICINE

## 2023-11-05 PROCEDURE — 80048 BASIC METABOLIC PNL TOTAL CA: CPT | Performed by: INTERNAL MEDICINE

## 2023-11-05 PROCEDURE — 97530 THERAPEUTIC ACTIVITIES: CPT

## 2023-11-05 PROCEDURE — 85025 COMPLETE CBC W/AUTO DIFF WBC: CPT | Performed by: INTERNAL MEDICINE

## 2023-11-05 RX ORDER — FUROSEMIDE 10 MG/ML
40 INJECTION INTRAMUSCULAR; INTRAVENOUS
Status: DISCONTINUED | OUTPATIENT
Start: 2023-11-05 | End: 2023-11-06

## 2023-11-05 RX ORDER — POTASSIUM CHLORIDE 20 MEQ/1
40 TABLET, EXTENDED RELEASE ORAL DAILY
Status: DISCONTINUED | OUTPATIENT
Start: 2023-11-05 | End: 2023-11-05

## 2023-11-05 RX ORDER — POTASSIUM CHLORIDE 20 MEQ/1
40 TABLET, EXTENDED RELEASE ORAL 2 TIMES DAILY
Status: DISCONTINUED | OUTPATIENT
Start: 2023-11-05 | End: 2023-11-10 | Stop reason: HOSPADM

## 2023-11-05 RX ADMIN — TIZANIDINE 4 MG: 4 TABLET ORAL at 16:58

## 2023-11-05 RX ADMIN — TIZANIDINE 4 MG: 4 TABLET ORAL at 09:27

## 2023-11-05 RX ADMIN — LORATADINE 10 MG: 10 TABLET ORAL at 09:27

## 2023-11-05 RX ADMIN — POTASSIUM CHLORIDE 40 MEQ: 1500 TABLET, EXTENDED RELEASE ORAL at 17:00

## 2023-11-05 RX ADMIN — FUROSEMIDE 40 MG: 10 INJECTION, SOLUTION INTRAMUSCULAR; INTRAVENOUS at 09:25

## 2023-11-05 RX ADMIN — ATORVASTATIN CALCIUM 10 MG: 10 TABLET, FILM COATED ORAL at 09:26

## 2023-11-05 RX ADMIN — POTASSIUM CHLORIDE 40 MEQ: 1500 TABLET, EXTENDED RELEASE ORAL at 11:10

## 2023-11-05 RX ADMIN — GABAPENTIN 100 MG: 100 CAPSULE ORAL at 21:34

## 2023-11-05 RX ADMIN — Medication 2.5 MG: at 14:46

## 2023-11-05 RX ADMIN — GABAPENTIN 100 MG: 100 CAPSULE ORAL at 16:58

## 2023-11-05 RX ADMIN — FUROSEMIDE 40 MG: 10 INJECTION, SOLUTION INTRAMUSCULAR; INTRAVENOUS at 16:58

## 2023-11-05 RX ADMIN — MELATONIN 3 MG: at 21:33

## 2023-11-05 RX ADMIN — APIXABAN 10 MG: 5 TABLET, FILM COATED ORAL at 09:25

## 2023-11-05 RX ADMIN — APIXABAN 10 MG: 5 TABLET, FILM COATED ORAL at 17:00

## 2023-11-05 RX ADMIN — TIZANIDINE 4 MG: 4 TABLET ORAL at 21:33

## 2023-11-05 RX ADMIN — INFLUENZA A VIRUS A/VICTORIA/4897/2022 IVR-238 (H1N1) ANTIGEN (FORMALDEHYDE INACTIVATED), INFLUENZA A VIRUS A/DARWIN/9/2021 SAN-010 (H3N2) ANTIGEN (FORMALDEHYDE INACTIVATED), INFLUENZA B VIRUS B/PHUKET/3073/2013 ANTIGEN (FORMALDEHYDE INACTIVATED), AND INFLUENZA B VIRUS B/MICHIGAN/01/2021 ANTIGEN (FORMALDEHYDE INACTIVATED) 0.7 ML: 60; 60; 60; 60 INJECTION, SUSPENSION INTRAMUSCULAR at 13:28

## 2023-11-05 RX ADMIN — Medication 2.5 MG: at 09:24

## 2023-11-05 RX ADMIN — FINASTERIDE 5 MG: 5 TABLET, FILM COATED ORAL at 09:27

## 2023-11-05 RX ADMIN — LISINOPRIL 10 MG: 10 TABLET ORAL at 09:27

## 2023-11-05 RX ADMIN — GABAPENTIN 100 MG: 100 CAPSULE ORAL at 09:26

## 2023-11-05 NOTE — ASSESSMENT & PLAN NOTE
Acute bilateral lower extremity DVT  Cleared by neurosurgery to start Eliquis  Eliquis was started on 11 /4

## 2023-11-05 NOTE — ASSESSMENT & PLAN NOTE
Patient complaining of increased diffuse edema, appears he has gained at least 17 pounds since hospitalization and appears grossly volume overloaded on examination with CXR with some evidence of pulmonary vascular congestion.   Patient himself denies any prior history of cardiac disease  S/p 40 mg IV Lasix during rapid response  Cardiac echo with EF 60% and grade 1 diastolic dysfunction no major valvular disease  BNP is 24  Monitor daily weights, I's/O, volume status  Negative fluid balance  Continue diuresis with IV Lasix twice daily, replace potassium

## 2023-11-05 NOTE — ASSESSMENT & PLAN NOTE
Patient reporting marked abdominal distention, with episodes of watery diarrhea this admission  CT a/p: Diffuse moderate distention of entire GI tract. No evidence of obstruction.   Nonspecific gastroenteritis and/or ileus   Receiving IV Lasix for anasarca as above  Pain control regimen initiated   Pt having multiple episodes of diarrhea but pt was on miralax, senna, lactulose, and had a dulcolax suppository on 11/1   Out of bed and ambulation  Improving  Continue supportive care

## 2023-11-05 NOTE — PLAN OF CARE
Problem: MOBILITY - ADULT  Goal: Maintain or return to baseline ADL function  Description: INTERVENTIONS:  -  Assess patient's ability to carry out ADLs; assess patient's baseline for ADL function and identify physical deficits which impact ability to perform ADLs (bathing, care of mouth/teeth, toileting, grooming, dressing, etc.)  - Assess/evaluate cause of self-care deficits   - Assess range of motion  - Assess patient's mobility; develop plan if impaired  - Assess patient's need for assistive devices and provide as appropriate  - Encourage maximum independence but intervene and supervise when necessary  - Involve family in performance of ADLs  - Assess for home care needs following discharge   - Consider OT consult to assist with ADL evaluation and planning for discharge  - Provide patient education as appropriate  Outcome: Progressing     Problem: Prexisting or High Potential for Compromised Skin Integrity  Goal: Skin integrity is maintained or improved  Description: INTERVENTIONS:  - Identify patients at risk for skin breakdown  - Assess and monitor skin integrity  - Assess and monitor nutrition and hydration status  - Monitor labs   - Assess for incontinence   - Turn and reposition patient  - Assist with mobility/ambulation  - Relieve pressure over bony prominences  - Avoid friction and shearing  - Provide appropriate hygiene as needed including keeping skin clean and dry  - Evaluate need for skin moisturizer/barrier cream  - Collaborate with interdisciplinary team   - Patient/family teaching  - Consider wound care consult   Outcome: Progressing     Problem: PAIN - ADULT  Goal: Verbalizes/displays adequate comfort level or baseline comfort level  Description: Interventions:  - Encourage patient to monitor pain and request assistance  - Assess pain using appropriate pain scale  - Administer analgesics based on type and severity of pain and evaluate response  - Implement non-pharmacological measures as appropriate and evaluate response  - Consider cultural and social influences on pain and pain management  - Notify physician/advanced practitioner if interventions unsuccessful or patient reports new pain  Outcome: Progressing     Problem: INFECTION - ADULT  Goal: Absence or prevention of progression during hospitalization  Description: INTERVENTIONS:  - Assess and monitor for signs and symptoms of infection  - Monitor lab/diagnostic results  - Monitor all insertion sites, i.e. indwelling lines, tubes, and drains  - Monitor endotracheal if appropriate and nasal secretions for changes in amount and color  - [place] appropriate cooling/warming therapies per order  - Administer medications as ordered  - Instruct and encourage patient and family to use good hand hygiene technique  - Identify and instruct in appropriate isolation precautions for identified infection/condition  Outcome: Progressing     Problem: SAFETY ADULT  Goal: Maintain or return to baseline ADL function  Description: INTERVENTIONS:  -  Assess patient's ability to carry out ADLs; assess patient's baseline for ADL function and identify physical deficits which impact ability to perform ADLs (bathing, care of mouth/teeth, toileting, grooming, dressing, etc.)  - Assess/evaluate cause of self-care deficits   - Assess range of motion  - Assess patient's mobility; develop plan if impaired  - Assess patient's need for assistive devices and provide as appropriate  - Encourage maximum independence but intervene and supervise when necessary  - Involve family in performance of ADLs  - Assess for home care needs following discharge   - Consider OT consult to assist with ADL evaluation and planning for discharge  - Provide patient education as appropriate  Outcome: Progressing  Goal: Patient will remain free of falls  Description: INTERVENTIONS:  - Educate patient/family on patient safety including physical limitations  - Instruct patient to call for assistance with activity   - Consult OT/PT to assist with strengthening/mobility   - Keep Call bell within reach  - Keep bed low and locked with side rails adjusted as appropriate  - Keep care items and personal belongings within reach  - Initiate and maintain comfort rounds  - Make Fall Risk Sign visible to staff  - Apply yellow socks and bracelet for high fall risk patients  - Consider moving patient to room near nurses station  Outcome: Progressing     Problem: DISCHARGE PLANNING  Goal: Discharge to home or other facility with appropriate resources  Description: INTERVENTIONS:  - Identify barriers to discharge w/patient and caregiver  - Arrange for needed discharge resources and transportation as appropriate  - Identify discharge learning needs (meds, wound care, etc.)  - Arrange for interpretive services to assist at discharge as needed  - Refer to Case Management Department for coordinating discharge planning if the patient needs post-hospital services based on physician/advanced practitioner order or complex needs related to functional status, cognitive ability, or social support system  Outcome: Progressing     Problem: Knowledge Deficit  Goal: Patient/family/caregiver demonstrates understanding of disease process, treatment plan, medications, and discharge instructions  Description: Complete learning assessment and assess knowledge base.   Interventions:  - Provide teaching at level of understanding  - Provide teaching via preferred learning methods  Outcome: Progressing

## 2023-11-05 NOTE — PLAN OF CARE
Problem: MOBILITY - ADULT  Goal: Maintain or return to baseline ADL function  Description: INTERVENTIONS:  -  Assess patient's ability to carry out ADLs; assess patient's baseline for ADL function and identify physical deficits which impact ability to perform ADLs (bathing, care of mouth/teeth, toileting, grooming, dressing, etc.)  - Assess/evaluate cause of self-care deficits   - Assess range of motion  - Assess patient's mobility; develop plan if impaired  - Assess patient's need for assistive devices and provide as appropriate  - Encourage maximum independence but intervene and supervise when necessary  - Involve family in performance of ADLs  - Assess for home care needs following discharge   - Consider OT consult to assist with ADL evaluation and planning for discharge  - Provide patient education as appropriate  Outcome: Progressing     Problem: Prexisting or High Potential for Compromised Skin Integrity  Goal: Skin integrity is maintained or improved  Description: INTERVENTIONS:  - Identify patients at risk for skin breakdown  - Assess and monitor skin integrity  - Assess and monitor nutrition and hydration status  - Monitor labs   - Assess for incontinence   - Turn and reposition patient  - Assist with mobility/ambulation  - Relieve pressure over bony prominences  - Avoid friction and shearing  - Provide appropriate hygiene as needed including keeping skin clean and dry  - Evaluate need for skin moisturizer/barrier cream  - Collaborate with interdisciplinary team   - Patient/family teaching  - Consider wound care consult   Outcome: Progressing     Problem: PAIN - ADULT  Goal: Verbalizes/displays adequate comfort level or baseline comfort level  Description: Interventions:  - Encourage patient to monitor pain and request assistance  - Assess pain using appropriate pain scale  - Administer analgesics based on type and severity of pain and evaluate response  - Implement non-pharmacological measures as appropriate and evaluate response  - Consider cultural and social influences on pain and pain management  - Notify physician/advanced practitioner if interventions unsuccessful or patient reports new pain  Outcome: Progressing     Problem: INFECTION - ADULT  Goal: Absence or prevention of progression during hospitalization  Description: INTERVENTIONS:  - Assess and monitor for signs and symptoms of infection  - Monitor lab/diagnostic results  - Monitor all insertion sites, i.e. indwelling lines, tubes, and drains  - Monitor endotracheal if appropriate and nasal secretions for changes in amount and color  - [place] appropriate cooling/warming therapies per order  - Administer medications as ordered  - Instruct and encourage patient and family to use good hand hygiene technique  - Identify and instruct in appropriate isolation precautions for identified infection/condition  Outcome: Progressing     Problem: SAFETY ADULT  Goal: Maintain or return to baseline ADL function  Description: INTERVENTIONS:  -  Assess patient's ability to carry out ADLs; assess patient's baseline for ADL function and identify physical deficits which impact ability to perform ADLs (bathing, care of mouth/teeth, toileting, grooming, dressing, etc.)  - Assess/evaluate cause of self-care deficits   - Assess range of motion  - Assess patient's mobility; develop plan if impaired  - Assess patient's need for assistive devices and provide as appropriate  - Encourage maximum independence but intervene and supervise when necessary  - Involve family in performance of ADLs  - Assess for home care needs following discharge   - Consider OT consult to assist with ADL evaluation and planning for discharge  - Provide patient education as appropriate  Outcome: Progressing  Goal: Patient will remain free of falls  Description: INTERVENTIONS:  - Educate patient/family on patient safety including physical limitations  - Instruct patient to call for assistance with activity   - Consult OT/PT to assist with strengthening/mobility   - Keep Call bell within reach  - Keep bed low and locked with side rails adjusted as appropriate  - Keep care items and personal belongings within reach  - Initiate and maintain comfort rounds  - Make Fall Risk Sign visible to staff  Problem: Knowledge Deficit  Goal: Patient/family/caregiver demonstrates understanding of disease process, treatment plan, medications, and discharge instructions  Description: Complete learning assessment and assess knowledge base.   Interventions:  - Provide teaching at level of understanding  - Provide teaching via preferred learning methods  Outcome: Progressing     - Apply yellow socks and bracelet for high fall risk patients  - Consider moving patient to room near nurses station  Outcome: Progressing

## 2023-11-05 NOTE — PROGRESS NOTES
[facility]  Progress Note  Name: Aakash Simmons  MRN: [id number]  Unit/Bed#: [facility]PHP 824-01 I Date of Admission: 11/2/2023   Date of Service: 11/5/2023 I Hospital Day: 3    Assessment/Plan   * Acute respiratory insufficiency  Assessment & Plan  Patient was RRT at [facility] with respiratory distress, mildly hypoxic requiring 2 LNC to maintain appropriate saturations  Suspect in the setting of volume overload/anasarca with at least ileus as below contributing  Transferred to [address] for further management given situation  CXR Low lung volumes and increased lung markings due to crowding. Mild left  basilar density likely due to atelectasis. No acute consolidation or congestion  Chest CT scan negative for PE with bibasilar atelectasis  Currently on room air  Incentive spirometry, pulmonary toileting    Ileus [facility])  Assessment & Plan  Patient reporting marked abdominal distention, with episodes of watery diarrhea this admission  CT a/p: Diffuse moderate distention of entire GI tract. No evidence of obstruction. Nonspecific gastroenteritis and/or ileus   Receiving IV Lasix for anasarca as above  Pain control regimen initiated   Pt having multiple episodes of diarrhea but pt was on miralax, senna, lactulose, and had a dulcolax suppository on 11/1   Out of bed and ambulation  Improving  Continue supportive care    Acute DVT (deep venous thrombosis) (HCC)  Assessment & Plan  Acute bilateral lower extremity DVT  Cleared by neurosurgery to start Eliquis  Eliquis was started on 11 /4    Anasarca  Assessment & Plan  Patient complaining of increased diffuse edema, appears he has gained at least 17 pounds since hospitalization and appears grossly volume overloaded on examination with CXR with some evidence of pulmonary vascular congestion.   Patient himself denies any prior history of cardiac disease  S/p 40 mg IV Lasix during rapid response  Cardiac echo with EF 60% and grade 1 diastolic dysfunction no major valvular disease  BNP is 24  Monitor daily weights, I's/O, volume status  Negative fluid balance  Continue diuresis with IV Lasix twice daily, replace potassium      Urinary retention  Assessment & Plan  S/p Christensen catheter placement at prior hospitalization, initially removed 10/26 however replaced 10/30 as patient failing urinary retention protocol  Maintain Christensen catheter for now and continue finasteride. Voiding trial once more ambulatory  Eventually will need outpatient urology follow-up    Hyponatremia  Assessment & Plan  Patient previously reported this was chronic issue, however sodium 127 now down from previous 132  Work-up in prior hospitalization revealing normal uric acid, urine sodium 46, urine osm 735, serum osm 278, TSH normal.  Cortisol was noted low however subsequent cosyntropin stimulation test was not concerning for adrenal insufficiency  Suspected SIADH as etiology, however also suspect volume overload is playing role  IV diuresis as above  Monitor    Benign essential hypertension  Assessment & Plan  Continue lisinopril  Monitor blood pressure per protocol  Previously patient was on HCTZ however discontinued due to hyponatremia    Osteoarthritis of cervical spine with myelopathy  Assessment & Plan  Patient s/p C3-C6 PCDF 10/25/2023 in setting of cervical stenosis/radiculopathy with cord compression at C4-5 level with severe canal stenosis  Continue with fall precautions, multimodal pain regimen  CM consult for assistance with disposition once stabilized, potential return to [facility]  PT OT eval recommending acute rehab           VTE Pharmacologic Prophylaxis: VTE Score: 5 High Risk (Score >/= 5) - Pharmacological DVT Prophylaxis Ordered: apixaban (Eliquis). Sequential Compression Devices Ordered. Patient Centered Rounds: I performed bedside rounds with nursing staff today.   Discussions with Specialists or Other Care Team Provider:     Education and Discussions with Family / Patient:  Patient. Total Time Spent on Date of Encounter in care of patient: 36 mins. This time was spent on one or more of the following: performing physical exam; counseling and coordination of care; obtaining or reviewing history; documenting in the medical record; reviewing/ordering tests, medications or procedures; communicating with other healthcare professionals and discussing with patient's family/caregivers. Current Length of Stay: 3 day(s)  Current Patient Status: Inpatient   Certification Statement: The patient will continue to require additional inpatient hospital stay due to diuresis  Discharge Plan: Anticipate discharge tomorrow to rehab facility. Code Status: Level 1 - Full Code    Subjective:   Patient seen and examined  Comfortable in chair  Was ambulating in the hallway earlier  Currently on room air  Clinically improving  No chest pain or shortness of breath    Objective:     Vitals:   Temp (24hrs), Av.9 °F (36.6 °C), Min:97.7 °F (36.5 °C), Max:98 °F (36.7 °C)    Temp:  [97.7 °F (36.5 °C)-98 °F (36.7 °C)] 97.7 °F (36.5 °C)  HR:  [83-97] 85  Resp:  [14-18] 16  BP: (113-131)/(63-76) 120/74  SpO2:  [94 %-97 %] 97 %  Body mass index is 35.19 kg/m². Input and Output Summary (last 24 hours):      Intake/Output Summary (Last 24 hours) at 2023 1029  Last data filed at 2023 0901  Gross per 24 hour   Intake 240 ml   Output 375 ml   Net -135 ml       Physical Exam:   Physical Exam     Patient is awake alert oriented no acute distress  Comfortable in bed  Lungs with decreased breath sound bilateral  Heart positive S1-S2 no murmur  Abdomen less distended soft nontender  Christensen catheter in place  Continues to have bilateral lower extremity pitting edema  Additional Data:     Labs:  Results from last 7 days   Lab Units 23  0543   WBC Thousand/uL 9.57   HEMOGLOBIN g/dL 11.5*   HEMATOCRIT % 33.9*   PLATELETS Thousands/uL 363   NEUTROS PCT % 78*   LYMPHS PCT % 9*   MONOS PCT % 11 EOS PCT % 1     Results from last 7 days   Lab Units 11/05/23  0543 11/04/23  0548 11/03/23  2302   SODIUM mmol/L 131*   < > 129*   POTASSIUM mmol/L 3.4*   < > 3.9   CHLORIDE mmol/L 91*   < > 91*   CO2 mmol/L 32   < > 31   BUN mg/dL 27*   < > 24   CREATININE mg/dL 0.81   < > 0.84   ANION GAP mmol/L 8   < > 7   CALCIUM mg/dL 8.9   < > 8.3*   ALBUMIN g/dL  --   --  3.2*   TOTAL BILIRUBIN mg/dL  --   --  0.59   ALK PHOS U/L  --   --  56   ALT U/L  --   --  29   AST U/L  --   --  22   GLUCOSE RANDOM mg/dL 107   < > 112    < > = values in this interval not displayed.      Results from last 7 days   Lab Units 11/03/23  2302   INR  1.11     Results from last 7 days   Lab Units 11/02/23  0338   POC GLUCOSE mg/dl 109               Lines/Drains:  Invasive Devices       Peripheral Intravenous Line  Duration             Peripheral IV 11/02/23 Proximal;Right;Ventral (anterior) Forearm 3 days              Drain  Duration             Urethral Catheter 16 Fr. 6 days                  Urinary Catheter:  Goal for removal: Voiding trial when ambulation improves               Imaging: Chest CT scan reviewed    Recent Cultures (last 7 days):   Results from last 7 days   Lab Units 11/02/23  0804   C DIFF TOXIN B BY PCR  Negative       Last 24 Hours Medication List:   Current Facility-Administered Medications   Medication Dose Route Frequency Provider Last Rate    acetaminophen  650 mg Oral Q6H PRN Lynn Alvine, DO      albuterol  2 puff Inhalation Q6H PRN AAKASH Clemente      apixaban  10 mg Oral BID Bruce Cotter DO      Followed by    Jose M Guerrero ON 11/11/2023] apixaban  5 mg Oral BID Bruce Cotter, DO      atorvastatin  10 mg Oral Daily Lynn Alvine, DO      finasteride  5 mg Oral Daily Lynn Alvine, DO      fluticasone  1 spray Each Nare BID Lynn Alvine, DO      folic acid  384 mcg Oral Daily Lynn Alvine, DO      furosemide  40 mg Intravenous BID (diuretic) Bruce Cotter, DO      gabapentin  100 mg Oral TID Lynn Alvine, DO      lisinopril  10 mg Oral Daily Nik Sim, DO      loratadine  10 mg Oral Daily Luisa Cai, DO      melatonin  3 mg Oral HS Luisa Cai, DO      ondansetron  4 mg Intravenous Q6H PRN Luisa Cai, DO      oxyCODONE  2.5 mg Oral Q4H PRN Nik Sim,       potassium chloride  40 mEq Oral BID Nik Sim, DO      tiZANidine  4 mg Oral TID Luisa Cai DO          Today, Patient Was Seen By: Nik Sim DO    **Please Note: This note may have been constructed using a voice recognition system. **

## 2023-11-05 NOTE — OCCUPATIONAL THERAPY NOTE
Occupational Therapy Treatment Note:      11/05/23 0930   OT Last Visit   OT Visit Date 11/05/23   Note Type   Note Type Treatment   Pain Assessment   Pain Assessment Tool 0-10   Pain Score 7   Pain Location/Orientation Orientation: Left  (shoulder)   Restrictions/Precautions   Other Precautions Chair Alarm; Bed Alarm; Fall Risk;Pain;Spinal precautions; Fluid restriction   ADL   Where Assessed Chair   UB Dressing Assistance 4  Minimal Assistance   UB Dressing Comments limited by pain and shoulder rom limitation   LB Dressing Assistance 2  Maximal Assistance   Toileting Assistance  2  Maximal Assistance   Toileting Comments asst for hygiene and clothing management   Functional Standing Tolerance   Time f balance c rw during adls   Transfers   Sit to Stand 4  Minimal assistance  (vc's and extended time)   Stand to Sit 4  Minimal assistance   Toilet transfer   (to from commode at bedside, min asst for xfers. mod vc's for carryover of hand placement)   Functional Mobility   Functional Mobility 4  Minimal assistance   Additional items Rolling walker   Cognition   Overall Cognitive Status WFL   Activity Tolerance   Activity Tolerance Patient tolerated treatment well   Assessment   Assessment pt participated in am ot session and was seen focusing on adl tasks seated in commode and chair. toileting and functional mobility and transfers. pt requires max asst lb adls and toileting and sba ub care after set up.  pt requires mod vx's for hand placement sit sit to from stand using rw.  pt voices frustration over being in  hospital   Plan   Treatment Interventions ADL retraining;Functional transfer training; Endurance training;Cognitive reorientation;Patient/family training;Equipment evaluation/education; Activityengagement   Goal Expiration Date 11/17/23   OT Treatment Day 4   OT Frequency 2-3x/wk   Discharge Recommendation   Rehab Resource Intensity Level, OT I (Maximum Resource Intensity)   AM-PAC Daily Activity Inpatient Lower Body Dressing 2   Bathing 2   Toileting 2   Upper Body Dressing 3   Grooming 3   Eating 3   Daily Activity Raw Score 15   Daily Activity Standardized Score (Calc for Raw Score >=11) 34.69   AM-PAC Applied Cognition Inpatient   Following a Speech/Presentation 4   Understanding Ordinary Conversation 4   Taking Medications 4   Remembering Where Things Are Placed or Put Away 4   Remembering List of 4-5 Errands 4   Taking Care of Complicated Tasks 3   Applied Cognition Raw Score 23   Applied Cognition Standardized Score 53.08     April A Storm

## 2023-11-05 NOTE — PHYSICAL THERAPY NOTE
PHYSICAL THERAPY NOTE          Patient Name: Anabel Neri  DRRHC'K Date: 11/5/2023 11/05/23 0929   PT Last Visit   PT Visit Date 11/05/23   Note Type   Note Type Treatment   Education   Education Provided Yes   End of Consult   Patient Position at End of Consult All needs within reach;Bed/Chair alarm activated; Bedside chair   Pain Assessment   Pain Assessment Tool 0-10   Pain Score 7   Pain Location/Orientation Orientation: Left  (Shoulder)   Pain Onset/Description Onset: Ongoing   Effect of Pain on Daily Activities Limited activity   Patient's Stated Pain Goal No pain   Hospital Pain Intervention(s) Repositioned   Restrictions/Precautions   Weight Bearing Precautions Per Order No   Other Precautions Chair Alarm; Bed Alarm; Fall Risk;Pain;Spinal precautions;Multiple lines; Fluid restriction   General   Chart Reviewed Yes. No brace required per previous notes     Cognition   Overall Cognitive Status WFL   Arousal/Participation Alert   Attention Within functional limits   Orientation Level Oriented X4   Following Commands Follows one step commands without difficulty   Comments Pt pleasant during session   Subjective   Subjective Agreeable to mobilize   Bed Mobility   Supine to Sit Unable to assess   Sit to Supine Unable to assess   Additional Comments Pt noted to be OOB in chair upon arrival.   Transfers   Sit to Stand 4  Minimal assistance   Additional items Assist x 1; Increased time required;Verbal cues   Stand to Sit 4  Minimal assistance   Additional items Assist x 1; Increased time required   Toilet transfer 4  Minimal assistance   Additional items Commode;Assist x 1; Armrests; Increased time required;Verbal cues   Additional Comments w/ RW   Ambulation/Elevation   Gait pattern Step to;Excessively slow; Short stride   Gait Assistance 4  Minimal assist   Additional items Assist x 1;Verbal cues; Tactile cues   Assistive Device Rolling walker   Distance 3 ft + 40 ft   Ambulation/Elevation Additional Comments Pt making gains with therapy. Demonstrates improvement in mobility and ambulation distance. Standing rest break X 1-2 taken 2* fatigue, chair follow for safety   Balance   Static Sitting Fair +   Dynamic Sitting Fair   Static Standing Fair -   Dynamic Standing Poor +   Ambulatory Poor +   Endurance Deficit   Endurance Deficit Yes   Endurance Deficit Description fatigue   Activity Tolerance   Activity Tolerance Patient limited by fatigue;Patient limited by pain   Medical Staff Made Aware CURRY  April   Nurse Made Aware RN cleared pt for therapy   Exercises   Balance training  STS X 2, Standing -hygiene care   Assessment   Prognosis Good   Problem List Decreased strength;Decreased endurance;Decreased mobility;Pain;Orthopedic restrictions   Assessment Pt seen for PT treatment session this date. Therapy session focused on functional transfers, and ambulation in order to improve overall mobility and independence. Pt OOB on commode upon arrival. Pt able ot complete transfers from commode with assist X 1 and RW, able to participate in hygiene care in standing . Pt demonstrates improved activity tolerance and increased ambulation distance this session. Required 1-2 standing rest break 2* fatigue and pain, but completes task with assist x 1. Pt making steady progress toward goals. Pt was left in recliner  at the end of PT session with all needs in reach. Pt would benefit from continued PT services while in hospital to address remaining limitations. The patient's AM-PAC Basic Mobility Inpatient Short Form Raw Score is 15. A Raw score of less than or equal to 16 suggests the patient may benefit from discharge to post-acute rehabilitation services. Please also refer to the recommendation of the Physical Therapist for safe discharge planning. Post dc recommendation is Level 1 (Maximum resource).    Barriers to Discharge Inaccessible home environment;Decreased caregiver support   Goals   Patient Goals to have less pain   STG Expiration Date 11/17/23   PT Treatment Day 1   Plan   Treatment/Interventions Functional transfer training;OT;Spoke to nursing;Gait training;Bed mobility; Therapeutic exercise;Patient/family training   Progress Progressing toward goals   PT Frequency 3-5x/wk   Discharge Recommendation   Rehab Resource Intensity Level, PT I (Maximum Resource Intensity)   Equipment Recommended Walker   AM-PAC Basic Mobility Inpatient   Turning in Flat Bed Without Bedrails 2   Lying on Back to Sitting on Edge of Flat Bed Without Bedrails 2   Moving Bed to Chair 3   Standing Up From Chair Using Arms 3   Walk in Room 3   Climb 3-5 Stairs With Railing 2   Basic Mobility Inpatient Raw Score 15   Basic Mobility Standardized Score 36.97   Highest Level Of Mobility   JH-HLM Goal 4: Move to chair/commode   JH-HLM Achieved 7: Walk 25 feet or more   Education   Education Provided Mobility training   Patient Demonstrates verbal understanding   End of Consult   Patient Position at End of Consult All needs within reach;Bed/Chair alarm activated; Bedside chair   Jacqueline Matthews PT DPT

## 2023-11-05 NOTE — ASSESSMENT & PLAN NOTE
Patient s/p C3-C6 PCDF 10/25/2023 in setting of cervical stenosis/radiculopathy with cord compression at C4-5 level with severe canal stenosis  Continue with fall precautions, multimodal pain regimen  CM consult for assistance with disposition once stabilized, potential return to [facility]  PT OT eval recommending acute rehab

## 2023-11-05 NOTE — PLAN OF CARE
Problem: OCCUPATIONAL THERAPY ADULT  Goal: Performs self-care activities at highest level of function for planned discharge setting. See evaluation for individualized goals. Description: Treatment Interventions: ADL retraining, Functional transfer training, UE strengthening/ROM, Endurance training, Equipment evaluation/education, Compensatory technique education, Continued evaluation, Energy conservation, Activityengagement          See flowsheet documentation for full assessment, interventions and recommendations. Outcome: Progressing  Note: Limitation: Decreased ADL status, Decreased UE strength, Decreased UE ROM, Decreased endurance, Decreased self-care trans, Decreased high-level ADLs  Prognosis: Fair  Assessment: pt participated in am ot session and was seen focusing on adl tasks seated in commode and chair. toileting and functional mobility and transfers.  pt requires max asst lb adls and toileting and sba ub care after set up.  pt requires mod vx's for hand placement sit sit to from stand using rw.  pt voices frustration over being in th hospital     Rehab Resource Intensity Level, OT: I (Maximum Resource Intensity)     April A Storm

## 2023-11-05 NOTE — PLAN OF CARE
Problem: PHYSICAL THERAPY ADULT  Goal: Performs mobility at highest level of function for planned discharge setting. See evaluation for individualized goals. Description: Treatment/Interventions: Functional transfer training, OT, Spoke to nursing, Gait training, Bed mobility, Therapeutic exercise, Patient/family training  Equipment Recommended: Nataliya Redi       See flowsheet documentation for full assessment, interventions and recommendations. Note: Prognosis: Good  Problem List: Decreased strength, Decreased endurance, Decreased mobility, Pain, Orthopedic restrictions  Assessment: Pt seen for PT treatment session this date. Therapy session focused on functional transfers, and ambulation in order to improve overall mobility and independence. Pt OOB on commode upon arrival. Pt able ot complete transfers from commode with assist X 1 and RW, able to participate in hygiene care in standing . Pt demonstrates improved activity tolerance and increased ambulation distance this session. Required 1-2 standing rest break 2* fatigue and pain, but completes task with assist x 1. Pt making steady progress toward goals. Pt was left in recliner  at the end of PT session with all needs in reach. Pt would benefit from continued PT services while in hospital to address remaining limitations. The patient's AM-PAC Basic Mobility Inpatient Short Form Raw Score is 15. A Raw score of less than or equal to 16 suggests the patient may benefit from discharge to post-acute rehabilitation services. Please also refer to the recommendation of the Physical Therapist for safe discharge planning. Post dc recommendation is Level 1 (Maximum resource). Barriers to Discharge: Inaccessible home environment, Decreased caregiver support     Rehab Resource Intensity Level, PT: I (Maximum Resource Intensity)    See flowsheet documentation for full assessment.

## 2023-11-05 NOTE — ASSESSMENT & PLAN NOTE
Patient was RRT at [facility] with respiratory distress, mildly hypoxic requiring 2 LNC to maintain appropriate saturations  Suspect in the setting of volume overload/anasarca with at least ileus as below contributing  Transferred to [facility] for further management given situation  CXR Low lung volumes and increased lung markings due to crowding. Mild left  basilar density likely due to atelectasis.  No acute consolidation or congestion  Chest CT scan negative for PE with bibasilar atelectasis  Currently on room air  Incentive spirometry, pulmonary toileting

## 2023-11-06 LAB
ANION GAP SERPL CALCULATED.3IONS-SCNC: 8 MMOL/L
BUN SERPL-MCNC: 32 MG/DL (ref 5–25)
CALCIUM SERPL-MCNC: 8.7 MG/DL (ref 8.4–10.2)
CHLORIDE SERPL-SCNC: 91 MMOL/L (ref 96–108)
CO2 SERPL-SCNC: 31 MMOL/L (ref 21–32)
CREAT SERPL-MCNC: 1.01 MG/DL (ref 0.6–1.3)
GFR SERPL CREATININE-BSD FRML MDRD: 73 ML/MIN/1.73SQ M
GLUCOSE SERPL-MCNC: 112 MG/DL (ref 65–140)
MAGNESIUM SERPL-MCNC: 1.8 MG/DL (ref 1.9–2.7)
POTASSIUM SERPL-SCNC: 3.8 MMOL/L (ref 3.5–5.3)
SODIUM SERPL-SCNC: 130 MMOL/L (ref 135–147)

## 2023-11-06 PROCEDURE — 99232 SBSQ HOSP IP/OBS MODERATE 35: CPT | Performed by: INTERNAL MEDICINE

## 2023-11-06 PROCEDURE — 80048 BASIC METABOLIC PNL TOTAL CA: CPT | Performed by: INTERNAL MEDICINE

## 2023-11-06 PROCEDURE — 83735 ASSAY OF MAGNESIUM: CPT | Performed by: INTERNAL MEDICINE

## 2023-11-06 RX ORDER — MAGNESIUM SULFATE HEPTAHYDRATE 40 MG/ML
2 INJECTION, SOLUTION INTRAVENOUS ONCE
Status: COMPLETED | OUTPATIENT
Start: 2023-11-06 | End: 2023-11-06

## 2023-11-06 RX ORDER — FUROSEMIDE 10 MG/ML
40 INJECTION INTRAMUSCULAR; INTRAVENOUS DAILY
Status: DISCONTINUED | OUTPATIENT
Start: 2023-11-07 | End: 2023-11-08

## 2023-11-06 RX ADMIN — LORATADINE 10 MG: 10 TABLET ORAL at 09:42

## 2023-11-06 RX ADMIN — FUROSEMIDE 40 MG: 10 INJECTION, SOLUTION INTRAMUSCULAR; INTRAVENOUS at 09:43

## 2023-11-06 RX ADMIN — MELATONIN 3 MG: at 22:02

## 2023-11-06 RX ADMIN — GABAPENTIN 100 MG: 100 CAPSULE ORAL at 22:02

## 2023-11-06 RX ADMIN — POTASSIUM CHLORIDE 40 MEQ: 1500 TABLET, EXTENDED RELEASE ORAL at 17:18

## 2023-11-06 RX ADMIN — FOLIC ACID TAB 400 MCG 400 MCG: 400 TAB at 09:42

## 2023-11-06 RX ADMIN — APIXABAN 10 MG: 5 TABLET, FILM COATED ORAL at 17:18

## 2023-11-06 RX ADMIN — GABAPENTIN 100 MG: 100 CAPSULE ORAL at 09:42

## 2023-11-06 RX ADMIN — LISINOPRIL 10 MG: 10 TABLET ORAL at 09:42

## 2023-11-06 RX ADMIN — TIZANIDINE 4 MG: 4 TABLET ORAL at 17:18

## 2023-11-06 RX ADMIN — FINASTERIDE 5 MG: 5 TABLET, FILM COATED ORAL at 09:42

## 2023-11-06 RX ADMIN — TIZANIDINE 4 MG: 4 TABLET ORAL at 09:42

## 2023-11-06 RX ADMIN — MAGNESIUM SULFATE HEPTAHYDRATE 2 G: 40 INJECTION, SOLUTION INTRAVENOUS at 10:04

## 2023-11-06 RX ADMIN — ATORVASTATIN CALCIUM 10 MG: 10 TABLET, FILM COATED ORAL at 09:42

## 2023-11-06 RX ADMIN — TIZANIDINE 4 MG: 4 TABLET ORAL at 22:02

## 2023-11-06 RX ADMIN — GABAPENTIN 100 MG: 100 CAPSULE ORAL at 17:18

## 2023-11-06 RX ADMIN — APIXABAN 10 MG: 5 TABLET, FILM COATED ORAL at 09:42

## 2023-11-06 NOTE — ASSESSMENT & PLAN NOTE
Patient s/p C3-C6 PCDF 10/25/2023 in setting of cervical stenosis/radiculopathy with cord compression at C4-5 level with severe canal stenosis  Continue with fall precautions, multimodal pain regimen  Potential return to [facility] when stable likely tomorrow  PT KAILEY long recommending acute rehab

## 2023-11-06 NOTE — PLAN OF CARE
Problem: MOBILITY - ADULT  Goal: Maintain or return to baseline ADL function  Description: INTERVENTIONS:  -  Assess patient's ability to carry out ADLs; assess patient's baseline for ADL function and identify physical deficits which impact ability to perform ADLs (bathing, care of mouth/teeth, toileting, grooming, dressing, etc.)  - Assess/evaluate cause of self-care deficits   - Assess range of motion  - Assess patient's mobility; develop plan if impaired  - Assess patient's need for assistive devices and provide as appropriate  - Encourage maximum independence but intervene and supervise when necessary  - Involve family in performance of ADLs  - Assess for home care needs following discharge   - Consider OT consult to assist with ADL evaluation and planning for discharge  - Provide patient education as appropriate  Outcome: Progressing     Problem: Prexisting or High Potential for Compromised Skin Integrity  Goal: Skin integrity is maintained or improved  Description: INTERVENTIONS:  - Identify patients at risk for skin breakdown  - Assess and monitor skin integrity  - Assess and monitor nutrition and hydration status  - Monitor labs   - Assess for incontinence   - Turn and reposition patient  - Assist with mobility/ambulation  - Relieve pressure over bony prominences  - Avoid friction and shearing  - Provide appropriate hygiene as needed including keeping skin clean and dry  - Evaluate need for skin moisturizer/barrier cream  - Collaborate with interdisciplinary team   - Patient/family teaching  - Consider wound care consult   Outcome: Progressing     Problem: PAIN - ADULT  Goal: Verbalizes/displays adequate comfort level or baseline comfort level  Description: Interventions:  - Encourage patient to monitor pain and request assistance  - Assess pain using appropriate pain scale  - Administer analgesics based on type and severity of pain and evaluate response  - Implement non-pharmacological measures as appropriate and evaluate response  - Consider cultural and social influences on pain and pain management  - Notify physician/advanced practitioner if interventions unsuccessful or patient reports new pain  Outcome: Progressing     Problem: INFECTION - ADULT  Goal: Absence or prevention of progression during hospitalization  Description: INTERVENTIONS:  - Assess and monitor for signs and symptoms of infection  - Monitor lab/diagnostic results  - Monitor all insertion sites, i.e. indwelling lines, tubes, and drains  - Monitor endotracheal if appropriate and nasal secretions for changes in amount and color  - [place] appropriate cooling/warming therapies per order  - Administer medications as ordered  - Instruct and encourage patient and family to use good hand hygiene technique  - Identify and instruct in appropriate isolation precautions for identified infection/condition  Outcome: Progressing     Problem: SAFETY ADULT  Goal: Maintain or return to baseline ADL function  Description: INTERVENTIONS:  -  Assess patient's ability to carry out ADLs; assess patient's baseline for ADL function and identify physical deficits which impact ability to perform ADLs (bathing, care of mouth/teeth, toileting, grooming, dressing, etc.)  - Assess/evaluate cause of self-care deficits   - Assess range of motion  - Assess patient's mobility; develop plan if impaired  - Assess patient's need for assistive devices and provide as appropriate  - Encourage maximum independence but intervene and supervise when necessary  - Involve family in performance of ADLs  - Assess for home care needs following discharge   - Consider OT consult to assist with ADL evaluation and planning for discharge  - Provide patient education as appropriate  Outcome: Progressing  Goal: Patient will remain free of falls  Description: INTERVENTIONS:  - Educate patient/family on patient safety including physical limitations  - Instruct patient to call for assistance with activity   - Consult OT/PT to assist with strengthening/mobility   - Keep Call bell within reach  - Keep bed low and locked with side rails adjusted as appropriate  - Keep care items and personal belongings within reach  - Initiate and maintain comfort rounds  - Make Fall Risk Sign visible to staff  - Offer Toileting every 2 Hours, in advance of need  - Apply yellow socks and bracelet for high fall risk patients  - Consider moving patient to room near nurses station  Outcome: Progressing     Problem: DISCHARGE PLANNING  Goal: Discharge to home or other facility with appropriate resources  Description: INTERVENTIONS:  - Identify barriers to discharge w/patient and caregiver  - Arrange for needed discharge resources and transportation as appropriate  - Identify discharge learning needs (meds, wound care, etc.)  - Arrange for interpretive services to assist at discharge as needed  - Refer to Case Management Department for coordinating discharge planning if the patient needs post-hospital services based on physician/advanced practitioner order or complex needs related to functional status, cognitive ability, or social support system  Outcome: Progressing     Problem: Knowledge Deficit  Goal: Patient/family/caregiver demonstrates understanding of disease process, treatment plan, medications, and discharge instructions  Description: Complete learning assessment and assess knowledge base.   Interventions:  - Provide teaching at level of understanding  - Provide teaching via preferred learning methods  Outcome: Progressing

## 2023-11-06 NOTE — ASSESSMENT & PLAN NOTE
Patient was RRT at [facility] with respiratory distress, mildly hypoxic requiring 2 LNC to maintain appropriate saturations  Suspect in the setting of volume overload/anasarca with at least ileus as below contributing  Transferred to [address] [facility] for further management given situation  CXR Low lung volumes and increased lung markings due to crowding. Mild left  basilar density likely due to atelectasis.  No acute consolidation or congestion  Chest CT scan negative for PE with bibasilar atelectasis  Currently on room air  Incentive spirometry, pulmonary toileting

## 2023-11-06 NOTE — PROGRESS NOTES
[facility]  Progress Note  Name: Maricel Suarez  MRN: [id number]  Unit/Bed#: [facility]PHP 824-01 I Date of Admission: 11/2/2023   Date of Service: 11/6/2023 I Hospital Day: 4    Assessment/Plan   * Acute respiratory insufficiency  Assessment & Plan  Patient was RRT at [facility] with respiratory distress, mildly hypoxic requiring 2 LNC to maintain appropriate saturations  Suspect in the setting of volume overload/anasarca with at least ileus as below contributing  Transferred to [address] for further management given situation  CXR Low lung volumes and increased lung markings due to crowding. Mild left  basilar density likely due to atelectasis. No acute consolidation or congestion  Chest CT scan negative for PE with bibasilar atelectasis  Currently on room air  Incentive spirometry, pulmonary toileting    Ileus [facility])  Assessment & Plan  Patient reporting marked abdominal distention, with episodes of watery diarrhea this admission  CT a/p: Diffuse moderate distention of entire GI tract. No evidence of obstruction. Nonspecific gastroenteritis and/or ileus   Receiving IV Lasix for anasarca as above  Pain control regimen initiated   Pt having multiple episodes of diarrhea but pt was on miralax, senna, lactulose, and had a dulcolax suppository on 11/1   Out of bed and ambulation  Improving  Continue supportive care    Acute DVT (deep venous thrombosis) (HCC)  Assessment & Plan  Acute bilateral lower extremity DVT  Cleared by neurosurgery to start Eliquis  Eliquis was started on 11 /4    Anasarca  Assessment & Plan  Patient complaining of increased diffuse edema, appears he has gained at least 17 pounds since hospitalization and appears grossly volume overloaded on examination with CXR with some evidence of pulmonary vascular congestion.   Patient himself denies any prior history of cardiac disease  S/p 40 mg IV Lasix during rapid response  Cardiac echo with EF 60% and grade 1 diastolic dysfunction no major valvular disease  BNP is 24  Monitor daily weights, I's/O, volume status  Negative fluid balance  Continue diuresis with IV Lasix today and possible transition to oral Lasix tomorrow  Patient refusing potassium supplement      Urinary retention  Assessment & Plan  S/p Christensen catheter placement at prior hospitalization, initially removed 10/26 however replaced 10/30 as patient failing urinary retention protocol  Maintain Christensen catheter for now and continue finasteride. Voiding trial once more ambulatory  Eventually will need outpatient urology follow-up    Hyponatremia  Assessment & Plan  Patient previously reported this was chronic issue, however sodium 127 now down from previous 132  Work-up in prior hospitalization revealing normal uric acid, urine sodium 46, urine osm 735, serum osm 278, TSH normal.  Cortisol was noted low however subsequent cosyntropin stimulation test was not concerning for adrenal insufficiency  Suspected SIADH as etiology, however also suspect volume overload is playing role  IV diuresis as above  Monitor    Benign essential hypertension  Assessment & Plan  Continue lisinopril  Monitor blood pressure per protocol  Previously patient was on HCTZ however discontinued due to hyponatremia    Osteoarthritis of cervical spine with myelopathy  Assessment & Plan  Patient s/p C3-C6 PCDF 10/25/2023 in setting of cervical stenosis/radiculopathy with cord compression at C4-5 level with severe canal stenosis  Continue with fall precautions, multimodal pain regimen  Potential return to [facility] when stable likely tomorrow  PT OT ethan recommending acute rehab             VTE Pharmacologic Prophylaxis: VTE Score: 5 High Risk (Score >/= 5) - Pharmacological DVT Prophylaxis Ordered: apixaban (Eliquis). Sequential Compression Devices Ordered. Patient Centered Rounds: I performed bedside rounds with nursing staff today.   Discussions with Specialists or Other Care Team Provider: CM    Education and Discussions with Family / Patient:  Patient. Total Time Spent on Date of Encounter in care of patient: 35 mins. This time was spent on one or more of the following: performing physical exam; counseling and coordination of care; obtaining or reviewing history; documenting in the medical record; reviewing/ordering tests, medications or procedures; communicating with other healthcare professionals and discussing with patient's family/caregivers. Current Length of Stay: 4 day(s)  Current Patient Status: Inpatient   Certification Statement: Management of volume overload  Discharge Plan: Anticipate discharge tomorrow to rehab facility. Code Status: Level 1 - Full Code    Subjective:   Patient seen and examined  Clinically improving  He reports 1 episode of diarrhea last night  Shortness of breath is improving  No chest pain    Asking me about timing of surgical staples removal    Objective:     Vitals:   Temp (24hrs), Av.5 °F (36.4 °C), Min:97.3 °F (36.3 °C), Max:97.7 °F (36.5 °C)    Temp:  [97.3 °F (36.3 °C)-97.7 °F (36.5 °C)] 97.3 °F (36.3 °C)  HR:  [89-90] 89  Resp:  [16-18] 16  BP: (112-117)/(69-71) 112/69  SpO2:  [95 %-98 %] 98 %  Body mass index is 35.19 kg/m². Input and Output Summary (last 24 hours):      Intake/Output Summary (Last 24 hours) at 2023 1548  Last data filed at 2023 1200  Gross per 24 hour   Intake 0 ml   Output 900 ml   Net -900 ml       Physical Exam:   Physical Exam   Patient is awake alert oriented no acute distress  Comfortable in bed  Lungs with decreased breath sound bilateral  Heart positive S1-S2 no murmur  Abdomen less distended soft nontender  Christensen catheter in place  Continues to have bilateral lower extremity pitting asha  Additional Data:     Labs:  Results from last 7 days   Lab Units 23  0543   WBC Thousand/uL 9.57   HEMOGLOBIN g/dL 11.5*   HEMATOCRIT % 33.9*   PLATELETS Thousands/uL 363   NEUTROS PCT % 78*   LYMPHS PCT % 9*   MONOS PCT % 11   EOS PCT % 1     Results from last 7 days   Lab Units 11/06/23  0634 11/04/23  0548 11/03/23  2302   SODIUM mmol/L 130*   < > 129*   POTASSIUM mmol/L 3.8   < > 3.9   CHLORIDE mmol/L 91*   < > 91*   CO2 mmol/L 31   < > 31   BUN mg/dL 32*   < > 24   CREATININE mg/dL 1.01   < > 0.84   ANION GAP mmol/L 8   < > 7   CALCIUM mg/dL 8.7   < > 8.3*   ALBUMIN g/dL  --   --  3.2*   TOTAL BILIRUBIN mg/dL  --   --  0.59   ALK PHOS U/L  --   --  56   ALT U/L  --   --  29   AST U/L  --   --  22   GLUCOSE RANDOM mg/dL 112   < > 112    < > = values in this interval not displayed. Results from last 7 days   Lab Units 11/03/23  2302   INR  1.11     Results from last 7 days   Lab Units 11/02/23  0338   POC GLUCOSE mg/dl 109               Lines/Drains:  Invasive Devices       Peripheral Intravenous Line  Duration             Peripheral IV 11/06/23 Left;Ventral (anterior) Forearm <1 day              Drain  Duration             Urethral Catheter 16 Fr. 7 days                  Urinary Catheter:  Goal for removal: Voiding trial when ambulation improves               Imaging: No pertinent imaging reviewed.     Recent Cultures (last 7 days):   Results from last 7 days   Lab Units 11/02/23  0804   C DIFF TOXIN B BY PCR  Negative       Last 24 Hours Medication List:   Current Facility-Administered Medications   Medication Dose Route Frequency Provider Last Rate    acetaminophen  650 mg Oral Q6H PRN Leatha Amezquita DO      albuterol  2 puff Inhalation Q6H PRN AAKASH Caldera      apixaban  10 mg Oral BID Brandon Mundo, DO      Followed by    Joshua Delong ON 11/11/2023] apixaban  5 mg Oral BID Lyons Mundo, DO      atorvastatin  10 mg Oral Daily Leatha Amezquita DO      finasteride  5 mg Oral Daily Leatha Amezquita,       fluticasone  1 spray Each Nare BID Leatha Amezquita,       folic acid  302 mcg Oral Daily Leatha Amezquita DO      [START ON 11/7/2023] furosemide  40 mg Intravenous Daily Brandon Mundo, DO      gabapentin  100 mg Oral TID Tory Trevin, DO      lisinopril  10 mg Oral Daily Adarsh Trinity, DO      loratadine  10 mg Oral Daily Tory Trevin, DO      melatonin  3 mg Oral HS Tory Trevin, DO      ondansetron  4 mg Intravenous Q6H PRN Tory Trevin, DO      oxyCODONE  2.5 mg Oral Q4H PRN Adarshjamshid Petersen, DO      potassium chloride  40 mEq Oral BID Adarshjamshid Petersen, DO      tiZANidine  4 mg Oral TID Tory Trevin, DO          Today, Patient Was Seen By: Adarsh Petersen DO    **Please Note: This note may have been constructed using a voice recognition system. **

## 2023-11-06 NOTE — APP STUDENT NOTE
[facility] [facility]  Progress Note  Name: Brady García  MRN: [id number]  Unit/Bed#: [facility]PHP 824-01 I Date of Admission: 11/2/2023   Date of Service: 11/6/2023 I Hospital Day: 4        Assessment/Plan   * Acute respiratory insufficiency  Assessment & Plan  Patient was RRT at [facility] with respiratory distress, mildly hypoxic requiring 2 LNC to maintain appropriate saturations  Suspect in the setting of volume overload/anasarca with at least ileus as below contributing  Transferred to [address] for further management given situation  CXR Low lung volumes and increased lung markings due to crowding. Mild left  basilar density likely due to atelectasis. No acute consolidation or congestion  Chest CT scan negative for PE with bibasilar atelectasis  Currently on room air  Incentive spirometry, pulmonary toileting     Ileus [facility])  Assessment & Plan  Patient reporting marked abdominal distention, with episodes of watery diarrhea this admission  CT a/p: Diffuse moderate distention of entire GI tract. No evidence of obstruction. Nonspecific gastroenteritis and/or ileus   Receiving IV Lasix for anasarca as above  Pain control regimen initiated   Pt having multiple episodes of diarrhea but pt was on miralax, senna, lactulose, and had a dulcolax suppository on 11/1   Out of bed and ambulation  Improving  Continue supportive care     Acute DVT (deep venous thrombosis) (HCC)  Assessment & Plan  Acute bilateral lower extremity DVT  Cleared by neurosurgery to start Eliquis  Eliquis was started on 11 /4  Patient will be d/c'ed on Eliquis         Anasarca  Assessment & Plan  Patient complaining of increased diffuse edema, appears he has gained at least 17 pounds since hospitalization and appears grossly volume overloaded on examination with CXR with some evidence of pulmonary vascular congestion.   Patient himself denies any prior history of cardiac disease  S/p 40 mg IV Lasix during rapid response  Cardiac echo with EF 60% and grade 1 diastolic dysfunction no major valvular disease  BNP is 24  Monitor daily weights, I's/O, volume status  Negative fluid balance  Continue diuresis with IV Lasix twice daily, replace potassium  Potassium level today is 1.8  D/c on potassium supplement, f/u with PCP       Urinary retention  Assessment & Plan  S/p Christensen catheter placement at prior hospitalization, initially removed 10/26 however replaced 10/30 as patient failing urinary retention protocol  Maintain Christensen catheter for now and continue finasteride. Voiding trial once more ambulatory  Eventually will need outpatient urology follow-up     Hyponatremia  Assessment & Plan  Patient previously reported this was chronic issue, however sodium 127 now down from previous 132  Work-up in prior hospitalization revealing normal uric acid, urine sodium 46, urine osm 735, serum osm 278, TSH normal.  Cortisol was noted low however subsequent cosyntropin stimulation test was not concerning for adrenal insufficiency  Suspected SIADH as etiology, however also suspect volume overload is playing role  IV diuresis as above  Monitor, sodium today 130     Benign essential hypertension  Assessment & Plan  Continue lisinopril  Monitor blood pressure per protocol  Previously patient was on HCTZ however discontinued due to hyponatremia     Osteoarthritis of cervical spine with myelopathy  Assessment & Plan  Patient s/p C3-C6 PCDF 10/25/2023 in setting of cervical stenosis/radiculopathy with cord compression at C4-5 level with severe canal stenosis  Continue with fall precautions, multimodal pain regimen  CM consult for assistance with disposition once stabilized, potential return to [facility]  PT OT eval recommending acute rehab             VTE Pharmacologic Prophylaxis: VTE Score: 5 High Risk (Score >/= 5) - Pharmacological DVT Prophylaxis Ordered: apixaban (Eliquis). Sequential Compression Devices Ordered.     Patient Centered Rounds: I performed bedside rounds with nursing staff today. Discussions with Specialists or Other Care Team Provider: nursing     Education and Discussions with Family / Patient: pt at bedside     Total Time Spent on Date of Encounter in care of patient: 35 mins. This time was spent on one or more of the following: performing physical exam; counseling and coordination of care; obtaining or reviewing history; documenting in the medical record; reviewing/ordering tests, medications or procedures; communicating with other healthcare professionals and discussing with patient's family/caregivers. Current Length of Stay: 4 day(s)  Current Patient Status: Inpatient   Certification Statement: The patient will continue to require additional inpatient hospital stay due to IV diuresis   Discharge Plan: anticipate discharge today to rehab facility     Code Status: Level 1 - Full Code    Subjective:   Patient sitting in recliner with feet elevated. Reports episode of diarrhea last night, but states overall fewer episodes. Reports improvement of SOB and does not appear to be in respiratory distress. Reports no appetite over the last 4 days. Objective:     Vitals:   Temp (24hrs), Av.8 °F (36.6 °C), Min:97.6 °F (36.4 °C), Max:98.1 °F (36.7 °C)    Temp:  [97.6 °F (36.4 °C)-98.1 °F (36.7 °C)] 97.7 °F (36.5 °C)  HR:  [89-94] 90  Resp:  [16-18] 16  BP: (114-117)/(69-74) 117/69  SpO2:  [94 %-95 %] 95 %  Body mass index is 35.19 kg/m². Input and Output Summary (last 24 hours): Intake/Output Summary (Last 24 hours) at 2023 0935  Last data filed at 2023 0636  Gross per 24 hour   Intake 222 ml   Output 300 ml   Net -78 ml       Physical Exam:   Physical Exam  Vitals reviewed. Constitutional:       Appearance: Normal appearance. HENT:      Head: Normocephalic and atraumatic. Eyes:      Extraocular Movements: Extraocular movements intact. Pupils: Pupils are equal, round, and reactive to light.    Cardiovascular:      Rate and Rhythm: Normal rate and regular rhythm. Pulmonary:      Effort: Pulmonary effort is normal.      Breath sounds: Normal breath sounds. Abdominal:      General: Bowel sounds are normal. There is distension. Musculoskeletal:      Cervical back: Normal range of motion and neck supple. Right lower leg: Edema present. Left lower leg: Edema present. Skin:     General: Skin is warm and dry. Neurological:      General: No focal deficit present. Mental Status: He is alert and oriented to person, place, and time. Psychiatric:         Mood and Affect: Mood normal.         Behavior: Behavior normal.      ***    Additional Data:     Labs:  Results from last 7 days   Lab Units 11/05/23  0543   WBC Thousand/uL 9.57   HEMOGLOBIN g/dL 11.5*   HEMATOCRIT % 33.9*   PLATELETS Thousands/uL 363   NEUTROS PCT % 78*   LYMPHS PCT % 9*   MONOS PCT % 11   EOS PCT % 1     Results from last 7 days   Lab Units 11/06/23  0634 11/04/23  0548 11/03/23  2302   SODIUM mmol/L 130*   < > 129*   POTASSIUM mmol/L 3.8   < > 3.9   CHLORIDE mmol/L 91*   < > 91*   CO2 mmol/L 31   < > 31   BUN mg/dL 32*   < > 24   CREATININE mg/dL 1.01   < > 0.84   ANION GAP mmol/L 8   < > 7   CALCIUM mg/dL 8.7   < > 8.3*   ALBUMIN g/dL  --   --  3.2*   TOTAL BILIRUBIN mg/dL  --   --  0.59   ALK PHOS U/L  --   --  56   ALT U/L  --   --  29   AST U/L  --   --  22   GLUCOSE RANDOM mg/dL 112   < > 112    < > = values in this interval not displayed.      Results from last 7 days   Lab Units 11/03/23  2302   INR  1.11     Results from last 7 days   Lab Units 11/02/23  0338   POC GLUCOSE mg/dl 109               Lines/Drains:  Invasive Devices       Peripheral Intravenous Line  Duration             Peripheral IV 11/06/23 Left;Ventral (anterior) Forearm <1 day              Drain  Duration             Urethral Catheter 16 Fr. 7 days                  Urinary Catheter:  Goal for removal: Voiding trial when ambulation improves               Imaging: Reviewed radiology reports from this admission including: chest CT scan    Recent Cultures (last 7 days):   Results from last 7 days   Lab Units 11/02/23  0804   C DIFF TOXIN B BY PCR  Negative       Last 24 Hours Medication List:   Current Facility-Administered Medications   Medication Dose Route Frequency Provider Last Rate    acetaminophen  650 mg Oral Q6H PRN Beverley Spire, DO      albuterol  2 puff Inhalation Q6H PRN Darylene Genre, CRNP      apixaban  10 mg Oral BID Magdaline Ask, DO      Followed by    Pam Draper ON 11/11/2023] apixaban  5 mg Oral BID Magdaline Ask, DO      atorvastatin  10 mg Oral Daily Beverley Spire, DO      finasteride  5 mg Oral Daily Beverley Spire, DO      fluticasone  1 spray Each Nare BID Beverley Spire, DO      folic acid  790 mcg Oral Daily Beverley Spire, DO      furosemide  40 mg Intravenous BID (diuretic) Magdaline Ask, DO      gabapentin  100 mg Oral TID Beverley Spire, DO      lisinopril  10 mg Oral Daily Magdaline Ask, DO      loratadine  10 mg Oral Daily Beverley Spire, DO      magnesium sulfate  2 g Intravenous Once Magdaline Ask, DO      melatonin  3 mg Oral HS Beverley Spire, DO      ondansetron  4 mg Intravenous Q6H PRN Beverley Spire, DO      oxyCODONE  2.5 mg Oral Q4H PRN Magdaline Ask, DO      potassium chloride  40 mEq Oral BID Magdaline Ask, DO      tiZANidine  4 mg Oral TID Beverley Spire, DO          Today, Patient Was Seen By: Aimee Ca    **Please Note: This note may have been constructed using a voice recognition system. **

## 2023-11-06 NOTE — ASSESSMENT & PLAN NOTE
Patient complaining of increased diffuse edema, appears he has gained at least 17 pounds since hospitalization and appears grossly volume overloaded on examination with CXR with some evidence of pulmonary vascular congestion.   Patient himself denies any prior history of cardiac disease  S/p 40 mg IV Lasix during rapid response  Cardiac echo with EF 60% and grade 1 diastolic dysfunction no major valvular disease  BNP is 24  Monitor daily weights, I's/O, volume status  Negative fluid balance  Continue diuresis with IV Lasix today and possible transition to oral Lasix tomorrow  Patient refusing potassium supplement

## 2023-11-07 ENCOUNTER — APPOINTMENT (INPATIENT)
Dept: RADIOLOGY | Facility: HOSPITAL | Age: 73
DRG: 641 | End: 2023-11-07
Payer: COMMERCIAL

## 2023-11-07 PROBLEM — R19.7 DIARRHEA: Status: ACTIVE | Noted: 2023-11-07

## 2023-11-07 LAB
ANION GAP SERPL CALCULATED.3IONS-SCNC: 8 MMOL/L
BUN SERPL-MCNC: 32 MG/DL (ref 5–25)
CALCIUM SERPL-MCNC: 8.6 MG/DL (ref 8.4–10.2)
CHLORIDE SERPL-SCNC: 89 MMOL/L (ref 96–108)
CO2 SERPL-SCNC: 32 MMOL/L (ref 21–32)
CREAT SERPL-MCNC: 0.93 MG/DL (ref 0.6–1.3)
GFR SERPL CREATININE-BSD FRML MDRD: 81 ML/MIN/1.73SQ M
GLUCOSE SERPL-MCNC: 110 MG/DL (ref 65–140)
GLUCOSE SERPL-MCNC: 86 MG/DL (ref 65–140)
MAGNESIUM SERPL-MCNC: 1.9 MG/DL (ref 1.9–2.7)
POTASSIUM SERPL-SCNC: 3.6 MMOL/L (ref 3.5–5.3)
SODIUM SERPL-SCNC: 129 MMOL/L (ref 135–147)

## 2023-11-07 PROCEDURE — 97116 GAIT TRAINING THERAPY: CPT

## 2023-11-07 PROCEDURE — 80048 BASIC METABOLIC PNL TOTAL CA: CPT | Performed by: INTERNAL MEDICINE

## 2023-11-07 PROCEDURE — 83735 ASSAY OF MAGNESIUM: CPT | Performed by: INTERNAL MEDICINE

## 2023-11-07 PROCEDURE — 97530 THERAPEUTIC ACTIVITIES: CPT

## 2023-11-07 PROCEDURE — 74018 RADEX ABDOMEN 1 VIEW: CPT

## 2023-11-07 PROCEDURE — 99232 SBSQ HOSP IP/OBS MODERATE 35: CPT | Performed by: INTERNAL MEDICINE

## 2023-11-07 PROCEDURE — 82948 REAGENT STRIP/BLOOD GLUCOSE: CPT

## 2023-11-07 RX ORDER — CALCIUM CARBONATE 500 MG/1
500 TABLET, CHEWABLE ORAL DAILY PRN
Status: DISCONTINUED | OUTPATIENT
Start: 2023-11-07 | End: 2023-11-10 | Stop reason: HOSPADM

## 2023-11-07 RX ORDER — LOPERAMIDE HYDROCHLORIDE 2 MG/1
2 CAPSULE ORAL ONCE
Status: COMPLETED | OUTPATIENT
Start: 2023-11-07 | End: 2023-11-07

## 2023-11-07 RX ADMIN — POTASSIUM CHLORIDE 40 MEQ: 1500 TABLET, EXTENDED RELEASE ORAL at 16:04

## 2023-11-07 RX ADMIN — FUROSEMIDE 40 MG: 10 INJECTION, SOLUTION INTRAMUSCULAR; INTRAVENOUS at 08:48

## 2023-11-07 RX ADMIN — LORATADINE 10 MG: 10 TABLET ORAL at 08:45

## 2023-11-07 RX ADMIN — APIXABAN 10 MG: 5 TABLET, FILM COATED ORAL at 08:45

## 2023-11-07 RX ADMIN — LISINOPRIL 10 MG: 10 TABLET ORAL at 08:45

## 2023-11-07 RX ADMIN — LOPERAMIDE HYDROCHLORIDE 2 MG: 2 CAPSULE ORAL at 13:44

## 2023-11-07 RX ADMIN — POTASSIUM CHLORIDE 40 MEQ: 1500 TABLET, EXTENDED RELEASE ORAL at 08:45

## 2023-11-07 RX ADMIN — TIZANIDINE 4 MG: 4 TABLET ORAL at 16:04

## 2023-11-07 RX ADMIN — GABAPENTIN 100 MG: 100 CAPSULE ORAL at 08:45

## 2023-11-07 RX ADMIN — TIZANIDINE 4 MG: 4 TABLET ORAL at 21:40

## 2023-11-07 RX ADMIN — GABAPENTIN 100 MG: 100 CAPSULE ORAL at 16:04

## 2023-11-07 RX ADMIN — GABAPENTIN 100 MG: 100 CAPSULE ORAL at 21:40

## 2023-11-07 RX ADMIN — FLUTICASONE PROPIONATE 1 SPRAY: 50 SPRAY, METERED NASAL at 08:47

## 2023-11-07 RX ADMIN — APIXABAN 10 MG: 5 TABLET, FILM COATED ORAL at 16:04

## 2023-11-07 RX ADMIN — TIZANIDINE 4 MG: 4 TABLET ORAL at 08:45

## 2023-11-07 RX ADMIN — ATORVASTATIN CALCIUM 10 MG: 10 TABLET, FILM COATED ORAL at 08:45

## 2023-11-07 RX ADMIN — FINASTERIDE 5 MG: 5 TABLET, FILM COATED ORAL at 08:45

## 2023-11-07 RX ADMIN — FOLIC ACID TAB 400 MCG 400 MCG: 400 TAB at 08:45

## 2023-11-07 RX ADMIN — MELATONIN 3 MG: at 21:40

## 2023-11-07 NOTE — PLAN OF CARE
Problem: MOBILITY - ADULT  Goal: Maintain or return to baseline ADL function  Description: INTERVENTIONS:  -  Assess patient's ability to carry out ADLs; assess patient's baseline for ADL function and identify physical deficits which impact ability to perform ADLs (bathing, care of mouth/teeth, toileting, grooming, dressing, etc.)  - Assess/evaluate cause of self-care deficits   - Assess range of motion  - Assess patient's mobility; develop plan if impaired  - Assess patient's need for assistive devices and provide as appropriate  - Encourage maximum independence but intervene and supervise when necessary  - Involve family in performance of ADLs  - Assess for home care needs following discharge   - Consider OT consult to assist with ADL evaluation and planning for discharge  - Provide patient education as appropriate  Outcome: Progressing     Problem: Prexisting or High Potential for Compromised Skin Integrity  Goal: Skin integrity is maintained or improved  Description: INTERVENTIONS:  - Identify patients at risk for skin breakdown  - Assess and monitor skin integrity  - Assess and monitor nutrition and hydration status  - Monitor labs   - Assess for incontinence   - Turn and reposition patient  - Assist with mobility/ambulation  - Relieve pressure over bony prominences  - Avoid friction and shearing  - Provide appropriate hygiene as needed including keeping skin clean and dry  - Evaluate need for skin moisturizer/barrier cream  - Collaborate with interdisciplinary team   - Patient/family teaching  - Consider wound care consult   Outcome: Progressing     Problem: PAIN - ADULT  Goal: Verbalizes/displays adequate comfort level or baseline comfort level  Description: Interventions:  - Encourage patient to monitor pain and request assistance  - Assess pain using appropriate pain scale  - Administer analgesics based on type and severity of pain and evaluate response  - Implement non-pharmacological measures as appropriate and evaluate response  - Consider cultural and social influences on pain and pain management  - Notify physician/advanced practitioner if interventions unsuccessful or patient reports new pain  Outcome: Progressing     Problem: INFECTION - ADULT  Goal: Absence or prevention of progression during hospitalization  Description: INTERVENTIONS:  - Assess and monitor for signs and symptoms of infection  - Monitor lab/diagnostic results  - Monitor all insertion sites, i.e. indwelling lines, tubes, and drains  - Monitor endotracheal if appropriate and nasal secretions for changes in amount and color  - [place] appropriate cooling/warming therapies per order  - Administer medications as ordered  - Instruct and encourage patient and family to use good hand hygiene technique  - Identify and instruct in appropriate isolation precautions for identified infection/condition  Outcome: Progressing     Problem: SAFETY ADULT  Goal: Maintain or return to baseline ADL function  Description: INTERVENTIONS:  -  Assess patient's ability to carry out ADLs; assess patient's baseline for ADL function and identify physical deficits which impact ability to perform ADLs (bathing, care of mouth/teeth, toileting, grooming, dressing, etc.)  - Assess/evaluate cause of self-care deficits   - Assess range of motion  - Assess patient's mobility; develop plan if impaired  - Assess patient's need for assistive devices and provide as appropriate  - Encourage maximum independence but intervene and supervise when necessary  - Involve family in performance of ADLs  - Assess for home care needs following discharge   - Consider OT consult to assist with ADL evaluation and planning for discharge  - Provide patient education as appropriate  Outcome: Progressing  Goal: Patient will remain free of falls  Description: INTERVENTIONS:  -  Assess patient's ability to carry out ADLs; assess patient's baseline for ADL function and identify physical deficits which impact ability to perform ADLs (bathing, care of mouth/teeth, toileting, grooming, dressing, etc.)  - Assess/evaluate cause of self-care deficits   - Assess range of motion  - Assess patient's mobility; develop plan if impaired  - Assess patient's need for assistive devices and provide as appropriate  - Encourage maximum independence but intervene and supervise when necessary  - Involve family in performance of ADLs  - Assess for home care needs following discharge   - Consider OT consult to assist with ADL evaluation and planning for discharge  - Provide patient education as appropriate  Outcome: Progressing     Problem: DISCHARGE PLANNING  Goal: Discharge to home or other facility with appropriate resources  Description: INTERVENTIONS:  - Identify barriers to discharge w/patient and caregiver  - Arrange for needed discharge resources and transportation as appropriate  - Identify discharge learning needs (meds, wound care, etc.)  - Arrange for interpretive services to assist at discharge as needed  - Refer to Case Management Department for coordinating discharge planning if the patient needs post-hospital services based on physician/advanced practitioner order or complex needs related to functional status, cognitive ability, or social support system  Outcome: Progressing     Problem: Knowledge Deficit  Goal: Patient/family/caregiver demonstrates understanding of disease process, treatment plan, medications, and discharge instructions  Description: Complete learning assessment and assess knowledge base.   Interventions:  - Provide teaching at level of understanding  - Provide teaching via preferred learning methods  Outcome: Progressing

## 2023-11-07 NOTE — PROGRESS NOTES
Patient is alert/oriented, afebrile, with TAYLOR on room air, and denies pain. Patient had multiple diarrheas during this shift, denies N/V, abdominal pain, and fountain catheter remains in place.

## 2023-11-07 NOTE — ASSESSMENT & PLAN NOTE
Acute bilateral lower extremity DVT  Cleared by neurosurgery to start Eliquis  Eliquis started on 11/4/2023  Continue Eliquis  Outpatient follow-up

## 2023-11-07 NOTE — ASSESSMENT & PLAN NOTE
Blood pressures reviewed  Acceptable  Continue lisinopril 10 mg daily  Monitor blood pressures  Avoid hypotension

## 2023-11-07 NOTE — ASSESSMENT & PLAN NOTE
Patient s/p C3-C6 PCDF 10/25/2023 in setting of cervical stenosis/radiculopathy with cord compression at C4-5 level with severe canal stenosis  Communicated with neurosurgery for postop care  Analgesics  Physical therapy

## 2023-11-07 NOTE — ASSESSMENT & PLAN NOTE
Reports episodes of loose stools  Denies abdominal pain nausea vomiting  No history of fever or constitutional symptoms  Supportive cares

## 2023-11-07 NOTE — ASSESSMENT & PLAN NOTE
Patient complaining of increased diffuse edema, appears he has gained at least 17 pounds since hospitalization and appears grossly volume overloaded on examination with CXR with some evidence of pulmonary vascular congestion. Patient himself denies any prior history of cardiac disease  S/p 40 mg IV Lasix during rapid response  Cardiac echo with EF 60% and grade 1 diastolic dysfunction no major valvular disease  BNP is 24  Monitor daily weights, I's/O, volume status  Receiving IV Lasix with good diuresis  Transition to p.o.  Lasix likely tomorrow  Low-salt diet

## 2023-11-07 NOTE — PLAN OF CARE
Problem: MOBILITY - ADULT  Goal: Maintain or return to baseline ADL function  Description: INTERVENTIONS:  -  Assess patient's ability to carry out ADLs; assess patient's baseline for ADL function and identify physical deficits which impact ability to perform ADLs (bathing, care of mouth/teeth, toileting, grooming, dressing, etc.)  - Assess/evaluate cause of self-care deficits   - Assess range of motion  - Assess patient's mobility; develop plan if impaired  - Assess patient's need for assistive devices and provide as appropriate  - Encourage maximum independence but intervene and supervise when necessary  - Involve family in performance of ADLs  - Assess for home care needs following discharge   - Consider OT consult to assist with ADL evaluation and planning for discharge  - Provide patient education as appropriate  Outcome: Progressing     Problem: Prexisting or High Potential for Compromised Skin Integrity  Goal: Skin integrity is maintained or improved  Description: INTERVENTIONS:  - Identify patients at risk for skin breakdown  - Assess and monitor skin integrity  - Assess and monitor nutrition and hydration status  - Monitor labs   - Assess for incontinence   - Turn and reposition patient  - Assist with mobility/ambulation  - Relieve pressure over bony prominences  - Avoid friction and shearing  - Provide appropriate hygiene as needed including keeping skin clean and dry  - Evaluate need for skin moisturizer/barrier cream  - Collaborate with interdisciplinary team   - Patient/family teaching  - Consider wound care consult   Outcome: Progressing     Problem: PAIN - ADULT  Goal: Verbalizes/displays adequate comfort level or baseline comfort level  Description: Interventions:  - Encourage patient to monitor pain and request assistance  - Assess pain using appropriate pain scale  - Administer analgesics based on type and severity of pain and evaluate response  - Implement non-pharmacological measures as appropriate and evaluate response  - Consider cultural and social influences on pain and pain management  - Notify physician/advanced practitioner if interventions unsuccessful or patient reports new pain  Outcome: Progressing     Problem: INFECTION - ADULT  Goal: Absence or prevention of progression during hospitalization  Description: INTERVENTIONS:  - Assess and monitor for signs and symptoms of infection  - Monitor lab/diagnostic results  - Monitor all insertion sites, i.e. indwelling lines, tubes, and drains  - Monitor endotracheal if appropriate and nasal secretions for changes in amount and color  - [place] appropriate cooling/warming therapies per order  - Administer medications as ordered  - Instruct and encourage patient and family to use good hand hygiene technique  - Identify and instruct in appropriate isolation precautions for identified infection/condition  Outcome: Progressing     Problem: SAFETY ADULT  Goal: Maintain or return to baseline ADL function  Description: INTERVENTIONS:  -  Assess patient's ability to carry out ADLs; assess patient's baseline for ADL function and identify physical deficits which impact ability to perform ADLs (bathing, care of mouth/teeth, toileting, grooming, dressing, etc.)  - Assess/evaluate cause of self-care deficits   - Assess range of motion  - Assess patient's mobility; develop plan if impaired  - Assess patient's need for assistive devices and provide as appropriate  - Encourage maximum independence but intervene and supervise when necessary  - Involve family in performance of ADLs  - Assess for home care needs following discharge   - Consider OT consult to assist with ADL evaluation and planning for discharge  - Provide patient education as appropriate  Outcome: Progressing  Goal: Patient will remain free of falls  Description: INTERVENTIONS:  - Educate patient/family on patient safety including physical limitations  - Instruct patient to call for assistance with activity   - Consult OT/PT to assist with strengthening/mobility   - Keep Call bell within reach  - Keep bed low and locked with side rails adjusted as appropriate  - Keep care items and personal belongings within reach  - Initiate and maintain comfort rounds  - Make Fall Risk Sign visible to staff  - Offer Toileting every 2 Hours, in advance of need  - Initiate/Maintain alarm  - Obtain necessary fall risk management equipment  - Apply yellow socks and bracelet for high fall risk patients  - Consider moving patient to room near nurses station  Outcome: Progressing     Problem: DISCHARGE PLANNING  Goal: Discharge to home or other facility with appropriate resources  Description: INTERVENTIONS:  - Identify barriers to discharge w/patient and caregiver  - Arrange for needed discharge resources and transportation as appropriate  - Identify discharge learning needs (meds, wound care, etc.)  - Arrange for interpretive services to assist at discharge as needed  - Refer to Case Management Department for coordinating discharge planning if the patient needs post-hospital services based on physician/advanced practitioner order or complex needs related to functional status, cognitive ability, or social support system  Outcome: Progressing     Problem: Knowledge Deficit  Goal: Patient/family/caregiver demonstrates understanding of disease process, treatment plan, medications, and discharge instructions  Description: Complete learning assessment and assess knowledge base.   Interventions:  - Provide teaching at level of understanding  - Provide teaching via preferred learning methods  Outcome: Progressing

## 2023-11-07 NOTE — ASSESSMENT & PLAN NOTE
S/p Christensen catheter placement at prior hospitalization, initially removed 10/26 however replaced 10/30 as patient failing urinary retention protocol  Voiding trial once more ambulatory  Continue finasteride

## 2023-11-07 NOTE — ASSESSMENT & PLAN NOTE
Patient previously reported this was chronic issue, however sodium 127 now down from previous 132  Work-up in prior hospitalization revealing normal uric acid, urine sodium 46, urine osm 735, serum osm 278, TSH normal.  Cortisol was noted low however subsequent cosyntropin stimulation test was not concerning for adrenal insufficiency  Likely SIADH  Monitor sodium levels closely

## 2023-11-07 NOTE — PROGRESS NOTES
[facility] [facility]  Progress Note  Name: Nataliia Khan  MRN: [id number]  [facility] I Date of Admission: 11/2/2023   Date of Service: 11/7/2023 I Hospital Day: 5    Assessment/Plan   * Acute respiratory insufficiency  Assessment & Plan  Patient was RRT at [facility] with respiratory distress, mildly hypoxic requiring 2 LNC to maintain appropriate saturations  Suspect in the setting of volume overload/anasarca with at least ileus as below contributing  Transferred to [address] for further management given situation  CXR Low lung volumes and increased lung markings due to crowding. Mild left  basilar density likely due to atelectasis. No acute consolidation or congestion  Chest CT scan negative for PE with bibasilar atelectasis  Improved now on ambient air  Encourage incentive spirometry  Monitor ambulatory O2 sats    Diarrhea  Assessment & Plan  Reports episodes of loose stools  Denies abdominal pain nausea vomiting  No history of fever or constitutional symptoms  Supportive cares    Acute DVT (deep venous thrombosis) (HCC)  Assessment & Plan  Acute bilateral lower extremity DVT  Cleared by neurosurgery to start Eliquis  Eliquis started on 11/4/2023  Continue Eliquis  Outpatient follow-up    Ileus [facility])  Assessment & Plan  Patient reporting marked abdominal distention, with episodes of watery diarrhea this admission  CT a/p: Diffuse moderate distention of entire GI tract. No evidence of obstruction. Nonspecific gastroenteritis and/or ileus   Now with episodes of diarrhea  Hold stool softeners/laxatives  Tolerating oral feeds denies nausea  Ileus likely resolving  Supportive cares    Anasarca  Assessment & Plan  Patient complaining of increased diffuse edema, appears he has gained at least 17 pounds since hospitalization and appears grossly volume overloaded on examination with CXR with some evidence of pulmonary vascular congestion.   Patient himself denies any prior history of cardiac disease  S/p 40 mg IV Lasix during rapid response  Cardiac echo with EF 60% and grade 1 diastolic dysfunction no major valvular disease  BNP is 24  Monitor daily weights, I's/O, volume status  Receiving IV Lasix with good diuresis  Transition to p.o. Lasix likely tomorrow  Low-salt diet      Urinary retention  Assessment & Plan  S/p Christensen catheter placement at prior hospitalization, initially removed 10/26 however replaced 10/30 as patient failing urinary retention protocol  Voiding trial once more ambulatory  Continue finasteride    Hyponatremia  Assessment & Plan  Patient previously reported this was chronic issue, however sodium 127 now down from previous 132  Work-up in prior hospitalization revealing normal uric acid, urine sodium 46, urine osm 735, serum osm 278, TSH normal.  Cortisol was noted low however subsequent cosyntropin stimulation test was not concerning for adrenal insufficiency  Likely SIADH  Monitor sodium levels closely      Gastroesophageal reflux disease  Assessment & Plan  Tums as needed    Benign essential hypertension  Assessment & Plan  Blood pressures reviewed  Acceptable  Continue lisinopril 10 mg daily  Monitor blood pressures  Avoid hypotension    Osteoarthritis of cervical spine with myelopathy  Assessment & Plan  Patient s/p C3-C6 PCDF 10/25/2023 in setting of cervical stenosis/radiculopathy with cord compression at C4-5 level with severe canal stenosis  Communicated with neurosurgery for postop care  Analgesics  Physical therapy                 VTE Pharmacologic Prophylaxis: VTE Score: 5 High Risk (Score >/= 5) - Pharmacological DVT Prophylaxis Ordered: apixaban (Eliquis). Sequential Compression Devices Ordered. Patient Centered Rounds: I performed bedside rounds with nursing staff today.   Discussions with Specialists or Other Care Team Provider: Neurosurgery, case management    Education and Discussions with Family / Patient:  Patient, called and left a message for nephlisa Amezcua. Total Time Spent on Date of Encounter in care of patient: 31 mins. This time was spent on one or more of the following: performing physical exam; counseling and coordination of care; obtaining or reviewing history; documenting in the medical record; reviewing/ordering tests, medications or procedures; communicating with other healthcare professionals and discussing with patient's family/caregivers. Current Length of Stay: 5 day(s)  Current Patient Status: Inpatient   Certification Statement: The patient will continue to require additional inpatient hospital stay due to as outlined  Discharge Plan:  Awaiting clinical and symptomatic improvement    Code Status: Level 1 - Full Code    Subjective:     Sitting up in chair  Reports episodes of loose stools  Denies abdominal pain nausea vomiting  Denies fevers  Tolerating oral feeds  Discussed with RN  History chart labs medications reviewed  He is requesting to discuss with neurosurgery about the staples in his neck    Objective:     Vitals:   Temp (24hrs), Av.7 °F (36.5 °C), Min:97.6 °F (36.4 °C), Max:97.8 °F (36.6 °C)    Temp:  [97.6 °F (36.4 °C)-97.8 °F (36.6 °C)] 97.8 °F (36.6 °C)  HR:  [88] 88  Resp:  [16-20] 16  BP: (110-113)/(67-71) 110/67  SpO2:  [95 %] 95 %  Body mass index is 35.19 kg/m². Input and Output Summary (last 24 hours):      Intake/Output Summary (Last 24 hours) at 2023 1546  Last data filed at 2023 1301  Gross per 24 hour   Intake --   Output 950 ml   Net -950 ml       Physical Exam:   Physical Exam       Comfortably sitting up in chair  Staples cervical area posteriorly noted  Lungs diminished breath sounds bilaterally at the bases  Vesicular breath sounds  Heart sounds S1 and S2 noted  Abdomen soft nontender  Abdomen distended  Awake obey simple commands  Trace pedal Ouma  No rash    Additional Data:     Labs:  Results from last 7 days   Lab Units 23  0543   WBC Thousand/uL 9.57   HEMOGLOBIN g/dL 11.5* HEMATOCRIT % 33.9*   PLATELETS Thousands/uL 363   NEUTROS PCT % 78*   LYMPHS PCT % 9*   MONOS PCT % 11   EOS PCT % 1     Results from last 7 days   Lab Units 11/07/23  0457 11/04/23  0548 11/03/23  2302   SODIUM mmol/L 129*   < > 129*   POTASSIUM mmol/L 3.6   < > 3.9   CHLORIDE mmol/L 89*   < > 91*   CO2 mmol/L 32   < > 31   BUN mg/dL 32*   < > 24   CREATININE mg/dL 0.93   < > 0.84   ANION GAP mmol/L 8   < > 7   CALCIUM mg/dL 8.6   < > 8.3*   ALBUMIN g/dL  --   --  3.2*   TOTAL BILIRUBIN mg/dL  --   --  0.59   ALK PHOS U/L  --   --  56   ALT U/L  --   --  29   AST U/L  --   --  22   GLUCOSE RANDOM mg/dL 110   < > 112    < > = values in this interval not displayed.      Results from last 7 days   Lab Units 11/03/23  2302   INR  1.11     Results from last 7 days   Lab Units 11/02/23  0338   POC GLUCOSE mg/dl 109               Lines/Drains:  Invasive Devices       Peripheral Intravenous Line  Duration             Peripheral IV 11/06/23 Left;Ventral (anterior) Forearm 1 day              Drain  Duration             Urethral Catheter 16 Fr. 8 days                  Urinary Catheter:  Goal for removal: Voiding trial when ambulation improves               Imaging: Reviewed radiology reports from this admission including: chest CT scan and abdominal/pelvic CT    Recent Cultures (last 7 days):   Results from last 7 days   Lab Units 11/02/23  0804   C DIFF TOXIN B BY PCR  Negative       Last 24 Hours Medication List:   Current Facility-Administered Medications   Medication Dose Route Frequency Provider Last Rate    acetaminophen  650 mg Oral Q6H PRN Rough And Readyisabel Voss DO      albuterol  2 puff Inhalation Q6H PRN AAKASH Khan      apixaban  10 mg Oral BID Tae Benitez DO      Followed by    Kimberly Hudson ON 11/11/2023] apixaban  5 mg Oral BID Tae Benitez DO      atorvastatin  10 mg Oral Daily Rough And Readyisabel Voss DO      calcium carbonate  500 mg Oral Daily PRN Geroge Dandy, MD      finasteride  5 mg Oral Daily Rhona Bills Kaz, DO      fluticasone  1 spray Each Nare BID Shelley Afia, DO      folic acid  999 mcg Oral Daily Shelley Afia, DO      furosemide  40 mg Intravenous Daily Cherl Eric, DO      gabapentin  100 mg Oral TID Shelley Afia, DO      lisinopril  10 mg Oral Daily Cherl Eric, DO      loratadine  10 mg Oral Daily Shelley Afia, DO      melatonin  3 mg Oral HS Shelley Afia, DO      ondansetron  4 mg Intravenous Q6H PRN Shelley Afia, DO      oxyCODONE  2.5 mg Oral Q4H PRN Cherl Eric, DO      potassium chloride  40 mEq Oral BID Cherl Eric, DO      tiZANidine  4 mg Oral TID Shelley Afia, DO          Today, Patient Was Seen By: Inge Gardner MD    **Please Note: This note may have been constructed using a voice recognition system. **

## 2023-11-07 NOTE — ASSESSMENT & PLAN NOTE
Patient reporting marked abdominal distention, with episodes of watery diarrhea this admission  CT a/p: Diffuse moderate distention of entire GI tract. No evidence of obstruction.   Nonspecific gastroenteritis and/or ileus   Now with episodes of diarrhea  Hold stool softeners/laxatives  Tolerating oral feeds denies nausea  Ileus likely resolving  Supportive cares

## 2023-11-07 NOTE — ASSESSMENT & PLAN NOTE
Patient was RRT at [facility] with respiratory distress, mildly hypoxic requiring 2 LNC to maintain appropriate saturations  Suspect in the setting of volume overload/anasarca with at least ileus as below contributing  Transferred to [address] [facility] for further management given situation  CXR Low lung volumes and increased lung markings due to crowding. Mild left  basilar density likely due to atelectasis.  No acute consolidation or congestion  Chest CT scan negative for PE with bibasilar atelectasis  Improved now on ambient air  Encourage incentive spirometry  Monitor ambulatory O2 sats

## 2023-11-07 NOTE — PHYSICAL THERAPY NOTE
PHYSICAL THERAPY NOTE          Patient Name: Bebeto Yoon  TDCEN'B Date: 11/7/2023 11/07/23 1258   PT Last Visit   PT Visit Date 11/07/23   Note Type   Note Type Treatment   Education   Education Provided Yes   End of Consult   Patient Position at End of Consult All needs within reach; Bedside chair   Pain Assessment   Pain Assessment Tool 0-10   Pain Score No Pain   Restrictions/Precautions   Weight Bearing Precautions Per Order No   Other Precautions Chair Alarm; Bed Alarm;Multiple lines; Fall Risk;Pain;Spinal precautions   General   Chart Reviewed Yes   Family/Caregiver Present No   Cognition   Overall Cognitive Status WFL   Arousal/Participation Alert; Responsive   Attention Within functional limits   Orientation Level Oriented X4   Following Commands Follows all commands and directions without difficulty   Comments Pt cooperative during session   Subjective   Subjective Agreeable to mobilize   Bed Mobility   Supine to Sit Unable to assess   Sit to Supine Unable to assess   Additional Comments Pt OOB in chair upon arrival.   Transfers   Sit to Stand 4  Minimal assistance   Additional items Assist x 1; Increased time required;Verbal cues   Stand to Sit 4  Minimal assistance   Additional items Assist x 1   Additional Comments w/RW X 2 STS   Ambulation/Elevation   Gait pattern Wide TIFF; Forward Flexion;Decreased heel strike   Gait Assistance 4  Minimal assist   Additional items Assist x 1;Verbal cues   Assistive Device Rolling walker   Distance 50 ft + 35 ft   Ambulation/Elevation Additional Comments Pt demonstrates improvement in mobility. Pt does require VC for hand placement and proper technqiue during transfers , utilizing RW.  Pt required 1 seated rest break between ambulation attempts 2* fatigue   Balance   Static Sitting Good   Dynamic Sitting Fair +   Static Standing Fair   Dynamic Standing Fair -   Ambulatory Poor + Endurance Deficit   Endurance Deficit Yes   Activity Tolerance   Activity Tolerance Patient limited by fatigue   Nurse Made Aware RN cleared pt for therapy   Exercises   Balance training  STS X 2   Assessment   Prognosis Good   Problem List Decreased strength;Decreased endurance;Decreased mobility;Pain;Orthopedic restrictions   Assessment Pt seen for PT treatment session this date. Pt cooperative and pleasant durign session. Tolerates multiple STS transfers using RW, VC for hand placement and proper technqiue for initial STS , then demonstrates good technique. Pt tolerates increased ambulation distance this session, with improved confidence level . Does require 1 seated rest break 2* fatigue. Pt educated regarding therapeutic exercise in chair, demonstrates proper method and verbalized understanding. Limiting factors continue to be , fatigue, endurance deficit, impaired gait, decreased caregiver support at home. Pt will benefitf rom continued PT while in hospital. Post dc recommendation is Level 1 (Maximum Resource) Rehab . Barriers to Discharge Inaccessible home environment;Decreased caregiver support   Goals   Patient Goals to get better   STG Expiration Date 11/17/23   PT Treatment Day 2   Plan   Treatment/Interventions Functional transfer training;Elevations; Therapeutic exercise;Gait training;Bed mobility;Spoke to nursing   Progress Progressing toward goals   PT Frequency 3-5x/wk   Discharge Recommendation   Rehab Resource Intensity Level, PT I (Maximum Resource Intensity)   Equipment Recommended Walker   AM-PAC Basic Mobility Inpatient   Turning in Flat Bed Without Bedrails 2   Lying on Back to Sitting on Edge of Flat Bed Without Bedrails 2   Moving Bed to Chair 3   Standing Up From Chair Using Arms 3   Walk in Room 3   Climb 3-5 Stairs With Railing 2   Basic Mobility Inpatient Raw Score 15   Basic Mobility Standardized Score 36.97   Highest Level Of Mobility   -[facility] Goal 4: Move to chair/commode   -[facility] Achieved 7: Walk 25 feet or more   Education   Education Provided Mobility training   Patient Demonstrates verbal understanding   End of Consult   Patient Position at End of Consult All needs within reach; Bedside chair   Rachael Kovacs PT DPT

## 2023-11-07 NOTE — PROGRESS NOTES
PHYSICAL MEDICINE AND REHABILITATION   PREADMISSION ASSESSMENT     Projected [facility] Diagnoses:  Impairment of mobility, safety and Activities of Daily Living (ADLs) due to Spinal Cord Dysfunction:  Non-Traumatic:  04.130 Other Non-Traumatic    Etiologic: Non-traumatic incomplete tetraplegia s/p C3-C6 PCDF   Date of Onset: 10/23/23  Date of surgery: 10/25/23    PATIENT INFORMATION  Name: Nathaniel Whitten Phone #: [phone] (home)   Address: [address] [place] [address]  YOB: 1950 Age: 68 y.o. #   Marital Status: Single  Ethnicity: White  Employment Status: retired  Extended Emergency Contact Information  Primary Emergency Contact: [address] [place], [place] [address]way [address] [place] [address] [address] Phone: [phone]  Mobile Phone: [phone]  Relation: Sister  Secondary Emergency Contact: [facility]  Mobile Phone: [phone]  Relation: Nephew  Advance Directive: Level 1 Full Code (no ACP docs)    INSURANCE/COVERAGE:     Primary MEDICARE / Plan: MEDICARE PART A ONLY / Product Type: Medicare /    Secondary Payer:[facility]  ID# [id number]   Payer Contact:  Payer Contact:   Contact Phone:  Contact Phone:     Per  MC is primary     MEDICARE #: [id number]  Medicare Days: 60/30/60  Medical Record #: [id number]    REFERRAL SOURCE:   Referring provider: Erick Nelson, *  Referring facility: [address] [place] [address]  Room: [place] [address]/[place] [address]  PCP: Cecil Luna MD PCP phone number: [phone]    MEDICAL INFORMATION  HPI: : Pt is a 68year old male with PMH including hypertension, hyperlipidemia, GERD, who has known history of cervical radiculopathy, C4-C6 cord with marked stenosis and compression. Follows with neurosurgery in outpatient setting, was scheduled for outpatient decompression procedure on 10/30 however presented to hospital on 10/23 for frequent falls.  MRI thoracic spine with cord compression at C4-5, mild thoracic degenerative changes without significant stenosis. Case was discussed with neurosurgery team, given concerns of worsening myelopathy symptoms including bilateral lower extremity involuntary muscle twitching, neuropathic pain and weakness, decision was made to transfer to [facility] for earlier inpatient procedure. Patient is status post cervical decompression and fusion on 10/25. DIANA drain removed on 10/27 by neurosurgery. Paresthesias to bilateral hands and legs, L > R significantly improving since surgery. Postoperative cervical x-rays, 10/26/2023, stable appearance C3-6 posterior fusion hardware. Continue to hold aspirin x 2 weeks postoperatively. No bracing needed. Hyponatremia- Suspect SIADH. Note hydrochlorothiazide use and pain. Currently 132. His fountain was removed and void trial attempted which he failed. Fountain was placed. Pt with Hyponatremia- patient reports that this has been a chronic issue. Suspect SIADH. Note hydrochlorothiazide use and pain. Fluid Restriction to 1800cc. Continue salt tabs. Cortisol 6.2>> given low BP and hypoNa, cosyntropin stim test obtained>> not concerning for adrenal insufficieny . Transferred off [facility] on 11/2 secondary to RRT with SOB/Abdominal distention and pain. CT a/p: Diffuse moderate distention of entire GI tract. No evidence of obstruction. Nonspecific gastroenteritis and/or ileus. Acute respiratory suspect in the setting of volume overload/anasarca with at least ileus. Now with episodes of diarrhea. Hold stool softeners/laxatives. Tolerating oral feeds denies nausea. Ileus likely resolving. CXR Low lung volumes and increased lung markings due to crowding. Mild left basilar density likely due to atelectasis. No acute consolidation or congestion. Chest CT scan negative for PE with bibasilar atelectasis. Currently on room air. In regards to anasarca/volume overload: S/p 40 mg IV Lasix during rapid response, Transition to p.o. Lasix 11/9.   PT and OT have been consulted and are recommending post-acute rehab services. Patient's case has been reviewed with [facility] medical director, patient meets medical criteria for acute rehab and has demonstrated the ability to tolerate three or more hours of therapy per day. Patient is medically stable and ready for discharge to [facility]. Past Medical History:   Past Surgical History: Allergies:     Past Medical History:   Diagnosis Date    GERD (gastroesophageal reflux disease)     Hyperlipidemia     Hypertension     Past Surgical History:   Procedure Laterality Date    COLONOSCOPY      HERNIA REPAIR      NE ARTHRD PST/PSTLAT TQ 1NTRSPC CRV BELW C2 SEGMENT N/A 10/25/2023    Procedure: C3-6 PCDF;  Surgeon: Tyesha Perez MD;  Location: [facility];  Service: Neurosurgery    NE COLONOSCOPY FLX DX W/COLLJ [facility] INPATIENT REHABILITATION WHEN PFRMD N/A 4/5/2019    Procedure: COLONOSCOPY;  Surgeon: Jc Lora DO;  Location: [facility]; Service: Gastroenterology    ROTATOR CUFF REPAIR Left     TONSILLECTOMY       No Known Allergies      Medical/functional conditions requiring inpatient rehabilitation: Non-traumatic incomplete tetraplegia s/p C3-C6 PCDF, Impaired mobility and self care, Impaired cognition     Risk for medical/clinical complications: Risk for falls, Risk for skin breakdown secondary to decreased mobility, Risk for hypertensive episodes, Risk for increased pain    Comorbidities:   Acute respiratory insufficiency  Illeus  Acute DVT  Anasarca  Osteoarthritis of cervical spine with myelopathy   Urinary Retention/Possible Neurogenic Bladder   Constipation/Possible Neurogenic Bowel   Post-operative pain   Hypertension   Hyponatremia   Skin/Pressure Injury Prevention  DVT Prophylaxis: HSQ, SCDs  GI Prophylaxis: PPI for GERD    Surgeries in the last 100 days:  On 10/25, Dr. Sadaf Turpin performed a PCDF C3-6 (laminectomes C4,5,6 and partial C3)     CURRENT VITAL SIGNS:   Temp:  [98 °F (36.7 °C)-98.1 °F (36.7 °C)] 98.1 °F (36.7 °C)  HR:  [] 102  Resp:  [16-18] 16  BP: (108-118)/(62-84) 118/76   Intake/Output Summary (Last 24 hours) at 11/10/2023 1201  Last data filed at 11/10/2023 0830  Gross per 24 hour   Intake 430 ml   Output 675 ml   Net -245 ml        LABORATORY RESULTS:      Lab Results   Component Value Date    HGB 11.5 (L) 11/05/2023    HCT 33.9 (L) 11/05/2023    WBC 9.57 11/05/2023     Lab Results   Component Value Date    BUN 32 (H) 11/08/2023    K 4.0 11/08/2023    CL 91 (L) 11/08/2023    CREATININE 0.92 11/08/2023     Lab Results   Component Value Date    PROTIME 14.2 11/03/2023    INR 1.11 11/03/2023        DIAGNOSTIC STUDIES:  XR cervical spine 2 or 3 views    Result Date: 10/27/2023  Impression: Straightening with posterior spinal fixation from C3-C6 is in alignment with soft tissue changes Workstation performed: [id number]     XR spine cervical 2 or 3 vw injury    Result Date: 10/25/2023  Impression: Fluoroscopic guidance provided for surgical procedure. Please refer to the separate procedure notes for additional details. Localization procedure was performed, with the OR notified of the level at approximately 1:50 p.m. on  10/25/2023 via telephone conversation with the OR x-ray technologist . Workstation performed: [id number]       PRECAUTIONS/SPECIAL NEEDS:  Anticoagulation:  heparin, Edema Management, Safety Concerns, Pain Management, Dietary Restrictions: Regular Diet, Fluid restriction 1500ML, and Language Preference: English.  Fall precautions    MEDICATIONS:     Current Facility-Administered Medications:     acetaminophen (TYLENOL) tablet 650 mg, 650 mg, Oral, Q6H PRN, Paulette Ratliff, DO    albuterol (PROVENTIL HFA,VENTOLIN HFA) inhaler 2 puff, 2 puff, Inhalation, Q6H PRN, AAKASH Kwon, 2 puff at 11/03/23 0535    apixaban (ELIQUIS) tablet 10 mg, 10 mg, Oral, BID, 10 mg at 11/10/23 0934 **FOLLOWED BY** [START ON 11/11/2023] apixaban (ELIQUIS) tablet 5 mg, 5 mg, Oral, BID, Donzella Eth, DO    atorvastatin (LIPITOR) tablet 10 mg, 10 mg, Oral, Daily, Tory Trevin, DO, 10 mg at 11/10/23 2209    calcium carbonate (TUMS) chewable tablet 500 mg, 500 mg, Oral, Daily PRN, Belinda Zhang MD    finasteride (PROSCAR) tablet 5 mg, 5 mg, Oral, Daily, Tory Trevin, DO, 5 mg at 11/10/23 0934    fluticasone (FLONASE) 50 mcg/act nasal spray 1 spray, 1 spray, Each Nare, BID, Tory Trevin, DO, 1 spray at 77/23/17 2055    folic acid (FOLVITE) tablet 400 mcg, 400 mcg, Oral, Daily, Tory Trevin, DO, 400 mcg at 11/10/23 0934    furosemide (LASIX) tablet 40 mg, 40 mg, Oral, Daily, Belinda Zhang MD, 40 mg at 11/10/23 0934    gabapentin (NEURONTIN) capsule 100 mg, 100 mg, Oral, TID, Tory Trevin, DO, 100 mg at 11/10/23 0934    lisinopril (ZESTRIL) tablet 10 mg, 10 mg, Oral, Daily, Adarsh Trinity, DO, 10 mg at 11/10/23 0934    loperamide (IMODIUM) oral liquid 2 mg, 2 mg, Oral, TID PRN, Belinda Zhang MD, 2 mg at 11/10/23 0425    loratadine (CLARITIN) tablet 10 mg, 10 mg, Oral, Daily, Tory Trevin, DO, 10 mg at 11/10/23 0934    melatonin tablet 3 mg, 3 mg, Oral, HS, Tory Trevin, DO, 3 mg at 11/09/23 2109    ondansetron (ZOFRAN) injection 4 mg, 4 mg, Intravenous, Q6H PRN, Tory Trevin, DO    oxyCODONE (ROXICODONE) split tablet 2.5 mg, 2.5 mg, Oral, Q4H PRN, Adarsh Trinity, DO, 2.5 mg at 11/08/23 1756    potassium chloride (K-DUR,KLOR-CON) CR tablet 40 mEq, 40 mEq, Oral, BID, Adarsh Trinity, DO, 40 mEq at 11/10/23 0934    psyllium (METAMUCIL) 1 packet, 1 packet, Oral, Daily, Belinda Zhang MD, 1 packet at 11/09/23 1446    tiZANidine (ZANAFLEX) tablet 4 mg, 4 mg, Oral, TID, Tory Trevin, DO, 4 mg at 11/10/23 0934    SKIN INTEGRITY:   Wound 10/20/23 Abrasion(s) Knee Anterior; Left  Wound 10/20/23 Abrasion(s) Toe (Comment which one) Anterior; Left  Wound 10/25/23 Head Bilateral  Wound 10/25/23 Cervical Neck Posterior    PRIOR LEVEL OF FUNCTION:  He lives in [facility] apartment  Compa Mendoza is single and lives alone. Self Care:  Independent, Indoor Mobility: Independent, Stairs (in/outdoor): Independent, and Cognition: Independent    FALLS IN THE LAST 6 MONTHS: 3    HOME ENVIRONMENT:  The living area: can live on one level  There are No steps to enter the home. The patient will have 24 hour supervision/physical assistance available upon discharge. PREVIOUS DME:  Equipment in home (previous DME): None    FUNCTIONAL STATUS:  Physical Therapy Occupational Therapy Speech Therapy   11/09/23 1001    PT Last Visit   PT Visit Date 11/09/23   Note Type   Note Type Treatment   Education   Education Provided Yes   End of Consult   Patient Position at End of Consult Bedside chair; All needs within reach;Bed/Chair alarm activated   Pain Assessment   Pain Assessment Tool 0-10   Pain Score 3   Pain Location/Orientation Location: Buttocks  (following toileting)   Pain Onset/Description Descriptor: Burning   Effect of Pain on Daily Activities pt reports frustration w/ ongoing diarrhea   Patient's Stated Pain Goal No pain   Restrictions/Precautions   Weight Bearing Precautions Per Order No   Other Precautions Multiple lines; Bed Alarm; Chair Alarm; Fall Risk;Pain   General   Chart Reviewed Yes   Response to Previous Treatment Patient with no complaints from previous session. Family/Caregiver Present No   Cognition   Overall Cognitive Status WFL   Arousal/Participation Alert; Cooperative   Attention Within functional limits   Orientation Level Oriented X4   Memory Within functional limits   Following Commands Follows all commands and directions without difficulty   Comments pleasant and agreable to PT   Subjective   Subjective "I just want to get stronger" "my LTG is to get back to golfing   Bed Mobility   Additional Comments pt presenting OOB on commode working w/ OT on initioation of PT session- PT assisted w/ standing balance during hygiene   Transfers   Sit to Stand 4  Minimal assistance  (w/o arm rests)   Additional items Assist x 1; Increased time required;Verbal cues   Stand to Sit 4  Minimal assistance   Additional items Assist x 1; Increased time required;Verbal cues   Toilet transfer 4  Minimal assistance   Additional items Assist x 1; Increased time required;Verbal cues   Ambulation/Elevation   Gait pattern Wide TIFF; Decreased heel strike;Decreased foot clearance   Gait Assistance 4  Minimal assist  (CGA- w/ fatigue and w/ turning)   Additional items Verbal cues   Assistive Device Rolling walker   Distance 35+52+62'  (seated rest breaks)   Stair Management Assistance Not tested   Balance   Static Sitting Good   Dynamic Sitting Fair +   Static Standing Fair   Dynamic Standing Fair -   Ambulatory Poor +   Endurance Deficit   Endurance Deficit Yes   Endurance Deficit Description limtied standing tolerance; limtied gait distances ; needs seated therapeutic rest breaks. Activity Tolerance   Activity Tolerance Patient tolerated treatment well;Patient limited by fatigue   Medical Staff Made Aware RN/ OT   Nurse Made Aware RN cleared pt for PT- updated post session   Exercises   Knee AROM Long Arc Quad Sitting;20 reps;Right;Left;AROM   Ankle Pumps Sitting;20 reps   Marching Standing;20 reps  (x2)   Balance training  STS 2 sets 5 reps each   Assessment   Prognosis Good   Problem List Decreased strength;Decreased range of motion;Decreased endurance; Impaired balance;Decreased mobility; Decreased coordination;Obesity;Pain   Assessment Pt continues to require min/ CGA for balance ; especially w/ fatigue nearing end of tx session; LOB x1 during turning w/ gait training on last trial. Pt is limited by  endurance deficits; balance and limtied strength adn will continue to benefit from skilled PT  to regain independence. Pt is functioning w ell below caseline and has good potential to achieve goals.     11/09/23 0857    OT Last Visit   OT Visit Date 11/09/23   Note Type   Note Type Treatment   Pain Assessment   Pain Assessment Tool 0-10   Pain Score No Pain   Hospital Pain Intervention(s) Repositioned; Ambulation/increased activity; Emotional support   Restrictions/Precautions   Weight Bearing Precautions Per Order No   Other Precautions Multiple lines; Fall Risk;Pain   Lifestyle   Autonomy Mod I with increased to for self care tasks (with recent symptoms)/mod I with ADL's, no AD with functional mobility, +drives   Reciprocal Relationships pt reports golfing several times a week. meets his friends out at times   Service to Others retired   Intrinsic Gratification spending time outside   ADL   Where [place] [address]  Supervision/Setup   Grooming Deficit Setup; Wash/dry hands; Wash/dry face   UB Bathing Assistance 4  Minimal Assistance   UB Bathing Deficit Increased time to complete; Chest;Right arm;Left arm; Abdomen   LB Bathing Assistance 3  Moderate Assistance   LB Bathing Deficit Right upper leg;Left upper leg   UB Dressing Assistance 4  Minimal Assistance   UB Dressing Deficit Thread RUE; Thread LUE;Pull over head;Pull around back;Pull down in back   LB Dressing Assistance 2  Maximal Assistance   LB Dressing Deficit Thread RLE into underwear; Thread LLE into underwear;Pull up over hips   Toileting Assistance  2  Maximal Assistance   Toileting Deficit Clothing management up;Clothing management down;Perineal hygiene   Bed Mobility   Supine to Sit 3  Moderate assistance   Additional items Assist x 2;HOB elevated; Bedrails; Increased time required;Verbal cues;LE management   Sit to Supine Unable to assess   Additional Comments Pt performed supine <> sit with Mod A x2 for UB postural support; sat EOB with good sitting balance; @ end of session, pt left sitting upright in recliner with PT, all functional needs in reach   Transfers   Sit to Stand 4  Minimal assistance   Additional items Assist x 1; Increased time required;Verbal cues;Armrests   Stand to Sit 4  Minimal assistance   Additional items Assist x 1; Armrests; Increased time required;Verbal cues   Toilet transfer 4  Minimal assistance Additional items Assist x 1; Armrests; Increased time required;Verbal cues; Commode   Additional Comments w/ RW   Functional Mobility   Functional Mobility 4  Minimal assistance   Additional Comments Pt completed short household functional mobility distances w/ Min A x1 w/ RW, with verbal cues for pacing/energy conservation techniques   Additional items Rolling walker   Toilet Transfers   Toilet Transfer From Bed   Toilet Transfer Type To and from   Toilet Transfer to Standard bedside commode   Toilet Transfer Technique Ambulating   Toilet Transfers Minimal assistance   Toilet Transfers Comments w/ RW   Subjective   Subjective "I need to get this diarrhea to stop."   Cognition   Overall Cognitive Status [facility]   Arousal/Participation Alert; Responsive;Arousable; Cooperative   Attention Within functional limits   Orientation Level Oriented X4   Memory Within functional limits   Following Commands Follows all commands and directions without difficulty   Comments Pt pleasant and cooperative   Activity Tolerance   Activity Tolerance Patient tolerated treatment well   Medical Staff Made Aware RN cleared; Tory Carey DPT   Assessment   Assessment Pt is a 67 yo male who actively participated in skilled OT session on 11/9/2023. Parts of treatment completed with PT to increase safety, decrease fall risk, and maximize functional/occupational performance 2* medical complexity which is a regression from pt's functional baseline. Treatment focused to improve functional transfers with fall prevention strategies, static/dynamic balance, postural/trunk control, proper body mechanics, functional use of b/l UE's, safety awareness, and overall increased activity tolerance in ADL/IADL/leisure tasks. Upon arrival, pt found lying supine in bed and was agreeable to OT session. Pt performed supine <> sit with Mod A x2 for UB postural support and completed STS from EOB <> BSC w/ Min A x1 w/ RW.  In seated position, pt performing UB bathing tasks w/ Min A, LB bathing tasks w/ Mod A, UB dressing tasks w/ Min A, and LB dressing tasks w/ Max A. Pt performed STS w/ Min A x1 w/ RW, requiring Max A for posterior hygiene 2* decreased dynamic balance and functional reach. Pt performed additional household functional mobility w/ Min A x1 w/ RW to simulate household distances during ADL/IADL routines in which pt required 2 standing rest break. At the end of the session, pt was left sitting upright in recliner with all functional needs in reach. Pt demonstrates gradual functional improvements towards OT goals however continues to be functioning below occupational baseline and is still limited by the following limitations/impairments which were addressed through skilled instruction: generalized weakness, balance, endurance/activity tolerance, postural/trunk control, strength, pain, and safety awareness. At this time, recommend discharge to post-acute rehab when medically stable. The patient's raw score on the AM-PAC Daily Activity Inpatient Short Form is 16. A raw score of less than 19 suggests the patient may benefit from discharge to post-acute rehabilitation services. Please refer to the recommendation of the Occupational Therapist for safe discharge planning. Established OT goals will be continued 2-3x/wk to address immediate acute care needs and underlying performance skills to maximize occupational performance and safety to return to [facility]. CARE SCORES:  Self Care:  Eatin: Supervision or touching  assistance  Oral hygiene: 04: Supervision or touching  assistance  Toilet hygiene: 02: Substantial/maximal assistance  Shower/bathing self: 03: Partial/moderate assistance  Upper body dressin: Partial/moderate assistance  Lower body dressin: Substantial/maximal assistance  Putting on/taking off footwear: 02:  Substantial/maximal assistance  Transfers:  Roll left and right: 03: Partial/moderate assistance  Sit to lyin: Partial/moderate assistance  Lying to sitting on side of bed: 03: Partial/moderate assistance  Sit to stand: 03: Partial/moderate assistance  Chair/bed to chair transfer: 03: Partial/moderate assistance  Toilet transfer: 02: Substantial/maximal assistance  Mobility:  Walk 10 ft: 03: Partial/moderate assistance  Walk 50 ft with two turns: 88: Not attempted due to medical conditions or safety concerns  Walk 150ft: 88: Not attempted due to medical conditions or safety concerns    CURRENT GAP IN FUNCTION  Prior to Admission: Functional Status: Patient was independent with mobility/ambulation, transfers, ADL's, IADL's. Expected functional outcomes: It is expected that with skilled acute rehabilitation services the patient will progress to Independent for self care and Independent for mobility     Estimated length of stay: 10 to 14 days    Anticipated Post-Discharge Disposition/Treatment  Disposition: Return to previous home/apartment. Outpatient Services: Physical Therapy (PT) and Occupational Therapy (OT)    BARRIERS TO DISCHARGE  Weakness, Pain, Balance Difficulty, Fatigue, Home Accessibility, Caregiver Accessibility, and Equipment Needs    INTERVENTIONS FOR DISCHARGE  Adaptive equipment, Patient/Family/Caregiver Education, [employer], Support Group, Arrange DME needs, Therapy exercises, and Energy conservation education     REQUIRED THERAPY:  Patient will require PT and OT 90 minutes each per day, five days per week to achieve rehab goals. REQUIRED FUNCTIONAL AND MEDICAL MANAGEMENT FOR INPATIENT REHABILITATION:  Skin:  Monitor skin for breakdown, Pain Management: Overall pain is moderately controlled, Deep Vein Thrombosis (DVT) Prophylaxis:  Per MD orders , further internal medicine management of additional medical conditions while on ARC, PT/OT intervention, patient/family education and training, and any needed consults PRN.     RECOMMENDED LEVEL OF CARE:    Pt is a 68 y.o. male with medical history of GERD< HTN, HLD, cervical stenosis with myelopathy who presented to [facility] on 10/20 after a fall. He got up, and he fell againk, hitting his head on the dresser - both times his legs giving out from under him. He has baseline weakness for which he sees Dr. Amanda Bethea and was scheduled for surgery (elective PCDF) on 10/30 at [facility]. UA negative, CBC, CMP unremarkable, negative for COVID/Flu/RSV, CTH unremarkable, CT C-Spine without acute findings, and CT T-L spine showed moderate multilevel degenerative changes. L Knee XR similarly with degenerative changes. He had some difficulty urinating on admission and required straight catheterization and ultimately fountain placement. Thoracic and lumbar MRI were ordered and he was transferred to [facility]. MRI showed severe stenosis C3-4, C4-5 and C5-6 with mild cord signal changes. On 10/25, Dr. Osiris Gaming performed a PCDF C3-6 (laminectomes C4,5,6 and partial C3). At baseline, SSEPs noted to be dampened. Post-op no bracing required, but has cervical precautions. Course c/b pain and hyponatremia and hypotension (felt 2/2 SIADH, also getting cosyntropin stim test which was not concerning for adrenal insufficiency). His fountain was removed and void trial attempted which he failed. Fountain was placed. Transferred off [facility] on 11/2 secondary to RRT with SOB/Abdominal distention and pain. CT a/p: Diffuse moderate distention of entire GI tract. No evidence of obstruction. Nonspecific gastroenteritis and/or ileus. Acute respiratory suspect in the setting of volume overload/anasarca with at least ileus. Now with episodes of diarrhea. Hold stool softeners/laxatives. Tolerating oral feeds denies nausea. Ileus likely resolving. CXR Low lung volumes and increased lung markings due to crowding. Mild left basilar density likely due to atelectasis. No acute consolidation or congestion. Chest CT scan negative for PE with bibasilar atelectasis. Currently on room air.  In regards to anasarca/volume overload: S/p 40 mg IV Lasix during rapid response, Transition to p.o. Lasix 11/9. Pt was independent PTA with transfers, amb, and ADLs. Pt lives with alone in an apartment with 0 KARLA. Pt is currently functioning below baseline needing Max A for ADLs and Mod A for transfers/amb. Pt would benefit from [facility] admission to have close medical management while participating in 3 hours of therapy per day that will include physical and occupational therapy. Physical and occupational therapists will address functional mobility deficits and assist patient in improving their strength, endurance, ROM, and self care. Pt will have 24/7 nursing care to monitor routine vitals, blood sugars, I/Os, skin integrity, and overall condition. The rehab nursing staff will follow therapy recommendations to have 24 hour follow through during non-therapy hours. PM&R to maximize function and provide medical oversight. The MD will monitor patient co-morbidities while on the unit as well as order any additional tests, labs, and consults needed. The [facility] specialized interdisciplinary team will meet weekly to discuss patient overall medical status and rehab goals in preparation for D/C home. Inpatient acute rehab is recommended for patient to maximize overall strength, endurance, self care, and mobility for a safe and timely transition back home.

## 2023-11-07 NOTE — PLAN OF CARE
Problem: PHYSICAL THERAPY ADULT  Goal: Performs mobility at highest level of function for planned discharge setting. See evaluation for individualized goals. Description: Treatment/Interventions: Functional transfer training, OT, Spoke to nursing, Gait training, Bed mobility, Therapeutic exercise, Patient/family training  Equipment Recommended: Tosha Bonilla       See flowsheet documentation for full assessment, interventions and recommendations. Outcome: Progressing  Note: Prognosis: Good  Problem List: Decreased strength, Decreased endurance, Decreased mobility, Pain, Orthopedic restrictions  Assessment: Pt seen for PT treatment session this date. Pt cooperative and pleasant durign session. Tolerates multiple STS transfers using RW, VC for hand placement and proper technqiue for initial STS , then demonstrates good technique. Pt tolerates increased ambulation distance this session, with improved confidence level . Does require 1 seated rest break 2* fatigue. Pt educated regarding therapeutic exercise in chair, demonstrates proper method and verbalized understanding. Limiting factors continue to be , fatigue, endurance deficit, impaired gait, decreased caregiver support at home. Pt will benefitf rom continued PT while in hospital. Post dc recommendation is Level 1 (Maximum Resource) Rehab . Barriers to Discharge: Inaccessible home environment, Decreased caregiver support     Rehab Resource Intensity Level, PT: I (Maximum Resource Intensity)    See flowsheet documentation for full assessment.

## 2023-11-07 NOTE — RESTORATIVE TECHNICIAN NOTE
Restorative Technician Note      Patient Name: Jasmyne Robison     Restorative Tech Visit Date: 11/07/23  Note Type: Mobility  Patient Position Upon Consult: Bedside chair  Activity Performed: Ambulated; Dangled; Stood; Other (Comment) (ambulation limited due to BM; incontinence)  Assistive Device: Standard walker  Education Provided: Yes  Patient Position at End of Consult: Bedside chair;  All needs within reach    Chilo Dela Cruz  DPT, Restorative Technician

## 2023-11-08 LAB
ANION GAP SERPL CALCULATED.3IONS-SCNC: 10 MMOL/L
BUN SERPL-MCNC: 32 MG/DL (ref 5–25)
CALCIUM SERPL-MCNC: 8.7 MG/DL (ref 8.4–10.2)
CHLORIDE SERPL-SCNC: 91 MMOL/L (ref 96–108)
CO2 SERPL-SCNC: 28 MMOL/L (ref 21–32)
CREAT SERPL-MCNC: 0.92 MG/DL (ref 0.6–1.3)
GFR SERPL CREATININE-BSD FRML MDRD: 82 ML/MIN/1.73SQ M
GLUCOSE SERPL-MCNC: 91 MG/DL (ref 65–140)
POTASSIUM SERPL-SCNC: 4 MMOL/L (ref 3.5–5.3)
SODIUM SERPL-SCNC: 129 MMOL/L (ref 135–147)

## 2023-11-08 PROCEDURE — 99232 SBSQ HOSP IP/OBS MODERATE 35: CPT | Performed by: INTERNAL MEDICINE

## 2023-11-08 PROCEDURE — 99024 POSTOP FOLLOW-UP VISIT: CPT | Performed by: STUDENT IN AN ORGANIZED HEALTH CARE EDUCATION/TRAINING PROGRAM

## 2023-11-08 PROCEDURE — 80048 BASIC METABOLIC PNL TOTAL CA: CPT | Performed by: INTERNAL MEDICINE

## 2023-11-08 RX ORDER — FUROSEMIDE 40 MG/1
40 TABLET ORAL DAILY
Status: DISCONTINUED | OUTPATIENT
Start: 2023-11-09 | End: 2023-11-10 | Stop reason: HOSPADM

## 2023-11-08 RX ORDER — LOPERAMIDE HCL 1 MG/7.5ML
4 SUSPENSION ORAL ONCE
Status: COMPLETED | OUTPATIENT
Start: 2023-11-08 | End: 2023-11-08

## 2023-11-08 RX ADMIN — FUROSEMIDE 40 MG: 10 INJECTION, SOLUTION INTRAMUSCULAR; INTRAVENOUS at 09:47

## 2023-11-08 RX ADMIN — APIXABAN 10 MG: 5 TABLET, FILM COATED ORAL at 17:48

## 2023-11-08 RX ADMIN — FLUTICASONE PROPIONATE 1 SPRAY: 50 SPRAY, METERED NASAL at 09:50

## 2023-11-08 RX ADMIN — POTASSIUM CHLORIDE 40 MEQ: 1500 TABLET, EXTENDED RELEASE ORAL at 09:46

## 2023-11-08 RX ADMIN — ATORVASTATIN CALCIUM 10 MG: 10 TABLET, FILM COATED ORAL at 09:47

## 2023-11-08 RX ADMIN — TIZANIDINE 4 MG: 4 TABLET ORAL at 09:47

## 2023-11-08 RX ADMIN — APIXABAN 10 MG: 5 TABLET, FILM COATED ORAL at 09:47

## 2023-11-08 RX ADMIN — POTASSIUM CHLORIDE 40 MEQ: 1500 TABLET, EXTENDED RELEASE ORAL at 17:48

## 2023-11-08 RX ADMIN — MELATONIN 3 MG: at 21:49

## 2023-11-08 RX ADMIN — Medication 2.5 MG: at 17:56

## 2023-11-08 RX ADMIN — GABAPENTIN 100 MG: 100 CAPSULE ORAL at 09:46

## 2023-11-08 RX ADMIN — LORATADINE 10 MG: 10 TABLET ORAL at 10:11

## 2023-11-08 RX ADMIN — TIZANIDINE 4 MG: 4 TABLET ORAL at 17:48

## 2023-11-08 RX ADMIN — FOLIC ACID TAB 400 MCG 400 MCG: 400 TAB at 09:47

## 2023-11-08 RX ADMIN — GABAPENTIN 100 MG: 100 CAPSULE ORAL at 21:49

## 2023-11-08 RX ADMIN — TIZANIDINE 4 MG: 4 TABLET ORAL at 21:49

## 2023-11-08 RX ADMIN — LISINOPRIL 10 MG: 10 TABLET ORAL at 09:47

## 2023-11-08 RX ADMIN — LOPERAMIDE HCL 4 MG: 1 SOLUTION ORAL at 19:53

## 2023-11-08 RX ADMIN — FINASTERIDE 5 MG: 5 TABLET, FILM COATED ORAL at 09:47

## 2023-11-08 RX ADMIN — GABAPENTIN 100 MG: 100 CAPSULE ORAL at 17:48

## 2023-11-08 NOTE — ASSESSMENT & PLAN NOTE
Acute bilateral lower extremity DVT  Cleared by neurosurgery to start Eliquis  Eliquis started on 11/4/2023  Continue Eliquis for anticoagulation  Outpatient follow-up

## 2023-11-08 NOTE — ASSESSMENT & PLAN NOTE
Patient reporting marked abdominal distention, with episodes of watery diarrhea this admission  CT a/p: Diffuse moderate distention of entire GI tract. No evidence of obstruction.   Nonspecific gastroenteritis and/or ileus   Episodes of diarrhea improving  Tolerating oral feeds denies nausea  Ileus resolving  Supportive cares

## 2023-11-08 NOTE — PROGRESS NOTES
[facility]  Progress Note  Name: Jayashree Villegas  MRN: [id number]  Unit/Bed#: [facility] 824-01 I Date of Admission: 11/2/2023   Date of Service: 11/8/2023 I Hospital Day: 6    Assessment/Plan   * Acute respiratory insufficiency  Assessment & Plan  Patient was RRT at [address] with respiratory distress, mildly hypoxic requiring 2 LNC to maintain appropriate saturations  Suspect in the setting of volume overload/anasarca with at least ileus as below contributing  Transferred to [address] for further management given situation  CXR Low lung volumes and increased lung markings due to crowding. Mild left  basilar density likely due to atelectasis. No acute consolidation or congestion  Chest CT scan negative for PE with bibasilar atelectasis  Resolved  Now on ambient air  Encourage incentive spirometry  Monitor ambulatory O2 sats      Diarrhea  Assessment & Plan  Reports episodes of loose stools slightly better today  Reports stools are firming up  Denies abdominal pain nausea vomiting or constitutional symptoms. Supportive cares      Acute DVT (deep venous thrombosis) (HCC)  Assessment & Plan  Acute bilateral lower extremity DVT  Cleared by neurosurgery to start Eliquis  Eliquis started on 11/4/2023  Continue Eliquis for anticoagulation  Outpatient follow-up    Ileus [facility])  Assessment & Plan  Patient reporting marked abdominal distention, with episodes of watery diarrhea this admission  CT a/p: Diffuse moderate distention of entire GI tract. No evidence of obstruction. Nonspecific gastroenteritis and/or ileus   Episodes of diarrhea improving  Tolerating oral feeds denies nausea  Ileus resolving  Supportive cares      Anasarca  Assessment & Plan  Patient complaining of increased diffuse edema, appears he has gained at least 17 pounds since hospitalization and appears grossly volume overloaded on examination with CXR with some evidence of pulmonary vascular congestion.   Patient himself denies any prior history of cardiac disease  S/p 40 mg IV Lasix during rapid response  Cardiac echo with EF 60% and grade 1 diastolic dysfunction no major valvular disease  BNP is 24  Monitor daily weights, I's/O, volume status  Received IV Lasix with good diuresis  Transition to p.o. Lasix  Monitor    Urinary retention  Assessment & Plan  S/p Christensen catheter placement at prior hospitalization, initially removed 10/26 however replaced 10/30 as patient failing urinary retention protocol  Continue finasteride  Voiding trial with improving ambulation    Hyponatremia  Assessment & Plan  Patient previously reported this was chronic issue, however sodium 127 now down from previous 132  Work-up in prior hospitalization revealing normal uric acid, urine sodium 46, urine osm 735, serum osm 278, TSH normal.  Cortisol was noted low however subsequent cosyntropin stimulation test was not concerning for adrenal insufficiency  Likely SIADH  Monitor sodium levels    Gastroesophageal reflux disease  Assessment & Plan  Tums as needed    Benign essential hypertension  Assessment & Plan  Blood pressures reviewed, acceptable  Continue lisinopril 10 mg daily  Monitor blood pressures    Osteoarthritis of cervical spine with myelopathy  Assessment & Plan  Patient s/p C3-C6 PCDF 10/25/2023 in setting of cervical stenosis/radiculopathy with cord compression at C4-5 level with severe canal stenosis  Neurosurgery inputs noted  Analgesics, physical therapy  Outpatient neurosurgery follow-up                   VTE Pharmacologic Prophylaxis: VTE Score: 5 High Risk (Score >/= 5) - Pharmacological DVT Prophylaxis Ordered: apixaban (Eliquis). Sequential Compression Devices Ordered. Patient Centered Rounds: I performed bedside rounds with nursing staff today.   Discussions with Specialists or Other Care Team Provider: Case management    Education and Discussions with Family / Patient:  Discussed with the patient, reports he is keeping his family updated. Total Time Spent on Date of Encounter in care of patient: 31 mins. This time was spent on one or more of the following: performing physical exam; counseling and coordination of care; obtaining or reviewing history; documenting in the medical record; reviewing/ordering tests, medications or procedures; communicating with other healthcare professionals and discussing with patient's family/caregivers. Current Length of Stay: 6 day(s)  Current Patient Status: Inpatient   Certification Statement: The patient will continue to require additional inpatient hospital stay due to as outlined  Discharge Plan:  Clinical and symptomatic improvement, disposition planning Case management following    Code Status: Level 1 - Full Code    Subjective:     Comfortably sitting up in chair  Reports improving episodes of diarrhea  Reports stool firming up  Appetite fair to good  Abdominal distention improved  Reports improved dyspnea  Encouraged ambulation with assistance    Objective:     Vitals:   Temp (24hrs), Av.9 °F (36.6 °C), Min:97.9 °F (36.6 °C), Max:97.9 °F (36.6 °C)    Temp:  [97.9 °F (36.6 °C)] 97.9 °F (36.6 °C)  HR:  [59-95] 95  Resp:  [16-18] 16  BP: (110-138)/(67-71) 137/71  SpO2:  [96 %-98 %] 98 %  Body mass index is 35.36 kg/m². Input and Output Summary (last 24 hours):      Intake/Output Summary (Last 24 hours) at 2023 1524  Last data filed at 2023 1101  Gross per 24 hour   Intake 240 ml   Output 1550 ml   Net -1310 ml       Physical Exam:   Physical Exam       Comfortably sitting up in chair  Obese  Short thick neck  Lungs bilateral air entry noted  Vesicular breath sounds  Diminished breath sounds at bases  Heart sounds S1-S2 noted  Heart sounds are distant  Abdomen soft nontender  Abdominal obesity noted  Awake obey simple commands  Pedal edema improved  No rash    Additional Data:     Labs:  Results from last 7 days   Lab Units 23  0543   WBC Thousand/uL 9.57   HEMOGLOBIN g/dL 11.5*   HEMATOCRIT % 33.9*   PLATELETS Thousands/uL 363   NEUTROS PCT % 78*   LYMPHS PCT % 9*   MONOS PCT % 11   EOS PCT % 1     Results from last 7 days   Lab Units 11/08/23  0430 11/04/23  0548 11/03/23  2302   SODIUM mmol/L 129*   < > 129*   POTASSIUM mmol/L 4.0   < > 3.9   CHLORIDE mmol/L 91*   < > 91*   CO2 mmol/L 28   < > 31   BUN mg/dL 32*   < > 24   CREATININE mg/dL 0.92   < > 0.84   ANION GAP mmol/L 10   < > 7   CALCIUM mg/dL 8.7   < > 8.3*   ALBUMIN g/dL  --   --  3.2*   TOTAL BILIRUBIN mg/dL  --   --  0.59   ALK PHOS U/L  --   --  56   ALT U/L  --   --  29   AST U/L  --   --  22   GLUCOSE RANDOM mg/dL 91   < > 112    < > = values in this interval not displayed.      Results from last 7 days   Lab Units 11/03/23  2302   INR  1.11     Results from last 7 days   Lab Units 11/07/23  2102 11/02/23  0338   POC GLUCOSE mg/dl 86 109               Lines/Drains:  Invasive Devices       Peripheral Intravenous Line  Duration             Peripheral IV 11/06/23 Left;Ventral (anterior) Forearm 2 days              Drain  Duration             Urethral Catheter 16 Fr. 9 days                  Urinary Catheter:  Goal for removal: Voiding trial when ambulation improves               Imaging: Reviewed radiology reports from this admission including: chest CT scan    Recent Cultures (last 7 days):   Results from last 7 days   Lab Units 11/02/23  0804   C DIFF TOXIN B BY PCR  Negative       Last 24 Hours Medication List:   Current Facility-Administered Medications   Medication Dose Route Frequency Provider Last Rate    acetaminophen  650 mg Oral Q6H PRN Barb Siemens, DO      albuterol  2 puff Inhalation Q6H PRN AAKASH Chappell      apixaban  10 mg Oral BID Billy Silva DO      Followed by    Mina Saini ON 11/11/2023] apixaban  5 mg Oral BID Billy Silva DO      atorvastatin  10 mg Oral Daily Barb Siemens, DO      calcium carbonate  500 mg Oral Daily PRN Neville Correa MD      finasteride  5 mg Oral Daily Marleni Hull Kaz, DO      fluticasone  1 spray Each Nare BID [facility], DO      folic acid  179 mcg Oral Daily [facility], DO      [START ON 11/9/2023] furosemide  40 mg Oral Daily Yuan Borja MD      gabapentin  100 mg Oral TID Nicole Border, DO      lisinopril  10 mg Oral Daily iJmmydine Viv, DO      loratadine  10 mg Oral Daily Nicole Border, DO      melatonin  3 mg Oral HS [facility], DO      ondansetron  4 mg Intravenous Q6H PRN Nicole Border, DO      oxyCODONE  2.5 mg Oral Q4H PRN Sara Nam, DO      potassium chloride  40 mEq Oral BID Sara Nam, DO      tiZANidine  4 mg Oral TID Nicole Border, DO          Today, Patient Was Seen By: Yuan Borja MD    **Please Note: This note may have been constructed using a voice recognition system. **

## 2023-11-08 NOTE — ASSESSMENT & PLAN NOTE
Reports episodes of loose stools slightly better today  Reports stools are firming up  Denies abdominal pain nausea vomiting or constitutional symptoms.   Supportive cares

## 2023-11-08 NOTE — ASSESSMENT & PLAN NOTE
Patient s/p C3-C6 PCDF 10/25/2023 in setting of cervical stenosis/radiculopathy with cord compression at C4-5 level with severe canal stenosis  Neurosurgery inputs noted  Analgesics, physical therapy  Outpatient neurosurgery follow-up

## 2023-11-08 NOTE — PLAN OF CARE
Problem: MOBILITY - ADULT  Goal: Maintain or return to baseline ADL function  Description: INTERVENTIONS:  -  Assess patient's ability to carry out ADLs; assess patient's baseline for ADL function and identify physical deficits which impact ability to perform ADLs (bathing, care of mouth/teeth, toileting, grooming, dressing, etc.)  - Assess/evaluate cause of self-care deficits   - Assess range of motion  - Assess patient's mobility; develop plan if impaired  - Assess patient's need for assistive devices and provide as appropriate  - Encourage maximum independence but intervene and supervise when necessary  - Involve family in performance of ADLs  - Assess for home care needs following discharge   - Consider OT consult to assist with ADL evaluation and planning for discharge  - Provide patient education as appropriate  Outcome: Progressing     Problem: Prexisting or High Potential for Compromised Skin Integrity  Goal: Skin integrity is maintained or improved  Description: INTERVENTIONS:  - Identify patients at risk for skin breakdown  - Assess and monitor skin integrity  - Assess and monitor nutrition and hydration status  - Monitor labs   - Assess for incontinence   - Turn and reposition patient  - Assist with mobility/ambulation  - Relieve pressure over bony prominences  - Avoid friction and shearing  - Provide appropriate hygiene as needed including keeping skin clean and dry  - Evaluate need for skin moisturizer/barrier cream  - Collaborate with interdisciplinary team   - Patient/family teaching  - Consider wound care consult   Outcome: Progressing     Problem: PAIN - ADULT  Goal: Verbalizes/displays adequate comfort level or baseline comfort level  Description: Interventions:  - Encourage patient to monitor pain and request assistance  - Assess pain using appropriate pain scale  - Administer analgesics based on type and severity of pain and evaluate response  - Implement non-pharmacological measures as appropriate and evaluate response  - Consider cultural and social influences on pain and pain management  - Notify physician/advanced practitioner if interventions unsuccessful or patient reports new pain  Outcome: Progressing     Problem: INFECTION - ADULT  Goal: Absence or prevention of progression during hospitalization  Description: INTERVENTIONS:  - Assess and monitor for signs and symptoms of infection  - Monitor lab/diagnostic results  - Monitor all insertion sites, i.e. indwelling lines, tubes, and drains  - Monitor endotracheal if appropriate and nasal secretions for changes in amount and color  - [place] appropriate cooling/warming therapies per order  - Administer medications as ordered  - Instruct and encourage patient and family to use good hand hygiene technique  - Identify and instruct in appropriate isolation precautions for identified infection/condition  Outcome: Progressing     Problem: SAFETY ADULT  Goal: Maintain or return to baseline ADL function  Description: INTERVENTIONS:  -  Assess patient's ability to carry out ADLs; assess patient's baseline for ADL function and identify physical deficits which impact ability to perform ADLs (bathing, care of mouth/teeth, toileting, grooming, dressing, etc.)  - Assess/evaluate cause of self-care deficits   - Assess range of motion  - Assess patient's mobility; develop plan if impaired  - Assess patient's need for assistive devices and provide as appropriate  - Encourage maximum independence but intervene and supervise when necessary  - Involve family in performance of ADLs  - Assess for home care needs following discharge   - Consider OT consult to assist with ADL evaluation and planning for discharge  - Provide patient education as appropriate  Outcome: Progressing  Goal: Patient will remain free of falls  Description: INTERVENTIONS:  - Educate patient/family on patient safety including physical limitations  - Instruct patient to call for assistance with activity   - Consult OT/PT to assist with strengthening/mobility   - Keep Call bell within reach  - Keep bed low and locked with side rails adjusted as appropriate  - Keep care items and personal belongings within reach  - Initiate and maintain comfort rounds  - Make Fall Risk Sign visible to staff  - Offer Toileting every 2 Hours, in advance of need  - Apply yellow socks and bracelet for high fall risk patients  - Consider moving patient to room near nurses station  Outcome: Progressing     Problem: DISCHARGE PLANNING  Goal: Discharge to home or other facility with appropriate resources  Description: INTERVENTIONS:  - Identify barriers to discharge w/patient and caregiver  - Arrange for needed discharge resources and transportation as appropriate  - Identify discharge learning needs (meds, wound care, etc.)  - Arrange for interpretive services to assist at discharge as needed  - Refer to Case Management Department for coordinating discharge planning if the patient needs post-hospital services based on physician/advanced practitioner order or complex needs related to functional status, cognitive ability, or social support system  Outcome: Progressing     Problem: Knowledge Deficit  Goal: Patient/family/caregiver demonstrates understanding of disease process, treatment plan, medications, and discharge instructions  Description: Complete learning assessment and assess knowledge base.   Interventions:  - Provide teaching at level of understanding  - Provide teaching via preferred learning methods  Outcome: Progressing

## 2023-11-08 NOTE — PLAN OF CARE
Called patient again at her home # and talked with her  who said she was at the other end of the house and I would have to call her on her cell #.  I instructed him that I did but I had to leave a message - he said that's the best I could do then.    mi baker CMA   Problem: MOBILITY - ADULT  Goal: Maintain or return to baseline ADL function  Description: INTERVENTIONS:  -  Assess patient's ability to carry out ADLs; assess patient's baseline for ADL function and identify physical deficits which impact ability to perform ADLs (bathing, care of mouth/teeth, toileting, grooming, dressing, etc.)  - Assess/evaluate cause of self-care deficits   - Assess range of motion  - Assess patient's mobility; develop plan if impaired  - Assess patient's need for assistive devices and provide as appropriate  - Encourage maximum independence but intervene and supervise when necessary  - Involve family in performance of ADLs  - Assess for home care needs following discharge   - Consider OT consult to assist with ADL evaluation and planning for discharge  - Provide patient education as appropriate  Outcome: Progressing     Problem: Prexisting or High Potential for Compromised Skin Integrity  Goal: Skin integrity is maintained or improved  Description: INTERVENTIONS:  - Identify patients at risk for skin breakdown  - Assess and monitor skin integrity  - Assess and monitor nutrition and hydration status  - Monitor labs   - Assess for incontinence   - Turn and reposition patient  - Assist with mobility/ambulation  - Relieve pressure over bony prominences  - Avoid friction and shearing  - Provide appropriate hygiene as needed including keeping skin clean and dry  - Evaluate need for skin moisturizer/barrier cream  - Collaborate with interdisciplinary team   - Patient/family teaching  - Consider wound care consult   Outcome: Progressing     Problem: PAIN - ADULT  Goal: Verbalizes/displays adequate comfort level or baseline comfort level  Description: Interventions:  - Encourage patient to monitor pain and request assistance  - Assess pain using appropriate pain scale  - Administer analgesics based on type and severity of pain and evaluate response  - Implement non-pharmacological measures as appropriate and evaluate response  - Consider cultural and social influences on pain and pain management  - Notify physician/advanced practitioner if interventions unsuccessful or patient reports new pain  Outcome: Progressing     Problem: INFECTION - ADULT  Goal: Absence or prevention of progression during hospitalization  Description: INTERVENTIONS:  - Assess and monitor for signs and symptoms of infection  - Monitor lab/diagnostic results  - Monitor all insertion sites, i.e. indwelling lines, tubes, and drains  - Monitor endotracheal if appropriate and nasal secretions for changes in amount and color  - Mustang appropriate cooling/warming therapies per order  - Administer medications as ordered  - Instruct and encourage patient and family to use good hand hygiene technique  - Identify and instruct in appropriate isolation precautions for identified infection/condition  Outcome: Progressing     Problem: SAFETY ADULT  Goal: Maintain or return to baseline ADL function  Description: INTERVENTIONS:  -  Assess patient's ability to carry out ADLs; assess patient's baseline for ADL function and identify physical deficits which impact ability to perform ADLs (bathing, care of mouth/teeth, toileting, grooming, dressing, etc.)  - Assess/evaluate cause of self-care deficits   - Assess range of motion  - Assess patient's mobility; develop plan if impaired  - Assess patient's need for assistive devices and provide as appropriate  - Encourage maximum independence but intervene and supervise when necessary  - Involve family in performance of ADLs  - Assess for home care needs following discharge   - Consider OT consult to assist with ADL evaluation and planning for discharge  - Provide patient education as appropriate  Outcome: Progressing  Goal: Patient will remain free of falls  Description: INTERVENTIONS:  - Educate patient/family on patient safety including physical limitations  - Instruct patient to call for assistance with activity   - Consult OT/PT to assist with strengthening/mobility   - Keep Call bell within reach  - Keep bed low and locked with side rails adjusted as appropriate  - Keep care items and personal belongings within reach  - Initiate and maintain comfort rounds  - Make Fall Risk Sign visible to staff  - Offer Toileting every 2 Hours, in advance of need  - Initiate/Maintain bed alarm  - Apply yellow socks and bracelet for high fall risk patients  - Consider moving patient to room near nurses station  Outcome: Progressing     Problem: DISCHARGE PLANNING  Goal: Discharge to home or other facility with appropriate resources  Description: INTERVENTIONS:  - Identify barriers to discharge w/patient and caregiver  - Arrange for needed discharge resources and transportation as appropriate  - Identify discharge learning needs (meds, wound care, etc.)  - Arrange for interpretive services to assist at discharge as needed  - Refer to Case Management Department for coordinating discharge planning if the patient needs post-hospital services based on physician/advanced practitioner order or complex needs related to functional status, cognitive ability, or social support system  Outcome: Progressing     Problem: Knowledge Deficit  Goal: Patient/family/caregiver demonstrates understanding of disease process, treatment plan, medications, and discharge instructions  Description: Complete learning assessment and assess knowledge base.   Interventions:  - Provide teaching at level of understanding  - Provide teaching via preferred learning methods  Outcome: Progressing

## 2023-11-08 NOTE — PROGRESS NOTES
[address] [facility] [place]  Progress Note  Name: Elizabeth Mustafa  MRN: [id number]  Unit/Bed#: [facility] 343-68 I Date of Admission: 11/2/2023   Date of Service: 11/8/2023 I Hospital Day: 6    Assessment/Plan   Osteoarthritis of cervical spine with myelopathy  Assessment & Plan  Seen today for 2-week POV s/p C3-6 PCDF with Dr. Jarad Mcleod, 10/25/2023  In setting of severe C4-6 central cord compression/stenosis. Doing well post-operatively - was at rehab but with some complications including ARF, bilateral LE DVT's. On exam, left UE strength -4/5, LLE 4/5, RUE/RLE 5/5. Still requiring catheter. Plan:  Continue to monitor neuro exam closely. Posterior cervical incision well-appearing - stables and drain suture discontinued without issue. Continue intensive rehabilitation. On Eliquis for acute DVT. Ok to resume ASA if needed. Mobilize with PT/OT. DVT ppx: SCD's, Lovenox. Neurosurgery will sign off. Plan for outpatient follow up in 4 weeks for 6 week POV as scheduled. Call with questions. Subjective/Objective   Chief Complaint: "I'm doing ok."    Subjective: States he is doing well. Continues to experience numbness and tingling to bilateral upper extremities. Has not noticed much improvement. Ambulating with some difficulty but eager to return to rehab. Still with urinary retention. Objective: NAD. Left sided weakness. Posterior cervical incision clean and dry, well-approximated. I/O         11/06 0701 11/07 0700 11/07 0701 11/08 0700 11/08 0701 11/09 0700    P. O. 480 0 240    Total Intake(mL/kg) 480 (4.4) 0 (0) 240 (2.2)    Urine (mL/kg/hr) 1000 (0.4) 1150 (0.4) 200 (0.5)    Stool  0     Total Output 1000 1150 200    Net -520 -1150 +40           Unmeasured Stool Occurrence  5 x             Invasive Devices       Peripheral Intravenous Line  Duration             Peripheral IV 11/06/23 Left;Ventral (anterior) Forearm 2 days              Drain  Duration Urethral Catheter 16 Fr. 9 days                    Physical Exam:  Vitals: Blood pressure 137/71, pulse 95, temperature 97.9 °F (36.6 °C), resp. rate 16, height 5' 9" (1.753 m), weight 109 kg (239 lb 6.7 oz), SpO2 98 %. ,Body mass index is 35.36 kg/m². General appearance: alert, appears stated age, cooperative and no distress  Head: Normocephalic, without obvious abnormality, atraumatic  Eyes: EOMI, PERRL  Neck: Posterior cervical incision clean dry and intact  Back: no kyphosis present, no tenderness to percussion or palpation  Lungs: non labored breathing  Heart: regular heart rate  Neurologic:   Mental status: Alert, oriented x3, thought content appropriate  Cranial nerves: grossly intact (Cranial nerves II-XII)  Sensory: bilateral UE paresthesias  Motor: RUE/RLE 5/5; LUE -4/5, LLE 4/5  Reflexes: 2+ and symmetric, bilateral Bonilla's        Lab Results:  Results from last 7 days   Lab Units 11/05/23  0543 11/03/23  2302 11/03/23  0508   WBC Thousand/uL 9.57 11.24* 9.28   HEMOGLOBIN g/dL 11.5* 11.7* 11.9*   HEMATOCRIT % 33.9* 34.5* 35.6*   PLATELETS Thousands/uL 363 327 341   NEUTROS PCT % 78*  --  77*   MONOS PCT % 11  --  10   EOS PCT % 1  --  1     Results from last 7 days   Lab Units 11/08/23  0430 11/07/23  0457 11/06/23  0634 11/04/23  0548 11/03/23  2302   SODIUM mmol/L 129* 129* 130*   < > 129*   POTASSIUM mmol/L 4.0 3.6 3.8   < > 3.9   CHLORIDE mmol/L 91* 89* 91*   < > 91*   CO2 mmol/L 28 32 31   < > 31   BUN mg/dL 32* 32* 32*   < > 24   CREATININE mg/dL 0.92 0.93 1.01   < > 0.84   CALCIUM mg/dL 8.7 8.6 8.7   < > 8.3*   ALK PHOS U/L  --   --   --   --  56   ALT U/L  --   --   --   --  29   AST U/L  --   --   --   --  22    < > = values in this interval not displayed.      Results from last 7 days   Lab Units 11/07/23  0457 11/06/23  0634 11/05/23  0543   MAGNESIUM mg/dL 1.9 1.8* 1.9     Results from last 7 days   Lab Units 11/04/23  0548   PHOSPHORUS mg/dL 3.4     Results from last 7 days   Lab Units 11/03/23  2302   INR  1.11     No results found for: "TROPONINT"  ABG:No results found for: "PHART", "DFZ4ORN", "PO2ART", "ZEO0BUO", "O2PTPFEN", "BEART", "SOURCE"    Imaging Studies: I have personally reviewed pertinent reports. and I have personally reviewed pertinent films in PACS    CTA chest pe study    Result Date: 11/4/2023  Impression: 1. No pulmonary embolism. 2. Bibasilar left greater than right subsegmental atelectasis similar to the CT of the abdomen and pelvis from 2 days ago. 3. Stable calcified and noncalcified anterior mediastinal nodules likely lymph nodes possibly sequela of prior granulomatous infection. Differential diagnosis could include oil cysts. 3. Suspect cholelithiasis and/or gallbladder sludge. No pericholecystic fluid to suggest acute cholecystitis by CT. Further clinical assessment recommended. Workstation performed: [id number]     XR abdomen 1 view kub    Result Date: 11/2/2023  Impression: Distended gas-filled colon with the cecum measuring about 9 cm. Resident: Morgan Amato, the attending radiologist, have reviewed the images and agree with the final report above. Workstation performed: [id number]     XR chest portable ICU    Result Date: 11/2/2023  Impression: Low lung volumes and increased lung markings due to crowding. Mild left basilar density likely due to atelectasis No acute consolidation or congestion Resident: Stevo Proctor I, the attending radiologist, have reviewed the images and agree with the final report above. Workstation performed: [id number]     CT abdomen pelvis w contrast    Result Date: 11/2/2023  Impression: Diffuse moderate distention of the entire gastrointestinal tract with fluid, with no evidence of obstruction. Findings may reflect nonspecific gastroenteritis and/or ileus. Workstation performed: [id number]       EKG, Pathology, and Other Studies: I have personally reviewed pertinent reports.       VTE Pharmacologic Prophylaxis: Sequential compression device (Venodyne)     VTE Mechanical Prophylaxis: sequential compression device

## 2023-11-08 NOTE — RESTORATIVE TECHNICIAN NOTE
Restorative Technician Note      Patient Name: Emily Gordon     Restorative Tech Visit Date: 11/08/23  Note Type: Mobility  Patient Position Upon Consult: Bedside chair  Activity Performed: Ambulated  Assistive Device: Standard walker  Education Provided: Yes  Patient Position at End of Consult: Supine;  All needs within reach    Halina Peguero  DPT, Restorative Technician

## 2023-11-08 NOTE — ASSESSMENT & PLAN NOTE
S/p Christensen catheter placement at prior hospitalization, initially removed 10/26 however replaced 10/30 as patient failing urinary retention protocol  Continue finasteride  Voiding trial with improving ambulation

## 2023-11-08 NOTE — ASSESSMENT & PLAN NOTE
Dry Eyes OU -The use/continuation of artificial tears were recommended. Seen today for 2-week POV s/p C3-6 PCDF with Dr. Sadaf Turpin, 10/25/2023  In setting of severe C4-6 central cord compression/stenosis. Doing well post-operatively - was at rehab but with some complications including ARF, bilateral LE DVT's. On exam, left UE strength -4/5, LLE 4/5, RUE/RLE 5/5. Still requiring catheter. Plan:  Continue to monitor neuro exam closely. Posterior cervical incision well-appearing - stables and drain suture discontinued without issue. Continue intensive rehabilitation. On Eliquis for acute DVT. Ok to resume ASA if needed. Mobilize with PT/OT. DVT ppx: SCD's, Lovenox. Neurosurgery will sign off. Plan for outpatient follow up in 4 weeks for 6 week POV as scheduled. Call with questions.

## 2023-11-08 NOTE — ASSESSMENT & PLAN NOTE
Patient complaining of increased diffuse edema, appears he has gained at least 17 pounds since hospitalization and appears grossly volume overloaded on examination with CXR with some evidence of pulmonary vascular congestion. Patient himself denies any prior history of cardiac disease  S/p 40 mg IV Lasix during rapid response  Cardiac echo with EF 60% and grade 1 diastolic dysfunction no major valvular disease  BNP is 24  Monitor daily weights, I's/O, volume status  Received IV Lasix with good diuresis  Transition to p.o.  Lasix  Monitor

## 2023-11-08 NOTE — ASSESSMENT & PLAN NOTE
Patient was RRT at [facility] with respiratory distress, mildly hypoxic requiring 2 LNC to maintain appropriate saturations  Suspect in the setting of volume overload/anasarca with at least ileus as below contributing  Transferred to [address] [facility] for further management given situation  CXR Low lung volumes and increased lung markings due to crowding. Mild left  basilar density likely due to atelectasis.  No acute consolidation or congestion  Chest CT scan negative for PE with bibasilar atelectasis  Resolved  Now on ambient air  Encourage incentive spirometry  Monitor ambulatory O2 sats

## 2023-11-08 NOTE — ASSESSMENT & PLAN NOTE
Patient previously reported this was chronic issue, however sodium 127 now down from previous 132  Work-up in prior hospitalization revealing normal uric acid, urine sodium 46, urine osm 735, serum osm 278, TSH normal.  Cortisol was noted low however subsequent cosyntropin stimulation test was not concerning for adrenal insufficiency  Likely SIADH  Monitor sodium levels

## 2023-11-09 PROCEDURE — 97110 THERAPEUTIC EXERCISES: CPT

## 2023-11-09 PROCEDURE — 97535 SELF CARE MNGMENT TRAINING: CPT

## 2023-11-09 PROCEDURE — 99232 SBSQ HOSP IP/OBS MODERATE 35: CPT | Performed by: INTERNAL MEDICINE

## 2023-11-09 PROCEDURE — 97530 THERAPEUTIC ACTIVITIES: CPT

## 2023-11-09 PROCEDURE — 89055 LEUKOCYTE ASSESSMENT FECAL: CPT | Performed by: INTERNAL MEDICINE

## 2023-11-09 PROCEDURE — 97116 GAIT TRAINING THERAPY: CPT

## 2023-11-09 RX ORDER — LOPERAMIDE HCL 1 MG/7.5ML
2 SUSPENSION ORAL 3 TIMES DAILY PRN
Status: DISPENSED | OUTPATIENT
Start: 2023-11-09 | End: 2023-11-10

## 2023-11-09 RX ADMIN — Medication 1 PACKET: at 14:46

## 2023-11-09 RX ADMIN — GABAPENTIN 100 MG: 100 CAPSULE ORAL at 16:17

## 2023-11-09 RX ADMIN — LISINOPRIL 10 MG: 10 TABLET ORAL at 09:59

## 2023-11-09 RX ADMIN — FLUTICASONE PROPIONATE 1 SPRAY: 50 SPRAY, METERED NASAL at 10:00

## 2023-11-09 RX ADMIN — FOLIC ACID TAB 400 MCG 400 MCG: 400 TAB at 09:59

## 2023-11-09 RX ADMIN — FINASTERIDE 5 MG: 5 TABLET, FILM COATED ORAL at 09:59

## 2023-11-09 RX ADMIN — APIXABAN 10 MG: 5 TABLET, FILM COATED ORAL at 09:59

## 2023-11-09 RX ADMIN — TIZANIDINE 4 MG: 4 TABLET ORAL at 16:17

## 2023-11-09 RX ADMIN — LORATADINE 10 MG: 10 TABLET ORAL at 09:59

## 2023-11-09 RX ADMIN — GABAPENTIN 100 MG: 100 CAPSULE ORAL at 09:59

## 2023-11-09 RX ADMIN — GABAPENTIN 100 MG: 100 CAPSULE ORAL at 21:10

## 2023-11-09 RX ADMIN — TIZANIDINE 4 MG: 4 TABLET ORAL at 09:59

## 2023-11-09 RX ADMIN — MELATONIN 3 MG: at 21:09

## 2023-11-09 RX ADMIN — ATORVASTATIN CALCIUM 10 MG: 10 TABLET, FILM COATED ORAL at 09:59

## 2023-11-09 RX ADMIN — FUROSEMIDE 40 MG: 40 TABLET ORAL at 09:59

## 2023-11-09 RX ADMIN — TIZANIDINE 4 MG: 4 TABLET ORAL at 21:09

## 2023-11-09 RX ADMIN — POTASSIUM CHLORIDE 20 MEQ: 1500 TABLET, EXTENDED RELEASE ORAL at 09:58

## 2023-11-09 RX ADMIN — APIXABAN 10 MG: 5 TABLET, FILM COATED ORAL at 17:17

## 2023-11-09 RX ADMIN — LOPERAMIDE HCL 2 MG: 1 SOLUTION ORAL at 21:11

## 2023-11-09 NOTE — CASE MANAGEMENT
Case Management Discharge Planning Note    Patient name Lisa Box  Location [facility] 824/[facility] 790-78 MRN [id number]  : 1950 Date 2023       Current Admission Date: 2023  Current Admission Diagnosis:Acute respiratory insufficiency   Patient Active Problem List    Diagnosis Date Noted    Diarrhea 2023    Acute DVT (deep venous thrombosis) ([address]) 2023    Acute respiratory insufficiency 2023    Ileus ([address]) 2023    Urinary retention 2023    Neurogenic bowel 2023    Seasonal allergies 2023    Anasarca 2023    Paresthesia 10/25/2023    Weakness of extremity 10/25/2023    Hyponatremia 10/25/2023    Impaired mobility and activities of daily living 10/20/2023    Anemia 10/20/2023    Benign essential hypertension 10/20/2023    Dorsalgia, unspecified 10/20/2023    Gastroesophageal reflux disease 10/20/2023    Hyperlipidemia 10/20/2023    Osteoarthritis of cervical spine with myelopathy 2023    Tinea corporis 2019    Onychomycosis 2019      LOS (days): 7  Geometric Mean LOS (GMLOS) (days): 3.10  Days to GMLOS:-4.3     OBJECTIVE:  Risk of Unplanned Readmission Score: 19.35         Current admission status: Inpatient   Preferred Pharmacy:   [address]  [address] [place] [address]  Phone: [phone] Fax: [phone]    Primary Care Provider: Alyson Royal MD    Primary Insurance: [employer]  Secondary Insurance: MEDICARE    DISCHARGE DETAILS:        CM continues to follow. Pt clinically accepted at [facility] pending medical stability and bed availability. Spoke to [employer], Matt Stoo who reports no beds today at [facility] but beds tomorrow.

## 2023-11-09 NOTE — ASSESSMENT & PLAN NOTE
Reports 2-3 loose stools  Denies abdominal pain nausea vomiting or constitutional symptoms  Monitor stool output discussed with RN  Imodium as needed  Metamucil

## 2023-11-09 NOTE — PHYSICAL THERAPY NOTE
PHYSICAL THERAPY TREATMENT  NAME:  Marguerite Childress  DATE: 11/09/23    AGE:   68 y.o. Mrn:   [id number]  ADMIT DX:  No admission diagnoses are documented for this encounter. Past Medical History:   Diagnosis Date    GERD (gastroesophageal reflux disease)     Hyperlipidemia     Hypertension      Past Surgical History:   Procedure Laterality Date    COLONOSCOPY      HERNIA REPAIR      MO ARTHRD PST/PSTLAT TQ 1NTRSPC CRV BELW C2 SEGMENT N/A 10/25/2023    Procedure: C3-6 PCDF;  Surgeon: Martin Dias MD;  Location: [facility];  Service: Neurosurgery    MO COLONOSCOPY FLX DX W/COLLJ [place] WHEN PFRMD N/A 4/5/2019    Procedure: COLONOSCOPY;  Surgeon: Michelle Escobar DO;  Location: [facility]; Service: Gastroenterology    ROTATOR CUFF REPAIR Left     TONSILLECTOMY         Length Of Stay: 7     11/09/23 1001   PT Last Visit   PT Visit Date 11/09/23   Note Type   Note Type Treatment   Education   Education Provided Yes   End of Consult   Patient Position at End of Consult Bedside chair; All needs within reach;Bed/Chair alarm activated   Pain Assessment   Pain Assessment Tool 0-10   Pain Score 3   Pain Location/Orientation Location: Buttocks  (following toileting)   Pain Onset/Description Descriptor: Burning   Effect of Pain on Daily Activities pt reports frustration w/ ongoing diarrhea   Patient's Stated Pain Goal No pain   Restrictions/Precautions   Weight Bearing Precautions Per Order No   Other Precautions Multiple lines; Bed Alarm; Chair Alarm; Fall Risk;Pain   General   Chart Reviewed Yes   Response to Previous Treatment Patient with no complaints from previous session. Family/Caregiver Present No   Cognition   Overall Cognitive Status WFL   Arousal/Participation Alert; Cooperative   Attention Within functional limits   Orientation Level Oriented X4   Memory Within functional limits   Following Commands Follows all commands and directions without difficulty   Comments pleasant and agreable to PT   Subjective Subjective "I just want to get stronger" "my LTG is to get back to golfing   Bed Mobility   Additional Comments pt presenting OOB on commode working w/ OT on initioation of PT session- PT assisted w/ standing balance during hygiene   Transfers   Sit to Stand 4  Minimal assistance  (w/o arm rests)   Additional items Assist x 1; Increased time required;Verbal cues   Stand to Sit 4  Minimal assistance   Additional items Assist x 1; Increased time required;Verbal cues   Toilet transfer 4  Minimal assistance   Additional items Assist x 1; Increased time required;Verbal cues   Ambulation/Elevation   Gait pattern Wide TIFF; Decreased heel strike;Decreased foot clearance   Gait Assistance 4  Minimal assist  (CGA- w/ fatigue and w/ turning)   Additional items Verbal cues   Assistive Device Rolling walker   Distance 11+73+30'  (seated rest breaks)   Stair Management Assistance Not tested   Balance   Static Sitting Good   Dynamic Sitting Fair +   Static Standing Fair   Dynamic Standing Fair -   Ambulatory Poor +   Endurance Deficit   Endurance Deficit Yes   Endurance Deficit Description limtied standing tolerance; limtied gait distances ; needs seated therapeutic rest breaks. Activity Tolerance   Activity Tolerance Patient tolerated treatment well;Patient limited by fatigue   Medical Staff Made Aware RN/ OT   Nurse Made Aware RN cleared pt for PT- updated post session   Exercises   Knee AROM Long Arc Quad Sitting;20 reps;Right;Left;AROM   Ankle Pumps Sitting;20 reps   Marching Standing;20 reps  (x2)   Balance training  STS 2 sets 5 reps each   Assessment   Prognosis Good   Problem List Decreased strength;Decreased range of motion;Decreased endurance; Impaired balance;Decreased mobility; Decreased coordination;Obesity;Pain   Assessment Pt continues to require min/ CGA for balance ; especially w/ fatigue nearing end of tx session; LOB x1 during turning w/ gait training on last trial. Pt is limited by  endurance deficits; balance and limtied strength adn will continue to benefit from skilled PT  to regain independence. Pt is functioning w ell below caseline and has good potential to achieve goals. Goals   Patient Goals to get better and be able to golf again some day   STG Expiration Date 11/17/23   PT Treatment Day 3   Plan   Treatment/Interventions Functional transfer training;LE strengthening/ROM; Elevations; Therapeutic exercise; Endurance training;Patient/family training;Equipment eval/education; Bed mobility;Gait training; Compensatory technique education;Spoke to nursing;Spoke to case management;Spoke to advanced practitioner   Progress Progressing toward goals   PT Frequency 3-5x/wk   Discharge Recommendation   Rehab Resource Intensity Level, PT I (Maximum Resource Intensity)   Equipment Recommended Walker   Walker Package Recommended Wheeled walker   AM-PAC Basic Mobility Inpatient   Turning in Flat Bed Without Bedrails 2   Lying on Back to Sitting on Edge of Flat Bed Without Bedrails 2   Moving Bed to Chair 3   Standing Up From Chair Using Arms 3   Walk in Room 3   Climb 3-5 Stairs With Railing 2   Basic Mobility Inpatient Raw Score 15   Basic Mobility Standardized Score 36.97   Highest Level Of Mobility   -[facility] Goal 4: Move to chair/commode   JH-HLM Achieved 7: Walk 25 feet or more     The patient's AM-PAC Basic Mobility Inpatient Short Form Raw Score is 15. A Raw score of less than or equal to 16 suggests the patient may benefit from discharge to post-acute rehabilitation services. Please also refer to the recommendation of the Physical Therapist for safe discharge planning.             Rios Grace, PT

## 2023-11-09 NOTE — ASSESSMENT & PLAN NOTE
Patient s/p C3-C6 PCDF 10/25/2023 in setting of cervical stenosis/radiculopathy with cord compression at C4-5 level with severe canal stenosis  Neurosurgery inputs noted  Analgesics, physical therapy  Neurosurgery plans for outpatient follow-up noted

## 2023-11-09 NOTE — OCCUPATIONAL THERAPY NOTE
Occupational Therapy Progress Note     Patient Name: Demetrio Webster  KEVCP'N Date: 11/9/2023  Problem List  Principal Problem:    Acute respiratory insufficiency  Active Problems:    Osteoarthritis of cervical spine with myelopathy    Benign essential hypertension    Gastroesophageal reflux disease    Hyponatremia    Urinary retention    Anasarca    Ileus (HCC)    Acute DVT (deep venous thrombosis) ([address])    Diarrhea            11/09/23 0857   OT Last Visit   OT Visit Date 11/09/23   Note Type   Note Type Treatment   Pain Assessment   Pain Assessment Tool 0-10   Pain Score No Pain   Hospital Pain Intervention(s) Repositioned; Ambulation/increased activity; Emotional support   Restrictions/Precautions   Weight Bearing Precautions Per Order No   Other Precautions Multiple lines; Fall Risk;Pain   Lifestyle   Autonomy Mod I with increased to for self care tasks (with recent symptoms)/mod I with ADL's, no AD with functional mobility, +drives   Reciprocal Relationships pt reports golfing several times a week. meets his friends out at times   Service to Others retired   Intrinsic Gratification spending time outside   ADL   Where [place] [address]  Supervision/Setup   Grooming Deficit Setup; Wash/dry hands; Wash/dry face   UB Bathing Assistance 4  Minimal Assistance   UB Bathing Deficit Increased time to complete; Chest;Right arm;Left arm; Abdomen   LB Bathing Assistance 3  Moderate Assistance   LB Bathing Deficit Right upper leg;Left upper leg   UB Dressing Assistance 4  Minimal Assistance   UB Dressing Deficit Thread RUE; Thread LUE;Pull over head;Pull around back;Pull down in back   LB Dressing Assistance 2  Maximal Assistance   LB Dressing Deficit Thread RLE into underwear; Thread LLE into underwear;Pull up over hips   Toileting Assistance  2  Maximal Assistance   Toileting Deficit Clothing management up;Clothing management down;Perineal hygiene   Bed Mobility   Supine to Sit 3  Moderate assistance Additional items Assist x 2;HOB elevated; Bedrails; Increased time required;Verbal cues;LE management   Sit to Supine Unable to assess   Additional Comments Pt performed supine <> sit with Mod A x2 for UB postural support; sat EOB with good sitting balance; @ end of session, pt left sitting upright in recliner with PT, all functional needs in reach   Transfers   Sit to Stand 4  Minimal assistance   Additional items Assist x 1; Increased time required;Verbal cues;Armrests   Stand to Sit 4  Minimal assistance   Additional items Assist x 1; Armrests; Increased time required;Verbal cues   Toilet transfer 4  Minimal assistance   Additional items Assist x 1; Armrests; Increased time required;Verbal cues; Commode   Additional Comments w/ RW   Functional Mobility   Functional Mobility 4  Minimal assistance   Additional Comments Pt completed short household functional mobility distances w/ Min A x1 w/ RW, with verbal cues for pacing/energy conservation techniques   Additional items Rolling walker   Toilet Transfers   Toilet Transfer From Bed   Toilet Transfer Type To and from   Toilet Transfer to Standard bedside commode   Toilet Transfer Technique Ambulating   Toilet Transfers Minimal assistance   Toilet Transfers Comments w/ RW   Subjective   Subjective "I need to get this diarrhea to stop."   Cognition   Overall Cognitive Status [facility]   Arousal/Participation Alert; Responsive;Arousable; Cooperative   Attention Within functional limits   Orientation Level Oriented X4   Memory Within functional limits   Following Commands Follows all commands and directions without difficulty   Comments Pt pleasant and cooperative   Activity Tolerance   Activity Tolerance Patient tolerated treatment well   Medical Staff Made Aware RN cleared; Mukund Cordoba DPT   Assessment   Assessment Pt is a 67 yo male who actively participated in skilled OT session on 11/9/2023.  Parts of treatment completed with PT to increase safety, decrease fall risk, and maximize functional/occupational performance 2* medical complexity which is a regression from pt's functional baseline. Treatment focused to improve functional transfers with fall prevention strategies, static/dynamic balance, postural/trunk control, proper body mechanics, functional use of b/l UE's, safety awareness, and overall increased activity tolerance in ADL/IADL/leisure tasks. Upon arrival, pt found lying supine in bed and was agreeable to OT session. Pt performed supine <> sit with Mod A x2 for UB postural support and completed STS from EOB <> BSC w/ Min A x1 w/ RW. In seated position, pt performing UB bathing tasks w/ Min A, LB bathing tasks w/ Mod A, UB dressing tasks w/ Min A, and LB dressing tasks w/ Max A. Pt performed STS w/ Min A x1 w/ RW, requiring Max A for posterior hygiene 2* decreased dynamic balance and functional reach. Pt performed additional household functional mobility w/ Min A x1 w/ RW to simulate household distances during ADL/IADL routines in which pt required 2 standing rest break. At the end of the session, pt was left sitting upright in recliner with all functional needs in reach. Pt demonstrates gradual functional improvements towards OT goals however continues to be functioning below occupational baseline and is still limited by the following limitations/impairments which were addressed through skilled instruction: generalized weakness, balance, endurance/activity tolerance, postural/trunk control, strength, pain, and safety awareness. At this time, recommend discharge to post-acute rehab when medically stable. The patient's raw score on the -PAC Daily Activity Inpatient Short Form is 16. A raw score of less than 19 suggests the patient may benefit from discharge to post-acute rehabilitation services. Please refer to the recommendation of the Occupational Therapist for safe discharge planning.  Established OT goals will be continued 2-3x/wk to address immediate acute care needs and underlying performance skills to maximize occupational performance and safety to return to [facility]. Plan   Treatment Interventions ADL retraining;Functional transfer training; Endurance training;Patient/family training;Equipment evaluation/education; Compensatory technique education;Continued evaluation; Energy conservation; Activityengagement   Goal Expiration Date 11/17/23   OT Treatment Day 5   OT Frequency 2-3x/wk   Discharge Recommendation   Rehab Resource Intensity Level, OT I (Maximum Resource Intensity)   AM-PAC Daily Activity Inpatient   Lower Body Dressing 2   Bathing 2   Toileting 2   Upper Body Dressing 3   Grooming 3   Eating 4   Daily Activity Raw Score 16   Daily Activity Standardized Score (Calc for Raw Score >=11) 35.96   AM-PAC Applied Cognition Inpatient   Following a Speech/Presentation 4   Understanding Ordinary Conversation 4   Taking Medications 4   Remembering Where Things Are Placed or Put Away 4   Remembering List of 4-5 Errands 4   Taking Care of Complicated Tasks 4   Applied Cognition Raw Score 24   Applied Cognition Standardized Score 62.21   End of Consult   Education Provided Yes   Patient Position at End of Consult Bedside chair; All needs within reach   Nurse Communication Nurse aware of consult     Aylin Noble MS, OTR/L

## 2023-11-09 NOTE — PLAN OF CARE
Problem: MOBILITY - ADULT  Goal: Maintain or return to baseline ADL function  Description: INTERVENTIONS:  -  Assess patient's ability to carry out ADLs; assess patient's baseline for ADL function and identify physical deficits which impact ability to perform ADLs (bathing, care of mouth/teeth, toileting, grooming, dressing, etc.)  - Assess/evaluate cause of self-care deficits   - Assess range of motion  - Assess patient's mobility; develop plan if impaired  - Assess patient's need for assistive devices and provide as appropriate  - Encourage maximum independence but intervene and supervise when necessary  - Involve family in performance of ADLs  - Assess for home care needs following discharge   - Consider OT consult to assist with ADL evaluation and planning for discharge  - Provide patient education as appropriate  Outcome: Progressing     Problem: Prexisting or High Potential for Compromised Skin Integrity  Goal: Skin integrity is maintained or improved  Description: INTERVENTIONS:  - Identify patients at risk for skin breakdown  - Assess and monitor skin integrity  - Assess and monitor nutrition and hydration status  - Monitor labs   - Assess for incontinence   - Turn and reposition patient  - Assist with mobility/ambulation  - Relieve pressure over bony prominences  - Avoid friction and shearing  - Provide appropriate hygiene as needed including keeping skin clean and dry  - Evaluate need for skin moisturizer/barrier cream  - Collaborate with interdisciplinary team   - Patient/family teaching  - Consider wound care consult   Outcome: Progressing     Problem: PAIN - ADULT  Goal: Verbalizes/displays adequate comfort level or baseline comfort level  Description: Interventions:  - Encourage patient to monitor pain and request assistance  - Assess pain using appropriate pain scale  - Administer analgesics based on type and severity of pain and evaluate response  - Implement non-pharmacological measures as appropriate and evaluate response  - Consider cultural and social influences on pain and pain management  - Notify physician/advanced practitioner if interventions unsuccessful or patient reports new pain  Outcome: Progressing     Problem: INFECTION - ADULT  Goal: Absence or prevention of progression during hospitalization  Description: INTERVENTIONS:  - Assess and monitor for signs and symptoms of infection  - Monitor lab/diagnostic results  - Monitor all insertion sites, i.e. indwelling lines, tubes, and drains  - Monitor endotracheal if appropriate and nasal secretions for changes in amount and color  - [place] appropriate cooling/warming therapies per order  - Administer medications as ordered  - Instruct and encourage patient and family to use good hand hygiene technique  - Identify and instruct in appropriate isolation precautions for identified infection/condition  Outcome: Progressing     Problem: SAFETY ADULT  Goal: Maintain or return to baseline ADL function  Description: INTERVENTIONS:  -  Assess patient's ability to carry out ADLs; assess patient's baseline for ADL function and identify physical deficits which impact ability to perform ADLs (bathing, care of mouth/teeth, toileting, grooming, dressing, etc.)  - Assess/evaluate cause of self-care deficits   - Assess range of motion  - Assess patient's mobility; develop plan if impaired  - Assess patient's need for assistive devices and provide as appropriate  - Encourage maximum independence but intervene and supervise when necessary  - Involve family in performance of ADLs  - Assess for home care needs following discharge   - Consider OT consult to assist with ADL evaluation and planning for discharge  - Provide patient education as appropriate  Outcome: Progressing  Goal: Patient will remain free of falls  Description: INTERVENTIONS:  - Educate patient/family on patient safety including physical limitations  - Instruct patient to call for assistance with activity   - Consult OT/PT to assist with strengthening/mobility   - Keep Call bell within reach  - Keep bed low and locked with side rails adjusted as appropriate  - Keep care items and personal belongings within reach  - Initiate and maintain comfort rounds  - Make Fall Risk Sign visible to staff  - Offer Toileting every 2 Hours, in advance of need  - Initiate/Maintain bed alarm  - Apply yellow socks and bracelet for high fall risk patients  - Consider moving patient to room near nurses station  Outcome: Progressing

## 2023-11-09 NOTE — ASSESSMENT & PLAN NOTE
Patient reporting marked abdominal distention, with episodes of watery diarrhea this admission  CT a/p: Diffuse moderate distention of entire GI tract. No evidence of obstruction.   Nonspecific gastroenteritis and/or ileus   Tolerating oral feeds denies nausea or vomiting  Ileus resolving  Supportive cares

## 2023-11-09 NOTE — ASSESSMENT & PLAN NOTE
Patient was RRT at [facility] with respiratory distress, mildly hypoxic requiring 2 LNC to maintain appropriate saturations  Suspect in the setting of volume overload/anasarca with at least ileus as below contributing  Transferred to [address] [facility] for further management given situation  CXR Low lung volumes and increased lung markings due to crowding. Mild left  basilar density likely due to atelectasis.  No acute consolidation or congestion  Chest CT scan negative for PE with bibasilar atelectasis  Resolved  Maintaining good sats on ambient air  Monitor ambulatory O2 sats  Encourage incentive spirometry

## 2023-11-09 NOTE — ASSESSMENT & PLAN NOTE
Patient complaining of increased diffuse edema, appears he has gained at least 17 pounds since hospitalization and appears grossly volume overloaded on examination with CXR with some evidence of pulmonary vascular congestion. Patient himself denies any prior history of cardiac disease  S/p 40 mg IV Lasix during rapid response  Cardiac echo with EF 60% and grade 1 diastolic dysfunction no major valvular disease  BNP is 24  Monitor daily weights, I's/O, volume status  Received IV Lasix with good diuresis now on p.o.  Lasix  Monitor

## 2023-11-09 NOTE — PLAN OF CARE
Problem: OCCUPATIONAL THERAPY ADULT  Goal: Performs self-care activities at highest level of function for planned discharge setting. See evaluation for individualized goals. Description: Treatment Interventions: ADL retraining, Functional transfer training, UE strengthening/ROM, Endurance training, Equipment evaluation/education, Compensatory technique education, Continued evaluation, Energy conservation, Activityengagement          See flowsheet documentation for full assessment, interventions and recommendations. Outcome: Progressing  Note: Limitation: Decreased ADL status, Decreased UE strength, Decreased UE ROM, Decreased endurance, Decreased self-care trans, Decreased high-level ADLs  Prognosis: Fair  Assessment: Pt is a 67 yo male who actively participated in skilled OT session on 11/9/2023. Parts of treatment completed with PT to increase safety, decrease fall risk, and maximize functional/occupational performance 2* medical complexity which is a regression from pt's functional baseline. Treatment focused to improve functional transfers with fall prevention strategies, static/dynamic balance, postural/trunk control, proper body mechanics, functional use of b/l UE's, safety awareness, and overall increased activity tolerance in ADL/IADL/leisure tasks. Upon arrival, pt found lying supine in bed and was agreeable to OT session. Pt performed supine <> sit with Mod A x2 for UB postural support and completed STS from EOB <> BSC w/ Min A x1 w/ RW. In seated position, pt performing UB bathing tasks w/ Min A, LB bathing tasks w/ Mod A, UB dressing tasks w/ Min A, and LB dressing tasks w/ Max A. Pt performed STS w/ Min A x1 w/ RW, requiring Max A for posterior hygiene 2* decreased dynamic balance and functional reach. Pt performed additional household functional mobility w/ Min A x1 w/ RW to simulate household distances during ADL/IADL routines in which pt required 2 standing rest break.  At the end of the session, pt was left sitting upright in recliner with all functional needs in reach. Pt demonstrates gradual functional improvements towards OT goals however continues to be functioning below occupational baseline and is still limited by the following limitations/impairments which were addressed through skilled instruction: generalized weakness, balance, endurance/activity tolerance, postural/trunk control, strength, pain, and safety awareness. At this time, recommend discharge to post-acute rehab when medically stable. The patient's raw score on the -PAC Daily Activity Inpatient Short Form is 16. A raw score of less than 19 suggests the patient may benefit from discharge to post-acute rehabilitation services. Please refer to the recommendation of the Occupational Therapist for safe discharge planning. Established OT goals will be continued 2-3x/wk to address immediate acute care needs and underlying performance skills to maximize occupational performance and safety to return to [facility].      Rehab Resource Intensity Level, OT: I (Maximum Resource Intensity)

## 2023-11-09 NOTE — ASSESSMENT & PLAN NOTE
Acute bilateral lower extremity DVT  Cleared by neurosurgery to start Eliquis  Eliquis started on 11/4/2023  Continue Eliquis anticoagulation  Outpatient follow-up

## 2023-11-09 NOTE — PLAN OF CARE
Problem: PHYSICAL THERAPY ADULT  Goal: Performs mobility at highest level of function for planned discharge setting. See evaluation for individualized goals. Description: Treatment/Interventions: Functional transfer training, OT, Spoke to nursing, Gait training, Bed mobility, Therapeutic exercise, Patient/family training  Equipment Recommended: Ruben Martell       See flowsheet documentation for full assessment, interventions and recommendations. Outcome: Progressing  Note: Prognosis: Good  Problem List: Decreased strength, Decreased range of motion, Decreased endurance, Impaired balance, Decreased mobility, Decreased coordination, Obesity, Pain  Assessment: Pt continues to require min/ CGA for balance ; especially w/ fatigue nearing end of tx session; LOB x1 during turning w/ gait training on last trial. Pt is limited by  endurance deficits; balance and limtied strength adn will continue to benefit from skilled PT  to regain independence. Pt is functioning w ell below caseline and has good potential to achieve goals. Barriers to Discharge: Inaccessible home environment, Decreased caregiver support     Rehab Resource Intensity Level, PT: I (Maximum Resource Intensity)    See flowsheet documentation for full assessment.

## 2023-11-09 NOTE — PROGRESS NOTES
[address] [facility]  Progress Note  Name: Maricel Suarez  MRN: [id number]  Unit/Bed#: [facility] 824-01 I Date of Admission: 11/2/2023   Date of Service: 11/9/2023 I Hospital Day: 7    Assessment/Plan   * Acute respiratory insufficiency  Assessment & Plan  Patient was RRT at [facility] with respiratory distress, mildly hypoxic requiring 2 LNC to maintain appropriate saturations  Suspect in the setting of volume overload/anasarca with at least ileus as below contributing  Transferred to [address] for further management given situation  CXR Low lung volumes and increased lung markings due to crowding. Mild left  basilar density likely due to atelectasis. No acute consolidation or congestion  Chest CT scan negative for PE with bibasilar atelectasis  Resolved  Maintaining good sats on ambient air  Monitor ambulatory O2 sats  Encourage incentive spirometry        Diarrhea  Assessment & Plan  Reports 2-3 loose stools  Denies abdominal pain nausea vomiting or constitutional symptoms  Monitor stool output discussed with RN  Imodium as needed  Metamucil        Acute DVT (deep venous thrombosis) ([facility])  Assessment & Plan  Acute bilateral lower extremity DVT  Cleared by neurosurgery to start Eliquis  Eliquis started on 11/4/2023  Continue Eliquis anticoagulation  Outpatient follow-up    Ileus [facility])  Assessment & Plan  Patient reporting marked abdominal distention, with episodes of watery diarrhea this admission  CT a/p: Diffuse moderate distention of entire GI tract. No evidence of obstruction. Nonspecific gastroenteritis and/or ileus   Tolerating oral feeds denies nausea or vomiting  Ileus resolving  Supportive cares      Anasarca  Assessment & Plan  Patient complaining of increased diffuse edema, appears he has gained at least 17 pounds since hospitalization and appears grossly volume overloaded on examination with CXR with some evidence of pulmonary vascular congestion.   Patient himself denies any prior history of cardiac disease  S/p 40 mg IV Lasix during rapid response  Cardiac echo with EF 60% and grade 1 diastolic dysfunction no major valvular disease  BNP is 24  Monitor daily weights, I's/O, volume status  Received IV Lasix with good diuresis now on p.o. Lasix  Monitor      Urinary retention  Assessment & Plan  S/p Christensen catheter placement at prior hospitalization, initially removed 10/26 however replaced 10/30 as patient failing urinary retention protocol  Continue finasteride  Voiding trial with improving ambulation    Hyponatremia  Assessment & Plan  Patient previously reported this was chronic issue, however sodium 127 now down from previous 132  Work-up in prior hospitalization revealing normal uric acid, urine sodium 46, urine osm 735, serum osm 278, TSH normal.  Cortisol was noted low however subsequent cosyntropin stimulation test was not concerning for adrenal insufficiency  Likely SIADH  Monitor sodium levels    Gastroesophageal reflux disease  Assessment & Plan  Tums as needed    Benign essential hypertension  Assessment & Plan  Blood pressures reviewed  Continue lisinopril 10 mg daily  Monitor blood pressures      Osteoarthritis of cervical spine with myelopathy  Assessment & Plan  Patient s/p C3-C6 PCDF 10/25/2023 in setting of cervical stenosis/radiculopathy with cord compression at C4-5 level with severe canal stenosis  Neurosurgery inputs noted  Analgesics, physical therapy  Neurosurgery plans for outpatient follow-up noted                 VTE Pharmacologic Prophylaxis: VTE Score: 5 High Risk (Score >/= 5) - Pharmacological DVT Prophylaxis Ordered: apixaban (Eliquis). Sequential Compression Devices Ordered. Patient Centered Rounds: I performed bedside rounds with nursing staff today. Discussions with Specialists or Other Care Team Provider: Case management    Education and Discussions with Family / Patient:  Patient, updated sister Renetta Novoa questions answered.      Total Time Spent on Date of Encounter in care of patient: 31 mins. This time was spent on one or more of the following: performing physical exam; counseling and coordination of care; obtaining or reviewing history; documenting in the medical record; reviewing/ordering tests, medications or procedures; communicating with other healthcare professionals and discussing with patient's family/caregivers. Current Length of Stay: 7 day(s)  Current Patient Status: Inpatient   Certification Statement: The patient will continue to require additional inpatient hospital stay due to as outlined  Discharge Plan:  Awaiting clinical symptomatic improvement, pending placement Case management following    Code Status: Level 1 - Full Code    Subjective:     Sitting up in chair  Reports 3-4 loose bowel movements denies abdominal pain nausea vomiting tolerating oral feeds  Denies constitutional symptoms fevers  Discussed with RN to maintain stool record  Patient reports frustration and wanting to go to rehab to complete his rehabilitation -updated Case management      Objective:     Vitals:   Temp (24hrs), Av.9 °F (36.6 °C), Min:97.7 °F (36.5 °C), Max:98.1 °F (36.7 °C)    Temp:  [97.7 °F (36.5 °C)-98.1 °F (36.7 °C)] 97.9 °F (36.6 °C)  HR:  [89-94] 94  Resp:  [16-18] 18  BP: ()/(59-69) 126/63  SpO2:  [94 %-97 %] 96 %  Body mass index is 35.49 kg/m². Input and Output Summary (last 24 hours):      Intake/Output Summary (Last 24 hours) at 2023 1438  Last data filed at 2023 0600  Gross per 24 hour   Intake 240 ml   Output 825 ml   Net -585 ml       Physical Exam:   Physical Exam       Sitting up in chair  Obese  Short thick neck  Lungs diminished breath sounds at the bases  Vesicular breath sounds  Heart sounds S1 and S2 noted  Heart sounds are distant  Abdomen soft nontender  Awake obey simple commands  No rash  Pedal Ouma improved    Additional Data:     Labs:  Results from last 7 days   Lab Units 23  0543   WBC Thousand/uL 9.57 HEMOGLOBIN g/dL 11.5*   HEMATOCRIT % 33.9*   PLATELETS Thousands/uL 363   NEUTROS PCT % 78*   LYMPHS PCT % 9*   MONOS PCT % 11   EOS PCT % 1     Results from last 7 days   Lab Units 11/08/23  0430 11/04/23  0548 11/03/23  2302   SODIUM mmol/L 129*   < > 129*   POTASSIUM mmol/L 4.0   < > 3.9   CHLORIDE mmol/L 91*   < > 91*   CO2 mmol/L 28   < > 31   BUN mg/dL 32*   < > 24   CREATININE mg/dL 0.92   < > 0.84   ANION GAP mmol/L 10   < > 7   CALCIUM mg/dL 8.7   < > 8.3*   ALBUMIN g/dL  --   --  3.2*   TOTAL BILIRUBIN mg/dL  --   --  0.59   ALK PHOS U/L  --   --  56   ALT U/L  --   --  29   AST U/L  --   --  22   GLUCOSE RANDOM mg/dL 91   < > 112    < > = values in this interval not displayed.      Results from last 7 days   Lab Units 11/03/23  2302   INR  1.11     Results from last 7 days   Lab Units 11/07/23  2102   POC GLUCOSE mg/dl 86               Lines/Drains:  Invasive Devices       Peripheral Intravenous Line  Duration             Peripheral IV 11/06/23 Left;Ventral (anterior) Forearm 3 days              Drain  Duration             Urethral Catheter 16 Fr. 10 days                  Urinary Catheter:  Goal for removal: Voiding trial when ambulation improves               Imaging: Reviewed radiology reports from this admission including: chest CT scan    Recent Cultures (last 7 days):         Last 24 Hours Medication List:   Current Facility-Administered Medications   Medication Dose Route Frequency Provider Last Rate    acetaminophen  650 mg Oral Q6H PRN Parvez Vora, DO      albuterol  2 puff Inhalation Q6H PRN AAKASH Turner      apixaban  10 mg Oral BID Rangel Riccardo, DO      Followed by    Anabel Leung ON 11/11/2023] apixaban  5 mg Oral BID Rangel Riccardo, DO      atorvastatin  10 mg Oral Daily Shiraa Diony, DO      calcium carbonate  500 mg Oral Daily PRN Savannah Rodriguez MD      finasteride  5 mg Oral Daily Jenlia Diony, DO      fluticasone  1 spray Each Nare BID Shiraa Diony, DO      folic acid 400 mcg Oral Daily Alberto Kim, DO      furosemide  40 mg Oral Daily Keeley Malone MD      gabapentin  100 mg Oral TID Alberto Kim, DO      lisinopril  10 mg Oral Daily Georgetta Gum, DO      loperamide  2 mg Oral TID PRN Keeley Malone MD      loratadine  10 mg Oral Daily Alberto Kim, DO      melatonin  3 mg Oral HS Alberto Kim, DO      ondansetron  4 mg Intravenous Q6H PRN Alberto Kim, DO      oxyCODONE  2.5 mg Oral Q4H PRN Georgetta Gum, DO      potassium chloride  40 mEq Oral BID Georgetta Gum, DO      psyllium  1 packet Oral Daily Keeley Malnoe MD      tiZANidine  4 mg Oral TID Alberto Kim, DO          Today, Patient Was Seen By: Keeley Malone MD    **Please Note: This note may have been constructed using a voice recognition system. **

## 2023-11-09 NOTE — PLAN OF CARE
Problem: MOBILITY - ADULT  Goal: Maintain or return to baseline ADL function  Description: INTERVENTIONS:  -  Assess patient's ability to carry out ADLs; assess patient's baseline for ADL function and identify physical deficits which impact ability to perform ADLs (bathing, care of mouth/teeth, toileting, grooming, dressing, etc.)  - Assess/evaluate cause of self-care deficits   - Assess range of motion  - Assess patient's mobility; develop plan if impaired  - Assess patient's need for assistive devices and provide as appropriate  - Encourage maximum independence but intervene and supervise when necessary  - Involve family in performance of ADLs  - Assess for home care needs following discharge   - Consider OT consult to assist with ADL evaluation and planning for discharge  - Provide patient education as appropriate  Outcome: Progressing     Problem: Prexisting or High Potential for Compromised Skin Integrity  Goal: Skin integrity is maintained or improved  Description: INTERVENTIONS:  - Identify patients at risk for skin breakdown  - Assess and monitor skin integrity  - Assess and monitor nutrition and hydration status  - Monitor labs   - Assess for incontinence   - Turn and reposition patient  - Assist with mobility/ambulation  - Relieve pressure over bony prominences  - Avoid friction and shearing  - Provide appropriate hygiene as needed including keeping skin clean and dry  - Evaluate need for skin moisturizer/barrier cream  - Collaborate with interdisciplinary team   - Patient/family teaching  - Consider wound care consult   Outcome: Progressing     Problem: PAIN - ADULT  Goal: Verbalizes/displays adequate comfort level or baseline comfort level  Description: Interventions:  - Encourage patient to monitor pain and request assistance  - Assess pain using appropriate pain scale  - Administer analgesics based on type and severity of pain and evaluate response  - Implement non-pharmacological measures as appropriate and evaluate response  - Consider cultural and social influences on pain and pain management  - Notify physician/advanced practitioner if interventions unsuccessful or patient reports new pain  Outcome: Progressing     Problem: INFECTION - ADULT  Goal: Absence or prevention of progression during hospitalization  Description: INTERVENTIONS:  - Assess and monitor for signs and symptoms of infection  - Monitor lab/diagnostic results  - Monitor all insertion sites, i.e. indwelling lines, tubes, and drains  - Monitor endotracheal if appropriate and nasal secretions for changes in amount and color  - [place] appropriate cooling/warming therapies per order  - Administer medications as ordered  - Instruct and encourage patient and family to use good hand hygiene technique  - Identify and instruct in appropriate isolation precautions for identified infection/condition  Outcome: Progressing     Problem: SAFETY ADULT  Goal: Maintain or return to baseline ADL function  Description: INTERVENTIONS:  -  Assess patient's ability to carry out ADLs; assess patient's baseline for ADL function and identify physical deficits which impact ability to perform ADLs (bathing, care of mouth/teeth, toileting, grooming, dressing, etc.)  - Assess/evaluate cause of self-care deficits   - Assess range of motion  - Assess patient's mobility; develop plan if impaired  - Assess patient's need for assistive devices and provide as appropriate  - Encourage maximum independence but intervene and supervise when necessary  - Involve family in performance of ADLs  - Assess for home care needs following discharge   - Consider OT consult to assist with ADL evaluation and planning for discharge  - Provide patient education as appropriate  Outcome: Progressing  Goal: Patient will remain free of falls  Description: INTERVENTIONS:  - Educate patient/family on patient safety including physical limitations  - Instruct patient to call for assistance with activity   - Consult OT/PT to assist with strengthening/mobility   - Keep Call bell within reach  - Keep bed low and locked with side rails adjusted as appropriate  - Keep care items and personal belongings within reach  - Initiate and maintain comfort rounds  - Apply yellow socks and bracelet for high fall risk patients  - Consider moving patient to room near nurses station  Outcome: Progressing     Problem: DISCHARGE PLANNING  Goal: Discharge to home or other facility with appropriate resources  Description: INTERVENTIONS:  - Identify barriers to discharge w/patient and caregiver  - Arrange for needed discharge resources and transportation as appropriate  - Identify discharge learning needs (meds, wound care, etc.)  - Arrange for interpretive services to assist at discharge as needed  - Refer to Case Management Department for coordinating discharge planning if the patient needs post-hospital services based on physician/advanced practitioner order or complex needs related to functional status, cognitive ability, or social support system  Outcome: Progressing     Problem: Knowledge Deficit  Goal: Patient/family/caregiver demonstrates understanding of disease process, treatment plan, medications, and discharge instructions  Description: Complete learning assessment and assess knowledge base. Interventions:  - Provide teaching at level of understanding  - Provide teaching via preferred learning methods  Outcome: Progressing     Problem: Nutrition/Hydration-ADULT  Goal: Nutrient/Hydration intake appropriate for improving, restoring or maintaining nutritional needs  Description: Monitor and assess patient's nutrition/hydration status for malnutrition. Collaborate with interdisciplinary team and initiate plan and interventions as ordered. Monitor patient's weight and dietary intake as ordered or per policy. Utilize nutrition screening tool and intervene as necessary.  Determine patient's food preferences and provide high-protein, high-caloric foods as appropriate.      INTERVENTIONS:  - Monitor oral intake, urinary output, labs, and treatment plans  - Assess nutrition and hydration status and recommend course of action  - Evaluate amount of meals eaten  - Assist patient with eating if necessary   - Allow adequate time for meals  - Recommend/ encourage appropriate diets, oral nutritional supplements, and vitamin/mineral supplements  - Order, calculate, and assess calorie counts as needed  - Recommend, monitor, and adjust tube feedings and TPN/PPN based on assessed needs  - Assess need for intravenous fluids  - Provide specific nutrition/hydration education as appropriate  - Include patient/family/caregiver in decisions related to nutrition  Outcome: Progressing

## 2023-11-10 ENCOUNTER — HOSPITAL ENCOUNTER (INPATIENT)
Facility: HOSPITAL | Age: 73
LOS: 10 days | Discharge: HOME | DRG: 560 | End: 2023-11-20
Attending: PHYSICAL MEDICINE & REHABILITATION | Admitting: PHYSICAL MEDICINE & REHABILITATION
Payer: MEDICARE

## 2023-11-10 VITALS
RESPIRATION RATE: 16 BRPM | HEIGHT: 69 IN | BODY MASS INDEX: 34.29 KG/M2 | OXYGEN SATURATION: 97 % | TEMPERATURE: 98.1 F | DIASTOLIC BLOOD PRESSURE: 76 MMHG | WEIGHT: 231.48 LBS | SYSTOLIC BLOOD PRESSURE: 118 MMHG | HEART RATE: 102 BPM

## 2023-11-10 DIAGNOSIS — K56.7 ILEUS (HCC): ICD-10-CM

## 2023-11-10 DIAGNOSIS — E87.70 HYPERVOLEMIA, UNSPECIFIED HYPERVOLEMIA TYPE: ICD-10-CM

## 2023-11-10 DIAGNOSIS — K59.2 NEUROGENIC BOWEL: ICD-10-CM

## 2023-11-10 DIAGNOSIS — Z78.9 IMPAIRED MOBILITY AND ACTIVITIES OF DAILY LIVING: ICD-10-CM

## 2023-11-10 DIAGNOSIS — L60.2 OVERGROWN TOENAILS: ICD-10-CM

## 2023-11-10 DIAGNOSIS — I10 BENIGN ESSENTIAL HYPERTENSION: ICD-10-CM

## 2023-11-10 DIAGNOSIS — M47.12 CERVICAL SPONDYLOSIS WITH MYELOPATHY: Primary | ICD-10-CM

## 2023-11-10 DIAGNOSIS — I82.443 ACUTE DEEP VEIN THROMBOSIS (DVT) OF TIBIAL VEIN OF BOTH LOWER EXTREMITIES (HCC): ICD-10-CM

## 2023-11-10 DIAGNOSIS — Z74.09 IMPAIRED MOBILITY AND ACTIVITIES OF DAILY LIVING: ICD-10-CM

## 2023-11-10 DIAGNOSIS — K21.9 GASTROESOPHAGEAL REFLUX DISEASE WITHOUT ESOPHAGITIS: ICD-10-CM

## 2023-11-10 DIAGNOSIS — R19.7 DIARRHEA, UNSPECIFIED TYPE: ICD-10-CM

## 2023-11-10 LAB
FLUAV RNA RESP QL NAA+PROBE: NEGATIVE
FLUBV RNA RESP QL NAA+PROBE: NEGATIVE
RSV RNA RESP QL NAA+PROBE: NEGATIVE
SARS-COV-2 RNA RESP QL NAA+PROBE: NEGATIVE

## 2023-11-10 PROCEDURE — 0241U HB NFCT DS VIR RESP RNA 4 TRGT: CPT | Performed by: INTERNAL MEDICINE

## 2023-11-10 PROCEDURE — 97530 THERAPEUTIC ACTIVITIES: CPT

## 2023-11-10 PROCEDURE — 99222 1ST HOSP IP/OBS MODERATE 55: CPT | Performed by: INTERNAL MEDICINE

## 2023-11-10 PROCEDURE — NC001 PR NO CHARGE

## 2023-11-10 PROCEDURE — 99239 HOSP IP/OBS DSCHRG MGMT >30: CPT | Performed by: INTERNAL MEDICINE

## 2023-11-10 PROCEDURE — 97112 NEUROMUSCULAR REEDUCATION: CPT

## 2023-11-10 PROCEDURE — 97116 GAIT TRAINING THERAPY: CPT

## 2023-11-10 PROCEDURE — 99223 1ST HOSP IP/OBS HIGH 75: CPT | Performed by: PHYSICAL MEDICINE & REHABILITATION

## 2023-11-10 RX ORDER — FLUTICASONE PROPIONATE 50 MCG
1 SPRAY, SUSPENSION (ML) NASAL 2 TIMES DAILY
Status: DISCONTINUED | OUTPATIENT
Start: 2023-11-10 | End: 2023-11-20 | Stop reason: HOSPADM

## 2023-11-10 RX ORDER — ALBUTEROL SULFATE 90 UG/1
2 AEROSOL, METERED RESPIRATORY (INHALATION) EVERY 6 HOURS PRN
Status: DISCONTINUED | OUTPATIENT
Start: 2023-11-10 | End: 2023-11-20 | Stop reason: HOSPADM

## 2023-11-10 RX ORDER — LANOLIN ALCOHOL/MO/W.PET/CERES
3 CREAM (GRAM) TOPICAL
Status: DISCONTINUED | OUTPATIENT
Start: 2023-11-10 | End: 2023-11-20 | Stop reason: HOSPADM

## 2023-11-10 RX ORDER — TIZANIDINE 2 MG/1
2 TABLET ORAL 3 TIMES DAILY
Status: DISCONTINUED | OUTPATIENT
Start: 2023-11-11 | End: 2023-11-10

## 2023-11-10 RX ORDER — ATORVASTATIN CALCIUM 10 MG/1
10 TABLET, FILM COATED ORAL DAILY
Status: DISCONTINUED | OUTPATIENT
Start: 2023-11-11 | End: 2023-11-20 | Stop reason: HOSPADM

## 2023-11-10 RX ORDER — POTASSIUM CHLORIDE 20 MEQ/1
40 TABLET, EXTENDED RELEASE ORAL 2 TIMES DAILY
Status: DISCONTINUED | OUTPATIENT
Start: 2023-11-10 | End: 2023-11-11

## 2023-11-10 RX ORDER — LANOLIN ALCOHOL/MO/W.PET/CERES
400 CREAM (GRAM) TOPICAL DAILY
Status: DISCONTINUED | OUTPATIENT
Start: 2023-11-11 | End: 2023-11-20 | Stop reason: HOSPADM

## 2023-11-10 RX ORDER — TIZANIDINE 4 MG/1
4 TABLET ORAL 3 TIMES DAILY
Status: DISCONTINUED | OUTPATIENT
Start: 2023-11-10 | End: 2023-11-10

## 2023-11-10 RX ORDER — LORATADINE 10 MG/1
10 TABLET ORAL DAILY
Status: DISCONTINUED | OUTPATIENT
Start: 2023-11-11 | End: 2023-11-20 | Stop reason: HOSPADM

## 2023-11-10 RX ORDER — ACETAMINOPHEN 325 MG/1
650 TABLET ORAL EVERY 6 HOURS PRN
Status: DISCONTINUED | OUTPATIENT
Start: 2023-11-10 | End: 2023-11-20 | Stop reason: HOSPADM

## 2023-11-10 RX ORDER — GABAPENTIN 100 MG/1
100 CAPSULE ORAL 3 TIMES DAILY
Status: DISCONTINUED | OUTPATIENT
Start: 2023-11-10 | End: 2023-11-10

## 2023-11-10 RX ORDER — FINASTERIDE 5 MG/1
5 TABLET, FILM COATED ORAL DAILY
Status: DISCONTINUED | OUTPATIENT
Start: 2023-11-11 | End: 2023-11-20 | Stop reason: HOSPADM

## 2023-11-10 RX ORDER — ONDANSETRON 4 MG/1
4 TABLET, ORALLY DISINTEGRATING ORAL EVERY 6 HOURS PRN
Status: DISCONTINUED | OUTPATIENT
Start: 2023-11-10 | End: 2023-11-20 | Stop reason: HOSPADM

## 2023-11-10 RX ORDER — CALCIUM CARBONATE 500 MG/1
500 TABLET, CHEWABLE ORAL DAILY PRN
Status: DISCONTINUED | OUTPATIENT
Start: 2023-11-10 | End: 2023-11-20 | Stop reason: HOSPADM

## 2023-11-10 RX ORDER — FUROSEMIDE 40 MG/1
40 TABLET ORAL DAILY
Status: DISCONTINUED | OUTPATIENT
Start: 2023-11-11 | End: 2023-11-11

## 2023-11-10 RX ORDER — TIZANIDINE 2 MG/1
2 TABLET ORAL EVERY 8 HOURS PRN
Status: DISCONTINUED | OUTPATIENT
Start: 2023-11-10 | End: 2023-11-20 | Stop reason: HOSPADM

## 2023-11-10 RX ORDER — LISINOPRIL 10 MG/1
10 TABLET ORAL DAILY
Status: DISCONTINUED | OUTPATIENT
Start: 2023-11-11 | End: 2023-11-11

## 2023-11-10 RX ADMIN — FLUTICASONE PROPIONATE 1 SPRAY: 50 SPRAY, METERED NASAL at 09:37

## 2023-11-10 RX ADMIN — TIZANIDINE 4 MG: 4 TABLET ORAL at 09:34

## 2023-11-10 RX ADMIN — LISINOPRIL 10 MG: 10 TABLET ORAL at 09:34

## 2023-11-10 RX ADMIN — LORATADINE 10 MG: 10 TABLET ORAL at 09:34

## 2023-11-10 RX ADMIN — GABAPENTIN 100 MG: 100 CAPSULE ORAL at 18:10

## 2023-11-10 RX ADMIN — ATORVASTATIN CALCIUM 10 MG: 10 TABLET, FILM COATED ORAL at 09:33

## 2023-11-10 RX ADMIN — POTASSIUM CHLORIDE 40 MEQ: 1500 TABLET, EXTENDED RELEASE ORAL at 18:09

## 2023-11-10 RX ADMIN — Medication 3 MG: at 21:56

## 2023-11-10 RX ADMIN — FOLIC ACID TAB 400 MCG 400 MCG: 400 TAB at 09:34

## 2023-11-10 RX ADMIN — FUROSEMIDE 40 MG: 40 TABLET ORAL at 09:34

## 2023-11-10 RX ADMIN — APIXABAN 10 MG: 5 TABLET, FILM COATED ORAL at 18:09

## 2023-11-10 RX ADMIN — POTASSIUM CHLORIDE 40 MEQ: 1500 TABLET, EXTENDED RELEASE ORAL at 09:34

## 2023-11-10 RX ADMIN — GABAPENTIN 100 MG: 100 CAPSULE ORAL at 09:34

## 2023-11-10 RX ADMIN — ALBUTEROL SULFATE 2 PUFF: 90 AEROSOL, METERED RESPIRATORY (INHALATION) at 22:07

## 2023-11-10 RX ADMIN — TIZANIDINE 4 MG: 4 TABLET ORAL at 18:10

## 2023-11-10 RX ADMIN — FINASTERIDE 5 MG: 5 TABLET, FILM COATED ORAL at 09:34

## 2023-11-10 RX ADMIN — LOPERAMIDE HCL 2 MG: 1 SOLUTION ORAL at 04:25

## 2023-11-10 RX ADMIN — APIXABAN 10 MG: 5 TABLET, FILM COATED ORAL at 09:34

## 2023-11-10 NOTE — ASSESSMENT & PLAN NOTE
Resolved  Patient was RRT at [facility] with respiratory distress, mildly hypoxic requiring 2 LNC to maintain appropriate saturations  Suspect in the setting of volume overload/anasarca with at least ileus as below contributing  Transferred to [address] [facility] for further management given situation  CXR Low lung volumes and increased lung markings due to crowding. Mild left  basilar density likely due to atelectasis.  No acute consolidation or congestion  Chest CT scan negative for PE with bibasilar atelectasis  Encourage incentive spirometry  Monitor ambulatory O2 sats

## 2023-11-10 NOTE — ASSESSMENT & PLAN NOTE
S/p Christensen catheter placement at prior hospitalization, initially removed 10/26 however replaced 10/30 as patient failing urinary retention protocol  Continue finasteride  Voiding trial when ambulation improves

## 2023-11-10 NOTE — ASSESSMENT & PLAN NOTE
Patient previously reported this was chronic issue, however sodium 127 now down from previous 132  Work-up in prior hospitalization revealing normal uric acid, urine sodium 46, urine osm 735, serum osm 278, TSH normal.  Cortisol was noted low however subsequent cosyntropin stimulation test was not concerning for adrenal insufficiency  Likely SIADH  Monitor sodium levels  Outpatient follow-up

## 2023-11-10 NOTE — DISCHARGE SUMMARY
[address] [facility]  Discharge- 100 Candelaria Harrison 1950, 68 y.o. male MRN: [id number]  Unit/Bed#: [facility] 824-01 Encounter: [id number]  Primary Care Provider: Alyson Royal MD   Date and time admitted to hospital: 11/2/2023  4:49 AM    * Acute respiratory insufficiency  Assessment & Plan  Resolved  Patient was RRT at [facility] with respiratory distress, mildly hypoxic requiring 2 LNC to maintain appropriate saturations  Suspect in the setting of volume overload/anasarca with at least ileus as below contributing  Transferred to [address] for further management given situation  CXR Low lung volumes and increased lung markings due to crowding. Mild left  basilar density likely due to atelectasis. No acute consolidation or congestion  Chest CT scan negative for PE with bibasilar atelectasis  Encourage incentive spirometry  Monitor ambulatory O2 sats      Diarrhea  Assessment & Plan  Diarrhea improving, stool firming up   abdominal imaging reviewed  Encourage fiber diet  Metamucil as needed        Acute DVT (deep venous thrombosis) (HCC)  Assessment & Plan  Acute bilateral lower extremity DVT  Cleared by neurosurgery to start Eliquis  Eliquis started on 11/4/2023  Continue Eliquis for anticoagulation  Outpatient follow-up    Ileus [facility])  Assessment & Plan  Patient reporting marked abdominal distention, with episodes of watery diarrhea this admission  CT a/p: Diffuse moderate distention of entire GI tract. No evidence of obstruction. Nonspecific gastroenteritis and/or ileus   Tolerating oral feeds, denies nausea or vomiting  Diarrhea improving  Ileus improved  Supportive cares      Anasarca  Assessment & Plan  Patient complaining of increased diffuse edema, appears he has gained at least 17 pounds since hospitalization and appears grossly volume overloaded on examination with CXR with some evidence of pulmonary vascular congestion.   Patient himself denies any prior history of cardiac disease  S/p 40 mg IV Lasix during rapid response  Cardiac echo with EF 60% and grade 1 diastolic dysfunction no major valvular disease  BNP is 24  Improved with IV Lasix  Low-salt diet      Urinary retention  Assessment & Plan  S/p Christensen catheter placement at prior hospitalization, initially removed 10/26 however replaced 10/30 as patient failing urinary retention protocol  Continue finasteride  Voiding trial when ambulation improves    Hyponatremia  Assessment & Plan  Patient previously reported this was chronic issue, however sodium 127 now down from previous 132  Work-up in prior hospitalization revealing normal uric acid, urine sodium 46, urine osm 735, serum osm 278, TSH normal.  Cortisol was noted low however subsequent cosyntropin stimulation test was not concerning for adrenal insufficiency  Likely SIADH  Monitor sodium levels  Outpatient follow-up    Gastroesophageal reflux disease  Assessment & Plan  Tums as needed    Benign essential hypertension  Assessment & Plan  Continue lisinopril 10 g daily  Outpatient follow-up      Osteoarthritis of cervical spine with myelopathy  Assessment & Plan  Patient s/p C3-C6 PCDF 10/25/2023 in setting of cervical stenosis/radiculopathy with cord compression at C4-5 level with severe canal stenosis  Neurosurgery inputs noted  Analgesics, physical therapy  Neurosurgery plans for outpatient follow-up noted            Discharge Summary - [facility]    Patient Information: David Ryan 68 y.o. male MRN: [id number]  Unit/Bed#: [facility] 824-01 Encounter: [id number]    Discharging Physician / Practitioner: Damián Patel MD  PCP: Fiona Carlson MD  Admission Date: 11/2/2023  Discharge Date: 11/10/23    Disposition:     Other:  To rehab    Reason for Admission: Shortness of breath    Discharge Diagnoses:     Principal Problem:    Acute respiratory insufficiency  Active Problems:    Osteoarthritis of cervical spine with myelopathy    Benign essential hypertension Gastroesophageal reflux disease    Hyponatremia    Urinary retention    Anasarca    Ileus (HCC)    Acute DVT (deep venous thrombosis) (HCC)    Diarrhea  Resolved Problems:    * No resolved hospital problems. *        Procedures Performed:     Abdominal KUB distended gas-filled colon with cecum measuring 9 cm  Chest x-ray low lung volumes increased lung markings, no acute consolidation or congestion  CT abdomen pelvis  Diffuse moderate distention of the entire gastrointestinal tract with fluid, with no evidence of obstruction. Findings may reflect nonspecific gastroenteritis and/or ileus. Lower extremity venous Doppler  Evidence of DVT bilateral lower extremities, please review venous Doppler studies for details    CTA chest  1. No pulmonary embolism. 2. Bibasilar left greater than right subsegmental atelectasis similar to the CT of the abdomen and pelvis from 2 days ago. 3. Stable calcified and noncalcified anterior mediastinal nodules likely lymph nodes possibly sequela of prior granulomatous infection. Differential diagnosis could include oil cysts. 3. Suspect cholelithiasis and/or gallbladder sludge. No pericholecystic fluid to suggest acute cholecystitis by CT. Further clinical assessment recommended. Abdominal x-ray  Persistent gas-filled dilatation of the large and small bowel. No discernible free air. Hospital Course:     David Ryan is a 68 y.o. male patient who originally presented to the hospital on 11/2/2023 due to shortness of breath. Patient was transferred from acute rehab to acute care centers due to worsening shortness of breath. He was a rapid response due to increased work of breathing unable to complete sentences abdominal distention and pain. He was admitted, imaging studies concerning for ileus. He was treated supportively, he also received diuresis with improvement in respiratory status. \    Patient reports symptomatic improvement, abdominal symptoms are improving he is tolerating oral feeds. His respiratory status is improved he is maintaining O2 sats on ambient air. He has some diarrhea which is improving with Metamucil and increasing fiber diet    He is hemodynamically stable and is deemed ready for discharge to [facility]. Kindly review the chart for details. Condition at Discharge: fair     Discharge Day Visit / Exam:     Subjective:      Comfortably sitting up in chair  Reports feeling better  Agreeable to discharge plan      Vitals: Blood Pressure: 118/76 (11/10/23 0933)  Pulse: 102 (11/10/23 0933)  Temperature: 98.1 °F (36.7 °C) (11/10/23 0735)  Respirations: 16 (11/10/23 0735)  Height: 5' 9" (175.3 cm) (11/02/23 1040)  Weight - Scale: 105 kg (231 lb 7.7 oz) (11/10/23 0424)  SpO2: 97 % (11/10/23 0933)  Exam:   Physical Exam    Comfortably sitting up in chair  Obese  Short thick neck  Lungs diminished breath sounds at the bases  Vascular breath sounds  Heart sounds S1 and S2 noted  Abdomen soft  Abdominal obesity noted  Nontender  Awake obey simple commands  No pedal Ouma  No rash    Discharge instructions/Information to patient and family:   See after visit summary for information provided to patient and family. Discharge plan discussed with the patient  Discharge plan discussed with patient's sister Angela Galindo follow-up with primary care physicia    Provisions for Follow-Up Care:  See after visit summary for information related to follow-up care and any pertinent home health orders. Planned Readmission: no     Discharge Statement:  I spent 41 minutes discharging the patient. This time was spent on the day of discharge. I had direct contact with the patient on the day of discharge. Greater than 50% of the total time was spent examining patient, answering all patient questions, arranging and discussing plan of care with patient as well as directly providing post-discharge instructions. Additional time then spent on discharge activities.     Discharge Medications:  See after visit summary for reconciled discharge medications provided to patient and family.       ** Please Note: This note has been constructed using a voice recognition system **

## 2023-11-10 NOTE — ASSESSMENT & PLAN NOTE
Patient complaining of increased diffuse edema, appears he has gained at least 17 pounds since hospitalization and appears grossly volume overloaded on examination with CXR with some evidence of pulmonary vascular congestion.   Patient himself denies any prior history of cardiac disease  S/p 40 mg IV Lasix during rapid response  Cardiac echo with EF 60% and grade 1 diastolic dysfunction no major valvular disease  BNP is 24  Improved with IV Lasix  Low-salt diet

## 2023-11-10 NOTE — PHYSICAL THERAPY NOTE
PHYSICAL THERAPY NOTE          Patient Name: Emily Gordon  AYMKP'K Date: 11/10/2023       11/10/23 0930   PT Last Visit   PT Visit Date 11/10/23   Note Type   Note Type Treatment   Pain Assessment   Pain Assessment Tool 0-10   Pain Score No Pain   Restrictions/Precautions   Weight Bearing Precautions Per Order No   Other Precautions Fall Risk;Multiple lines;Spinal precautions; Bed Alarm; Chair Alarm   General   Chart Reviewed Yes   Response to Previous Treatment Patient with no complaints from previous session. Family/Caregiver Present No   Cognition   Overall Cognitive Status WFL   Arousal/Participation Alert; Cooperative   Attention Within functional limits   Orientation Level Oriented X4   Memory Within functional limits   Following Commands Follows all commands and directions without difficulty   Subjective   Subjective pt pleasant and cooperative throughout therapy session. pt received seated on commode   Bed Mobility   Supine to Sit Unable to assess   Sit to Supine Unable to assess   Transfers   Sit to Stand 4  Minimal assistance   Additional items Assist x 1; Increased time required;Verbal cues   Stand to Sit 4  Minimal assistance   Additional items Assist x 1; Increased time required;Verbal cues   Toilet transfer 4  Minimal assistance   Additional items Assist x 1; Increased time required;Verbal cues; Commode   Additional Comments transfers w RW   Ambulation/Elevation   Gait pattern Improper Weight shift; Forward Flexion;Narrow TIFF; Antalgic; Shuffling; Short stride; Excessively slow   Gait Assistance 4  Minimal assist  (CGA)   Additional items Assist x 1; Tactile cues; Verbal cues   Assistive Device Rolling walker   Distance 85'   Stair Management Assistance Not tested   Balance   Static Sitting Good   Dynamic Sitting Fair +   Static Standing Fair +   Dynamic Standing Fair   Ambulatory Fair   Endurance Deficit   Endurance Deficit Yes Endurance Deficit Description decreased exercise tolerance   Activity Tolerance   Activity Tolerance Patient tolerated treatment well;Patient limited by fatigue   Nurse Made Aware RN cleared and updated   Exercises   Neuro re-ed static/dynamic seated balance for approx 10 minutes. seated in bedside recliner, weight shifting and reaching outside of TIFF   Assessment   Prognosis Good   Problem List Decreased strength;Decreased range of motion;Decreased endurance; Impaired balance;Decreased mobility; Decreased coordination;Obesity;Pain   Assessment Patient was received seated on commode . Patient was agreeable to therapy services today. PT session focused on gait, transfers, and balance today in order to improve functional mobility and independence. Functional mobility was performed as described above. Pt was eager to participate in therapy services today. Pt was assisted off of toilet and transferred to sitting in bedside recliner. Pt was able to complete several sit to stands at recliner. Pt was then able to ambulate into hallway without any significant LOBs or safety concerns. Pt continues to require chair follow due to decreased exercise tolerance. Upon completion of ambulation, pt as returned to seated in bedside recliner and made comfortable. Pt was educated about safety in standing while in the hospital. Pt reports understanding. Patient will benefit from continued PT services while in hospital in order to address remaining limitations. The patients AM-PAC Basic Mobility Inpatient Short From Raw Score is 18 . PT recommendation at this time is for rehab vs HHPT. Please also refer to the recommendation of the Physical Therapist for safe discharge planning.    Barriers to Discharge Inaccessible home environment;Decreased caregiver support   Goals   Patient Goals to go home   STG Expiration Date 11/17/23   PT Treatment Day 4   Plan   Treatment/Interventions ADL retraining;Functional transfer training;LE strengthening/ROM; Elevations; Therapeutic exercise; Endurance training;Bed mobility;Gait training;Spoke to nursing;OT   Progress Progressing toward goals   PT Frequency 3-5x/wk   Discharge Recommendation   Rehab Resource Intensity Level, PT II (Moderate Resource Intensity)   AM-PAC Basic Mobility Inpatient   Turning in Flat Bed Without Bedrails 3   Lying on Back to Sitting on Edge of Flat Bed Without Bedrails 3   Moving Bed to Chair 3   Standing Up From Chair Using Arms 3   Walk in Room 3   Climb 3-5 Stairs With Railing 3   Basic Mobility Inpatient Raw Score 18   Basic Mobility Standardized Score 41.05   Highest Level Of Mobility   JH-HLM Goal 6: Walk 10 steps or more   JH-HLM Achieved 7: Walk 25 feet or more   Education   Education Provided Mobility training   Patient Demonstrates verbal understanding   End of Consult   Patient Position at End of Consult Bedside chair;Bed/Chair alarm activated; All needs within reach       Lupis Carlson PT, DPT

## 2023-11-10 NOTE — PLAN OF CARE
Problem: PHYSICAL THERAPY ADULT  Goal: Performs mobility at highest level of function for planned discharge setting. See evaluation for individualized goals. Description: Treatment/Interventions: Functional transfer training, OT, Spoke to nursing, Gait training, Bed mobility, Therapeutic exercise, Patient/family training  Equipment Recommended: Cholo Casanova       See flowsheet documentation for full assessment, interventions and recommendations. Outcome: Progressing  Note: Prognosis: Good  Problem List: Decreased strength, Decreased range of motion, Decreased endurance, Impaired balance, Decreased mobility, Decreased coordination, Obesity, Pain  Assessment: Patient was received seated on commode . Patient was agreeable to therapy services today. PT session focused on gait, transfers, and balance today in order to improve functional mobility and independence. Functional mobility was performed as described above. Pt was eager to participate in therapy services today. Pt was assisted off of toilet and transferred to sitting in bedside recliner. Pt was able to complete several sit to stands at recliner. Pt was then able to ambulate into hallway without any significant LOBs or safety concerns. Pt continues to require chair follow due to decreased exercise tolerance. Upon completion of ambulation, pt as returned to seated in bedside recliner and made comfortable. Pt was educated about safety in standing while in the hospital. Pt reports understanding. Patient will benefit from continued PT services while in hospital in order to address remaining limitations. The patients AM-PAC Basic Mobility Inpatient Short From Raw Score is 18 . PT recommendation at this time is for rehab vs HHPT. Please also refer to the recommendation of the Physical Therapist for safe discharge planning.   Barriers to Discharge: Inaccessible home environment, Decreased caregiver support     Rehab Resource Intensity Level, PT: II (Moderate Resource Intensity)    See flowsheet documentation for full assessment.

## 2023-11-10 NOTE — ASSESSMENT & PLAN NOTE
Patient reporting marked abdominal distention, with episodes of watery diarrhea this admission  CT a/p: Diffuse moderate distention of entire GI tract. No evidence of obstruction.   Nonspecific gastroenteritis and/or ileus   Tolerating oral feeds, denies nausea or vomiting  Diarrhea improving  Ileus improved  Supportive cares

## 2023-11-10 NOTE — ASSESSMENT & PLAN NOTE
Diarrhea improving, stool firming up   abdominal imaging reviewed  Encourage fiber diet  Metamucil as needed

## 2023-11-10 NOTE — CASE MANAGEMENT
Case Management Discharge Planning Note    Patient name Angela Castellanos  Location [facility] 824/[facility] 275-56 MRN [id number]  : 1950 Date 11/10/2023       Current Admission Date: 2023  Current Admission Diagnosis:Acute respiratory insufficiency   Patient Active Problem List    Diagnosis Date Noted    Diarrhea 2023    Acute DVT (deep venous thrombosis) ([address]) 2023    Acute respiratory insufficiency 2023    Ileus ([address]) 2023    Urinary retention 2023    Neurogenic bowel 2023    Seasonal allergies 2023    Anasarca 2023    Paresthesia 10/25/2023    Weakness of extremity 10/25/2023    Hyponatremia 10/25/2023    Impaired mobility and activities of daily living 10/20/2023    Anemia 10/20/2023    Benign essential hypertension 10/20/2023    Dorsalgia, unspecified 10/20/2023    Gastroesophageal reflux disease 10/20/2023    Hyperlipidemia 10/20/2023    Osteoarthritis of cervical spine with myelopathy 2023    Tinea corporis 2019    Onychomycosis 2019      LOS (days): 8  Geometric Mean LOS (GMLOS) (days): 3.10  Days to GMLOS:-5.2     OBJECTIVE:  Risk of Unplanned Readmission Score: 20.43         Current admission status: Inpatient   Preferred Pharmacy:   [address]  Phone: [phone] Fax: [phone]    Primary Care Provider: Genet Smith MD    Primary Insurance: [facility]  Secondary Insurance: MEDICARE    DISCHARGE DETAILS:                           Accepting Facility Name, [place] & State : [facility]         Pt to transport to [address] [place] [address] at 2:30pm. IMM Not needed as pt to transfer to acute rehab.

## 2023-11-10 NOTE — PLAN OF CARE
Problem: MOBILITY - ADULT  Goal: Maintain or return to baseline ADL function  Description: INTERVENTIONS:  -  Assess patient's ability to carry out ADLs; assess patient's baseline for ADL function and identify physical deficits which impact ability to perform ADLs (bathing, care of mouth/teeth, toileting, grooming, dressing, etc.)  - Assess/evaluate cause of self-care deficits   - Assess range of motion  - Assess patient's mobility; develop plan if impaired  - Assess patient's need for assistive devices and provide as appropriate  - Encourage maximum independence but intervene and supervise when necessary  - Involve family in performance of ADLs  - Assess for home care needs following discharge   - Consider OT consult to assist with ADL evaluation and planning for discharge  - Provide patient education as appropriate  Outcome: Progressing     Problem: Prexisting or High Potential for Compromised Skin Integrity  Goal: Skin integrity is maintained or improved  Description: INTERVENTIONS:  - Identify patients at risk for skin breakdown  - Assess and monitor skin integrity  - Assess and monitor nutrition and hydration status  - Monitor labs   - Assess for incontinence   - Turn and reposition patient  - Assist with mobility/ambulation  - Relieve pressure over bony prominences  - Avoid friction and shearing  - Provide appropriate hygiene as needed including keeping skin clean and dry  - Evaluate need for skin moisturizer/barrier cream  - Collaborate with interdisciplinary team   - Patient/family teaching  - Consider wound care consult   Outcome: Progressing     Problem: PAIN - ADULT  Goal: Verbalizes/displays adequate comfort level or baseline comfort level  Description: Interventions:  - Encourage patient to monitor pain and request assistance  - Assess pain using appropriate pain scale  - Administer analgesics based on type and severity of pain and evaluate response  - Implement non-pharmacological measures as appropriate and evaluate response  - Consider cultural and social influences on pain and pain management  - Notify physician/advanced practitioner if interventions unsuccessful or patient reports new pain  Outcome: Progressing     Problem: INFECTION - ADULT  Goal: Absence or prevention of progression during hospitalization  Description: INTERVENTIONS:  - Assess and monitor for signs and symptoms of infection  - Monitor lab/diagnostic results  - Monitor all insertion sites, i.e. indwelling lines, tubes, and drains  - Monitor endotracheal if appropriate and nasal secretions for changes in amount and color  - [place] appropriate cooling/warming therapies per order  - Administer medications as ordered  - Instruct and encourage patient and family to use good hand hygiene technique  - Identify and instruct in appropriate isolation precautions for identified infection/condition  Outcome: Progressing     Problem: DISCHARGE PLANNING  Goal: Discharge to home or other facility with appropriate resources  Description: INTERVENTIONS:  - Identify barriers to discharge w/patient and caregiver  - Arrange for needed discharge resources and transportation as appropriate  - Identify discharge learning needs (meds, wound care, etc.)  - Arrange for interpretive services to assist at discharge as needed  - Refer to Case Management Department for coordinating discharge planning if the patient needs post-hospital services based on physician/advanced practitioner order or complex needs related to functional status, cognitive ability, or social support system  Outcome: Progressing     Problem: Nutrition/Hydration-ADULT  Goal: Nutrient/Hydration intake appropriate for improving, restoring or maintaining nutritional needs  Description: Monitor and assess patient's nutrition/hydration status for malnutrition. Collaborate with interdisciplinary team and initiate plan and interventions as ordered.   Monitor patient's weight and dietary intake as ordered or per policy. Utilize nutrition screening tool and intervene as necessary. Determine patient's food preferences and provide high-protein, high-caloric foods as appropriate.      INTERVENTIONS:  - Monitor oral intake, urinary output, labs, and treatment plans  - Assess nutrition and hydration status and recommend course of action  - Evaluate amount of meals eaten  - Assist patient with eating if necessary   - Allow adequate time for meals  - Recommend/ encourage appropriate diets, oral nutritional supplements, and vitamin/mineral supplements  - Order, calculate, and assess calorie counts as needed  - Recommend, monitor, and adjust tube feedings and TPN/PPN based on assessed needs  - Assess need for intravenous fluids  - Provide specific nutrition/hydration education as appropriate  - Include patient/family/caregiver in decisions related to nutrition  Outcome: Progressing

## 2023-11-10 NOTE — CONSULTS
Internal Medicine Consultation Note    Patient: Gale Martínez  Age/sex: 68 y.o. male  Medical Record #: [id number]      Cervical spine stenosis with radiculopathy  Had a fall PTA 2/2 bilateral leg pain with numbness, tingling and weakness that had been worsening. He had been to see Neurosurgery as an outpatient and was scheduled for a PCDF on 10/30/23. S/p C3-6 PCDF 10/25/23  Staples and sutures are out = NS saw 11/8 for his 2 weeks followup     Hyponatremia  Per patient is chronic and present for quite a while although there is no labs to review since PCP not in system  [place] likely SIADH when in the hospital the first time  Hasn't really improved much since back in the hospital but is stable at 129  Will check BMP in AM 11/11/23     HTN  Home:  Lisinopril 20mg qd/HCTZ 25mg qd  Here:  Lisinopril 10mg qd  HCTZ was stopped 2/2 hyponatremia when he was hospitalized for his neck surgery  stable     Anasarca  Resolved other than mild ankle edema  Continue Lasix 40mg qd/Kdur 40 meq BID     Ileus  Resolved  Tolerating diet     B/L LE DVTs  New discovered upon return to the hospital  DVT in LLE one of the paired peroneals; RLE in mid PTV  On Eliquis which is new for him (he was cleared by NS to have)     HLD  Continue statin     Urine retention/fountain  VT here per PMR  Continue Proscar     ABLA  No transfusion given  Will check CBC 11/11/23        Discharge date:  Team       ROS:   A 10 point ROS was performed; negative except as noted above. Social History:    Substance Use History:   Social History     Substance and Sexual Activity   Alcohol Use Yes    Alcohol/week: 6.0 standard drinks of alcohol    Types: 6 Cans of beer per week    Comment: beer 4-5 days a week     Social History     Tobacco Use   Smoking Status Never   Smokeless Tobacco Never     Social History     Substance and Sexual Activity   Drug Use Never       Family History:    No family history on file.       Review of Scheduled Meds:  Current Facility-Administered Medications   Medication Dose Route Frequency Provider Last Rate    acetaminophen  650 mg Oral Q6H PRN Norma Chavez MD      albuterol  2 puff Inhalation Q6H PRN Norma Chavez MD      apixaban  10 mg Oral BID Norma Chavez MD      Followed by    Lior Ashton ON 11/11/2023] apixaban  5 mg Oral BID Norma Chavez MD      [START ON 11/11/2023] atorvastatin  10 mg Oral Daily Norma Chavez MD      calcium carbonate  500 mg Oral Daily PRN MD Lior Peter ON 11/11/2023] finasteride  5 mg Oral Daily Norma Chavez MD      fluticasone  1 spray Each Nare BID Norma Chavez MD      [START ON 18/42/4407] folic acid  610 mcg Oral Daily Norma Chavez MD      [START ON 11/11/2023] furosemide  40 mg Oral Daily Norma Chavez MD      gabapentin  100 mg Oral TID Norma Chavez MD      [START ON 11/11/2023] lisinopril  10 mg Oral Daily MD Lior Peter ON 11/11/2023] loratadine  10 mg Oral Daily Norma Chavez MD      melatonin  3 mg Oral HS Norma Chavez MD      ondansetron  4 mg Oral Q6H PRN Norma Chavez MD      oxyCODONE  2.5 mg Oral Q4H PRN Norma Chavez MD      potassium chloride  40 mEq Oral BID Norma Chavez MD      [START ON 11/11/2023] psyllium  1 packet Oral Daily Norma Chavez MD      tiZANidine  4 mg Oral TID Norma Chavez MD         Labs:     Results from last 7 days   Lab Units 11/05/23  0543 11/03/23  2302   WBC Thousand/uL 9.57 11.24*   HEMOGLOBIN g/dL 11.5* 11.7*   HEMATOCRIT % 33.9* 34.5*   PLATELETS Thousands/uL 363 327     Results from last 7 days   Lab Units 11/08/23  0430 11/07/23  0457   SODIUM mmol/L 129* 129*   POTASSIUM mmol/L 4.0 3.6   CHLORIDE mmol/L 91* 89*   CO2 mmol/L 28 32   BUN mg/dL 32* 32*   CREATININE mg/dL 0.92 0.93   CALCIUM mg/dL 8.7 8.6         Results from last 7 days   Lab Units 11/03/23  2302   INR  1.11          Results from last 7 days   Lab Units 11/07/23  2102   POC GLUCOSE mg/dl 86       No results found for: "Ridgeley Arline", "Rulon Gums", "Renaee Matas", "SPUTUMCULTUR"    Input and Output Summary (last 24 hours):     No intake or output data in the 24 hours ending 11/10/23 1700    Imaging:     No orders to display       *Labs /Radiology studiesReviewed, no new imaging  *Medications reviewed and reconciled as needed  *Please refer to order section for additional ordered labs studies  *Case discussed with primary attending during morning huddle case rounds    Vitals:   Temp (24hrs), Av.1 °F (36.7 °C), Min:98 °F (36.7 °C), Max:98.3 °F (36.8 °C)    Temp:  [98 °F (36.7 °C)-98.3 °F (36.8 °C)] 98.3 °F (36.8 °C)  HR:  [] 95  Resp:  [12-18] 12  BP: (101-118)/(62-84) 101/62  SpO2:  [95 %-98 %] 96 %  Body mass index is 35.53 kg/m². Physical Exam:   General Appearance: no distress, nontoxic appearing  HEENT:  External ear normal.  Nose normal w/o drainage. Mucous membranes are moist. Oropharynx is clear. Conjunctiva clear w/o icterus or redness. Neck:  posterior neck incision is healed  Lungs: BBS without crackles/wheeze/rhonchi; respirations unlabored with normal inspiratory/expiratory effort. No retractions noted. On RA  CV: regular rate and rhythm; no rubs/murmurs/gallops, PMI normal   ABD: Abdomen is large but soft. Bowel sounds all quadrants. Nontender with no distention. EXT: mild LE edema mostly ankles  Skin: normal turgor, normal texture, no rashes  Psych: affect normal, mood normal  Neuro: AAO         Invasive Devices       Peripheral Intravenous Line  Duration             Peripheral IV 11/10/23 Dorsal (posterior); Left Wrist <1 day              Drain  Duration             Urethral Catheter 16 Fr. 11 days                     VTE Pharmacologic Prophylaxis: eliquis  Code Status: Level 1 - Full Code  Current Length of Stay: 0 day(s)    Total floor / unit time spent today 1 hour with more than 50% spent counseling/coordinating care.  Counseling includes discussion with patient re: progress  and discussion with patient of his/her current medical state/information. Coordination of patient's care was performed in conjunction with primary service. Time invested included review of patient's labs, vitals, and management of their comorbidities with continued monitoring. In addition, this patient was discussed with medical team including physician and advanced extenders. The care of the patient was extensively discussed and appropriate treatment plan was formulated unique for this patient by supervising physician unless stated otherwise in their attestation statement. ** Please Note: voice to text software may have been used in the creation of this document.  Audio transcription errors may occur**

## 2023-11-10 NOTE — PROGRESS NOTES
Internal Medicine Progress Note  Patient: Altagracia English  Age/sex: 68 y.o. male  Medical Record #: [id number]      ASSESSMENT/PLAN: (Interval History)  Altagracia English is seen and examined and management for following issues:    Cervical spine stenosis with radiculopathy  Had a fall PTA 2/2 bilateral leg pain with numbness, tingling and weakness that had been worsening. He had been to see Neurosurgery as an outpatient and was scheduled for a PCDF on 10/30/23. S/p C3-6 PCDF 10/25/23  Staples and sutures are out = NS saw 11/8 for his 2 weeks followup     Hyponatremia  Per patient is chronic and present for quite a while although there is no labs to review since PCP not in system  [place] likely SIADH when in the hospital the first time  Hasn't really improved since back in the hospital   Stable at 128-129  Will continue to watch     HTN  Home:  Lisinopril 20mg qd/HCTZ 25mg qd  Here:  Lisinopril 10mg qd  HCTZ was stopped 2/2 hyponatremia when he was hospitalized for his neck surgery  Will reduce Lisinopril to 5mg qd since BPs are soft     Anasarca  Resolved other than some mild ankle edema  Continue Lasix 40mg qd/Kdur 40 meq BID but since BPs are soft and edema is almost totally resolved, may need to cut dose back = will change time to 1100 so can assess tomorrow first     Ileus  Resolved  Tolerating diet     B/L LE DVTs  New discovered upon return to the hospital  DVT in LLE one of the paired peroneals; RLE in mid PTV  On Eliquis which is new for him (he was cleared by NS to have)     HLD  Continue statin     Urine retention/fountain  VT here per PMR  Continue Proscar     ABLA  No transfusion given  Stable today 11/11/23        Discharge date:  Team       The above assessment and plan was reviewed and updated as determined by my evaluation of the patient on 11/11/2023.     Labs:   Results from last 7 days   Lab Units 11/11/23  0523 11/05/23  0543   WBC Thousand/uL 5.76 9.57   HEMOGLOBIN g/dL 11.6* 11.5*   HEMATOCRIT % 33.9* 33.9*   PLATELETS Thousands/uL 379 363     Results from last 7 days   Lab Units 11/11/23  0523 11/08/23  0430   SODIUM mmol/L 128* 129*   POTASSIUM mmol/L 3.8 4.0   CHLORIDE mmol/L 92* 91*   CO2 mmol/L 29 28   BUN mg/dL 26* 32*   CREATININE mg/dL 0.89 0.92   CALCIUM mg/dL 8.5 8.7               Results from last 7 days   Lab Units 11/07/23  2102   POC GLUCOSE mg/dl 86       Review of Scheduled Meds:  Current Facility-Administered Medications   Medication Dose Route Frequency Provider Last Rate    acetaminophen  650 mg Oral Q6H PRN Tyler Fischer MD      albuterol  2 puff Inhalation Q6H PRN Tyler Fischer MD      apixaban  5 mg Oral BID Tyler Fischer MD      atorvastatin  10 mg Oral Daily Tyler Fischer MD      calcium carbonate  500 mg Oral Daily PRN Tyler Fischer MD      finasteride  5 mg Oral Daily Tyler Fischer MD      fluticasone  1 spray Each Nare BID Tyler Fischer MD      folic acid  097 mcg Oral Daily Tyler Fischer MD      furosemide  40 mg Oral Daily Tyler Fischer MD      lisinopril  10 mg Oral Daily Tyler Fischer MD      loratadine  10 mg Oral Daily Tyler Fischer MD      melatonin  3 mg Oral HS Tyler Fischer MD      ondansetron  4 mg Oral Q6H PRN Tyler Fischer MD      oxyCODONE  2.5 mg Oral Q4H PRN Tyler Fischer MD      potassium chloride  40 mEq Oral BID Tyler Fischer MD      psyllium  1 packet Oral Daily Tyler Fischer MD      tiZANidine  2 mg Oral Q8H PRN Tyler Fischer MD         Subjective/ HPI: Patient seen and examined. Patients overnight issues or events were reviewed with nursing or staff during rounds or morning huddle session. New or overnight issues include the following:     No new or overnight issues. Offers no complaints    ROS:   A 10 point ROS was performed; negative except as noted above.        Imaging:     No orders to display       *Labs /Radiology studies Reviewed, no new imaging  *Medications  reviewed and reconciled as needed  *Please refer to order section for additional ordered labs studies  *Case discussed with primary attending during morning huddle case rounds    Physical Examination:  Vitals:   Vitals:    11/10/23 1637 11/10/23 2102 11/11/23 0514 11/11/23 0600   BP: 101/62 104/61 115/59    BP Location: Right arm Right arm Left arm    Pulse: 95 85 92    Resp: 12 20 18    Temp: 98.3 °F (36.8 °C) 97.9 °F (36.6 °C) 98.2 °F (36.8 °C)    TempSrc: Oral Oral Oral    SpO2: 96% 97% 98%    Weight: 106 kg (233 lb 11 oz)  106 kg (234 lb 9.1 oz) 106 kg (234 lb 9.1 oz)   Height: 5' 8" (1.727 m)          General Appearance: no distress, non toxic appearing  HEENT: PERRLA, conjuctiva normal; oropharynx clear; mucous membranes moist   Neck:  Supple, normal ROM  Lungs: CTA, normal respiratory effort, no retractions, expiratory effort normal  CV: regular rate and rhythm; no rubs/murmurs/gallops, PMI normal   ABD: soft; ND/NT; +BS  EXT: mild ankle edema  Skin: normal turgor, normal texture, no rashes  Psych: affect normal, mood normal  Neuro: AAO      The above physical exam was reviewed and updated as determined by my evaluation of the patient on 11/11/2023. Invasive Devices       Peripheral Intravenous Line  Duration             Peripheral IV 11/10/23 Dorsal (posterior); Left Wrist 1 day              Drain  Duration             Urethral Catheter 16 Fr. 12 days                       VTE Pharmacologic Prophylaxis: Eliquis  Code Status: Level 1 - Full Code  Current Length of Stay: 1 day(s)      Total time spent:  30 minutes with more than 50% spent counseling/coordinating care. Counseling includes discussion with patient re: progress  and discussion with patient of his/her current medical state/information. Coordination of patient's care was performed in conjunction with primary service. Time invested included review of patient's labs, vitals, and management of their comorbidities with continued monitoring.  In addition, this patient was discussed with medical team including physician and advanced extenders. The care of the patient was extensively discussed and appropriate treatment plan was formulated unique for this patient. Medical decision making for the day was made by supervising physician unless otherwise noted in their attestation statement. ** Please Note:  voice to text software may have been used in the creation of this document.  Although proof errors in transcription or interpretation are a potential of such software**

## 2023-11-11 LAB
ALBUMIN SERPL BCP-MCNC: 3.5 G/DL (ref 3.5–5)
ALP SERPL-CCNC: 53 U/L (ref 34–104)
ALT SERPL W P-5'-P-CCNC: 19 U/L (ref 7–52)
ANION GAP SERPL CALCULATED.3IONS-SCNC: 7 MMOL/L
AST SERPL W P-5'-P-CCNC: 18 U/L (ref 13–39)
BASOPHILS # BLD AUTO: 0.03 THOUSANDS/ÂΜL (ref 0–0.1)
BASOPHILS NFR BLD AUTO: 1 % (ref 0–1)
BILIRUB SERPL-MCNC: 0.72 MG/DL (ref 0.2–1)
BUN SERPL-MCNC: 26 MG/DL (ref 5–25)
CALCIUM SERPL-MCNC: 8.5 MG/DL (ref 8.4–10.2)
CHLORIDE SERPL-SCNC: 92 MMOL/L (ref 96–108)
CO2 SERPL-SCNC: 29 MMOL/L (ref 21–32)
CREAT SERPL-MCNC: 0.89 MG/DL (ref 0.6–1.3)
EOSINOPHIL # BLD AUTO: 0.07 THOUSAND/ÂΜL (ref 0–0.61)
EOSINOPHIL NFR BLD AUTO: 1 % (ref 0–6)
ERYTHROCYTE [DISTWIDTH] IN BLOOD BY AUTOMATED COUNT: 12.5 % (ref 11.6–15.1)
GFR SERPL CREATININE-BSD FRML MDRD: 84 ML/MIN/1.73SQ M
GLUCOSE P FAST SERPL-MCNC: 87 MG/DL (ref 65–99)
GLUCOSE SERPL-MCNC: 87 MG/DL (ref 65–140)
HCT VFR BLD AUTO: 33.9 % (ref 36.5–49.3)
HGB BLD-MCNC: 11.6 G/DL (ref 12–17)
IMM GRANULOCYTES # BLD AUTO: 0.06 THOUSAND/UL (ref 0–0.2)
IMM GRANULOCYTES NFR BLD AUTO: 1 % (ref 0–2)
LYMPHOCYTES # BLD AUTO: 0.86 THOUSANDS/ÂΜL (ref 0.6–4.47)
LYMPHOCYTES NFR BLD AUTO: 15 % (ref 14–44)
MCH RBC QN AUTO: 32.5 PG (ref 26.8–34.3)
MCHC RBC AUTO-ENTMCNC: 34.2 G/DL (ref 31.4–37.4)
MCV RBC AUTO: 95 FL (ref 82–98)
MONOCYTES # BLD AUTO: 0.94 THOUSAND/ÂΜL (ref 0.17–1.22)
MONOCYTES NFR BLD AUTO: 16 % (ref 4–12)
NEUTROPHILS # BLD AUTO: 3.8 THOUSANDS/ÂΜL (ref 1.85–7.62)
NEUTS SEG NFR BLD AUTO: 66 % (ref 43–75)
NRBC BLD AUTO-RTO: 0 /100 WBCS
PLATELET # BLD AUTO: 379 THOUSANDS/UL (ref 149–390)
PMV BLD AUTO: 9.4 FL (ref 8.9–12.7)
POTASSIUM SERPL-SCNC: 3.8 MMOL/L (ref 3.5–5.3)
PROT SERPL-MCNC: 5.9 G/DL (ref 6.4–8.4)
RBC # BLD AUTO: 3.57 MILLION/UL (ref 3.88–5.62)
SODIUM SERPL-SCNC: 128 MMOL/L (ref 135–147)
WBC # BLD AUTO: 5.76 THOUSAND/UL (ref 4.31–10.16)
WBC SPEC QL GRAM STN: ABNORMAL

## 2023-11-11 PROCEDURE — 99232 SBSQ HOSP IP/OBS MODERATE 35: CPT | Performed by: PHYSICAL MEDICINE & REHABILITATION

## 2023-11-11 PROCEDURE — 97110 THERAPEUTIC EXERCISES: CPT

## 2023-11-11 PROCEDURE — 97116 GAIT TRAINING THERAPY: CPT

## 2023-11-11 PROCEDURE — 85025 COMPLETE CBC W/AUTO DIFF WBC: CPT | Performed by: NURSE PRACTITIONER

## 2023-11-11 PROCEDURE — 99232 SBSQ HOSP IP/OBS MODERATE 35: CPT | Performed by: INTERNAL MEDICINE

## 2023-11-11 PROCEDURE — 11721 DEBRIDE NAIL 6 OR MORE: CPT | Performed by: PODIATRIST

## 2023-11-11 PROCEDURE — 97162 PT EVAL MOD COMPLEX 30 MIN: CPT

## 2023-11-11 PROCEDURE — 80053 COMPREHEN METABOLIC PANEL: CPT | Performed by: PHYSICAL MEDICINE & REHABILITATION

## 2023-11-11 PROCEDURE — 97530 THERAPEUTIC ACTIVITIES: CPT

## 2023-11-11 PROCEDURE — 0HBRXZZ EXCISION OF TOE NAIL, EXTERNAL APPROACH: ICD-10-PCS | Performed by: PODIATRIST

## 2023-11-11 PROCEDURE — 99221 1ST HOSP IP/OBS SF/LOW 40: CPT | Performed by: PODIATRIST

## 2023-11-11 PROCEDURE — 97535 SELF CARE MNGMENT TRAINING: CPT

## 2023-11-11 PROCEDURE — 97166 OT EVAL MOD COMPLEX 45 MIN: CPT

## 2023-11-11 RX ORDER — FUROSEMIDE 40 MG/1
40 TABLET ORAL DAILY
Status: DISCONTINUED | OUTPATIENT
Start: 2023-11-12 | End: 2023-11-12

## 2023-11-11 RX ORDER — POTASSIUM CHLORIDE 20 MEQ/1
40 TABLET, EXTENDED RELEASE ORAL 2 TIMES DAILY
Status: DISCONTINUED | OUTPATIENT
Start: 2023-11-11 | End: 2023-11-12

## 2023-11-11 RX ORDER — LISINOPRIL 5 MG/1
5 TABLET ORAL DAILY
Status: DISCONTINUED | OUTPATIENT
Start: 2023-11-12 | End: 2023-11-20 | Stop reason: HOSPADM

## 2023-11-11 RX ADMIN — LORATADINE 10 MG: 10 TABLET ORAL at 10:05

## 2023-11-11 RX ADMIN — POTASSIUM CHLORIDE 40 MEQ: 1500 TABLET, EXTENDED RELEASE ORAL at 21:23

## 2023-11-11 RX ADMIN — APIXABAN 5 MG: 5 TABLET, FILM COATED ORAL at 10:05

## 2023-11-11 RX ADMIN — CALCIUM CARBONATE (ANTACID) CHEW TAB 500 MG 500 MG: 500 CHEW TAB at 20:46

## 2023-11-11 RX ADMIN — POTASSIUM CHLORIDE 40 MEQ: 1500 TABLET, EXTENDED RELEASE ORAL at 10:05

## 2023-11-11 RX ADMIN — FLUTICASONE PROPIONATE 1 SPRAY: 50 SPRAY, METERED NASAL at 10:06

## 2023-11-11 RX ADMIN — ALBUTEROL SULFATE 2 PUFF: 90 AEROSOL, METERED RESPIRATORY (INHALATION) at 23:37

## 2023-11-11 RX ADMIN — FOLIC ACID TAB 400 MCG 400 MCG: 400 TAB at 10:05

## 2023-11-11 RX ADMIN — LISINOPRIL 10 MG: 10 TABLET ORAL at 10:05

## 2023-11-11 RX ADMIN — FLUTICASONE PROPIONATE 1 SPRAY: 50 SPRAY, METERED NASAL at 17:51

## 2023-11-11 RX ADMIN — ATORVASTATIN CALCIUM 10 MG: 10 TABLET, FILM COATED ORAL at 10:05

## 2023-11-11 RX ADMIN — TIZANIDINE 2 MG: 2 TABLET ORAL at 17:54

## 2023-11-11 RX ADMIN — Medication 3 MG: at 21:24

## 2023-11-11 RX ADMIN — Medication 1 PACKET: at 10:05

## 2023-11-11 RX ADMIN — APIXABAN 5 MG: 5 TABLET, FILM COATED ORAL at 17:51

## 2023-11-11 RX ADMIN — FINASTERIDE 5 MG: 5 TABLET, FILM COATED ORAL at 10:05

## 2023-11-11 RX ADMIN — FUROSEMIDE 40 MG: 40 TABLET ORAL at 10:05

## 2023-11-11 NOTE — PROGRESS NOTES
Physical Medicine and Rehabilitation Progress Note  Carla Horn 68 y.o. male MRN: [id number]  Unit/Bed#: -17 Encounter: [id number]    To Review: Carla Horn is a 68 y.o. male with medical history of GERD, HTN, HLD, cervical stenosis with myelopathy who originally presented to the [address] [place] [address] on 10/20 after a sudden fall due to legs giving out from under him. Ultimately transferred to [facility] for Nsx evaluation given known myelopathy and MRI showed severe stenosis C3-4, 4-5, and 5-6 with mild cord signal changes. On 10/25, Dr. Kwame Grigsby performed PCDF C3-6 with laminectomies of C4,5,6 and partial C3. At baseline SSEPS noted to be dampened. Post-op with no bracing required but cervical precautions. That hospitalization was c/b by hypotension, SIADH, and neurogenic bowel/bladder. Leading up to hospitalization he had no issues with his bladder, but did have worsening constipation. He was admitted to the [facility] on 11/1, but later that evening developed SOB and increased WOB with worsening abdominal pain and distention. He was given IV Lasix, albuterol and transferred back to the acute care hospital. KUB concerning for ileus (had been having watery diarrhea). He required 2L NC for his hypoxia. CXR showed findings consistent with atelectasis without acute consolidation or congestion. CTAP showed moderate distention of his entire GI tract without evidence of obstruction. Echo showed EF 60% with G1DD and no major valvular disease. LE dopplers for LE edema showed DVT in R mid posterior tibial vein, and L one of paired peroneal veins. CTA showed no PE (but significant atelectasis similar to previous CTAP the day of transfer. He was also noted to have stable calcified and noncalcified anterior mediastinal nodules (likely lymph nodes) possibly sequelae of prior granulomatous infection.  Also noted to incidentally have cholelithiasis/gallbladder sludge without evidence of acute cholecystitis. He was started on therapeutic lovenox. After discussion with Nsx, he was cleared to transition to eliquis. He has since been able to be progressed to room air and feels more comfortable. He has continued to have multiple loose stools. He was transitioned to PO Lasix on 11/8. He has imodium as needed and metamucil ordered. Fecal leuks are in progress and C. Diff on 11/2 was negative. He was re-admitted to the [facility] on 11/10/2023     Chief Complaint: Did much better with bowels last night. Interval History/Subjective:  Happy because for the first time he did not have loose stools through the night. In the AM, able to get to commode and have a continent loose/watery movement. He is hoping this maintains today. We discussed monitoring strict I/O for now to make sure urine output is amenable to void trial (in event he is still retaining), and he would like to wait prior to void trial to make sure his bowels are better. No new numbness/tingling/weakness. No CP, SOB, fevers, chills, N/V, abdominal pain. Legs in Ace wraps and tolerating. ROS:  A 10 point review of systems was negative except for what is noted in the HPI. Today's Changes:  Fecal leuks and enteric panel pending. Reviewed labs, sodium lower today. Will discuss with IM. Performed sensory exam at this time - NLI actually C3 given impairment in LT and PP in L C4. L sensation worse than R, L strength worse than R. Pinprick more impaired than LT. Proprioception is fine  Patient is eating lunch and deferred rectal today. Monitor bowel function today. Is on Psyllium. Improved after this was started  Pain remains controlled off scheduled gabapentin and tizanidine. Monitor. STRICT I/O today to monitor 24 hour urine output. If <2300 cc total, can initiate void trial tomorrow. Discussed with patient. Total visit time: 35 minutes, with more than 50% spent counseling/coordinating care.  Counseling includes discussion with patient re: progress in therapies, functional issues observed by therapy staff, and discussion with patient regarding their current medical state and wellbeing. Coordination of patient's care was performed in conjunction with Internal Medicine service to monitor patient's labs, vitals, and management of their comorbidities. Assessment/Plan:    * Cervical spondylosis with myelopathy  Assessment & Plan  Presented with progressive weakness/constipation that culminated in two falls and difficulty with mobility.  - Mri showed severe stenosis C3-4, 4-5, and 5-6 with mild cord signal changes  10/25 PCDF with C3-6 laminectomies of C4,5,6 and partial C3  SSEPs noted to be dampened at baseline  Post-op examined C5 AIS D with pinprick > light touch impairment and proprioception intact  11/11 Admission AIS C3 AIS D. L > R sensory impairment. PP > LT. Mild LUE weakness already improved compared to Post-op examination. Question of neurogenic bowel/bladder  No need for collar  Cervical spine precautions in place  Pain control  Incisional care  PT/OT 3-5 hours/day, 5-7 days/ week. Plan for 6 week POV with Nsx ~12/6 with repeat imaging. Diarrhea  Assessment & Plan  See ileus for details. Adding enteric panel. Acute DVT (deep venous thrombosis) ([address])  Assessment & Plan  Diagnosed 11/3/2023:  RIGHT LOWER LIMB:  Evidence of acute deep venous thrombosis was noted in the mid posterior tibial  veins. No evidence of superficial thrombophlebitis noted. Doppler evaluation shows a normal response to augmentation maneuvers. .  Popliteal, posterior tibial and anterior tibial arterial Doppler waveform's are  triphasic. There is a well defined hypoechoic non-vascularized cystic-type structure noted  in the popliteal fossa. LEFT LOWER LIMB:  Evidence of acute deep venous thrombosis was noted in one of the paired  peroneal veins. No evidence of superficial thrombophlebitis noted.   Doppler evaluation shows a normal response to augmentation maneuvers. Popliteal, posterior tibial and anterior tibial arterial Doppler waveform's are  triphasic. Started on eliquis after clearance from Neurosurgery  No SCDs  OK for ACE wraps  Likely 3 months treatment  Outpatient f/u with PCP. Ileus [facility])  Assessment & Plan  Possible component of neurogenic bowel with progressively worsening constipation leading up to hospitalization  Then had ileus in the hospital - seems to be improving. Tolerating PO. Now with many loose/watery stools. 11/11 improving (had been started recently on metamucil  C. Diff negative on 11/2  Fecal leuks/enteric panel are pending  11/9 started on metamucil. Monitor bowels for now. Close continence care. Anasarca  Assessment & Plan  Most resolved, has LE edema  Required IV Lasix inpatient  Now on Lasix 40mg daily and has refused potassium supplementation  11/11 BMP stable. ACE wrap and elevate legs  Monitor     Urinary retention  Assessment & Plan  Likely component of neurogenic bladder, but multifactorial including mobility/medications, etc.  Did not have bladder symptoms leading up to hospitalization  Found to have retention during hospitalization. On finasteride  Monitoring 24 hour output to make sure low enough for void trial.  Patient would also like to make sure his bowels stay stable prior to void trial  Recheck in AM.       Hyponatremia  Assessment & Plan  Likely SIADH and HCTZ per previous hospitalization  Also with volume overload which could be contributing as well (on Lasix)   11/11 128  Continue FR. Asymptomatic  IM consulted and assisting with management. They will continue to monitor for now. Hyperlipidemia  Assessment & Plan  Continue statin    Gastroesophageal reflux disease  Assessment & Plan  TUMS PRN    Benign essential hypertension  Assessment & Plan  Home: Lisinopril 10mg daily  Here: Lisinopril 10mg daily, Lasix 40mg daily  Monitor and adjust as appropriate  IM consulted to assist with management. Anemia  Assessment & Plan  ABLA  11/11 Stable at 11.6  Monitor and transfuse as appropriate  IM consulted to assist with management        Health Maintenance  #Delirium/Sleep: At risk. Optimize sleep/wake, bowel, bladder, pain control. Environmental interventions. Avoid restraint. #Pain: Not using opioids and pain overall controlled. Will stop gabapentin, and decrease Tizanidine 2mg TID and making PRN. He is on PRN oxycodone 2.5mg Q4hr Prn and minimizing opioid use due to recent ileus. Has Tylenol ordered PRN. #Skin/Pressure Injury Prevention: Turn Q2hr in bed, with weight shifts P76-80osd in wheelchair. Float heels in bed. #DVT Prophylaxis: Fully anticoagulated on eliquis. No SCDs due to DVTs. #GI Prophylaxis: Not currently indicated  #Code Status: Full Code  #FEN: Regular House diet with 1500mL fluid restriction. Ensure Compact Chocolate with Breakfast/Lunch  #Dispo:  ElOS 10-14 days with goals to discharge home at mod Ind level of function      Objective:    Functional Update: pending  PT  OT  SLP    Allergies per EMR    Physical Exam:  Temp:  [97.9 °F (36.6 °C)-98.3 °F (36.8 °C)] 98.2 °F (36.8 °C)  HR:  [] 92  Resp:  [12-20] 18  BP: (101-118)/(59-76) 115/59  Oxygen Therapy  SpO2: 98 %      Gen: No acute distress, Well-nourished, well-appearing. HEENT: Moist mucus membranes  Cardiovascular: Regular rate, rhythm, S1/S2. Distal pulses palpable  Heme/Extr: No edema  Pulmonary: Non-labored breathing  : + fountain with clear yellow urine. Dark sediment. GI: Soft, non-tender, non-distended. MSK: ROM is full in all extremities. No effusions or deformities. Bulk is symmetric. See below for MMT scores. Integumentary: Skin is warm, dry. Neuro: AAOx3,  LT sensation patchy impaired starting C8 on the R and C4 on the L.  PP sensation patchy impaired starting at C5 on the R and C4 on the L  Psych: Normal mood and affect. Diagnostic Studies: reviewed, no new imaging  No results found.     Laboratory: Reviewed   Results from last 7 days   Lab Units 11/11/23  0523 11/05/23  0543   HEMOGLOBIN g/dL 11.6* 11.5*   HEMATOCRIT % 33.9* 33.9*   WBC Thousand/uL 5.76 9.57     Results from last 7 days   Lab Units 11/11/23  0523 11/08/23  0430 11/07/23  0457   BUN mg/dL 26* 32* 32*   POTASSIUM mmol/L 3.8 4.0 3.6   CHLORIDE mmol/L 92* 91* 89*   CREATININE mg/dL 0.89 0.92 0.93   AST U/L 18  --   --    ALT U/L 19  --   --             Patient Active Problem List   Diagnosis    Tinea corporis    Onychomycosis    Cervical spondylosis with myelopathy    Impaired mobility and activities of daily living    Anemia    Benign essential hypertension    Dorsalgia, unspecified    Gastroesophageal reflux disease    Hyperlipidemia    Paresthesia    Weakness of extremity    Hyponatremia    Urinary retention    Neurogenic bowel    Seasonal allergies    Anasarca    Acute respiratory insufficiency    Ileus (HCC)    Acute DVT (deep venous thrombosis) ([facility])    Diarrhea         Medications  Current Facility-Administered Medications   Medication Dose Route Frequency Provider Last Rate    acetaminophen  650 mg Oral Q6H PRN Olena Nicolas MD      albuterol  2 puff Inhalation Q6H PRN Olena Nicolas MD      apixaban  5 mg Oral BID Olena Nicolas MD      atorvastatin  10 mg Oral Daily Olena Nicolas MD      calcium carbonate  500 mg Oral Daily PRN Olena Nicolas MD      finasteride  5 mg Oral Daily Olena Nicolas MD      fluticasone  1 spray Each Nare BID Olena Nicolas MD      folic acid  229 mcg Oral Daily Olena Nicolas MD      furosemide  40 mg Oral Daily Olena Nicolas MD      lisinopril  10 mg Oral Daily Olena Nicolas MD      loratadine  10 mg Oral Daily Olena Nicolas MD      melatonin  3 mg Oral HS Olena Nicolas MD      ondansetron  4 mg Oral Q6H PRN Olena Nicolas MD      oxyCODONE  2.5 mg Oral Q4H PRN Olena Nicolas MD      potassium chloride  40 mEq Oral BID Olena Nicolas MD      psyllium  1 packet Oral Daily Olena Nicolas MD      tiZANidine  2 mg Oral Q8H PRN Diaz Levine MD            ** Please Note: Fluency Direct voice to text software may have been used in the creation of this document.  **

## 2023-11-11 NOTE — ASSESSMENT & PLAN NOTE
Presented with progressive weakness/constipation that culminated in two falls and difficulty with mobility.  - MRI showed severe stenosis C3-4, 4-5, and 5-6 with mild cord signal changes  10/25 PCDF with C3-6 laminectomies of C4,5,6 and partial C3  SSEPs noted to be dampened at baseline  Post-op examined C5 AIS D with pinprick > light touch impairment and proprioception intact  11/11 Admission AIS C3 AIS D. L > R sensory impairment. PP > LT. Mild LUE weakness already improved compared to Post-op examination. No need for collar  Cervical spine precautions in place  Pain control  Incisional care  PT/OT 3-5 hours/day, 5-7 days/ week.   Plan for 6 week POV with Nsx, scheduled

## 2023-11-11 NOTE — ASSESSMENT & PLAN NOTE
Possible component of neurogenic bowel with progressively worsening constipation leading up to hospitalization, complicated by ileus during hospitalization  Now with many loose/watery stools/diarrhea  11/9 prior to admission to [facility] started on metamucil. Fecal leukocytes moderate, C. Diff negative on 11/2, enteric panel negative, KUB with only gaseous distension  Has pepto PRN for diarrhea.  Will hold on imodium given prior ileus  Simethicone scheduled for gas, probiotics started, high-fiber/low fat diet  GI consulted 11/14/23; repeat KUB 11/15 stable, deferring NG placement/rectal tube placement for decompression at this time  Outpatient follow-up with GI

## 2023-11-11 NOTE — PLAN OF CARE
Problem: PAIN - ADULT  Goal: Verbalizes/displays adequate comfort level or baseline comfort level  Description: Interventions:  - Encourage patient to monitor pain and request assistance  - Assess pain using appropriate pain scale  - Administer analgesics based on type and severity of pain and evaluate response  - Implement non-pharmacological measures as appropriate and evaluate response  - Consider cultural and social influences on pain and pain management  - Notify physician/advanced practitioner if interventions unsuccessful or patient reports new pain  Outcome: Progressing     Problem: INFECTION - ADULT  Goal: Absence or prevention of progression during hospitalization  Description: INTERVENTIONS:  - Assess and monitor for signs and symptoms of infection  - Monitor lab/diagnostic results  - Monitor all insertion sites, i.e. indwelling lines, tubes, and drains  - Monitor endotracheal if appropriate and nasal secretions for changes in amount and color  - [place] appropriate cooling/warming therapies per order  - Administer medications as ordered  - Instruct and encourage patient and family to use good hand hygiene technique  - Identify and instruct in appropriate isolation precautions for identified infection/condition  Outcome: Progressing

## 2023-11-11 NOTE — H&P
PHYSICAL MEDICINE AND REHABILITATION H&P/ADMISSION NOTE  Pushpa Gibbons 68 y.o. male MRN: [id number]  Unit/Bed#: -37 Encounter: [id number]     Rehab Diagnosis: Impairment of mobility, safety and Activities of Daily Living (ADLs) due to Spinal Cord Dysfunction:  Non-Traumatic:  04.1211  Quadriplegia, Incomplete C1-4 - changed diagnosis given known level of injury. Adjusted given C3 sensory level on exam.    History of Present Illness:   Pushpa Gibbons is a 68 y.o. male with medical history of GERD, HTN, HLD, cervical stenosis with myelopathy who originally presented to the [address] [place] [address] on 10/20 after a sudden fall due to legs giving out from under him. Ultimately transferred to [facility] for Nsx evaluation given known myelopathy and MRI showed severe stenosis C3-4, 4-5, and 5-6 with mild cord signal changes. On 10/25, Dr. Yris Caruso performed PCDF C3-6 with laminectomies of C4,5,6 and partial C3. At baseline SSEPS noted to be dampened. Post-op with no bracing required but cervical precautions. That hospitalization was c/b by hypotension, SIADH, and neurogenic bowel/bladder. Leading up to hospitalization he had no issues with his bladder, but did have worsening constipation. He was admitted to the [facility] on 11/1, but later that evening developed SOB and increased WOB with worsening abdominal pain and distention. He was given IV Lasix, albuterol and transferred back to the acute care hospital. KUB concerning for ileus (had been having watery diarrhea). He required 2L NC for his hypoxia. CXR showed findings consistent with atelectasis without acute consolidation or congestion. CTAP showed moderate distention of his entire GI tract without evidence of obstruction. Echo showed EF 60% with G1DD and no major valvular disease. LE dopplers for LE edema showed DVT in R mid posterior tibial vein, and L one of paired peroneal veins.  CTA showed no PE (but significant atelectasis similar to previous CTAP the day of transfer. He was also noted to have stable calcified and noncalcified anterior mediastinal nodules (likely lymph nodes) possibly sequelae of prior granulomatous infection. Also noted to incidentally have cholelithiasis/gallbladder sludge without evidence of acute cholecystitis. He was started on therapeutic lovenox. After discussion with Nsx, he was cleared to transition to eliquis. He has since been able to be progressed to room air and feels more comfortable. He has continued to have multiple loose stools. He was transitioned to PO Lasix on 11/8. He has imodium as needed and metamucil ordered. Fecal leuks are in progress and C. Diff on 11/2 was negative. He was re-admitted to the [facility] on 11/10/2023    Subjective: Strength is better, and sensation still somewhat altered. UE dexterity and strength has been good. Still struggling with bowel incontinence and urinary retention. Has not had TOV since prior to the [facility]. He is on Lasix. He denies any new CP, SOB, fevers, chills, N/V, or significant abdominal pain. Pain overall well controlled. He reports sacral pain and discomfort. Review of Systems: A 10-point review of systems was performed. Negative except as listed above. Plan:     * Cervical spondylosis with myelopathy  Assessment & Plan  Presented with progressive weakness/constipation that culminated in two falls and difficulty with mobility.  - Mri showed severe stenosis C3-4, 4-5, and 5-6 with mild cord signal changes  10/25 PCDF with C3-6 laminectomies of C4,5,6 and partial C3  SSEPs noted to be dampened at baseline  Post-op examined C5 AIS D with pinprick > light touch impairment and proprioception intact   - Plan to repeat sensory exam and sacral exam on 11/11. Question of neurogenic bowel/bladder  No need for collar  Cervical spine precautions in place  Pain control  Incisional care  PT/OT 3-5 hours/day, 5-7 days/ week. Plan for 6 week POV with Nsx ~12/6 with repeat imaging. Diarrhea  Assessment & Plan  See ileus for details. Adding enteric panel. Acute DVT (deep venous thrombosis) ([address])  Assessment & Plan  Diagnosed 11/3/2023:  RIGHT LOWER LIMB:  Evidence of acute deep venous thrombosis was noted in the mid posterior tibial  veins. No evidence of superficial thrombophlebitis noted. Doppler evaluation shows a normal response to augmentation maneuvers. .  Popliteal, posterior tibial and anterior tibial arterial Doppler waveform's are  triphasic. There is a well defined hypoechoic non-vascularized cystic-type structure noted  in the popliteal fossa. LEFT LOWER LIMB:  Evidence of acute deep venous thrombosis was noted in one of the paired  peroneal veins. No evidence of superficial thrombophlebitis noted. Doppler evaluation shows a normal response to augmentation maneuvers. Popliteal, posterior tibial and anterior tibial arterial Doppler waveform's are  triphasic. Started on eliquis after clearance from Neurosurgery  No SCDs  OK for ACE wraps  Likely 3 months treatment  Outpatient f/u with PCP. Ileus [facility])  Assessment & Plan  Possible component of neurogenic bowel with progressively worsening constipation leading up to hospitalization  Then had ileus in the hospital - seems to be improving. Tolerating PO. Now with many loose/watery stools. C. Diff negative on 11/2  Fecal leuks are pending  11/9 started on metamucil. Monitor bowels for now. Close continence care. Anasarca  Assessment & Plan  Most resolved, has LE edema  Required IV Lasix inpatient  Now on Lasix 40mg daily and has refused potassium supplementation  Repeat labs in the AM  ACE wrap and elevate legs  Monitor     Urinary retention  Assessment & Plan  Likely component of neurogenic bladder, but multifactorial including mobility/medications, etc.  Did not have bladder symptoms leading up to hospitalization  Found to have retention during hospitalization.    On finasteride  Will perform sacral exam tomorrow and depending on results will likely remove fountain for ARC Scan/Cath protocol      Hyponatremia  Assessment & Plan  Likely SIADH and HCTZ per previous hospitalization  Also with volume overload which could be contributing as well (on Lasix)   11/8   Rechecking on 11/10  Continue FR  IM consulted and assisting with management. Hyperlipidemia  Assessment & Plan  Continue statin    Gastroesophageal reflux disease  Assessment & Plan  TUMS PRN    Benign essential hypertension  Assessment & Plan  Home: Lisinopril 10mg daily  Here: Lisinopril 10mg daily, Lasix 40mg daily  Monitor and adjust as appropriate  IM consulted to assist with management. Anemia  Assessment & Plan  ABLA  11/5 was 11.5   Repeat in the AM  Monitor and transfuse as appropriate  IM consulted to assist with management        Health Maintenance  #Delirium/Sleep: At risk. Optimize sleep/wake, bowel, bladder, pain control. Environmental interventions. Avoid restraint. #Pain: Not using opioids and pain overall controlled. Will stop gabapentin, and decrease Tizanidine 2mg TID and making PRN. He is on PRN oxycodone 2.5mg Q4hr Prn and minimizing opioid use due to recent ileus. Has Tylenol ordered PRN. #Skin/Pressure Injury Prevention: Turn Q2hr in bed, with weight shifts [id number] in wheelchair. Float heels in bed. #DVT Prophylaxis: Fully anticoagulated on eliquis. No SCDs due to DVTs. #GI Prophylaxis: Not currently indicated  #Code Status: Full Code  #FEN: Regular House diet with 1500mL fluid restriction. Ensure Compact Chocolate with Breakfast/Lunch  #Dispo:  ElOS 10-14 days with goals to discharge home at mod Ind level of function. 75 minutes or greater spent for this encounter which included a combination of face-to-face time with the patient and non-face-to-face time which in part specifically includes management of chronic comorbidities, acute sequelae as detailed above.   Face-to-face time included extended discussion with patient regarding current condition, medical history, mood, medical/rehabilitation management, and disposition. Non face-to-face time included coordination of care with patient's co-managing AP and/or physician(s) thru communication and review of their recent documentation as well as reviewing vitals, bowel/bladder function, recent labs, diagnostic imaging, and notes from therapy, CM, and nursing.      Drug regimen reviewed, all potential adverse effects identified and addressed:    Scheduled Meds:  Current Facility-Administered Medications   Medication Dose Route Frequency Provider Last Rate    acetaminophen  650 mg Oral Q6H PRN Kianna Musa MD      albuterol  2 puff Inhalation Q6H PRN Kianna Musa MD      [START ON 11/11/2023] apixaban  5 mg Oral BID Kianna Musa MD      [START ON 11/11/2023] atorvastatin  10 mg Oral Daily Kianna Musa MD      calcium carbonate  500 mg Oral Daily PRN MD Jalyn Mg Maze ON 11/11/2023] finasteride  5 mg Oral Daily Kianna Musa MD      fluticasone  1 spray Each Nare BID Kianna Musa MD      [START ON 80/47/6842] folic acid  251 mcg Oral Daily Kianna Musa MD      [START ON 11/11/2023] furosemide  40 mg Oral Daily Kianna Musa MD      gabapentin  100 mg Oral TID Kianna Musa MD      [START ON 11/11/2023] lisinopril  10 mg Oral Daily Kianna Musa MD      [START ON 11/11/2023] loratadine  10 mg Oral Daily Kianna Musa MD      melatonin  3 mg Oral HS Kianna Musa MD      ondansetron  4 mg Oral Q6H PRN Kianna Musa MD      oxyCODONE  2.5 mg Oral Q4H PRN Kianna Musa MD      potassium chloride  40 mEq Oral BID Kianna Musa MD      [START ON 11/11/2023] psyllium  1 packet Oral Daily Kianna Musa MD      tiZANidine  4 mg Oral TID Kianna Musa MD          Restrictions include:   Cervical spine precautions  Fall precautions      Functional History/Home Set-up - Prior to Admission:     Lives alone  1 level apartment with 0 KARLA  Previously independent with mobility, ADLs, and IADLs, driving, meal prep, and medication management. Functional Status Upon Admission to [facility]:  Mobility: Tonja ambulation 39' with RW  Transfers: Tonja  ADLs: Supervision grooming, Tonja UB bathing, modA LB bathing, Tonja UB dressing, maxA LB derssing, maxA toileting      Physical Exam:  Temp:  [97.9 °F (36.6 °C)-98.3 °F (36.8 °C)] 97.9 °F (36.6 °C)  HR:  [] 85  Resp:  [12-20] 20  BP: (101-118)/(61-84) 104/61  SpO2:  [95 %-98 %] 97 %    Gen: No acute distress, Well-nourished, well-appearing. HEENT: Moist mucus membranes, Normocephalic/Atraumatic  Cardiovascular: Regular rate, rhythm, S1/S2. Distal pulses palpable  Heme/Extr: BL LE edema   Pulmonary: Non-labored breathing. Lungs CTAB  : + fountain with clear yellow urine. GI: Soft, non-tender, non-distended. BS+  MSK: AROM is WFL in all extremities. No effusions or deformities. Bulk is symmetric. See below for MMT scores. Integumentary: Skin is warm, dry. Heels intact. Sacrum intact and blanchable. Neuro: AAOx3, Speech is intact. Appropriate to questioning. Tone is normal. Reflexes normal in patella, achilles. Rectal exam deferred currently in chair. MMT:   Strength:   Right  Left  Site  Right  Left  Site    5 5  S Ab: Shoulder Abductors  5  5  HF: Hip Flexors    5 5  EF: Elbow Flexors  5  5 KF: Knee Flexors    5  5  EE: Elbow Extensors  5  5  KE: Knee Extensors    5  5  WE: Wrist Extensors  5  5  DR: Dorsi Flexors    5  4  FF: Finger Flexors  5  5  PF: Plantar Flexors    5  4  HI: Hand Intrinsics  5  5  EHL: Extensor Hallucis Longus   Psych: Normal mood and affect.        Laboratory:    Results from last 7 days   Lab Units 11/05/23  0543 11/03/23  2302   HEMOGLOBIN g/dL 11.5* 11.7*   HEMATOCRIT % 33.9* 34.5*   WBC Thousand/uL 9.57 11.24*     Results from last 7 days   Lab Units 11/08/23  0430 11/07/23  0457 11/06/23  0634 11/04/23  0548 11/03/23  2302   BUN mg/dL 32* 32* 32*   < > 24   SODIUM mmol/L 129* 129* 130*   < > 129*   POTASSIUM mmol/L 4.0 3.6 3.8   < > 3.9   CHLORIDE mmol/L 91* 89* 91*   < > 91*   CREATININE mg/dL 0.92 0.93 1.01   < > 0.84   AST U/L  --   --   --   --  22   ALT U/L  --   --   --   --  29    < > = values in this interval not displayed. Results from last 7 days   Lab Units 11/03/23  2302   PROTIME seconds 14.2   INR  1.11        Wt Readings from Last 1 Encounters:   11/10/23 106 kg (233 lb 11 oz)     Estimated body mass index is 35.53 kg/m² as calculated from the following:    Height as of this encounter: 5' 8" (1.727 m). Weight as of this encounter: 106 kg (233 lb 11 oz). Imaging: reviewed   11/2 XR Abd:  Distended gas-filled colon with the cecum measuring about 9 cm.     11/2 CXR:  Low lung volumes and increased lung markings due to crowding. Mild left basilar density likely due to atelectasis     No acute consolidation or congestion    11/2 CTAP:  Diffuse moderate distention of the entire gastrointestinal tract with fluid, with no evidence of obstruction. Findings may reflect nonspecific gastroenteritis and/or ileus. 11/3 Dopplers:  RIGHT LOWER LIMB:  Evidence of acute deep venous thrombosis was noted in the mid posterior tibial  veins. No evidence of superficial thrombophlebitis noted. Doppler evaluation shows a normal response to augmentation maneuvers. .  Popliteal, posterior tibial and anterior tibial arterial Doppler waveform's are  triphasic. There is a well defined hypoechoic non-vascularized cystic-type structure noted  in the popliteal fossa. LEFT LOWER LIMB:  Evidence of acute deep venous thrombosis was noted in one of the paired  peroneal veins. No evidence of superficial thrombophlebitis noted. Doppler evaluation shows a normal response to augmentation maneuvers. Popliteal, posterior tibial and anterior tibial arterial Doppler waveform's are  triphasic. 11/4 Cta Chest PE:  1. No pulmonary embolism.   2. Bibasilar left greater than right subsegmental atelectasis similar to the CT of the abdomen and pelvis from 2 days ago. 3. Stable calcified and noncalcified anterior mediastinal nodules likely lymph nodes possibly sequela of prior granulomatous infection. Differential diagnosis could include oil cysts. 3. Suspect cholelithiasis and/or gallbladder sludge. No pericholecystic fluid to suggest acute cholecystitis by CT. Further clinical assessment recommended. 11/7 XR Abdomen:  Persistent gas-filled dilatation of the large and small bowel. No discernible free air. Rehabilitation Prognosis: good     Tolerance for three hours of therapy a day: good     Family/Patient Goals:  Patient/family's goals: Return to previous home/apartment. Patient will receive PT and OT 90 minutes each per day, five days per week to achieve rehab goals or participate in 900 minutes of therapy within a 7 day week period. Mobility Goals: Modified [place]  Transfer Goals: Modified [place]  Activities of Daily Living (ADLs) Goals: Modified [place]    Discharge Planning:  Rehabilitation and discharge goals discussed with the patient and/or family. Case Managment and Social Work to review patient/family resources and to coordinate Discharge Planning. Estimated length of stay: 10 to 14 days    Patient and Family Education and Training:  Rehabilitation and discharge goals discussed with the patient and/or family. Patient/family education/training needs to be discussed in weekly team meeting.     Equipment/DME needs: Therapy teams to assess and evaluate for additional equipment/DME needs throughout rehabilitation stay    Past Medical History:   Past Surgical History:   Family History:   Social history:   Past Medical History:   Diagnosis Date    GERD (gastroesophageal reflux disease)     Hyperlipidemia     Hypertension     Past Surgical History:   Procedure Laterality Date    COLONOSCOPY      HERNIA REPAIR      N[place] ARTHRD PST/PSTLAT TQ 1NTRSPC DAISY BELW C2 SEGMENT N/A 10/25/2023    Procedure: C3-6 PCDF;  Surgeon: Deejay Sheldon MD;  Location: [facility];  Service: Neurosurgery    SC COLONOSCOPY FLX DX W/COLLJ [facility] WHEN PFRMD N/A 4/5/2019    Procedure: COLONOSCOPY;  Surgeon: June Reyes DO;  Location: [facility]; Service: Gastroenterology    ROTATOR CUFF REPAIR Left     TONSILLECTOMY       History reviewed. No pertinent family history. Social History     Socioeconomic History    Marital status: Single     Spouse name: None    Number of children: None    Years of education: None    Highest education level: None   Occupational History    None   Tobacco Use    Smoking status: Never    Smokeless tobacco: Never   Vaping Use    Vaping Use: Never used   Substance and Sexual Activity    Alcohol use: Yes     Alcohol/week: 6.0 standard drinks of alcohol     Types: 6 Cans of beer per week     Comment: beer 4-5 days a week    Drug use: Never    Sexual activity: None   Other Topics Concern    None   Social History Narrative    None     Social Determinants of Health     Financial Resource Strain: Not on file   Food Insecurity: No Food Insecurity (10/22/2023)    Hunger Vital Sign     Worried About Running Out of Food in the Last Year: Never true     Ran Out of Food in the Last Year: Never true   Transportation Needs: No Transportation Needs (10/22/2023)    PRAPARE - Transportation     Lack of Transportation (Medical): No     Lack of Transportation (Non-Medical):  No   Physical Activity: Not on file   Stress: Not on file   Social Connections: Not on file   Intimate Partner Violence: Not on file   Housing Stability: Low Risk  (10/22/2023)    Housing Stability Vital Sign     Unable to Pay for Housing in the Last Year: No     Number of Places Lived in the Last Year: 1     Unstable Housing in the Last Year: No          Current Medical Diagnosis Allergies   Patient Active Problem List   Diagnosis    Tinea corporis    Onychomycosis    Osteoarthritis of cervical spine with myelopathy    Impaired mobility and activities of daily living    Anemia    Benign essential hypertension    Dorsalgia, unspecified    Gastroesophageal reflux disease    Hyperlipidemia    Paresthesia    Weakness of extremity    Hyponatremia    Urinary retention    Neurogenic bowel    Seasonal allergies    Anasarca    Acute respiratory insufficiency    Ileus (HCC)    Acute DVT (deep venous thrombosis) (HCC)    Diarrhea    No Known Allergies        Medical Necessity Criteria for ARC Admission:  He is of Moderate Risk due to the following comorbid conditions: Neurogenic bowel, neurogenic bladder, AHRF, anasarca, acute DVT, pain, HTN, hyponatremia, risk of Vte, skin breakdown/pressure injury, risk of falls, ileus. . In addition, the preadmission screen, post-admission physical evaluation, overall plan of care and admissions order demonstrate a reasonable expectation that the following criteria were met at the time of admission to the [facility]. The patient requires active and ongoing therapeutic intervention of multiple therapy disciplines (physical therapy, occupational therapy, speech-language pathology, or prosthetics/orthotics), one of which is physical or occupational therapy. Patient requires an intensive rehabilitation therapy program, as defined in Chapter 1, section 110.2.2 of the CMS Medicare Policy Manual. This intensive rehabilitation therapy program will consist of at least 3 hours of therapy per day at least 5 days per week or at least 15 hours of intensive rehabilitation therapy within a 7 consecutive day period, beginning with the date of admission to the [facility]. The patient is reasonably expected to actively participate in, and benefit significantly from, the intensive rehabilitation therapy program as defined in Chapter 1, section 110.2.2 of the CMS Medicare Policy Manual at this time of admission to the [facility].  He can reasonably be expected to make measurable improvement (that will be of practical value to improve the patient’s functional capacity or adaptation to impairments) as a result of the rehabilitation treatment, as defined in section 110.3, and such improvement can be expected to be made within the prescribed period of time. As noted in the CMS Medicare Policy Manual, the patient need not be expected to achieve complete independence in the domain of self-care nor be expected to return to his or her prior level of functioning in order to meet this standard. The patient must require physician supervision by a rehabilitation physician. As such, a rehabilitation physician will conduct face-to-face visits with the patient at least 3 days per week throughout the patient’s stay in the [address] [place] [address] to assess the patient both medically and functionally, as well as to modify the course of treatment as needed to maximize the patient’s capacity to benefit from the rehabilitation process. The patient requires an intensive and coordinated interdisciplinary approach to providing rehabilitation, as defined in Chapter 1, section 110.2.5 of the CMS Medicare Policy Manual. This will be achieved through periodic team conferences, conducted at least once in a 7-day period, and comprising of an interdisciplinary team of medical professionals consisting of: a rehabilitation physician, registered nurse,  and/or , and a licensed/certified therapist from each therapy discipline involved in treating the patient. Solange Hand MD  Physical Medicine and Rehabilitation    ** Please Note: Fluency Direct voice to text software may have been used in the creation of this document.  **

## 2023-11-11 NOTE — CONSULTS
Podiatry - Consultation    Patient Information:   Theo Rubalcava 68 y.o. male MRN: [id number]  Unit/Bed#: -15 Encounter: [id number]  PCP: Lui King MD  Date of Admission:  11/10/2023  Date of Consultation: 11/11/23  Requesting Physician: Savanna Banegas MD      ASSESSMENT:    Theo Rubalcava is a 68 y.o. male with:    Onychomycosis  Cervical spondylosis w/ myelopathy  -S/p PCDF with C3-6 laminectomies (DOS: 10/25/23)  3. History of DVT    PLAN:    Toenails x10 were excisionally debrided using a large nipper to normal length and thickness without incident. Weight bearing as tolerated  Rest of Medical care per primary team.  Podiatry signing off, thank you for the consult! Please contact with any questions or concerns. SUBJECTIVE:    History of Present Illness:    Theo Rubalcava is a 68 y.o. male who is originally admitted to [facility] on 11/10/2023 due to cervical spine stenosis s/p PCDF with a past medical history of HTN, HLD, cervical spondylosis, acute DVT, impaired ADLs    We are consulted for painful, elongated toenails x 10. They state that they have difficulty applying their socks and shoes due to the elongation of the nails and was going to make appointment to get them trimmed before he was actually admitted to the hospital tThey have been unable to cut their nails adequately. He states that he does not follow outpatient with podiatrist, but he is interested in obtaining one. Patient has no further pedal complaints at this time. Review of Systems:    Constitutional: Negative. HENT: Negative. Eyes: Negative. Respiratory: Negative. Cardiovascular: Negative. Gastrointestinal: Negative. Musculoskeletal:  Lower extremity weakness  Skin: Thickened, elongated toenails  Neurological: Moderate numbness in feet bilaterally  Psych: Negative.      Past Medical and Surgical History:     Past Medical History:   Diagnosis Date    GERD (gastroesophageal reflux disease) Hyperlipidemia     Hypertension        Past Surgical History:   Procedure Laterality Date    COLONOSCOPY      HERNIA REPAIR      ID ARTHRD PST/PSTLAT TQ 1NTRSPC CRV BELW C2 SEGMENT N/A 10/25/2023    Procedure: C3-6 PCDF;  Surgeon: Kosta Bell MD;  Location: [facility];  Service: Neurosurgery    ID COLONOSCOPY FLX DX W/COLLJ [facility] WHEN PFRMD N/A 4/5/2019    Procedure: COLONOSCOPY;  Surgeon: Chayito Camacho DO;  Location: [facility]; Service: Gastroenterology    ROTATOR CUFF REPAIR Left     TONSILLECTOMY         Meds/Allergies:    Medications Prior to Admission   Medication    apixaban (ELIQUIS) 5 mg    atorvastatin (LIPITOR) 20 mg tablet    finasteride (PROSCAR) 5 mg tablet    fluticasone (FLONASE) 50 mcg/act nasal spray    folic acid (FOLVITE) 030 MCG tablet    gabapentin (NEURONTIN) 100 mg capsule    lisinopril (ZESTRIL) 10 mg tablet    loratadine (CLARITIN) 10 mg tablet    melatonin 3 mg    psyllium (METAMUCIL) packet    tiZANidine (ZANAFLEX) 4 mg tablet    acetaminophen (TYLENOL) 325 mg tablet    albuterol (PROVENTIL HFA,VENTOLIN HFA) 90 mcg/act inhaler    apixaban (ELIQUIS) 5 mg    lidocaine (LIDODERM) 5 %    polyethylene glycol (MIRALAX) 17 g packet       No Known Allergies    Social History:     Marital Status: Single    Substance Use History:   Social History     Substance and Sexual Activity   Alcohol Use Yes    Alcohol/week: 6.0 standard drinks of alcohol    Types: 6 Cans of beer per week    Comment: beer 4-5 days a week     Social History     Tobacco Use   Smoking Status Never   Smokeless Tobacco Never     Social History     Substance and Sexual Activity   Drug Use Never       Family History:    History reviewed. No pertinent family history.       OBJECTIVE:    Vitals:   Blood Pressure: 104/61 (11/10/23 2102)  Pulse: 85 (11/10/23 2102)  Temperature: 97.9 °F (36.6 °C) (11/10/23 2102)  Temp Source: Oral (11/10/23 2102)  Respirations: 20 (11/10/23 2102)  Height: 5' 8" (172.7 cm) (11/10/23 1637)  Weight - Scale: 106 kg (233 lb 11 oz) (11/10/23 1637)  SpO2: 97 % (11/10/23 2102)    Physical Exam:    General Appearance: Alert, cooperative, no distress. HEENT: Head normocephalic, atraumatic, without obvious abnormality. Heart: Normal rate and rhythm. Lungs: Non-labored breathing. No respiratory distress. Abdomen: Without distension. Psychiatric: AAOx3  Lower Extremity:  Vascular:   Right DP and PT pulses are weak. Left DP and PT pulses are weak. CRT < 3 seconds at the digits. +1/4 edema noted at bilateral lower extremities, L>R. Pedal hair is absent. Skin temperature is WNL bilaterally. Musculoskeletal:  MMT is 4/5 in all muscle compartments bilaterally. ROM at the ankle joint is limited bilaterally with the leg extended. No Pain on global palpation of bilateral lower extremities. Rigid second digital contracture noted bilaterally    Dermatological:  Elongated, discolored, dystrophic nails x 10 that are positive for subungual debris. Preulcerative lesion noted to the distal tuft of bilateral second digits, notable callus formation    Neurological:  Gross sensation is intact. Protective sensation is diminished. Patient Reports numbness and/or paresthesias. Additional data:     Lab Results: I have personally reviewed pertinent labs including:    Results from last 7 days   Lab Units 11/05/23  0543   WBC Thousand/uL 9.57   HEMOGLOBIN g/dL 11.5*   HEMATOCRIT % 33.9*   PLATELETS Thousands/uL 363   NEUTROS PCT % 78*   LYMPHS PCT % 9*   MONOS PCT % 11   EOS PCT % 1     Results from last 7 days   Lab Units 11/08/23  0430   POTASSIUM mmol/L 4.0   CHLORIDE mmol/L 91*   CO2 mmol/L 28   BUN mg/dL 32*   CREATININE mg/dL 0.92   CALCIUM mg/dL 8.7                     Imaging: I have personally reviewed pertinent reports in PACS. EKG, Pathology, and Other Studies: I have personally reviewed pertinent reports. ** Please Note: Portions of the record may have been created with voice recognition software.  Occasional wrong word or "sound a like" substitutions may have occurred due to the inherent limitations of voice recognition software. Read the chart carefully and recognize, using context, where substitutions have occurred.  **

## 2023-11-11 NOTE — PROGRESS NOTES
11/11/23 0830   Eating Goal   Eating Assist Level Moderate [place]   Meal Complete All meals   Status Ongoing; Target goal - one week   Interventions Compensation Strategies; Optimal Position   Grooming Goal   Grooming Assist Level Supervision   Task Complete Groom   Environment Stand at Sink;Seated at [facility]   Safety Precautions Safety Precaution   Status Target goal - one week; Ongoing   Intervention Assistive Device; Therapeutic Exercise; Tolerance Work;Balance Work   Bathing Goal   Bathing Assist Level Moderate Assist   Task Upper Body Bathing  (Lower body bathing)   Environment Seated;Sponge Bath; Shower   Adaptive Equipment Reacher;Long Handle Sponge;Seat with back;Grab Bar;Hand Held Shower   Safety Precautions Safety Precaution   Status Ongoing; Target goal - one week   Intervention Assistive Device; ADL Training; Therapeutic Exercise;Neuromuscular Education   Upper Body Dressing Goal   Upper Body Dressing Assist Level Minimum Assist   Task Upper Body;Arms in/out; Over Head;Button Down; Fasteners   Adaptive Equipment Reacher;Button Hook   Environment  Seated   Safety Precautions Safety Precaution   Status Ongoing; Target goal - one week   Intervention Assistive Device;Balance Work;Neuromuscular Education; Tolerance Work; Therapeutic Exercise   Lower Body Dressing Goal   Lower Body Dressing Assist Level Minimum Assist   Task Lower Body;Shoe/Slipper;Socks;Pants; Undergarment; Fasteners   Adaptive Equipment Reacher;Sock Aide; Shoe Horn;Dressing Stick; Elastic Laces   Environment  Seated   Safety Precautions Safety Precaution   Status Ongoing; Target goal - one week   Intervention Assistive Device;Balance Work;Neuromuscular Education; Therapeutic Exercise; Tolerance Work   Toileting Goal   Toileting Assist Level Minimum Assist   Task Pants Up;Pants Down;Hygiene   Assistive Device Reacher;Moist Wipes;Grab Bar   Safety Balance;Use a Bedside Commode at [facility]   Status Ongoing; Target goal - one week   Intervention ADL Training;Assistive Device;Balance Work   Toileting Transfer Goal   Toileting Transfer Assist Level [address] Roller Walker;Raised Toilet Seat   Safety Safety Precaution   Status Ongoing; Target goal - one week   Intervention ADL Training;Balance Work;Assistive Device   Tub/Shower Transfer Goal   Tub/Shower Assist Level Moderate Assistance   Method Shower Stall   Assist Device Seat with Back;Grab Bar;Hand Held Shower   Status Ongoing; Target goal - one week   Interventions Assistive Device; ADL Training;Neuromuscular Education   Home Management Goal   Assist Level Moderate Assist   Status Ongoing; Target - one week   Interventions Activity Tolerance;Clothing Care;Meal Preparation

## 2023-11-11 NOTE — ASSESSMENT & PLAN NOTE
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 Asymptomatic  IM consulted and assisting with management. They will continue to monitor for now.  Labs in the AM

## 2023-11-11 NOTE — PROGRESS NOTES
11/11/23 1400   Rehab Team Goals   Transfer Team Goal Patient will be independent with transfers with least restrictive device upon completion of rehab program   Locomotion Team Goal Patient will be independent with locomotion with least restrictive device upon completion of rehab program   Rehab Team Interventions   PT Interventions Gait Training; Therapeutic Exercise;Neuromuscualr Reeducation;Transfer Training;Community Reintegration;Bed Mobility;Modalities; Patient/Family Education   PT Transfer Goal   Roll left and right Goal 06. Independent - Patient completes the activity by him/herself with no assistance from a helper. Sit to lying Goal 06. Independent - Patient completes the activity by him/herself with no assistance from a helper. Lying to sitting on side of bed Goal 06. Independent - Patient completes the activity by him/herself with no assistance from a helper. Sit to stand Goal 06. Independent - Patient completes the activity by him/herself with no assistance from a helper. Chair/bed-to-chair transfer Goal 06. Independent - Patient completes the activity by him/herself with no assistance from a helper. Car Transfer Goal 06. Independent - Patient completes the activity by him/herself with no assistance from a helper. Assistive Device Single [employer]  (or no AD (currently using RW; goal for LRD))   Safety Precautions WBAT   Environment Level Surface; Uneven Surface; Well Lit; Tile Floor; Carpet; Distractions   Status Target goal - two weeks   Locomotion Goal   Primary discharge mode of locomotion Walking   Target Walk Distance 150 ft   Assist Device Cane  (or no AD)   Gait Pattern Improvement WNL   Environment Level Surface; Uneven Surface; Well Lit; Tile Floor; Carpet; Distractions   Walk 10 feet Goal 06. Independent - Patient completes the activity by him/herself with no assistance from a helper. Walk 50 feet with 2 turns Goal 06.  Independent - Patient completes the activity by him/herself with no assistance from a helper. Walk 150 feet Goal 06. Independent - Patient completes the activity by him/herself with no assistance from a helper. Walking 10 feet on uneven surface 06. Independent - Patient completes the activity by him/herself with no assistance from a helper. Walking Goal Status Target goal - two weeks   Stairs Goal   1 step or curb goal 06. Independent - Patient completes the activity by him/herself with no assistance from a helper. 4 steps Goal 09. Not applicable   12 steps Goal 09. Not applicable   Assist Level Moderate [place]   Number of Stairs 1   Technique Curb Step   Hand Rail   (no HR)   Status Target goal - two weeks   Object Retrieval Goal   Picking up object Goal 06. Independent - Patient completes the activity by him/herself with no assistance from a helper.    Assistive Device  Reacher  (may use reacher or no AD)   Small Object Picked Up pen

## 2023-11-11 NOTE — ASSESSMENT & PLAN NOTE
Home: Lisinopril 10mg daily  Here: Lisinopril 5 mg daily, Lasix 20 mg daily  Monitor and adjust as appropriate  IM consulted to assist with management.

## 2023-11-11 NOTE — TREATMENT PLAN
Individualized Plan of [address] [place] [address] Sagariste 68 y.o. male MRN: [id number]  Unit/Bed#: -01 Encounter: [id number]     PATIENT INFORMATION  ADMISSION DATE: 11/10/2023  4:14 PM ALBERT CATEGORY:Spinal Cord Dysfunction:  Non-Traumatic:  08.2494  Quadriplegia, Incomplete C5-8   ADMISSION DIAGNOSIS: Tetraplegia ([address]) [G82.50]  EXPECTED LOS: 10 to 14 days     MEDICAL/FUNCTIONAL PROGNOSIS  Based on my assessment of the patient's medical conditions and current functional status, the prognosis for attaining medical and functional goals or the IRF stay is:  Good    Medical Goals: Patient will be able to manage medical conditions and comorbid conditions with medications and follow up upon completion of rehab program.    1. Adequate pain control  2. Prevention of VTE  3. Adequate secretion management, and monitoring of respiratory status  4. Bowel/bladder management  5. Maintain appropriate nutrition and hydration for healing and hemodynamic stability  6. Emotional adaptation to impairment  7. Monitor for polypharmacy  8. Fall prevention  9. Patient and family caregiver training/education  10. Skin protection and prevention of pressure injury  11. Incisional care to prevent infection/dehiscence  12. Prevention of delirium      ANTICIPATED DISCHARGE DISPOSITION AND SERVICES  COMMUNITY SETTING: Home - independent/modified independent      ANTICIPATED FOLLOW-UP SERVICE:   Outpatient Therapy Services: PT and OT         DISCIPLINE SPECIFIC PLANS:  Required Disciplines & Services: Rehabillitation Nursing    REQUIRED THERAPY:  Therapy Hours per Day Days per Week Total Days   Physical Therapy 1.5 5 5   Occupational Therapy 1.5 5 5   NOTE: Additional therapy time(s) or changes to allocation of therapies as appropriate to meet patient needs and to achieve functional goals.       Patient will participate in above therapy regimen consisting of PT and OT due to the following medical procedure/condition:Spinal Cord Dysfunction:  Non-Traumatic:  30.6301  Quadriplegia, Incomplete C5-8    ANTICIPATED FUNCTIONAL OUTCOMES:  ADL:     Bladder/Bowel:     Transfers:     Locomotion:     Cognitive:       DISCHARGE PLANNING NEEDS  Equipment needs: Discharge needs to be reviewed with team      REHAB ANTICIPATED PARTICIPATION RESTRICTIONS:  None

## 2023-11-11 NOTE — PROGRESS NOTES
11/11/23 1400   Patient Data   Rehab Impairment Impairment of mobility, safety and Activities of Daily Living (ADLs) due to Spinal Cord Dysfunction: Non-Traumatic: 04.130 Other Non-Traumatic   Etiologic Diagnosis Non-traumatic incomplete tetraplegia s/p C3-C6 PCDF   Date of Onset 10/23/23  (DOS: 10/25/23)   Home Setup   Type of Home Apartment   Method of Entry Stairs   Number of Stairs 1   Number of Stairs in Home 0   First Floor Bathroom Shower; Full   First Floor Setup Available Yes   Home Modifications Necessary? No   Baseline Information   Transportation    Prior Device(s) Used   (none)   Prior Level of Function   Self-Care 3. Independent - Patient completed the activities by him/herself, with or without an assistive device, with no assistance from a helper. Indoor-Mobility (Ambulation) 3. Independent - Patient completed the activities by him/herself, with or without an assistive device, with no assistance from a helper. Stairs 3. Independent - Patient completed the activities by him/herself, with or without an assistive device, with no assistance from a helper. Functional Cognition 3. Independent - Patient completed the activities by him/herself, with or without an assistive device, with no assistance from a helper. Prior Assistance Needed for   (had a "cleaning lady")   Prior Device Used Z. None of the above   Falls in the Last Year   Number of falls in the past 12 months 3   Type of Injury Associated with Fall Injury  (Reported back pain with fall in March 2023)   Psychosocial   Psychosocial (WDL) WDL   Patient Behaviors/Mood Cooperative   Restrictions/Precautions   Precautions Fall Risk;Fluid restriction; [place]; Spinal precautions   Weight Bearing Restrictions No   ROM Restrictions Yes  (No c-collar; c-spine precautions)   Pain Assessment   Pain Assessment Tool 0-10   Pain Score No Pain   Toileting Hygiene   Type of Assistance Needed Physical assistance   Physical Assistance Level 51%-75%   Toileting Hygiene CARE Score 2   Toilet Transfer   Surface Assessed Standard Commode   Transfer Technique Stand Pivot   Limitations Noted In Endurance;Balance;LE Strength;UE Strength;Sensation   Adaptive Equipment Walker   Type of Assistance Needed   (CG with RW, modA/HHA without AD)   Physical Assistance Level 26%-50%   Toilet Transfer CARE Score 3   Transfer Bed/Chair/Wheelchair   Limitations Noted In Balance; Endurance;Sensation;UE Strength;LE Strength   Adaptive Equipment Roller Walker   Type of Assistance Needed Physical assistance   Physical Assistance Level 26%-50%   Comment CG with RW, modA/HHA without AD   Chair/Bed-to-Chair Transfer CARE Score 3   Roll Left and Right   Type of Assistance Needed Supervision   Roll Left and Right CARE Score 4   Sit to Lying   Type of Assistance Needed Supervision   Sit to Lying CARE Score 4   Lying to Sitting on Side of Bed   Type of Assistance Needed Physical assistance   Physical Assistance Level 26%-50%   Lying to Sitting on Side of Bed CARE Score 3   Sit to Stand   Type of Assistance Needed Incidental touching   Sit to Stand CARE Score 4   Picking Up Object   Type of Assistance Needed Physical assistance   Physical Assistance Level 25% or less   Comment reacher   Picking Up Object CARE Score 3   Car Transfer   Type of Assistance Needed Physical assistance   Physical Assistance Level 26%-50%   Comment able to manage b/l LE into/out of car, assist to stand from low car without cushion   Car Transfer CARE Score 3   Ambulation   Primary Mode of Locomotion Prior to Admission Walk   Distance Walked (feet) 30 ft  (10, 15)   Assist Device Roller Walker;Hand Hold   Gait Pattern Inconsistant Teena; Slow Teena; Step through; Decreased L stance; Improper weight shift   Limitations Noted In Balance; Endurance;Sensation; Sequencing;Speed;Strength;Swing   Provided Assistance with: Balance   Walk Assist Level Contact Guard; Moderate Assist   Findings CG-Tonja to ambulate with RW x35ft [id number]; modA/RHHA and L rail to ambulate without AD x10ft   Walk 10 Feet   Type of Assistance Needed Physical assistance   Physical Assistance Level 26%-50%   Comment CG-Tonja with RW, R HHA/modA and L rail without AD   Walk 10 Feet CARE Score 3   Walk 50 Feet with Two Turns   Comment (S)  Plan to trial ambulation with SPC or no AD   Reason if not Attempted Safety concerns   Walk 50 Feet with Two Turns CARE Score 88   Walk 150 Feet   Reason if not Attempted Safety concerns   Walk 150 Feet CARE Score 88   Walking 10 Feet on Uneven Surfaces   Comment (S)  Trial next session   Reason if not Attempted Safety concerns   Walking 10 Feet on Uneven Surfaces CARE Score 88   Wheel 50 Feet with Two Turns   Reason if not Attempted Activity not applicable   Wheel 50 Feet with Two Turns CARE Score 9   Wheel 150 Feet   Reason if not Attempted Activity not applicable   Wheel 349 Feet CARE Score 9   Curb or Single Stair   Style negotiated Single stair   Type of Assistance Needed Physical assistance   Physical Assistance Level Total assistance   Comment requires b/l UE or rail at this time to negotiate step; PLOF no AD or railing to negotiate step to enter apt   Reason if not Attempted Activity not applicable   1 Step (Curb) CARE Score -   4 Steps   Reason if not Attempted Activity not applicable   4 Steps CARE Score 9   12 Steps   Comment 1 KARLA home; does not perform steps elsewhere; may be performed for strengthening purposes   Reason if not Attempted Activity not applicable   12 Steps CARE Score 9   Stairs   Type [place] Steps   # of Steps 2   Weight Bearing Precautions Cervical;Fall Risk   Assist Devices Bilateral Rail   Findings modA to ascend fwd, descend bwd with b/l UE support on rails; did not use rail or AD prior   Comprehension   QI: Comprehension 4. [place]: Clear comprehension without cues or repetitions   Expression   QI: Expression 4.  Express complex messages without difficulty and with speech that is clear and easy to Graham   RLE Assessment   RLE Assessment WFL  (4-/5 grossly)   Strength RLE   RLE Overall Strength 4-/5   R Hip Flexion 3+/5   R Hip ADduction 4/5   R Knee Flexion 4/5   R Knee Extension 4/5   R Ankle Dorsiflexion 4+/5   R Ankle Plantar Flexion 5/5   LLE Assessment   LLE Assessment WFL  (3+/5 grossly)   Strength LLE   LLE Overall Strength 3+/5   L Hip Flexion 3-/5   L Knee Flexion 4-/5   L Knee Extension 4-/5   L Ankle Dorsiflexion 4-/5   L Ankle Plantar Flexion 4/5   Coordination   Movements are Fluid and Coordinated 0   Coordination and Movement Description Dysmetria UE   Sensation   Light Touch Partial deficits in the RLE;Partial deficits in the LLE   Cognition   Overall Cognitive Status WFL   Arousal/Participation Cooperative   Attention Within functional limits   Orientation Level Oriented X4   Memory Within functional limits   Following Commands Follows all commands and directions without difficulty   Discharge Information   Patient's Discharge Plan Home alone   Patient's Rehab Expectations Increase leg strength and be able to walk with a cane   Impressions Pt is a 69 yo male, hospitalized 10/23 s/p two falls in one day where he legs gave out. 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 On 10/25, pt underwent posterior cervical decompression and fusion surgery C3-C6 and laminectomies C3-C6. No c-collar but does have c-spine precautions. Complications in hospital with DVT and CHF. Pt has a fountain catheter. Pt's PLOF is independent without AD in apartment and community. Pt currently ambulating with a RW, but his primary goal is to ambulate with SPC or no AD. Pt continues to report b/l LE weakness and decreased sensation although LLE grossly 3+/5 and R LE grossly 4-/5 via MMT. Pt able to perform SPT at CG-Tonja level RW; increased assistance to modA/RHHA without AD. Session limited by 2x SPT with RW to toilet with loose, watery stool.  Pt also limited by generalized fatigue and weakness; he was able to ambulate with RW with CG-Tonja 30ft and without AD with R HHA/modA and L rail 10ft without knee buckling although fearful of legs giving out. Pt utilized reacher to p/u pen from ground at this time. He was able to perform car transfer with modA; increased assist to stand from low car. Pt demonstrates good potential for reaching goals and returning to his apartment at independent level with goals of returning home with LRD. DAKOTA 2 weeks.    PT Therapy Minutes   PT Time In 1400   PT Time Out 1530   PT Total Time (minutes) 90   PT Mode of treatment - Individual (minutes) 90   PT Mode of treatment - Concurrent (minutes) 0   PT Mode of treatment - Group (minutes) 0   PT Mode of treatment - Co-treat (minutes) 0   PT Mode of Treatment - Total time(minutes) 90 minutes   PT Cumulative Minutes 90   Cumulative Minutes   Cumulative therapy minutes 180

## 2023-11-11 NOTE — ASSESSMENT & PLAN NOTE
Most resolved, has LE edema  Required IV Lasix inpatient  Lasix decreased from 40mg to 20mg by IM on 11/12, and on potassium supplement.   ACE wrap and elevate legs  Monitor

## 2023-11-11 NOTE — ASSESSMENT & PLAN NOTE
Diagnosed 11/3/2023 with DVT in right mid posterior tibial veins and left peroneal vein. Started on eliquis after clearance from Neurosurgery  No SCDs  OK for ACE wraps  Likely 3 months treatment  Outpatient f/u with PCP.

## 2023-11-11 NOTE — PLAN OF CARE
Problem: Prexisting or High Potential for Compromised Skin Integrity  Goal: Skin integrity is maintained or improved  Description: INTERVENTIONS:  - Identify patients at risk for skin breakdown  - Assess and monitor skin integrity  - Assess and monitor nutrition and hydration status  - Monitor labs   - Assess for incontinence   - Turn and reposition patient  - Assist with mobility/ambulation  - Relieve pressure over bony prominences  - Avoid friction and shearing  - Provide appropriate hygiene as needed including keeping skin clean and dry  - Evaluate need for skin moisturizer/barrier cream  - Collaborate with interdisciplinary team   - Patient/family teaching  - Consider wound care consult   Outcome: Progressing     Problem: PAIN - ADULT  Goal: Verbalizes/displays adequate comfort level or baseline comfort level  Description: Interventions:  - Encourage patient to monitor pain and request assistance  - Assess pain using appropriate pain scale  - Administer analgesics based on type and severity of pain and evaluate response  - Implement non-pharmacological measures as appropriate and evaluate response  - Consider cultural and social influences on pain and pain management  - Notify physician/advanced practitioner if interventions unsuccessful or patient reports new pain  Outcome: Progressing     Problem: INFECTION - ADULT  Goal: Absence or prevention of progression during hospitalization  Description: INTERVENTIONS:  - Assess and monitor for signs and symptoms of infection  - Monitor lab/diagnostic results  - Monitor all insertion sites, i.e. indwelling lines, tubes, and drains  - Monitor endotracheal if appropriate and nasal secretions for changes in amount and color  - [place] appropriate cooling/warming therapies per order  - Administer medications as ordered  - Instruct and encourage patient and family to use good hand hygiene technique  - Identify and instruct in appropriate isolation precautions for identified infection/condition  Outcome: Progressing  Goal: Absence of fever/infection during neutropenic period  Description: INTERVENTIONS:  - Monitor WBC    Outcome: Progressing     Problem: SAFETY ADULT  Goal: Patient will remain free of falls  Description: INTERVENTIONS:  - Educate patient/family on patient safety including physical limitations  - Instruct patient to call for assistance with activity   - Consult OT/PT to assist with strengthening/mobility   - Keep Call bell within reach  - Keep bed low and locked with side rails adjusted as appropriate  - Keep care items and personal belongings within reach  - Initiate and maintain comfort rounds  - Make Fall Risk Sign visible to staff  - Offer Toileting every  Hours, in advance of need  - Initiate/Maintain alarm  - Obtain necessary fall risk management equipment:   - Apply yellow socks and bracelet for high fall risk patients  - Consider moving patient to room near nurses station  Outcome: Progressing  Goal: Maintain or return to baseline ADL function  Description: INTERVENTIONS:  -  Assess patient's ability to carry out ADLs; assess patient's baseline for ADL function and identify physical deficits which impact ability to perform ADLs (bathing, care of mouth/teeth, toileting, grooming, dressing, etc.)  - Assess/evaluate cause of self-care deficits   - Assess range of motion  - Assess patient's mobility; develop plan if impaired  - Assess patient's need for assistive devices and provide as appropriate  - Encourage maximum independence but intervene and supervise when necessary  - Involve family in performance of ADLs  - Assess for home care needs following discharge   - Consider OT consult to assist with ADL evaluation and planning for discharge  - Provide patient education as appropriate  Outcome: Progressing  Goal: Maintains/Returns to pre admission functional level  Description: INTERVENTIONS:  - Perform BMAT or MOVE assessment daily.   - Set and communicate daily mobility goal to care team and patient/family/caregiver. - Collaborate with rehabilitation services on mobility goals if consulted  - Perform Range of Motion  times a day. - Reposition patient every  hours.   - Dangle patient  times a day  - Stand patient  times a day  - Ambulate patient  times a day  - Out of bed to chair  times a day   - Out of bed for meals  times a day  - Out of bed for toileting  - Record patient progress and toleration of activity level   Outcome: Progressing     Problem: DISCHARGE PLANNING  Goal: Discharge to home or other facility with appropriate resources  Description: INTERVENTIONS:  - Identify barriers to discharge w/patient and caregiver  - Arrange for needed discharge resources and transportation as appropriate  - Identify discharge learning needs (meds, wound care, etc.)  - Arrange for interpretive services to assist at discharge as needed  - Refer to Case Management Department for coordinating discharge planning if the patient needs post-hospital services based on physician/advanced practitioner order or complex needs related to functional status, cognitive ability, or social support system  Outcome: Progressing

## 2023-11-11 NOTE — PROGRESS NOTES
Occupational Therapy Evaluation    Past Medical History:   Diagnosis Date    GERD (gastroesophageal reflux disease)     Hyperlipidemia     Hypertension      Past Surgical History:   Procedure Laterality Date    COLONOSCOPY      HERNIA REPAIR      TX ARTHRD PST/PSTLAT TQ 1NTRSPC CRV BELW C2 SEGMENT N/A 10/25/2023    Procedure: C3-6 PCDF;  Surgeon: Tony Ortiz MD;  Location: [facility];  Service: Neurosurgery    TX COLONOSCOPY FLX DX W/COLLJ [place] WHEN PFRMD N/A 4/5/2019    Procedure: COLONOSCOPY;  Surgeon: Celestine Zapata DO;  Location: [facility]; Service: Gastroenterology    ROTATOR CUFF REPAIR Left     TONSILLECTOMY          11/11/23 0830   Patient Data   Rehab Impairment Impairment of mobility, safety and Activities of Daily Living (ADLs) due to Spinal Cord Dysfunction:  Non-Traumatic:  04.130 Other Non-Traumatic   Etiologic Diagnosis Non-traumatic incomplete tetraplegia s/p C3-C6 PCDF   Date of Onset 10/23/23  (DOS: 10/25/2023)   Home Setup   Type of Home Apartment   Method of Entry Stairs   Number of Stairs 1   Number of Stairs in Home 0   First Floor Bathroom Full; Shower;Curtain   First Floor Setup Available Yes   Home Modifications Necessary? No   Available Equipment Shower Chair   Prior IADL Participation   Money Management Identify Money;Estimate Costs;Estimate Change;Combine Bills;Manage Checkbook   Meal Preparation Full Participation   Laundry Full Participation   Home Cleaning Full Participation   Prior Level of Function   Self-Care 3. Independent - Patient completed the activities by him/herself, with or without an assistive device, with no assistance from a helper. Indoor-Mobility (Ambulation) 3. Independent - Patient completed the activities by him/herself, with or without an assistive device, with no assistance from a helper. Stairs 3. Independent - Patient completed the activities by him/herself, with or without an assistive device, with no assistance from a helper. Functional Cognition 3. Independent - Patient completed the activities by him/herself, with or without an assistive device, with no assistance from a helper. Restrictions/Precautions   Weight Bearing Restrictions No   ROM Restrictions Yes  (No c-collar, but to maintain cspine precautions)   Pain Assessment   Pain Assessment Tool 0-10   Pain Score No Pain   Eating Assessment   QI: Swallowing/Nutritional Status None of the above   Current Diet Regular   Intake Mode PO   Finishes Timely Yes   Opens Packages Yes   Findings Mild diff with manipulation of packages   Type of Assistance Needed Set-up / clean-up   Physical Assistance Level No physical assistance   Eating CARE Score 5   Oral Hygiene   Type of Assistance Needed Set-up / clean-up   Physical Assistance Level No physical assistance   Oral Hygiene CARE Score 5   Grooming   Able To Acquire Items; Initiate Tasks; Wash/Dry Face;Brush/Clean Teeth;Wash/Dry Hands   Limitation Noted In Strength;Timeliness; Coordination   Tub/Shower Transfer   Reason Not Assessed Medical;Safety   Shower/Bathe Self   Type of Assistance Needed Physical assistance   Physical Assistance Level 76% or more   Shower/Bathe Self CARE Score 2   Bathing   Assessed Bath Style Sponge Bath   Anticipated D/C Bath Style Shower   Able to [place] No   Able to [employer] Temperature Yes   Able to Wash/Rinse/Dry (body part) Chest;Abdomen;L Upper Leg;R Upper Leg   Limitations Noted in Balance; Endurance;ROM;Strength;Timeliness   Positioning Seated   Dressing/Undressing Clothing   Remove UB Clothes Other  (hopsital gown)   Don UB Clothes Pullover Shirt   Type of Assistance Needed Physical assistance   Physical Assistance Level 51%-75%   Upper Body Dressing CARE Score 2   Remove LB Clothes Socks   Don LB [employer]; Undergarment;Socks   Type of Assistance Needed Physical assistance   Physical Assistance Level 76% or more   Lower Body Dressing CARE Score 2   Limitations Noted In Balance; Coordination;Buttoning; Endurance;ROM;Strength   Positioning Supported Sit   Putting On/Taking Off Footwear   Type of Assistance Needed Physical assistance   Physical Assistance Level 76% or more   Putting On/Taking Off Footwear CARE Score 2   Toileting Hygiene   Type of Assistance Needed Physical assistance   Physical Assistance Level 51%-75%   Toileting Hygiene CARE Score 2   Toilet Transfer   Surface Assessed Standard Commode   Transfer Technique Stand Pivot   Limitations Noted In Balance; Endurance;ROM;Sensation;UE Strength;LE Strength   Adaptive Equipment Walker   Positioning Concerns Grab Bars   Type of Assistance Needed Physical assistance   Physical Assistance Level 26%-50%   Toilet Transfer CARE Score 3   Toileting   Able to Pull Clothing down no, up no. Able to Manage Clothing Closures No   Limitations Noted In Balance; Coordination;ROM;UE Strength;LE Strength   Adaptive Equipment Grab Bar   Transfer Bed/Chair/Wheelchair   Limitations Noted In Balance; Endurance; Coordination;Sensation;UE Strength;LE Strength   Adaptive Equipment Roller Walker   Type of Assistance Needed Physical assistance   Physical Assistance Level 26%-50%   Chair/Bed-to-Chair Transfer CARE Score 3   Roll Left and Right   Reason if not Attempted   (pt received OOB)   Sit to Lying   Reason if not Attempted   (Pt received OOB)   Lying to Sitting on Side of Bed   Reason if not Attempted   (Pt received OOB)   Sit to Stand   Type of Assistance Needed Physical assistance   Physical Assistance Level 26%-50%   Sit to Stand CARE Score 3   RUE Assessment   RUE Assessment X   AROM - RUE   R Shoulder Flexion Approx 90   R Shoulder ABduction Approx 45   R Elbow Flexion Full   R Elbow Extension Full   R Wrist Flexion WFL   R Wrist Extension WFL   R Digit Flexion WFL   R Digit Extension WFL   Strength - RUE   R Gross Arm Strength 3-/5   R Shoulder Flexion 3-/5   R Shoulder ABduction 3-/5   R Elbow Flexion 3+/5   R Elbow Extension 4/5   R Wrist Flexion 4/5   R Wrist Extension 4/5   LUE Assessment   LUE Assessment X   AROM - LUE   L Shoulder Flexion approx 75   L Shoulder ABduction approx 80   L Elbow Flexion Full   L Elbow Extension Full   L Wrist Flexion Full   L Wrist Extension Full   L Digit Flexion Full   L Digit Extension Full   Strength - LUE   L Gross Arm Strength 3-/5   L Shoulder Flexion 3-/5   L Shoulder ABduction 3-/5   L Elbow Flexion 3+/5   L Elbow Extension 4/5   L Wrist Flexion 4/5   L Wrist Extension 4/5   Coordination   Movements are Fluid and Coordinated 0   Coordination and Movement Description Slowed serial opposito   Sensation   Light Touch Partial deficits in the RUE;Partial deficits in the LUE  (Some significant in L hand)   Cognition   Overall Cognitive Status [facility]   Arousal/Participation Alert; Cooperative   Attention Within functional limits   Orientation Level Oriented X4   Memory Within functional limits   Following Commands Follows all commands and directions without difficulty   Objective Measure   OT Measure(s)  R: 50, 65, 60; L: 35, 41, 39. 9 hole R: 49s; L: 48s   OT Findings Below average  stregth B for age range; fine motor coordination deficits; multiple episodes of dropping. Primary sensory deficits as cause. Discharge Information   Patient's Discharge Plan Home alone with DME as needed   Patient's Rehab Expectations "Build my legs up"   Impressions Pt is a 67 y/o s/p PCDF C3-6/c4-6 lami who is currently presenting with decreased BUE AROM/strength, coordination, balance and act tori limiting participation in ADLs, IADLs and func txfrs/mob. PTA pt was independent, but admits to having progressive difficulty with daiyl activities since March 2023. He currently requires increased A with UB/LB ADLs, txfrs and mob due to weakness and sensory deficits. Pt is an excellent candidate for OT services to max participation in meaningful tasks.

## 2023-11-11 NOTE — ASSESSMENT & PLAN NOTE
Likely component of neurogenic bladder, but multifactorial including mobility/medications, etc.  Did not have bladder symptoms leading up to hospitalization  Found to have retention during hospitalization.    On finasteride  Trial of void initiated 11/14/23 with urinary retention protocol

## 2023-11-11 NOTE — PROGRESS NOTES
11/11/23 0830   Rehab Team Goals   ADL Team Goal Patient will be independent with ADLs with least restrictive device upon completion of rehab program   Rehab Team Interventions   OT Interventions Self Care;Home Management; Therapeutic Exercise;Community Reintegration; Energy Conservation;Patient/Family Education   Eating Goal   Eating Goal 06. Independent - Patient completes the activity by him/herself with no assistance from a helper. Meal Complete All meals   Diet Level Regular   Status Ongoing; Target goal - two weeks   Interventions Assistive Device; Compensation Strategies; Neuromuscular Education; Optimal Position   Grooming Goal   Oral Hygiene Goal 06. Independent - Patient completes the activity by him/herself with no assistance from a helper. Task Wash/Dry Face;Wash/Dry Hands;Brush Teeth;Comb Hair; Shave;Acquire Items; Initiate Task;Complete Groom   Environment Seated at [employer] at [employer]; Target goal - two weeks   Intervention Assistive Device;Balance Work;Neuromuscular Education; Therapeutic Exercise; Tolerance Work   Tub/Shower Transfer Goal   Method Shower Stall   Assist Device Seat with [employer]   Status Ongoing; Target goal - two weeks   Interventions ADL Training;Assistive Device; Neuromuscular Education   Bathing Goal   Shower/bathe self Goal 06. Independent - Patient completes the activity by him/herself with no assistance from a helper. Environment Seated; Shower   Adaptive Equipment Reacher;Seat with [employer]   Status Ongoing; Target goal - one week   Intervention ADL Training;Assistive Device; Neuromuscular Education; Therapeutic Exercise   Upper Body Dressing Goal   Upper body dressing Goal 06. Independent - Patient completes the activity by him/herself with no assistance from a helper. Task Upper Body;Arms in/out; Over Head;Button Down; Fasteners   Adaptive Equipment Dressing Stick; Reacher;Button [employer]   Status Ongoing; Target goal - two weeks Intervention Assistive Device;Balance Work;Neuromuscular Education; Therapeutic Exercise; Tolerance Work   Lower Body Dressing Goal   Lower body dressing Goal 06. Independent - Patient completes the activity by him/herself with no assistance from a helper. Task Lower Body;Socks;Shoe/Slipper;Pants; Undergarment; Fasteners   Adaptive Equipment Reacher;Sock Aide; Shoe [place];Dressing Stick; Elastic Laces   Environment Seated;Standing   Status Ongoing; Target goal - two weeks   Intervention Balance Work;Assistive Device; Neuromuscular Education; Therapeutic Exercise; Tolerance Work   Toileting Transfer Goal   Toilet transfer Goal 06. Independent - Patient completes the activity by him/herself with no assistance from a helper. Assistive Device [employer]   Status Ongoing; Target goal - two weeks   Intervention ADL Training;Balance Work;Assistive Device   Toileting Goal   Toileting hygiene Goal 06. Independent - Patient completes the activity by him/herself with no assistance from a helper. Task Pants Up;Pants Down;Hygiene   Assistive Device Reacher;Grab Bar;Moist Wipes   Safety Balance   Status Ongoing; Target goal - two weeks   Intervention ADL Training;Balance Work;Assistive Device   Meal Prep and Kitchen Mobility   Assist Level Independent   Status Target goal - two weeks; Ongoing   Laundry   Assist Level Independent   Status Ongoing; Target goal - two weeks

## 2023-11-11 NOTE — PROGRESS NOTES
Internal Medicine Progress Note  Patient: Francesca Mcmillan  Age/sex: 68 y.o. male  Medical Record #: [id number]      ASSESSMENT/PLAN: (Interval History)  Francesca Mcmillan is seen and examined and management for following issues:    Cervical spine stenosis with radiculopathy  Had a fall PTA 2/2 bilateral leg pain with numbness, tingling and weakness that had been worsening. He had been to see Neurosurgery as an outpatient and was scheduled for a PCDF on 10/30/23.   S/p C3-6 PCDF 10/25/23  Staples and sutures are out = NS saw 11/8 for his 2 weeks followup     Hyponatremia  Per patient is chronic and present for quite a while although there is no labs to review since PCP not in system  Taylor likely SIADH when in the hospital the first time  Hasn't really improved since back in the hospital   Stable at 128-129  Will continue to watch     HTN  Home:  Lisinopril 20mg qd/HCTZ 25mg qd  Here:  Lisinopril 10mg qd  HCTZ was stopped 2/2 hyponatremia when he was hospitalized for his neck surgery  Cut Lisinopril to 5mg qd to start 11/12/23 since BPs were soft but didn't get it this AM 11/11     Anasarca  Resolved other than some mild ankle edema  On Lasix 40mg qd/Kdur 40 meq BID but since BPs are soft and edema is almost totally resolved, cut Lasix dose back to 20mg qd and decrease the Kdur to 40 meq qd     Ileus  Tolerating diet but still having loose watery stools  Fecal leukocytes 11/9 = showed moderate amt of WBCs  Stool enteric panel to be sent  For KUB  May need GI to see tomorrow depending on stool enteric panel and KUB results     B/L LE DVTs  New discovered upon return to the hospital  DVT in LLE one of the paired peroneals; RLE in mid PTV  On Eliquis which is new for him (he was cleared by NS to have)     HLD  Continue statin     Urine retention/fountain  VT here per PMR  Continue Proscar     ABLA  No transfusion given  Stable 11/11/23        Discharge date:  Team       The above assessment and plan was reviewed and updated as determined by my evaluation of the patient on 11/12/2023. Labs:   Results from last 7 days   Lab Units 11/11/23  0523   WBC Thousand/uL 5.76   HEMOGLOBIN g/dL 11.6*   HEMATOCRIT % 33.9*   PLATELETS Thousands/uL 379     Results from last 7 days   Lab Units 11/11/23  0523 11/08/23  0430   SODIUM mmol/L 128* 129*   POTASSIUM mmol/L 3.8 4.0   CHLORIDE mmol/L 92* 91*   CO2 mmol/L 29 28   BUN mg/dL 26* 32*   CREATININE mg/dL 0.89 0.92   CALCIUM mg/dL 8.5 8.7             Results from last 7 days   Lab Units 11/07/23  2102   POC GLUCOSE mg/dl 86       Review of Scheduled Meds:  Current Facility-Administered Medications   Medication Dose Route Frequency Provider Last Rate    acetaminophen  650 mg Oral Q6H PRN Cassie Garcia MD      albuterol  2 puff Inhalation Q6H PRN Cassie Garcia MD      apixaban  5 mg Oral BID Cassie Garcia MD      atorvastatin  10 mg Oral Daily Cassie Garcia MD      bismuth subsalicylate  647 mg Oral Q6H PRN Cassie Garcia MD      calcium carbonate  500 mg Oral Daily PRN Cassie Garcia MD      finasteride  5 mg Oral Daily Cassie Garcia MD      fluticasone  1 spray Each Nare BID Cassie Garcia MD      folic acid  333 mcg Oral Daily Cassie Garcia MD      furosemide  40 mg Oral Daily AAKASH Alas      lisinopril  5 mg Oral Daily AAKASH Alas      loratadine  10 mg Oral Daily Cassie Garcia MD      melatonin  3 mg Oral HS Cassie Garcia MD      ondansetron  4 mg Oral Q6H PRN Cassie Garcia MD      oxyCODONE  2.5 mg Oral Q4H PRN Cassie Garcia MD      potassium chloride  40 mEq Oral BID AAKASH Galicia      psyllium  1 packet Oral Daily Cassie Garcia MD      tiZANidine  2 mg Oral Q8H PRN Cassie Garcia MD         Subjective/ HPI: Patient seen and examined. Patients overnight issues or events were reviewed with nursing or staff during rounds or morning huddle session.  New or overnight issues include the following:     No overnight issues. Feels bloated again bit no pain. Had 2 BM today    ROS:   A 10 point ROS was performed; negative except as noted above. Imaging:     XR abdomen 1 view kub    (Results Pending)       *Labs /Radiology studies Reviewed, no new imaging  *Medications  reviewed and reconciled as needed  *Please refer to order section for additional ordered labs studies  *Case discussed with primary attending during morning huddle case rounds    Physical Examination:  Vitals:   Vitals:    11/11/23 1350 11/11/23 2100 11/12/23 0523 11/12/23 0824   BP: 98/63 100/63 111/65 103/57   BP Location: Right arm Left arm Right arm Left arm   Pulse: (!) 107 88 94 77   Resp: 12 20 17    Temp: 97.5 °F (36.4 °C) 97.6 °F (36.4 °C) 98.2 °F (36.8 °C)    TempSrc: Oral Oral Oral    SpO2: 95% 97% 96%    Weight:   105 kg (231 lb 7.7 oz)    Height:           General Appearance: no distress, nontoxic appearing  HEENT:  External ear normal.  Nose normal w/o drainage. Mucous membranes are moist. Oropharynx is clear. Conjunctiva clear w/o icterus or redness. Neck:  Supple, normal ROM  Lungs: BBS without crackles/wheeze/rhonchi; respirations unlabored with normal inspiratory/expiratory effort. No retractions noted. On RA  CV: regular rate and rhythm; no rubs/murmurs/gallops, PMI normal   ABD: Abdomen is large, a bit more distended than yesterday and slightly taught. Bowel sounds are very hypoactive. Nontender with no distention. EXT: very mild lower leg edema = ACE wraps on  Skin: normal turgor, normal texture, no rashes  Psych: affect normal, mood normal  Neuro: AAO     The above physical exam was reviewed and updated as determined by my evaluation of the patient on 11/12/2023. Invasive Devices       Peripheral Intravenous Line  Duration             Peripheral IV 11/10/23 Dorsal (posterior); Left Wrist 2 days              Drain  Duration             Urethral Catheter 16 Fr. 13 days                       VTE Pharmacologic Prophylaxis: Eliquis  Code Status: Level 1 - Full Code  Current Length of Stay: 2 day(s)      Total time spent:  30 minutes with more than 50% spent counseling/coordinating care. Counseling includes discussion with patient re: progress  and discussion with patient of his/her current medical state/information. Coordination of patient's care was performed in conjunction with primary service. Time invested included review of patient's labs, vitals, and management of their comorbidities with continued monitoring. In addition, this patient was discussed with medical team including physician and advanced extenders. The care of the patient was extensively discussed and appropriate treatment plan was formulated unique for this patient. Medical decision making for the day was made by supervising physician unless otherwise noted in their attestation statement. ** Please Note:  voice to text software may have been used in the creation of this document.  Although proof errors in transcription or interpretation are a potential of such software**

## 2023-11-12 ENCOUNTER — APPOINTMENT (INPATIENT)
Dept: RADIOLOGY | Facility: HOSPITAL | Age: 73
DRG: 560 | End: 2023-11-12
Payer: MEDICARE

## 2023-11-12 PROCEDURE — 74018 RADEX ABDOMEN 1 VIEW: CPT

## 2023-11-12 PROCEDURE — 87505 NFCT AGENT DETECTION GI: CPT | Performed by: PHYSICAL MEDICINE & REHABILITATION

## 2023-11-12 PROCEDURE — 97110 THERAPEUTIC EXERCISES: CPT

## 2023-11-12 PROCEDURE — 97530 THERAPEUTIC ACTIVITIES: CPT

## 2023-11-12 PROCEDURE — 99232 SBSQ HOSP IP/OBS MODERATE 35: CPT | Performed by: INTERNAL MEDICINE

## 2023-11-12 PROCEDURE — 99232 SBSQ HOSP IP/OBS MODERATE 35: CPT | Performed by: PHYSICAL MEDICINE & REHABILITATION

## 2023-11-12 RX ORDER — FUROSEMIDE 20 MG/1
20 TABLET ORAL DAILY
Status: DISCONTINUED | OUTPATIENT
Start: 2023-11-13 | End: 2023-11-17

## 2023-11-12 RX ORDER — POTASSIUM CHLORIDE 20 MEQ/1
40 TABLET, EXTENDED RELEASE ORAL DAILY
Status: DISCONTINUED | OUTPATIENT
Start: 2023-11-13 | End: 2023-11-20 | Stop reason: HOSPADM

## 2023-11-12 RX ORDER — POTASSIUM CHLORIDE 20 MEQ/1
40 TABLET, EXTENDED RELEASE ORAL DAILY
Status: DISCONTINUED | OUTPATIENT
Start: 2023-11-13 | End: 2023-11-12

## 2023-11-12 RX ADMIN — FINASTERIDE 5 MG: 5 TABLET, FILM COATED ORAL at 08:25

## 2023-11-12 RX ADMIN — LORATADINE 10 MG: 10 TABLET ORAL at 08:25

## 2023-11-12 RX ADMIN — APIXABAN 5 MG: 5 TABLET, FILM COATED ORAL at 17:04

## 2023-11-12 RX ADMIN — APIXABAN 5 MG: 5 TABLET, FILM COATED ORAL at 08:25

## 2023-11-12 RX ADMIN — POTASSIUM CHLORIDE 40 MEQ: 1500 TABLET, EXTENDED RELEASE ORAL at 11:11

## 2023-11-12 RX ADMIN — Medication 3 MG: at 21:04

## 2023-11-12 RX ADMIN — ATORVASTATIN CALCIUM 10 MG: 10 TABLET, FILM COATED ORAL at 08:25

## 2023-11-12 RX ADMIN — TIZANIDINE 2 MG: 2 TABLET ORAL at 21:04

## 2023-11-12 RX ADMIN — FOLIC ACID TAB 400 MCG 400 MCG: 400 TAB at 08:25

## 2023-11-12 RX ADMIN — FUROSEMIDE 40 MG: 40 TABLET ORAL at 11:11

## 2023-11-12 NOTE — PROGRESS NOTES
Physical Medicine and Rehabilitation Progress Note  Marguerite Childress 68 y.o. male MRN: [id number]  Unit/Bed#: -75 Encounter: [id number]    To Review: Marguerite Childress is a 68 y.o. male with medical history of GERD, HTN, HLD, cervical stenosis with myelopathy who originally presented to the [address] [place] [address] on 10/20 after a sudden fall due to legs giving out from under him. Ultimately transferred to [facility] for Nsx evaluation given known myelopathy and MRI showed severe stenosis C3-4, 4-5, and 5-6 with mild cord signal changes. On 10/25, Dr. Bertha Arora performed PCDF C3-6 with laminectomies of C4,5,6 and partial C3. At baseline SSEPS noted to be dampened. Post-op with no bracing required but cervical precautions. That hospitalization was c/b by hypotension, SIADH, and neurogenic bowel/bladder. Leading up to hospitalization he had no issues with his bladder, but did have worsening constipation. He was admitted to the [facility] on 11/1, but later that evening developed SOB and increased WOB with worsening abdominal pain and distention. He was given IV Lasix, albuterol and transferred back to the acute care hospital. KUB concerning for ileus (had been having watery diarrhea). He required 2L NC for his hypoxia. CXR showed findings consistent with atelectasis without acute consolidation or congestion. CTAP showed moderate distention of his entire GI tract without evidence of obstruction. Echo showed EF 60% with G1DD and no major valvular disease. LE dopplers for LE edema showed DVT in R mid posterior tibial vein, and L one of paired peroneal veins. CTA showed no PE (but significant atelectasis similar to previous CTAP the day of transfer. He was also noted to have stable calcified and noncalcified anterior mediastinal nodules (likely lymph nodes) possibly sequelae of prior granulomatous infection.  Also noted to incidentally have cholelithiasis/gallbladder sludge without evidence of acute cholecystitis. He was started on therapeutic lovenox. After discussion with Nsx, he was cleared to transition to eliquis. He has since been able to be progressed to room air and feels more comfortable. He has continued to have multiple loose stools. He was transitioned to PO Lasix on 11/8. He has imodium as needed and metamucil ordered. Fecal leuks are in progress and C. Diff on 11/2 was negative. He was re-admitted to the [facility] on 11/10/2023     Chief Complaint: More loose stools throughout yesterday    Interval History/Subjective:  After we talked yesterday, he had multiple loose watery stools, but was able to maintain continence. He felt his bloating was worsening and is amenable to abdominal XR. He denies any CP, SOB, fevers, chills, N/V, pain. He does not want to remove fountain until his bowels are under better control. Reviewed risk of infection. Total output yesterday 1250. ROS:  A 10 point review of systems was negative except for what is noted in the HPI. Today's Changes:  24hr urine output appropriate for trial of void with scan/cath. When patient is ready. Encouraged to start next week. Repeat labs tomorrow  Fecal leuks - moderate. Enteric panel pending. If enteric panel negative, consider resending C. Diff. 11/2 negative. Checking KUB given sensation of bloating. Discussed with IM, may benefit from GI consult after the weekend. IM decreased Lisinopril, but he didn't get it due to soft Bps. Edema much better, lasix decreased and Kdur decreased. Total visit time: 35 minutes, with more than 50% spent counseling/coordinating care. Counseling includes discussion with patient re: progress in therapies, functional issues observed by therapy staff, and discussion with patient regarding their current medical state and wellbeing.  Coordination of patient's care was performed in conjunction with Internal Medicine service to monitor patient's labs, vitals, and management of their comorbidities. Assessment/Plan:    * Cervical spondylosis with myelopathy  Assessment & Plan  Presented with progressive weakness/constipation that culminated in two falls and difficulty with mobility.  - Mri showed severe stenosis C3-4, 4-5, and 5-6 with mild cord signal changes  10/25 PCDF with C3-6 laminectomies of C4,5,6 and partial C3  SSEPs noted to be dampened at baseline  Post-op examined C5 AIS D with pinprick > light touch impairment and proprioception intact  11/11 Admission AIS C3 AIS D. L > R sensory impairment. PP > LT. Mild LUE weakness already improved compared to Post-op examination. Question of neurogenic bowel/bladder   - Bowel continence improving, consistency still loose. - Bladder with fountain, and would recommend void trial on the ARC. No need for collar  Cervical spine precautions in place  Pain control  Incisional care  PT/OT 3-5 hours/day, 5-7 days/ week. Plan for 6 week POV with Nsx ~12/6 with repeat imaging. Diarrhea  Assessment & Plan  See ileus for details. Adding enteric panel. Acute DVT (deep venous thrombosis) ([address])  Assessment & Plan  Diagnosed 11/3/2023:  RIGHT LOWER LIMB:  Evidence of acute deep venous thrombosis was noted in the mid posterior tibial  veins. No evidence of superficial thrombophlebitis noted. Doppler evaluation shows a normal response to augmentation maneuvers. .  Popliteal, posterior tibial and anterior tibial arterial Doppler waveform's are  triphasic. There is a well defined hypoechoic non-vascularized cystic-type structure noted  in the popliteal fossa. LEFT LOWER LIMB:  Evidence of acute deep venous thrombosis was noted in one of the paired  peroneal veins. No evidence of superficial thrombophlebitis noted. Doppler evaluation shows a normal response to augmentation maneuvers. Popliteal, posterior tibial and anterior tibial arterial Doppler waveform's are  triphasic.     Started on eliquis after clearance from Neurosurgery  No SCDs  OK for ACE wraps  Likely 3 months treatment  Outpatient f/u with PCP. Ileus [facility])  Assessment & Plan  Possible component of neurogenic bowel with progressively worsening constipation leading up to hospitalization  Then had ileus in the hospital - which was improving on admission. Now with many loose/watery stools/diarrhea  11/9 prior to admission to [facility] started on metamucil. 11/11 improving (had been started recently on metamucil  C. Diff negative on 11/2  Fecal leuks day of admission were positive for moderate amount of WBC, Enteric panel pending  11/12 feeling more bloated - checking KUB. Has pepto PRN for diarrhea. Will hold on imodium given possible infection. Monitor bowels for now. Has been mostly continent on the [address]  Most resolved, has LE edema  Required IV Lasix inpatient  Lasix decreased from 40mg to 20mg by IM on 11/12, and on potassium supplement. ACE wrap and elevate legs  Monitor     Urinary retention  Assessment & Plan  Likely component of neurogenic bladder, but multifactorial including mobility/medications, etc.  Did not have bladder symptoms leading up to hospitalization  Found to have retention during hospitalization. On finasteride  24 urine output is fine for void trial.  Patient would also like to make sure his bowels stay stable prior to void trial  - Encourage him to void trial 11/13 given risk of infection. Hyponatremia  Assessment & Plan  Likely SIADH and HCTZ per previous hospitalization  Also with volume overload which could be contributing as well (on Lasix)   11/11 128  Continue FR. Asymptomatic  IM consulted and assisting with management. They will continue to monitor for now.  Labs in the AM    Hyperlipidemia  Assessment & Plan  Continue statin    Gastroesophageal reflux disease  Assessment & Plan  TUMS PRN    Benign essential hypertension  Assessment & Plan  Home: Lisinopril 10mg daily  Here: Lisinopril 10mg daily, Lasix 20mg daily  Monitor and adjust as appropriate  IM consulted to assist with management. Anemia  Assessment & Plan  ABLA  11/11 Stable at 11.6  Monitor and transfuse as appropriate  IM consulted to assist with management        Health Maintenance  #Delirium/Sleep: At risk. Optimize sleep/wake, bowel, bladder, pain control. Environmental interventions. Avoid restraint. #Pain: Not using opioids and pain overall controlled. Will stop gabapentin, and decrease Tizanidine 2mg TID and making PRN. He is on PRN oxycodone 2.5mg Q4hr Prn and minimizing opioid use due to recent ileus. Has Tylenol ordered PRN. #Skin/Pressure Injury Prevention: Turn Q2hr in bed, with weight shifts [id number] in wheelchair. Float heels in bed. #DVT Prophylaxis: Fully anticoagulated on eliquis. No SCDs due to DVTs. #GI Prophylaxis: Not currently indicated  #Code Status: Full Code  #FEN: Regular House diet with 1500mL fluid restriction. Ensure Compact Chocolate with Breakfast/Lunch  #Dispo:  ElOS 10-14 days with goals to discharge home at mod Ind level of function    Objective:    Functional Update:   PT: Overall CGA with mobility. OT: Min-modA with ADLs    Allergies per EMR    Physical Exam:  Temp:  [97.6 °F (36.4 °C)-98.2 °F (36.8 °C)] 97.8 °F (36.6 °C)  HR:  [77-98] 98  Resp:  [17-20] 18  BP: (100-115)/(57-72) 115/72  Oxygen Therapy  SpO2: 95 %    Gen: No acute distress, Well-nourished, well-appearing. HEENT: Moist mucus membranes, Normocephalic/Atraumatic  Cardiovascular: Regular rate, rhythm, S1/S2. Distal pulses palpable  Heme/Extr: Improved BL LE edema. Pulmonary: Non-labored breathing. Lungs CTAB  : + fountain with yellow urine. GI: Soft, mildly distended. BS+. No r/r/g  MSK: ROM is WFL in all extremities. No effusions or deformities. Bulk is symmetric. See below for MMT scores. Integumentary: Skin is warm, dry. Neuro: AAOx3,  Speech is intact. Appropriate to questioning. Psych: Normal mood and affect.      Diagnostic Studies: Reviewed, no new imaging    Laboratory:  Reviewed   Results from last 7 days   Lab Units 11/11/23  0523   HEMOGLOBIN g/dL 11.6*   HEMATOCRIT % 33.9*   WBC Thousand/uL 5.76     Results from last 7 days   Lab Units 11/11/23  0523 11/08/23  0430 11/07/23  0457   BUN mg/dL 26* 32* 32*   POTASSIUM mmol/L 3.8 4.0 3.6   CHLORIDE mmol/L 92* 91* 89*   CREATININE mg/dL 0.89 0.92 0.93   AST U/L 18  --   --    ALT U/L 19  --   --             Patient Active Problem List   Diagnosis    Tinea corporis    Onychomycosis    Cervical spondylosis with myelopathy    Impaired mobility and activities of daily living    Anemia    Benign essential hypertension    Dorsalgia, unspecified    Gastroesophageal reflux disease    Hyperlipidemia    Paresthesia    Weakness of extremity    Hyponatremia    Urinary retention    Neurogenic bowel    Seasonal allergies    Anasarca    Acute respiratory insufficiency    Ileus (HCC)    Acute DVT (deep venous thrombosis) (HCC)    Diarrhea         Medications  Current Facility-Administered Medications   Medication Dose Route Frequency Provider Last Rate    acetaminophen  650 mg Oral Q6H PRN Glen Tyler MD      albuterol  2 puff Inhalation Q6H PRN Glen Tyler MD      apixaban  5 mg Oral BID Glen Tyler MD      atorvastatin  10 mg Oral Daily Glen Tyler MD      bismuth subsalicylate  272 mg Oral Q6H PRN Glen Tyler MD      calcium carbonate  500 mg Oral Daily PRN Glen Tyler MD      finasteride  5 mg Oral Daily Glen Tyler MD      fluticasone  1 spray Each Nare BID Glen Tyler MD      folic acid  214 mcg Oral Daily Glen Tyler MD      [START ON 11/13/2023] furosemide  20 mg Oral Daily AAKASH Forman      lisinopril  5 mg Oral Daily AAKASH Forman      loratadine  10 mg Oral Daily Glen Tyler MD      melatonin  3 mg Oral HS Glen Tyler MD      ondansetron  4 mg Oral Q6H PRN Glen Tyler MD      oxyCODONE  2.5 mg Oral Q4H PRN Glen Tyler MD      [START ON 11/13/2023] potassium chloride  40 mEq Oral Daily AAKASH Terrell      psyllium  1 packet Oral Daily lBayne Styles MD      tiZANidine  2 mg Oral Q8H PRN Blayne Styles MD            ** Please Note: Fluency Direct voice to text software may have been used in the creation of this document.  **

## 2023-11-12 NOTE — PROGRESS NOTES
11/12/23 1100   Pain Assessment   Pain Assessment Tool 0-10   Pain Score No Pain   Restrictions/Precautions   Precautions Fall Risk;Fluid restriction; Christensen; Spinal precautions;Pain   ROM Restrictions   (CS Precautions)   Cognition   Overall Cognitive Status WFL   Subjective   Subjective "Can I wheel into the bathroom and brush my teeth?"   Sit to Stand   Type of Assistance Needed Incidental touching   Physical Assistance Level No physical assistance   Comment CGA with RW   Sit to Stand CARE Score 4   Bed-Chair Transfer   Type of Assistance Needed Incidental touching   Physical Assistance Level No physical assistance   Comment CGA with RW   Chair/Bed-to-Chair Transfer CARE Score 4   Transfer Bed/Chair/Wheelchair   Findings RW utilized this session 2* time constraints and lack of addiitonal assistance; continue to focus some mobility using LRAD to assist with safe discharge planning   Walk 10 Feet   Type of Assistance Needed Incidental touching; Adaptive equipment   Physical Assistance Level No physical assistance   Comment RW   Walk 10 Feet CARE Score 4   Ambulation   Primary Mode of Locomotion Prior to Admission Walk   Distance Walked (feet) 15 ft   Assist Device Roller Walker   Gait Pattern Inconsistant Teena   Does the patient walk? 2. Yes   Therapeutic Interventions   Other PT donned ACE wraps BLE during session   Equipment Use   NuStep L1x10 minutes BLE only   Assessment   Treatment Assessment Patient engaged in brief 45 minute PT treatment session well. We focused on obtaining more appropriate DME for patient to utilize in room: high back wheelchair with pillow added as patient c.o neck pain while sitting OOB for long perisods of time. We also switched cushions to a gel cushion as he reported buttocks pain with current cushion. Encouraged him to stand during nursing checks. Can assess in upcoming sessions patient's ability to perform (I) STS for offloading in room.  Upcoming sessions to target balance, righting reactions and strengthening. Patient will need to achieve mod(I) goals at time of d/c using LRAD as he lives alone. Problem List Decreased strength;Decreased endurance; Impaired balance;Decreased mobility; Decreased coordination;Orthopedic restrictions;Decreased range of motion   Plan   Treatment/Interventions Functional transfer training;LE strengthening/ROM; Endurance training; Therapeutic exercise;Patient/family training;Equipment eval/education; Bed mobility;Gait training; Compensatory technique education   Progress Progressing toward goals   PT Therapy Minutes   PT Time In 1100   PT Time Out 1145   PT Total Time (minutes) 45   PT Mode of treatment - Individual (minutes) 45   PT Mode of treatment - Concurrent (minutes) 0   PT Mode of treatment - Group (minutes) 0   PT Mode of treatment - Co-treat (minutes) 0   PT Mode of Treatment - Total time(minutes) 45 minutes   PT Cumulative Minutes 135

## 2023-11-12 NOTE — PLAN OF CARE
Problem: Prexisting or High Potential for Compromised Skin Integrity  Goal: Skin integrity is maintained or improved  Description: INTERVENTIONS:  - Identify patients at risk for skin breakdown  - Assess and monitor skin integrity  - Assess and monitor nutrition and hydration status  - Monitor labs   - Assess for incontinence   - Turn and reposition patient  - Assist with mobility/ambulation  - Relieve pressure over bony prominences  - Avoid friction and shearing  - Provide appropriate hygiene as needed including keeping skin clean and dry  - Evaluate need for skin moisturizer/barrier cream  - Collaborate with interdisciplinary team   - Patient/family teaching  - Consider wound care consult   Outcome: Progressing     Problem: PAIN - ADULT  Goal: Verbalizes/displays adequate comfort level or baseline comfort level  Description: Interventions:  - Encourage patient to monitor pain and request assistance  - Assess pain using appropriate pain scale  - Administer analgesics based on type and severity of pain and evaluate response  - Implement non-pharmacological measures as appropriate and evaluate response  - Consider cultural and social influences on pain and pain management  - Notify physician/advanced practitioner if interventions unsuccessful or patient reports new pain  Outcome: Progressing     Problem: INFECTION - ADULT  Goal: Absence or prevention of progression during hospitalization  Description: INTERVENTIONS:  - Assess and monitor for signs and symptoms of infection  - Monitor lab/diagnostic results  - Monitor all insertion sites, i.e. indwelling lines, tubes, and drains  - Monitor endotracheal if appropriate and nasal secretions for changes in amount and color  - [place] appropriate cooling/warming therapies per order  - Administer medications as ordered  - Instruct and encourage patient and family to use good hand hygiene technique  - Identify and instruct in appropriate isolation precautions for identified infection/condition  Outcome: Progressing  Goal: Absence of fever/infection during neutropenic period  Description: INTERVENTIONS:  - Monitor WBC    Outcome: Progressing

## 2023-11-12 NOTE — PROGRESS NOTES
Internal Medicine Progress Note  Patient: Emily Gordon  Age/sex: 68 y.o. male  Medical Record #: [id number]      ASSESSMENT/PLAN: (Interval History)  Emily Gordon is seen and examined and management for following issues:    Cervical spine stenosis with radiculopathy  Had a fall PTA 2/2 bilateral leg pain with numbness, tingling and weakness that had been worsening. He had been to see Neurosurgery as an outpatient and was scheduled for a PCDF on 10/30/23.   S/p C3-6 PCDF 10/25/23  Staples and sutures are out = NS saw 11/8 for his 2 weeks followup     Hyponatremia  Per patient is chronic and present for quite a while although there is no labs to review since PCP not in system  [place] likely SIADH when in the hospital the first time  Hasn't really improved since back in the hospital   Stable at 128-129  Will continue to watch     HTN  Home:  Lisinopril 20mg qd/HCTZ 25mg qd  Here:  Lisinopril 10mg qd  HCTZ was stopped 2/2 hyponatremia when he was hospitalized for his neck surgery  Cut Lisinopril to 5mg qd to start 11/12/23 since BPs were soft but didn't get it 11/11; did get this AM     Anasarca  Resolved other than some mild ankle edema  On Lasix 40mg qd/Kdur 40 meq BID but since BPs are soft and edema is almost totally resolved, cut Lasix dose back to 20mg qd and decreased the Kdur to 40 meq qd 11/12     Ileus  Tolerating diet but still having loose watery stools  Fecal leukocytes 11/9 = showed moderate amt of WBCs  Stool enteric panel was sent and is pending  KUB 11/12 = persistent gaseous distention of small and large bowel  Will add Simethicone QID scheduled     B/L LE DVTs  New discovered upon return to the hospital  DVT in LLE one of the paired peroneals; RLE in mid PTV  On Eliquis which is new for him (he was cleared by NS to have)     HLD  Continue statin     Urine retention/fountain  VT here per PMR  Continue Proscar     ABLA  No transfusion given  Stable 11/11/23        Discharge date:  Team    The above assessment and plan was reviewed and updated as determined by my evaluation of the patient on 11/13/2023. Labs:   Results from last 7 days   Lab Units 11/11/23  0523   WBC Thousand/uL 5.76   HEMOGLOBIN g/dL 11.6*   HEMATOCRIT % 33.9*   PLATELETS Thousands/uL 379     Results from last 7 days   Lab Units 11/11/23  0523 11/08/23  0430   SODIUM mmol/L 128* 129*   POTASSIUM mmol/L 3.8 4.0   CHLORIDE mmol/L 92* 91*   CO2 mmol/L 29 28   BUN mg/dL 26* 32*   CREATININE mg/dL 0.89 0.92   CALCIUM mg/dL 8.5 8.7             Results from last 7 days   Lab Units 11/07/23  2102   POC GLUCOSE mg/dl 86       Review of Scheduled Meds:  Current Facility-Administered Medications   Medication Dose Route Frequency Provider Last Rate    acetaminophen  650 mg Oral Q6H PRN Durenda Shoulders, MD      albuterol  2 puff Inhalation Q6H PRN Durenda Shoulders, MD      apixaban  5 mg Oral BID Durenda Shoulders, MD      atorvastatin  10 mg Oral Daily Durenda Shoulders, MD      bismuth subsalicylate  677 mg Oral Q6H PRN Durenda Shoulders, MD      calcium carbonate  500 mg Oral Daily PRN Durenda Shoulders, MD      finasteride  5 mg Oral Daily Durenda Shoulders, MD      fluticasone  1 spray Each Nare BID Durenda Shoulders, MD      folic acid  884 mcg Oral Daily Durenda Shoulders, MD      furosemide  20 mg Oral Daily AAKASH Mari      lisinopril  5 mg Oral Daily AAKASH Mari      loratadine  10 mg Oral Daily Durenda Shoulders, MD      melatonin  3 mg Oral HS Durenda Shoulders, MD      ondansetron  4 mg Oral Q6H PRN Durenda Shoulders, MD      oxyCODONE  2.5 mg Oral Q4H PRN Durenda Shoulders, MD      potassium chloride  40 mEq Oral Daily AAKASH Abbott      psyllium  1 packet Oral Daily Durenda Shoulders, MD      tiZANidine  2 mg Oral Q8H PRN Durenda Shoulders, MD         Subjective/ HPI: Patient seen and examined. Patients overnight issues or events were reviewed with nursing or staff during rounds or morning huddle session.  New or overnight issues include the following:     No new or overnight issues. Offers no complaints. He thinks bloating is a little better today    ROS:   A 10 point ROS was performed; negative except as noted above. Imaging:     XR abdomen 1 view kub    (Results Pending)       *Labs /Radiology studies Reviewed, no new imaging  *Medications  reviewed and reconciled as needed  *Please refer to order section for additional ordered labs studies  *Case discussed with primary attending during morning huddle case rounds    Physical Examination:  Vitals:   Vitals:    11/12/23 1444 11/12/23 2051 11/13/23 0534 11/13/23 0820   BP: 115/72 119/71 103/70 114/73   BP Location: Right arm Left arm Right arm    Pulse: 98 96 94    Resp: 18 18 18    Temp: 97.8 °F (36.6 °C) 97.7 °F (36.5 °C) 98.1 °F (36.7 °C)    TempSrc: Oral Oral Oral    SpO2: 95% 97% 98%    Weight:   105 kg (231 lb 7.7 oz)    Height:           General Appearance: no distress, non toxic appearing  HEENT: PERRLA, conjuctiva normal; oropharynx clear; mucous membranes moist   Neck:  Supple, normal ROM  Lungs: CTA, normal respiratory effort, no retractions, expiratory effort normal  CV: regular rate and rhythm; no rubs/murmurs/gallops, PMI normal   ABD: soft but large; non tender; hypoactive BS  EXT: tr LE edema  Skin: normal turgor, normal texture, no rashes  Psych: affect normal, mood normal  Neuro: AAO     The above physical exam was reviewed and updated as determined by my evaluation of the patient on 11/13/2023. Invasive Devices       Peripheral Intravenous Line  Duration             Peripheral IV 11/10/23 Dorsal (posterior); Left Wrist 3 days              Drain  Duration             Urethral Catheter 16 Fr. 14 days                       VTE Pharmacologic Prophylaxis: Eliquis  Code Status: Level 1 - Full Code  Current Length of Stay: 3 day(s)      Total time spent:  30 minutes with more than 50% spent counseling/coordinating care.  Counseling includes discussion with patient re: progress  and discussion with patient of his/her current medical state/information. Coordination of patient's care was performed in conjunction with primary service. Time invested included review of patient's labs, vitals, and management of their comorbidities with continued monitoring. In addition, this patient was discussed with medical team including physician and advanced extenders. The care of the patient was extensively discussed and appropriate treatment plan was formulated unique for this patient. Medical decision making for the day was made by supervising physician unless otherwise noted in their attestation statement. ** Please Note:  voice to text software may have been used in the creation of this document.  Although proof errors in transcription or interpretation are a potential of such software**

## 2023-11-13 PROBLEM — K56.7 ILEUS (HCC): Status: RESOLVED | Noted: 2023-11-02 | Resolved: 2023-11-13

## 2023-11-13 PROBLEM — R60.1 ANASARCA: Status: RESOLVED | Noted: 2023-11-01 | Resolved: 2023-11-13

## 2023-11-13 PROBLEM — E87.70 VOLUME OVERLOAD: Status: ACTIVE | Noted: 2023-11-13

## 2023-11-13 LAB
CAMPYLOBACTER DNA SPEC NAA+PROBE: NORMAL
SALMONELLA DNA SPEC QL NAA+PROBE: NORMAL
SHIGA TOXIN STX GENE SPEC NAA+PROBE: NORMAL
SHIGELLA DNA SPEC QL NAA+PROBE: NORMAL

## 2023-11-13 PROCEDURE — 97110 THERAPEUTIC EXERCISES: CPT

## 2023-11-13 PROCEDURE — 99232 SBSQ HOSP IP/OBS MODERATE 35: CPT | Performed by: INTERNAL MEDICINE

## 2023-11-13 PROCEDURE — 97530 THERAPEUTIC ACTIVITIES: CPT

## 2023-11-13 PROCEDURE — 99232 SBSQ HOSP IP/OBS MODERATE 35: CPT | Performed by: PHYSICAL MEDICINE & REHABILITATION

## 2023-11-13 PROCEDURE — 97535 SELF CARE MNGMENT TRAINING: CPT

## 2023-11-13 RX ORDER — SIMETHICONE 80 MG
80 TABLET,CHEWABLE ORAL
Status: DISCONTINUED | OUTPATIENT
Start: 2023-11-13 | End: 2023-11-20 | Stop reason: HOSPADM

## 2023-11-13 RX ADMIN — ATORVASTATIN CALCIUM 10 MG: 10 TABLET, FILM COATED ORAL at 08:20

## 2023-11-13 RX ADMIN — FLUTICASONE PROPIONATE 1 SPRAY: 50 SPRAY, METERED NASAL at 08:21

## 2023-11-13 RX ADMIN — SIMETHICONE 80 MG: 80 TABLET, CHEWABLE ORAL at 16:43

## 2023-11-13 RX ADMIN — LISINOPRIL 5 MG: 5 TABLET ORAL at 08:20

## 2023-11-13 RX ADMIN — Medication 1 PACKET: at 08:21

## 2023-11-13 RX ADMIN — FINASTERIDE 5 MG: 5 TABLET, FILM COATED ORAL at 08:21

## 2023-11-13 RX ADMIN — APIXABAN 5 MG: 5 TABLET, FILM COATED ORAL at 16:43

## 2023-11-13 RX ADMIN — TIZANIDINE 2 MG: 2 TABLET ORAL at 21:49

## 2023-11-13 RX ADMIN — SIMETHICONE 80 MG: 80 TABLET, CHEWABLE ORAL at 21:49

## 2023-11-13 RX ADMIN — FOLIC ACID TAB 400 MCG 400 MCG: 400 TAB at 08:20

## 2023-11-13 RX ADMIN — Medication 3 MG: at 21:49

## 2023-11-13 RX ADMIN — APIXABAN 5 MG: 5 TABLET, FILM COATED ORAL at 08:20

## 2023-11-13 RX ADMIN — SIMETHICONE 80 MG: 80 TABLET, CHEWABLE ORAL at 11:47

## 2023-11-13 RX ADMIN — LORATADINE 10 MG: 10 TABLET ORAL at 08:20

## 2023-11-13 NOTE — PROGRESS NOTES
11/13/23 8297   Pain Assessment   Pain Assessment Tool 0-10   Pain Score 2   Pain Location/Orientation Location: Buttocks   Restrictions/Precautions   Precautions Fall Risk;Fluid restriction;Pain;Spinal precautions   Braces or Orthoses Other (Comment)  (BLE ace wraps)   Subjective   Subjective pt agreeable to perform skilled PT   Sit to Stand   Type of Assistance Needed Incidental touching   Comment CgA w RW   Sit to Stand CARE Score 4   Bed-Chair Transfer   Type of Assistance Needed Incidental touching   Comment Cga RW   Chair/Bed-to-Chair Transfer CARE Score 4   Transfer Bed/Chair/Wheelchair   Adaptive Equipment Roller Walker   Walk 10 Feet   Type of Assistance Needed Incidental touching; Adaptive equipment   Walk 10 Feet CARE Score 4   Walk 50 Feet with Two Turns   Type of Assistance Needed Incidental touching; Adaptive equipment   Comment RW   Walk 50 Feet with Two Turns CARE Score 4   Walking 10 Feet on Uneven Surfaces   Type of Assistance Needed Physical assistance; Adaptive equipment   Physical Assistance Level 51%-75%   Comment on  floor mat w RW   Walking 10 Feet on Uneven Surfaces CARE Score 2   Ambulation   Primary Mode of Locomotion Prior to Admission Walk   Distance Walked (feet) 100 ft  (30x3)   Assist Device Roller Walker   Does the patient walk? 2. Yes   Wheel 50 Feet with Two Turns   Reason if not Attempted Activity not applicable   Wheel 50 Feet with Two Turns CARE Score 9   Wheel 150 Feet   Reason if not Attempted Activity not applicable   Wheel 953 Feet CARE Score 9   Curb or Single Stair   Style negotiated Curb   Type of Assistance Needed Physical assistance; Adaptive equipment   Physical Assistance Level 76% or more   Comment VC for RW placement and then step seqeunce   1 Step (Curb) CARE Score 2   4 Steps   Reason if not Attempted Activity not applicable   4 Steps CARE Score 9   12 Steps   Reason if not Attempted Activity not applicable   12 Steps CARE Score 9   Therapeutic Interventions Strengthening seates LAQ AP marching 20 reps gluts 20 reps   Flexibility HS and calf stretch manual   Balance dynamic balance   Equipment Use   NuStep lvl 1 for LE only 10 min   Assessment   Treatment Assessment pt engaged in skilled PT focus  on STS/SPT with RW progressing at his tolerance . Pt able to inc walking distanec with RW and progressing toward DC goals . pt instructed on C ' neck percaution and overall at fall risk . Pt LLE weakness and hip weakness. Pt back in his room with all needs in reach, Cont POC   Plan   Progress Progressing toward goals   PT Therapy Minutes   PT Time In 0830   PT Time Out 1000   PT Total Time (minutes) 90   PT Mode of treatment - Individual (minutes) 90   PT Mode of treatment - Concurrent (minutes) 0   PT Mode of treatment - Group (minutes) 0   PT Mode of treatment - Co-treat (minutes) 0   PT Mode of Treatment - Total time(minutes) 90 minutes   PT Cumulative Minutes 225   Therapy Time missed   Time missed?  No

## 2023-11-13 NOTE — PROGRESS NOTES
Physical Medicine and Rehabilitation Progress Note  Demetrio Webster 68 y.o. male MRN: [id number]  Unit/Bed#: [facility] 785-91 Encounter: [id number]    Rehab Diagnosis: Impairment of mobility, safety and Activities of Daily Living (ADLs) due to Spinal Cord Dysfunction:  Non-Traumatic:  04.1211  Quadriplegia, Incomplete C1-4 - changed diagnosis given known level of injury. Adjusted given C3 sensory level on exam.     Interval History/Subjective: He is seen at bedside this morning. He continues to have frequent bowel movements, mostly watery as well as abdominal distension and gas. Enteric panel negative, KUB with ongoing gaseous distension. Added scheduled simethicone. Discussed plan for trial of void potentially tomorrow if he is feeling comfortable. Urine output adequate via indwelling catheter. Vital signs reviewed, stable. No significant abnormalities. Assessment/Plan:    * Cervical spondylosis with myelopathy  Assessment & Plan  Presented with progressive weakness/constipation that culminated in two falls and difficulty with mobility.  - MRI showed severe stenosis C3-4, 4-5, and 5-6 with mild cord signal changes  10/25 PCDF with C3-6 laminectomies of C4,5,6 and partial C3  SSEPs noted to be dampened at baseline  Post-op examined C5 AIS D with pinprick > light touch impairment and proprioception intact  11/11 Admission AIS C3 AIS D. L > R sensory impairment. PP > LT. Mild LUE weakness already improved compared to Post-op examination. Question of neurogenic bowel/bladder   - Bowel continence improving, consistency still loose. - Bladder with fountain, and would recommend void trial on the ARC. No need for collar  Cervical spine precautions in place  Pain control  Incisional care  PT/OT 3-5 hours/day, 5-7 days/ week. Plan for 6 week POV with Nsx ~12/6 with repeat imaging.      Volume overload  Assessment & Plan  Lasix 20 mg PO daily with potassium  ACE wrap and elevate legs  Monitor     Urinary retention  Assessment & Plan  Likely component of neurogenic bladder, but multifactorial including mobility/medications, etc.  Did not have bladder symptoms leading up to hospitalization  Found to have retention during hospitalization. On finasteride  24 urine output is fine for void trial.  Patient would also like to make sure his bowels stay stable prior to void trial    Hyponatremia  Assessment & Plan  Likely SIADH and HCTZ per previous hospitalization  Also with volume overload which could be contributing as well (on Lasix)   11/11 128  Continue FR. Asymptomatic  IM consulted and assisting with management. They will continue to monitor for now. Labs in the AM    Anasarca-resolved as of 11/13/2023  Assessment & Plan  Most resolved, has LE edema  Required IV Lasix inpatient  Lasix decreased from 40mg to 20mg by IM on 11/12, and on potassium supplement. ACE wrap and elevate legs  Monitor     Diarrhea  Assessment & Plan  Possible component of neurogenic bowel with progressively worsening constipation leading up to hospitalization, complicated by ileus during hospitalization  Now with many loose/watery stools/diarrhea  11/9 prior to admission to [facility] started on metamucil. 11/11 improving (had been started recently on metamucil  C. Diff negative on 11/2  Fecal leuks day of admission were positive for moderate amount of WBC, Enteric panel negative, KUB with ongoing gaseous distension  Has pepto PRN for diarrhea. Will hold on imodium given prior ileus  Simethicone scheduled for gas  Will consult GI if symptoms do not improve    Acute DVT (deep venous thrombosis) ([address])  Assessment & Plan  Diagnosed 11/3/2023 with DVT in right mid posterior tibial veins and left peroneal vein. Started on eliquis after clearance from Neurosurgery  No SCDs  OK for ACE wraps  Likely 3 months treatment  Outpatient f/u with PCP.      Hyperlipidemia  Assessment & Plan  Continue statin    Gastroesophageal reflux disease  Assessment & Plan  TUMS PRN    Benign essential hypertension  Assessment & Plan  Home: Lisinopril 10mg daily  Here: Lisinopril 10mg daily, Lasix 20mg daily  Monitor and adjust as appropriate  IM consulted to assist with management. Anemia  Assessment & Plan  ABLA  11/11 Stable at 11.6  Monitor and transfuse as appropriate  IM consulted to assist with management    Ileus (HCC)-resolved as of 11/13/2023  Assessment & Plan  Ileus during this hospitalization. Now resolved. Health Maintenance  #Delirium/Sleep: Practice effective sleep hygiene (e.g., consistent night and morning routine, quiet, dark room at night, no electronic devices/screens in bed, avoid large meals/caffeine before bed)  #Pain: Tylenol PRN, oxycodone 2.5 mg q4h PRN, tizanidine 2 mg q8h PRN  #Skin/Pressure Injury Prevention: Turn Q2hr in bed, with weight shifts [id number] in wheelchair. #DVT Prophylaxis: Apixaban  #GI Prophylaxis: Not indicated  #Code Status: Level 1 - Full  #FEN: High fiber, low fat diet  #Dispo: Pending initial interdisciplinary team meeting, home with assist, ELOS 10 to 14 days    Objective:    Functional Update: Pending    Allergies per EMR    Physical Exam:  Temp:  [97.1 °F (36.2 °C)-98.1 °F (36.7 °C)] 97.1 °F (36.2 °C)  HR:  [94-98] 98  Resp:  [18] 18  BP: (103-119)/(60-80) 118/80  Oxygen Therapy  SpO2: 97 %    GEN: Patient seen resting comfortably in bedside chair, NAD  HEENT: NCAT; EOMI; mucous membranes moist  CV: 1-2+ pitting edema of bilateral lower extremities to mid-tibial level without overlying erythema  PULM: Breathing comfortably on room air  ABD: Distended  SKIN: No obvious rashes or lesions  : Indwelling urinary catheter in place draining yellow urine.    NEURO:  Awake and alert, answering questions appropriately to context  Speech is fluent and organized  CN II-XII grossly intact  Moving all extremities spontaneously in recliner    Diagnostic Studies: reviewed  XR abdomen 1 view kub    Result Date: 11/13/2023  Impression: Persistent gaseous distention of small and large bowel.  Workstation performed: [id number]       Laboratory:    Results from last 7 days   Lab Units 11/11/23  0523   HEMOGLOBIN g/dL 11.6*   HEMATOCRIT % 33.9*   WBC Thousand/uL 5.76     Results from last 7 days   Lab Units 11/11/23  0523 11/08/23  0430 11/07/23  0457   BUN mg/dL 26* 32* 32*   POTASSIUM mmol/L 3.8 4.0 3.6   CHLORIDE mmol/L 92* 91* 89*   CREATININE mg/dL 0.89 0.92 0.93   AST U/L 18  --   --    ALT U/L 19  --   --             Patient Active Problem List   Diagnosis    Tinea corporis    Onychomycosis    Cervical spondylosis with myelopathy    Impaired mobility and activities of daily living    Anemia    Benign essential hypertension    Dorsalgia, unspecified    Gastroesophageal reflux disease    Hyperlipidemia    Paresthesia    Weakness of extremity    Hyponatremia    Urinary retention    Neurogenic bowel    Seasonal allergies    Acute respiratory insufficiency    Acute DVT (deep venous thrombosis) (HCC)    Diarrhea    Volume overload     Medications  Current Facility-Administered Medications   Medication Dose Route Frequency Provider Last Rate    acetaminophen  650 mg Oral Q6H PRN José Cowart MD      albuterol  2 puff Inhalation Q6H PRN José Cowart MD      apixaban  5 mg Oral BID José Cowart MD      atorvastatin  10 mg Oral Daily José Cowart MD      bismuth subsalicylate  918 mg Oral Q6H PRN José Cowart MD      calcium carbonate  500 mg Oral Daily PRN José Cowart MD      finasteride  5 mg Oral Daily José Cowart MD      fluticasone  1 spray Each Nare BID José Cowart MD      folic acid  017 mcg Oral Daily José Cowart MD      furosemide  20 mg Oral Daily AAKASH David      lisinopril  5 mg Oral Daily AAKASH David      loratadine  10 mg Oral Daily José Cowart MD      melatonin  3 mg Oral HS José Cwoart MD      ondansetron  4 mg Oral Q6H PRN José Cowart MD oxyCODONE  2.5 mg Oral Q4H PRN Jarrett Loco MD      potassium chloride  40 mEq Oral Daily AAKASH Mari      psyllium  1 packet Oral Daily Jarrett Loco MD      simethicone  80 mg Oral 4x Daily (with meals and at bedtime) AAKASH Abbott      tiZANidine  2 mg Oral Q8H PRN Jarrett Loco MD        I have spent a total time of 35 minutes on 11/13/23 in caring for this patient including Diagnostic results, Risks and benefits of tx options, Patient and family education, Impressions, Counseling / Coordination of care, Documenting in the medical record, Reviewing / ordering tests, medicine, procedures  , Obtaining or reviewing history  , and Communicating with other healthcare professionals . Radha Mcintosh MD  Attending Physician  Physical Medicine and [place]    ** Please Note: Fluency Direct voice to text software may have been used in the creation of this document.  **

## 2023-11-13 NOTE — UTILIZATION REVIEW
Initial Clinical Review    Admission: Date/Time/Statement:   Admission Orders (From admission, onward)       Ordered        11/02/23 0450  Inpatient Admission  Once                          Orders Placed This Encounter   Procedures    Inpatient Admission     Standing Status:   Standing     Number of Occurrences:   1     Order Specific Question:   Level of Care     Answer:   Med Surg [16]     Order Specific Question:   Estimated length of stay     Answer:   More than 2 Midnights     Order Specific Question:   Certification     Answer:   I certify that inpatient services are medically necessary for this patient for a duration of greater than two midnights. See H&P and MD Progress Notes for additional information about the patient's course of treatment. Initial Presentation: 68 y.o. male who presented initially to [address] [place] [address] then transferred to [address] [place] [address] on 10/23, presented due to falls noted with cervical cord compression, transferred for C3-C6 PCDF performed 10/25/2023 by neurosurgery team with DIANA drain placed postoperatively 10/27/2023. 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 Discharged 11/1/2023 to [facility] for inpatient acute rehabilitation. At [facility] on 11/2, patient was a rapid response due to shortness of breath with increased work of breathing, noted unable to complete long sentences. He additionally stated increased abdominal pain and distention but noted he was passing gas and had at least 2-3 rounds diarrhea this evening, and weight gain approx 17 lbs.   Concern was noted for anasarca and potential ileus, and patient received IV Lasix with albuterol breathing treatment and transferred back to medicine service for further management given the degree of shortness of breath additionally requiring 2 LNC. On exam, pt appears uncomfortable, reporting ongoing shortness of breath and abdominal distention. PMHx:  GERD, HLD, HTN. Plan:  Admit Inpatient due to acute respiratory insufficiency, med surg,  supplemental O2 prn, encourage inc spirometry, order TTE, monitor volume status, IO and daily wts, keep NPO, daily BMP, IV diuresis, monitor electrolytes and replete prn, pain control, maintain Christensen, monitor BP and dc HCTZ. PT OT AMARILIS long consult for dispo planning. Date: 11/3   Day 2:   Continues with shortness of breath, breath sounds decreased BL, BL LE edema noted. Continue above tx plan including monitor volume status, IO and daily wts, IV diuresis, labs, pain control, Christensen, PT OT, CM following. Patient positive for 2 acute venous thrombos. 1 in the right posterior vein of the calf and another in the left peroneal vein of the calf. Will initiate therapeutic dose of Lovenox now. And await formal report from vascular lab likely tomorrow 11/4.      ED Triage Vitals   Temperature Pulse Respirations Blood Pressure SpO2   11/02/23 0458 11/02/23 0458 11/02/23 0458 11/02/23 0458 11/02/23 0458   98 °F (36.7 °C) 92 20 107/67 96 %      Temp src Heart Rate Source Patient Position - Orthostatic VS BP Location FiO2 (%)   -- -- -- -- --             Pain Score       11/02/23 0505       No Pain          Wt Readings from Last 1 Encounters:   11/13/23 105 kg (231 lb 7.7 oz)     Additional Vital Signs:   Date/Time Temp Pulse Resp BP SpO2 Weight   11/10/23 2102 97.9 °F (36.6 °C) 85 20 104/61 97 % --   11/10/23 1637 98.3 °F (36.8 °C) 95 12 101/62 96 % 106 kg (233 lb 11 oz)   11/10/23 0933 -- 102 -- 118/76 97 % --   11/10/23 0735 98.1 °F (36.7 °C) 101 16 116/76 98 % --   11/10/23 0424 -- -- -- -- -- 105 kg (231 lb 7.7 oz)   11/09/23 2334 -- -- -- -- 96 % --   11/09/23 2131 98 °F (36.7 °C) 89 18 109/84 95 % --   11/09/23 1556 98.1 °F (36.7 °C) 70 16 108/62 96 % --   11/09/23 0741 97.9 °F (36.6 °C) 94 18 126/63 96 % --   11/09/23 0600 -- -- -- -- -- 109 kg (240 lb 4.8 oz)   11/08/23 2316 97.7 °F (36.5 °C) 89 18 99/59 94 % --   11/08/23 1602 98.1 °F (36.7 °C) 92 16 125/69 97 % --   11/08/23 0900 -- -- -- -- 98 % --   11/08/23 0741 97.9 °F (36.6 °C) 95 -- 137/71 98 % --   11/08/23 0741 97.9 °F (36.6 °C) -- 16 137/71 -- --   11/08/23 0600 -- -- -- -- -- 109 kg (239 lb 6.7 oz)   11/07/23 2245 -- 59 18 138/71 96 % --   11/07/23 1527 -- -- 16 110/67 -- --   11/07/23 0830 -- -- -- -- 95 % --   11/07/23 0803 97.8 °F (36.6 °C) -- 16 113/71 -- --   11/06/23 2308 97.6 °F (36.4 °C) 88 20 113/71 95 % --   11/06/23 1538 97.3 °F (36.3 °C) (Abnormal)   89 16 112/69 98 % --   11/06/23 0815 -- -- -- -- 95 % --   11/06/23 0740 97.7 °F (36.5 °C) 90 16 117/69 95 % --   11/05/23 2240 97.6 °F (36.4 °C) 89 18 114/71 95 % --   11/05/23 1521 98.1 °F (36.7 °C) 94 18 117/74 94 % --   11/05/23 0900 -- -- -- -- -- 108 kg (238 lb 5.1 oz)   11/05/23 0758 97.7 °F (36.5 °C) 85 16 120/74 97 % --   11/05/23 0544 -- -- -- -- -- 110 kg (242 lb 8.1 oz)   11/05/23 0537 -- 83 15 119/73 95 % --   11/04/23 2254 -- 93 18 113/63 94 % --   11/04/23 1553 -- 97 -- -- 95 % --   11/04/23 1510 98 °F (36.7 °C) -- 14 131/76 -- --   11/04/23 1012 -- 97 -- 134/76 98 % --   11/04/23 0737 98.3 °F (36.8 °C) 74 16 118/71 93 % --   11/04/23 0045 -- 82 -- 116/71 99 % --   11/03/23 2240 98.2 °F (36.8 °C) -- -- -- -- --   11/03/23 2240 -- 77 -- 87/53 (Abnormal)   98 % --   11/03/23 2215 -- 85 16 91/57 97 % --   11/1950 -- -- -- -- 91 % --   11/03/23 1945 -- -- -- -- 94 % --   11/03/23 1533 -- -- -- -- 93 % --   11/03/23 1503 98 °F (36.7 °C) 49 (Abnormal)   18 131/78 89 % (Abnormal)   --   11/03/23 0737 97.5 °F (36.4 °C) 90 18 141/81 96 % --   11/03/23 0418 -- -- -- -- 99 % --   11/03/23 0407 -- -- -- -- 98 % --   11/03/23 0403 -- -- -- -- -- 112 kg (246 lb 11.1 oz)   11/02/23 2202 97.8 °F (36.6 °C) 108 (Abnormal)   17 161/85 88 % (Abnormal)   -- 11/02/23 1536 97.5 °F (36.4 °C) 95 -- 151/73 98 % --   11/02/23 1535 97.5 °F (36.4 °C) 100 18 151/73 -- --   11/02/23 1040 -- 89 -- 109/69 -- 120 kg (264 lb)   11/02/23 0745 97.7 °F (36.5 °C) 89 16 109/69 97 % --   11/02/23 0600 -- -- -- -- -- 120 kg (264 lb 1.8 oz)   11/02/23 0518 -- -- -- -- -- 122 kg (268 lb 1.3 oz)   11/02/23 0505 -- -- -- -- 94 % --   11/02/23 0458 98 °F (36.7 °C) 92 20 107/67 96 % --   11/02/23 0352 -- 91 26 (Abnormal)   113/70 -- --   11/02/23 0343 -- -- -- -- 96 % --   11/02/23 0342 -- 89 -- 101/72 96 % --   11/02/23 0001 -- -- -- 118/78 -- --     Oxygen Therapy from 11/02/23 0000 to 11/11/23 0000  Date and Time SpO2 O2 Device   11/10/23 2102 97 % None (Room air)   11/10/23 1637 96 % None (Room air)   11/10/23 0933 97 % --   11/10/23 0735 98 % --   11/09/23 2334 96 % None (Room air)   11/09/23 2131 95 % --   11/09/23 1556 96 % --   11/09/23 0741 96 % --   11/08/23 2316 94 % --   11/08/23 1602 97 % --   11/08/23 0900 98 % None (Room air)   11/08/23 0741 98 % --   11/07/23 2245 96 % --   11/07/23 0830 95 % None (Room air)   11/06/23 2308 95 % --   11/06/23 1538 98 % --   11/06/23 0815 95 % None (Room air)   11/06/23 0740 95 % --   11/05/23 2240 95 % --   11/05/23 2035 -- None (Room air)   11/05/23 1521 94 % --   11/05/23 0758 97 % --   11/05/23 0537 95 % --   11/04/23 2254 94 % --   11/04/23 1553 95 % --   11/04/23 1012 98 % --   11/04/23 0737 93 % --   11/04/23 0045 99 % --   11/03/23 2240 98 % --   11/03/23 2215 97 % --   11/1950 91 % Nasal cannula   11/03/23 1945 94 % --   11/03/23 1533 93 % Nasal cannula   11/03/23 1503 89 % (Abnormal)   --   11/03/23 0737 96 % --   11/03/23 0418 99 % Nasal cannula   11/03/23 0407 98 % Nasal cannula   11/02/23 2202 88 % (Abnormal)   --   11/02/23 1536 98 % --   11/02/23 1515 -- Nasal cannula   11/02/23 0745 97 % --   11/02/23 0505 94 % Nasal cannula   11/02/23 0458 96 % --   11/02/23 0343 96 % --   11/02/23 0342 96 % --     Pertinent Labs/Diagnostic Test Results:   11/2 ECHO:    Left Ventricle: Left ventricular cavity size is normal. Wall thickness is normal. The left ventricular ejection fraction is 65%. Systolic function is normal. Wall motion is normal. Diastolic function is mildly abnormal, consistent with grade I (abnormal) relaxation. 11/2 EKG:  Normal sinus rhythm  Low voltage QRS  Inferior infarct (cited on or before 20-OCT-2023)  Abnormal ECG    XR abdomen 1 view kub   Final Result by Angie Najera MD (11/10 9242)      Persistent gas-filled dilatation of the large and small bowel. No discernible free air. Resident: Dalia Grijalva, the attending radiologist, have reviewed the images and agree with the final report above. Workstation performed: [id number]         CTA chest pe study   Final Result by Allyson iGbson MD (11/04 5867)         1. No pulmonary embolism. 2. Bibasilar left greater than right subsegmental atelectasis similar to the CT of the abdomen and pelvis from 2 days ago. 3. Stable calcified and noncalcified anterior mediastinal nodules likely lymph nodes possibly sequela of prior granulomatous infection. Differential diagnosis could include oil cysts. 3. Suspect cholelithiasis and/or gallbladder sludge. No pericholecystic fluid to suggest acute cholecystitis by CT. Further clinical assessment recommended. Workstation performed: [id number]         VAS lower limb venous duplex study, complete bilateral   Final Result by Paige Garrison MD (11/04 1522)      CT abdomen pelvis w contrast   Final Result by Stephanie Killian MD (11/02 0645)      Diffuse moderate distention of the entire gastrointestinal tract with fluid, with no evidence of obstruction. Findings may reflect nonspecific gastroenteritis and/or ileus.             Workstation performed: [id number]         XR spine cervical 2 or 3 vw injury    (Results Pending)     Results from last 7 days   Lab Units 11/10/23  0940   SARS-COV-2  Negative     Results from last 7 days   Lab Units 11/11/23  0523   WBC Thousand/uL 5.76   HEMOGLOBIN g/dL 11.6*   HEMATOCRIT % 33.9*   PLATELETS Thousands/uL 379   NEUTROS ABS Thousands/µL 3.80         Results from last 7 days   Lab Units 11/11/23  0523 11/08/23  0430 11/07/23  0457   SODIUM mmol/L 128* 129* 129*   POTASSIUM mmol/L 3.8 4.0 3.6   CHLORIDE mmol/L 92* 91* 89*   CO2 mmol/L 29 28 32   ANION GAP mmol/L 7 10 8   BUN mg/dL 26* 32* 32*   CREATININE mg/dL 0.89 0.92 0.93   EGFR ml/min/1.73sq m 84 82 81   CALCIUM mg/dL 8.5 8.7 8.6   MAGNESIUM mg/dL  --   --  1.9     Results from last 7 days   Lab Units 11/11/23  0523   AST U/L 18   ALT U/L 19   ALK PHOS U/L 53   TOTAL PROTEIN g/dL 5.9*   ALBUMIN g/dL 3.5   TOTAL BILIRUBIN mg/dL 0.72     Results from last 7 days   Lab Units 11/07/23  2102   POC GLUCOSE mg/dl 86     Results from last 7 days   Lab Units 11/11/23  0523 11/08/23  0430 11/07/23  0457   GLUCOSE RANDOM mg/dL 87 91 110       Results from last 7 days   Lab Units 11/10/23  0940   INFLUENZA A PCR  Negative   INFLUENZA B PCR  Negative   RSV PCR  Negative     Results from last 7 days   Lab Units 11/12/23  1607   SALMONELLA SP PCR  None Detected   SHIGELLA SP/ENTEROINVASIVE E. COLI (EIEC)  None Detected   CAMPYLOBACTER SP (JEJUNI AND COLI)  None Detected   SHIGA TOXIN 1/SHIGA TOXIN 2  None Detected     Past Medical History:   Diagnosis Date    GERD (gastroesophageal reflux disease)     Hyperlipidemia     Hypertension      Present on Admission:   (Resolved) Anasarca   Acute respiratory insufficiency   (Resolved) Ileus (HCC)   Hyponatremia   Gastroesophageal reflux disease   Benign essential hypertension   Cervical spondylosis with myelopathy   Urinary retention      Admitting Diagnosis: Other reduced mobility [Z74.09]  Age/Sex: 68 y.o. male  Admission Orders:  Current Facility-Administered Medications   Medication Dose Route Frequency    acetaminophen  650 mg Oral Q6H PRN albuterol  2 puff Inhalation Q6H PRN    atorvastatin  10 mg Oral Daily    finasteride  5 mg Oral Daily    fluticasone  1 spray Each Nare BID    folic acid  929 mcg Oral Daily    furosemide  40 mg Intravenous BID    gabapentin  100 mg Oral TID    heparin (porcine)  5,000 Units Subcutaneous Q8H [facility]    lisinopril  10 mg Oral Daily    loratadine  10 mg Oral Daily    melatonin  3 mg Oral HS    ondansetron  4 mg Intravenous Q6H PRN    oxyCODONE  2.5 mg Oral Q4H PRN    potassium chloride  40 mEq Oral BID    tiZANidine  4 mg Oral TID     Scd  Stool record    IP CONSULT TO CASE MANAGEMENT    Network Utilization Review Department  ATTENTION: Please call with any questions or concerns to [phone] and carefully listen to the prompts so that you are directed to the right person. All voicemails are confidential.   For Discharge needs, contact Care Management DC Support Team at [phone] opt. 2  Send all requests for admission clinical reviews, approved or denied determinations and any other requests to dedicated fax number below belonging to the campus where the patient is receiving treatment.  List of dedicated fax numbers for the Facilities:  [place] DENIALS (Administrative/Medical Necessity) [phone]   DISCHARGE SUPPORT TEAM (NETWORK) [address] [address] [facility] (Maternity/NICU/Pediatrics) [phone]   [address] [place] [address] Drive [phone]   [facility] 1000 [facility] [phone] 1505 [facility] 207 [facility] 5220 [facility] [address] [address] [facility] [phone]   [address] [facility] [address] [phone]   [address] [place] [address] [address] [phone]

## 2023-11-13 NOTE — PROGRESS NOTES
11/13/23 1230   Pain Assessment   Pain Assessment Tool 0-10   Pain Score 3   Pain Location/Orientation Location: Buttocks   Restrictions/Precautions   Precautions Fall Risk;Fluid restriction;Pain;Spinal precautions   Weight Bearing Restrictions No   ROM Restrictions   (spinal precautions)   Braces or Orthoses Other (Comment)  (BLE ace wraps)   Lifestyle   Autonomy "I want to go home as soon as you guys say I can."   Putting On/Taking Off Footwear   Type of Assistance Needed Physical assistance   Physical Assistance Level Total assistance   Comment A to don b/l ace wraps using 2 4" and 1 6" ace wrap. A to don/doff socks. Putting On/Taking Off Footwear CARE Score 1   Sit to Stand   Type of Assistance Needed Incidental touching   Physical Assistance Level No physical assistance   Comment CGA w/ RW. Sit to Stand CARE Score 4   Bed-Chair Transfer   Type of Assistance Needed Incidental touching; Adaptive equipment   Physical Assistance Level No physical assistance   Comment CGA w/ RW, ambulated<>bathroom. Chair/Bed-to-Chair Transfer CARE Score 4   Toileting Hygiene   Type of Assistance Needed Physical assistance;Verbal cues; Set-up / clean-up   Physical Assistance Level 26%-50%   Comment Min-Mod A in stance w/ unilateral release for bowel hygiene management, req A for thoroughness. vc's to manage clothing while seated to knee level, CGA-Min A to steady in stance w/ cues for alternating unilateral release for clothing management req inc time. Toileting Hygiene CARE Score 3   Toilet Transfer   Type of Assistance Needed Physical assistance   Physical Assistance Level 25% or less   Comment CGA-Min A w/ RW to [facility] over toilet. Toilet Transfer CARE Score 3   Coordination   Fine Motor Small item retrieval while seated at table w/ focus on facilitation of b/l tripod grasp. Left: x28 reps noted to drop 2, Right: x28 noted to drop 1.    Cognition   Overall Cognitive Status [facility]   Additional Activities   Additional Activities Comments Pt reporting inc buttocks pain w/ prolonged sitting in recliner. Reviewed several cushion options: pt dislikes current w/c cushion and soft-care, prefers sitting on a pillow despite edu provided. Pt agreeable to static standing during hourly rounds, updated whiteboard and staff made aware of same. Assessment   Treatment Assessment Pt seen for skilled OT session focusing on rewrapping ace wraps, short distance fxnl mobility w/ RW, toileting, and b/l [place]. Time spent rapport building, pt highly motivated to return to independent level and looking forward to d/c home "as soon as you guys say I can." Pt questioning mobility timeline w/ SPC vs no AD, time spent discussing realistic goal-setting and pt demo G understanding of importance of RW at this time and possibility to utilize at time of d/c to inc overall indep. Pt reports having great support system despite living alone. Pt's sister, GINNY, present and will be able to A w/ partial IADL management when she returns from vacation. Cont OT POC: endurance work, b/l [place], fxnl standing tolerance, fxnl reach to rear to inc indep w/ bowel hygiene management, item retrieval and transportation w/ use of RW, light IADL management. Pt was left resting in recliner w/ all needs within reach. Recommend pt stand at [facility] during hourly rounds to relieve buttock pain, provide proper sacral offloading to dec risk of PI, and inc overall comfort. Prognosis Good   Problem List Decreased strength;Decreased endurance; Impaired balance;Decreased mobility; Decreased coordination;Orthopedic restrictions;Decreased range of motion   Discharge Recommendation   Rehab Resource Intensity Level, OT   (pending progress)   OT Therapy Minutes   OT Time In 1230   OT Time Out 1400   OT Total Time (minutes) 90   OT Mode of treatment - Individual (minutes) 90   OT Mode of treatment - Concurrent (minutes) 0   OT Mode of treatment - Group (minutes) 0   OT Mode of treatment - Co-treat (minutes) 0   OT Mode of Treatment - Total time(minutes) 90 minutes   OT Cumulative Minutes 180   Therapy Time missed   Time missed?  No

## 2023-11-13 NOTE — CASE MANAGEMENT
Met w/pt and reviewed rehab routine and cm role. Pt resides alone in an apmt with no yonis. Pt expects to need to use a roller walker on dc and has a friend who is getting him one. Pt also states he has a friend getting him a shower chair if one is needed. Pt states he has friends who have cleaned out his fridge when he was hospitalized and they will stock it for him when he knows he is returning home. Pt knows he has work to do but is looking to return home soon. Pts sister will be out of town from 11/14 to 11/21 but his nephew is available or will arrange transport for dc. Pt wishes to go to outpt therapy and cm confirmed insurance is medicare part A only and [facility]. Pt now aware outpt therapy will go under the [facility]. Pts PCP is dr. Ana M Lopez at [facility]. Pt aware of [facility] location and is aware if he needs pt and ot he would need to attend that location. Pt confirmed he would have people who will drive him to therapy. Pt uses the [facility] for pharmacy services. Cm reviewed team mtg process and will follow to assist w/dc planning recommendations.

## 2023-11-14 LAB
ANION GAP SERPL CALCULATED.3IONS-SCNC: 8 MMOL/L
ATRIAL RATE: 108 BPM
BUN SERPL-MCNC: 15 MG/DL (ref 5–25)
CALCIUM SERPL-MCNC: 8.9 MG/DL (ref 8.4–10.2)
CHLORIDE SERPL-SCNC: 94 MMOL/L (ref 96–108)
CO2 SERPL-SCNC: 28 MMOL/L (ref 21–32)
CREAT SERPL-MCNC: 0.85 MG/DL (ref 0.6–1.3)
GFR SERPL CREATININE-BSD FRML MDRD: 86 ML/MIN/1.73SQ M
GLUCOSE P FAST SERPL-MCNC: 95 MG/DL (ref 65–99)
GLUCOSE SERPL-MCNC: 95 MG/DL (ref 65–140)
P AXIS: 30 DEGREES
POTASSIUM SERPL-SCNC: 3.5 MMOL/L (ref 3.5–5.3)
PR INTERVAL: 190 MS
QRS AXIS: -31 DEGREES
QRSD INTERVAL: 100 MS
QT INTERVAL: 330 MS
QTC INTERVAL: 442 MS
SODIUM SERPL-SCNC: 130 MMOL/L (ref 135–147)
T WAVE AXIS: 23 DEGREES
VENTRICULAR RATE: 108 BPM

## 2023-11-14 PROCEDURE — 97530 THERAPEUTIC ACTIVITIES: CPT

## 2023-11-14 PROCEDURE — 99233 SBSQ HOSP IP/OBS HIGH 50: CPT | Performed by: PHYSICAL MEDICINE & REHABILITATION

## 2023-11-14 PROCEDURE — 97535 SELF CARE MNGMENT TRAINING: CPT

## 2023-11-14 PROCEDURE — 80048 BASIC METABOLIC PNL TOTAL CA: CPT | Performed by: NURSE PRACTITIONER

## 2023-11-14 PROCEDURE — 99232 SBSQ HOSP IP/OBS MODERATE 35: CPT | Performed by: INTERNAL MEDICINE

## 2023-11-14 PROCEDURE — 93010 ELECTROCARDIOGRAM REPORT: CPT | Performed by: INTERNAL MEDICINE

## 2023-11-14 PROCEDURE — 93005 ELECTROCARDIOGRAM TRACING: CPT

## 2023-11-14 RX ORDER — SACCHAROMYCES BOULARDII 250 MG
250 CAPSULE ORAL 2 TIMES DAILY
Status: DISCONTINUED | OUTPATIENT
Start: 2023-11-14 | End: 2023-11-20 | Stop reason: HOSPADM

## 2023-11-14 RX ADMIN — FINASTERIDE 5 MG: 5 TABLET, FILM COATED ORAL at 08:00

## 2023-11-14 RX ADMIN — FLUTICASONE PROPIONATE 1 SPRAY: 50 SPRAY, METERED NASAL at 08:03

## 2023-11-14 RX ADMIN — LORATADINE 10 MG: 10 TABLET ORAL at 08:00

## 2023-11-14 RX ADMIN — LISINOPRIL 5 MG: 5 TABLET ORAL at 08:00

## 2023-11-14 RX ADMIN — APIXABAN 5 MG: 5 TABLET, FILM COATED ORAL at 17:03

## 2023-11-14 RX ADMIN — SIMETHICONE 80 MG: 80 TABLET, CHEWABLE ORAL at 21:38

## 2023-11-14 RX ADMIN — SIMETHICONE 80 MG: 80 TABLET, CHEWABLE ORAL at 12:18

## 2023-11-14 RX ADMIN — FOLIC ACID TAB 400 MCG 400 MCG: 400 TAB at 08:00

## 2023-11-14 RX ADMIN — SIMETHICONE 80 MG: 80 TABLET, CHEWABLE ORAL at 17:03

## 2023-11-14 RX ADMIN — APIXABAN 5 MG: 5 TABLET, FILM COATED ORAL at 08:00

## 2023-11-14 RX ADMIN — FUROSEMIDE 20 MG: 20 TABLET ORAL at 10:11

## 2023-11-14 RX ADMIN — Medication 3 MG: at 21:38

## 2023-11-14 RX ADMIN — Medication 250 MG: at 17:03

## 2023-11-14 RX ADMIN — SIMETHICONE 80 MG: 80 TABLET, CHEWABLE ORAL at 07:59

## 2023-11-14 RX ADMIN — POTASSIUM CHLORIDE 40 MEQ: 1500 TABLET, EXTENDED RELEASE ORAL at 10:24

## 2023-11-14 RX ADMIN — ATORVASTATIN CALCIUM 10 MG: 10 TABLET, FILM COATED ORAL at 08:00

## 2023-11-14 NOTE — PLAN OF CARE
Problem: Prexisting or High Potential for Compromised Skin Integrity  Goal: Skin integrity is maintained or improved  Description: INTERVENTIONS:  - Identify patients at risk for skin breakdown  - Assess and monitor skin integrity  - Assess and monitor nutrition and hydration status  - Monitor labs   - Assess for incontinence   - Turn and reposition patient  - Assist with mobility/ambulation  - Relieve pressure over bony prominences  - Avoid friction and shearing  - Provide appropriate hygiene as needed including keeping skin clean and dry  - Evaluate need for skin moisturizer/barrier cream  - Collaborate with interdisciplinary team   - Patient/family teaching  - Consider wound care consult   Outcome: Progressing     Problem: PAIN - ADULT  Goal: Verbalizes/displays adequate comfort level or baseline comfort level  Description: Interventions:  - Encourage patient to monitor pain and request assistance  - Assess pain using appropriate pain scale  - Administer analgesics based on type and severity of pain and evaluate response  - Implement non-pharmacological measures as appropriate and evaluate response  - Consider cultural and social influences on pain and pain management  - Notify physician/advanced practitioner if interventions unsuccessful or patient reports new pain  Outcome: Progressing     Problem: INFECTION - ADULT  Goal: Absence or prevention of progression during hospitalization  Description: INTERVENTIONS:  - Assess and monitor for signs and symptoms of infection  - Monitor lab/diagnostic results  - Monitor all insertion sites, i.e. indwelling lines, tubes, and drains  - Monitor endotracheal if appropriate and nasal secretions for changes in amount and color  - [place] appropriate cooling/warming therapies per order  - Administer medications as ordered  - Instruct and encourage patient and family to use good hand hygiene technique  - Identify and instruct in appropriate isolation precautions for identified infection/condition  Outcome: Progressing  Goal: Absence of fever/infection during neutropenic period  Description: INTERVENTIONS:  - Monitor WBC    Outcome: Progressing     Problem: SAFETY ADULT  Goal: Patient will remain free of falls  Description: INTERVENTIONS:  - Educate patient/family on patient safety including physical limitations  - Instruct patient to call for assistance with activity   - Consult OT/PT to assist with strengthening/mobility   - Keep Call bell within reach  - Keep bed low and locked with side rails adjusted as appropriate  - Keep care items and personal belongings within reach  - Initiate and maintain comfort rounds  - Make Fall Risk Sign visible to staff  - Offer Toileting every 2 Hours, in advance of need  - Apply yellow socks and bracelet for high fall risk patients  - Consider moving patient to room near nurses station  Outcome: Progressing  Goal: Maintain or return to baseline ADL function  Description: INTERVENTIONS:  -  Assess patient's ability to carry out ADLs; assess patient's baseline for ADL function and identify physical deficits which impact ability to perform ADLs (bathing, care of mouth/teeth, toileting, grooming, dressing, etc.)  - Assess/evaluate cause of self-care deficits   - Assess range of motion  - Assess patient's mobility; develop plan if impaired  - Assess patient's need for assistive devices and provide as appropriate  - Encourage maximum independence but intervene and supervise when necessary  - Involve family in performance of ADLs  - Assess for home care needs following discharge   - Consider OT consult to assist with ADL evaluation and planning for discharge  - Provide patient education as appropriate  Outcome: Progressing  Goal: Maintains/Returns to pre admission functional level  Description: INTERVENTIONS:  - Perform BMAT or MOVE assessment daily.   - Set and communicate daily mobility goal to care team and patient/family/caregiver.    - Collaborate with rehabilitation services on mobility goals if consulted  - Perform Range of Motion 3 times a day. - Reposition patient every 2 hours.   - Dangle patient 3 times a day  - Stand patient 3 times a day  - Ambulate patient 3 times a day  - Out of bed to chair 3 times a day   - Out of bed for meals 3 times a day  - Out of bed for toileting  - Record patient progress and toleration of activity level   Outcome: Progressing     Problem: DISCHARGE PLANNING  Goal: Discharge to home or other facility with appropriate resources  Description: INTERVENTIONS:  - Identify barriers to discharge w/patient and caregiver  - Arrange for needed discharge resources and transportation as appropriate  - Identify discharge learning needs (meds, wound care, etc.)  - Arrange for interpretive services to assist at discharge as needed  - Refer to Case Management Department for coordinating discharge planning if the patient needs post-hospital services based on physician/advanced practitioner order or complex needs related to functional status, cognitive ability, or social support system  Outcome: Progressing     Problem: MOBILITY - ADULT  Goal: Maintain or return to baseline ADL function  Description: INTERVENTIONS:  -  Assess patient's ability to carry out ADLs; assess patient's baseline for ADL function and identify physical deficits which impact ability to perform ADLs (bathing, care of mouth/teeth, toileting, grooming, dressing, etc.)  - Assess/evaluate cause of self-care deficits   - Assess range of motion  - Assess patient's mobility; develop plan if impaired  - Assess patient's need for assistive devices and provide as appropriate  - Encourage maximum independence but intervene and supervise when necessary  - Involve family in performance of ADLs  - Assess for home care needs following discharge   - Consider OT consult to assist with ADL evaluation and planning for discharge  - Provide patient education as appropriate  Outcome: Progressing  Goal: Maintains/Returns to pre admission functional level  Description: INTERVENTIONS:  - Perform BMAT or MOVE assessment daily.   - Set and communicate daily mobility goal to care team and patient/family/caregiver. - Collaborate with rehabilitation services on mobility goals if consulted  - Perform Range of Motion 3 times a day. - Reposition patient every 2 hours.   - Dangle patient 3 times a day  - Stand patient 3 times a day  - Ambulate patient 3 times a day  - Out of bed to chair 3 times a day   - Out of bed for meals 3 times a day  - Out of bed for toileting  - Record patient progress and toleration of activity level   Outcome: Progressing

## 2023-11-14 NOTE — PCC OCCUPATIONAL THERAPY
Pt continues to present with impairments in activity tolerance, endurance, and standing balance/tolerance. Additional functional barriers include fatigue, pain, spinal precautions, decreased caregiver support, risk for falls, and home environment. Pt is currently functioning at overall Min A for ADLs and CGA w/ RW for fxnl mobility. Pt will continue to benefit from skilled OT services to address above mentioned barriers and maximize functional independence in baseline areas of occupation. OT d/c recommendation for home w/ family support, anticipate d/c mid next week.

## 2023-11-14 NOTE — PLAN OF CARE
Problem: Prexisting or High Potential for Compromised Skin Integrity  Goal: Skin integrity is maintained or improved  Description: INTERVENTIONS:  - Identify patients at risk for skin breakdown  - Assess and monitor skin integrity  - Assess and monitor nutrition and hydration status  - Monitor labs   - Assess for incontinence   - Turn and reposition patient  - Assist with mobility/ambulation  - Relieve pressure over bony prominences  - Avoid friction and shearing  - Provide appropriate hygiene as needed including keeping skin clean and dry  - Evaluate need for skin moisturizer/barrier cream  - Collaborate with interdisciplinary team   - Patient/family teaching  - Consider wound care consult   Outcome: Progressing     Problem: PAIN - ADULT  Goal: Verbalizes/displays adequate comfort level or baseline comfort level  Description: Interventions:  - Encourage patient to monitor pain and request assistance  - Assess pain using appropriate pain scale  - Administer analgesics based on type and severity of pain and evaluate response  - Implement non-pharmacological measures as appropriate and evaluate response  - Consider cultural and social influences on pain and pain management  - Notify physician/advanced practitioner if interventions unsuccessful or patient reports new pain  Outcome: Progressing     Problem: INFECTION - ADULT  Goal: Absence or prevention of progression during hospitalization  Description: INTERVENTIONS:  - Assess and monitor for signs and symptoms of infection  - Monitor lab/diagnostic results  - Monitor all insertion sites, i.e. indwelling lines, tubes, and drains  - Monitor endotracheal if appropriate and nasal secretions for changes in amount and color  - [place] appropriate cooling/warming therapies per order  - Administer medications as ordered  - Instruct and encourage patient and family to use good hand hygiene technique  - Identify and instruct in appropriate isolation precautions for identified infection/condition  Outcome: Progressing  Goal: Absence of fever/infection during neutropenic period  Description: INTERVENTIONS:  - Monitor WBC    Outcome: Progressing

## 2023-11-14 NOTE — PROGRESS NOTES
11/14/23 4618   Pain Assessment   Pain Assessment Tool 0-10   Pain Score No Pain   Restrictions/Precautions   Precautions Fall Risk;Fluid restriction; [place]; Spinal precautions   Weight Bearing Restrictions No   ROM Restrictions   (cervical spinal precautions)   Braces or Orthoses Other (Comment)  (b/l ace wraps)   Oral Hygiene   Type of Assistance Needed Set-up / clean-up   Physical Assistance Level No physical assistance   Comment pt requested to complete while seated in w/c at sink 2* fatigue. Oral Hygiene CARE Score 5   Shower/Bathe Self   Type of Assistance Needed Physical assistance;Verbal cues; Set-up / clean-up   Physical Assistance Level 26%-50%   Comment seated to bathe BUE and upper legs. CGA in stance to bathe groin and buttocks w/ unilateral support. + fountain care. crossed leg technique to bathe lower legs req A to bathe feet. Hydraguard applied to sacrum/buttocks. Shower/Bathe Self CARE Score 3   Tub/Shower Transfer   Reason Not Assessed Sponge Bath;Patient refusal  (Pt w/ active shower orders, pt requested to sponge bathe.)   Upper Body Dressing   Type of Assistance Needed Physical assistance;Verbal cues   Physical Assistance Level 26%-50%   Comment while seated in w/c. cues for dong dressing technique threading LUE. req A to fully manage over R shoulder and down back. Upper Body Dressing CARE Score 3   Lower Body Dressing   Type of Assistance Needed Physical assistance;Verbal cues; Adaptive equipment;Set-up / clean-up   Physical Assistance Level 26%-50%   Comment Pt requested to wear brief today, req A to adjust tabs. A to thread fountain. crossed leg technique to thread RLE, A to fully thread LLE. CGA-Min A in stance for clothing management w/ cues for alternating unilateral release to inc safety. Lower Body Dressing CARE Score 3   Putting On/Taking Off Footwear   Type of Assistance Needed Physical assistance   Physical Assistance Level 51%-75%   Comment Moisturizer applied to lower legs.  A for ace wraps. A to don/doff socks w/ crossed leg technique while seated in w/c. Putting On/Taking Off Footwear CARE Score 2   Sit to Stand   Type of Assistance Needed Incidental touching; Adaptive equipment   Physical Assistance Level No physical assistance   Comment CGA w/ RW   Sit to Stand CARE Score 4   Bed-Chair Transfer   Type of Assistance Needed Incidental touching; Adaptive equipment   Physical Assistance Level No physical assistance   Comment CGA w/ RW, short distance mobility in hallway. Chair/Bed-to-Chair Transfer CARE Score 4   Cognition   Overall Cognitive Status WFL   Assessment   Treatment Assessment Pt seen for skilled OT session focusing on self-care management. Details on sponge bath ADL noted above. Pt initially frustrated w/ ongoing loose bowel w/ recent bowel incontinence, abdominal discomfort, and prolonged hospital stay; provided w/ extended emotional and support to maximize participation. Cont OT POC: endurance work, b/l Yahir Sebas, fxnl standing tolerance, fxnl reach to rear to inc indep w/ bowel hygiene management, item retrieval and transportation w/ use of RW, light IADL management. Pt was left resting in recliner w/ all needs within reach. Prognosis Good   Problem List Decreased strength;Decreased endurance; Impaired balance;Decreased mobility; Decreased coordination;Orthopedic restrictions;Decreased range of motion   Plan   Treatment/Interventions ADL retraining;Functional transfer training; Endurance training;Patient/family training   Progress Progressing toward goals   Discharge Recommendation   Rehab Resource Intensity Level, OT   (pending progress)   OT Therapy Minutes   OT Time In 0830   OT Time Out 1000   OT Total Time (minutes) 90   OT Mode of treatment - Individual (minutes) 90   OT Mode of treatment - Concurrent (minutes) 0   OT Mode of treatment - Group (minutes) 0   OT Mode of treatment - Co-treat (minutes) 0   OT Mode of Treatment - Total time(minutes) 90 minutes   OT Cumulative Minutes 270   Therapy Time missed   Time missed?  No

## 2023-11-14 NOTE — PCC CARE MANAGEMENT
Pt admitted s/p cervical intervention. Pt resides alone but reports having a large network of friends to assist on dc. Pt is anxious to return home and verbalizes his need to use a roller walker on dc. Pt would like to continue with outpt physical therapy on dc.

## 2023-11-14 NOTE — PROGRESS NOTES
11/14/23 1230   Pain Assessment   Pain Assessment Tool 0-10   Restrictions/Precautions   Precautions Fall Risk;Fluid restriction; [place]; Spinal precautions   Braces or Orthoses Other (Comment)  (b/l ace wraps)   Subjective   Subjective pt agreeable to perform skilled PT   Sit to Stand   Type of Assistance Needed Incidental touching; Adaptive equipment   Comment (S)  RW and having diarrhea in standing and then walking to the bathroom all water large   Sit to Stand CARE Score 4   Bed-Chair Transfer   Type of Assistance Needed Incidental touching; Adaptive equipment   Comment (S)  RW and having diarrhea in standing and then walking to the bathroom all water large   Chair/Bed-to-Chair Transfer CARE Score 4   Transfer Bed/Chair/Wheelchair   Adaptive Equipment Roller Walker   Walk 10 Feet   Type of Assistance Needed Incidental touching; Adaptive equipment   Comment (S)  RW and having diarrhea in standing and then walking to the bathroom all water large   Walk 10 Feet CARE Score 4   Walk 50 Feet with Two Turns   Reason if not Attempted Safety concerns   Walk 50 Feet with Two Turns CARE Score 88   Walk 150 Feet   Reason if not Attempted Safety concerns   Walk 150 Feet CARE Score 88   Walking 10 Feet on Uneven Surfaces   Reason if not Attempted Safety concerns   Walking 10 Feet on Uneven Surfaces CARE Score 88   Ambulation   Primary Mode of Locomotion Prior to Admission Walk   Distance Walked (feet) 15 ft   Assist Device Roller Walker   Findings walking to bathroom. ..RW and having diarrhea in standing and then walking to the bathroom all water large   Does the patient walk? 2.  Yes   Wheel 50 Feet with Two Turns   Reason if not Attempted Activity not applicable   Wheel 50 Feet with Two Turns CARE Score 9   Wheel 150 Feet   Reason if not Attempted Activity not applicable   Wheel 203 Feet CARE Score 9   Assessment   Treatment Assessment pt agreeable to perform skilled PT and pt c/o having diarrhea in morning with nsging and then x2 this skilled PT session large with brief on and walking to bathroom with TYRELL and александр . Pt needed to be clean up form waist to BLE and bottom. Pt has very lose BM with water and nsging and Dr Kenney Voss aware. pt being clean up with soap and warm water with hand towels and towels with medical wipes , pt then dress with new briefs and clothes and then walking back to recliner. Spoke with nsging about someone cleaning his bathroom. Pt in recliner with all neeeds in reach and BLE rewrapped and clean sock on . Dr Kenney Voss in his room , pt need to control diarrhea before coming down to PT gym, . Cont POC monitor SxS   Barriers to Discharge Decreased caregiver support; Inaccessible home environment   Plan   Progress Progressing toward goals   PT Therapy Minutes   PT Time In 1230   PT Time Out 1330   PT Total Time (minutes) 60   PT Mode of treatment - Individual (minutes) 60   PT Mode of treatment - Concurrent (minutes) 0   PT Mode of treatment - Group (minutes) 0   PT Mode of treatment - Co-treat (minutes) 0   PT Mode of Treatment - Total time(minutes) 60 minutes   PT Cumulative Minutes 285   Therapy Time missed   Time missed? Yes   Amount of time missed 30   Reason for time missed Illness; Extreme fatigue   Time(s) multiple attempts made diarrhea x3

## 2023-11-14 NOTE — PCC PHYSICAL THERAPY
Pt is a 69 yo male, hospitalized 10/23 s/p two falls in one day where he legs gave out. 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 On 10/25, pt underwent posterior cervical decompression and fusion surgery C3-C6 and laminectomies C3-C6. No c-collar but does have c-spine precautions. Pt concern about diarrhea today session and cont to have diarrhea this cont to be one of pt barriers going FWD , also left hip weakness during ambulation with RW , KARLA home and fountain care. Pt demonstrates good potential for reaching goals and returning to his apartment at independent level with goals of returning home with LRD. ELOS 2 weeks.

## 2023-11-14 NOTE — PROGRESS NOTES
Physical Medicine and Rehabilitation Progress Note  Mariah  68 y.o. male MRN: [id number]  Unit/Bed#: [facility] 216-75 Encounter: [id number]    Rehab Diagnosis: Impairment of mobility, safety and Activities of Daily Living (ADLs) due to Spinal Cord Dysfunction:  Non-Traumatic:  04.1211  Quadriplegia, Incomplete C1-4 - changed diagnosis given known level of injury. Adjusted given C3 sensory level on exam.     Interval History/Subjective: He is seen at bedside this morning. More watery bowel movements this morning, disrupting PT session. He feels the urge come on very suddenly when standing from sitting, though is able to make it to the bathroom. He has no abdominal pain. He denies even the sensation of feeling bloated. He has noticed no significant benefit yet from simethicone. GI consulted today for assistance in management. Also discussed voiding trial, will begin today with urinary retention protocol/timed void schedule. Vital signs reviewed, stable. No significant abnormalities. Labs reviewed. Assessment/Plan:    * Cervical spondylosis with myelopathy  Assessment & Plan  Presented with progressive weakness/constipation that culminated in two falls and difficulty with mobility.  - MRI showed severe stenosis C3-4, 4-5, and 5-6 with mild cord signal changes  10/25 PCDF with C3-6 laminectomies of C4,5,6 and partial C3  SSEPs noted to be dampened at baseline  Post-op examined C5 AIS D with pinprick > light touch impairment and proprioception intact  11/11 Admission AIS C3 AIS D. L > R sensory impairment. PP > LT. Mild LUE weakness already improved compared to Post-op examination. Question of neurogenic bowel/bladder   - Bowel continence improving, consistency still loose. - Bladder with fountain, and would recommend void trial on the ARC. No need for collar  Cervical spine precautions in place  Pain control  Incisional care  PT/OT 3-5 hours/day, 5-7 days/ week.   Plan for 6 week POV with Nsx ~12/6 with repeat imaging. Volume overload  Assessment & Plan  Lasix 20 mg PO daily with potassium  ACE wrap and elevate legs  Monitor     Urinary retention  Assessment & Plan  Likely component of neurogenic bladder, but multifactorial including mobility/medications, etc.  Did not have bladder symptoms leading up to hospitalization  Found to have retention during hospitalization. On finasteride  Trial of void initiated 11/14/23 with urinary retention protocol    Hyponatremia  Assessment & Plan  Likely SIADH and HCTZ per previous hospitalization  Also with volume overload which could be contributing as well (on Lasix)   11/11 128  Continue FR. Asymptomatic  IM consulted and assisting with management. They will continue to monitor for now. Labs in the AM    Anasarca-resolved as of 11/13/2023  Assessment & Plan  Most resolved, has LE edema  Required IV Lasix inpatient  Lasix decreased from 40mg to 20mg by IM on 11/12, and on potassium supplement. ACE wrap and elevate legs  Monitor     Diarrhea  Assessment & Plan  Possible component of neurogenic bowel with progressively worsening constipation leading up to hospitalization, complicated by ileus during hospitalization  Now with many loose/watery stools/diarrhea  11/9 prior to admission to [facility] started on metamucil. Fecal leukocytes moderate, C. Diff negative on 11/2, enteric panel negative, KUB with only gaseous distension  Has pepto PRN for diarrhea. Will hold on imodium given prior ileus  Simethicone scheduled for gas, probiotics started, high-fiber/low fat diet  GI consulted 11/14/23 for assistance in management and work-up    Acute DVT (deep venous thrombosis) ([address])  Assessment & Plan  Diagnosed 11/3/2023 with DVT in right mid posterior tibial veins and left peroneal vein. Started on eliquis after clearance from Neurosurgery  No SCDs  OK for ACE wraps  Likely 3 months treatment  Outpatient f/u with PCP.      Hyperlipidemia  Assessment & Plan  Continue statin    Gastroesophageal reflux disease  Assessment & Plan  TUMS PRN    Benign essential hypertension  Assessment & Plan  Home: Lisinopril 10mg daily  Here: Lisinopril 5 mg daily, Lasix 20 mg daily  Monitor and adjust as appropriate  IM consulted to assist with management. Anemia  Assessment & Plan  ABLA  11/11 Stable at 11.6  Monitor and transfuse as appropriate  IM consulted to assist with management    Ileus (HCC)-resolved as of 11/13/2023  Assessment & Plan  Ileus during this hospitalization. Now resolved. Health Maintenance  #Delirium/Sleep: Practice effective sleep hygiene (e.g., consistent night and morning routine, quiet, dark room at night, no electronic devices/screens in bed, avoid large meals/caffeine before bed)  #Pain: Tylenol PRN, oxycodone 2.5 mg q4h PRN, tizanidine 2 mg q8h PRN  #Skin/Pressure Injury Prevention: Turn Q2hr in bed, with weight shifts [id number] in wheelchair. #DVT Prophylaxis: Apixaban  #GI Prophylaxis: Not indicated  #Code Status: Level 1 - Full  #FEN: High fiber, low fat diet  #Dispo: Pending initial interdisciplinary team meeting, home with assist, ELOS 10 to 14 days    Objective:    Functional Update: Pending    Allergies per EMR    Physical Exam:  Temp:  [97.5 °F (36.4 °C)-98.3 °F (36.8 °C)] 98.3 °F (36.8 °C)  HR:  [82-99] 89  Resp:  [16-20] 16  BP: (120-122)/(65-84) 122/78  Oxygen Therapy  SpO2: 100 %    GEN: Patient seen resting comfortably in bedside chair, NAD  HEENT: NCAT; EOMI; mucous membranes moist  CV: Edema of bilateral lower extremities to mid-tibial level without overlying erythema; ACE wraps in place  PULM: Breathing comfortably on room air  ABD: Distended, soft, non-tender  SKIN: No obvious rashes or lesions  : Indwelling urinary catheter in place draining yellow urine.    NEURO:  Awake and alert, answering questions appropriately to context  Speech is fluent and organized  CN II-XII grossly intact  Moving all extremities spontaneously in recliner    Diagnostic Studies: reviewed, none new  XR abdomen 1 view kub    Result Date: 11/13/2023  Impression: Persistent gaseous distention of small and large bowel.  Workstation performed: [id number]       Laboratory:    Results from last 7 days   Lab Units 11/11/23  0523   HEMOGLOBIN g/dL 11.6*   HEMATOCRIT % 33.9*   WBC Thousand/uL 5.76     Results from last 7 days   Lab Units 11/14/23  0627 11/11/23  0523 11/08/23  0430   BUN mg/dL 15 26* 32*   POTASSIUM mmol/L 3.5 3.8 4.0   CHLORIDE mmol/L 94* 92* 91*   CREATININE mg/dL 0.85 0.89 0.92   AST U/L  --  18  --    ALT U/L  --  19  --             Patient Active Problem List   Diagnosis    Tinea corporis    Onychomycosis    Cervical spondylosis with myelopathy    Impaired mobility and activities of daily living    Anemia    Benign essential hypertension    Dorsalgia, unspecified    Gastroesophageal reflux disease    Hyperlipidemia    Paresthesia    Weakness of extremity    Hyponatremia    Urinary retention    Neurogenic bowel    Seasonal allergies    Acute respiratory insufficiency    Acute DVT (deep venous thrombosis) (HCC)    Diarrhea    Volume overload     Medications  Current Facility-Administered Medications   Medication Dose Route Frequency Provider Last Rate    acetaminophen  650 mg Oral Q6H PRN Glen Tyler MD      albuterol  2 puff Inhalation Q6H PRN Glen Tyler MD      apixaban  5 mg Oral BID Glen Tyler MD      atorvastatin  10 mg Oral Daily Glen Tyler MD      bismuth subsalicylate  056 mg Oral Q6H PRN Glen Tyler MD      calcium carbonate  500 mg Oral Daily PRN Glen Tyler MD      finasteride  5 mg Oral Daily Glen Tyler MD      fluticasone  1 spray Each Nare BID Glen Tyler MD      folic acid  837 mcg Oral Daily Glen Tyler MD      furosemide  20 mg Oral Daily AAKASH Forman      lisinopril  5 mg Oral Daily AAKASH Forman      loratadine  10 mg Oral Daily Glen Tyler MD      melatonin  3 mg Oral HS Kianna Musa MD      ondansetron  4 mg Oral Q6H PRN Kianna Musa MD      oxyCODONE  2.5 mg Oral Q4H PRN Kianna Musa MD      potassium chloride  40 mEq Oral Daily Nestora Crigler, CRNP      psyllium  1 packet Oral Daily Kianna Musa MD      saccharomyces boulardii  250 mg Oral BID Vicki Mcnamara MD      simethicone  80 mg Oral 4x Daily (with meals and at bedtime) Nestora Crigler, CRNP      tiZANidine  2 mg Oral Q8H PRN Kianna Musa MD        I have spent a total time of 50 minutes on 11/14/23 in caring for this patient including Prognosis, Risks and benefits of tx options, Patient and family education, Impressions, Counseling / Coordination of care, Documenting in the medical record, Reviewing / ordering tests, medicine, procedures  , Obtaining or reviewing history  , and Communicating with other healthcare professionals . Dhruv Underwood MD  Attending Physician  Physical Medicine and [place]    ** Please Note: Fluency Direct voice to text software may have been used in the creation of this document.  **

## 2023-11-15 ENCOUNTER — APPOINTMENT (INPATIENT)
Dept: RADIOLOGY | Facility: HOSPITAL | Age: 73
DRG: 560 | End: 2023-11-15
Payer: MEDICARE

## 2023-11-15 PROCEDURE — 97530 THERAPEUTIC ACTIVITIES: CPT

## 2023-11-15 PROCEDURE — 97535 SELF CARE MNGMENT TRAINING: CPT

## 2023-11-15 PROCEDURE — 97110 THERAPEUTIC EXERCISES: CPT

## 2023-11-15 PROCEDURE — 99232 SBSQ HOSP IP/OBS MODERATE 35: CPT | Performed by: PHYSICAL MEDICINE & REHABILITATION

## 2023-11-15 PROCEDURE — 99222 1ST HOSP IP/OBS MODERATE 55: CPT | Performed by: INTERNAL MEDICINE

## 2023-11-15 PROCEDURE — 74018 RADEX ABDOMEN 1 VIEW: CPT

## 2023-11-15 PROCEDURE — 99232 SBSQ HOSP IP/OBS MODERATE 35: CPT | Performed by: INTERNAL MEDICINE

## 2023-11-15 RX ADMIN — Medication 3 MG: at 21:57

## 2023-11-15 RX ADMIN — LORATADINE 10 MG: 10 TABLET ORAL at 08:56

## 2023-11-15 RX ADMIN — APIXABAN 5 MG: 5 TABLET, FILM COATED ORAL at 16:39

## 2023-11-15 RX ADMIN — SIMETHICONE 80 MG: 80 TABLET, CHEWABLE ORAL at 16:40

## 2023-11-15 RX ADMIN — SIMETHICONE 80 MG: 80 TABLET, CHEWABLE ORAL at 12:40

## 2023-11-15 RX ADMIN — Medication 1 PACKET: at 08:54

## 2023-11-15 RX ADMIN — Medication 250 MG: at 16:40

## 2023-11-15 RX ADMIN — SIMETHICONE 80 MG: 80 TABLET, CHEWABLE ORAL at 21:57

## 2023-11-15 RX ADMIN — POTASSIUM CHLORIDE 40 MEQ: 1500 TABLET, EXTENDED RELEASE ORAL at 12:41

## 2023-11-15 RX ADMIN — FUROSEMIDE 20 MG: 20 TABLET ORAL at 12:41

## 2023-11-15 RX ADMIN — SIMETHICONE 80 MG: 80 TABLET, CHEWABLE ORAL at 07:02

## 2023-11-15 RX ADMIN — FOLIC ACID TAB 400 MCG 400 MCG: 400 TAB at 08:54

## 2023-11-15 RX ADMIN — FLUTICASONE PROPIONATE 1 SPRAY: 50 SPRAY, METERED NASAL at 08:57

## 2023-11-15 RX ADMIN — LISINOPRIL 5 MG: 5 TABLET ORAL at 08:56

## 2023-11-15 RX ADMIN — ATORVASTATIN CALCIUM 10 MG: 10 TABLET, FILM COATED ORAL at 08:54

## 2023-11-15 RX ADMIN — APIXABAN 5 MG: 5 TABLET, FILM COATED ORAL at 08:57

## 2023-11-15 RX ADMIN — Medication 250 MG: at 09:01

## 2023-11-15 RX ADMIN — FINASTERIDE 5 MG: 5 TABLET, FILM COATED ORAL at 08:54

## 2023-11-15 NOTE — TEAM CONFERENCE
Acute RehabilitationTeam Conference Note  Date: 11/15/2023   Time: 11:07 AM       Patient Name:  Mukund Hale       Medical Record Number: [id number]   YOB: 1950  Sex: Male          Room/Bed:  [facility]6/[facility]6-01  Payor Info:  Payor: Edwige Ventura / Plan: MEDICARE PART A ONLY / Product Type: Medicare /      Admitting Diagnosis: Tetraplegia ([address]) [G82.50]   Admit Date/Time:  11/10/2023  4:14 PM  Admission Comments: No comment available     Primary Diagnosis:  Cervical spondylosis with myelopathy  Principal Problem: Cervical spondylosis with myelopathy    Patient Active Problem List    Diagnosis Date Noted    Volume overload 11/13/2023    Diarrhea 11/07/2023    Acute DVT (deep venous thrombosis) ([address]) 11/04/2023    Acute respiratory insufficiency 11/02/2023    Urinary retention 11/01/2023    Neurogenic bowel 11/01/2023    Seasonal allergies 11/01/2023    Paresthesia 10/25/2023    Weakness of extremity 10/25/2023    Hyponatremia 10/25/2023    Impaired mobility and activities of daily living 10/20/2023    Anemia 10/20/2023    Benign essential hypertension 10/20/2023    Dorsalgia, unspecified 10/20/2023    Gastroesophageal reflux disease 10/20/2023    Hyperlipidemia 10/20/2023    Cervical spondylosis with myelopathy 08/21/2023    Tinea corporis 07/29/2019    Onychomycosis 07/29/2019       Physical Therapy:    Weight Bearing Status: Full Weight Bearing  Transfers: Minimal Assistance, Moderate Assistance  Bed Mobility: Minimal Assistance, Moderate Assistance  Amulation Distance (ft): 100 feet  Ambulation: Moderate Assistance  Assistive Device for Ambulation: Roller Walker  Wheelchair Mobility Distance: 0 ft  Number of Stairs: 1  Stair Assistance: Moderate Assistance  Assistive Device for Ramp: Roller Walker  Discharge Recommendations: Home with:  [address] [place] [address] with[de-identified] Family Support    Pt is a 67 yo male, hospitalized 10/23 s/p two falls in one day where he legs gave out.  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 On 10/25, pt underwent posterior cervical decompression and fusion surgery C3-C6 and laminectomies C3-C6. No c-collar but does have c-spine precautions. Pt concern about diarrhea today session and cont to have diarrhea this cont to be one of pt barriers going FWD , also left hip weakness during ambulation with RW , KARLA home and fountain care. Pt demonstrates good potential for reaching goals and returning to his apartment at independent level with goals of returning home with LRD. ELOS 2 weeks. Occupational Therapy:  Eating: Independent  Grooming: Minimal Assistance  Bathing: Minimal Assistance  Bathing: Minimal Assistance  Upper Body Dressing: Minimal Assistance  Lower Body Dressing: Moderate Assistance  Toileting: Minimal Assistance  Toilet Transfer: Minimal Assistance  Cognition: Within Defined Limits  Orientation: Person, Place, Time, Situation  Discharge Recommendations: Home with:  [facility] with[de-identified] Family Support       Pt continues to present with impairments in activity tolerance, endurance, and standing balance/tolerance. Additional functional barriers include fatigue, pain, spinal precautions, decreased caregiver support, risk for falls, and home environment. Pt is currently functioning at overall Min A for ADLs and CGA w/ RW for fxnl mobility. Pt will continue to benefit from skilled OT services to address above mentioned barriers and maximize functional independence in baseline areas of occupation. OT d/c recommendation for home w/ family support, anticipate d/c mid next week. Speech Therapy:           No notes on file    Nursing Notes:     Diet Type: Regular/House                                                                     Pain Location/Orientation: Location: Buttocks  Pain Score: 0                                  Cervical spine stenosis with radiculopathy  Had a fall PTA 2/2 bilateral leg pain with numbness, tingling and weakness that had been worsening. He had been to see Neurosurgery as an outpatient and was scheduled for a PCDF on 10/30/23. S/p C3-6 PCDF 10/25/23  Staples and sutures are out = NS saw 11/8 for his 2 weeks followup     Hyponatremia  Per patient is chronic and present for quite a while although there is no labs to review since PCP not in system  [place] likely SIADH when in the hospital the first time  Hasn't really improved since back in the hospital   Stable at 128-130  Will continue to watch     HTN  Home:  Lisinopril 20mg qd/HCTZ 25mg qd  Here:  Lisinopril 5 mg qd (dec 11/12)  HCTZ was stopped 2/2 hyponatremia when he was hospitalized for his neck surgery     Anasarca  Resolved other than some mild ankle edema  On Lasix 40mg qd/Kdur 40 meq BID but since BPs were soft and edema is almost totally resolved, cut Lasix dose back to 20mg qd and decreased the Kdur to 40 meq qd 11/12  No changes today     Ileus  Tolerating diet but still having loose watery stools  Fecal leukocytes 11/9 = showed moderate amt of WBCs  Stool enteric panel was negative  KUB 11/12 = persistent gaseous distention of small and large bowel  On 11/13 Simethicone QID scheduled was added     B/L LE DVTs  New discovered upon return to the hospital  DVT in LLE one of the paired peroneals; RLE in mid PTV  On Eliquis which is new for him (he was cleared by NS to have)     HLD  Continue statin     Urine retention/fountain  VT here per PMR  Continue Proscar     ABLA  No transfusion given  Stable 11/11/23     Irregular HR  EKG today 11/14 shows ST with PACs       Case Management:     Discharge Planning  Living Arrangements: Lives Alone  Support Systems: Self, Friend  Assistance Needed: independent prior to admission  Type of Current Residence: Other (Comment) (apartament)  Current Home Care Services: No  Pt admitted s/p cervical intervention. Pt resides alone but reports having a large network of friends to assist on dc.  Pt is anxious to return home and verbalizes his need to use a roller walker on dc. Pt would like to continue with outpt physical therapy on dc. Is the patient actively participating in therapies? yes  List any modifications to the treatment plan:     Barriers Interventions   Lives alone Progress to safest goal for dc to home   Urinary retention, fountain Voiding trial in progress, pvr's for a day   Neurogenic bowel Gi consulted, low fat high fiber diet   Balance, righting reactions Use of rw, therapy exercises to increase independence and promote safety         Is the patient making expected progress toward goals? yes  List any update or changes to goals:     Medical Goals: Patient will be medically stable for discharge to [facility] restrictive [facility] upon completion of rehab program and Patient will be able to manage medical conditions and comorbid conditions with medications and follow up upon completion of rehab program    Weekly Team Goals:   Rehab Team Goals  ADL Team Goal: Patient will be independent with ADLs with least restrictive device upon completion of rehab program  Transfer Team Goal: Patient will be independent with transfers with least restrictive device upon completion of rehab program  Locomotion Team Goal: Patient will be independent with locomotion with least restrictive device upon completion of rehab program    Discussion: pt presents with the above barriers and is min a adls. Pt is contact guard with a roller walker. Pt's goals are independent due to living alone. Pt reports he has a wide scope of friends who can assist with transportation, groceries and other items. Outpt physical therapy is recommended. Anticipated Discharge Date:  11/20/2023  [facility] Team Members Present: The following team members are supervising care for this patient and were present during this Weekly Team Conference.     Physician: Dr. Dinesh Shearer MD  : TRESSA Shearer  Registered Nurse: Tomasa Manzanares RN  Physical Therapist: Kenia Cardenas DPT  Occupational Therapist: Sherren Blight Aline Villatoro MS, OTR/L  Speech Therapist:

## 2023-11-15 NOTE — PROGRESS NOTES
11/15/23 1330   Pain Assessment   Pain Assessment Tool 0-10   Pain Score No Pain   Restrictions/Precautions   Precautions Fall Risk;Fluid restriction   Weight Bearing Restrictions No   Braces or Orthoses Other (Comment)  (BLE ACE wraps)   Cognition   Overall Cognitive Status WFL   Subjective   Subjective Patient ready to participate in PT session   Roll Left and Right   Type of Assistance Needed Supervision   Physical Assistance Level No physical assistance   Roll Left and Right CARE Score 4   Sit to Lying   Type of Assistance Needed Supervision   Physical Assistance Level No physical assistance   Comment HOB flat and no use of bed rails; OOB on his right side   Sit to Lying CARE Score 4   Lying to Sitting on Side of Bed   Type of Assistance Needed Supervision   Physical Assistance Level No physical assistance   Comment HOB flat and no use of bed rails; OOB on his right side   Lying to Sitting on Side of Bed CARE Score 4   Sit to Stand   Type of Assistance Needed Independent   Physical Assistance Level No physical assistance   Comment progressed to be able to  room with use of roller walker to offload pressure at buttocks; able to manage walker and tray table appropriately   Sit to Stand CARE Score 6   Bed-Chair Transfer   Type of Assistance Needed Supervision; Adaptive equipment   Physical Assistance Level No physical assistance   Comment CS with RW   Chair/Bed-to-Chair Transfer CARE Score 4   Transfer Bed/Chair/Wheelchair   Limitations Noted In LE Strength   Adaptive Equipment Roller Walker   Car Transfer   Type of Assistance Needed Supervision; Adaptive equipment   Physical Assistance Level No physical assistance   Comment CS with RW   Car Transfer CARE Score 4   Walk 10 Feet   Type of Assistance Needed Supervision; Adaptive equipment   Physical Assistance Level No physical assistance   Comment CS with RW   Walk 10 Feet CARE Score 4   Walk 50 Feet with Two Turns   Type of Assistance Needed Supervision; Adaptive equipment   Physical Assistance Level No physical assistance   Comment CS with RW   Walk 50 Feet with Two Turns CARE Score 4   Walk 150 Feet   Type of Assistance Needed Supervision; Adaptive equipment   Physical Assistance Level No physical assistance   Comment CS with RW   Walk 150 Feet CARE Score 4   Walking 10 Feet on Uneven Surfaces   Type of Assistance Needed Supervision; Adaptive equipment   Physical Assistance Level No physical assistance   Comment CS with RW   Walking 10 Feet on Uneven Surfaces CARE Score 4   Ambulation   Primary Mode of Locomotion Prior to Admission Walk   Distance Walked (feet) 190 ft  (+ 150' + 50'x2)   Assist Device Roller Walker   Gait Pattern Inconsistant Teena;Decreased foot clearance   Limitations Noted In Endurance; Heel Strike   Does the patient walk? 2. Yes   Wheel 50 Feet with Two Turns   Reason if not Attempted Activity not applicable   Wheel 50 Feet with Two Turns CARE Score 9   Wheel 150 Feet   Reason if not Attempted Activity not applicable   Wheel 558 Feet CARE Score 9   Curb or Single Stair   Style negotiated Curb   Type of Assistance Needed Incidental touching; Adaptive equipment   Physical Assistance Level No physical assistance   Comment CGA with RW on 6" curb step   1 Step (Curb) CARE Score 4   Toilet Transfer   Type of Assistance Needed Supervision; Adaptive equipment   Physical Assistance Level No physical assistance   Comment CS with RW   Toilet Transfer CARE Score 4   Toilet Transfer   Findings Patient reporting to PT that he struggles perfroming hygiene after having a BM. Spoke with OT who can address next session to icnrease his (I) and patient will be living alone   Therapeutic Interventions   Strengthening Performed and provided HEP: Access Code: [email or web address]  URL: [email or web address]  Date: 11/15/2023  Prepared by: Linsey Range    Exercises  - Seated Ankle Pumps  - 7 x weekly - 3 sets - 20 reps  - Seated Heel Raise  - 7 x weekly - 3 sets - 12 reps - 3 hold  - Seated Long Arc Quad  - 7 x weekly - 3 sets - 12 reps - 3 hold  - Seated March  - 7 x weekly - 3 sets - 12 reps - 3 hold  - Seated Hip Adduction Isometrics with Ball  - 7 x weekly - 3 sets - 12 reps - 3 hold  - Seated Hip Abduction with Resistance  - 7 x weekly - 3 sets - 12 reps - 3 hold   Flexibility BLE heel cord and HS gentle stretch TERT 2 minutes   Other PT re-donned RLE ACE wrap at the start of session 2* falling off. Other Comments   Comments (S)  PT showed patient a walker tray table which he is interested in using- OT to address next session   Assessment   Treatment Assessment Patient making good progress with skilled PT intervention thusfar. He was made aware that his discharge is set for Monday with OPPT services. He was provided and able to perform initial HEP for seated exercises; can add exercises prior to discharge. PT to order RW which he is also interested in getting a walker tray for- OT to address tomorrow. His main concerns deal with medical concerns related to his bowels, etc. MD and FAVIOLANP in during session to discuss further. He was progressed to mod(I) with standing in room using a RW and can be further assessed for IRP Friday.    PT Therapy Minutes   PT Time In 1330   PT Time Out 1500   PT Total Time (minutes) 90   PT Mode of treatment - Individual (minutes) 90   PT Mode of treatment - Concurrent (minutes) 0   PT Mode of treatment - Group (minutes) 0   PT Mode of treatment - Co-treat (minutes) 0   PT Mode of Treatment - Total time(minutes) 90 minutes   PT Cumulative Minutes 375

## 2023-11-15 NOTE — PROGRESS NOTES
11/15/23 1000   Pain Assessment   Pain Assessment Tool 0-10   Pain Score No Pain   Restrictions/Precautions   Precautions Fall Risk;Fluid restriction;Spinal precautions   Weight Bearing Restrictions No   ROM Restrictions   (c-spine precautions)   Braces or Orthoses   (BLE Ace wraps)   Oral Hygiene   Type of Assistance Needed Incidental touching   Physical Assistance Level No physical assistance   Comment pt requested to brush teeth at start of session. CGA in stance at sink with unilateral UE release to brush teeth, no LOB   Oral Hygiene CARE Score 4   Picking Up Object   Type of Assistance Needed Incidental touching;Verbal cues; Adaptive equipment   Physical Assistance Level No physical assistance   Comment Pt engaged in [facility] item retrieval with CGA, completing fxl mob w/RW around 10ft x 10ft space and using LHR to retrieve mushroom pegs from floor and place into RW basket. Focus was on increasing fxl standing balance/tolerance, LHAE edu, maintaining spinal precautions. Pt tolerated well, initially with edu on how to approach items with RW to avoid bending far over front of RW and breaking spinal precautions, but then pt demo G carryover of edu/cues and able to retrieve all pegs w/o phsyical assist. Pt would benefit from [facility] upon D/C and from continued fxl practice w/LHAE. Picking Up Object CARE Score 4   Sit to Stand   Type of Assistance Needed Incidental touching; Adaptive equipment   Physical Assistance Level No physical assistance   Comment CGA- w/RW   Sit to Stand CARE Score 4   Bed-Chair Transfer   Type of Assistance Needed Incidental touching; Adaptive equipment   Physical Assistance Level No physical assistance   Comment CGA- fxl mob w/RW, with pt ambulating from OT gym back to pt room at end of session with w/c follow (but not needed)   Chair/Bed-to-Chair Transfer CARE Score 4   [address], [address]   Type of Assistance Needed Incidental touching; Adaptive equipment   Physical Assistance Level No physical assistance   Comment CGA in stance at [facility] for CM up/down over hips, no hygiene occurred as pt unable to void   [place] Score 4   Toilet Transfer   Type of Assistance Needed Incidental touching; Adaptive equipment   Physical Assistance Level No physical assistance   Comment CGA-CS fxl mob w/RW, recliner<>BSC over toilet, no LOB   Toilet Transfer CARE Score 4   Exercise Tools   Exercise Tools Yes   Other Exercise Tool 1 Seated w/Sup, [id number] B/L bicep curls w/ 3#tbar for increased BUE strength for improved indep w/fxl transfers and sit<>stands. Pt tolerated well with brief rest breaks between sets. Attempted chest press but pt unable to tolerate with weight at this time. Other Exercise Tool 2 Seated w/Sup, BUE tabletop towel glides 30x fwd and circular planes, for gentle AROM/stretching as prep before activity. Pt tolerated well with brief rest breaks between set to manage minimal fatigue. Cognition   Overall Cognitive Status WFL   Arousal/Participation Alert; Cooperative   Attention Within functional limits   Orientation Level Oriented X4   Memory Within functional limits   Following Commands Follows all commands and directions without difficulty   Activity Tolerance   Activity Tolerance Patient tolerated treatment well   Assessment   Treatment Assessment Pt seen for 90min skilled OT session focused on toileting/toilet transfers, grooming in stance at sink, BUE strengthening, gentle AROM/stretching, LHR item retrieval w/RW, edu on RW basket, and activity tolerance, for increased independence w/ADLs/IADLs and decreased caregiver burden. See detailed descriptions of fxl performance above. Pt tolerated session well, initially requiring emotional support and encouragement to overcome frustration with ongoing loose bowels / recent incontinence and prolonged hospital stay. With emotional support and redirection, pt able to then engage in therapy session.  Pt would benefit from continued skilled OT focused on ADL retraining, endurance work, B/L [place], standing balance/tolerance, fxl reaching to rear for rear hygiene, and light IADL mgmt. Next OT session, please focus on bowel hygiene mgmt (fxl reach to rear) and RW tray edu. Prognosis Good   Problem List Decreased strength;Decreased range of motion;Decreased endurance; Impaired balance;Decreased mobility; Decreased coordination;Orthopedic restrictions   Plan   Treatment/Interventions ADL retraining;Functional transfer training; Therapeutic exercise; Endurance training;Patient/family training;Equipment eval/education; Bed mobility; Compensatory technique education   Progress Progressing toward goals   Discharge Recommendation   Rehab Resource Intensity Level, OT   (pending)   OT Therapy Minutes   OT Time In 1000   OT Time Out 1130   OT Total Time (minutes) 90   OT Mode of treatment - Individual (minutes) 90   OT Mode of treatment - Concurrent (minutes) 0   OT Mode of treatment - Group (minutes) 0   OT Mode of treatment - Co-treat (minutes) 0   OT Mode of Treatment - Total time(minutes) 90 minutes   OT Cumulative Minutes 360   Therapy Time missed   Time missed?  No

## 2023-11-15 NOTE — PLAN OF CARE
Problem: Prexisting or High Potential for Compromised Skin Integrity  Goal: Skin integrity is maintained or improved  Description: INTERVENTIONS:  - Identify patients at risk for skin breakdown  - Assess and monitor skin integrity  - Assess and monitor nutrition and hydration status  - Monitor labs   - Assess for incontinence   - Turn and reposition patient  - Assist with mobility/ambulation  - Relieve pressure over bony prominences  - Avoid friction and shearing  - Provide appropriate hygiene as needed including keeping skin clean and dry  - Evaluate need for skin moisturizer/barrier cream  - Collaborate with interdisciplinary team   - Patient/family teaching  - Consider wound care consult   Outcome: Progressing     Problem: PAIN - ADULT  Goal: Verbalizes/displays adequate comfort level or baseline comfort level  Description: Interventions:  - Encourage patient to monitor pain and request assistance  - Assess pain using appropriate pain scale  - Administer analgesics based on type and severity of pain and evaluate response  - Implement non-pharmacological measures as appropriate and evaluate response  - Consider cultural and social influences on pain and pain management  - Notify physician/advanced practitioner if interventions unsuccessful or patient reports new pain  Outcome: Progressing     Problem: INFECTION - ADULT  Goal: Absence or prevention of progression during hospitalization  Description: INTERVENTIONS:  - Assess and monitor for signs and symptoms of infection  - Monitor lab/diagnostic results  - Monitor all insertion sites, i.e. indwelling lines, tubes, and drains  - Monitor endotracheal if appropriate and nasal secretions for changes in amount and color  - [place] appropriate cooling/warming therapies per order  - Administer medications as ordered  - Instruct and encourage patient and family to use good hand hygiene technique  - Identify and instruct in appropriate isolation precautions for identified infection/condition  Outcome: Progressing  Goal: Absence of fever/infection during neutropenic period  Description: INTERVENTIONS:  - Monitor WBC    Outcome: Progressing     Problem: SAFETY ADULT  Goal: Patient will remain free of falls  Description: INTERVENTIONS:  - Educate patient/family on patient safety including physical limitations  - Instruct patient to call for assistance with activity   - Consult OT/PT to assist with strengthening/mobility   - Keep Call bell within reach  - Keep bed low and locked with side rails adjusted as appropriate  - Keep care items and personal belongings within reach  - Initiate and maintain comfort rounds  - Make Fall Risk Sign visible to staff  - Offer Toileting every 3 Hours, in advance of need  - Initiate/Maintain 2alarm  - Obtain necessary fall risk management equipment: 3  - Apply yellow socks and bracelet for high fall risk patients  - Consider moving patient to room near nurses station  Outcome: Progressing  Goal: Maintain or return to baseline ADL function  Description: INTERVENTIONS:  -  Assess patient's ability to carry out ADLs; assess patient's baseline for ADL function and identify physical deficits which impact ability to perform ADLs (bathing, care of mouth/teeth, toileting, grooming, dressing, etc.)  - Assess/evaluate cause of self-care deficits   - Assess range of motion  - Assess patient's mobility; develop plan if impaired  - Assess patient's need for assistive devices and provide as appropriate  - Encourage maximum independence but intervene and supervise when necessary  - Involve family in performance of ADLs  - Assess for home care needs following discharge   - Consider OT consult to assist with ADL evaluation and planning for discharge  - Provide patient education as appropriate  Outcome: Progressing  Goal: Maintains/Returns to pre admission functional level  Description: INTERVENTIONS:  - Perform BMAT or MOVE assessment daily.   - Set and communicate daily mobility goal to care team and patient/family/caregiver. - Collaborate with rehabilitation services on mobility goals if consulted  - Perform Range of Motion 3 times a day. - Reposition patient every 3 hours.   - Dangle patient 3 times a day  - Stand patient 3 times a day  - Ambulate patient 3 times a day  - Out of bed to chair 3 times a day   - Out of bed for meals 3 times a day  - Out of bed for toileting  - Record patient progress and toleration of activity level   Outcome: Progressing     Problem: DISCHARGE PLANNING  Goal: Discharge to home or other facility with appropriate resources  Description: INTERVENTIONS:  - Identify barriers to discharge w/patient and caregiver  - Arrange for needed discharge resources and transportation as appropriate  - Identify discharge learning needs (meds, wound care, etc.)  - Arrange for interpretive services to assist at discharge as needed  - Refer to Case Management Department for coordinating discharge planning if the patient needs post-hospital services based on physician/advanced practitioner order or complex needs related to functional status, cognitive ability, or social support system  Outcome: Progressing     Problem: MOBILITY - ADULT  Goal: Maintain or return to baseline ADL function  Description: INTERVENTIONS:  -  Assess patient's ability to carry out ADLs; assess patient's baseline for ADL function and identify physical deficits which impact ability to perform ADLs (bathing, care of mouth/teeth, toileting, grooming, dressing, etc.)  - Assess/evaluate cause of self-care deficits   - Assess range of motion  - Assess patient's mobility; develop plan if impaired  - Assess patient's need for assistive devices and provide as appropriate  - Encourage maximum independence but intervene and supervise when necessary  - Involve family in performance of ADLs  - Assess for home care needs following discharge   - Consider OT consult to assist with ADL evaluation and planning for discharge  - Provide patient education as appropriate  Outcome: Progressing  Goal: Maintains/Returns to pre admission functional level  Description: INTERVENTIONS:  - Perform BMAT or MOVE assessment daily.   - Set and communicate daily mobility goal to care team and patient/family/caregiver. - Collaborate with rehabilitation services on mobility goals if consulted  - Perform Range of Motion 3 times a day. - Reposition patient every 2 hours.   - Dangle patient 3 times a day  - Stand patient 3 times a day  - Ambulate patient 3 times a day  - Out of bed to chair 3 times a day   - Out of bed for meals 3 times a day  - Out of bed for toileting  - Record patient progress and toleration of activity level   Outcome: Progressing

## 2023-11-15 NOTE — CONSULTS
Consultation - [address]  Lora Singleton 68 y.o. male MRN: [id number]  Unit/Bed#: [facility] 388-12 Encounter: [id number]            Inpatient consult to gastroenterology     Date/Time  11/15/2023 10:03 AM     Performed by  Belinda Bonilla MD   Authorized by  Krish Fuentes MD             Reason for Consult / Principal Problem:   Diarrhea       ASSESSMENT AND PLAN:    69 yo M with history of GERD, HTN, HLD and cervical myelopathy who originally presented on 10/20 following multiple falls over the past month with worsening numbness and tingling of LE leading to neurosurgical procedure on 10/25 with cervical laminectomies involving C3-6 with fusion with hospitalization then complicated by ileus and respiratory distress and now in [facility] with GI now consulted given continued loose stools. 1. Diarrhea  - It appears vanc powder was administered during procedure, but pt has received no systemic abx during hospitalization  - Avoid anticholinergics, NSAIDs and laxatives  - Agree with avoiding loperamide in the setting of ileus  - Stool enteric panel negative on 11/12  - C. difficile toxin negative on 11/2  - KUB with similar appearance   - Pt feeling well overall and had semi-solid BM earlier today. If worsening without BM's may need to consider NG vs rectal tube, but for now reasonable to defer   - No plans for endoscopic management at this time as much of distention is in small bowel    ______________________________________________________________________    HPI:  Rossy Paris is our 69 yo M with history of GERD, HTN, HLD and cervical myelopathy who originally presented on 10/20 following multiple falls over the past month with worsening numbness and tingling of LE leading to neurosurgical procedure on 10/25 with cervical laminectomies involving C3-6 with fusion with hospitalization then complicated by ileus and respiratory distress and now in [facility].  He reports he has been doing well from rehab perspective and is walking with a walker, but this has now been made difficult due to worsening loose stools. Patient reports prior to admission he would normally have a solid stool every 1 to 2 days. Since admission he has had intermittent loose stools and over the past 2 weeks this is worsened. He reports a few days ago he had a 1 or 2 loose stools a day but beginning yesterday he had 3 episodes of watery bowel movements. Denies family history of IBD or personal history of IBS. Father with colorectal cancer in his late 62s. Denies nausea, vomiting, fevers, chills, abdominal pain, hematochezia, melena. Most recent colonoscopy from 4/2019 showing single 3 mm hyperplastic polyp in sigmoid       REVIEW OF SYSTEMS:    CONSTITUTIONAL: Denies any fever, chills, rigors, and weight loss. HEENT: No earache or tinnitus. Denies hearing loss or visual disturbances. CARDIOVASCULAR: No chest pain or palpitations. RESPIRATORY: Denies any cough, hemoptysis, shortness of breath or dyspnea on exertion. GASTROINTESTINAL: As noted in the History of Present Illness. GENITOURINARY: No problems with urination. Denies any hematuria or dysuria. NEUROLOGIC: No dizziness or vertigo, denies headaches. MUSCULOSKELETAL: Denies any muscle or joint pain. SKIN: Denies skin rashes or itching. ENDOCRINE: Denies excessive thirst. Denies intolerance to heat or cold. PSYCHOSOCIAL: Denies depression or anxiety. Denies any recent memory loss.        Historical Information   Past Medical History:   Diagnosis Date    GERD (gastroesophageal reflux disease)     Hyperlipidemia     Hypertension      Past Surgical History:   Procedure Laterality Date    COLONOSCOPY      HERNIA REPAIR      AZ ARTHRD PST/PSTLAT TQ 1NTRSPC CRV BELW C2 SEGMENT N/A 10/25/2023    Procedure: C3-6 PCDF;  Surgeon: Kamari Barragan MD;  Location: [facility];  Service: Neurosurgery    [place] COLONOSCOPY FLX DX W/COLLJ SPEC WHEN PFRMD N/A 4/5/2019    Procedure: COLONOSCOPY;  Surgeon: Lynn Acevedo DO;  Location: [facility]; Service: Gastroenterology    ROTATOR CUFF REPAIR Left     TONSILLECTOMY       Social History   Social History     Substance and Sexual Activity   Alcohol Use Yes    Alcohol/week: 6.0 standard drinks of alcohol    Types: 6 Cans of beer per week    Comment: beer 4-5 days a week     Social History     Substance and Sexual Activity   Drug Use Never     Social History     Tobacco Use   Smoking Status Never   Smokeless Tobacco Never     History reviewed. No pertinent family history.     Meds/Allergies     Medications Prior to Admission   Medication    apixaban (ELIQUIS) 5 mg    atorvastatin (LIPITOR) 20 mg tablet    finasteride (PROSCAR) 5 mg tablet    fluticasone (FLONASE) 50 mcg/act nasal spray    folic acid (FOLVITE) 801 MCG tablet    gabapentin (NEURONTIN) 100 mg capsule    lisinopril (ZESTRIL) 10 mg tablet    loratadine (CLARITIN) 10 mg tablet    melatonin 3 mg    psyllium (METAMUCIL) packet    tiZANidine (ZANAFLEX) 4 mg tablet    acetaminophen (TYLENOL) 325 mg tablet    albuterol (PROVENTIL HFA,VENTOLIN HFA) 90 mcg/act inhaler    apixaban (ELIQUIS) 5 mg    lidocaine (LIDODERM) 5 %    polyethylene glycol (MIRALAX) 17 g packet     Current Facility-Administered Medications   Medication Dose Route Frequency    acetaminophen (TYLENOL) tablet 650 mg  650 mg Oral Q6H PRN    albuterol (PROVENTIL HFA,VENTOLIN HFA) inhaler 2 puff  2 puff Inhalation Q6H PRN    apixaban (ELIQUIS) tablet 5 mg  5 mg Oral BID    atorvastatin (LIPITOR) tablet 10 mg  10 mg Oral Daily    bismuth subsalicylate (PEPTO BISMOL) oral suspension 524 mg  524 mg Oral Q6H PRN    calcium carbonate (TUMS) chewable tablet 500 mg  500 mg Oral Daily PRN    finasteride (PROSCAR) tablet 5 mg  5 mg Oral Daily    fluticasone (FLONASE) 50 mcg/act nasal spray 1 spray  1 spray Each Nare BID    folic acid (FOLVITE) tablet 400 mcg  400 mcg Oral Daily    furosemide (LASIX) tablet 20 mg  20 mg Oral Daily lisinopril (ZESTRIL) tablet 5 mg  5 mg Oral Daily    loratadine (CLARITIN) tablet 10 mg  10 mg Oral Daily    melatonin tablet 3 mg  3 mg Oral HS    ondansetron (ZOFRAN-ODT) dispersible tablet 4 mg  4 mg Oral Q6H PRN    oxyCODONE (ROXICODONE) split tablet 2.5 mg  2.5 mg Oral Q4H PRN    potassium chloride (K-DUR,KLOR-CON) CR tablet 40 mEq  40 mEq Oral Daily    psyllium (METAMUCIL) 1 packet  1 packet Oral Daily    saccharomyces boulardii (FLORASTOR) capsule 250 mg  250 mg Oral BID    simethicone (MYLICON) chewable tablet 80 mg  80 mg Oral 4x Daily (with meals and at bedtime)    tiZANidine (ZANAFLEX) tablet 2 mg  2 mg Oral Q8H PRN       No Known Allergies        Objective     Blood pressure 96/64, pulse (!) 116, temperature 97.7 °F (36.5 °C), temperature source Oral, resp. rate 18, height 5' 8" (1.727 m), weight 102 kg (223 lb 15.8 oz), SpO2 94 %. Body mass index is 34.06 kg/m². Intake/Output Summary (Last 24 hours) at 11/15/2023 0954  Last data filed at 11/15/2023 0857  Gross per 24 hour   Intake 850 ml   Output 873 ml   Net -23 ml         PHYSICAL EXAM:      General Appearance:   Alert, cooperative, no distress   HEENT:   Normocephalic, atraumatic, anicteric.      Neck:  Supple, symmetrical, trachea midline   Lungs:   No respiratory distress   Heart[de-identified]   Regular rate and rhythm per monitor   Abdomen:   Soft, non-tender, mildly distended; normal bowel sounds; no masses, no organomegaly    Genitalia:   Deferred    Rectal:   Deferred    Extremities:  No cyanosis, clubbing or edema    Pulses:  2+ and symmetric all extremities    Skin:  No jaundice, rashes, or lesions    Lymph nodes:  No palpable cervical lymphadenopathy        Lab Results:   Admission on 11/10/2023   Component Date Value    Salmonella sp PCR 11/12/2023 None Detected     Shigella sp/Enteroinvasi* 11/12/2023 None Detected     Campylobacter sp (jejuni* 11/12/2023 None Detected     Shiga toxin 1/Shiga toxi* 11/12/2023 None Detected     WBC 11/11/2023 5.76 RBC 11/11/2023 3.57 (L)     Hemoglobin 11/11/2023 11.6 (L)     Hematocrit 11/11/2023 33.9 (L)     MCV 11/11/2023 95     MCH 11/11/2023 32.5     MCHC 11/11/2023 34.2     RDW 11/11/2023 12.5     MPV 11/11/2023 9.4     Platelets 29/72/0417 379     nRBC 11/11/2023 0     Neutrophils Relative 11/11/2023 66     Immat GRANS % 11/11/2023 1     Lymphocytes Relative 11/11/2023 15     Monocytes Relative 11/11/2023 16 (H)     Eosinophils Relative 11/11/2023 1     Basophils Relative 11/11/2023 1     Neutrophils Absolute 11/11/2023 3.80     Immature Grans Absolute 11/11/2023 0.06     Lymphocytes Absolute 11/11/2023 0.86     Monocytes Absolute 11/11/2023 0.94     Eosinophils Absolute 11/11/2023 0.07     Basophils Absolute 11/11/2023 0.03     Sodium 11/11/2023 128 (L)     Potassium 11/11/2023 3.8     Chloride 11/11/2023 92 (L)     CO2 11/11/2023 29     ANION GAP 11/11/2023 7     BUN 11/11/2023 26 (H)     Creatinine 11/11/2023 0.89     Glucose 11/11/2023 87     Glucose, Fasting 11/11/2023 87     Calcium 11/11/2023 8.5     AST 11/11/2023 18     ALT 11/11/2023 19     Alkaline Phosphatase 11/11/2023 53     Total Protein 11/11/2023 5.9 (L)     Albumin 11/11/2023 3.5     Total Bilirubin 11/11/2023 0.72     eGFR 11/11/2023 84     Sodium 11/14/2023 130 (L)     Potassium 11/14/2023 3.5     Chloride 11/14/2023 94 (L)     CO2 11/14/2023 28     ANION GAP 11/14/2023 8     BUN 11/14/2023 15     Creatinine 11/14/2023 0.85     Glucose 11/14/2023 95     Glucose, Fasting 11/14/2023 95     Calcium 11/14/2023 8.9     eGFR 11/14/2023 86     Ventricular Rate 11/14/2023 108     Atrial Rate 11/14/2023 108     CT Interval 11/14/2023 190     QRSD Interval 11/14/2023 100     QT Interval 11/14/2023 330     QTC Interval 11/14/2023 442     P Axis 11/14/2023 30     QRS [place] 11/14/2023 -32     T Wave [place] 11/14/2023 23        Imaging Studies: I have personally reviewed pertinent imaging studies.

## 2023-11-15 NOTE — PLAN OF CARE
Problem: Prexisting or High Potential for Compromised Skin Integrity  Goal: Skin integrity is maintained or improved  Description: INTERVENTIONS:  - Identify patients at risk for skin breakdown  - Assess and monitor skin integrity  - Assess and monitor nutrition and hydration status  - Monitor labs   - Assess for incontinence   - Turn and reposition patient  - Assist with mobility/ambulation  - Relieve pressure over bony prominences  - Avoid friction and shearing  - Provide appropriate hygiene as needed including keeping skin clean and dry  - Evaluate need for skin moisturizer/barrier cream  - Collaborate with interdisciplinary team   - Patient/family teaching  - Consider wound care consult   11/15/2023 0128 by Dereck Dutta RN  Outcome: Progressing  11/14/2023 1442 by Dereck Dutta RN  Outcome: Progressing     Problem: PAIN - ADULT  Goal: Verbalizes/displays adequate comfort level or baseline comfort level  Description: Interventions:  - Encourage patient to monitor pain and request assistance  - Assess pain using appropriate pain scale  - Administer analgesics based on type and severity of pain and evaluate response  - Implement non-pharmacological measures as appropriate and evaluate response  - Consider cultural and social influences on pain and pain management  - Notify physician/advanced practitioner if interventions unsuccessful or patient reports new pain  11/15/2023 0128 by Dereck Dutta RN  Outcome: Progressing  11/14/2023 1442 by Dereck Dutta RN  Outcome: Progressing     Problem: INFECTION - ADULT  Goal: Absence or prevention of progression during hospitalization  Description: INTERVENTIONS:  - Assess and monitor for signs and symptoms of infection  - Monitor lab/diagnostic results  - Monitor all insertion sites, i.e. indwelling lines, tubes, and drains  - Monitor endotracheal if appropriate and nasal secretions for changes in amount and color  - [place] appropriate cooling/warming therapies per order  - Administer medications as ordered  - Instruct and encourage patient and family to use good hand hygiene technique  - Identify and instruct in appropriate isolation precautions for identified infection/condition  11/15/2023 0128 by Winsome Montes RN  Outcome: Progressing  11/14/2023 1442 by Winsome Montes RN  Outcome: Progressing  Goal: Absence of fever/infection during neutropenic period  Description: INTERVENTIONS:  - Monitor WBC    11/15/2023 0128 by Winsome Montes RN  Outcome: Progressing  11/14/2023 1442 by Winsome Montes RN  Outcome: Progressing

## 2023-11-15 NOTE — PROGRESS NOTES
Internal Medicine Progress Note  Patient: Keyonna Fraser  Age/sex: 68 y.o. male  Medical Record #: [id number]      ASSESSMENT/PLAN: (Interval History)  Keyonna Fraser is seen and examined and management for following issues:    Cervical spine stenosis with radiculopathy  Had a fall PTA 2/2 bilateral leg pain with numbness, tingling and weakness that had been worsening. He had been to see Neurosurgery as an outpatient and was scheduled for a PCDF on 10/30/23. S/p C3-6 PCDF 10/25/23  Staples and sutures are out = NS saw 11/8 for his 2 weeks followup     Hyponatremia  Per patient is chronic and present for quite a while although there is no labs to review since PCP not in system  [place] likely SIADH when in the hospital the first time  Hasn't really improved since back in the hospital   Stable at 128-130  Will continue to watch     HTN  Home:  Lisinopril 20mg qd/HCTZ 25mg qd  Here:  Lisinopril 5 mg qd (dec 11/12)  HCTZ was stopped 2/2 hyponatremia when he was hospitalized for his neck surgery  Lisinopril held this AM 11/15 for SBP of 96     Anasarca  Resolved other than some mild left foot/ankle edema. He says the left foot swells intermittently at home.   He broke his ankle in past  Was on Lasix 40mg qd/Kdur 40 meq BID but since BPs were soft and edema is almost totally resolved, cut Lasix dose back to 20mg qd and decreased the Kdur to 40 meq qd 11/12  No changes today     Ileus  Tolerating diet but still having loose stools  Fecal leukocytes 11/9 = showed moderate amt of WBCs  Stool enteric panel was negative  KUB 11/12 = persistent gaseous distention of small and large bowel  On 11/13 Simethicone QID scheduled was added  GI saw today and ordered another KUB     B/L LE DVTs  New discovered upon return to the hospital  DVT in LLE one of the paired peroneals; RLE in mid PTV  On Eliquis which is new for him (he was cleared by NS to have)     HLD  Continue statin     Urine retention/fountain  VT here per PMR  Continue Proscar     ABLA  No transfusion given  Stable 11/11/23     Irregular HR  EKG today 11/14 shows ST with PACs     Discharge date:  Team    The above assessment and plan was reviewed and updated as determined by my evaluation of the patient on 11/15/2023.     Labs:   Results from last 7 days   Lab Units 11/11/23  0523   WBC Thousand/uL 5.76   HEMOGLOBIN g/dL 11.6*   HEMATOCRIT % 33.9*   PLATELETS Thousands/uL 379     Results from last 7 days   Lab Units 11/14/23  0627 11/11/23  0523   SODIUM mmol/L 130* 128*   POTASSIUM mmol/L 3.5 3.8   CHLORIDE mmol/L 94* 92*   CO2 mmol/L 28 29   BUN mg/dL 15 26*   CREATININE mg/dL 0.85 0.89   CALCIUM mg/dL 8.9 8.5                   Review of Scheduled Meds:  Current Facility-Administered Medications   Medication Dose Route Frequency Provider Last Rate    acetaminophen  650 mg Oral Q6H PRN Isidoro Brown MD      albuterol  2 puff Inhalation Q6H PRN Isidoro Brown MD      apixaban  5 mg Oral BID Isidoro Brown MD      atorvastatin  10 mg Oral Daily Isidoro Brown MD      bismuth subsalicylate  202 mg Oral Q6H PRN Isidoro Brown MD      calcium carbonate  500 mg Oral Daily PRN Isidoro Brown MD      finasteride  5 mg Oral Daily Isidoro Brown MD      fluticasone  1 spray Each Nare BID Isidoro Brown MD      folic acid  192 mcg Oral Daily Isidoro Brown MD      furosemide  20 mg [facility] Daily [facility]      lisinopril  5 mg Oral Daily [facility]      loratadine  10 mg Oral Daily Isidoro Brown MD      melatonin  3 mg Oral HS Isidoro Brown MD      ondansetron  4 mg Oral Q6H PRN Isidoro Brown MD      oxyCODONE  2.5 mg Oral Q4H PRN Isidoro Brown MD      potassium chloride  40 mEq Oral Daily AAKASH Hull      psyllium  1 packet Oral Daily Isidoro Brown MD      saccharomyces boulardii  250 mg Oral BID Cody Thomson MD      simethicone  80 mg Oral 4x Daily (with meals and at bedtime) AAKASH Hull tiZANidine  2 mg Oral Q8H PRN Norma Chavez MD         Subjective/ HPI: Patient seen and examined. Patients overnight issues or events were reviewed with nursing or staff during rounds or morning huddle session. New or overnight issues include the following:     No overnight issues. SBP 96 this AM so Lisinopril held. Offers no complaints and feels abdominal bloating mildly improved    ROS:   A 10 point ROS was performed; negative except as noted above. Imaging:     XR abdomen 1 view kub   Final Result by Marcello Arrington MD (11/13 1045)      Persistent gaseous distention of small and large bowel. Workstation performed: [id number]         XR abdomen 1 view kub    (Results Pending)       *Labs /Radiology studies Reviewed, no new imaging  *Medications  reviewed and reconciled as needed  *Please refer to order section for additional ordered labs studies  *Case discussed with primary attending during morning huddle case rounds    Physical Examination:  Vitals:   Vitals:    11/14/23 1440 11/14/23 2017 11/15/23 0447 11/15/23 0856   BP: 122/78 124/77 121/77 96/64   BP Location: Right arm Right arm Right arm Left arm   Pulse: 89 86 93 (!) 116   Resp: 16 17 18    Temp: 98.3 °F (36.8 °C) 97.7 °F (36.5 °C) 97.7 °F (36.5 °C)    TempSrc: Oral Oral Oral    SpO2: 100% 95% 94%    Weight:   102 kg (223 lb 15.8 oz)    Height:           General Appearance: no distress, non toxic appearing  HEENT: PERRLA, conjuctiva normal; oropharynx clear; mucous membranes moist   Neck:  Supple, normal ROM  Lungs: CTA, normal respiratory effort, no retractions, expiratory effort normal  CV: regular rate and rhythm; no rubs/murmurs/gallops, PMI normal   ABD: large, soft, NT; +BS  EXT: +edema feet L>R  Skin: normal turgor, normal texture, no rashes  Psych: affect normal, mood normal  Neuro: AAO      The above physical exam was reviewed and updated as determined by my evaluation of the patient on 11/15/2023.     Invasive Devices None                      VTE Pharmacologic Prophylaxis: Eliquis  Code Status: Level 1 - Full Code  Current Length of Stay: 5 day(s)      Total time spent:  30 minutes with more than 50% spent counseling/coordinating care. Counseling includes discussion with patient re: progress  and discussion with patient of his/her current medical state/information. Coordination of patient's care was performed in conjunction with primary service. Time invested included review of patient's labs, vitals, and management of their comorbidities with continued monitoring. In addition, this patient was discussed with medical team including physician and advanced extenders. The care of the patient was extensively discussed and appropriate treatment plan was formulated unique for this patient. Medical decision making for the day was made by supervising physician unless otherwise noted in their attestation statement. ** Please Note:  voice to text software may have been used in the creation of this document.  Although proof errors in transcription or interpretation are a potential of such software**

## 2023-11-15 NOTE — PROGRESS NOTES
Physical Medicine and Rehabilitation Progress Note  Mendel Moloney 68 y.o. male MRN: [id number]  Unit/Bed#: [facility] 902-34 Encounter: [id number]    Rehab Diagnosis: Impairment of mobility, safety and Activities of Daily Living (ADLs) due to Spinal Cord Dysfunction:  Non-Traumatic:  04.1211  Quadriplegia, Incomplete C1-4 - changed diagnosis given known level of injury. Adjusted given C3 sensory level on exam.     Interval History/Subjective: He is seen with PT this afternoon. Bowel movements have slowed somewhat today. GI evaluated this morning. Indwelling urinary catheter removed last night, he has been able to void spontaneously, PVRs consistently <200 cc. Vital signs reviewed, tachycardic to 110s, improvement later to 105, VS otherwise stable, WNL. Interdisciplinary team meeting held today to discuss progress and barriers with respect to disposition. Separate note with pertinent items will be available. Assessment/Plan:    * Cervical spondylosis with myelopathy  Assessment & Plan  Presented with progressive weakness/constipation that culminated in two falls and difficulty with mobility.  - MRI showed severe stenosis C3-4, 4-5, and 5-6 with mild cord signal changes  10/25 PCDF with C3-6 laminectomies of C4,5,6 and partial C3  SSEPs noted to be dampened at baseline  Post-op examined C5 AIS D with pinprick > light touch impairment and proprioception intact  11/11 Admission AIS C3 AIS D. L > R sensory impairment. PP > LT. Mild LUE weakness already improved compared to Post-op examination. Question of neurogenic bowel/bladder   - Bowel continence improving, consistency still loose. - Bladder with fountain, and would recommend void trial on the ARC. No need for collar  Cervical spine precautions in place  Pain control  Incisional care  PT/OT 3-5 hours/day, 5-7 days/ week. Plan for 6 week POV with Nsx ~12/6 with repeat imaging.      Volume overload  Assessment & Plan  Lasix 20 mg PO daily with potassium  ACE wrap and elevate legs  Monitor     Urinary retention  Assessment & Plan  Likely component of neurogenic bladder, but multifactorial including mobility/medications, etc.  Did not have bladder symptoms leading up to hospitalization  Found to have retention during hospitalization. On finasteride  Trial of void initiated 11/14/23 with urinary retention protocol    Hyponatremia  Assessment & Plan  Likely SIADH and HCTZ per previous hospitalization  Also with volume overload which could be contributing as well (on Lasix)   11/11 128  Continue FR. Asymptomatic  IM consulted and assisting with management. They will continue to monitor for now. Labs in the AM    Anasarca-resolved as of 11/13/2023  Assessment & Plan  Most resolved, has LE edema  Required IV Lasix inpatient  Lasix decreased from 40mg to 20mg by IM on 11/12, and on potassium supplement. ACE wrap and elevate legs  Monitor     Diarrhea  Assessment & Plan  Possible component of neurogenic bowel with progressively worsening constipation leading up to hospitalization, complicated by ileus during hospitalization  Now with many loose/watery stools/diarrhea  11/9 prior to admission to [facility] started on metamucil. Fecal leukocytes moderate, C. Diff negative on 11/2, enteric panel negative, KUB with only gaseous distension  Has pepto PRN for diarrhea. Will hold on imodium given prior ileus  Simethicone scheduled for gas, probiotics started, high-fiber/low fat diet  GI consulted 11/14/23; repeat KUB 11/15, consider NG placement if worsening gastric bloating/distension, rectal tube if worsening colonic distension for venting    Acute DVT (deep venous thrombosis) ([address])  Assessment & Plan  Diagnosed 11/3/2023 with DVT in right mid posterior tibial veins and left peroneal vein. Started on eliquis after clearance from Neurosurgery  No SCDs  OK for ACE wraps  Likely 3 months treatment  Outpatient f/u with PCP.      Hyperlipidemia  Assessment & Plan  Continue statin    Gastroesophageal reflux disease  Assessment & Plan  TUMS PRN    Benign essential hypertension  Assessment & Plan  Home: Lisinopril 10mg daily  Here: Lisinopril 5 mg daily, Lasix 20 mg daily  Monitor and adjust as appropriate  IM consulted to assist with management. Anemia  Assessment & Plan  ABLA  11/11 Stable at 11.6  Monitor and transfuse as appropriate  IM consulted to assist with management    Ileus (HCC)-resolved as of 11/13/2023  Assessment & Plan  Ileus during this hospitalization. Now resolved. Health Maintenance  #Delirium/Sleep: Practice effective sleep hygiene (e.g., consistent night and morning routine, quiet, dark room at night, no electronic devices/screens in bed, avoid large meals/caffeine before bed)  #Pain: Tylenol PRN, oxycodone 2.5 mg q4h PRN, tizanidine 2 mg q8h PRN  #Skin/Pressure Injury Prevention: Turn Q2hr in bed, with weight shifts [id number] in wheelchair. #DVT Prophylaxis: Apixaban  #GI Prophylaxis: Not indicated  #Code Status: Level 1 - Full  #FEN: High fiber, low fat diet  #Dispo: 11/20/23, home with assist, outpatient PT    Objective:    Functional Update:  Physical Therapy Occupational Therapy   Weight Bearing Status: Full Weight Bearing  Transfers: Minimal Assistance, Moderate Assistance  Bed Mobility: Minimal Assistance, Moderate Assistance  Amulation Distance (ft): 100 feet  Ambulation: Moderate Assistance  Assistive Device for Ambulation: Roller Walker  Wheelchair Mobility Distance: 0 ft  Number of Stairs: 1  Stair Assistance: Moderate Assistance  Assistive Device for Ramp: Roller Walker  Discharge Recommendations: Home with:  [address] [place] [address] with[de-identified] Family Support   Eating: Independent  Grooming: Minimal Assistance  Bathing: Minimal Assistance  Bathing: Minimal Assistance  Upper Body Dressing: Minimal Assistance  Lower Body Dressing:  Moderate Assistance  Toileting: Minimal Assistance  Toilet Transfer: Minimal Assistance  Cognition: Within Defined Limits  Orientation: Person, Place, Time, Situation       Allergies per EMR    Physical Exam:  Temp:  [97.7 °F (36.5 °C)-97.8 °F (36.6 °C)] 97.8 °F (36.6 °C)  HR:  [] 105  Resp:  [17-18] 18  BP: ()/(56-77) 108/70  Oxygen Therapy  SpO2: 97 %    GEN: Patient seen resting comfortably in WC, NAD  HEENT: NCAT; EOMI; mucous membranes moist  CV: Edema of bilateral lower extremities to mid-tibial level without overlying erythema; ACE wraps in place  PULM: Breathing comfortably on room air  ABD: Mildly distended  SKIN: No obvious rashes or lesions  NEURO:  Awake and alert, answering questions appropriately to context  Speech is fluent and organized  CN II-XII grossly intact  Moving all extremities spontaneously in recliner    Diagnostic Studies: reviewed, none new  XR abdomen 1 view kub    Result Date: 11/13/2023  Impression: Persistent gaseous distention of small and large bowel.  Workstation performed: [id number]       Laboratory:    Results from last 7 days   Lab Units 11/11/23  0523   HEMOGLOBIN g/dL 11.6*   HEMATOCRIT % 33.9*   WBC Thousand/uL 5.76     Results from last 7 days   Lab Units 11/14/23  0627 11/11/23  0523   BUN mg/dL 15 26*   POTASSIUM mmol/L 3.5 3.8   CHLORIDE mmol/L 94* 92*   CREATININE mg/dL 0.85 0.89   AST U/L  --  18   ALT U/L  --  19            Patient Active Problem List   Diagnosis    Tinea corporis    Onychomycosis    Cervical spondylosis with myelopathy    Impaired mobility and activities of daily living    Anemia    Benign essential hypertension    Dorsalgia, unspecified    Gastroesophageal reflux disease    Hyperlipidemia    Paresthesia    Weakness of extremity    Hyponatremia    Urinary retention    Neurogenic bowel    Seasonal allergies    Acute respiratory insufficiency    Acute DVT (deep venous thrombosis) ([facility])    Diarrhea    Volume overload     Medications  Current Facility-Administered Medications   Medication Dose Route Frequency Provider Last Rate    acetaminophen  650 mg Oral Q6H PRN Rodena Beverage Depadua, MD      albuterol  2 puff Inhalation Q6H PRN Kianna Musa MD      apixaban  5 mg Oral BID Kianna Musa MD      atorvastatin  10 mg Oral Daily Kianna Musa MD      bismuth subsalicylate  024 mg Oral Q6H PRN Kianna Musa MD      calcium carbonate  500 mg Oral Daily PRN Kianna Musa MD      finasteride  5 mg Oral Daily Kianna Musa MD      fluticasone  1 spray Each Nare BID Kianna Musa MD      folic acid  228 mcg Oral Daily Kianna Musa MD      furosemide  20 mg Oral Daily Neel Medina, CRNP      lisinopril  5 mg Oral Daily Neel Medina, AAKASH      loratadine  10 mg Oral Daily Kianna Musa MD      melatonin  3 mg Oral HS Kianna Musa MD      ondansetron  4 mg Oral Q6H PRN Kianna Musa MD      oxyCODONE  2.5 mg Oral Q4H PRN Kianna Musa MD      potassium chloride  40 mEq Oral Daily Chrisora Crigler, CRNP      psyllium  1 packet Oral Daily Kianna Musa MD      saccharomyces boulardii  250 mg Oral BID Vicki Mcnamara MD      simethicone  80 mg Oral 4x Daily (with meals and at bedtime) Nestora Crigler, AAKASH      tiZANidine  2 mg Oral Q8H PRN Kianna Musa MD        I have spent a total time of 45 minutes on 11/15/23 in caring for this patient including Risks and benefits of tx options, Patient and family education, Impressions, Counseling / Coordination of care, Documenting in the medical record, Reviewing / ordering tests, medicine, procedures  , Obtaining or reviewing history  , and Communicating with other healthcare professionals . Interdisciplinary team meeting held today to discuss progress and barriers with respect to disposition. Separate note with pertinent items will be available. Dhruv Underwood MD  Attending Physician  Physical Medicine and [place]    ** Please Note: Fluency Direct voice to text software may have been used in the creation of this document.  **

## 2023-11-15 NOTE — PCC NURSING
Cervical spine stenosis with radiculopathy  Had a fall PTA 2/2 bilateral leg pain with numbness, tingling and weakness that had been worsening. He had been to see Neurosurgery as an outpatient and was scheduled for a PCDF on 10/30/23.   S/p C3-6 PCDF 10/25/23  Staples and sutures are out = NS saw 11/8 for his 2 weeks followup     Hyponatremia  Per patient is chronic and present for quite a while although there is no labs to review since PCP not in system  [place] likely SIADH when in the hospital the first time  Hasn't really improved since back in the hospital   Stable at 128-130  Will continue to watch     HTN  Home:  Lisinopril 20mg qd/HCTZ 25mg qd  Here:  Lisinopril 5 mg qd (dec 11/12)  HCTZ was stopped 2/2 hyponatremia when he was hospitalized for his neck surgery     Anasarca  Resolved other than some mild ankle edema  On Lasix 40mg qd/Kdur 40 meq BID but since BPs were soft and edema is almost totally resolved, cut Lasix dose back to 20mg qd and decreased the Kdur to 40 meq qd 11/12  No changes today     Ileus  Tolerating diet but still having loose watery stools  Fecal leukocytes 11/9 = showed moderate amt of WBCs  Stool enteric panel was negative  KUB 11/12 = persistent gaseous distention of small and large bowel  On 11/13 Simethicone QID scheduled was added     B/L LE DVTs  New discovered upon return to the hospital  DVT in LLE one of the paired peroneals; RLE in mid PTV  On Eliquis which is new for him (he was cleared by NS to have)     HLD  Continue statin     Urine retention/fountain  VT here per PMR  Continue Proscar     ABLA  No transfusion given  Stable 11/11/23     Irregular HR  EKG today 11/14 shows ST with PACs

## 2023-11-15 NOTE — CASE MANAGEMENT
Met w/pt during his therapy time and confirmed dc for Monday 11/20. Pt confirmed he will have transportation but he is not working on it until he knows the results from his follow up xray today. Cm confirmed outpt physical therapy and orders will need to go through the [place]. CM learned pt may also need a roller walker and cm also stated that needs to go through the [place]. Pt made cm aware someone was in his room today assisting him with application for medicaid. Pt needs to supply a couple of documents for the application process to be completed.

## 2023-11-16 PROBLEM — R33.9 URINARY RETENTION: Status: RESOLVED | Noted: 2023-11-01 | Resolved: 2023-11-16

## 2023-11-16 LAB
ANION GAP SERPL CALCULATED.3IONS-SCNC: 7 MMOL/L
BASOPHILS # BLD AUTO: 0.05 THOUSANDS/ÂΜL (ref 0–0.1)
BASOPHILS NFR BLD AUTO: 1 % (ref 0–1)
BUN SERPL-MCNC: 10 MG/DL (ref 5–25)
CALCIUM SERPL-MCNC: 9.2 MG/DL (ref 8.4–10.2)
CHLORIDE SERPL-SCNC: 95 MMOL/L (ref 96–108)
CO2 SERPL-SCNC: 30 MMOL/L (ref 21–32)
CREAT SERPL-MCNC: 0.83 MG/DL (ref 0.6–1.3)
EOSINOPHIL # BLD AUTO: 0.12 THOUSAND/ÂΜL (ref 0–0.61)
EOSINOPHIL NFR BLD AUTO: 2 % (ref 0–6)
ERYTHROCYTE [DISTWIDTH] IN BLOOD BY AUTOMATED COUNT: 12.4 % (ref 11.6–15.1)
GFR SERPL CREATININE-BSD FRML MDRD: 87 ML/MIN/1.73SQ M
GLUCOSE P FAST SERPL-MCNC: 92 MG/DL (ref 65–99)
GLUCOSE SERPL-MCNC: 92 MG/DL (ref 65–140)
HCT VFR BLD AUTO: 38.4 % (ref 36.5–49.3)
HGB BLD-MCNC: 12.9 G/DL (ref 12–17)
IMM GRANULOCYTES # BLD AUTO: 0.07 THOUSAND/UL (ref 0–0.2)
IMM GRANULOCYTES NFR BLD AUTO: 1 % (ref 0–2)
LYMPHOCYTES # BLD AUTO: 1.29 THOUSANDS/ÂΜL (ref 0.6–4.47)
LYMPHOCYTES NFR BLD AUTO: 18 % (ref 14–44)
MCH RBC QN AUTO: 32.3 PG (ref 26.8–34.3)
MCHC RBC AUTO-ENTMCNC: 33.6 G/DL (ref 31.4–37.4)
MCV RBC AUTO: 96 FL (ref 82–98)
MONOCYTES # BLD AUTO: 0.6 THOUSAND/ÂΜL (ref 0.17–1.22)
MONOCYTES NFR BLD AUTO: 8 % (ref 4–12)
NEUTROPHILS # BLD AUTO: 5.07 THOUSANDS/ÂΜL (ref 1.85–7.62)
NEUTS SEG NFR BLD AUTO: 70 % (ref 43–75)
NRBC BLD AUTO-RTO: 0 /100 WBCS
PLATELET # BLD AUTO: 382 THOUSANDS/UL (ref 149–390)
PMV BLD AUTO: 9.2 FL (ref 8.9–12.7)
POTASSIUM SERPL-SCNC: 3.9 MMOL/L (ref 3.5–5.3)
RBC # BLD AUTO: 4 MILLION/UL (ref 3.88–5.62)
SODIUM SERPL-SCNC: 132 MMOL/L (ref 135–147)
WBC # BLD AUTO: 7.2 THOUSAND/UL (ref 4.31–10.16)

## 2023-11-16 PROCEDURE — 99232 SBSQ HOSP IP/OBS MODERATE 35: CPT | Performed by: PHYSICAL MEDICINE & REHABILITATION

## 2023-11-16 PROCEDURE — 97535 SELF CARE MNGMENT TRAINING: CPT

## 2023-11-16 PROCEDURE — 99232 SBSQ HOSP IP/OBS MODERATE 35: CPT | Performed by: INTERNAL MEDICINE

## 2023-11-16 PROCEDURE — 80048 BASIC METABOLIC PNL TOTAL CA: CPT | Performed by: NURSE PRACTITIONER

## 2023-11-16 PROCEDURE — 97530 THERAPEUTIC ACTIVITIES: CPT

## 2023-11-16 PROCEDURE — 85025 COMPLETE CBC W/AUTO DIFF WBC: CPT | Performed by: NURSE PRACTITIONER

## 2023-11-16 PROCEDURE — 97116 GAIT TRAINING THERAPY: CPT

## 2023-11-16 RX ADMIN — Medication 3 MG: at 22:15

## 2023-11-16 RX ADMIN — SIMETHICONE 80 MG: 80 TABLET, CHEWABLE ORAL at 17:46

## 2023-11-16 RX ADMIN — ACETAMINOPHEN 650 MG: 325 TABLET, FILM COATED ORAL at 22:15

## 2023-11-16 RX ADMIN — APIXABAN 5 MG: 5 TABLET, FILM COATED ORAL at 17:46

## 2023-11-16 RX ADMIN — FLUTICASONE PROPIONATE 1 SPRAY: 50 SPRAY, METERED NASAL at 08:40

## 2023-11-16 RX ADMIN — LORATADINE 10 MG: 10 TABLET ORAL at 08:32

## 2023-11-16 RX ADMIN — Medication 1 PACKET: at 08:32

## 2023-11-16 RX ADMIN — FOLIC ACID TAB 400 MCG 400 MCG: 400 TAB at 08:32

## 2023-11-16 RX ADMIN — Medication 250 MG: at 17:46

## 2023-11-16 RX ADMIN — SIMETHICONE 80 MG: 80 TABLET, CHEWABLE ORAL at 12:15

## 2023-11-16 RX ADMIN — LISINOPRIL 5 MG: 5 TABLET ORAL at 08:32

## 2023-11-16 RX ADMIN — POTASSIUM CHLORIDE 40 MEQ: 1500 TABLET, EXTENDED RELEASE ORAL at 12:16

## 2023-11-16 RX ADMIN — FUROSEMIDE 20 MG: 20 TABLET ORAL at 12:15

## 2023-11-16 RX ADMIN — SIMETHICONE 80 MG: 80 TABLET, CHEWABLE ORAL at 08:32

## 2023-11-16 RX ADMIN — APIXABAN 5 MG: 5 TABLET, FILM COATED ORAL at 08:32

## 2023-11-16 RX ADMIN — FINASTERIDE 5 MG: 5 TABLET, FILM COATED ORAL at 08:32

## 2023-11-16 RX ADMIN — SIMETHICONE 80 MG: 80 TABLET, CHEWABLE ORAL at 22:15

## 2023-11-16 RX ADMIN — Medication 2.5 MG: at 10:03

## 2023-11-16 RX ADMIN — ALBUTEROL SULFATE 2 PUFF: 90 AEROSOL, METERED RESPIRATORY (INHALATION) at 08:40

## 2023-11-16 RX ADMIN — ATORVASTATIN CALCIUM 10 MG: 10 TABLET, FILM COATED ORAL at 08:32

## 2023-11-16 RX ADMIN — Medication 250 MG: at 08:32

## 2023-11-16 NOTE — PROGRESS NOTES
11/16/23 0700   Pain Assessment   Pain Assessment Tool 0-10   Pain Score 4   Pain Location/Orientation Location: Neck   Restrictions/Precautions   Precautions Fall Risk;Fluid restriction   Weight Bearing Restrictions No   Eating   Type of Assistance Needed Set-up / clean-up   Physical Assistance Level No physical assistance   Comment Requires set up A to manage containers. Eating CARE Score 5   Oral Hygiene   Type of Assistance Needed Supervision   Physical Assistance Level No physical assistance   Comment In stance at sink   Oral Hygiene CARE Score 4   Shower/Bathe Self   Type of Assistance Needed Supervision   Physical Assistance Level No physical assistance   Comment Pt demos ability to bathe 10/10 parts with inc time, seated via cross leg for distal LE bathing and supervision in stance with cues for rear hygiene mgmt thoroughness due to b/l dexterity issues. Shower/Bathe Self CARE Score 4   Tub/Shower Transfer   Reason Not Assessed Patient refusal;Sponge Bath   Findings Declined shower despite encouragement. Upper Body Dressing   Type of Assistance Needed Supervision   Physical Assistance Level No physical assistance   Comment Inc time to manage over head. Discussed various alternatives including button shirts, V neck, and polo style half button/zip shirts to inc ease of task. Pt receptive to same. Does report dexterity deficits which limits his ability to manage buttons. Trialed button hook with much success and inc independence/ease with task. Will purchase independently provided h/o. Upper Body Dressing CARE Score 4   Lower Body Dressing   Type of Assistance Needed Supervision;Verbal cues   Physical Assistance Level No physical assistance   [address] time for task completion. Initially accidentally threads leg into wrong hole, however, was able to self correct with inc time.    Lower Body Dressing CARE Score 4   Putting On/Taking Off Footwear   Type of Assistance Needed Physical assistance Physical Assistance Level 51%-75%   Comment Hydraguard applied to BLEs, dependent for ACE wrapping, able to don/doff socks with  inc time via cross leg technique. Putting On/Taking Off Footwear CARE Score 2   Sit to Stand   Type of Assistance Needed Independent   Physical Assistance Level No physical assistance   Sit to Stand CARE Score 6   Bed-Chair Transfer   Type of Assistance Needed Set-up / clean-up   Physical Assistance Level No physical assistance   Comment RW   Chair/Bed-to-Chair Transfer CARE Score 5   Toileting Hygiene   Type of Assistance Needed Incidental touching   Physical Assistance Level No physical assistance   Toileting Hygiene CARE Score 4   Toilet Transfer   Comment Reports raised toilet seat and UE support near toilet, declines BSC. Cognition   Overall Cognitive Status WFL   Arousal/Participation Alert; Cooperative   Attention Within functional limits   Orientation Level Oriented X4   Memory Within functional limits   Following Commands Follows all commands and directions without difficulty   Activity Tolerance   Activity Tolerance Patient tolerated treatment well   Assessment   Treatment Assessment Pt participated in skilled OT services with focus on ADL retraining, dexterity, and functional txfers. Reviewed folding walker tray with pt, how to apply, and how to fold up walker with tray on. Pt has family friend who can secure it to walker for him. Provided handout to independently purchase. Pt will continue to benefit from skilled OT services with focus on dexterity, handwriting, kitchen mobility/meal prep. Prognosis Good   Problem List Decreased strength;Decreased range of motion;Decreased endurance; Impaired balance;Decreased mobility; Decreased coordination;Orthopedic restrictions   Plan   Treatment/Interventions ADL retraining; Therapeutic exercise; Endurance training;Patient/family training;Equipment eval/education; Bed mobility; Compensatory technique education   Progress Progressing toward goals   OT Therapy Minutes   OT Time In 0700   OT Time Out 0830   OT Total Time (minutes) 90   OT Mode of treatment - Individual (minutes) 90   OT Mode of treatment - Concurrent (minutes) 0   OT Mode of treatment - Group (minutes) 0   OT Mode of treatment - Co-treat (minutes) 0   OT Mode of Treatment - Total time(minutes) 90 minutes   OT Cumulative Minutes 450   Therapy Time missed   Time missed?  No

## 2023-11-16 NOTE — CASE MANAGEMENT
In preparation for discharge Monday 11/20, CM called [facility] office in [address] 3 times, each time, there are 3 rings and then a busy signal. CM called [facility] clinic in [address] [facility], which directed CM to Nghia Edward, pressed 0, spoke with FaWowo Finders, [facility] about [facility] and roller walker equipment through the [facility]. Fayrene Finders stated she was sending a message to patient's PCP, Dr. Jimmy Lilly , with the request for outpt PT and RW orders. Fayrene Finders stated CM will receive a phone call from the [facility] when these requests are filled. CM will continue to follow.

## 2023-11-16 NOTE — PROGRESS NOTES
Physical Medicine and Rehabilitation Progress Note  Angela Castellanos 68 y.o. male MRN: [id number]  Unit/Bed#: -18 Encounter: [id number]    Rehab Diagnosis: Impairment of mobility, safety and Activities of Daily Living (ADLs) due to Spinal Cord Dysfunction:  Non-Traumatic:  04.1211  Quadriplegia, Incomplete C1-4 - changed diagnosis given known level of injury. Adjusted given C3 sensory level on exam.     Interval History/Subjective: He is seen this morning. Bowel movement frequency is improved. He is voiding spontaneously. Vital signs reviewed, stable, WNL. Labs and imaging reviewed. Assessment/Plan:    * Cervical spondylosis with myelopathy  Assessment & Plan  Presented with progressive weakness/constipation that culminated in two falls and difficulty with mobility.  - MRI showed severe stenosis C3-4, 4-5, and 5-6 with mild cord signal changes  10/25 PCDF with C3-6 laminectomies of C4,5,6 and partial C3  SSEPs noted to be dampened at baseline  Post-op examined C5 AIS D with pinprick > light touch impairment and proprioception intact  11/11 Admission AIS C3 AIS D. L > R sensory impairment. PP > LT. Mild LUE weakness already improved compared to Post-op examination. No need for collar  Cervical spine precautions in place  Pain control  Incisional care  PT/OT 3-5 hours/day, 5-7 days/ week. Plan for 6 week POV with Nsx, scheduled    Volume overload  Assessment & Plan  Lasix 20 mg PO daily with potassium  ACE wrap and elevate legs  Monitor     Diarrhea  Assessment & Plan  Possible component of neurogenic bowel with progressively worsening constipation leading up to hospitalization, complicated by ileus during hospitalization  Now with many loose/watery stools/diarrhea  11/9 prior to admission to [facility] started on metamucil. Fecal leukocytes moderate, C. Diff negative on 11/2, enteric panel negative, KUB with only gaseous distension  Has pepto PRN for diarrhea.  Will hold on imodium given prior ileus  Simethicone scheduled for gas, probiotics started, high-fiber/low fat diet  GI consulted 11/14/23; repeat KUB 11/15 stable, deferring NG placement/rectal tube placement for decompression at this time  Outpatient follow-up with GI    Hyponatremia  Assessment & Plan  Likely SIADH and HCTZ per previous hospitalization  Also with volume overload which could be contributing as well (on Lasix)   11/11 128  Continue FR. Asymptomatic  IM consulted and assisting with management. They will continue to monitor for now. Labs in the AM    Anasarca-resolved as of 11/13/2023  Assessment & Plan  Most resolved, has LE edema  Required IV Lasix inpatient  Lasix decreased from 40mg to 20mg by IM on 11/12, and on potassium supplement. ACE wrap and elevate legs  Monitor     Urinary retention-resolved as of 11/16/2023  Assessment & Plan  Likely component of neurogenic bladder, but multifactorial including mobility/medications, etc.  Did not have bladder symptoms leading up to hospitalization  Found to have retention during hospitalization. On finasteride  Trial of void initiated 11/14/23 with urinary retention protocol    Acute DVT (deep venous thrombosis) ([address])  Assessment & Plan  Diagnosed 11/3/2023 with DVT in right mid posterior tibial veins and left peroneal vein. Started on eliquis after clearance from Neurosurgery  No SCDs  OK for ACE wraps  Likely 3 months treatment  Outpatient f/u with PCP. Hyperlipidemia  Assessment & Plan  Continue statin    Gastroesophageal reflux disease  Assessment & Plan  TUMS PRN    Benign essential hypertension  Assessment & Plan  Home: Lisinopril 10mg daily  Here: Lisinopril 5 mg daily, Lasix 20 mg daily  Monitor and adjust as appropriate  IM consulted to assist with management. Anemia  Assessment & Plan  ABLA  Monitor and transfuse as appropriate  IM consulted to assist with management    Ileus (HCC)-resolved as of 11/13/2023  Assessment & Plan  Ileus during this hospitalization. Now resolved. Health Maintenance  #Delirium/Sleep: Practice effective sleep hygiene (e.g., consistent night and morning routine, quiet, dark room at night, no electronic devices/screens in bed, avoid large meals/caffeine before bed)  #Pain: Tylenol PRN, oxycodone 2.5 mg q4h PRN, tizanidine 2 mg q8h PRN  #Skin/Pressure Injury Prevention: Turn Q2hr in bed, with weight shifts [id number] in wheelchair. #DVT Prophylaxis: Apixaban  #GI Prophylaxis: Not indicated  #Code Status: Level 1 - Full  #FEN: High fiber, low fat diet  #Dispo: 11/20/23, home with assist, outpatient PT    Objective:    Functional Update: Pending    Allergies per EMR    Physical Exam:  Temp:  [97.7 °F (36.5 °C)-97.9 °F (36.6 °C)] 97.9 °F (36.6 °C)  HR:  [] 94  Resp:  [18-19] 19  BP: (108-134)/(64-74) 134/64  Oxygen Therapy  SpO2: 95 %    GEN: Patient seen resting comfortably in recliner at bedside, NAD  HEENT: NCAT; EOMI; mucous membranes moist  CV: Edema of bilateral lower extremities to distal tibial level without overlying erythema on exposed skin; ACE wraps in place  PULM: Breathing comfortably on room air  ABD: Mildly distended  SKIN: No obvious rashes or lesions  NEURO:  Awake and alert, answering questions appropriately to context  Speech is fluent and organized  CN II-XII grossly intact  Moving all extremities spontaneously in recliner    Diagnostic Studies: reviewed    XR abdomen 1 view kub 11/15/23  IMPRESSION:     Persistent gaseous distention of the large and small bowel. Overall degree of gaseous distention is similar to the previous examination. Cecal diameter currently approximately 13 cm    XR abdomen 1 view kub    Result Date: 11/13/2023  Impression: Persistent gaseous distention of small and large bowel.        Laboratory:    Results from last 7 days   Lab Units 11/16/23  0651 11/11/23  0523   HEMOGLOBIN g/dL 12.9 11.6*   HEMATOCRIT % 38.4 33.9*   WBC Thousand/uL 7.20 5.76     Results from last 7 days   Lab Units 11/16/23  0651 11/14/23  0627 11/11/23  0523   BUN mg/dL 10 15 26*   POTASSIUM mmol/L 3.9 3.5 3.8   CHLORIDE mmol/L 95* 94* 92*   CREATININE mg/dL 0.83 0.85 0.89   AST U/L  --   --  18   ALT U/L  --   --  19            Patient Active Problem List   Diagnosis    Tinea corporis    Onychomycosis    Cervical spondylosis with myelopathy    Impaired mobility and activities of daily living    Anemia    Benign essential hypertension    Dorsalgia, unspecified    Gastroesophageal reflux disease    Hyperlipidemia    Paresthesia    Weakness of extremity    Hyponatremia    Neurogenic bowel    Seasonal allergies    Acute respiratory insufficiency    Acute DVT (deep venous thrombosis) ([facility])    Diarrhea    Volume overload     Medications  Current Facility-Administered Medications   Medication Dose Route Frequency Provider Last Rate    acetaminophen  650 mg Oral Q6H PRN Paul Potter MD      albuterol  2 puff Inhalation Q6H PRN Paul Potter MD      apixaban  5 mg Oral BID Paul Potter MD      atorvastatin  10 mg Oral Daily Paul Potter MD      bismuth subsalicylate  207 mg Oral Q6H PRN Paul Potter MD      calcium carbonate  500 mg Oral Daily PRN Paul Potter MD      finasteride  5 mg Oral Daily Paul Potter MD      fluticasone  1 spray Each Nare BID Paul Potter MD      folic acid  763 mcg Oral Daily Paul Potter MD      furosemide  20 mg Oral Daily AAKASH Hill      lisinopril  5 mg Oral Daily AAKASH Hill      loratadine  10 mg Oral Daily Paul Potter MD      melatonin  3 mg Oral HS Paul Potter MD      ondansetron  4 mg Oral Q6H PRN Paul Potter MD      oxyCODONE  2.5 mg Oral Q4H PRN Paul Potter MD      potassium chloride  40 mEq Oral Daily AAKASH Henderson      psyllium  1 packet Oral Daily Paul Potter MD      saccharomyces boulardii  250 mg Oral BID Yris Pressley MD      simethicone  80 mg Oral 4x Daily (with meals and at bedtime) [employer] AAKASH Barnhart      tiZANidine  2 mg Oral Q8H PRN José Cowart MD        I have spent a total time of 30 minutes on 11/16/23 in caring for this patient including Diagnostic results, Risks and benefits of tx options, Patient and family education, Impressions, Counseling / Coordination of care, Documenting in the medical record, Reviewing / ordering tests, medicine, procedures  , Obtaining or reviewing history  , and Communicating with other healthcare professionals . Jeannette Devine MD  Attending Physician  Physical Medicine and [place]    ** Please Note: Fluency Direct voice to text software may have been used in the creation of this document.  **

## 2023-11-16 NOTE — PROGRESS NOTES
11/16/23 1000   Pain Assessment   Pain Assessment Tool 0-10   Pain Score 4   Pain Location/Orientation Orientation: Right;Location: Neck   Pain Onset/Description Onset: Gradual;Onset: Sudden;Descriptor: Aching   Patient's Stated Pain Goal No pain   Hospital Pain Intervention(s) Rest   Restrictions/Precautions   Precautions Fall Risk;Fluid restriction   Weight Bearing Restrictions No   ROM Restrictions Yes  (No c-collar, but to maintain cspine precautions)   Braces or Orthoses   (BLE ACE wraps)   Cognition   Overall Cognitive Status WFL   Arousal/Participation Alert; Cooperative   Attention Within functional limits   Orientation Level Oriented X4   Memory Within functional limits   Following Commands Follows all commands and directions without difficulty   Sit to Stand   Type of Assistance Needed Independent; Adaptive equipment   Comment with RW   Sit to Stand CARE Score 6   Bed-Chair Transfer   Type of Assistance Needed Zia Cola / Merrick Burrs; Adaptive equipment   Comment with RW   Chair/Bed-to-Chair Transfer CARE Score 5   Transfer Bed/Chair/Wheelchair   Adaptive Equipment Roller Walker   Walk 10 Feet   Type of Assistance Needed Set-up / clean-up; Adaptive equipment   Comment with RW   Walk 10 Feet CARE Score 5   Walk 50 Feet with Two Turns   Type of Assistance Needed Supervision; Adaptive equipment   Comment with RW   Walk 50 Feet with Two Turns CARE Score 4   Walk 150 Feet   Type of Assistance Needed Supervision; Adaptive equipment   Comment with RW   Walk 150 Feet CARE Score 4   Walking 10 Feet on Uneven Surfaces   Type of Assistance Needed Supervision; Adaptive equipment   Comment with RW over floor mat   Walking 10 Feet on Uneven Surfaces CARE Score 4   Ambulation   Primary Mode of Locomotion Prior to Admission Walk   Distance Walked (feet) 190 ft  (220)   Assist Device Roller Walker   Gait Pattern Inconsistant Teena;Decreased foot clearance   Limitations Noted In Endurance; Heel Strike   Provided Assistance with: Direction   Walk Assist Level Distant Supervision;Supervision   Does the patient walk? 2. Yes   Wheel 50 Feet with Two Turns   Reason if not Attempted Activity not applicable   Wheel 50 Feet with Two Turns CARE Score 9   Wheelchair mobility   Does the patient use a wheelchair? 0. No   Curb or Single Stair   Style negotiated Curb   Type of Assistance Needed Supervision; Adaptive equipment   Comment CS with RW over 6" curb, VC' s to increase TIFF prior to bringing RW up/down   1 Step (Curb) CARE Score 4   4 Steps   Reason if not Attempted Activity not applicable   4 Steps CARE Score 9   12 Steps   Reason if not Attempted Activity not applicable   12 Steps CARE Score 9   Picking Up Object   Type of Assistance Needed Supervision; Adaptive equipment   Comment S with RW and reacher, discussed purchasing for home and copies provided to pt of suggested reachers. Picking Up Object CARE Score 4   Toilet Transfer   Type of Assistance Needed Supervision; Adaptive equipment   Comment with RW   Toilet Transfer CARE Score 4   Toilet Transfer   Surface Assessed Raised Toilet   Findings Pt c/o raised BSC over seat and asked if it could be removed if IRP's given as he would like to sit to urinate. Will follow up with OT. Therapeutic Interventions   Balance weaving through cones fwd, bwds and side stepping with RW. Neuromuscular Re-Education Alt toe taps intially with B hands on RW and then HHA on L MIN to MODA with no RW required 2 x10 reps   Other discussed IRP's with pt in preporation for tomorrow. Equipment Use   NuStep L2 x10 min BLE only   Assessment   Treatment Assessment Pt will cont POC as tolerated with increased focus on gait, increased balance, increased righting reactions and safety. Pt stating he was unaware of spinal precautions and required increased VC's during activity to maintain them during session. Pt maintained DS/set up in room this session and will be IRP's tomorrow in preporation for discharge.  Pt will cont POC as tolerated with cont focus on I with RW, increased balance, increased safety and stair management. Problem List Decreased strength;Decreased range of motion;Decreased endurance; Impaired balance;Decreased mobility; Decreased coordination;Orthopedic restrictions   Barriers to Discharge Decreased caregiver support; Inaccessible home environment   PT Barriers   Functional Limitation Car transfers;Stair negotiation; Walking   Plan   Treatment/Interventions Functional transfer training;LE strengthening/ROM; Therapeutic exercise; Endurance training;Gait training   Progress Progressing toward goals   Discharge Recommendation   Equipment Recommended Walker   PT Therapy Minutes   PT Time In 1000   PT Time Out 1130   PT Total Time (minutes) 90   PT Mode of treatment - Individual (minutes) 90   PT Mode of treatment - Concurrent (minutes) 0   PT Mode of treatment - Group (minutes) 0   PT Mode of treatment - Co-treat (minutes) 0   PT Mode of Treatment - Total time(minutes) 90 minutes   PT Cumulative Minutes 465   Therapy Time missed   Time missed?  No

## 2023-11-16 NOTE — PROGRESS NOTES
Internal Medicine Progress Note  Patient: Nathaniel Whitten  Age/sex: 68 y.o. male  Medical Record #: [id number]      ASSESSMENT/PLAN: (Interval History)  Nathaniel Whitten is seen and examined and management for following issues:    Cervical spine stenosis with radiculopathy  Had a fall PTA 2/2 bilateral leg pain with numbness, tingling and weakness that had been worsening. He had been to see Neurosurgery as an outpatient and was scheduled for a PCDF on 10/30/23. S/p C3-6 PCDF 10/25/23  Staples/sutures are out = NS saw 11/8 for his 2 weeks followup     Hyponatremia  Per patient is chronic and present for quite a while although there is no labs to review since his PCP is not in the system  Felt likely SIADH when in the hospital the first time  Improved to 132 today 11/16     HTN  Home:  Lisinopril 20mg qd/HCTZ 25mg qd  Here:  Lisinopril 5 mg qd (dec 11/12)  HCTZ was stopped 2/2 hyponatremia when he was hospitalized for his neck surgery  Lisinopril held AM 11/15 for SBP of 96 but given today 11/16     Anasarca  Resolved other than some mild left foot/ankle edema. He says the left foot swells intermittently at home. He broke his ankle in past  Was on Lasix 40mg qd/Kdur 40 meq BID but since BPs were soft and edema is almost totally resolved, cut Lasix dose back to 20mg qd and decreased the Kdur to 40 meq qd 11/12  No changes today     Ileus  Tolerating diet but still having loose stools  Fecal leukocytes 11/9 = showed moderate amt of WBCs  Stool enteric panel was negative  KUB 11/12 = persistent gaseous distention of small and large bowel  On 11/13 Simethicone QID scheduled was added  GI saw 11/15 and ordered another KUB = "Persistent gaseous distention of the large and small bowel. Overall degree of gaseous distention is similar to the previous examination. Cecal diameter currently approximately 13 cm". Per GI, no intervention for now since this is likely chronic.   He will be seen back in the office as OP in the [facility] office and GI will arrange for this     B/L LE DVTs  New discovered upon return to the hospital  DVT in LLE one of the paired peroneals; RLE in mid PTV  On Eliquis which is new for him (he was cleared by NS to have)     HLD  Continue statin     Urine retention/fountain  VT here per PMR  Continue Proscar     ABLA  No transfusion given  Stable 11/11/23     Irregular HR  EKG on 11/14 shows ST with PACs     Discharge date:  11/20/23    The above assessment and plan was reviewed and updated as determined by my evaluation of the patient on 11/16/2023.     Labs:   Results from last 7 days   Lab Units 11/16/23  0651 11/11/23  0523   WBC Thousand/uL 7.20 5.76   HEMOGLOBIN g/dL 12.9 11.6*   HEMATOCRIT % 38.4 33.9*   PLATELETS Thousands/uL 382 379     Results from last 7 days   Lab Units 11/16/23  0651 11/14/23  0627   SODIUM mmol/L 132* 130*   POTASSIUM mmol/L 3.9 3.5   CHLORIDE mmol/L 95* 94*   CO2 mmol/L 30 28   BUN mg/dL 10 15   CREATININE mg/dL 0.83 0.85   CALCIUM mg/dL 9.2 8.9                   Review of Scheduled Meds:  Current Facility-Administered Medications   Medication Dose Route Frequency Provider Last Rate    acetaminophen  650 mg Oral Q6H PRN Taylor Salvador MD      albuterol  2 puff Inhalation Q6H PRN Taylor Salvador MD      apixaban  5 mg Oral BID Taylor Salvador MD      atorvastatin  10 mg Oral Daily Taylor Salvador MD      bismuth subsalicylate  370 mg Oral Q6H PRN Taylor Salvador MD      calcium carbonate  500 mg Oral Daily PRN Taylor Salvador MD      finasteride  5 mg Oral Daily Taylor Salvador MD      fluticasone  1 spray Each Nare BID Taylor Salvador MD      folic acid  428 mcg Oral Daily Taylor Salvador MD      furosemide  20 mg Oral Daily AAKASH Avina      lisinopril  5 mg Oral Daily AAKASH Avina      loratadine  10 mg Oral Daily Taylor Salvador MD      melatonin  3 mg Oral HS Taylor Salvador MD      ondansetron  4 mg Oral Q6H PRN Taylor Salvador MD oxyCODONE  2.5 mg Oral Q4H PRN Yaneth Maier MD      potassium chloride  40 mEq Oral Daily AAKASH [place]      psyllium  1 packet Oral Daily Yaneth Maier MD      saccharomyces boulardii  250 mg Oral BID Reina Garrido MD      simethicone  80 mg Oral 4x Daily (with meals and at bedtime) AAKASH [place]      tiZANidine  2 mg Oral Q8H PRN Yaneth Maier MD         Subjective/ HPI: Patient seen and examined. Patients overnight issues or events were reviewed with nursing or staff during rounds or morning huddle session. New or overnight issues include the following:     No new or overnight issues. Offers no complaints    ROS:   A 10 point ROS was performed; negative except as noted above. Imaging:     XR abdomen 1 view kub   Final Result by Shyla Galaviz MD (11/16 0900)      Persistent gaseous distention of the large and small bowel. Overall degree of gaseous distention is similar to the previous examination. Cecal diameter currently approximately 13 cm               Workstation performed: [id number]         XR abdomen 1 view kub   Final Result by Mercy Reed MD (11/13 1045)      Persistent gaseous distention of small and large bowel. Workstation performed: [id number]             *Labs /Radiology studies Reviewed  *Medications  reviewed and reconciled as needed  *Please refer to order section for additional ordered labs studies  *Case discussed with primary attending during morning huddle case rounds    Physical Examination:  Vitals:   Vitals:    11/15/23 2026 11/16/23 0600 11/16/23 0636 11/16/23 0832   BP: 129/69  122/74 134/64   BP Location: Left arm  Right arm    Pulse: 96  94    Resp: 18  19    Temp: 97.7 °F (36.5 °C)  97.9 °F (36.6 °C)    TempSrc: Oral  Oral    SpO2: 98%  95%    Weight:  101 kg (222 lb 9.6 oz)     Height:           General Appearance: no distress, nontoxic appearing  HEENT:  External ear normal.  Nose normal w/o drainage.  Mucous membranes are moist. Oropharynx is clear. Conjunctiva clear w/o icterus or redness. Neck:  Supple, normal ROM  Lungs: BBS without crackles/wheeze/rhonchi; respirations unlabored with normal inspiratory/expiratory effort. No retractions noted. On RA  CV: regular rate and rhythm; no rubs/murmurs/gallops, PMI normal   ABD: Abdomen is large/sl distended/NT/soft. Bowel sounds all quadrants. EXT: tr-very mild edema in feet L>R  Skin: normal turgor, normal texture, no rashes  Psych: affect normal, mood normal  Neuro: AAO     The above physical exam was reviewed and updated as determined by my evaluation of the patient on 11/16/2023. Invasive Devices       None                      VTE Pharmacologic Prophylaxis: Eliquis  Code Status: Level 1 - Full Code  Current Length of Stay: 6 day(s)      Total time spent:  30 minutes with more than 50% spent counseling/coordinating care. Counseling includes discussion with patient re: progress  and discussion with patient of his/her current medical state/information. Coordination of patient's care was performed in conjunction with primary service. Time invested included review of patient's labs, vitals, and management of their comorbidities with continued monitoring. In addition, this patient was discussed with medical team including physician and advanced extenders. The care of the patient was extensively discussed and appropriate treatment plan was formulated unique for this patient. Medical decision making for the day was made by supervising physician unless otherwise noted in their attestation statement. ** Please Note:  voice to text software may have been used in the creation of this document.  Although proof errors in transcription or interpretation are a potential of such software**

## 2023-11-17 PROCEDURE — 97110 THERAPEUTIC EXERCISES: CPT

## 2023-11-17 PROCEDURE — 99232 SBSQ HOSP IP/OBS MODERATE 35: CPT | Performed by: PHYSICAL MEDICINE & REHABILITATION

## 2023-11-17 PROCEDURE — 97535 SELF CARE MNGMENT TRAINING: CPT

## 2023-11-17 PROCEDURE — 99232 SBSQ HOSP IP/OBS MODERATE 35: CPT | Performed by: INTERNAL MEDICINE

## 2023-11-17 PROCEDURE — 97530 THERAPEUTIC ACTIVITIES: CPT

## 2023-11-17 RX ORDER — POTASSIUM CHLORIDE 20 MEQ/1
40 TABLET, EXTENDED RELEASE ORAL DAILY
Qty: 60 TABLET | Refills: 0 | Status: SHIPPED | OUTPATIENT
Start: 2023-11-18 | End: 2023-12-18

## 2023-11-17 RX ORDER — ACETAMINOPHEN 325 MG/1
650 TABLET ORAL EVERY 6 HOURS PRN
Start: 2023-11-17

## 2023-11-17 RX ORDER — FUROSEMIDE 20 MG/1
20 TABLET ORAL DAILY
Qty: 30 TABLET | Refills: 0 | Status: SHIPPED | OUTPATIENT
Start: 2023-11-18 | End: 2023-12-18

## 2023-11-17 RX ORDER — SACCHAROMYCES BOULARDII 250 MG
250 CAPSULE ORAL 2 TIMES DAILY
Start: 2023-11-17

## 2023-11-17 RX ORDER — LISINOPRIL 5 MG/1
5 TABLET ORAL DAILY
Qty: 30 TABLET | Refills: 0 | Status: SHIPPED | OUTPATIENT
Start: 2023-11-18 | End: 2023-12-18

## 2023-11-17 RX ORDER — CALCIUM CARBONATE 500 MG/1
500 TABLET, CHEWABLE ORAL DAILY PRN
Start: 2023-11-17

## 2023-11-17 RX ORDER — FUROSEMIDE 20 MG/1
20 TABLET ORAL DAILY
Status: DISCONTINUED | OUTPATIENT
Start: 2023-11-18 | End: 2023-11-20 | Stop reason: HOSPADM

## 2023-11-17 RX ORDER — OXYCODONE HYDROCHLORIDE 5 MG/1
2.5 TABLET ORAL EVERY 8 HOURS PRN
Qty: 11 TABLET | Refills: 0 | Status: SHIPPED | OUTPATIENT
Start: 2023-11-17 | End: 2023-11-24

## 2023-11-17 RX ADMIN — ATORVASTATIN CALCIUM 10 MG: 10 TABLET, FILM COATED ORAL at 09:32

## 2023-11-17 RX ADMIN — SIMETHICONE 80 MG: 80 TABLET, CHEWABLE ORAL at 15:47

## 2023-11-17 RX ADMIN — LISINOPRIL 5 MG: 5 TABLET ORAL at 09:32

## 2023-11-17 RX ADMIN — Medication 1 PACKET: at 09:32

## 2023-11-17 RX ADMIN — Medication 250 MG: at 09:32

## 2023-11-17 RX ADMIN — Medication 250 MG: at 17:18

## 2023-11-17 RX ADMIN — FOLIC ACID TAB 400 MCG 400 MCG: 400 TAB at 09:32

## 2023-11-17 RX ADMIN — SIMETHICONE 80 MG: 80 TABLET, CHEWABLE ORAL at 09:33

## 2023-11-17 RX ADMIN — Medication 3 MG: at 21:52

## 2023-11-17 RX ADMIN — APIXABAN 5 MG: 5 TABLET, FILM COATED ORAL at 09:32

## 2023-11-17 RX ADMIN — FINASTERIDE 5 MG: 5 TABLET, FILM COATED ORAL at 09:33

## 2023-11-17 RX ADMIN — LORATADINE 10 MG: 10 TABLET ORAL at 09:31

## 2023-11-17 RX ADMIN — FLUTICASONE PROPIONATE 1 SPRAY: 50 SPRAY, METERED NASAL at 09:35

## 2023-11-17 RX ADMIN — Medication 2.5 MG: at 09:43

## 2023-11-17 RX ADMIN — SIMETHICONE 80 MG: 80 TABLET, CHEWABLE ORAL at 21:52

## 2023-11-17 RX ADMIN — APIXABAN 5 MG: 5 TABLET, FILM COATED ORAL at 17:18

## 2023-11-17 NOTE — CASE MANAGEMENT
Met w/pt who informed cm [facility] and it will be delivered to his home on Monday. If it is not there by the time he arrives his fernando has one he can borrow. Pt still have not received word re: outpt physical therapy.

## 2023-11-17 NOTE — PLAN OF CARE
Problem: Prexisting or High Potential for Compromised Skin Integrity  Goal: Skin integrity is maintained or improved  Description: INTERVENTIONS:  - Identify patients at risk for skin breakdown  - Assess and monitor skin integrity  - Assess and monitor nutrition and hydration status  - Monitor labs   - Assess for incontinence   - Turn and reposition patient  - Assist with mobility/ambulation  - Relieve pressure over bony prominences  - Avoid friction and shearing  - Provide appropriate hygiene as needed including keeping skin clean and dry  - Evaluate need for skin moisturizer/barrier cream  - Collaborate with interdisciplinary team   - Patient/family teaching  - Consider wound care consult   Outcome: Progressing     Problem: PAIN - ADULT  Goal: Verbalizes/displays adequate comfort level or baseline comfort level  Description: Interventions:  - Encourage patient to monitor pain and request assistance  - Assess pain using appropriate pain scale  - Administer analgesics based on type and severity of pain and evaluate response  - Implement non-pharmacological measures as appropriate and evaluate response  - Consider cultural and social influences on pain and pain management  - Notify physician/advanced practitioner if interventions unsuccessful or patient reports new pain  Outcome: Progressing     Problem: INFECTION - ADULT  Goal: Absence or prevention of progression during hospitalization  Description: INTERVENTIONS:  - Assess and monitor for signs and symptoms of infection  - Monitor lab/diagnostic results  - Monitor all insertion sites, i.e. indwelling lines, tubes, and drains  - Monitor endotracheal if appropriate and nasal secretions for changes in amount and color  - [place] appropriate cooling/warming therapies per order  - Administer medications as ordered  - Instruct and encourage patient and family to use good hand hygiene technique  - Identify and instruct in appropriate isolation precautions for identified infection/condition  Outcome: Progressing  Goal: Absence of fever/infection during neutropenic period  Description: INTERVENTIONS:  - Monitor WBC    Outcome: Progressing     Problem: SAFETY ADULT  Goal: Patient will remain free of falls  Description: INTERVENTIONS:  - Educate patient/family on patient safety including physical limitations  - Instruct patient to call for assistance with activity   - Consult OT/PT to assist with strengthening/mobility   - Keep Call bell within reach  - Keep bed low and locked with side rails adjusted as appropriate  - Keep care items and personal belongings within reach  - Initiate and maintain comfort rounds  - Make Fall Risk Sign visible to staff  - Offer Toileting every  Hours, in advance of need  - Initiate/Maintain alarm  - Obtain necessary fall risk management equipment:   - Apply yellow socks and bracelet for high fall risk patients  - Consider moving patient to room near nurses station  Outcome: Progressing  Goal: Maintain or return to baseline ADL function  Description: INTERVENTIONS:  -  Assess patient's ability to carry out ADLs; assess patient's baseline for ADL function and identify physical deficits which impact ability to perform ADLs (bathing, care of mouth/teeth, toileting, grooming, dressing, etc.)  - Assess/evaluate cause of self-care deficits   - Assess range of motion  - Assess patient's mobility; develop plan if impaired  - Assess patient's need for assistive devices and provide as appropriate  - Encourage maximum independence but intervene and supervise when necessary  - Involve family in performance of ADLs  - Assess for home care needs following discharge   - Consider OT consult to assist with ADL evaluation and planning for discharge  - Provide patient education as appropriate  Outcome: Progressing  Goal: Maintains/Returns to pre admission functional level  Description: INTERVENTIONS:  - Perform BMAT or MOVE assessment daily.   - Set and communicate daily mobility goal to care team and patient/family/caregiver. - Collaborate with rehabilitation services on mobility goals if consulted  - Perform Range of Motion  times a day. - Reposition patient every  hours.   - Dangle patient  times a day  - Stand patient  times a day  - Ambulate patient  times a day  - Out of bed to chair  times a day   - Out of bed for meals  times a day  - Out of bed for toileting  - Record patient progress and toleration of activity level   Outcome: Progressing     Problem: DISCHARGE PLANNING  Goal: Discharge to home or other facility with appropriate resources  Description: INTERVENTIONS:  - Identify barriers to discharge w/patient and caregiver  - Arrange for needed discharge resources and transportation as appropriate  - Identify discharge learning needs (meds, wound care, etc.)  - Arrange for interpretive services to assist at discharge as needed  - Refer to Case Management Department for coordinating discharge planning if the patient needs post-hospital services based on physician/advanced practitioner order or complex needs related to functional status, cognitive ability, or social support system  Outcome: Progressing     Problem: MOBILITY - ADULT  Goal: Maintain or return to baseline ADL function  Description: INTERVENTIONS:  -  Assess patient's ability to carry out ADLs; assess patient's baseline for ADL function and identify physical deficits which impact ability to perform ADLs (bathing, care of mouth/teeth, toileting, grooming, dressing, etc.)  - Assess/evaluate cause of self-care deficits   - Assess range of motion  - Assess patient's mobility; develop plan if impaired  - Assess patient's need for assistive devices and provide as appropriate  - Encourage maximum independence but intervene and supervise when necessary  - Involve family in performance of ADLs  - Assess for home care needs following discharge   - Consider OT consult to assist with ADL evaluation and planning for discharge  - Provide patient education as appropriate  Outcome: Progressing  Goal: Maintains/Returns to pre admission functional level  Description: INTERVENTIONS:  - Perform BMAT or MOVE assessment daily.   - Set and communicate daily mobility goal to care team and patient/family/caregiver. - Collaborate with rehabilitation services on mobility goals if consulted  - Perform Range of Motion  times a day. - Reposition patient every  hours. - Dangle patient  times a day  - Stand patient  times a day  - Ambulate patient  times a day  - Out of bed to chair  times a day   - Out of bed for meals  times a day  - Out of bed for toileting  - Record patient progress and toleration of activity level   Outcome: Progressing     Problem: Nutrition/Hydration-ADULT  Goal: Nutrient/Hydration intake appropriate for improving, restoring or maintaining nutritional needs  Description: Monitor and assess patient's nutrition/hydration status for malnutrition. Collaborate with interdisciplinary team and initiate plan and interventions as ordered. Monitor patient's weight and dietary intake as ordered or per policy. Utilize nutrition screening tool and intervene as necessary. Determine patient's food preferences and provide high-protein, high-caloric foods as appropriate.      INTERVENTIONS:  - Monitor oral intake, urinary output, labs, and treatment plans  - Assess nutrition and hydration status and recommend course of action  - Evaluate amount of meals eaten  - Assist patient with eating if necessary   - Allow adequate time for meals  - Recommend/ encourage appropriate diets, oral nutritional supplements, and vitamin/mineral supplements  - Order, calculate, and assess calorie counts as needed  - Recommend, monitor, and adjust tube feedings and TPN/PPN based on assessed needs  - Assess need for intravenous fluids  - Provide specific nutrition/hydration education as appropriate  - Include patient/family/caregiver in decisions related to nutrition  Outcome: Progressing

## 2023-11-17 NOTE — PROGRESS NOTES
11/17/23 0700   Pain Assessment   Pain Assessment Tool 0-10   Pain Score 5   Pain Location/Orientation Orientation: Right;Location: Neck   Hospital Pain Intervention(s) Rest   Restrictions/Precautions   Precautions Fall Risk;Fluid restriction   ROM Restrictions Yes  (c spine precautions)   Braces or Orthoses   (BLE Ace wraps)   Eating   Type of Assistance Needed Set-up / clean-up   Physical Assistance Level No physical assistance   Eating CARE Score 5   Oral Hygiene   Type of Assistance Needed Independent   Physical Assistance Level No physical assistance   Comment In stance at sink   Oral Hygiene CARE Score 6   Sit to Stand   Type of Assistance Needed Independent; Adaptive equipment   Physical Assistance Level No physical assistance   Comment RW   Sit to Stand CARE Score 6   Bed-Chair Transfer   Type of Assistance Needed Independent; Adaptive equipment   Physical Assistance Level No physical assistance   Comment RW   Chair/Bed-to-Chair Transfer CARE Score 6   Toileting Hygiene   Type of Assistance Needed Physical assistance   Physical Assistance Level 25% or less   Comment Pt demos ability to manage clothing up/down over hips and perform bladder hygiene at an overall [place] level. Continues to require Tonja for thoroughness of rear hygiene. Pt with dec dynamic reach around waist and dec RUE dexterity to perform thoroughly Pt educated on various options and provided handout on same including bidet attachment, hand held shower head, toileting wand, wiping from front, seated vs standing. Toileting Hygiene CARE Score 3   Toilet Transfer   Type of Assistance Needed Independent; Adaptive equipment   Physical Assistance Level No physical assistance   Comment RW   Toilet Transfer CARE Score 6   Exercise Tools   Other Exercise Tool 1 Pt provided with red therasponge and yellow putty to inc BUE strength to inc independence with ADL tasks, refer below for details on HEP.    Cognition   Overall Cognitive Status [facility] Arousal/Participation Alert; Cooperative   Attention Within functional limits   Orientation Level Oriented X4   Memory Within functional limits   Following Commands Follows all commands and directions without difficulty   Activity Tolerance   Activity Tolerance Patient tolerated treatment well   Assessment   Treatment Assessment Pt participated in skilled OT services with focus on ADL retraining, assessment for IRP, functional mobility, and establishing BUE HEP. Pt continues to be limited by BUE ROM/strength/coordination, dec standing tori/balance, dec endurance, dec strength, spinal precautions. Pt has progressed to IRP with RW- educated on need for proper footwear, observance of spinal precautions, and A warranted for rear hygiene. Pt will continue to benefit from skilled OT services with focus on kitchen mobility/meal prep, BUE coordination/strength. Prognosis Good   Problem List Decreased strength;Decreased range of motion;Decreased endurance; Impaired balance;Decreased mobility; Decreased coordination;Orthopedic restrictions   Plan   Treatment/Interventions ADL retraining;Functional transfer training; Therapeutic exercise; Endurance training;Patient/family training;Bed mobility; Compensatory technique education   Progress Progressing toward goals   OT Therapy Minutes   OT Time In 0700   OT Time Out 0830   OT Total Time (minutes) 90   OT Mode of treatment - Individual (minutes) 90   OT Mode of treatment - Concurrent (minutes) 0   OT Mode of treatment - Group (minutes) 0   OT Mode of treatment - Co-treat (minutes) 0   OT Mode of Treatment - Total time(minutes) 90 minutes   OT Cumulative Minutes 540   Therapy Time missed   Time missed? No     Access Code: [email or web address]  URL: [email or web address]  Date: 11/17/2023  Prepared by: Monroe Orlando    Exercises  - Putty Squeezes  - 1 x daily - 5 x weekly - 3 sets - 10 reps  - Tip Pinch with Putty  - 1 x daily - 5 x weekly - 3 sets - 10 reps  - Key Pinch with Putty - 1 x daily - 5 x weekly - 3 sets - 10 reps  - 3-Point Pinch with Putty  - 1 x daily - 5 x weekly - 3 sets - 10 reps  - Seated Claw Fist with Putty  - 1 x daily - 5 x weekly - 3 sets - 10 reps

## 2023-11-17 NOTE — PROGRESS NOTES
Internal Medicine Progress Note  Patient: Lisa Box  Age/sex: 68 y.o. male  Medical Record #: [id number]      ASSESSMENT/PLAN: (Interval History)  Lisa Box is seen and examined and management for following issues:    Cervical spine stenosis with radiculopathy  Had a fall PTA 2/2 bilateral leg pain with numbness, tingling and weakness that had been worsening. He had been to see Neurosurgery as an outpatient and was scheduled for a PCDF on 10/30/23. S/p C3-6 PCDF 10/25/23  Staples/sutures are out = NS saw 11/8 for his 2 weeks followup     Hyponatremia  Per patient is chronic and present for quite a while although there is no labs to review since his PCP is not in the system  Felt likely SIADH when in the hospital the first time  Improved to 132 on 11/16     HTN  Home:  Lisinopril 20mg qd/HCTZ 25mg qd  Here:  Lisinopril 5 mg qd (dec 11/12)  HCTZ was stopped 2/2 hyponatremia when he was hospitalized for his neck surgery  stable     Anasarca  Resolved other than some mild left foot/ankle edema. He says the left foot swells intermittently at home. He broke his ankle in past  Was on Lasix 40mg qd/Kdur 40 meq BID but since BPs were soft and edema is almost totally resolved, cut Lasix dose back to 20mg qd and decreased the Kdur to 40 meq qd on 11/12   so Lasix/Kdur held today 11/17 --> will change hold parameter to <100 and keep Lisinopril hold at <110     Ileus  Tolerating diet but still having loose stools  Fecal leukocytes 11/9 = showed moderate amt of WBCs  Stool enteric panel was negative  KUB 11/12 = persistent gaseous distention of small and large bowel  On 11/13 Simethicone QID scheduled was added  GI saw 11/15 and ordered another KUB = "Persistent gaseous distention of the large and small bowel. Overall degree of gaseous distention is similar to the previous examination. Cecal diameter currently approximately 13 cm". Per GI, no intervention for now since this is likely chronic.   He will be seen back in the office as OP in the [facility] office and GI will arrange for this     B/L LE DVTs  New discovered upon return to the hospital  DVT in LLE one of the paired peroneals; RLE in mid PTV  On Eliquis which is new for him (he was cleared by NS to have)     HLD  Continue statin     Urine retention/fountain  VT here per PMR  Continue Proscar     ABLA  No transfusion given  Stable 11/11/23     Irregular HR  EKG on 11/14 shows ST with PACs     Discharge date:  11/20/23    The above assessment and plan was reviewed and updated as determined by my evaluation of the patient on 11/17/2023.     Labs:   Results from last 7 days   Lab Units 11/16/23  0651 11/11/23  0523   WBC Thousand/uL 7.20 5.76   HEMOGLOBIN g/dL 12.9 11.6*   HEMATOCRIT % 38.4 33.9*   PLATELETS Thousands/uL 382 379     Results from last 7 days   Lab Units 11/16/23  0651 11/14/23  0627   SODIUM mmol/L 132* 130*   POTASSIUM mmol/L 3.9 3.5   CHLORIDE mmol/L 95* 94*   CO2 mmol/L 30 28   BUN mg/dL 10 15   CREATININE mg/dL 0.83 0.85   CALCIUM mg/dL 9.2 8.9                   Review of Scheduled Meds:  Current Facility-Administered Medications   Medication Dose Route Frequency Provider Last Rate    acetaminophen  650 mg Oral Q6H PRN Chepe Gipson MD      albuterol  2 puff Inhalation Q6H PRN Chepe Gipson MD      apixaban  5 mg Oral BID Chepe Gipson MD      atorvastatin  10 mg Oral Daily Chepe Gipson MD      bismuth subsalicylate  914 mg Oral Q6H PRN Chepe Gipson MD      calcium carbonate  500 mg Oral Daily PRN Chepe Gipson MD      finasteride  5 mg Oral Daily Chepe Gipson MD      fluticasone  1 spray Each Nare BID Chepe Gipson MD      folic acid  527 mcg Oral Daily Chepe Gipson MD      furosemide  20 mg Oral Daily AAKASH Saravia      lisinopril  5 mg Oral Daily AAKASH Saravia      loratadine  10 mg Oral Daily Chepe Gipson MD      melatonin  3 mg Oral HS Chepe Gipson MD      ondansetron  4 mg Oral Q6H PRN Cristian Bales MD      oxyCODONE  2.5 mg Oral Q4H PRN Cristian Bales MD      potassium chloride  40 mEq Oral Daily AAKASH Fountain      psyllium  1 packet Oral Daily Cristian Balse MD      saccharomyces boulardii  250 mg Oral BID Shanna Gould MD      simethicone  80 mg Oral 4x Daily (with meals and at bedtime) AAKASH Fountain      tiZANidine  2 mg Oral Q8H PRN Cristian Bales MD         Subjective/ HPI: Patient seen and examined. Patients overnight issues or events were reviewed with nursing or staff during rounds or morning huddle session. New or overnight issues include the following:     No new or overnight issues. Offers no complaints    ROS:   A 10 point ROS was performed; negative except as noted above. Imaging:     XR abdomen 1 view kub   Final Result by Radha Luna MD (11/16 0900)      Persistent gaseous distention of the large and small bowel. Overall degree of gaseous distention is similar to the previous examination. Cecal diameter currently approximately 13 cm               Workstation performed: [id number]         XR abdomen 1 view kub   Final Result by Neha Munoz MD (11/13 1045)      Persistent gaseous distention of small and large bowel.          Workstation performed: [id number]             *Labs /Radiology studies Reviewed, no new imaging  *Medications  reviewed and reconciled as needed  *Please refer to order section for additional ordered labs studies  *Case discussed with primary attending during morning huddle case rounds    Physical Examination:  Vitals:   Vitals:    11/16/23 1500 11/16/23 2020 11/17/23 0602 11/17/23 0930   BP: 112/67 97/68 117/78 113/65   BP Location: Right arm Right arm Right arm Right arm   Pulse: 89 89 91    Resp: 16 17 16    Temp: 97.5 °F (36.4 °C) 98.1 °F (36.7 °C) 97.6 °F (36.4 °C)    TempSrc: Oral Oral Oral    SpO2: 98% 95% 96%    Weight:       Height:           General Appearance: no distress, non toxic appearing  HEENT: PERRLA, conjuctiva normal; oropharynx clear; mucous membranes moist   Neck:  Supple, normal ROM  Lungs: CTA, normal respiratory effort, no retractions, expiratory effort normal  CV: regular rate and rhythm; no rubs/murmurs/gallops, PMI normal   ABD: large but soft/slightly distended, +BS. Nontender  EXT: very mild edema feet L>R  Skin: normal turgor, normal texture, no rashes  Psych: affect normal, mood normal  Neuro: AAO      The above physical exam was reviewed and updated as determined by my evaluation of the patient on 11/17/2023. Invasive Devices       None                      VTE Pharmacologic Prophylaxis: Eliquis  Code Status: Level 1 - Full Code  Current Length of Stay: 7 day(s)      Total time spent:  30 minutes with more than 50% spent counseling/coordinating care. Counseling includes discussion with patient re: progress  and discussion with patient of his/her current medical state/information. Coordination of patient's care was performed in conjunction with primary service. Time invested included review of patient's labs, vitals, and management of their comorbidities with continued monitoring. In addition, this patient was discussed with medical team including physician and advanced extenders. The care of the patient was extensively discussed and appropriate treatment plan was formulated unique for this patient. Medical decision making for the day was made by supervising physician unless otherwise noted in their attestation statement. ** Please Note:  voice to text software may have been used in the creation of this document.  Although proof errors in transcription or interpretation are a potential of such software**

## 2023-11-17 NOTE — PROGRESS NOTES
11/17/23 0930   Pain Assessment   Pain Assessment Tool 0-10   Pain Score 5   Pain Location/Orientation Orientation: Right;Location: Neck   Pain Onset/Description Onset: Gradual;Onset: Ongoing;Frequency: Intermittent   Patient's Stated Pain Goal No pain   Hospital Pain Intervention(s) Rest   Restrictions/Precautions   Precautions Fall Risk;Fluid restriction   ROM Restrictions Yes  (cspine precautions)   Braces or Orthoses Other (Comment)  (BLE ace wraps)   Cognition   Overall Cognitive Status WFL   Arousal/Participation Alert; Cooperative   Attention Within functional limits   Orientation Level Oriented X4   Memory Within functional limits   Following Commands Follows all commands and directions without difficulty   Roll Left and Right   Type of Assistance Needed Independent   Roll Left and Right CARE Score 6   Sit to Lying   Type of Assistance Needed Independent   Sit to Lying CARE Score 6   Lying to Sitting on Side of Bed   Type of Assistance Needed Independent   Lying to Sitting on Side of Bed CARE Score 6   Sit to Stand   Type of Assistance Needed Independent; Adaptive equipment   Comment with RW   Sit to Stand CARE Score 6   Bed-Chair Transfer   Type of Assistance Needed Adaptive equipment; Independent   Comment with RW   Chair/Bed-to-Chair Transfer CARE Score 6   Transfer Bed/Chair/Wheelchair   Adaptive Equipment Roller Walker   Walk 10 Feet   Type of Assistance Needed Independent; Adaptive equipment   Comment with RW   Walk 10 Feet CARE Score 6   Walk 50 Feet with Two Turns   Type of Assistance Needed Set-up / clean-up; Adaptive equipment   Comment with RW   Walk 50 Feet with Two Turns CARE Score 5   Walk 150 Feet   Type of Assistance Needed Supervision; Adaptive equipment   Comment with RW   Walk 150 Feet CARE Score 4   Walking 10 Feet on Uneven Surfaces   Type of Assistance Needed Supervision; Adaptive equipment   Comment with RW over floor mat   Walking 10 Feet on Uneven Surfaces CARE Score 4   Ambulation Primary Mode of Locomotion Prior to Admission Walk   Distance Walked (feet) 190 ft  (x2)   Assist Device Roller Walker   Gait Pattern Inconsistant Teena;Decreased foot clearance; Forward Flexion   Limitations Noted In Heel Strike;Posture;Strength   Provided Assistance with: Direction   Walk Assist Level Modified Independent;Distant Supervision;Supervision   Does the patient walk? 2. Yes   Wheel 50 Feet with Two Turns   Reason if not Attempted Activity not applicable   Wheel 50 Feet with Two Turns CARE Score 9   Wheel 150 Feet   Reason if not Attempted Activity not applicable   Wheel 420 Feet CARE Score 9   Wheelchair mobility   Does the patient use a wheelchair? 0. No   Curb or Single Stair   Style negotiated Curb   Type of Assistance Needed Supervision; Adaptive equipment   Comment with RW   1 Step (Curb) CARE Score 4   4 Steps   Reason if not Attempted Activity not applicable   4 Steps CARE Score 9   12 Steps   Reason if not Attempted Activity not applicable   12 Steps CARE Score 9   Toilet Transfer   Type of Assistance Needed Independent; Adaptive equipment   Comment with RW   Toilet Transfer CARE Score 6   Toilet Transfer   Surface Assessed Standard Toilet   Therapeutic Interventions   Strengthening Standing LAQ, hip flex, hip ABD, heel raises and mini squats 2 x10 reps, seated rest between resps 2/2 fatigue   Balance Alt toe taps on 6" steps with single HR 2 x10 reps, CS given   Other Pt ace wrapped start of session   Equipment Use   NuStep L2 x15 min BLE only   Other Comments   Comments Discussed meals for home at start of session. Meals for mom suggested as an option. Phone number and email left with pt to look into it. increased time spent discussing options for home as pt lives alone and has limited support. Assessment   Treatment Assessment Pt participated in skilled PT session with increased focus on gait, increased endurance, balance, coordination, increased LE strengthening and education for home.  Pt had a BM start of session and did request A for hygiene. Pt encouraged to start attempting to perform what he is able to and then have staff come in for cleanliness. Pt states understanding and agreed. Also educated pt on options for food at home as he has limited resources in his community and only a nephew that is involved. Information left with pt and he is to follow up this afternoon on his own. Pt performed standing TE's as part of HEP for home. Pt will cont POC as tolerated with cont focus on curb step negotiation, balance, increased gait over uneven surfaces and increased I with long distance amb prior to discharge Monday, 11/20/23. Problem List Decreased strength;Decreased range of motion;Decreased endurance; Impaired balance;Decreased mobility; Decreased coordination;Orthopedic restrictions   Barriers to Discharge Decreased caregiver support; Inaccessible home environment   PT Barriers   Functional Limitation Car transfers;Stair negotiation; Walking   Plan   Treatment/Interventions Functional transfer training;LE strengthening/ROM; Therapeutic exercise; Endurance training   Progress Progressing toward goals   Discharge Recommendation   Equipment Recommended Walker   PT Therapy Minutes   PT Time In 0930   PT Time Out 1130   PT Total Time (minutes) 120   PT Mode of treatment - Individual (minutes) 120   PT Mode of treatment - Concurrent (minutes) 0   PT Mode of treatment - Group (minutes) 0   PT Mode of treatment - Co-treat (minutes) 0   PT Mode of Treatment - Total time(minutes) 120 minutes   PT Cumulative Minutes 585   Therapy Time missed   Time missed?  No

## 2023-11-17 NOTE — PROGRESS NOTES
Physical Medicine and Rehabilitation Progress Note  Carla Horn 68 y.o. male MRN: [id number]  Unit/Bed#: -62 Encounter: [id number]    Rehab Diagnosis: Impairment of mobility, safety and Activities of Daily Living (ADLs) due to Spinal Cord Dysfunction:  Non-Traumatic:  04.1211  Quadriplegia, Incomplete C1-4 - changed diagnosis given known level of injury. Adjusted given C3 sensory level on exam.     Interval History/Subjective: He is seen this afternoon. He reports bowel movements are becoming slightly firmer. In-room privileges granted today. He says that low-fat element of diet has been very restrictive, will trial off of this today, can resume restriction if bowel movements become looser or more frequent. Vitals reviewed, stable and WNL. Voiding spontaneously. Assessment/Plan:    * Cervical spondylosis with myelopathy  Assessment & Plan  Presented with progressive weakness/constipation that culminated in two falls and difficulty with mobility.  - MRI showed severe stenosis C3-4, 4-5, and 5-6 with mild cord signal changes  10/25 PCDF with C3-6 laminectomies of C4,5,6 and partial C3  SSEPs noted to be dampened at baseline  Post-op examined C5 AIS D with pinprick > light touch impairment and proprioception intact  11/11 Admission AIS C3 AIS D. L > R sensory impairment. PP > LT. Mild LUE weakness already improved compared to Post-op examination. No need for collar  Cervical spine precautions in place  Pain control  Incisional care  PT/OT 3-5 hours/day, 5-7 days/ week. Plan for 6 week POV with Nsx, scheduled    Volume overload  Assessment & Plan  Lasix 20 mg PO daily with potassium  ACE wrap and elevate legs  Monitor     Diarrhea  Assessment & Plan  Possible component of neurogenic bowel with progressively worsening constipation leading up to hospitalization, complicated by ileus during hospitalization  Now with many loose/watery stools/diarrhea  11/9 prior to admission to [facility] started on metamucil. Fecal leukocytes moderate, C. Diff negative on 11/2, enteric panel negative, KUB with only gaseous distension  Has pepto PRN for diarrhea. Will hold on imodium given prior ileus  Simethicone scheduled for gas, probiotics started, high-fiber/low fat diet  GI consulted 11/14/23; repeat KUB 11/15 stable, deferring NG placement/rectal tube placement for decompression at this time  Outpatient follow-up with GI    Hyponatremia  Assessment & Plan  Likely SIADH and HCTZ per previous hospitalization  Also with volume overload which could be contributing as well (on Lasix)   11/11 128  Continue FR. Asymptomatic  IM consulted and assisting with management. They will continue to monitor for now. Labs in the AM    Anasarca-resolved as of 11/13/2023  Assessment & Plan  Most resolved, has LE edema  Required IV Lasix inpatient  Lasix decreased from 40mg to 20mg by IM on 11/12, and on potassium supplement. ACE wrap and elevate legs  Monitor     Urinary retention-resolved as of 11/16/2023  Assessment & Plan  Likely component of neurogenic bladder, but multifactorial including mobility/medications, etc.  Did not have bladder symptoms leading up to hospitalization  Found to have retention during hospitalization. On finasteride  Trial of void initiated 11/14/23 with urinary retention protocol    Acute DVT (deep venous thrombosis) ([address])  Assessment & Plan  Diagnosed 11/3/2023 with DVT in right mid posterior tibial veins and left peroneal vein. Started on eliquis after clearance from Neurosurgery  No SCDs  OK for ACE wraps  Likely 3 months treatment  Outpatient f/u with PCP. Hyperlipidemia  Assessment & Plan  Continue statin    Gastroesophageal reflux disease  Assessment & Plan  TUMS PRN    Benign essential hypertension  Assessment & Plan  Home: Lisinopril 10mg daily  Here: Lisinopril 5 mg daily, Lasix 20 mg daily  Monitor and adjust as appropriate  IM consulted to assist with management.      Anemia  Assessment & Plan  ABLA  Monitor and transfuse as appropriate  IM consulted to assist with management    Ileus (HCC)-resolved as of 11/13/2023  Assessment & Plan  Ileus during this hospitalization. Now resolved. Health Maintenance  #Delirium/Sleep: Practice effective sleep hygiene (e.g., consistent night and morning routine, quiet, dark room at night, no electronic devices/screens in bed, avoid large meals/caffeine before bed)  #Pain: Tylenol PRN, oxycodone 2.5 mg q4h PRN, tizanidine 2 mg q8h PRN  #Skin/Pressure Injury Prevention: Turn Q2hr in bed, with weight shifts [id number] in wheelchair. #DVT Prophylaxis: Apixaban  #GI Prophylaxis: Not indicated  #Code Status: Level 1 - Full  #FEN: High fiber, low fat diet  #Dispo: 11/20/23, home with assist, outpatient PT (referral ordered)    Objective:    Functional Update: Pending    Allergies per EMR    Physical Exam:  Temp:  [97.5 °F (36.4 °C)-98.1 °F (36.7 °C)] 97.6 °F (36.4 °C)  HR:  [] 108  Resp:  [16-17] 16  BP: ()/(50-78) 106/50  Oxygen Therapy  SpO2: 96 %    GEN: Patient seen resting comfortably in recliner at bedside, NAD  HEENT: NCAT; EOMI; mucous membranes moist  CV: Edema of bilateral lower extremities to distal tibial level without overlying erythema on exposed skin; ACE wraps in place  PULM: Breathing comfortably on room air  ABD: Mildly distended  SKIN: No obvious rashes or lesions  NEURO:  Awake and alert, answering questions appropriately to context  Speech is fluent and organized  CN II-XII grossly intact  Moving all extremities spontaneously in recliner    Diagnostic Studies: reviewed, none new    XR abdomen 1 view kub 11/15/23  IMPRESSION:     Persistent gaseous distention of the large and small bowel. Overall degree of gaseous distention is similar to the previous examination. Cecal diameter currently approximately 13 cm    XR abdomen 1 view kub    Result Date: 11/13/2023  Impression: Persistent gaseous distention of small and large bowel. Laboratory:    Results from last 7 days   Lab Units 11/16/23  0651 11/11/23  0523   HEMOGLOBIN g/dL 12.9 11.6*   HEMATOCRIT % 38.4 33.9*   WBC Thousand/uL 7.20 5.76     Results from last 7 days   Lab Units 11/16/23  0651 11/14/23  0627 11/11/23  0523   BUN mg/dL 10 15 26*   POTASSIUM mmol/L 3.9 3.5 3.8   CHLORIDE mmol/L 95* 94* 92*   CREATININE mg/dL 0.83 0.85 0.89   AST U/L  --   --  18   ALT U/L  --   --  19            Patient Active Problem List   Diagnosis    Tinea corporis    Onychomycosis    Cervical spondylosis with myelopathy    Impaired mobility and activities of daily living    Anemia    Benign essential hypertension    Dorsalgia, unspecified    Gastroesophageal reflux disease    Hyperlipidemia    Paresthesia    Weakness of extremity    Hyponatremia    Neurogenic bowel    Seasonal allergies    Acute respiratory insufficiency    Acute DVT (deep venous thrombosis) (HCC)    Diarrhea    Volume overload     Medications  Current Facility-Administered Medications   Medication Dose Route Frequency Provider Last Rate    acetaminophen  650 mg Oral Q6H PRN Phu Castillo MD      albuterol  2 puff Inhalation Q6H PRN Phu Castillo MD      apixaban  5 mg Oral BID Phu Castillo MD      atorvastatin  10 mg Oral Daily Phu Castillo MD      bismuth subsalicylate  423 mg Oral Q6H PRN Phu Castillo MD      calcium carbonate  500 mg Oral Daily PRN Phu Castillo MD      finasteride  5 mg Oral Daily Phu Castillo MD      fluticasone  1 spray Each Nare BID Phu Castillo MD      folic acid  509 mcg Oral Daily Phu Castillo MD      [START ON 11/18/2023] furosemide  20 mg Oral Daily AAKASH Mckeon      lisinopril  5 mg Oral Daily AAKASH Mckeon      loratadine  10 mg Oral Daily Phu Castillo MD      melatonin  3 mg Oral HS Phu Castillo MD      ondansetron  4 mg Oral Q6H PRN Phu Castillo MD      oxyCODONE  2.5 mg Oral Q4H PRN Phu Castillo MD      potassium chloride  40 mEq Oral Daily AAKASH Almazan      psyllium  1 packet Oral Daily Diaz Levine MD      saccharomyces boulardii  250 mg Oral BID Carri Scott MD      simethicone  80 mg Oral 4x Daily (with meals and at bedtime) AAKASH De La Torre      tiZANidine  2 mg Oral Q8H PRN Diaz Levine MD        I have spent a total time of 35 minutes on 11/17/23 in caring for this patient including Impressions, Counseling / Coordination of care, Documenting in the medical record, Obtaining or reviewing history  , and Communicating with other healthcare professionals . Usman Crespo MD  Attending Physician  Physical Medicine and [place]    ** Please Note: Fluency Direct voice to text software may have been used in the creation of this document.  **

## 2023-11-18 PROCEDURE — 99232 SBSQ HOSP IP/OBS MODERATE 35: CPT | Performed by: INTERNAL MEDICINE

## 2023-11-18 PROCEDURE — 97530 THERAPEUTIC ACTIVITIES: CPT

## 2023-11-18 PROCEDURE — 97116 GAIT TRAINING THERAPY: CPT

## 2023-11-18 PROCEDURE — 97535 SELF CARE MNGMENT TRAINING: CPT

## 2023-11-18 PROCEDURE — 97112 NEUROMUSCULAR REEDUCATION: CPT

## 2023-11-18 RX ADMIN — ATORVASTATIN CALCIUM 10 MG: 10 TABLET, FILM COATED ORAL at 09:13

## 2023-11-18 RX ADMIN — Medication 1 PACKET: at 09:12

## 2023-11-18 RX ADMIN — Medication 2.5 MG: at 09:12

## 2023-11-18 RX ADMIN — Medication 250 MG: at 18:14

## 2023-11-18 RX ADMIN — APIXABAN 5 MG: 5 TABLET, FILM COATED ORAL at 09:12

## 2023-11-18 RX ADMIN — SIMETHICONE 80 MG: 80 TABLET, CHEWABLE ORAL at 11:37

## 2023-11-18 RX ADMIN — POTASSIUM CHLORIDE 40 MEQ: 1500 TABLET, EXTENDED RELEASE ORAL at 11:37

## 2023-11-18 RX ADMIN — SIMETHICONE 80 MG: 80 TABLET, CHEWABLE ORAL at 21:03

## 2023-11-18 RX ADMIN — FOLIC ACID TAB 400 MCG 400 MCG: 400 TAB at 09:11

## 2023-11-18 RX ADMIN — LORATADINE 10 MG: 10 TABLET ORAL at 09:13

## 2023-11-18 RX ADMIN — SIMETHICONE 80 MG: 80 TABLET, CHEWABLE ORAL at 18:14

## 2023-11-18 RX ADMIN — Medication 250 MG: at 09:11

## 2023-11-18 RX ADMIN — FLUTICASONE PROPIONATE 1 SPRAY: 50 SPRAY, METERED NASAL at 09:17

## 2023-11-18 RX ADMIN — APIXABAN 5 MG: 5 TABLET, FILM COATED ORAL at 18:14

## 2023-11-18 RX ADMIN — Medication 2.5 MG: at 13:12

## 2023-11-18 RX ADMIN — Medication 2.5 MG: at 23:04

## 2023-11-18 RX ADMIN — SIMETHICONE 80 MG: 80 TABLET, CHEWABLE ORAL at 09:13

## 2023-11-18 RX ADMIN — FINASTERIDE 5 MG: 5 TABLET, FILM COATED ORAL at 09:13

## 2023-11-18 RX ADMIN — Medication 3 MG: at 21:03

## 2023-11-18 NOTE — PLAN OF CARE
Problem: Prexisting or High Potential for Compromised Skin Integrity  Goal: Skin integrity is maintained or improved  Description: INTERVENTIONS:  - Identify patients at risk for skin breakdown  - Assess and monitor skin integrity  - Assess and monitor nutrition and hydration status  - Monitor labs   - Assess for incontinence   - Turn and reposition patient  - Assist with mobility/ambulation  - Relieve pressure over bony prominences  - Avoid friction and shearing  - Provide appropriate hygiene as needed including keeping skin clean and dry  - Evaluate need for skin moisturizer/barrier cream  - Collaborate with interdisciplinary team   - Patient/family teaching  - Consider wound care consult   Outcome: Progressing     Problem: PAIN - ADULT  Goal: Verbalizes/displays adequate comfort level or baseline comfort level  Description: Interventions:  - Encourage patient to monitor pain and request assistance  - Assess pain using appropriate pain scale  - Administer analgesics based on type and severity of pain and evaluate response  - Implement non-pharmacological measures as appropriate and evaluate response  - Consider cultural and social influences on pain and pain management  - Notify physician/advanced practitioner if interventions unsuccessful or patient reports new pain  Outcome: Progressing     Problem: INFECTION - ADULT  Goal: Absence or prevention of progression during hospitalization  Description: INTERVENTIONS:  - Assess and monitor for signs and symptoms of infection  - Monitor lab/diagnostic results  - Monitor all insertion sites, i.e. indwelling lines, tubes, and drains  - Monitor endotracheal if appropriate and nasal secretions for changes in amount and color  - Jordan appropriate cooling/warming therapies per order  - Administer medications as ordered  - Instruct and encourage patient and family to use good hand hygiene technique  - Identify and instruct in appropriate isolation precautions for identified infection/condition  Outcome: Progressing

## 2023-11-18 NOTE — PROGRESS NOTES
11/18/23 8714   Pain Assessment   Pain Assessment Tool 0-10   Pain Score 5   Pain Location/Orientation Location: Neck   Restrictions/Precautions   Precautions Fall Risk;Fluid restriction; Christensen; Spinal precautions   Weight Bearing Restrictions No   ROM Restrictions   (no c collar but maintain precautions)   Braces or Orthoses   (BLE ace wraps)   Sit to Stand   Type of Assistance Needed Independent; Adaptive equipment   Physical Assistance Level No physical assistance   Sit to Stand CARE Score 6   Bed-Chair Transfer   Type of Assistance Needed Independent; Adaptive equipment   Physical Assistance Level No physical assistance   Comment RW   Chair/Bed-to-Chair Transfer CARE Score 6   Toileting Hygiene   Type of Assistance Needed Incidental touching   Physical Assistance Level No physical assistance   Comment Pt with improved rear hygiene abilities this session following cueing for technique, guarding warranted to assure thoroughness of hygiene. Toileting Hygiene CARE Score 4   Toilet Transfer   Type of Assistance Needed Independent   Physical Assistance Level No physical assistance   Toilet Transfer CARE Score 6   Meal Prep   Meal Prep Level Walker   Meal Prep Level of Assistance Modified independent   Meal Preparation Pt engaged in hot meal prep to simulate home context of frying an egg on the stove top. Pt overall demos standing tolerance of ~10-15 minutes for entirety of task completion. G safety and carryover of education from start of session requiring no cueing to reinforce. Pt was able to safely adjust burners and cook to desired temp. Pt with no overt LOB and G carryover of precautions throughout. Asif for task. Discussed ECTs with meal prep and suggested placing chair in kitchen for safety if fatigued, pt receptive to same.    Kitchen Mobility   Kitchen-Mobility Level Walker   Kitchen Activity Retrieve items;Transport items   Kitchen Mobility Comments Discussed having friend who is transporting him home and purchasing groceries placing all necessary items at counter height or slightly above and below to inc compliance with spinal precautions and safety. Pt receptive. Provided pt with use of reacher to inc safety during item retrieval, pt will purchase independently. Pt demos ability to carryover all techniques and transported via folding walker tray. Alva with RW. Cognition   Overall Cognitive Status WFL   Arousal/Participation Alert; Cooperative   Attention Within functional limits   Orientation Level Oriented X4   Memory Within functional limits   Following Commands Follows all commands and directions without difficulty   Activity Tolerance   Activity Tolerance Patient tolerated treatment well   Assessment   Treatment Assessment Pt participated in skilled OT services with focus on toileting, kitchen mobility/hot meal prep and functional mobility. Pt continues to make G progress towards achieving OT goals. Pt remains limited by dec UE coordination/strength/ROM, spinal precautions, dec act tori. Pt has been given IRP with RW and reports it has been going well thus far, with improved confidence noted with toileting tasks and mobility. Pt will continue to benefit from skilled OT services with focus on ADL retraining, d/c planning, toileting prior to d/c. Prognosis Good   Problem List Decreased strength;Decreased range of motion;Decreased endurance; Impaired balance;Decreased mobility; Decreased coordination;Orthopedic restrictions   Plan   Treatment/Interventions Functional transfer training;ADL retraining; Therapeutic exercise; Endurance training;Patient/family training;Equipment eval/education; Bed mobility; Compensatory technique education   Progress Progressing toward goals   OT Therapy Minutes   OT Time In 0830   OT Time Out 1000   OT Total Time (minutes) 90   OT Mode of treatment - Individual (minutes) 90   OT Mode of treatment - Concurrent (minutes) 0   OT Mode of treatment - Group (minutes) 0   OT Mode of treatment - Co-treat (minutes) 0   OT Mode of Treatment - Total time(minutes) 90 minutes   OT Cumulative Minutes 630   Therapy Time missed   Time missed?  No

## 2023-11-18 NOTE — PROGRESS NOTES
11/18/23 1500   Pain Assessment   Pain Assessment Tool 0-10   Pain Score No Pain   Restrictions/Precautions   Precautions Fall Risk;Fluid restriction   Weight Bearing Restrictions No   ROM Restrictions Yes  (Cervical precautions)   Braces or Orthoses Other (Comment)  (BL LE ACE wraps)   Cognition   Overall Cognitive Status WFL   Arousal/Participation Alert; Cooperative   Attention Within functional limits   Following Commands Follows all commands and directions without difficulty   Subjective   Subjective Americo Garay says he is doing well this afternoon and is excited to return home on Monday. He has no new c/o and is agreeable to PT. Roll Left and Right   Type of Assistance Needed Independent   Roll Left and Right CARE Score 6   Sit to Lying   Type of Assistance Needed Independent   Sit to Lying CARE Score 6   Lying to Sitting on Side of Bed   Type of Assistance Needed Independent   Lying to Sitting on Side of Bed CARE Score 6   Sit to Stand   Type of Assistance Needed Independent; Adaptive equipment   Comment RW   Sit to Stand CARE Score 6   Bed-Chair Transfer   Type of Assistance Needed Independent; Adaptive equipment   Comment RW   Chair/Bed-to-Chair Transfer CARE Score 6   Car Transfer   Type of Assistance Needed Independent; Adaptive equipment   Comment RW   Car Transfer CARE Score 6   Walk 10 Feet   Type of Assistance Needed Independent; Adaptive equipment   Comment RW   Walk 10 Feet CARE Score 6   Walk 50 Feet with Two Turns   Type of Assistance Needed Independent; Adaptive equipment   Comment RW   Walk 50 Feet with Two Turns CARE Score 6   Walk 150 Feet   Type of Assistance Needed Supervision; Adaptive equipment   Comment DSx1; progress to Alva as able w/RW   Walk 150 Feet CARE Score 4   Walking 10 Feet on Uneven Surfaces   Type of Assistance Needed Independent; Adaptive equipment   Comment RW   Walking 10 Feet on Uneven Surfaces CARE Score 6   Ambulation   Primary Mode of Locomotion Prior to Admission Walk Distance Walked (feet) 340 ft  (x1, 250' x1, 200' x1)   Assist Device Roller Walker   Gait Pattern Inconsistant Teena;Trendelenburg;Decreased L stance; Improper weight shift   Limitations Noted In Balance; Endurance;Speed   Does the patient walk? 2. Yes   Wheelchair mobility   Does the patient use a wheelchair? 0. No   Curb or Single Stair   Style negotiated Curb   Type of Assistance Needed Independent; Adaptive equipment   Comment RW, 6" curb step   1 Step (Curb) CARE Score 6   4 Steps   Reason if not Attempted Activity not applicable   4 Steps CARE Score 9   12 Steps   Reason if not Attempted Activity not applicable   12 Steps CARE Score 9   Picking Up Object   Type of Assistance Needed Independent; Adaptive equipment   Comment RW, reacher, marker from floor   Picking Up Object CARE Score 6   Toilet Transfer   Type of Assistance Needed Independent; Adaptive equipment   Comment RW, grab bars   Toilet Transfer CARE Score 6   Therapeutic Interventions   Neuromuscular Re-Education For balance: ambulation at railing UL UE support 2x30' fwd R UE 2x30' bwd R UE 2x30' fwd L UE 2x30' bwd L UE CGx1 throughout. Side stepping 2x30' in both directions progressing from BUE to L UE onlly focused on minimizing L hip trendelenberg   Other Comments   Comments Progressed IRP's to permission to walk to window across from room adjacent to elevators on that side of wing with RW at [address] and OT notified - board updated   [facility] participated in his skilled PT session focused on CARE score updates and progressing overall mobility status. Walked longest distance of 0' today with RW. L trendelenberg appreciated with lateral side stepping especially with only L UE support - pt reports this has been a long standing issue - may benefit from continued L glut med strengthening. Otherwise pt continues to progress to more CARE mobilities at the Alva level in accordance with his therapeutic goals. Family/Caregiver Present No   Problem List Decreased strength;Decreased range of motion;Decreased endurance; Impaired balance;Decreased mobility; Decreased coordination;Orthopedic restrictions   Barriers to Discharge Inaccessible home environment;Decreased caregiver support   PT Barriers   Physical Impairment Decreased strength;Decreased range of motion;Decreased endurance; Impaired balance;Decreased mobility; Decreased coordination;Orthopedic restrictions   Functional Limitation Car transfers;Stair negotiation; Walking   Plan   Treatment/Interventions ADL retraining;Functional transfer training;LE strengthening/ROM; Elevations; Therapeutic exercise; Endurance training;Gait training   Progress Progressing toward goals   Discharge Recommendation   Rehab Resource Intensity Level, PT I (Maximum Resource Intensity)   PT Therapy Minutes   PT Time In 1330   PT Time Out 1500   PT Total Time (minutes) 90   PT Mode of treatment - Individual (minutes) 90   PT Mode of treatment - Concurrent (minutes) 0   PT Mode of treatment - Group (minutes) 0   PT Mode of treatment - Co-treat (minutes) 0   PT Mode of Treatment - Total time(minutes) 90 minutes   PT Cumulative Minutes 675   Therapy Time missed   Time missed?  No

## 2023-11-18 NOTE — PROGRESS NOTES
Internal Medicine Progress Note  Patient: Emily Gordon  Age/sex: 68 y.o. male  Medical Record #: [id number]      ASSESSMENT/PLAN: (Interval History)  Emily Gordon is seen and examined and management for following issues:    Cervical spine stenosis with radiculopathy  Had a fall PTA 2/2 bilateral leg pain with numbness, tingling and weakness that had been worsening. He had been to see Neurosurgery as an outpatient and was scheduled for a PCDF on 10/30/23. S/p C3-6 PCDF 10/25/23  Staples/sutures are out = NS saw 11/8 for his 2 weeks followup     Hyponatremia  Per patient is chronic and present for quite a while although there is no labs to review since his PCP is not in the system  Felt likely SIADH when in the hospital the first time  Improved to 132 on 11/16     HTN  Home:  Lisinopril 20mg qd/HCTZ 25mg qd  Here:  Lisinopril 5 mg qd (dec 11/12) - held due to soft BP today. HCTZ was stopped 2/2 hyponatremia when he was hospitalized for his neck surgery  stable     Anasarca  Resolved other than some mild left foot/ankle edema. He says the left foot swells intermittently at home. He broke his ankle in past  Was on Lasix 40mg qd/Kdur 40 meq BID but since BPs were soft and edema is almost totally resolved, cut Lasix dose back to 20mg qd and decreased the Kdur to 40 meq qd on 11/12   so Lasix/Kdur held 11/17 and today --> will change hold parameter to <100 and keep Lisinopril hold at <110     Ileus  Tolerating diet but still having loose stools  Fecal leukocytes 11/9 = showed moderate amt of WBCs  Stool enteric panel was negative  KUB 11/12 = persistent gaseous distention of small and large bowel  On 11/13 Simethicone QID scheduled was added  GI saw 11/15 and ordered another KUB = "Persistent gaseous distention of the large and small bowel. Overall degree of gaseous distention is similar to the previous examination. Cecal diameter currently approximately 13 cm".     Per GI, no intervention for now since this is likely chronic. He will be seen back in the office as OP in the [facility] office and GI will arrange for this     B/L LE DVTs  New discovered upon return to the hospital  DVT in LLE one of the paired peroneals; RLE in mid PTV  On Eliquis which is new for him (he was cleared by NS to have)     HLD  Continue statin     Urine retention/fountain  VT here per PMR  Continue Proscar     ABLA  No transfusion given  Stable 11/11/23     Irregular HR  EKG on 11/14 shows ST with PACs     Discharge date:  11/20/23    The above assessment and plan was reviewed and updated as determined by my evaluation of the patient on 11/18/2023.     Labs:   Results from last 7 days   Lab Units 11/16/23  0651   WBC Thousand/uL 7.20   HEMOGLOBIN g/dL 12.9   HEMATOCRIT % 38.4   PLATELETS Thousands/uL 382       Results from last 7 days   Lab Units 11/16/23  0651 11/14/23  0627   SODIUM mmol/L 132* 130*   POTASSIUM mmol/L 3.9 3.5   CHLORIDE mmol/L 95* 94*   CO2 mmol/L 30 28   BUN mg/dL 10 15   CREATININE mg/dL 0.83 0.85   CALCIUM mg/dL 9.2 8.9                     Review of Scheduled Meds:  Current Facility-Administered Medications   Medication Dose Route Frequency Provider Last Rate    acetaminophen  650 mg Oral Q6H PRN Charles Blackburn MD      albuterol  2 puff Inhalation Q6H PRN Charles Blackburn MD      apixaban  5 mg Oral BID Charles Blackburn MD      atorvastatin  10 mg Oral Daily Charles Blackburn MD      bismuth subsalicylate  100 mg Oral Q6H PRN Charles Blackburn MD      calcium carbonate  500 mg Oral Daily PRN Charles Blackburn MD      finasteride  5 mg Oral Daily Charles Blackburn MD      fluticasone  1 spray Each Nare BID Charles Blackburn MD      folic acid  743 mcg Oral Daily Charles Blackburn MD      furosemide  20 mg Oral Daily AAKASH Barton      lisinopril  5 mg Oral Daily AAKASH Barton      loratadine  10 mg Oral Daily Charles Blackburn MD      melatonin  3 mg Oral HS Charles Blackburn MD      ondansetron 4 mg Oral Q6H PRN Paul Potter MD      oxyCODONE  2.5 mg Oral Q4H PRN Paul Potter MD      potassium chloride  40 mEq Oral Daily AAKASH Henderson      psyllium  1 packet Oral Daily Paul Potter MD      saccharomyces boulardii  250 mg Oral BID Yris Pressley MD      simethicone  80 mg Oral 4x Daily (with meals and at bedtime) AAKASH Henderson      tiZANidine  2 mg Oral Q8H PRN Paul Potter MD         Subjective/ HPI: Patient seen and examined. Patients overnight issues or events were reviewed with nursing or staff during rounds or morning huddle session. New or overnight issues include the following:     Pt seen in his room. He denies any current complaints. ROS:   A 10 point ROS was performed; negative except as noted above. Imaging:     XR abdomen 1 view kub   Final Result by Radha Valencia MD (11/16 0900)      Persistent gaseous distention of the large and small bowel. Overall degree of gaseous distention is similar to the previous examination. Cecal diameter currently approximately 13 cm               Workstation performed: [id number]         XR abdomen 1 view kub   Final Result by Loreto Gifford MD (11/13 1045)      Persistent gaseous distention of small and large bowel.          Workstation performed: [id number]             *Labs /Radiology studies Reviewed  *Medications  reviewed and reconciled as needed  *Please refer to order section for additional ordered labs studies  *Case discussed with primary attending during morning huddle case rounds    Physical Examination:  Vitals:   Vitals:    11/18/23 0546 11/18/23 0600 11/18/23 0846 11/18/23 0913   BP: 103/71   112/64   BP Location: Right arm      Pulse: 82      Resp: 16      Temp: 97.6 °F (36.4 °C)      TempSrc: Oral      SpO2: 98%      Weight:  101 kg (223 lb 5.2 oz) 101 kg (223 lb 5.2 oz)    Height:           General Appearance: no distress, non toxic appearing  HEENT: PERRLA, conjuctiva normal; oropharynx clear; mucous membranes moist   Neck:  Supple, normal ROM  Lungs: CTA, normal respiratory effort, no retractions, expiratory effort normal  CV: regular rate and rhythm; no rubs/murmurs/gallops, PMI normal   ABD: large but soft/slightly distended, +BS. Nontender  EXT: very mild edema feet L>R  Skin: normal turgor, normal texture, no rashes  Psych: affect normal, mood normal  Neuro: AAO      The above physical exam was reviewed and updated as determined by my evaluation of the patient on 11/18/2023. Invasive Devices       None                      VTE Pharmacologic Prophylaxis: Eliquis  Code Status: Level 1 - Full Code  Current Length of Stay: 8 day(s)      Total time spent:  30 minutes with more than 50% spent counseling/coordinating care. Counseling includes discussion with patient re: progress  and discussion with patient of his/her current medical state/information. Coordination of patient's care was performed in conjunction with primary service. Time invested included review of patient's labs, vitals, and management of their comorbidities with continued monitoring. In addition, this patient was discussed with medical team including physician and advanced extenders. The care of the patient was extensively discussed and appropriate treatment plan was formulated unique for this patient. Medical decision making for the day was made by supervising physician unless otherwise noted in their attestation statement. ** Please Note:  voice to text software may have been used in the creation of this document.  Although proof errors in transcription or interpretation are a potential of such software**

## 2023-11-19 PROCEDURE — 97110 THERAPEUTIC EXERCISES: CPT

## 2023-11-19 PROCEDURE — 97530 THERAPEUTIC ACTIVITIES: CPT

## 2023-11-19 PROCEDURE — 97535 SELF CARE MNGMENT TRAINING: CPT

## 2023-11-19 PROCEDURE — 99232 SBSQ HOSP IP/OBS MODERATE 35: CPT | Performed by: INTERNAL MEDICINE

## 2023-11-19 RX ADMIN — FOLIC ACID TAB 400 MCG 400 MCG: 400 TAB at 08:42

## 2023-11-19 RX ADMIN — APIXABAN 5 MG: 5 TABLET, FILM COATED ORAL at 18:25

## 2023-11-19 RX ADMIN — Medication 2.5 MG: at 21:10

## 2023-11-19 RX ADMIN — ATORVASTATIN CALCIUM 10 MG: 10 TABLET, FILM COATED ORAL at 08:42

## 2023-11-19 RX ADMIN — SIMETHICONE 80 MG: 80 TABLET, CHEWABLE ORAL at 21:10

## 2023-11-19 RX ADMIN — SIMETHICONE 80 MG: 80 TABLET, CHEWABLE ORAL at 15:58

## 2023-11-19 RX ADMIN — FINASTERIDE 5 MG: 5 TABLET, FILM COATED ORAL at 08:42

## 2023-11-19 RX ADMIN — LORATADINE 10 MG: 10 TABLET ORAL at 08:42

## 2023-11-19 RX ADMIN — SIMETHICONE 80 MG: 80 TABLET, CHEWABLE ORAL at 11:32

## 2023-11-19 RX ADMIN — Medication 250 MG: at 08:42

## 2023-11-19 RX ADMIN — Medication 2.5 MG: at 15:58

## 2023-11-19 RX ADMIN — Medication 250 MG: at 18:24

## 2023-11-19 RX ADMIN — FUROSEMIDE 20 MG: 20 TABLET ORAL at 11:32

## 2023-11-19 RX ADMIN — FLUTICASONE PROPIONATE 1 SPRAY: 50 SPRAY, METERED NASAL at 08:43

## 2023-11-19 RX ADMIN — Medication 3 MG: at 21:10

## 2023-11-19 RX ADMIN — Medication 1 PACKET: at 08:41

## 2023-11-19 RX ADMIN — LISINOPRIL 5 MG: 5 TABLET ORAL at 08:41

## 2023-11-19 RX ADMIN — POTASSIUM CHLORIDE 40 MEQ: 1500 TABLET, EXTENDED RELEASE ORAL at 11:32

## 2023-11-19 RX ADMIN — SIMETHICONE 80 MG: 80 TABLET, CHEWABLE ORAL at 08:45

## 2023-11-19 RX ADMIN — APIXABAN 5 MG: 5 TABLET, FILM COATED ORAL at 08:42

## 2023-11-19 NOTE — PLAN OF CARE

## 2023-11-19 NOTE — PROGRESS NOTES
11/19/23 4359   Pain Assessment   Pain Assessment Tool 0-10   Restrictions/Precautions   Precautions Fall Risk;Fluid restriction   Braces or Orthoses   (BLE ACE wrap)   Subjective   Subjective pt agreeable to perform skilled PT   Roll Left and Right   Type of Assistance Needed Independent   Roll Left and Right CARE Score 6   Sit to Lying   Type of Assistance Needed Independent   Sit to Lying CARE Score 6   Lying to Sitting on Side of Bed   Type of Assistance Needed Independent   Lying to Sitting on Side of Bed CARE Score 6   Sit to Stand   Type of Assistance Needed Independent   Sit to Stand CARE Score 6   Bed-Chair Transfer   Type of Assistance Needed Independent   Comment RW   Chair/Bed-to-Chair Transfer CARE Score 6   Transfer Bed/Chair/Wheelchair   Limitations Noted In LE Strength   Adaptive Equipment Roller Walker   Car Transfer   Type of Assistance Needed Independent; Adaptive equipment   Comment RW   Car Transfer CARE Score 6   Walk 10 Feet   Type of Assistance Needed Independent; Adaptive equipment   Comment RW   Walk 10 Feet CARE Score 6   Walk 50 Feet with Two Turns   Type of Assistance Needed Independent; Adaptive equipment   Comment RW   Walk 50 Feet with Two Turns CARE Score 6   Walk 150 Feet   Type of Assistance Needed Independent; Adaptive equipment   Comment RW   Walk 150 Feet CARE Score 6   Walking 10 Feet on Uneven Surfaces   Type of Assistance Needed Independent; Adaptive equipment   Comment  floor mat   Walking 10 Feet on Uneven Surfaces CARE Score 6   Ambulation   Distance Walked (feet) 350 ft  (x2)   Assist Device Roller Walker   Does the patient walk? 2. Yes   Wheel 50 Feet with Two Turns   Reason if not Attempted Activity not applicable   Wheel 50 Feet with Two Turns CARE Score 9   Wheel 150 Feet   Reason if not Attempted Activity not applicable   Wheel 185 Feet CARE Score 9   Wheelchair mobility   Does the patient use a wheelchair? 0.  No   Curb or Single Stair   Style negotiated Curb   Type of Assistance Needed Independent; Adaptive equipment   Comment 6 inch curb step   1 Step (Curb) CARE Score 6   4 Steps   Reason if not Attempted Activity not applicable   4 Steps CARE Score 9   12 Steps   Reason if not Attempted Activity not applicable   12 Steps CARE Score 9   Picking Up Object   Type of Assistance Needed Independent; Adaptive equipment   Comment RW for support reacher for marker   Picking Up Object CARE Score 6   Toilet Transfer   Type of Assistance Needed Independent; Adaptive equipment   Comment RW   Toilet Transfer CARE Score 6   Toilet Transfer   Surface Assessed Standard Toilet   Therapeutic Interventions   Flexibility BLE heel cord and HS gentle stretch TERT 2 minute   Neuromuscular Re-Education For balance: ambulation at railing UL UE support 2x30' fwd R UE 2x30' bwd R UE 2x30' fwd L UE 2x30' bwd L UE CGx1 throughout. Side stepping 2x30' in both directions progressing from BUE to L UE onlly focused on minimizing L hip trendelenberg   Equipment Use   NuStep L2 BLE 15 min   Assessment   Treatment Assessment pt engaged in skilled PT focus on care scores and progressnig with IRP and instruction with home care and dec fall risk and fall recovery . Pt has all DME sin and see CM note for details and pt progressing for DC tomorrow and then to OPPT   Barriers to Discharge Inaccessible home environment;Decreased caregiver support   Plan   Progress Progressing toward goals   Discharge Recommendation   Rehab Resource Intensity Level, PT   (Home with OPPT)   PT Therapy Minutes   PT Time In 0930   PT Time Out 1100   PT Total Time (minutes) 90   PT Mode of treatment - Individual (minutes) 90   PT Mode of treatment - Concurrent (minutes) 0   PT Mode of treatment - Group (minutes) 0   PT Mode of treatment - Co-treat (minutes) 0   PT Mode of Treatment - Total time(minutes) 90 minutes   PT Cumulative Minutes 765   Therapy Time missed   Time missed?  No Name band;

## 2023-11-19 NOTE — PROGRESS NOTES
Access Code: [email or web address]  URL: [email or web address]  Date: 11/19/2023  Prepared by: Moises Estrada    Exercises  - Seated Shoulder Shrugs  - 1 x daily - 5 x weekly - 3 sets - 10 reps  - Seated Shoulder Shrug Circles AROM Backward  - 1 x daily - 5 x weekly - 3 sets - 10 reps  - Seated Shoulder Shrug Circles AROM Forward  - 1 x daily - 5 x weekly - 3 sets - 10 reps  - Seated Shoulder Flexion Self PROM  - 1 x daily - 5 x weekly - 3 sets - 10 reps  - Seated Elbow Flexion and Extension AROM  - 1 x daily - 5 x weekly - 3 sets - 10 reps

## 2023-11-19 NOTE — PLAN OF CARE
Problem: Prexisting or High Potential for Compromised Skin Integrity  Goal: Skin integrity is maintained or improved  Description: INTERVENTIONS:  - Identify patients at risk for skin breakdown  - Assess and monitor skin integrity  - Assess and monitor nutrition and hydration status  - Monitor labs   - Assess for incontinence   - Turn and reposition patient  - Assist with mobility/ambulation  - Relieve pressure over bony prominences  - Avoid friction and shearing  - Provide appropriate hygiene as needed including keeping skin clean and dry  - Evaluate need for skin moisturizer/barrier cream  - Collaborate with interdisciplinary team   - Patient/family teaching  - Consider wound care consult   Outcome: Progressing     Problem: INFECTION - ADULT  Goal: Absence or prevention of progression during hospitalization  Description: INTERVENTIONS:  - Assess and monitor for signs and symptoms of infection  - Monitor lab/diagnostic results  - Monitor all insertion sites, i.e. indwelling lines, tubes, and drains  - Monitor endotracheal if appropriate and nasal secretions for changes in amount and color  - [place] appropriate cooling/warming therapies per order  - Administer medications as ordered  - Instruct and encourage patient and family to use good hand hygiene technique  - Identify and instruct in appropriate isolation precautions for identified infection/condition  Outcome: Progressing  Goal: Absence of fever/infection during neutropenic period  Description: INTERVENTIONS:  - Monitor WBC    Outcome: Progressing     Problem: SAFETY ADULT  Goal: Patient will remain free of falls  Description: INTERVENTIONS:  - Educate patient/family on patient safety including physical limitations  - Instruct patient to call for assistance with activity   - Consult OT/PT to assist with strengthening/mobility   - Keep Call bell within reach  - Keep bed low and locked with side rails adjusted as appropriate  - Keep care items and personal belongings within reach  - Initiate and maintain comfort rounds  - Make Fall Risk Sign visible to staff  - Offer Toileting every 2 Hours, in advance of need  - Initiate/Maintain bed alarm  - Obtain necessary fall risk management equipment: bed alarm  - Apply yellow socks and bracelet for high fall risk patients  - Consider moving patient to room near nurses station  Outcome: Progressing  Goal: Maintain or return to baseline ADL function  Description: INTERVENTIONS:  -  Assess patient's ability to carry out ADLs; assess patient's baseline for ADL function and identify physical deficits which impact ability to perform ADLs (bathing, care of mouth/teeth, toileting, grooming, dressing, etc.)  - Assess/evaluate cause of self-care deficits   - Assess range of motion  - Assess patient's mobility; develop plan if impaired  - Assess patient's need for assistive devices and provide as appropriate  - Encourage maximum independence but intervene and supervise when necessary  - Involve family in performance of ADLs  - Assess for home care needs following discharge   - Consider OT consult to assist with ADL evaluation and planning for discharge  - Provide patient education as appropriate  Outcome: Progressing  Goal: Maintains/Returns to pre admission functional level  Description: INTERVENTIONS:  - Perform BMAT or MOVE assessment daily.   - Set and communicate daily mobility goal to care team and patient/family/caregiver. - Collaborate with rehabilitation services on mobility goals if consulted  - Perform Range of Motion 3 times a day. - Reposition patient every 2 hours.   - Dangle patient 3 times a day  - Stand patient 3 times a day  - Ambulate patient 3 times a day  - Out of bed to chair 3 times a day   - Out of bed for meals 3 times a day  - Out of bed for toileting  - Record patient progress and toleration of activity level   Outcome: Progressing

## 2023-11-19 NOTE — PROGRESS NOTES
Internal Medicine Progress Note  Patient: Lisa Box  Age/sex: 68 y.o. male  Medical Record #: [id number]      ASSESSMENT/PLAN: (Interval History)  Lisa Box is seen and examined and management for following issues:    Cervical spine stenosis with radiculopathy  Had a fall PTA 2/2 bilateral leg pain with numbness, tingling and weakness that had been worsening. He had been to see Neurosurgery as an outpatient and was scheduled for a PCDF on 10/30/23. S/p C3-6 PCDF 10/25/23  Staples/sutures are out = NS saw 11/8 for his 2 weeks followup     Hyponatremia  Per patient is chronic and present for quite a while although there is no labs to review since his PCP is not in the system  Felt likely SIADH when in the hospital the first time  Improved to 132 on 11/16     HTN  Home:  Lisinopril 20mg qd/HCTZ 25mg qd  Here:  Lisinopril 5 mg qd (dec 11/12) - held 11/18 due to soft BP. HCTZ was stopped 2/2 hyponatremia when he was hospitalized for his neck surgery  stable     Anasarca  Resolved other than some mild left foot/ankle edema. He says the left foot swells intermittently at home. He broke his ankle in past  Was on Lasix 40mg qd/Kdur 40 meq BID but since BPs were soft and edema is almost totally resolved, cut Lasix dose back to 20mg qd and decreased the Kdur to 40 meq qd on 11/12   so Lasix/Kdur held 11/17 and 11/18      Ileus  Tolerating diet but still having loose stools  Fecal leukocytes 11/9 = showed moderate amt of WBCs  Stool enteric panel was negative  KUB 11/12 = persistent gaseous distention of small and large bowel  On 11/13 Simethicone QID scheduled was added  GI saw 11/15 and ordered another KUB = "Persistent gaseous distention of the large and small bowel. Overall degree of gaseous distention is similar to the previous examination. Cecal diameter currently approximately 13 cm". Per GI, no intervention for now since this is likely chronic.   He will be seen back in the office as OP in the [facility] office and GI will arrange for this     B/L LE DVTs  New discovered upon return to the hospital  DVT in LLE one of the paired peroneals; RLE in mid PTV  On Eliquis which is new for him (he was cleared by NS to have)     HLD  Continue statin     Urine retention/fountain  VT here per PMR  Continue Proscar     ABLA  No transfusion given  Stable 11/11/23     Irregular HR  EKG on 11/14 shows ST with PACs     Discharge date:  11/20/23    The above assessment and plan was reviewed and updated as determined by my evaluation of the patient on 11/19/2023.     Labs:   Results from last 7 days   Lab Units 11/16/23  0651   WBC Thousand/uL 7.20   HEMOGLOBIN g/dL 12.9   HEMATOCRIT % 38.4   PLATELETS Thousands/uL 382       Results from last 7 days   Lab Units 11/16/23  0651 11/14/23  0627   SODIUM mmol/L 132* 130*   POTASSIUM mmol/L 3.9 3.5   CHLORIDE mmol/L 95* 94*   CO2 mmol/L 30 28   BUN mg/dL 10 15   CREATININE mg/dL 0.83 0.85   CALCIUM mg/dL 9.2 8.9                     Review of Scheduled Meds:  Current Facility-Administered Medications   Medication Dose Route Frequency Provider Last Rate    acetaminophen  650 mg Oral Q6H PRN Olena Nicolas MD      albuterol  2 puff Inhalation Q6H PRN Olena Nicolas MD      apixaban  5 mg Oral BID Olena Nicolas MD      atorvastatin  10 mg Oral Daily Olena Nicolas MD      bismuth subsalicylate  955 mg Oral Q6H PRN Olena Nicolas MD      calcium carbonate  500 mg Oral Daily PRN Olena Nicolas MD      finasteride  5 mg Oral Daily Olena Nicolas MD      fluticasone  1 spray Each Nare BID Olena Nicolas MD      folic acid  803 mcg Oral Daily Olena Nicolas MD      furosemide  20 mg Oral Daily AAKASH Crystal      lisinopril  5 mg Oral Daily Shaun Le Medina, AAKASH      loratadine  10 mg Oral Daily Olena Nicolas MD      melatonin  3 mg Oral HS Olena Nicolas MD      ondansetron  4 mg Oral Q6H PRN Olena Nicolas MD      oxyCODONE  2.5 mg Oral Q4H PRN Solange Hand MD      potassium chloride  40 mEq Oral Daily AAKASH Farias      psyllium  1 packet Oral Daily Solange Hand MD      saccharomyces boulardii  250 mg Oral BID Justin Servin MD      simethicone  80 mg Oral 4x Daily (with meals and at bedtime) AAKASH Farias      tiZANidine  2 mg Oral Q8H PRN Solange Hand MD         Subjective/ HPI: Patient seen and examined. Patients overnight issues or events were reviewed with nursing or staff during rounds or morning huddle session. New or overnight issues include the following:     Pt seen in his room. He denies any current complaints. ROS:   A 10 point ROS was performed; negative except as noted above. Imaging:     XR abdomen 1 view kub   Final Result by Elly Huizar MD (11/16 0900)      Persistent gaseous distention of the large and small bowel. Overall degree of gaseous distention is similar to the previous examination. Cecal diameter currently approximately 13 cm               Workstation performed: [id number]         XR abdomen 1 view kub   Final Result by Poonam Duran MD (11/13 1045)      Persistent gaseous distention of small and large bowel.          Workstation performed: [id number]             *Labs /Radiology studies Reviewed  *Medications  reviewed and reconciled as needed  *Please refer to order section for additional ordered labs studies  *Case discussed with primary attending during morning huddle case rounds    Physical Examination:  Vitals:   Vitals:    11/19/23 0604 11/19/23 0841 11/19/23 1028 11/19/23 1132   BP: 105/77 118/66  116/67   BP Location: Left arm   Right arm   Pulse: 99      Resp: 18      Temp: 97.6 °F (36.4 °C)      TempSrc: Oral      SpO2: 98%      Weight:   101 kg (222 lb 12.8 oz)    Height:           General Appearance: no distress, non toxic appearing  HEENT: PERRLA, conjuctiva normal; oropharynx clear; mucous membranes moist   Neck:  Supple, normal ROM  Lungs: CTA, normal respiratory effort, no retractions, expiratory effort normal  CV: regular rate and rhythm; no rubs/murmurs/gallops, PMI normal   ABD: large but soft/slightly distended, +BS. Nontender  EXT: very mild edema feet L>R  Skin: normal turgor, normal texture, no rashes  Psych: affect normal, mood normal  Neuro: AAO      The above physical exam was reviewed and updated as determined by my evaluation of the patient on 11/19/2023. Invasive Devices       None                      VTE Pharmacologic Prophylaxis: Eliquis  Code Status: Level 1 - Full Code  Current Length of Stay: 9 day(s)      Total time spent:  30 minutes with more than 50% spent counseling/coordinating care. Counseling includes discussion with patient re: progress  and discussion with patient of his/her current medical state/information. Coordination of patient's care was performed in conjunction with primary service. Time invested included review of patient's labs, vitals, and management of their comorbidities with continued monitoring. In addition, this patient was discussed with medical team including physician and advanced extenders. The care of the patient was extensively discussed and appropriate treatment plan was formulated unique for this patient. Medical decision making for the day was made by supervising physician unless otherwise noted in their attestation statement. ** Please Note:  voice to text software may have been used in the creation of this document.  Although proof errors in transcription or interpretation are a potential of such software**

## 2023-11-19 NOTE — PROGRESS NOTES
11/19/23 0701   Pain Assessment   Pain Assessment Tool 0-10   Pain Score No Pain   Restrictions/Precautions   Precautions Fall Risk;Fluid restriction   Weight Bearing Restrictions No   ROM Restrictions Yes  (cervical precautions but no collar)   Braces or Orthoses   (BLE ACE wraps)   Eating   Type of Assistance Needed Independent   Physical Assistance Level No physical assistance   Eating CARE Score 6   Oral Hygiene   Type of Assistance Needed Independent   Physical Assistance Level No physical assistance   Comment in stance at sink   Oral Hygiene CARE Score 6   Shower/Bathe Self   Type of Assistance Needed Independent   Physical Assistance Level No physical assistance   Comment Able to bathe 10/10 parts with inc time. Shower/Bathe Self CARE Score 6   Tub/Shower Transfer   Findings Agreeable to shower in shower stall with shower chair at a [place] level. Upper Body Dressing   Type of Assistance Needed Independent   Physical Assistance Level No physical assistance   Upper Body Dressing CARE Score 6   Lower Body Dressing   Type of Assistance Needed Independent   Physical Assistance Level No physical assistance   Lower Body Dressing CARE Score 6   Putting On/Taking Off Footwear   Type of Assistance Needed Independent   Physical Assistance Level No physical assistance   Comment Can complete donning and doffing of standard footwear at a [place] level. Would require A for ACE wraps, but pt demos ability to manage compression stockings if team still feels he needs compression at time of d/c would recommend switch to stockings. Putting On/Taking Off Footwear CARE Score 6   Sit to Stand   Type of Assistance Needed Independent   Physical Assistance Level No physical assistance   Sit to Stand CARE Score 6   Bed-Chair Transfer   Type of Assistance Needed Independent; Adaptive equipment   Physical Assistance Level No physical assistance   Comment RW   Chair/Bed-to-Chair Transfer CARE Score 6   Toileting Hygiene   Type of Assistance Needed Independent   Physical Assistance Level No physical assistance   Comment With simulation of AE which pt will order for home use pt is independent for rear hygiene. Due to environmental reasons pt would continue to require A here but can be scored as independent. Toileting Hygiene CARE Score 6   Toilet Transfer   Type of Assistance Needed Independent; Adaptive equipment   Physical Assistance Level No physical assistance   Comment RW   Toilet Transfer CARE Score 6   Exercise Tools   Other Exercise Tool 1 HEP provided, refer below. Cognition   Overall Cognitive Status WFL   Arousal/Participation Alert; Cooperative   Attention Within functional limits   Orientation Level Oriented X4   Memory Within functional limits   Following Commands Follows all commands and directions without difficulty   Activity Tolerance   Activity Tolerance Patient tolerated treatment well   Assessment   Treatment Assessment Pt participated in skilled OT services with focus on ADL retraining. Pt overall has made G progress towards achieving OT goals. Pt remains limited by BUE coordination/strength/dexterity, dec higher level standing tori/balance. He demos ability to perform basic ADL tasks at a [place] level with use of RW. Pt does have G insight into remaining deficits and compensates as needed and self initiates strategies. No necessary DME needs, pt independently purchasing shower chair with back, folding walker tray, toileting wand. Plan for pt to d/c home with supportive friends to A as needed with IADL tasks. Prognosis Good   Problem List Decreased strength;Decreased range of motion;Decreased endurance; Impaired balance;Decreased mobility; Decreased coordination;Orthopedic restrictions   OT Therapy Minutes   OT Time In 0700   OT Time Out 0830   OT Total Time (minutes) 90   OT Mode of treatment - Individual (minutes) 90   OT Mode of treatment - Concurrent (minutes) 0   OT Mode of treatment - Group (minutes) 0   OT Mode of treatment - Co-treat (minutes) 0   OT Mode of Treatment - Total time(minutes) 90 minutes   OT Cumulative Minutes 720   Therapy Time missed   Time missed? No   Access Code: [email or web address]  URL: [email or web address]  Date: 11/19/2023  Prepared by: Chitra August    Exercises  - Seated Shoulder Shrugs  - 1 x daily - 5 x weekly - 3 sets - 10 reps  - Seated Shoulder Shrug Circles AROM Backward  - 1 x daily - 5 x weekly - 3 sets - 10 reps  - Seated Shoulder Shrug Circles AROM Forward  - 1 x daily - 5 x weekly - 3 sets - 10 reps  - Seated Shoulder Flexion Self PROM  - 1 x daily - 5 x weekly - 3 sets - 10 reps  - Seated Elbow Flexion and Extension AROM  - 1 x daily - 5 x weekly - 3 sets - 10 reps

## 2023-11-20 VITALS
HEIGHT: 68 IN | WEIGHT: 223.99 LBS | BODY MASS INDEX: 33.95 KG/M2 | TEMPERATURE: 97.6 F | SYSTOLIC BLOOD PRESSURE: 117 MMHG | OXYGEN SATURATION: 98 % | HEART RATE: 104 BPM | RESPIRATION RATE: 18 BRPM | DIASTOLIC BLOOD PRESSURE: 73 MMHG

## 2023-11-20 PROBLEM — R19.7 DIARRHEA: Status: RESOLVED | Noted: 2023-11-07 | Resolved: 2023-11-20

## 2023-11-20 PROBLEM — E87.70 VOLUME OVERLOAD: Status: RESOLVED | Noted: 2023-11-13 | Resolved: 2023-11-20

## 2023-11-20 LAB
ANION GAP SERPL CALCULATED.3IONS-SCNC: 7 MMOL/L
BUN SERPL-MCNC: 10 MG/DL (ref 5–25)
CALCIUM SERPL-MCNC: 9 MG/DL (ref 8.4–10.2)
CHLORIDE SERPL-SCNC: 96 MMOL/L (ref 96–108)
CO2 SERPL-SCNC: 30 MMOL/L (ref 21–32)
CREAT SERPL-MCNC: 0.84 MG/DL (ref 0.6–1.3)
GFR SERPL CREATININE-BSD FRML MDRD: 86 ML/MIN/1.73SQ M
GLUCOSE P FAST SERPL-MCNC: 89 MG/DL (ref 65–99)
GLUCOSE SERPL-MCNC: 89 MG/DL (ref 65–140)
POTASSIUM SERPL-SCNC: 3.8 MMOL/L (ref 3.5–5.3)
SODIUM SERPL-SCNC: 133 MMOL/L (ref 135–147)

## 2023-11-20 PROCEDURE — 99238 HOSP IP/OBS DSCHRG MGMT 30/<: CPT | Performed by: PHYSICAL MEDICINE & REHABILITATION

## 2023-11-20 PROCEDURE — 80048 BASIC METABOLIC PNL TOTAL CA: CPT | Performed by: NURSE PRACTITIONER

## 2023-11-20 RX ADMIN — APIXABAN 5 MG: 5 TABLET, FILM COATED ORAL at 07:45

## 2023-11-20 RX ADMIN — POTASSIUM CHLORIDE 40 MEQ: 1500 TABLET, EXTENDED RELEASE ORAL at 11:29

## 2023-11-20 RX ADMIN — FLUTICASONE PROPIONATE 1 SPRAY: 50 SPRAY, METERED NASAL at 07:52

## 2023-11-20 RX ADMIN — Medication 250 MG: at 07:44

## 2023-11-20 RX ADMIN — ATORVASTATIN CALCIUM 10 MG: 10 TABLET, FILM COATED ORAL at 07:45

## 2023-11-20 RX ADMIN — FOLIC ACID TAB 400 MCG 400 MCG: 400 TAB at 07:45

## 2023-11-20 RX ADMIN — FUROSEMIDE 20 MG: 20 TABLET ORAL at 11:29

## 2023-11-20 RX ADMIN — LORATADINE 10 MG: 10 TABLET ORAL at 07:45

## 2023-11-20 RX ADMIN — SIMETHICONE 80 MG: 80 TABLET, CHEWABLE ORAL at 07:44

## 2023-11-20 RX ADMIN — SIMETHICONE 80 MG: 80 TABLET, CHEWABLE ORAL at 11:29

## 2023-11-20 RX ADMIN — Medication 1 PACKET: at 07:44

## 2023-11-20 RX ADMIN — FINASTERIDE 5 MG: 5 TABLET, FILM COATED ORAL at 07:45

## 2023-11-20 NOTE — PLAN OF CARE
Problem: Prexisting or High Potential for Compromised Skin Integrity  Goal: Skin integrity is maintained or improved  Description: INTERVENTIONS:  - Identify patients at risk for skin breakdown  - Assess and monitor skin integrity  - Assess and monitor nutrition and hydration status  - Monitor labs   - Assess for incontinence   - Turn and reposition patient  - Assist with mobility/ambulation  - Relieve pressure over bony prominences  - Avoid friction and shearing  - Provide appropriate hygiene as needed including keeping skin clean and dry  - Evaluate need for skin moisturizer/barrier cream  - Collaborate with interdisciplinary team   - Patient/family teaching  - Consider wound care consult   Outcome: Progressing     Problem: PAIN - ADULT  Goal: Verbalizes/displays adequate comfort level or baseline comfort level  Description: Interventions:  - Encourage patient to monitor pain and request assistance  - Assess pain using appropriate pain scale  - Administer analgesics based on type and severity of pain and evaluate response  - Implement non-pharmacological measures as appropriate and evaluate response  - Consider cultural and social influences on pain and pain management  - Notify physician/advanced practitioner if interventions unsuccessful or patient reports new pain  Outcome: Progressing     Problem: INFECTION - ADULT  Goal: Absence or prevention of progression during hospitalization  Description: INTERVENTIONS:  - Assess and monitor for signs and symptoms of infection  - Monitor lab/diagnostic results  - Monitor all insertion sites, i.e. indwelling lines, tubes, and drains  - Monitor endotracheal if appropriate and nasal secretions for changes in amount and color  - [place] appropriate cooling/warming therapies per order  - Administer medications as ordered  - Instruct and encourage patient and family to use good hand hygiene technique  - Identify and instruct in appropriate isolation precautions for identified infection/condition  Outcome: Progressing  Goal: Absence of fever/infection during neutropenic period  Description: INTERVENTIONS:  - Monitor WBC    Outcome: Progressing     Problem: SAFETY ADULT  Goal: Patient will remain free of falls  Description: INTERVENTIONS:  - Educate patient/family on patient safety including physical limitations  - Instruct patient to call for assistance with activity   - Consult OT/PT to assist with strengthening/mobility   - Keep Call bell within reach  - Keep bed low and locked with side rails adjusted as appropriate  - Keep care items and personal belongings within reach  - Initiate and maintain comfort rounds  - Make Fall Risk Sign visible to staff  - Offer Toileting every 2 Hours, in advance of need  - Initiate/Maintain bed/chair alarm  - Obtain necessary fall risk management equipment: nonskid socks  - Apply yellow socks and bracelet for high fall risk patients  - Consider moving patient to room near nurses station  Outcome: Progressing  Goal: Maintain or return to baseline ADL function  Description: INTERVENTIONS:  -  Assess patient's ability to carry out ADLs; assess patient's baseline for ADL function and identify physical deficits which impact ability to perform ADLs (bathing, care of mouth/teeth, toileting, grooming, dressing, etc.)  - Assess/evaluate cause of self-care deficits   - Assess range of motion  - Assess patient's mobility; develop plan if impaired  - Assess patient's need for assistive devices and provide as appropriate  - Encourage maximum independence but intervene and supervise when necessary  - Involve family in performance of ADLs  - Assess for home care needs following discharge   - Consider OT consult to assist with ADL evaluation and planning for discharge  - Provide patient education as appropriate  Outcome: Progressing  Goal: Maintains/Returns to pre admission functional level  Description: INTERVENTIONS:  - Perform BMAT or MOVE assessment daily.   - Set and communicate daily mobility goal to care team and patient/family/caregiver. - Collaborate with rehabilitation services on mobility goals if consulted  - Perform Range of Motion 3 times a day. - Reposition patient every 2 hours.   - Dangle patient 3 times a day  - Stand patient 3 times a day  - Ambulate patient 3 times a day  - Out of bed to chair 3 times a day   - Out of bed for meals 3 times a day  - Out of bed for toileting  - Record patient progress and toleration of activity level   Outcome: Progressing     Problem: DISCHARGE PLANNING  Goal: Discharge to home or other facility with appropriate resources  Description: INTERVENTIONS:  - Identify barriers to discharge w/patient and caregiver  - Arrange for needed discharge resources and transportation as appropriate  - Identify discharge learning needs (meds, wound care, etc.)  - Arrange for interpretive services to assist at discharge as needed  - Refer to Case Management Department for coordinating discharge planning if the patient needs post-hospital services based on physician/advanced practitioner order or complex needs related to functional status, cognitive ability, or social support system  Outcome: Progressing     Problem: MOBILITY - ADULT  Goal: Maintain or return to baseline ADL function  Description: INTERVENTIONS:  -  Assess patient's ability to carry out ADLs; assess patient's baseline for ADL function and identify physical deficits which impact ability to perform ADLs (bathing, care of mouth/teeth, toileting, grooming, dressing, etc.)  - Assess/evaluate cause of self-care deficits   - Assess range of motion  - Assess patient's mobility; develop plan if impaired  - Assess patient's need for assistive devices and provide as appropriate  - Encourage maximum independence but intervene and supervise when necessary  - Involve family in performance of ADLs  - Assess for home care needs following discharge   - Consider OT consult to assist with ADL evaluation and planning for discharge  - Provide patient education as appropriate  Outcome: Progressing  Goal: Maintains/Returns to pre admission functional level  Description: INTERVENTIONS:  - Perform BMAT or MOVE assessment daily.   - Set and communicate daily mobility goal to care team and patient/family/caregiver. - Collaborate with rehabilitation services on mobility goals if consulted  - Perform Range of Motion 3 times a day. - Reposition patient every 2 hours. - Dangle patient 3 times a day  - Stand patient 3 times a day  - Ambulate patient 3 times a day  - Out of bed to chair 3 times a day   - Out of bed for meals 3 times a day  - Out of bed for toileting  - Record patient progress and toleration of activity level   Outcome: Progressing     Problem: Nutrition/Hydration-ADULT  Goal: Nutrient/Hydration intake appropriate for improving, restoring or maintaining nutritional needs  Description: Monitor and assess patient's nutrition/hydration status for malnutrition. Collaborate with interdisciplinary team and initiate plan and interventions as ordered. Monitor patient's weight and dietary intake as ordered or per policy. Utilize nutrition screening tool and intervene as necessary. Determine patient's food preferences and provide high-protein, high-caloric foods as appropriate.      INTERVENTIONS:  - Monitor oral intake, urinary output, labs, and treatment plans  - Assess nutrition and hydration status and recommend course of action  - Evaluate amount of meals eaten  - Assist patient with eating if necessary   - Allow adequate time for meals  - Recommend/ encourage appropriate diets, oral nutritional supplements, and vitamin/mineral supplements  - Order, calculate, and assess calorie counts as needed  - Recommend, monitor, and adjust tube feedings and TPN/PPN based on assessed needs  - Assess need for intravenous fluids  - Provide specific nutrition/hydration education as appropriate  - Include patient/family/caregiver in decisions related to nutrition  Outcome: Progressing

## 2023-11-20 NOTE — NURSING NOTE
Patient was stable at time of discharge. Patient voiced that all questions were answered at time of discharge and he is to follow up with the [place]. Meds sent to [facility] pharmacy.

## 2023-11-20 NOTE — PLAN OF CARE
Problem: Prexisting or High Potential for Compromised Skin Integrity  Goal: Skin integrity is maintained or improved  Description: INTERVENTIONS:  - Identify patients at risk for skin breakdown  - Assess and monitor skin integrity  - Assess and monitor nutrition and hydration status  - Monitor labs   - Assess for incontinence   - Turn and reposition patient  - Assist with mobility/ambulation  - Relieve pressure over bony prominences  - Avoid friction and shearing  - Provide appropriate hygiene as needed including keeping skin clean and dry  - Evaluate need for skin moisturizer/barrier cream  - Collaborate with interdisciplinary team   - Patient/family teaching  - Consider wound care consult   Outcome: Progressing     Problem: PAIN - ADULT  Goal: Verbalizes/displays adequate comfort level or baseline comfort level  Description: Interventions:  - Encourage patient to monitor pain and request assistance  - Assess pain using appropriate pain scale  - Administer analgesics based on type and severity of pain and evaluate response  - Implement non-pharmacological measures as appropriate and evaluate response  - Consider cultural and social influences on pain and pain management  - Notify physician/advanced practitioner if interventions unsuccessful or patient reports new pain  Outcome: Progressing     Problem: INFECTION - ADULT  Goal: Absence or prevention of progression during hospitalization  Description: INTERVENTIONS:  - Assess and monitor for signs and symptoms of infection  - Monitor lab/diagnostic results  - Monitor all insertion sites, i.e. indwelling lines, tubes, and drains  - Monitor endotracheal if appropriate and nasal secretions for changes in amount and color  - [place] appropriate cooling/warming therapies per order  - Administer medications as ordered  - Instruct and encourage patient and family to use good hand hygiene technique  - Identify and instruct in appropriate isolation precautions for identified infection/condition  Outcome: Progressing  Goal: Absence of fever/infection during neutropenic period  Description: INTERVENTIONS:  - Monitor WBC    Outcome: Progressing     Problem: SAFETY ADULT  Goal: Patient will remain free of falls  Description: INTERVENTIONS:  - Educate patient/family on patient safety including physical limitations  - Instruct patient to call for assistance with activity   - Consult OT/PT to assist with strengthening/mobility   - Keep Call bell within reach  - Keep bed low and locked with side rails adjusted as appropriate  - Keep care items and personal belongings within reach  - Initiate and maintain comfort rounds  - Make Fall Risk Sign visible to staff  - Offer Toileting every 2 Hours, in advance of need  - Initiate/Maintain bed alarm  - Obtain necessary fall risk management equipment: bed alarm  - Apply yellow socks and bracelet for high fall risk patients  - Consider moving patient to room near nurses station  Outcome: Progressing  Goal: Maintain or return to baseline ADL function  Description: INTERVENTIONS:  -  Assess patient's ability to carry out ADLs; assess patient's baseline for ADL function and identify physical deficits which impact ability to perform ADLs (bathing, care of mouth/teeth, toileting, grooming, dressing, etc.)  - Assess/evaluate cause of self-care deficits   - Assess range of motion  - Assess patient's mobility; develop plan if impaired  - Assess patient's need for assistive devices and provide as appropriate  - Encourage maximum independence but intervene and supervise when necessary  - Involve family in performance of ADLs  - Assess for home care needs following discharge   - Consider OT consult to assist with ADL evaluation and planning for discharge  - Provide patient education as appropriate  Outcome: Progressing  Goal: Maintains/Returns to pre admission functional level  Description: INTERVENTIONS:  - Perform BMAT or MOVE assessment daily.   - Set and communicate daily mobility goal to care team and patient/family/caregiver. - Collaborate with rehabilitation services on mobility goals if consulted  - Perform Range of Motion 3 times a day. - Reposition patient every 2 hours.   - Dangle patient 3 times a day  - Stand patient 3 times a day  - Ambulate patient 3 times a day  - Out of bed to chair 3 times a day   - Out of bed for meals 3 times a day  - Out of bed for toileting  - Record patient progress and toleration of activity level   Outcome: Progressing

## 2023-11-20 NOTE — DISCHARGE INSTR - AVS FIRST PAGE
ACUTE REHABILITATION CENTER DISCHARGE INSTRUCTIONS:    ACTIVITY:   Please follow mobility, self care instructions as your were taught by inpatient rehabilitation therapists. Please continue using your adaptive equipment. You will continue your rehabilitation with [facility] outpatient physical therapy. RESTRICTIONS:  No Driving until cleared by your primary care physician    INCISIONAL CARE:  Wash daily with soap and water    MEDICATION CHANGES:  Please see your after visit summary below for all medication names, dosing, and dosing times.   PLEASE SEE YOUR PCP FOR ALL MEDICATION REFILLS

## 2023-11-20 NOTE — DISCHARGE SUMMARY
Discharge Summary - PMR   Nena Castrejon 68 y.o. male MRN: [id number]  Unit/Bed#: -12 Encounter: [id number]    Admission Date: 11/10/2023     Discharge Date: 11/20/2023    Etiologic/Rehabilitation Diagnosis: Impairment of mobility, safety and Activities of Daily Living (ADLs) due to Spinal Cord Dysfunction:  Non-Traumatic:  04.1211  Quadriplegia, Incomplete C1-4    HPI: Nena Castrejon is a 68 y.o. male with medical history of GERD, HTN, HLD, cervical stenosis with myelopathy who originally presented to the [address] [place] [address] on 10/20 after a sudden fall due to legs giving out from under him. Ultimately transferred to [facility] for Nsx evaluation given known myelopathy and MRI showed severe stenosis C3-4, 4-5, and 5-6 with mild cord signal changes. On 10/25, Dr. Adal Longoria performed PCDF C3-6 with laminectomies of C4,5,6 and partial C3. At baseline SSEPS noted to be dampened. Post-op with no bracing required but cervical precautions. That hospitalization was c/b by hypotension, SIADH, and neurogenic bowel/bladder. Leading up to hospitalization he had no issues with his bladder, but did have worsening constipation. He was admitted to the [facility] on 11/1, but later that evening developed SOB and increased WOB with worsening abdominal pain and distention. He was given IV Lasix, albuterol and transferred back to the acute care hospital. KUB concerning for ileus (had been having watery diarrhea). He required 2L NC for his hypoxia. CXR showed findings consistent with atelectasis without acute consolidation or congestion. CTAP showed moderate distention of his entire GI tract without evidence of obstruction. Echo showed EF 60% with G1DD and no major valvular disease. LE dopplers for LE edema showed DVT in R mid posterior tibial vein, and L one of paired peroneal veins. CTA showed no PE (but significant atelectasis similar to previous CTAP the day of transfer.  He was also noted to have stable calcified and noncalcified anterior mediastinal nodules (likely lymph nodes) possibly sequelae of prior granulomatous infection. Also noted to incidentally have cholelithiasis/gallbladder sludge without evidence of acute cholecystitis. He was started on therapeutic lovenox. After discussion with Nsx, he was cleared to transition to eliquis. He has since been able to be progressed to room air and feels more comfortable. He has continued to have multiple loose stools. He was transitioned to PO Lasix on 11/8. He has imodium as needed and metamucil ordered. Fecal leuks are in progress and C. Diff on 11/2 was negative. He was re-admitted to the [facility] on 11/10/2023     Procedures Performed During ARC Admission: none    Acute Rehabilitation Center Course: Patient participated in a comprehensive interdisciplinary inpatient rehabilitation program which included involvment of MD, therapies (PT, OT), RN, CM, SW, dietary services. He was able to be advanced to a modified independent level of assist and considered safe for discharge home. Please see below for patient's day to day management of medical needs. * Cervical spondylosis with myelopathy  Assessment & Plan  Presented with progressive weakness/constipation that culminated in two falls and difficulty with mobility.  - MRI showed severe stenosis C3-4, 4-5, and 5-6 with mild cord signal changes  10/25 PCDF with C3-6 laminectomies of C4,5,6 and partial C3  SSEPs noted to be dampened at baseline  Post-op examined C5 AIS D with pinprick > light touch impairment and proprioception intact  11/11 Admission AIS C3 AIS D. L > R sensory impairment. PP > LT. Mild LUE weakness already improved compared to Post-op examination. No need for collar  Cervical spine precautions in place  PT/OT 3-5 hours/day, 5-7 days/ week.   Plan for 6 week POV with Nsx    Diarrhea  Assessment & Plan  Possible component of neurogenic bowel with progressively worsening constipation leading up to hospitalization, complicated by ileus during hospitalization  Now with many loose/watery stools/diarrhea  11/9 prior to admission to [facility] started on metamucil. Fecal leukocytes moderate, C. Diff negative on 11/2, enteric panel negative, KUB with only gaseous distension  Has pepto PRN for diarrhea. Will hold on imodium given prior ileus  Simethicone scheduled for gas, probiotics started, high-fiber/low fat diet  GI consulted 11/14/23; repeat KUB 11/15 stable, deferring NG placement/rectal tube placement for decompression at this time  Outpatient follow-up with GI, referral ordered    Hyponatremia  Assessment & Plan  Likely SIADH and HCTZ per previous hospitalization  Also with volume overload which could be contributing as well (on Lasix)   11/11 128  Continue FR. Asymptomatic  IM consulted and assisting with management    Volume overload-resolved as of 11/20/2023  Assessment & Plan  Lasix 20 mg PO daily with potassium  ACE wrap and elevate legs  Monitor     Anasarca-resolved as of 11/13/2023  Assessment & Plan  Most resolved, has LE edema  Required IV Lasix inpatient  Lasix decreased from 40mg to 20mg by IM on 11/12, and on potassium supplement. ACE wrap and elevate legs  Monitor     Urinary retention-resolved as of 11/16/2023  Assessment & Plan  Likely component of neurogenic bladder, but multifactorial including mobility/medications, etc.  Did not have bladder symptoms leading up to hospitalization  Found to have retention during hospitalization. On finasteride  Trial of void initiated 11/14/23 with urinary retention protocol    Acute DVT (deep venous thrombosis) ([address])  Assessment & Plan  Diagnosed 11/3/2023 with DVT in right mid posterior tibial veins and left peroneal vein. Started on eliquis after clearance from Neurosurgery  No SCDs  OK for ACE wraps  Likely 3 months treatment  Outpatient f/u with PCP.     Hyperlipidemia  Assessment & Plan  Continue statin    Gastroesophageal reflux disease  Assessment & Plan  TUMS PRN    Benign essential hypertension  Assessment & Plan  Home: Lisinopril 10mg daily  Here: Lisinopril 5 mg daily, Lasix 20 mg daily  Monitor and adjust as appropriate  IM consulted to assist with management. Anemia  Assessment & Plan  ABLA  Monitor and transfuse as appropriate  IM consulted to assist with management    Ileus (HCC)-resolved as of 11/13/2023  Assessment & Plan  Ileus during this hospitalization. Now resolved. Discharge Physical Examination:    GEN: Patient seen resting comfortably in recliner at bedside, NAD  HEENT: NCAT; EOMI; mucous membranes moist  CV: No lower extremity edema  PULM: Breathing comfortably on room air  ABD: Mildly distended  SKIN: No obvious rashes or lesions on exposed skin  NEURO:  Awake and alert, answering questions appropriately to context  Speech is fluent and organized  CN II-XII grossly intact  Moving all extremities spontaneously in recliner    Significant Findings, Care, Treatment and Services Provided:     Gastroenterology Inpatient Consult:  A&P Copied from 11/15/23:  67 yo M with history of GERD, HTN, HLD and cervical myelopathy who originally presented on 10/20 following multiple falls over the past month with worsening numbness and tingling of LE leading to neurosurgical procedure on 10/25 with cervical laminectomies involving C3-6 with fusion with hospitalization then complicated by ileus and respiratory distress and now in ARC with GI now consulted given continued loose stools. 1. Diarrhea  - It appears vanc powder was administered during procedure, but pt has received no systemic abx during hospitalization  - Avoid anticholinergics, NSAIDs and laxatives  - Agree with avoiding loperamide in the setting of ileus  - Stool enteric panel negative on 11/12  - C. difficile toxin negative on 11/2  - KUB with similar appearance   - Pt feeling well overall and had semi-solid BM earlier today.  If worsening without BM's may need to consider NG vs rectal tube, but for now reasonable to defer   - No plans for endoscopic management at this time as much of distention is in small bowel    Complications: None    Functional Status Upon Discharge from [facility]:     PT           11/19/23 0928   Pain Assessment   Pain Assessment Tool 0-10   Restrictions/Precautions   Precautions Fall Risk;Fluid restriction   Braces or Orthoses    (BLE ACE wrap)   Subjective   Subjective pt agreeable to perform skilled PT   Roll Left and Right   Type of Assistance Needed Independent   Roll Left and Right CARE Score 6   Sit to Lying   Type of Assistance Needed Independent   Sit to Lying CARE Score 6   Lying to Sitting on Side of Bed   Type of Assistance Needed Independent   Lying to Sitting on Side of Bed CARE Score 6   Sit to Stand   Type of Assistance Needed Independent   Sit to Stand CARE Score 6   Bed-Chair Transfer   Type of Assistance Needed Independent   Comment    Chair/Bed-to-Chair Transfer CARE Score 6   Transfer Bed/Chair/Wheelchair   Limitations Noted In LE Strength   Adaptive Equipment Roller Walker   Car Transfer   Type of Assistance Needed Independent; Adaptive equipment   Comment RW   Car Transfer CARE Score 6   Walk 10 Feet   Type of Assistance Needed Independent; Adaptive equipment   Comment RW   Walk 10 Feet CARE Score 6   Walk 50 Feet with Two Turns   Type of Assistance Needed Independent; Adaptive equipment   Comment RW   Walk 50 Feet with Two Turns CARE Score 6   Walk 150 Feet   Type of Assistance Needed Independent; Adaptive equipment   Comment RW   Walk 150 Feet CARE Score 6   Walking 10 Feet on Uneven Surfaces   Type of Assistance Needed Independent; Adaptive equipment   Comment RW floor mat   Walking 10 Feet on Uneven Surfaces CARE Score 6   Ambulation   Distance Walked (feet) 350 ft  (x2)   Assist Device Roller Walker   Does the patient walk? 2.  Yes   Wheel 50 Feet with Two Turns   Reason if not Attempted Activity not applicable   Wheel 50 Feet with Two Turns CARE Score 9   Wheel 150 Feet   Reason if not Attempted Activity not applicable   Wheel 145 Feet CARE Score 9   Wheelchair mobility   Does the patient use a wheelchair? 0. No   Curb or Single Stair   Style negotiated Curb   Type of Assistance Needed Independent; Adaptive equipment   Comment 6 inch curb step   1 Step (Curb) CARE Score 6   4 Steps   Reason if not Attempted Activity not applicable   4 Steps CARE Score 9   12 Steps   Reason if not Attempted Activity not applicable   12 Steps CARE Score 9   Picking Up Object   Type of Assistance Needed Independent; Adaptive equipment   Comment RW for support reacher for marker   Picking Up Object CARE Score 6   Toilet Transfer   Type of Assistance Needed Independent; Adaptive equipment   Comment RW   Toilet Transfer CARE Score 6   Toilet Transfer   Surface Assessed Standard Toilet   Therapeutic Interventions   Flexibility BLE heel cord and HS gentle stretch TERT 2 minute   Neuromuscular Re-Education For balance: ambulation at railing UL UE support 2x30' fwd R UE 2x30' bwd R UE 2x30' fwd L UE 2x30' bwd L UE CGx1 throughout. Side stepping 2x30' in both directions progressing from BUE to L UE onlly focused on minimizing L hip trendelenberg   Equipment Use   NuStep L2 BLE 15 min   Assessment   Treatment Assessment pt engaged in skilled PT focus on care scores and progressnig with IRP and instruction with home care and dec fall risk and fall recovery .  Pt has all DME sin and see CM note for details and pt progressing for DC tomorrow and then to OPPT   Barriers to Discharge Inaccessible home environment;Decreased caregiver support   Plan   Progress Progressing toward goals   Discharge Recommendation   Rehab Resource Intensity Level, PT    (Home with OPPT)   PT Therapy Minutes   PT Time In 0930   PT Time Out 1100   PT Total Time (minutes) 90   PT Mode of treatment - Individual (minutes) 90   PT Mode of treatment - Concurrent (minutes) 0   PT Mode of treatment - Group (minutes) 0   PT Mode of treatment - Co-treat (minutes) 0   PT Mode of Treatment - Total time(minutes) 90 minutes   PT Cumulative Minutes 765   Therapy Time missed   Time missed? No               OT           11/19/23 0701   Pain Assessment   Pain Assessment Tool 0-10   Pain Score No Pain   Restrictions/Precautions   Precautions Fall Risk;Fluid restriction   Weight Bearing Restrictions No   ROM Restrictions Yes  (cervical precautions but no collar)   Braces or Orthoses    (BLE ACE wraps)   Eating   Type of Assistance Needed Independent   Physical Assistance Level No physical assistance   Eating CARE Score 6   Oral Hygiene   Type of Assistance Needed Independent   Physical Assistance Level No physical assistance   Comment in stance at sink   Oral Hygiene CARE Score 6   Shower/Bathe Self   Type of Assistance Needed Independent   Physical Assistance Level No physical assistance   Comment Able to bathe 10/10 parts with inc time. Shower/Bathe Self CARE Score 6   Tub/Shower Transfer   Findings Agreeable to shower in shower stall with shower chair at a [place] level. Upper Body Dressing   Type of Assistance Needed Independent   Physical Assistance Level No physical assistance   Upper Body Dressing CARE Score 6   Lower Body Dressing   Type of Assistance Needed Independent   Physical Assistance Level No physical assistance   Lower Body Dressing CARE Score 6   Putting On/Taking Off Footwear   Type of Assistance Needed Independent   Physical Assistance Level No physical assistance   Comment Can complete donning and doffing of standard footwear at a [place] level. Would require A for ACE wraps, but pt demos ability to manage compression stockings if team still feels he needs compression at time of d/c would recommend switch to stockings.    Putting On/Taking Off Footwear CARE Score 6   Sit to Stand   Type of Assistance Needed Independent   Physical Assistance Level No physical assistance   Sit to Stand CARE Score 6   Bed-Chair Transfer   Type of Assistance Needed Independent; Adaptive equipment   Physical Assistance Level No physical assistance   Comment RW   Chair/Bed-to-Chair Transfer CARE Score 6   Toileting Hygiene   Type of Assistance Needed Independent   Physical Assistance Level No physical assistance   Comment With simulation of AE which pt will order for home use pt is independent for rear hygiene. Due to environmental reasons pt would continue to require A here but can be scored as independent. Toileting Hygiene CARE Score 6   Toilet Transfer   Type of Assistance Needed Independent; Adaptive equipment   Physical Assistance Level No physical assistance   Comment RW   Toilet Transfer CARE Score 6   Exercise Tools   Other Exercise Tool 1 HEP provided, refer below. Cognition   Overall Cognitive Status WFL   Arousal/Participation Alert; Cooperative   Attention Within functional limits   Orientation Level Oriented X4   Memory Within functional limits   Following Commands Follows all commands and directions without difficulty   Activity Tolerance   Activity Tolerance Patient tolerated treatment well   Assessment   Treatment Assessment Pt participated in skilled OT services with focus on ADL retraining. Pt overall has made G progress towards achieving OT goals. Pt remains limited by BUE coordination/strength/dexterity, dec higher level standing tori/balance. He demos ability to perform basic ADL tasks at a [place] level with use of RW. Pt does have G insight into remaining deficits and compensates as needed and self initiates strategies. No necessary DME needs, pt independently purchasing shower chair with back, folding walker tray, toileting wand. Plan for pt to d/c home with supportive friends to A as needed with IADL tasks. Prognosis Good   Problem List Decreased strength;Decreased range of motion;Decreased endurance; Impaired balance;Decreased mobility; Decreased coordination;Orthopedic restrictions   OT Therapy Minutes   OT Time In 0700   OT Time Out 0830   OT Total Time (minutes) 90   OT Mode of treatment - Individual (minutes) 90   OT Mode of treatment - Concurrent (minutes) 0   OT Mode of treatment - Group (minutes) 0   OT Mode of treatment - Co-treat (minutes) 0   OT Mode of Treatment - Total time(minutes) 90 minutes   OT Cumulative Minutes 720   Therapy Time missed   Time missed? No              Discharge Diagnosis: Impairment of mobility, safety and Activities of Daily Living (ADLs) due to Spinal Cord Dysfunction:  Non-Traumatic:  04.1211  Quadriplegia, Incomplete C1-4    Discharge Medications:   See after visit summary for reconciled discharge medications provided to patient and family. Condition at Discharge: stable     Discharge instructions/Information to patient and family:   See after visit summary for information provided to patient and family. Provisions for Follow-Up Care:  See after visit summary for information related to follow-up care and any pertinent home health orders. Future Appointments   Date Time Provider 4600  [facility]   12/11/2023  1:00 PM Jaime Smith MD [facility]       Disposition: Home    Planned Readmission: No    Discharge Statement   I spent more than 30 minutes discharging the patient. This time was spent on the day of discharge. I had direct contact with the patient on the day of discharge. Greater than 50% of the total time was spent examining patient, answering all patient questions, arranging and discussing plan of care with patient as well as directly providing post-discharge instructions. Additional time then spent on discharge activities. Discharge Medications:  See after visit summary for reconciled discharge medications provided to patient and family.       Aislinn Amaya MD  Attending Physician  Physical Medicine and [place]

## 2023-11-22 NOTE — PROGRESS NOTES
11/22/23 1218   Hello, [Guardian’s Name / Patient’s Madhav English, this is [Caller Madhav English from [facility], and our clinical care team wanted to check on you / your child after your recent visit to the hospital. It will only take 3-5 minutes. Is this a good time? Discharge Call Type/ Specific Diagnosis: General Call   General Discharge Phone Call   Were your/your child's discharge instructions clear and understandable? Please tell me in your own words how to care for yourself/your child now that you're home Yes;Patient understood instructions   Have you filled your/your child's new prescriptions yet? Yes   What questions do you have about those medications? No questions   Are you/your child having any unusual symptoms or problems? (Specific to problem- i.e., dressing, pain, bruising or swelling, procedure, etc.) No reported symptoms/problems   Do you have follow up appointment with your/your child's physician? Yes   Is there anything preventing you from keeping that appointment? No;Patient able to keep appointment   Are there any physicians, nurses, or hospital staff you would like us to recognize for doing a very good job? Nurse;PCA/Tech;PT/OT/RT/SLP   Thank you for taking the time to share with me about your care and recovery. Do you have any suggestions for us? No   This call resulted in: No interventions needed   Call Complete   Attempted Number of Calls 2   Discharge phone call complete?  Complete

## 2023-11-22 NOTE — PROGRESS NOTES
Occupational Therapy Discharge Summary       Pt has made good Progress in Occupational Therapy. Pt MET long term goals. Pt progressed to Independent  level for functional transfers with RW. Independent level for ADL function with use of RW. Pt discharged home independently. OT recommending use of walker  and pt/family received and/or purchased prior to d/c for home use. Family training was completed prior to d/c to maximize independence and safety with ADL/IADL function at home environment. Pt safe to d/c home at current level of function and continues to present with deficits of weakness and impaired balance. Recommending None Supervision for ADLS and daily activities at time of D/C. Pt does not require further OT needs.

## 2023-11-27 ENCOUNTER — TELEPHONE (OUTPATIENT)
Dept: NEUROSURGERY | Facility: CLINIC | Age: 73
End: 2023-11-27

## 2023-11-27 NOTE — TELEPHONE ENCOUNTER
Pt called to confirm if xray was needed for 12/11 appt advise him yes, and let him know that we need to do it 3 days prior

## 2023-11-29 ENCOUNTER — EVALUATION (OUTPATIENT)
Dept: PHYSICAL THERAPY | Facility: CLINIC | Age: 73
End: 2023-11-29
Payer: COMMERCIAL

## 2023-11-29 DIAGNOSIS — M47.12 CERVICAL SPONDYLOSIS WITH MYELOPATHY: Primary | ICD-10-CM

## 2023-11-29 DIAGNOSIS — Z74.09 IMPAIRED MOBILITY AND ACTIVITIES OF DAILY LIVING: ICD-10-CM

## 2023-11-29 DIAGNOSIS — Z78.9 IMPAIRED MOBILITY AND ACTIVITIES OF DAILY LIVING: ICD-10-CM

## 2023-11-29 PROCEDURE — 97162 PT EVAL MOD COMPLEX 30 MIN: CPT

## 2023-11-29 NOTE — PROGRESS NOTES
PT Evaluation     Today's date: 2023  Patient name: Jasmyne Robison  : 1950  MRN: [id number]  Referring provider: Annita Granda MD  Dx:   Encounter Diagnosis     ICD-10-CM    1. Cervical spondylosis with myelopathy  M47.12 Ambulatory Referral to Physical Therapy      2. Impaired mobility and activities of daily living  Z74.09 Ambulatory Referral to Physical Therapy    Z78.9           Start Time: 1500  Stop Time: 1535  Total time in clinic (min): 35 minutes    Assessment  Assessment details: Patient is a 68 y.o. male who presents to physical therapy with s/p PCDF with a month long hospital stay. Patient presents to evaluation with pain, decreased shoulder range of motion, decreased upper extremity and lower extremity strength, gait difficulty and decreased tolerance to activity. I discussed risks, benefits, and alternatives to treatment, and answered all patient questions to patient satisfaction. Patient is an appropriate candidate for skilled PT and would benefit from skilled PT services to address the aforementioned impairments, achieve goals, maximize function, and improve quality of life. Pt is in agreement with this plan.     Patient Education: activity modifications as needed, pacing of activities, importance of HEP compliance, PT prognosis/POC    Impairments: activity intolerance, impaired balance, impaired physical strength and pain with function    Goals  ST weeks  Pt will demonstrate good understanding and compliance with HEP  Pt will demonstrate improved postural awareness and ability to self-correct without reliance on external cues     LT weeks  Pt will improve FOTO score to > or = to expected score to indicate improved functional abilities   Pt will increase shoulder strength to 4+/5 for improved tolerance/independence with ADLs  Pt will increase PROM to WNL to allow for return of AROM of affected shoulder  Pt will increase shoulder flexion/abduction to 170 degrees to increase independence with ADLs   Pt will improve TUG score to 12 seconds to decrease fall risk. Pt will ambulate with least restrictive assistive device safely. Pt will improve LE strength by 1 muscle grade to improve safety with gait. Plan  Plan details: 2x/wk  Patient would benefit from: PT eval and skilled physical therapy  Planned modality interventions: cryotherapy, thermotherapy: hydrocollator packs and TENS  Planned therapy interventions: ADL retraining, activity modification, balance, manual therapy, graded activity, graded exercise, graded motor, flexibility, functional ROM exercises, gait training, strengthening, stretching, therapeutic activities, therapeutic exercise, therapeutic training and home exercise program  Frequency: 2x week  Duration in weeks: 8  Plan of Care beginning date: 11/29/2023  Plan of Care expiration date: 1/24/2024  Treatment plan discussed with: patient      Subjective Evaluation    History of Present Illness  Mechanism of injury: Pt is a 67 y/o male who presents to therapy s/p cervical fusion on October 25th. He was in the hospital doing PT for 1 week, but he was in the hospital for about a month. He notes he lost range of motion in his arms since surgery. He notes back in March of 2023 he fell off a plastic chair outside. 2-3 weeks after that he started getting numbness in his toes and he told his PCP. He notes it got worse over the few weeks after that. Then he had an MRI and XRAY and he had compression on the spinal cord that started to affect his gait and ability to move his arms. The left was worse than the right and it continued to get worse. On October 20th he fell twice attempting to go to the bathroom. He went to [facility] where he was transferred to [facility] for surgery. He has numbness still in both feet and hands with the left being worse than the right. He has difficulty with balance, gait, and reaching with his arms.  He has a cleaning lady that helps with laundry, cleaning, and getting groceries. He presents today with a FWW. Prior to everything he was using no device to get around. He notes his endurance is low. He gets tired quickly when cooking.        Quality of life: poor    Pain  Current pain ratin  At best pain ratin  At worst pain ratin        Objective     Functional Assessment        Comments  5x STS: 36 seconds     TU seconds     LE strength:  Left:  Hip- 3+/5  Knee- 4/5  Ankle- 5/5    Right:  Hip- 3+/5  Knee- 4/5  Ankle- 5/5     UE strength:   Left: deferred due to active range  Right: deferred due to active range    L shoulder AROM:  flex-90 abd- 90     R shoulder AROM: flex- 20 abd- 20        Flowsheet Rows      Flowsheet Row Most Recent Value   PT/OT G-Codes    Current Score 47   Projected Score 58               Diagnosis: s/p C3-6 PCDF   Precautions: hx DVT, fall risk, cervical spine precautions    POC Expires: 24   Re-evaluation Date: 23   FOTO Scores/Date: Goal -;    Visit Count 1/10       Manuals                                Ther Ex                                                                                Neuro Re-Ed                                                               Ther Act                                                                                 Modalities

## 2023-11-30 NOTE — PHYSICAL THERAPY NOTE
PHYSICAL THERAPY DISCHARGE SUMMARY    Patient made good progress during his stay at the acute rehab center where he presented with an Impairment of mobility, safety and Activities of Daily Living (ADLs) due to Spinal Cord Dysfunction: Non-Traumatic/ Non-traumatic incomplete tetraplegia s/p C3-C6 PCDF. He presented on eval with impaired balance, weakness and dec toelrance to activity. He responded well to therapeutic activity and exercise, neuro re-ed and transfer, gait and stair training. He was able to progress to a mod(I) level with use of RW which was ordered for him through the [place]. Information provided to him on how to obtain a walker tray as he lived alone and would need to be fully (I). He was provided with a HEP and was recommended to follow up with OPPT services.      Russell Hernandez PT, DPT

## 2023-12-04 ENCOUNTER — APPOINTMENT (OUTPATIENT)
Dept: RADIOLOGY | Facility: CLINIC | Age: 73
End: 2023-12-04
Payer: COMMERCIAL

## 2023-12-04 DIAGNOSIS — M47.12 OSTEOARTHRITIS OF CERVICAL SPINE WITH MYELOPATHY: ICD-10-CM

## 2023-12-04 PROCEDURE — 72040 X-RAY EXAM NECK SPINE 2-3 VW: CPT

## 2023-12-05 ENCOUNTER — OFFICE VISIT (OUTPATIENT)
Dept: PHYSICAL THERAPY | Facility: CLINIC | Age: 73
End: 2023-12-05
Payer: COMMERCIAL

## 2023-12-05 DIAGNOSIS — Z74.09 IMPAIRED MOBILITY AND ACTIVITIES OF DAILY LIVING: ICD-10-CM

## 2023-12-05 DIAGNOSIS — M47.12 CERVICAL SPONDYLOSIS WITH MYELOPATHY: Primary | ICD-10-CM

## 2023-12-05 DIAGNOSIS — Z78.9 IMPAIRED MOBILITY AND ACTIVITIES OF DAILY LIVING: ICD-10-CM

## 2023-12-05 PROCEDURE — 97110 THERAPEUTIC EXERCISES: CPT

## 2023-12-05 PROCEDURE — 97112 NEUROMUSCULAR REEDUCATION: CPT

## 2023-12-05 PROCEDURE — 97530 THERAPEUTIC ACTIVITIES: CPT

## 2023-12-05 NOTE — PROGRESS NOTES
Daily Note     Today's date: 2023  Patient name: Francesca Mcmillan  : 1950  MRN: [id number]  Referring provider: Justin Servin MD  Dx:   Encounter Diagnosis     ICD-10-CM    1. Cervical spondylosis with myelopathy  M47.12       2. Impaired mobility and activities of daily living  Z74.09     Z78.9                      Subjective: pt reports neck is doing good, difficulty raising arms over head. Objective: See treatment diary below      Assessment: Tolerated treatment well. Patient would benefit from continued PT. Initiated POC as listed below w/ good tolerance. Assistance required for SLR abd on L side. Unable to use cane w/ supine shoulder flexion due to RUE not being able to raise overhead due to weakness. Plan: Continue per plan of care.       Diagnosis: s/p C3-6 PCDF   Precautions: hx DVT, fall risk, cervical spine precautions    POC Expires: 24   Re-evaluation Date: 23   FOTO Scores/Date: Goal -;    Visit Count 1/10 2/10      Manuals  12                              Ther Ex        Rec.bike  L1 6 min      Supine cane flexion  HHA 10" 10x      Table slides   2x10      pulleys  nv                                              Neuro Re-Ed        Quad sets  10" 10x      SLR flex/abd  2x10/x10 min assist on L side      Supine punches  2x10                                     Ther Act             STS  nv      Step ups  6" 10x                                                          Modalities

## 2023-12-07 ENCOUNTER — OFFICE VISIT (OUTPATIENT)
Dept: PHYSICAL THERAPY | Facility: CLINIC | Age: 73
End: 2023-12-07
Payer: COMMERCIAL

## 2023-12-07 DIAGNOSIS — Z74.09 IMPAIRED MOBILITY AND ACTIVITIES OF DAILY LIVING: ICD-10-CM

## 2023-12-07 DIAGNOSIS — M47.12 CERVICAL SPONDYLOSIS WITH MYELOPATHY: Primary | ICD-10-CM

## 2023-12-07 DIAGNOSIS — Z78.9 IMPAIRED MOBILITY AND ACTIVITIES OF DAILY LIVING: ICD-10-CM

## 2023-12-07 PROCEDURE — 97110 THERAPEUTIC EXERCISES: CPT

## 2023-12-07 PROCEDURE — 97530 THERAPEUTIC ACTIVITIES: CPT

## 2023-12-07 NOTE — PROGRESS NOTES
Daily Note     Today's date: 2023  Patient name: Pushpa Gibbons  : 1950  MRN: [id number]  Referring provider: Carri Scott MD  Dx:   Encounter Diagnosis     ICD-10-CM    1. Cervical spondylosis with myelopathy  M47.12       2. Impaired mobility and activities of daily living  Z74.09     Z78.9                      Subjective: pt reports feeling really tired and fatigued lately. Objective: See treatment diary below      Assessment: Tolerated treatment well. Patient would benefit from continued PT. Progressed UE mobility exercises and balance/stability activities. Multiple rest breaks required due to fatiguing easily. Plan: Continue per plan of care.       Diagnosis: s/p C3-6 PCDF   Precautions: hx DVT, fall risk, cervical spine precautions    POC Expires: 24   Re-evaluation Date: 23   FOTO Scores/Date: Goal -;    Visit Count 1/10 2/10 3/10     Manuals                              Ther Ex        Rec.bike  L1 6 min No resistance 6 min     Supine cane flexion  HHA 10" 10x      Table slides   2x10 2x10     pulleys  nv 2'/2'     Wall walk ups   5x     Shoulder isometrics    8x flexion only (other directions nv, pt was fatigued)                             Neuro Re-Ed        Quad sets  10" 10x      SLR flex/abd  2x10/x10 min assist on L side      Supine punches  2x10      Tandem walking   nv                            Ther Act             STS  nv Low mat 15x     Step ups  6" 10x 6" 10x                                                         Modalities

## 2023-12-08 ENCOUNTER — TELEPHONE (OUTPATIENT)
Age: 73
End: 2023-12-08

## 2023-12-08 NOTE — TELEPHONE ENCOUNTER
Dr Donato Swanson patient- called [facility] - Transferred to  [facility] in Dr. Khang Guardado office, patient's PCP. She will send message to Dr John Tan requesting referral for patient to see Nancy Ortega due to Diarrhea R19.7  [facility]) K56.7    Will send over Referral to [phone] Please call patient to schedule when received.  Thank you

## 2023-12-08 NOTE — TELEPHONE ENCOUNTER
Patients GI provider:  Maliha Burton PA-C    Number to return call: [phone]    Reason for call: Pt returning call regarding referral. Since pt is part of [place], PCP would have to be the one to put in the referral so that it is covered.  Please reach out to pt's PCP at [phone] for referral.    Scheduled procedure/appointment date if applicable: N/A

## 2023-12-11 ENCOUNTER — OFFICE VISIT (OUTPATIENT)
Dept: NEUROSURGERY | Facility: CLINIC | Age: 73
End: 2023-12-11

## 2023-12-11 VITALS
DIASTOLIC BLOOD PRESSURE: 78 MMHG | HEIGHT: 68 IN | SYSTOLIC BLOOD PRESSURE: 124 MMHG | HEART RATE: 106 BPM | TEMPERATURE: 97.9 F | RESPIRATION RATE: 18 BRPM | BODY MASS INDEX: 31.22 KG/M2 | WEIGHT: 206 LBS | OXYGEN SATURATION: 94 %

## 2023-12-11 DIAGNOSIS — M47.12 OSTEOARTHRITIS OF CERVICAL SPINE WITH MYELOPATHY: Primary | ICD-10-CM

## 2023-12-11 PROCEDURE — 99024 POSTOP FOLLOW-UP VISIT: CPT | Performed by: STUDENT IN AN ORGANIZED HEALTH CARE EDUCATION/TRAINING PROGRAM

## 2023-12-11 NOTE — PROGRESS NOTES
Office Note - Neurosurgery   Demetrio Webster 68 y.o. male MRN: [id number]      Assessment/Plan:    Soledad Paul is a 68year old male with cervical stenosis and myelopathy now status post C3-C6 posterior cervical decompression and fusion who returns for 6 week follow up. He has made slow improvements with PT and we again discussed that progress after cervical decompression can be frustratingly slow, but it is reassuring that he is making gains in strength. He has had no further falls. I reviewed his XR C spine which demonstrates stable positioning of his hardware without adverse features. I encouraged him to continue to work with PT. Will plan to see him back at 12 weeks post op with a repeat XR cervical spine. I have spent a total time of 25 minutes on 12/11/23 in caring for this patient including Diagnostic results, Prognosis, Impressions, Counseling / Coordination of care, and Documenting in the medical record. Subjective/Objective     Chief Complaint    Post-op (6 weeks pov)       HPI    Soledad Paul is a 68year old male with cervical stenosis and myelopathy now status post C3-C6 posterior cervical decompression and fusion (10/25/23) who returns for 6 week follow up. Since discharge he has been working with PT and does report some gradual improvements in his strength. He denies any falls. No wound healing issues. He does not that his left arm has gained more strength than his right since working with therapy. His numbness has remained stable. He is ambulating with a walker. Pain is well controlled with tylenol. LINDSEY PORTILLO personally reviewed and updated. Review of Systems   Constitutional:  Negative for fatigue. Gastrointestinal:         Not regular   Musculoskeletal:  Positive for gait problem (uses walker) and neck stiffness. Neurological:  Positive for dizziness, tremors (random in both legs), weakness (arms and legs) and numbness (both legs, feet, and hands).    Psychiatric/Behavioral: Negative for sleep disturbance. Family History    History reviewed. No pertinent family history. Social History    Social History     Socioeconomic History    Marital status: Single     Spouse name: Not on file    Number of children: Not on file    Years of education: Not on file    Highest education level: Not on file   Occupational History    Not on file   Tobacco Use    Smoking status: Never    Smokeless tobacco: Never   Vaping Use    Vaping Use: Never used   Substance and Sexual Activity    Alcohol use: Yes     Alcohol/week: 6.0 standard drinks of alcohol     Types: 6 Cans of beer per week     Comment: beer 4-5 days a week    Drug use: Never    Sexual activity: Not on file   Other Topics Concern    Not on file   Social History Narrative    Not on file     Social Determinants of Health     Financial Resource Strain: Not on file   Food Insecurity: No Food Insecurity (10/22/2023)    Hunger Vital Sign     Worried About Running Out of Food in the Last Year: Never true     Ran Out of Food in the Last Year: Never true   Transportation Needs: No Transportation Needs (10/22/2023)    PRAPARE - Transportation     Lack of Transportation (Medical): No     Lack of Transportation (Non-Medical):  No   Physical Activity: Not on file   Stress: Not on file   Social Connections: Not on file   Intimate Partner Violence: Not on file   Housing Stability: Low Risk  (10/22/2023)    Housing Stability Vital Sign     Unable to Pay for Housing in the Last Year: No     Number of Places Lived in the Last Year: 1     Unstable Housing in the Last Year: No       Past Medical History    Past Medical History:   Diagnosis Date    GERD (gastroesophageal reflux disease)     Hyperlipidemia     Hypertension        Surgical History    Past Surgical History:   Procedure Laterality Date    COLONOSCOPY      HERNIA REPAIR      WI ARTHRD PST/PSTLAT TQ 1NTRSPC CRV BELW C2 SEGMENT N/A 10/25/2023    Procedure: C3-6 PCDF;  Surgeon: Mony Rocha MD; Location: [facility];  Service: Neurosurgery    NV COLONOSCOPY FLX DX W/COLLJ SPEC WHEN PFRMD N/A 4/5/2019    Procedure: COLONOSCOPY;  Surgeon: Jc Lora DO;  Location: [facility]; Service: Gastroenterology    ROTATOR CUFF REPAIR Left     TONSILLECTOMY         Medications      Current Outpatient Medications:     acetaminophen (TYLENOL) 325 mg tablet, Take 2 tablets (650 mg total) by mouth every 6 (six) hours as needed (mild pain,headaches,fever), Disp: , Rfl:     albuterol (PROVENTIL HFA,VENTOLIN HFA) 90 mcg/act inhaler, INHALE 2 PUFFS BY MOUTH EVERY 6 HOURS AS NEEDED FOR WHEEZING AND SHORTNESS OF BREATH, Disp: , Rfl:     apixaban (ELIQUIS) 5 mg, Take 1 tablet (5 mg total) by mouth 2 (two) times a day, Disp: 60 tablet, Rfl: 0    atorvastatin (LIPITOR) 20 mg tablet, Take 10 mg by mouth daily, Disp: , Rfl:     calcium carbonate (TUMS) 500 mg chewable tablet, Chew 1 tablet (500 mg total) daily as needed for indigestion or heartburn (Patient taking differently: Chew 500 mg if needed for indigestion or heartburn), Disp: , Rfl:     fluticasone (FLONASE) 50 mcg/act nasal spray, into each nostril, Disp: , Rfl:     folic acid (FOLVITE) 687 MCG tablet, Take 400 mcg by mouth daily, Disp: , Rfl:     furosemide (LASIX) 20 mg tablet, Take 1 tablet (20 mg total) by mouth daily Do not start before November 18, 2023., Disp: 30 tablet, Rfl: 0    lisinopril (ZESTRIL) 5 mg tablet, Take 1 tablet (5 mg total) by mouth daily Do not start before November 18, 2023., Disp: 30 tablet, Rfl: 0    psyllium (METAMUCIL) packet, Take 1 packet by mouth daily Do not start before November 11, 2023., Disp: , Rfl: 0    finasteride (PROSCAR) 5 mg tablet, Take 1 tablet (5 mg total) by mouth daily Do not start before November 2, 2023.  (Patient not taking: Reported on 12/11/2023), Disp: , Rfl: 0    loratadine (CLARITIN) 10 mg tablet, Take 10 mg by mouth daily (Patient not taking: Reported on 12/11/2023), Disp: , Rfl:     melatonin 3 mg, Take 1 tablet (3 mg total) by mouth daily at bedtime (Patient not taking: Reported on 12/11/2023), Disp: , Rfl: 0    potassium chloride (K-DUR,KLOR-CON) 20 mEq tablet, Take 2 tablets (40 mEq total) by mouth daily Do not start before November 18, 2023. (Patient not taking: Reported on 12/11/2023), Disp: 60 tablet, Rfl: 0    saccharomyces boulardii (FLORASTOR) 250 mg capsule, Take 1 capsule (250 mg total) by mouth 2 (two) times a day (Patient not taking: Reported on 12/11/2023), Disp: , Rfl:     Allergies    No Known Allergies  Investigations    I personally reviewed the XR cervical spine results with the patient: stable position of C3-6 hardware with no adverse features. Physical Exam    Vitals:  Blood pressure 124/78, pulse (!) 106, temperature 97.9 °F (36.6 °C), temperature source Temporal, resp. rate 18, height 5' 8" (1.727 m), weight 93.4 kg (206 lb), SpO2 94 %. ,Body mass index is 31.32 kg/m².     Physical Exam  Neurologic Exam  Awake and alert  Oriented and appropriate  BUE 5/5, numbness in all fingers stable  BLE 5/5, ambulates with a walker

## 2023-12-13 ENCOUNTER — OFFICE VISIT (OUTPATIENT)
Dept: PHYSICAL THERAPY | Facility: CLINIC | Age: 73
End: 2023-12-13
Payer: COMMERCIAL

## 2023-12-13 DIAGNOSIS — Z74.09 IMPAIRED MOBILITY AND ACTIVITIES OF DAILY LIVING: ICD-10-CM

## 2023-12-13 DIAGNOSIS — Z78.9 IMPAIRED MOBILITY AND ACTIVITIES OF DAILY LIVING: ICD-10-CM

## 2023-12-13 DIAGNOSIS — M47.12 CERVICAL SPONDYLOSIS WITH MYELOPATHY: Primary | ICD-10-CM

## 2023-12-13 PROCEDURE — 97110 THERAPEUTIC EXERCISES: CPT

## 2023-12-13 PROCEDURE — 97530 THERAPEUTIC ACTIVITIES: CPT

## 2023-12-13 PROCEDURE — 97116 GAIT TRAINING THERAPY: CPT

## 2023-12-13 NOTE — PROGRESS NOTES
Daily Note     Today's date: 2023  Patient name: Lisa Box  : 1950  MRN: [id number]  Referring provider: Alka Crooks MD  Dx:   Encounter Diagnosis     ICD-10-CM    1. Cervical spondylosis with myelopathy  M47.12       2. Impaired mobility and activities of daily living  Z74.09     Z78.9                      Subjective: pt reports f/u went well and MD told him to continue therapy. Pt reports he has slightly increased mobility In the arms. Pt reports RLE chris occasionally. Pt notes that he would like to start practicing w/ the cane. Objective: See treatment diary below      Assessment: Tolerated treatment well. Patient demonstrated fatigue post treatment and would benefit from continued PT. Progressed functional strengthening as tolerated. Pt experienced RLE buckling t/o exercises today. Started to practice w/ SPC w/ CGA. Instructed pt to continue using FWW until he feels comfortable using SCP starting w/ use inside as tolerated. Plan: Continue per plan of care.       Diagnosis: s/p C3-6 PCDF   Precautions: hx DVT, fall risk, cervical spine precautions    POC Expires: 24   Re-evaluation Date: 23   FOTO Scores/Date: Goal -;    Visit Count 1/10 2/10 3/10 4/10    Manuals                             Ther Ex        Rec.bike  L1 6 min No resistance 6 min 6 min    Supine cane flexion  HHA 10" 10x      Table slides   2x10 2x10 2x10    pulleys  nv 2'/2' 2'    Wall walk ups   5x 5x    Shoulder isometrics    8x flexion only (other directions nv, pt was fatigued)                             Neuro Re-Ed        Quad sets  10" 10x      SLR flex/abd  2x10/x10 min assist on L side      Supine punches  2x10      Tandem walking   nv     Side stepping                       Ther Act             STS  nv Low mat 15x Low mat 2x10    Step ups  6" 10x 6" 10x 6" 15x                                                        Gait             SPC    8'

## 2023-12-14 ENCOUNTER — OFFICE VISIT (OUTPATIENT)
Dept: GASTROENTEROLOGY | Facility: CLINIC | Age: 73
End: 2023-12-14
Payer: COMMERCIAL

## 2023-12-14 VITALS
HEIGHT: 69 IN | HEART RATE: 102 BPM | WEIGHT: 218 LBS | BODY MASS INDEX: 32.29 KG/M2 | SYSTOLIC BLOOD PRESSURE: 108 MMHG | OXYGEN SATURATION: 97 % | DIASTOLIC BLOOD PRESSURE: 74 MMHG

## 2023-12-14 DIAGNOSIS — K59.00 CONSTIPATION, UNSPECIFIED CONSTIPATION TYPE: ICD-10-CM

## 2023-12-14 DIAGNOSIS — K56.7 ILEUS (HCC): Primary | ICD-10-CM

## 2023-12-14 DIAGNOSIS — M47.12 OSTEOARTHRITIS OF CERVICAL SPINE WITH MYELOPATHY: ICD-10-CM

## 2023-12-14 DIAGNOSIS — K59.01 SLOW TRANSIT CONSTIPATION: ICD-10-CM

## 2023-12-14 PROCEDURE — 99213 OFFICE O/P EST LOW 20 MIN: CPT | Performed by: PHYSICIAN ASSISTANT

## 2023-12-14 RX ORDER — FINASTERIDE 5 MG/1
5 TABLET, FILM COATED ORAL DAILY
Qty: 30 TABLET | Refills: 3 | Status: SHIPPED | OUTPATIENT
Start: 2023-12-14

## 2023-12-14 RX ORDER — POLYETHYLENE GLYCOL 3350 17 G/17G
17 POWDER, FOR SOLUTION ORAL DAILY
Qty: 578 G | Refills: 3 | Status: SHIPPED | OUTPATIENT
Start: 2023-12-14

## 2023-12-14 NOTE — PROGRESS NOTES
[facility] - Outpatient Follow-up Note  Nena Castrejon 68 y.o. male MRN: [id number]  Encounter: [id number]          ASSESSMENT AND PLAN:      1. Ileus ([address])  Status post recent admission for myelopathy resulting in multiple falls  He underwent a laminectomy surgery and after this developed an ileus  He was evaluated by our gastroenterology team at [facility]  He is doing better    2. Constipation, unspecified constipation type  Patient notes constipation since discharge  Using Senokot every 2 to 3 days  Advised MiraLAX 1 capful daily  25 to 30 g of fiber daily  64 ounces of water daily    He is due routinely for a colonoscopy in April -we will plan to call patient in March to schedule    ______________________________________________________________________    SUBJECTIVE: 61-year-old male with a recent history of a cervical myelopathy resulting in falls ultimately leading him to undergo a laminectomy at [address] [place] [address] which was complicated postoperatively by an ileus who presents for follow-up. The patient was evaluated by our gastroenterology team while admitted at [facility]. He was noted to have an ileus on imaging with complaints of multiple loose to watery stools. Stool testing was noted to be negative for any enteric pathogens or C. difficile. By the time GI was involved the patient was already improving. He was advised to avoid anticholinergics, laxatives and loperamide. Since his discharge from rehab in November he has been struggling with constipation. Reports a bowel movement every 2 to 3 days and typically is hard. He is using Senokot every 3 to 4 days if he does not have a bowel movement. He will typically take this at night to be able to have a bowel movement in the morning. He has never struggled with constipation before. He denies any rectal bleeding. He has no severe abdominal pain. He is tolerating a regular diet.   He has lost approximately 25 pounds over the past few months but admits that he was not eating well while hospitalized. His last colonoscopy was in 2019 with a hyperplastic polyp removed. He is due routinely for a colonoscopy in April of next year      REVIEW OF SYSTEMS IS OTHERWISE NEGATIVE. Historical Information   Past Medical History:   Diagnosis Date    GERD (gastroesophageal reflux disease)     Hyperlipidemia     Hypertension      Past Surgical History:   Procedure Laterality Date    COLONOSCOPY      HERNIA REPAIR      WY ARTHRD PST/PSTLAT TQ 1NTRSPC CRV BELW C2 SEGMENT N/A 10/25/2023    Procedure: C3-6 PCDF;  Surgeon: Richie Soto MD;  Location: [facility];  Service: Neurosurgery    [place] COLONOSCOPY FLX DX W/COLLJ [facility] WHEN PFRMD N/A 4/5/2019    Procedure: COLONOSCOPY;  Surgeon: Jasmina Bocanegra DO;  Location: [facility]; Service: Gastroenterology    ROTATOR CUFF REPAIR Left     TONSILLECTOMY       Social History   Social History     Substance and Sexual Activity   Alcohol Use Yes    Alcohol/week: 6.0 standard drinks of alcohol    Types: 6 Cans of beer per week    Comment: beer 4-5 days a week     Social History     Substance and Sexual Activity   Drug Use Never     Social History     Tobacco Use   Smoking Status Never   Smokeless Tobacco Never     No family history on file.     Meds/Allergies       Current Outpatient Medications:     acetaminophen (TYLENOL) 325 mg tablet    albuterol (PROVENTIL HFA,VENTOLIN HFA) 90 mcg/act inhaler    apixaban (ELIQUIS) 5 mg    atorvastatin (LIPITOR) 20 mg tablet    calcium carbonate (TUMS) 500 mg chewable tablet    fluticasone (FLONASE) 50 mcg/act nasal spray    folic acid (FOLVITE) 850 MCG tablet    furosemide (LASIX) 20 mg tablet    lisinopril (ZESTRIL) 5 mg tablet    loratadine (CLARITIN) 10 mg tablet    psyllium (METAMUCIL) packet    finasteride (PROSCAR) 5 mg tablet    melatonin 3 mg    potassium chloride (K-DUR,KLOR-CON) 20 mEq tablet    saccharomyces boulardii (FLORASTOR) 250 mg capsule    No Known Allergies        Objective     There were no vitals taken for this visit. There is no height or weight on file to calculate BMI. PHYSICAL EXAM:      General Appearance:   Alert, cooperative, no distress   HEENT:   Normocephalic, atraumatic, anicteric. Neck:  Supple, symmetrical, trachea midline   Lungs:   Clear to auscultation bilaterally; no rales, rhonchi or wheezing; respirations unlabored    Heart[de-identified]   Regular rate and rhythm; no murmur, rub, or gallop. Abdomen:   Soft, non-tender, non-distended; normal bowel sounds; no masses, no organomegaly    Genitalia:   Deferred    Rectal:   Deferred    Extremities:  No cyanosis, clubbing or edema    Pulses:  2+ and symmetric    Skin:  No jaundice, rashes, or lesions    Lymph nodes:  No palpable cervical lymphadenopathy        Lab Results:   No visits with results within 1 Day(s) from this visit.    Latest known visit with results is:   Admission on 11/10/2023, Discharged on 11/20/2023   Component Date Value    Salmonella sp PCR 11/12/2023 None Detected     Shigella sp/Enteroinvasi* 11/12/2023 None Detected     Campylobacter sp (jejuni* 11/12/2023 None Detected     Shiga toxin 1/Shiga toxi* 11/12/2023 None Detected     WBC 11/11/2023 5.76     RBC 11/11/2023 3.57 (L)     Hemoglobin 11/11/2023 11.6 (L)     Hematocrit 11/11/2023 33.9 (L)     MCV 11/11/2023 95     MCH 11/11/2023 32.5     MCHC 11/11/2023 34.2     RDW 11/11/2023 12.5     MPV 11/11/2023 9.4     Platelets 28/88/2988 379     nRBC 11/11/2023 0     Neutrophils Relative 11/11/2023 66     Immat GRANS % 11/11/2023 1     Lymphocytes Relative 11/11/2023 15     Monocytes Relative 11/11/2023 16 (H)     Eosinophils Relative 11/11/2023 1     Basophils Relative 11/11/2023 1     Neutrophils Absolute 11/11/2023 3.80     Immature Grans Absolute 11/11/2023 0.06     Lymphocytes Absolute 11/11/2023 0.86     Monocytes Absolute 11/11/2023 0.94     Eosinophils Absolute 11/11/2023 0.07     Basophils Absolute 11/11/2023 0.03 Sodium 11/11/2023 128 (L)     Potassium 11/11/2023 3.8     Chloride 11/11/2023 92 (L)     CO2 11/11/2023 29     ANION GAP 11/11/2023 7     BUN 11/11/2023 26 (H)     Creatinine 11/11/2023 0.89     Glucose 11/11/2023 87     Glucose, Fasting 11/11/2023 87     Calcium 11/11/2023 8.5     AST 11/11/2023 18     ALT 11/11/2023 19     Alkaline Phosphatase 11/11/2023 53     Total Protein 11/11/2023 5.9 (L)     Albumin 11/11/2023 3.5     Total Bilirubin 11/11/2023 0.72     eGFR 11/11/2023 84     Sodium 11/14/2023 130 (L)     Potassium 11/14/2023 3.5     Chloride 11/14/2023 94 (L)     CO2 11/14/2023 28     ANION GAP 11/14/2023 8     BUN 11/14/2023 15     Creatinine 11/14/2023 0.85     Glucose 11/14/2023 95     Glucose, Fasting 11/14/2023 95     Calcium 11/14/2023 8.9     eGFR 11/14/2023 86     Ventricular Rate 11/14/2023 108     Atrial Rate 11/14/2023 108     OK Interval 11/14/2023 190     QRSD Interval 11/14/2023 100     QT Interval 11/14/2023 330     QTC Interval 11/14/2023 442     P Axis 11/14/2023 30     QRS Hartman 11/14/2023 -31     T Wave Hartman 11/14/2023 23     Sodium 11/16/2023 132 (L)     Potassium 11/16/2023 3.9     Chloride 11/16/2023 95 (L)     CO2 11/16/2023 30     ANION GAP 11/16/2023 7     BUN 11/16/2023 10     Creatinine 11/16/2023 0.83     Glucose 11/16/2023 92     Glucose, Fasting 11/16/2023 92     Calcium 11/16/2023 9.2     eGFR 11/16/2023 87     WBC 11/16/2023 7.20     RBC 11/16/2023 4.00     Hemoglobin 11/16/2023 12.9     Hematocrit 11/16/2023 38.4     MCV 11/16/2023 96     MCH 11/16/2023 32.3     MCHC 11/16/2023 33.6     RDW 11/16/2023 12.4     MPV 11/16/2023 9.2     Platelets 38/56/6257 382     nRBC 11/16/2023 0     Neutrophils Relative 11/16/2023 70     Immat GRANS % 11/16/2023 1     Lymphocytes Relative 11/16/2023 18     Monocytes Relative 11/16/2023 8     Eosinophils Relative 11/16/2023 2     Basophils Relative 11/16/2023 1     Neutrophils Absolute 11/16/2023 5.07     Immature Grans Absolute 11/16/2023 0.07     Lymphocytes Absolute 11/16/2023 1.29     Monocytes Absolute 11/16/2023 0.60     Eosinophils Absolute 11/16/2023 0.12     Basophils Absolute 11/16/2023 0.05     Sodium 11/20/2023 133 (L)     Potassium 11/20/2023 3.8     Chloride 11/20/2023 96     CO2 11/20/2023 30     ANION GAP 11/20/2023 7     BUN 11/20/2023 10     Creatinine 11/20/2023 0.84     Glucose 11/20/2023 89     Glucose, Fasting 11/20/2023 89     Calcium 11/20/2023 9.0     eGFR 11/20/2023 86          Radiology Results:   XR spine cervical 2 or 3 vw injury    Result Date: 12/4/2023  Narrative: CERVICAL SPINE INDICATION:   Other spondylosis with myelopathy, cervical region. COMPARISON:  Comparison made with previous examination(s) dated (DX) 15-Nov-2023,(DX) [facility]2023[id number] 04-Nov-[facility]23,(CT) 02-Nov-2023. VIEWS:  XR SPINE CERVICAL 2 OR 3 VW INJURY Images: 2 FINDINGS: The patient status post laminectomy and posterior fusion C3-C6. The hardware is intact. No acute fracture or subluxation. Anatomic alignment. There is moderate to severe multilevel disc space narrowing with osteophytosis as well as facet disease in the mid to lower cervical spine. Normal prevertebral soft tissues. Clear lung apices. Impression: No acute osseous abnormality. Postsurgical change without hardware failure. Moderate to severe multilevel spondylosis Electronically signed: 12/04/2023 02:33 PM Anthony Ferrer MD    XR abdomen 1 view kub    Result Date: 11/16/2023  Narrative: ABDOMEN INDICATION:   monitor ileus. COMPARISON: 11/12/2023 VIEWS:  AP supine FINDINGS: As on prior, gaseous distention of large and small bowel is seen. No discernible free air on this supine study. Upright or left lateral decubitus imaging is more sensitive to detect subtle free air in the appropriate setting. No pathologic calcifications or soft tissue masses. Lung bases not well included on field-of-view. The cecal diameter is currently 13 cm.  Prior diameter 11.5 cm but other areas of the GI tract for example the transverse colon (today's diameter 7.4 cm, earlier 8.2 cm) are slightly less distended     Impression: Persistent gaseous distention of the large and small bowel. Overall degree of gaseous distention is similar to the previous examination.  Cecal diameter currently approximately 13 cm Workstation performed: [id number]

## 2023-12-15 ENCOUNTER — OFFICE VISIT (OUTPATIENT)
Dept: PHYSICAL THERAPY | Facility: CLINIC | Age: 73
End: 2023-12-15
Payer: COMMERCIAL

## 2023-12-15 DIAGNOSIS — Z74.09 IMPAIRED MOBILITY AND ACTIVITIES OF DAILY LIVING: ICD-10-CM

## 2023-12-15 DIAGNOSIS — M47.12 CERVICAL SPONDYLOSIS WITH MYELOPATHY: Primary | ICD-10-CM

## 2023-12-15 DIAGNOSIS — Z78.9 IMPAIRED MOBILITY AND ACTIVITIES OF DAILY LIVING: ICD-10-CM

## 2023-12-15 PROCEDURE — 97110 THERAPEUTIC EXERCISES: CPT

## 2023-12-15 PROCEDURE — 97530 THERAPEUTIC ACTIVITIES: CPT

## 2023-12-15 NOTE — PROGRESS NOTES
Daily Note     Today's date: 12/15/2023  Patient name: Humaira Chapa  : 1950  MRN: [id number]  Referring provider: Fitz Sofia MD  Dx:   Encounter Diagnosis     ICD-10-CM    1. Cervical spondylosis with myelopathy  M47.12       2. Impaired mobility and activities of daily living  Z74.09     Z78.9           Start Time: 0930  Stop Time: 1020  Total time in clinic (min): 50 minutes    Subjective: Pt offers no new complaints. Objective: See treatment diary below      Assessment: Able to complete increased reps with step ups today. Rows/ext added today to strengthen scapular muscles and decrease upper trap activation. Practiced gait with a SPC throughout session. Tolerated treatment well. Patient demonstrated fatigue post treatment and would benefit from continued PT      Plan: Continue per plan of care. Progress treatment as tolerated.        Diagnosis: s/p C3-6 PCDF   Precautions: hx DVT, fall risk, cervical spine precautions    POC Expires: 24   Re-evaluation Date: 23   FOTO Scores/Date: Goal -;    Visit Count 1/10 2/10 3/10 4/10 5/10   Manuals 11/29 12/5 12/7 12/13 12/15                           Ther Ex     12/15   Rec.bike  L1 6 min No resistance 6 min 6 min L1 6 min   Supine cane flexion  HHA 10" 10x      Table slides   2x10 2x10 2x10 2x10   pulleys  nv 2'/2' 2' 3'    Wall walk ups   5x 5x 10x R, 7x L    Shoulder isometrics    8x flexion only (other directions nv, pt was fatigued)                             Neuro Re-Ed     12/15    Quad sets  10" 10x      SLR flex/abd  2x10/x10 min assist on L side      Supine punches  2x10      Tandem walking   nv     Side stepping         rows/ext         Gtb 2x10     Ther Act          12/15    STS  nv Low mat 15x Low mat 2x10 Low mat 2x10    Step ups  6" 10x 6" 10x 6" 15x 6" 2x10                                                       Gait             SPC    8' normal... Well appearing, well nourished, awake, alert, oriented to person, place, time/situation and in no apparent distress.

## 2023-12-18 ENCOUNTER — TELEPHONE (OUTPATIENT)
Age: 73
End: 2023-12-18

## 2023-12-18 NOTE — TELEPHONE ENCOUNTER
Called and spoke to patient. Gave patient message as per Cortney. Patient also wanted to confirm that his scripts were in. [facility] Told patient if within a week he has not received [facility] to please call office.

## 2023-12-18 NOTE — TELEPHONE ENCOUNTER
Patients GI provider:  REBEL Lares    Number to return call: [phone]    Reason for call: Pt calling stating that when he had ov w/ REBEL Lares on 12/14 she was prescribing Proscar and Gycoloax. Per pt the scripts are to be mailed to him. I informed pt that she has placed a script order / [facility] pharmacy in [place]. According to pt he wants the script mailed to him. Please mail scripts to pt's address on his chart or reach out to him re: this.    Scheduled procedure/appointment date if applicable: N/A

## 2023-12-18 NOTE — TELEPHONE ENCOUNTER
Pt called back to ask Cortney if he should seek a referral to urology. He stated that it was discussed briefly at his last office visit.

## 2023-12-20 ENCOUNTER — TELEPHONE (OUTPATIENT)
Age: 73
End: 2023-12-20

## 2023-12-20 ENCOUNTER — OFFICE VISIT (OUTPATIENT)
Dept: PHYSICAL THERAPY | Facility: CLINIC | Age: 73
End: 2023-12-20
Payer: COMMERCIAL

## 2023-12-20 DIAGNOSIS — M47.12 CERVICAL SPONDYLOSIS WITH MYELOPATHY: Primary | ICD-10-CM

## 2023-12-20 DIAGNOSIS — Z78.9 IMPAIRED MOBILITY AND ACTIVITIES OF DAILY LIVING: ICD-10-CM

## 2023-12-20 DIAGNOSIS — Z74.09 IMPAIRED MOBILITY AND ACTIVITIES OF DAILY LIVING: ICD-10-CM

## 2023-12-20 PROCEDURE — 97110 THERAPEUTIC EXERCISES: CPT

## 2023-12-20 PROCEDURE — 97530 THERAPEUTIC ACTIVITIES: CPT

## 2023-12-20 NOTE — TELEPHONE ENCOUNTER
Patients GI provider:  REBEL Lares    Number to return call: [phone]    Reason for call: Pt calling stating he received a call from us. Pt stated he already saw Cortney 12/14/23 and informed him he is do for colonoscopy in April. Pt stating he is waiting until March to schedule colonoscopy.    Scheduled procedure/appointment date if applicable: N/A

## 2023-12-20 NOTE — PROGRESS NOTES
"Daily Note     Today's date: 2023  Patient name: Jack Singleton  : 1950  MRN: [id number]  Referring provider: Chaitanya Rush MD  Dx:   Encounter Diagnosis     ICD-10-CM    1. Cervical spondylosis with myelopathy  M47.12       2. Impaired mobility and activities of daily living  Z74.09     Z78.9           Start Time: 1015  Stop Time: 1100  Total time in clinic (min): 45 minutes    Subjective: pt offers no new complaints.       Objective: See treatment diary below      Assessment: Frequent rest breaks needed between exercises. Shakiness in LE with sit to stands and step ups. Pt able to complete 3 sets of sit to stands today. Tolerated treatment well. Patient demonstrated fatigue post treatment and would benefit from continued PT      Plan: Continue per plan of care.  Progress treatment as tolerated.       Diagnosis: s/p C3-6 PCDF   Precautions: hx DVT, fall risk, cervical spine precautions    POC Expires: 24   Re-evaluation Date: 23   FOTO Scores/Date: Goal -;    Visit Count 6/10 2/10 3/10 4/10 5/10   Manuals 12/20 12/5 12/7 12/13 12/15                           Ther Ex 12/20    12/15   Rec.bike L1 6 min  L1 6 min No resistance 6 min 6 min L1 6 min   Supine cane flexion  HHA 10\" 10x      Table slides  2x10  2x10 2x10 2x10 2x10   pulleys 3 flex/scap  nv 2'/2' 2' 3'    Wall walk ups 10x L, 10x R   5x 5x 10x R, 7x L    Shoulder isometrics    8x flexion only (other directions nv, pt was fatigued)                             Neuro Re-Ed 12/20    12/15    Quad sets  10\" 10x      SLR flex/abd  2x10/x10 min assist on L side      Supine punches  2x10      Tandem walking   nv     Side stepping         rows/ext Gtb 2x10        Gtb 2x10     Ther Act 12/20         12/15    STS Low mat 3x10  nv Low mat 15x Low mat 2x10 Low mat 2x10    Step ups 6\" 2x10  6\" 10x 6\" 10x 6\" 15x 6\" 2x10                                                     Gait            SPC    8'                                   "

## 2023-12-22 ENCOUNTER — APPOINTMENT (OUTPATIENT)
Dept: PHYSICAL THERAPY | Facility: CLINIC | Age: 73
End: 2023-12-22
Payer: COMMERCIAL

## 2023-12-27 ENCOUNTER — OFFICE VISIT (OUTPATIENT)
Dept: PHYSICAL THERAPY | Facility: CLINIC | Age: 73
End: 2023-12-27
Payer: COMMERCIAL

## 2023-12-27 DIAGNOSIS — Z78.9 IMPAIRED MOBILITY AND ACTIVITIES OF DAILY LIVING: ICD-10-CM

## 2023-12-27 DIAGNOSIS — Z74.09 IMPAIRED MOBILITY AND ACTIVITIES OF DAILY LIVING: ICD-10-CM

## 2023-12-27 DIAGNOSIS — M47.12 CERVICAL SPONDYLOSIS WITH MYELOPATHY: Primary | ICD-10-CM

## 2023-12-27 PROCEDURE — 97110 THERAPEUTIC EXERCISES: CPT

## 2023-12-27 NOTE — PROGRESS NOTES
PT Re-Evaluation     Today's date: 2023  Patient name: Jack Singleton  : 1950  MRN: [id number]  Referring provider: Chaitanya Rush MD  Dx:   Encounter Diagnosis     ICD-10-CM    1. Cervical spondylosis with myelopathy  M47.12       2. Impaired mobility and activities of daily living  Z74.09     Z78.9             Start Time: 1223  Stop Time: 1300  Total time in clinic (min): 37 minutes    Assessment  Assessment details: Patient is a 72 y/o male who presents to therapy s/p PCDF and has completed 7 visits to date with improved FOTO score of 56 from 47 since IE. Patient demonstrates subjective/objective improvement since starting PT such as LE strength, LE endurance, UE mobility, and activity tolerance. Patient continues to present with deficits that include shoulder mobility, shoulder strength, gait, activity tolerance, and endurance. Patient will benefit from continued skilled PT intervention to address the aforementioned impairments, achieve goals, maximize function, and improve quality of life. Pt is in agreement with this plan.  Impairments: activity intolerance, impaired balance, impaired physical strength and pain with function    Goals  ST weeks  Pt will demonstrate good understanding and compliance with HEP --met  Pt will demonstrate improved postural awareness and ability to self-correct without reliance on external cues --progressing     LT weeks  Pt will improve FOTO score to > or = to expected score to indicate improved functional abilities --progressing  Pt will increase shoulder strength to 4+/5 for improved tolerance/independence with ADLs --progressing  Pt will increase PROM to WNL to allow for return of AROM of affected shoulder --progressing  Pt will increase shoulder flexion/abduction to 170 degrees to increase independence with ADLs --progressing  Pt will improve TUG score to 12 seconds to decrease fall risk. --progressing   Pt will ambulate with least restrictive  assistive device safely. --progressing  Pt will improve LE strength by 1 muscle grade to improve safety with gait. -progressing    Plan  Plan details: 2x/wk  Patient would benefit from: PT eval and skilled physical therapy  Planned modality interventions: cryotherapy, thermotherapy: hydrocollator packs and TENS  Planned therapy interventions: ADL retraining, activity modification, balance, manual therapy, graded activity, graded exercise, graded motor, flexibility, functional ROM exercises, gait training, strengthening, stretching, therapeutic activities, therapeutic exercise, therapeutic training and home exercise program  Frequency: 2x week  Duration in weeks: 8  Plan of Care beginning date: 11/29/2023  Plan of Care expiration date: 1/24/2024  Treatment plan discussed with: patient      Subjective Evaluation    History of Present Illness  Mechanism of injury: RE (12/27):  Pt is a 72 y/o male who presents to therapy s/p cervical fusion done on October 25th. He notes improvements with LE strength, endurance, UE mobility. Pt notes he is now using a cane instead of a walker. He notes he has been using the cane around the house for 2 weeks. Pt reports he started driving recently and he uses the cane when he walks to and from the car. He notes some pain when driving in his neck. He notes some difficulty with getting out of the car due to the low seat. He notes his arm mobility is not where he would like it to be, and notes he needs to improve on it. He has difficulty with shaving his face, lifting the arm over head. He notes balance has gotten better, he needs to steady himself when he stands up right away. Pt notes numbness in the L UE has improved, but the R UE and b/l LE are still numb.     IE (11/29)  Pt is a 72 y/o male who presents to therapy s/p cervical fusion on October 25th. He was in the hospital doing PT for 1 week, but he was in the hospital for about a month. He notes he lost range of motion in his arms  since surgery. He notes back in 2023 he fell off a plastic chair outside. 2-3 weeks after that he started getting numbness in his toes and he told his PCP. He notes it got worse over the few weeks after that. Then he had an MRI and XRAY and he had compression on the spinal cord that started to affect his gait and ability to move his arms. The left was worse than the right and it continued to get worse. On  he fell twice attempting to go to the bathroom. He went to [facility] where he was transferred to [place] for surgery. He has numbness still in both feet and hands with the left being worse than the right. He has difficulty with balance, gait, and reaching with his arms. He has a cleaning lady that helps with laundry, cleaning, and getting groceries. He presents today with a FWW. Prior to everything he was using no device to get around. He notes his endurance is low. He gets tired quickly when cooking.       Quality of life: poor    Pain  Current pain ratin  At best pain ratin  At worst pain rating: 3  Location: neck  Quality: dull ache        Objective     Functional Assessment        Comments  5x STS: 11 seconds     TU seconds with SPC     LE strength:  Left:  Hip- flex- 4-/5 abd- 4/5  Knee- 5/5  Ankle- 5/5    Right:  Hip- flex- 4-/5 abd- 4/5  Knee- 5/5  Ankle- 5/5     UE strength:   Left: deferred due to active range  Right: deferred due to active range    L shoulder AROM:  flex-100 abd- 100    R shoulder AROM: flex-  40 abd- 40 (with moderate upper trap compensation)         Flowsheet Rows      Flowsheet Row Most Recent Value   PT/OT G-Codes    Current Score 56   Projected Score 58                 Diagnosis: s/p C3-6 PCDF   Precautions: hx DVT, fall risk, cervical spine precautions    POC Expires: 24   Re-evaluation Date: 23   FOTO Scores/Date: Goal -;    Visit Count 6/10 1/10 3/10 4/10 5/10   Manuals 12/20 12/27 12/7 12/13 12/15                          "  Ther Ex 12/20 12/27   12/15   Rec.bike L1 6 min  L1 6 min No resistance 6 min 6 min L1 6 min   Supine cane flexion        Table slides  2x10   2x10 2x10 2x10   pulleys 3 flex/scap   2'/2' 2' 3'    Wall walk ups 10x L, 10x R   5x 5x 10x R, 7x L    Shoulder isometrics    8x flexion only (other directions nv, pt was fatigued)             Pt edu  Updated FOTO/measurements               Neuro Re-Ed 12/20    12/15    Quad sets        SLR flex/abd        Supine punches        Tandem walking   nv     Side stepping         rows/ext Gtb 2x10       Gtb 2x10     Ther Act 12/20        12/15    STS Low mat 3x10   Low mat 15x Low mat 2x10 Low mat 2x10    Step ups 6\" 2x10   6\" 10x 6\" 15x 6\" 2x10                                                    Gait            SPC    8'                           "

## 2023-12-27 NOTE — PROGRESS NOTES
"Daily Note     Today's date: 2023  Patient name: Jack Singleton  : 1950  MRN: [id number]  Referring provider: Chaitanya Rush MD  Dx: No diagnosis found.               Subjective: ***      Objective: See treatment diary below      Assessment: Tolerated treatment {Tolerated treatment :}. Patient {assessment:0312677540}      Plan: {PLAN:[id number]}     Diagnosis: s/p C3-6 PCDF   Precautions: hx DVT, fall risk, cervical spine precautions    POC Expires: 24   Re-evaluation Date: 23   FOTO Scores/Date: Goal -;    Visit Count 6/10 2/10 3/10 4/10 5/10   Manuals 12/20 12/5 12/7 12/13 12/15                           Ther Ex 12/20    12/15   Rec.bike L1 6 min  L1 6 min No resistance 6 min 6 min L1 6 min   Supine cane flexion  HHA 10\" 10x      Table slides  2x10  2x10 2x10 2x10 2x10   pulleys 3 flex/scap  nv 2'/2' 2' 3'    Wall walk ups 10x L, 10x R   5x 5x 10x R, 7x L    Shoulder isometrics    8x flexion only (other directions nv, pt was fatigued)                             Neuro Re-Ed 12/20    12/15    Quad sets  10\" 10x      SLR flex/abd  2x10/x10 min assist on L side      Supine punches  2x10      Tandem walking   nv     Side stepping         rows/ext Gtb 2x10        Gtb 2x10     Ther Act 12/20         12/15    STS Low mat 3x10  nv Low mat 15x Low mat 2x10 Low mat 2x10    Step ups 6\" 2x10  6\" 10x 6\" 10x 6\" 15x 6\" 2x10                                                     Gait            SPC    8'                                     "

## 2024-01-02 ENCOUNTER — OFFICE VISIT (OUTPATIENT)
Dept: PHYSICAL THERAPY | Facility: CLINIC | Age: 74
End: 2024-01-02
Payer: COMMERCIAL

## 2024-01-02 DIAGNOSIS — Z74.09 IMPAIRED MOBILITY AND ACTIVITIES OF DAILY LIVING: ICD-10-CM

## 2024-01-02 DIAGNOSIS — Z78.9 IMPAIRED MOBILITY AND ACTIVITIES OF DAILY LIVING: ICD-10-CM

## 2024-01-02 DIAGNOSIS — M47.12 CERVICAL SPONDYLOSIS WITH MYELOPATHY: Primary | ICD-10-CM

## 2024-01-02 PROCEDURE — 97530 THERAPEUTIC ACTIVITIES: CPT

## 2024-01-02 PROCEDURE — 97110 THERAPEUTIC EXERCISES: CPT

## 2024-01-02 NOTE — PROGRESS NOTES
"Daily Note     Today's date: 2024  Patient name: Jack Singleton  : 1950  MRN: [id number]  Referring provider: Chaitanya Rush MD  Dx:   Encounter Diagnosis     ICD-10-CM    1. Cervical spondylosis with myelopathy  M47.12       2. Impaired mobility and activities of daily living  Z74.09     Z78.9           Start Time: 0930  Stop Time: 1015  Total time in clinic (min): 45 minutes    Subjective: pt offers no new complaints.       Objective: See treatment diary below      Assessment: Increased resistance on recumbent bike this visit. Pt completed STS with 5# kettle bell today. Good control with added weight with noted fatigue towards the end of the sets. Tolerated treatment well. Patient demonstrated fatigue post treatment and would benefit from continued PT      Plan: Continue per plan of care.  Progress treatment as tolerated.       Diagnosis: s/p C3-6 PCDF   Precautions: hx DVT, fall risk, cervical spine precautions    POC Expires: 24   Re-evaluation Date: 23   FOTO Scores/Date: Goal -;    Visit Count 6/10 1/10 2/10 4/10 510   Manuals 12/20 12/27 01/02 12/13 12/15                           Ther Ex 12/20 12/27 01/02  12/15   Rec.bike L1 6 min  L1 6 min L2 6 min  6 min L1 6 min   Supine cane flexion        Table slides  2x10    2x10 2x10   pulleys 3 flex/scap   3 flex/scap  2' 3'    Wall walk ups 10x L, 10x R   10x ea  5x 10x R, 7x L    Shoulder isometrics                 Pt edu  Updated FOTO/measurements               Neuro Re-Ed 12/20  01/02  12/15    Quad sets        SLR flex/abd        Supine punches        Tandem walking        Side stepping         rows/ext Gtb 2x10   Gtb 2x10    Gtb 2x10     Ther Act 12/20   01/02     12/15    STS Low mat 3x10   Low mat 2x10 5# Low mat 2x10 Low mat 2x10    Step ups 6\" 2x10   6\" 2x10  6\" 15x 6\" 2x10                                                   Gait            SPC    8'                           "

## 2024-01-04 ENCOUNTER — OFFICE VISIT (OUTPATIENT)
Dept: PHYSICAL THERAPY | Facility: CLINIC | Age: 74
End: 2024-01-04
Payer: COMMERCIAL

## 2024-01-04 DIAGNOSIS — M47.12 CERVICAL SPONDYLOSIS WITH MYELOPATHY: Primary | ICD-10-CM

## 2024-01-04 DIAGNOSIS — Z74.09 IMPAIRED MOBILITY AND ACTIVITIES OF DAILY LIVING: ICD-10-CM

## 2024-01-04 DIAGNOSIS — Z78.9 IMPAIRED MOBILITY AND ACTIVITIES OF DAILY LIVING: ICD-10-CM

## 2024-01-04 PROCEDURE — 97112 NEUROMUSCULAR REEDUCATION: CPT

## 2024-01-04 PROCEDURE — 97110 THERAPEUTIC EXERCISES: CPT

## 2024-01-04 PROCEDURE — 97530 THERAPEUTIC ACTIVITIES: CPT

## 2024-01-04 NOTE — PROGRESS NOTES
"Daily Note     Today's date: 2024  Patient name: Jack Singleton  : 1950  MRN: [id number]  Referring provider: Chaitanya Rush MD  Dx:   Encounter Diagnosis     ICD-10-CM    1. Cervical spondylosis with myelopathy  M47.12       2. Impaired mobility and activities of daily living  Z74.09     Z78.9                      Subjective: pt reports no new complaints upon arrival.       Objective: See treatment diary below      Assessment: Tolerated treatment well. Patient would benefit from continued PT. Progressed shoulder strengthening w/ cueing for posture/technique. Required assistance on R UE w/ concentric raising but pt was able to lower eccentrically better.       Plan: Continue per plan of care.      Diagnosis: s/p C3-6 PCDF   Precautions: hx DVT, fall risk, cervical spine precautions    POC Expires: 24   Re-evaluation Date: 23   FOTO Scores/Date: Goal -;    Visit Count 6/10 1/10 2/10 310    Manuals                             Ther Ex     Rec.bike L1 6 min  L1 6 min L2 6 min  L2 6 min    Supine cane flexion        Table slides  2x10        pulleys 3 flex/scap   3 flex/scap      Wall walk ups 10x L, 10x R   10x ea  10x ea    Shoulder isometrics         Supine shoulder flexion    10x                    Pt edu  Updated FOTO/measurements               Neuro Re-Ed     S/l abduction     10x    S/l ER    10x 2# on L   Supine punches        Tandem walking        Side stepping         rows/ext Gtb 2x10   Gtb 2x10  Gtb 2x10      Ther Act     STS Low mat 3x10   Low mat 2x10 5# Low mat 2x10 5#    Step ups 6\" 2x10   6\" 2x10  6\" 2x10                                                Gait          SPC                               "

## 2024-01-09 ENCOUNTER — APPOINTMENT (OUTPATIENT)
Dept: PHYSICAL THERAPY | Facility: CLINIC | Age: 74
End: 2024-01-09
Payer: COMMERCIAL

## 2024-01-09 NOTE — PROGRESS NOTES
"Daily Note     Today's date: 2024  Patient name: Jack Singleton  : 1950  MRN: [id number]  Referring provider: Chaitanya Rush MD  Dx: No diagnosis found.               Subjective: pt reports      Objective: See treatment diary below      Assessment: Tolerated treatment well. Patient would benefit from continued PT.      Plan: Continue per plan of care.      Diagnosis: s/p C3-6 PCDF   Precautions: hx DVT, fall risk, cervical spine precautions    POC Expires: 24   Re-evaluation Date: 23   FOTO Scores/Date: Goal -;    Visit Count 6/10 1/10 2/10 3/10 4/10   Manuals                            Ther Ex    Rec.bike L1 6 min  L1 6 min L2 6 min  L2 6 min    Supine cane flexion        Table slides  2x10        pulleys 3 flex/scap   3 flex/scap      Wall walk ups 10x L, 10x R   10x ea  10x ea    Shoulder isometrics         Supine shoulder flexion    10x                    Pt edu  Updated FOTO/measurements               Neuro Re-Ed    S/l abduction     10x    S/l ER    10x 2# on L   Supine punches        Tandem walking        Side stepping         rows/ext Gtb 2x10   Gtb 2x10  Gtb 2x10      Ther Act    STS Low mat 3x10   Low mat 2x10 5# Low mat 2x10 5#    Step ups 6\" 2x10   6\" 2x10  6\" 2x10                                                Gait          SPC                                 "

## 2024-01-11 ENCOUNTER — OFFICE VISIT (OUTPATIENT)
Dept: PHYSICAL THERAPY | Facility: CLINIC | Age: 74
End: 2024-01-11
Payer: COMMERCIAL

## 2024-01-11 DIAGNOSIS — Z74.09 IMPAIRED MOBILITY AND ACTIVITIES OF DAILY LIVING: ICD-10-CM

## 2024-01-11 DIAGNOSIS — Z78.9 IMPAIRED MOBILITY AND ACTIVITIES OF DAILY LIVING: ICD-10-CM

## 2024-01-11 DIAGNOSIS — M47.12 CERVICAL SPONDYLOSIS WITH MYELOPATHY: Primary | ICD-10-CM

## 2024-01-11 PROCEDURE — 97112 NEUROMUSCULAR REEDUCATION: CPT

## 2024-01-11 PROCEDURE — 97110 THERAPEUTIC EXERCISES: CPT

## 2024-01-11 NOTE — PROGRESS NOTES
"Daily Note     Today's date: 2024  Patient name: Jack Singleton  : 1950  MRN: [id number]  Referring provider: Chaitanya Rush MD  Dx:   Encounter Diagnosis     ICD-10-CM    1. Cervical spondylosis with myelopathy  M47.12       2. Impaired mobility and activities of daily living  Z74.09     Z78.9                      Subjective: pt reports continued weakness in the legs. Reports UE strength is improving.       Objective: See treatment diary below      Assessment: Tolerated treatment well. Patient would benefit from continued PT. Progressed LE and UE strengthening w/ good tolerance. Assistance w/ RUE exercises about half way through repetitions, mostly w/ concentric contraction.       Plan: Continue per plan of care.      Diagnosis: s/p C3-6 PCDF   Precautions: hx DVT, fall risk, cervical spine precautions    POC Expires: 24   Re-evaluation Date: 23   FOTO Scores/Date: Goal -;    Visit Count 5/10  210 3/10    Manuals                             Ther Ex       Rec.bike L2 6 min  L2 6 min  L2 6 min    Supine cane flexion 10\" 10x       Table slides         pulleys   3 flex/scap      Wall walk ups 10x ea  10x ea  10x ea    Shoulder isometrics         Supine shoulder flexion 10x   10x    SLR flexion 10x2               Pt edu                Neuro Re-Ed       S/l abduction  2# L 2x10; 0# 10x   10x    S/l ER 2# L 2x10; 0# R 2x10   10x    Supine punches        Tandem walking        Side stepping        Bridges 5\" 2x10        rows/ext   Gtb 2x10  Gtb 2x10      Ther Act       STS   Low mat 2x10 5# Low mat 2x10 5#    Step ups   6\" 2x10  6\" 2x10                                               Gait         SPC                                 "

## 2024-01-16 ENCOUNTER — TELEPHONE (OUTPATIENT)
Dept: NEUROSURGERY | Facility: CLINIC | Age: 74
End: 2024-01-16

## 2024-01-16 ENCOUNTER — APPOINTMENT (OUTPATIENT)
Dept: PHYSICAL THERAPY | Facility: CLINIC | Age: 74
End: 2024-01-16
Payer: COMMERCIAL

## 2024-01-16 NOTE — TELEPHONE ENCOUNTER
Patient phoned the nurse line to inquire if he could have an order for some form of imaging of his lower back for his upcoming 12 week follow up with Dr. Saldaña.  Patient states that he has little to no change in the numbness and tingling in his bilateral lower legs and he has a feeling that it is not related to his cervical area but his lower back.  Patient informed that this RN will message the provider team and see what we can do for him.      1602 patient phoned and updated that it is suggested he brings his concerns to Dr. Saldaña next week and see if he would like imaging then.  Patient appreciative for our time and will see us next week.

## 2024-01-17 ENCOUNTER — APPOINTMENT (OUTPATIENT)
Dept: RADIOLOGY | Facility: CLINIC | Age: 74
End: 2024-01-17
Payer: COMMERCIAL

## 2024-01-17 DIAGNOSIS — M47.12 OSTEOARTHRITIS OF CERVICAL SPINE WITH MYELOPATHY: ICD-10-CM

## 2024-01-17 PROCEDURE — 72040 X-RAY EXAM NECK SPINE 2-3 VW: CPT

## 2024-01-18 ENCOUNTER — OFFICE VISIT (OUTPATIENT)
Dept: PHYSICAL THERAPY | Facility: CLINIC | Age: 74
End: 2024-01-18
Payer: COMMERCIAL

## 2024-01-18 DIAGNOSIS — Z78.9 IMPAIRED MOBILITY AND ACTIVITIES OF DAILY LIVING: ICD-10-CM

## 2024-01-18 DIAGNOSIS — M47.12 CERVICAL SPONDYLOSIS WITH MYELOPATHY: Primary | ICD-10-CM

## 2024-01-18 DIAGNOSIS — Z74.09 IMPAIRED MOBILITY AND ACTIVITIES OF DAILY LIVING: ICD-10-CM

## 2024-01-18 PROCEDURE — 97110 THERAPEUTIC EXERCISES: CPT

## 2024-01-18 PROCEDURE — 97112 NEUROMUSCULAR REEDUCATION: CPT

## 2024-01-18 NOTE — PROGRESS NOTES
"Daily Note     Today's date: 2024  Patient name: Jack Singleton  : 1950  MRN: [id number]  Referring provider: Chaitanya Rush MD  Dx:   Encounter Diagnosis     ICD-10-CM    1. Cervical spondylosis with myelopathy  M47.12       2. Impaired mobility and activities of daily living  Z74.09     Z78.9           Start Time: 09  Stop Time: 1015  Total time in clinic (min): 40 minutes    Subjective: pt offers no new complaints.      Objective: See treatment diary below      Assessment: Difficulty completing side lying shoulder strengthening exercises. Therapist assistance provided to complete through full range of motion. Tolerated treatment well. Patient would benefit from continued PT      Plan: Continue per plan of care.      Diagnosis: s/p C3-6 PCDF   Precautions: hx DVT, fall risk, cervical spine precautions    POC Expires: 24   Re-evaluation Date: 23   FOTO Scores/Date: Goal -;    Visit Count 10 6/10 2/10 3/10    Manuals                             Ther Ex      Rec.bike L2 6 min L2 6 min L2 6 min  L2 6 min    Supine cane flexion 10\" 10x 10\" 10x       Table slides         pulleys   3 flex/scap      Wall walk ups 10x ea 10x ea  10x ea  10x ea    Shoulder isometrics         Supine shoulder flexion 10x 10x   10x    SLR flexion 10x2 2x10  # nv              Pt edu                Neuro Re-Ed      S/l abduction  2# L 2x10; 0# 10x 2# L 2x10; R 10x   10x    S/l ER 2# L 2x10; 0# R 2x10 2# L 2x10; R 2x10  10x    Supine punches        Tandem walking        Side stepping        Bridges 5\" 2x10 5\" 2x10        rows/ext   Gtb 2x10  Gtb 2x10      Ther Act       STS   Low mat 2x10 5# Low mat 2x10 5#    Step ups   6\" 2x10  6\" 2x10                                               Gait         SPC                                   "

## 2024-01-23 ENCOUNTER — APPOINTMENT (OUTPATIENT)
Dept: PHYSICAL THERAPY | Facility: CLINIC | Age: 74
End: 2024-01-23
Payer: COMMERCIAL

## 2024-01-24 ENCOUNTER — OFFICE VISIT (OUTPATIENT)
Dept: NEUROSURGERY | Facility: CLINIC | Age: 74
End: 2024-01-24
Payer: COMMERCIAL

## 2024-01-24 ENCOUNTER — TELEPHONE (OUTPATIENT)
Dept: NEUROSURGERY | Facility: CLINIC | Age: 74
End: 2024-01-24

## 2024-01-24 VITALS
HEIGHT: 69 IN | RESPIRATION RATE: 18 BRPM | HEART RATE: 86 BPM | OXYGEN SATURATION: 98 % | TEMPERATURE: 97.6 F | WEIGHT: 220 LBS | DIASTOLIC BLOOD PRESSURE: 66 MMHG | SYSTOLIC BLOOD PRESSURE: 122 MMHG | BODY MASS INDEX: 32.58 KG/M2

## 2024-01-24 DIAGNOSIS — G62.9 NEUROPATHY: Primary | ICD-10-CM

## 2024-01-24 PROCEDURE — 99213 OFFICE O/P EST LOW 20 MIN: CPT | Performed by: STUDENT IN AN ORGANIZED HEALTH CARE EDUCATION/TRAINING PROGRAM

## 2024-01-24 RX ORDER — GABAPENTIN 300 MG/1
300 CAPSULE ORAL 3 TIMES DAILY
Qty: 100 CAPSULE | Refills: 0 | Status: SHIPPED | OUTPATIENT
Start: 2024-01-24

## 2024-01-24 NOTE — TELEPHONE ENCOUNTER
PATIENT CHECKED OUT WITH A 4 WEEK FOLLOW UP AND XRAY SPINE DR ESCALERA ALSO SENT ORDERS FOR GABAPENTIN FAXED ORDERS TO [facility] -[phone]. Spoke to the [facility] she stated she will check that rx received.

## 2024-01-24 NOTE — PROGRESS NOTES
Office Note - Neurosurgery   Jack Singleton 73 y.o. male MRN: [id number]      Assessment/Plan:    Jack Singleton is a 73 year old male with cervical stenosis and myelopathy now status post C3-C6 posterior cervical decompression and fusion who returns for 12 week follow up. He reports he is doing well overall. He has made steady improvements with his walking, and now is using a cane for assistance. He has noted continued improvement in his hand strength and improvement in hand in arm numbness. He does note some worsening in lower extremity numbness today. He has also has issues with urge incontinence. He is concerned this could be coming from his low back. He denies any significant low back pain or radicular lower extremity pain. I did review an  MRI of his L spine from 10/2023 with him that has multi level degenerative changes but no significant compressive pathology. I'll order a flexion/extension study of his back today given the worsening neuropathy pain. Will also trial low dose gabapentin 300mg TID. I'd like to see him back in 4 weeks after starting the medication to see if it has been efficacious. Otherwise we did discuss that urge incontinence can often be related to prostate issues. He has not recently seen his PCP and I would like for him to see up an appointment with them to review this as worsening urinary incontinence in the absence of other low back red flag symptoms would be atypical for low back pathology.      Subjective/Objective     Chief Complaint    Post-op (6 WK F/U XRAYS)       HPI    Jack Singleton is a 73 year old male with cervical stenosis and myelopathy now status post C3-C6 posterior cervical decompression and fusion who returns for 12 week follow up    LINDSEY PORTILLO personally reviewed and updated.    Review of Systems   Constitutional: Negative.    HENT: Negative.     Eyes: Negative.    Respiratory: Negative.     Cardiovascular: Negative.    Gastrointestinal: Negative.         Not regular    Genitourinary:  Positive for difficulty urinating.        Had an incident of incontinence of urine    Musculoskeletal:  Positive for gait problem (uses walker/ has improved, uses cane instead of walker and walks independent at home). 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   Skin: Negative.    Allergic/Immunologic: Negative.    Neurological:  Positive for dizziness, tremors (random in both legs) and numbness (both legs, feet, and hands// left hand is worse than the right, feels it in both legs below the knee into th both feet and all toes). Negative for weakness (arms and legs// getting better with PT).   Hematological: Negative.    Psychiatric/Behavioral:  Negative for sleep disturbance.        Family History    History reviewed. No pertinent family history.    Social History    Social History     Socioeconomic History    Marital status: Single     Spouse name: Not on file    Number of children: Not on file    Years of education: Not on file    Highest education level: Not on file   Occupational History    Not on file   Tobacco Use    Smoking status: Never    Smokeless tobacco: Never   Vaping Use    Vaping status: Never Used   Substance and Sexual Activity    Alcohol use: Yes     Alcohol/week: 6.0 standard drinks of alcohol     Types: 6 Cans of beer per week     Comment: beer 4-5 days a week    Drug use: Never    Sexual activity: Not on file   Other Topics Concern    Not on file   Social History Narrative    Not on file     Social Determinants of Health     Financial Resource Strain: Not on file   Food Insecurity: No Food Insecurity (10/22/2023)    Hunger Vital Sign     Worried About Running Out of Food in the Last Year: Never true     Ran Out of Food in the Last Year: Never true   Transportation Needs: No Transportation Needs (10/22/2023)    PRAPARE - Transportation     Lack of Transportation (Medical): No     Lack of Transportation (Non-Medical):  No   Physical Activity: Not on file   Stress: Not on file   Social Connections: Not on file   Intimate Partner Violence: Not on file   Housing Stability: Low Risk  (10/22/2023)    Housing Stability Vital Sign     Unable to Pay for Housing in the Last Year: No     Number of Places Lived in the Last Year: 1     Unstable Housing in the Last Year: No       Past Medical History    Past Medical History:   Diagnosis Date    GERD (gastroesophageal reflux disease)     Hyperlipidemia     Hypertension        Surgical History    Past Surgical History:   Procedure Laterality Date    COLONOSCOPY      HERNIA REPAIR      LA ARTHRD PST/PSTLAT TQ 1NTRSPC CRV BELW C2 SEGMENT N/A 10/25/2023    Procedure: C3-6 PCDF;  Surgeon: Richy Saldaña MD;  Location: [facility];  Service: Neurosurgery    LA COLONOSCOPY FLX DX W/COLLJ SPEC WHEN PFRMD N/A 4/5/2019    Procedure: COLONOSCOPY;  Surgeon: Krishna Maguire DO;  Location: [facility];  Service: Gastroenterology    ROTATOR CUFF REPAIR Left     TONSILLECTOMY         Medications      Current Outpatient Medications:     acetaminophen (TYLENOL) 325 mg tablet, Take 2 tablets (650 mg total) by mouth every 6 (six) hours as needed (mild pain,headaches,fever), Disp: , Rfl:     albuterol (PROVENTIL HFA,VENTOLIN HFA) 90 mcg/act inhaler, INHALE 2 PUFFS BY MOUTH EVERY 6 HOURS AS NEEDED FOR WHEEZING AND SHORTNESS OF BREATH, Disp: , Rfl:     apixaban (ELIQUIS) 5 mg, Take 1 tablet (5 mg total) by mouth 2 (two) times a day, Disp: 60 tablet, Rfl: 0    atorvastatin (LIPITOR) 20 mg tablet, Take 10 mg by mouth daily, Disp: , Rfl:     finasteride (PROSCAR) 5 mg tablet, Take 1 tablet (5 mg total) by mouth daily, Disp: 30 tablet, Rfl: 3    fluticasone (FLONASE) 50 mcg/act nasal spray, into each nostril, Disp: , Rfl:     folic acid (FOLVITE) 800 MCG tablet, Take 400 mcg by mouth daily, Disp: , Rfl:     loratadine (CLARITIN) 10 mg tablet, Take 10 mg by mouth daily, Disp: , Rfl:     polyethylene glycol (GLYCOLAX) 17  "GM/SCOOP powder, Take 17 g by mouth daily, Disp: 578 g, Rfl: 3    psyllium (METAMUCIL) packet, Take 1 packet by mouth daily Do not start before November 11, 2023., Disp: , Rfl: 0    calcium carbonate (TUMS) 500 mg chewable tablet, Chew 1 tablet (500 mg total) daily as needed for indigestion or heartburn (Patient not taking: Reported on 1/24/2024), Disp: , Rfl:     furosemide (LASIX) 20 mg tablet, Take 1 tablet (20 mg total) by mouth daily Do not start before November 18, 2023., Disp: 30 tablet, Rfl: 0    lisinopril (ZESTRIL) 5 mg tablet, Take 1 tablet (5 mg total) by mouth daily Do not start before November 18, 2023., Disp: 30 tablet, Rfl: 0    potassium chloride (K-DUR,KLOR-CON) 20 mEq tablet, Take 2 tablets (40 mEq total) by mouth daily Do not start before November 18, 2023. (Patient not taking: Reported on 12/11/2023), Disp: 60 tablet, Rfl: 0    Allergies    No Known Allergies    Physical Exam    Vitals:  Blood pressure 122/66, pulse 86, temperature 97.6 °F (36.4 °C), temperature source Temporal, resp. rate 18, height 5' 8.5\" (1.74 m), weight 99.8 kg (220 lb), SpO2 98%.,Body mass index is 32.96 kg/m².    Physical Exam  Neurologic Exam  Awake and alert   Oriented and appropriate  BUE 5/5, numbness in all fingers stable  BLE 5/5, ambulates with a cane  "

## 2024-01-25 ENCOUNTER — TELEPHONE (OUTPATIENT)
Dept: NEUROSURGERY | Facility: CLINIC | Age: 74
End: 2024-01-25

## 2024-01-25 ENCOUNTER — OFFICE VISIT (OUTPATIENT)
Dept: PHYSICAL THERAPY | Facility: CLINIC | Age: 74
End: 2024-01-25
Payer: COMMERCIAL

## 2024-01-25 DIAGNOSIS — Z98.1 S/P CERVICAL SPINAL FUSION: Primary | ICD-10-CM

## 2024-01-25 DIAGNOSIS — Z78.9 IMPAIRED MOBILITY AND ACTIVITIES OF DAILY LIVING: ICD-10-CM

## 2024-01-25 DIAGNOSIS — Z74.09 IMPAIRED MOBILITY AND ACTIVITIES OF DAILY LIVING: ICD-10-CM

## 2024-01-25 DIAGNOSIS — M47.12 CERVICAL SPONDYLOSIS WITH MYELOPATHY: Primary | ICD-10-CM

## 2024-01-25 PROCEDURE — 97112 NEUROMUSCULAR REEDUCATION: CPT

## 2024-01-25 PROCEDURE — 97110 THERAPEUTIC EXERCISES: CPT

## 2024-01-25 NOTE — PROGRESS NOTES
"Daily Note     Today's date: 2024  Patient name: Jack Singleton  : 1950  MRN: [id number]  Referring provider: Chaitanya Rush MD  Dx:   Encounter Diagnosis     ICD-10-CM    1. Cervical spondylosis with myelopathy  M47.12       2. Impaired mobility and activities of daily living  Z74.09     Z78.9                      Subjective: pt reports overall improvement w/ UE strength and mobility. Continued difficulty reaching out to side w/ R UE. Continued weakness in the legs.       Objective: See treatment diary below      Assessment: Tolerated treatment well. Patient would benefit from continued PT. Improved control w/ supine active flexion on R side. Pt was challenged w/ increased resistance to SLR's.       Plan: Continue per plan of care.      Diagnosis: s/p C3-6 PCDF   Precautions: hx DVT, fall risk, cervical spine precautions    POC Expires: 24   Re-evaluation Date: 23   FOTO Scores/Date: Goal -;    Visit Count /10 6/10 7/10     Manuals                              Ther Ex        Rec.bike L2 6 min L2 6 min L2 6 min     Supine cane flexion 10\" 10x 10\" 10x  10\" 10x     Table slides         pulleys        Wall walk ups 10x ea 10x ea  5# 10x     Shoulder isometrics         Supine shoulder flexion 10x 10x  2# L 10x;0# R 10x     SLR flexion 10x2 2x10  2# 2x10             Pt edu                Neuro Re-Ed        S/l abduction  2# L 2x10; 0# 10x 2# L 2x10; R 10x  2# L 2x10; R 10x      S/l ER 2# L 2x10; 0# R 2x10 2# L 2x10; R 2x10 2# L 2x10; R 2x10     Supine punches        Tandem walking        Side stepping        Bridges 5\" 2x10 5\" 2x10        rows/ext   BTB rows 2x10      Ther Act        STS        Step ups        Functional reach w/ cones   R only to top of cuff weight shelf 1' 3 cones                                       Gait        SPC                                     "

## 2024-01-25 NOTE — TELEPHONE ENCOUNTER
Received call on nurseline from patient requesting new referral placed so he can continue PT. Patient is 12 weeks s/p C3-6 PCDF and saw Dr. Saldaña in clinic yesterday 1/24/24. He will return in 4 weeks with x-rays. Referral placed. All questions answered at this time, he was appreciative of the assistance.

## 2024-01-26 ENCOUNTER — TELEPHONE (OUTPATIENT)
Dept: NEUROSURGERY | Facility: CLINIC | Age: 74
End: 2024-01-26

## 2024-01-26 NOTE — TELEPHONE ENCOUNTER
Patient phoned to inquire if he was good to go with additional physical therapy appointments.  Patient phoned back by this RN and informed that yes, he is good to go.  The referral is in and the next step is just for him to schedule.  Patient appreciative for phone call clarification   Hypercholesterolemia Monitoring: I explained this is common when taking isotretinoin. We will monitor closely.

## 2024-01-29 ENCOUNTER — APPOINTMENT (OUTPATIENT)
Dept: RADIOLOGY | Facility: CLINIC | Age: 74
End: 2024-01-29
Payer: COMMERCIAL

## 2024-01-29 DIAGNOSIS — G62.9 NEUROPATHY: ICD-10-CM

## 2024-01-29 PROCEDURE — 72114 X-RAY EXAM L-S SPINE BENDING: CPT

## 2024-01-30 ENCOUNTER — OFFICE VISIT (OUTPATIENT)
Dept: PHYSICAL THERAPY | Facility: CLINIC | Age: 74
End: 2024-01-30
Payer: COMMERCIAL

## 2024-01-30 DIAGNOSIS — Z74.09 IMPAIRED MOBILITY AND ACTIVITIES OF DAILY LIVING: ICD-10-CM

## 2024-01-30 DIAGNOSIS — Z78.9 IMPAIRED MOBILITY AND ACTIVITIES OF DAILY LIVING: ICD-10-CM

## 2024-01-30 DIAGNOSIS — M47.12 CERVICAL SPONDYLOSIS WITH MYELOPATHY: Primary | ICD-10-CM

## 2024-01-30 PROCEDURE — 97110 THERAPEUTIC EXERCISES: CPT

## 2024-01-30 PROCEDURE — 97112 NEUROMUSCULAR REEDUCATION: CPT

## 2024-01-30 NOTE — PROGRESS NOTES
"Daily Note     Today's date: 2024  Patient name: Jack Singleton  : 1950  MRN: [id number]  Referring provider: Chaitanya Rush MD  Dx:   Encounter Diagnosis     ICD-10-CM    1. Cervical spondylosis with myelopathy  M47.12       2. Impaired mobility and activities of daily living  Z74.09     Z78.9                      Subjective: pt reports feeling okay today.       Objective: See treatment diary below      Assessment: Tolerated treatment well. Increased reps for wall climbs. Patient had increased difficulty near the end of his reps on the right side experiencing muscle fatigue. Patient continues to have increased difficulty reaching out to the side with his RUE. Patient did require cues on form with TB rows. Patient would benefit from continued PT.       Plan: Continue per plan of care.      Diagnosis: s/p C3-6 PCDF   Precautions: hx DVT, fall risk, cervical spine precautions    POC Expires: 24   Re-evaluation Date: 23   FOTO Scores/Date: Goal -;    Visit Count 5/10 6/10 7/10 8/10    Manuals                             Ther Ex      Rec.bike L2 6 min L2 6 min L2 6 min L2 6 min     Supine cane flexion 10\" 10x 10\" 10x  10\" 10x 10\" x 10    Table slides         pulleys        Wall walk ups 10x ea 10x ea  5# 10x 2 x 10     Shoulder isometrics         Supine shoulder flexion 10x 10x  2# L 10x;0# R 10x 2# L 10x;0# R 10x    SLR flexion 10x2 2x10  2# 2x10 2# 2 x 10            Pt edu                Neuro Re-Ed        S/l abduction  2# L 2x10; 0# 10x 2# L 2x10; R 10x  2# L 2x10; R 10x  2# L 2x10; R 10x     S/l ER 2# L 2x10; 0# R 2x10 2# L 2x10; R 2x10 2# L 2x10; R 2x10 2# L 2x10; R 2x10    Supine punches        Tandem walking        Side stepping        Bridges 5\" 2x10 5\" 2x10        rows/ext   BTB rows 2x10 BTB rows 2x10     Ther Act        STS        Step ups        Functional reach w/ cones   R only to top of cuff weight shelf 1' 3 cones                            "            Gait        SPC

## 2024-02-01 ENCOUNTER — OFFICE VISIT (OUTPATIENT)
Dept: PHYSICAL THERAPY | Facility: CLINIC | Age: 74
End: 2024-02-01
Payer: COMMERCIAL

## 2024-02-01 DIAGNOSIS — Z78.9 IMPAIRED MOBILITY AND ACTIVITIES OF DAILY LIVING: ICD-10-CM

## 2024-02-01 DIAGNOSIS — M47.12 CERVICAL SPONDYLOSIS WITH MYELOPATHY: Primary | ICD-10-CM

## 2024-02-01 DIAGNOSIS — Z74.09 IMPAIRED MOBILITY AND ACTIVITIES OF DAILY LIVING: ICD-10-CM

## 2024-02-01 PROCEDURE — 97110 THERAPEUTIC EXERCISES: CPT

## 2024-02-01 NOTE — PROGRESS NOTES
PT Re-Evaluation     Today's date: 2024  Patient name: Jack Singleton  : 1950  MRN: [id number]  Referring provider: Richy Saldaña MD  Dx:   Encounter Diagnosis     ICD-10-CM    1. Cervical spondylosis with myelopathy  M47.12 PT plan of care cert/re-cert      2. Impaired mobility and activities of daily living  Z74.09 PT plan of care cert/re-cert    Z78.9             Start Time: 930  Stop Time: 1010  Total time in clinic (min): 40 minutes    Assessment  Assessment details: Patient is a  72 y/o male who presents to therapy s/p  and has completed 14 visits to date. Patient demonstrates subjective/objective improvement since starting PT such as activity tolerance, shoulder mobility and strength, improved balance with gait, and ability to perform self-care. Patient continues to present with deficits that include strength and mobility in the upper and lower extremities, gait, balance, and activity tolerance. Patient will benefit from continued skilled PT intervention to address the aforementioned impairments, achieve goals, maximize function, and improve quality of life. Pt is in agreement with this plan.   Impairments: activity intolerance, impaired balance, impaired physical strength and pain with function    Goals  ST weeks  Pt will demonstrate good understanding and compliance with HEP --met  Pt will demonstrate improved postural awareness and ability to self-correct without reliance on external cues --progressing     LT weeks  Pt will improve FOTO score to > or = to expected score to indicate improved functional abilities --progressing  Pt will increase shoulder strength to 4+/5 for improved tolerance/independence with ADLs --progressing  Pt will increase PROM to WNL to allow for return of AROM of affected shoulder --progressing  Pt will increase shoulder flexion/abduction to 170 degrees to increase independence with ADLs --progressing  Pt will improve TUG score to 12 seconds to decrease  fall risk. --progressing   Pt will ambulate with least restrictive assistive device safely. --progressing  Pt will improve LE strength by 1 muscle grade to improve safety with gait. -progressing    Plan  Plan details: 2x/wk  Patient would benefit from: PT eval and skilled physical therapy  Planned modality interventions: cryotherapy, thermotherapy: hydrocollator packs and TENS  Planned therapy interventions: ADL retraining, activity modification, balance, manual therapy, graded activity, graded exercise, graded motor, flexibility, functional ROM exercises, gait training, strengthening, stretching, therapeutic activities, therapeutic exercise, therapeutic training and home exercise program  Frequency: 2x week  Duration in weeks: 8  Plan of Care beginning date: 1/25/2024  Plan of Care expiration date: 3/21/2024  Treatment plan discussed with: patient      Subjective Evaluation    History of Present Illness  Mechanism of injury: RE-EVAL (2/1/24)  Pt has completed 14 PT visits to date since since cervival fusion on October 25th.  Patient reports improved strength and ROM with UE.  Reports improved balance feeling he is walking better using cane for community ambulation and no AD inside the house.  Reports less reliance on use of UE push off with getting out of chair.   Reports difficulty reaching over head without assistance with R UE.  Reports improved self care.          RE (12/27):  Pt is a 72 y/o male who presents to therapy s/p cervical fusion done on October 25th. He notes improvements with LE strength, endurance, UE mobility. Pt notes he is now using a cane instead of a walker. He notes he has been using the cane around the house for 2 weeks. Pt reports he started driving recently and he uses the cane when he walks to and from the car. He notes some pain when driving in his neck. He notes some difficulty with getting out of the car due to the low seat. He notes his arm mobility is not where he would like it to  be, and notes he needs to improve on it. He has difficulty with shaving his face, lifting the arm over head. He notes balance has gotten better, he needs to steady himself when he stands up right away. Pt notes numbness in the L UE has improved, but the R UE and b/l LE are still numb.     IE ()  Pt is a 72 y/o male who presents to therapy s/p cervical fusion on . He was in the hospital doing PT for 1 week, but he was in the hospital for about a month. He notes he lost range of motion in his arms since surgery. He notes back in 2023 he fell off a plastic chair outside. 2-3 weeks after that he started getting numbness in his toes and he told his PCP. He notes it got worse over the few weeks after that. Then he had an MRI and XRAY and he had compression on the spinal cord that started to affect his gait and ability to move his arms. The left was worse than the right and it continued to get worse. On  he fell twice attempting to go to the bathroom. He went to [facility] where he was transferred to [place] for surgery. He has numbness still in both feet and hands with the left being worse than the right. He has difficulty with balance, gait, and reaching with his arms. He has a cleaning lady that helps with laundry, cleaning, and getting groceries. He presents today with a FWW. Prior to everything he was using no device to get around. He notes his endurance is low. He gets tired quickly when cooking.       Quality of life: poor    Pain  Current pain ratin  At best pain ratin  At worst pain ratin  Location: neck  Quality: dull ache        Objective     Functional Assessment        Comments  5x STS: 11 seconds decreased (UE use compared to last time)     TUG: 10 seconds no AD, wide TIFF     LE strength:  Left:  Hip- flex- 4-/5 abd- 4/5  Knee- 5/5  Ankle- 5/5    Right:  Hip- flex- 4/5 abd- 4/5  Knee- 5/5  Ankle- 5/5     Shoulder strength:   Left: flex- 4/5 abd-  "4-/5  Right: deferred due to active range    L shoulder AROM:  flex-135 abd- 140 IR- L3 ER- C5    R shoulder AROM: flex-65 abd- 50 (with moderate upper trap compensation) ER- occiput IR- L3      Flowsheet Rows      Flowsheet Row Most Recent Value   PT/OT G-Codes    Current Score 54   Projected Score 58          Diagnosis: s/p C3-6 PCDF   Precautions: hx DVT, fall risk, cervical spine precautions    POC Expires: 3/21/24   Re-evaluation Date: 2/22/24   FOTO Scores/Date: Goal -   Visit Count 5/10 6/10 7/10 8/10 9/10   Manuals 1/11 1/18 1/25 1/30 2/1                           Ther Ex  1/18 1/30 2/1   Rec.bike L2 6 min L2 6 min L2 6 min L2 6 min  L2 x 6 mins   Supine cane flexion 10\" 10x 10\" 10x  10\" 10x 10\" x 10    Table slides         pulleys        Wall walk ups 10x ea 10x ea  5# 10x 2 x 10     Shoulder isometrics         Supine shoulder flexion 10x 10x  2# L 10x;0# R 10x 2# L 10x;0# R 10x    SLR flexion 10x2 2x10  2# 2x10 2# 2 x 10            Pt edu     FOTO updated subjective objective data collected for re-eval           Neuro Re-Ed  1/18      S/l abduction  2# L 2x10; 0# 10x 2# L 2x10; R 10x  2# L 2x10; R 10x  2# L 2x10; R 10x     S/l ER 2# L 2x10; 0# R 2x10 2# L 2x10; R 2x10 2# L 2x10; R 2x10 2# L 2x10; R 2x10    Supine punches        Tandem walking        Side stepping        Bridges 5\" 2x10 5\" 2x10        rows/ext   BTB rows 2x10 BTB rows 2x10     Ther Act        STS        Step ups        Functional reach w/ cones   R only to top of cuff weight shelf 1' 3 cones                                       Gait        SPC                               "

## 2024-02-06 ENCOUNTER — APPOINTMENT (OUTPATIENT)
Dept: PHYSICAL THERAPY | Facility: CLINIC | Age: 74
End: 2024-02-06
Payer: COMMERCIAL

## 2024-02-08 ENCOUNTER — APPOINTMENT (OUTPATIENT)
Dept: PHYSICAL THERAPY | Facility: CLINIC | Age: 74
End: 2024-02-08
Payer: COMMERCIAL

## 2024-02-13 ENCOUNTER — APPOINTMENT (OUTPATIENT)
Dept: PHYSICAL THERAPY | Facility: CLINIC | Age: 74
End: 2024-02-13
Payer: COMMERCIAL

## 2024-02-15 ENCOUNTER — OFFICE VISIT (OUTPATIENT)
Dept: PHYSICAL THERAPY | Facility: CLINIC | Age: 74
End: 2024-02-15
Payer: COMMERCIAL

## 2024-02-15 DIAGNOSIS — Z74.09 IMPAIRED MOBILITY AND ACTIVITIES OF DAILY LIVING: ICD-10-CM

## 2024-02-15 DIAGNOSIS — Z78.9 IMPAIRED MOBILITY AND ACTIVITIES OF DAILY LIVING: ICD-10-CM

## 2024-02-15 DIAGNOSIS — M47.12 CERVICAL SPONDYLOSIS WITH MYELOPATHY: Primary | ICD-10-CM

## 2024-02-15 PROCEDURE — 97110 THERAPEUTIC EXERCISES: CPT

## 2024-02-15 PROCEDURE — 97112 NEUROMUSCULAR REEDUCATION: CPT

## 2024-02-15 NOTE — PROGRESS NOTES
"Daily Note     Today's date: 2/15/2024  Patient name: Jack Singleton  : 1950  MRN: [id number]  Referring provider: Chaitanya Rush MD  Dx:   Encounter Diagnosis     ICD-10-CM    1. Cervical spondylosis with myelopathy  M47.12       2. Impaired mobility and activities of daily living  Z74.09     Z78.9                      Subjective: pt reports strength is slowly improving.       Objective: See treatment diary below      Assessment: Tolerated treatment well. Patient would benefit from continued PT. Progressed strengthening w/ good tolerance. Need cane assistance w/ standing scaption, trouble w/ eccentric control.       Plan: Continue per plan of care.      Diagnosis: s/p C3-6 PCDF   Precautions: hx DVT, fall risk, cervical spine precautions    POC Expires: 3/21/24   Re-evaluation Date: 24   FOTO Scores/Date: Goal -   Visit Count 1/10   8/10 9/10   Manuals 2/15   1/30 2/1                           Ther Ex 2/15   1/30 2/1   Rec.bike L2 x 6 min   L2 6 min  L2 x 6 mins   Supine cane flexion 10\" x 10   10\" x 10    Table slides         pulleys        Wall walk ups    2 x 10     Shoulder isometrics         Supine shoulder flexion 2# L 15x; 0# R 15x   2# L 10x;0# R 10x    SLR flexion    2# 2 x 10    Standing scaption w/ cane assist 10x       Pt edu     FOTO updated subjective objective data collected for re-eval   Ball roll up wall 5# x 10       Neuro Re-Ed 2/15       S/l abduction  2# L 2x10; 0# R x10   2# L 2x10; R 10x     S/l ER 2# L 2x10; 1# R 2x10   2# L 2x10; R 2x10    Supine punches 2# L 2x10; 2# R 2x10       Tandem walking        Side stepping        Bridges         rows/ext Btb 2x10   BTB rows 2x10     Ther Act        STS        Step ups        Functional reach w/ cones 4 cones top weight rack x 5                                         Gait        SPC                                      "

## 2024-02-20 ENCOUNTER — APPOINTMENT (OUTPATIENT)
Dept: PHYSICAL THERAPY | Facility: CLINIC | Age: 74
End: 2024-02-20
Payer: COMMERCIAL

## 2024-02-21 ENCOUNTER — OFFICE VISIT (OUTPATIENT)
Dept: NEUROSURGERY | Facility: CLINIC | Age: 74
End: 2024-02-21
Payer: COMMERCIAL

## 2024-02-21 VITALS
RESPIRATION RATE: 18 BRPM | DIASTOLIC BLOOD PRESSURE: 82 MMHG | HEART RATE: 94 BPM | WEIGHT: 232 LBS | SYSTOLIC BLOOD PRESSURE: 130 MMHG | BODY MASS INDEX: 35.16 KG/M2 | OXYGEN SATURATION: 96 % | TEMPERATURE: 98 F | HEIGHT: 68 IN

## 2024-02-21 DIAGNOSIS — G62.9 NEUROPATHY: Primary | ICD-10-CM

## 2024-02-21 PROCEDURE — 99212 OFFICE O/P EST SF 10 MIN: CPT | Performed by: STUDENT IN AN ORGANIZED HEALTH CARE EDUCATION/TRAINING PROGRAM

## 2024-02-21 NOTE — PROGRESS NOTES
Office Note - Neurosurgery   Jack Singleton 74 y.o. male MRN: [id number]      Assessment/Plan:    Jack Singleton is a 73 year old male with a history of cervical stenosis and myelopathy status post C3-6 PCDF 10/25/24. He returns to clinic today after trialing a month on Gabapentin 100 TID for his neuropathy. He does endorse continued slow improvement in the numbness in his hands and feet, and also reports that the shaking he would get in his feet is gone. He did reduce his Gabapentin dosing over the past few weeks as he developed a cold and worried this would interfere with his cold medication, but overall he does feel that he has benefited from the Gabapentin. As such I thinks it's reasonable to continue and am happy to provide ongoing refills or he can reach out to his PCP if he would like to consolidate prescribers. I reviewed the results of his lumbar spine flex/ex. There is no dynamic instability, he does have diffuse arthritic changes and some degenerated discs in the lower lumbar spine. However he has no redflag lumbar symptoms on exam, as such I don't think escalating to an MRI would be appropriate at this time. I did recommend continuing to work with PT, which he has already made good strides with them. Going forward I am available on an as needed basis.      Subjective/Objective     Chief Complaint    Follow-up (4 WEEK FOLLOW UP XRAY SPINE /VA PATIENT /)       HPI    Jack Singleton is a 73 year old male with a history of cervical stenosis and myelopathy status post C3-6 PCDF 10/25/24.    ROS  ROS personally reviewed and updated.    Review of Systems   Constitutional: Negative.    HENT: Negative.     Eyes: Negative.    Respiratory: Negative.     Cardiovascular:  Positive for leg swelling (constant swelling in left leg).   Gastrointestinal: Negative.    Endocrine: Negative.    Genitourinary: Negative.    Musculoskeletal:  Positive for gait problem (uses cane for support).   Skin: Negative.     Allergic/Immunologic: Negative.    Neurological:  Positive for weakness (legs, arms and hands are not as strong as they use to be) and numbness (in both fett and toes// hands still n/t, has improved some Left is better than Right).   Hematological: Negative.    Psychiatric/Behavioral: Negative.         Family History    History reviewed. No pertinent family history.    Social History    Social History     Socioeconomic History    Marital status: Single     Spouse name: Not on file    Number of children: Not on file    Years of education: Not on file    Highest education level: Not on file   Occupational History    Not on file   Tobacco Use    Smoking status: Never    Smokeless tobacco: Never   Vaping Use    Vaping status: Never Used   Substance and Sexual Activity    Alcohol use: Yes     Alcohol/week: 6.0 standard drinks of alcohol     Types: 6 Cans of beer per week     Comment: beer 4-5 days a week    Drug use: Never    Sexual activity: Not on file   Other Topics Concern    Not on file   Social History Narrative    Not on file     Social Determinants of Health     Financial Resource Strain: Not on file   Food Insecurity: No Food Insecurity (10/22/2023)    Hunger Vital Sign     Worried About Running Out of Food in the Last Year: Never true     Ran Out of Food in the Last Year: Never true   Transportation Needs: No Transportation Needs (10/22/2023)    PRAPARE - Transportation     Lack of Transportation (Medical): No     Lack of Transportation (Non-Medical): No   Physical Activity: Not on file   Stress: Not on file   Social Connections: Not on file   Intimate Partner Violence: Not on file   Housing Stability: Low Risk  (10/22/2023)    Housing Stability Vital Sign     Unable to Pay for Housing in the Last Year: No     Number of Places Lived in the Last Year: 1     Unstable Housing in the Last Year: No       Past Medical History    Past Medical History:   Diagnosis Date    GERD (gastroesophageal reflux disease)      Hyperlipidemia     Hypertension        Surgical History    Past Surgical History:   Procedure Laterality Date    COLONOSCOPY      HERNIA REPAIR      SD ARTHRD PST/PSTLAT TQ 1NTRSPC CRV BELW C2 SEGMENT N/A 10/25/2023    Procedure: C3-6 PCDF;  Surgeon: Richy Saldaña MD;  [facility]: [facility];  Service: Neurosurgery    SD COLONOSCOPY FLX DX W/COLLJ SPEC WHEN PFRMD N/A 4/5/2019    Procedure: COLONOSCOPY;  Surgeon: Krishna Maguire DO;  Location: [facility];  Service: Gastroenterology    ROTATOR CUFF REPAIR Left     TONSILLECTOMY         Medications      Current Outpatient Medications:     acetaminophen (TYLENOL) 325 mg tablet, Take 2 tablets (650 mg total) by mouth every 6 (six) hours as needed (mild pain,headaches,fever), Disp: , Rfl:     albuterol (PROVENTIL HFA,VENTOLIN HFA) 90 mcg/act inhaler, INHALE 2 PUFFS BY MOUTH EVERY 6 HOURS AS NEEDED FOR WHEEZING AND SHORTNESS OF BREATH, Disp: , Rfl:     atorvastatin (LIPITOR) 20 mg tablet, Take 10 mg by mouth daily, Disp: , Rfl:     finasteride (PROSCAR) 5 mg tablet, Take 1 tablet (5 mg total) by mouth daily, Disp: 30 tablet, Rfl: 3    fluticasone (FLONASE) 50 mcg/act nasal spray, into each nostril, Disp: , Rfl:     folic acid (FOLVITE) 800 MCG tablet, Take 400 mcg by mouth daily, Disp: , Rfl:     gabapentin (Neurontin) 300 mg capsule, Take 1 capsule (300 mg total) by mouth 3 (three) times a day, Disp: 100 capsule, Rfl: 0    loratadine (CLARITIN) 10 mg tablet, Take 10 mg by mouth daily, Disp: , Rfl:     polyethylene glycol (GLYCOLAX) 17 GM/SCOOP powder, Take 17 g by mouth daily, Disp: 578 g, Rfl: 3    psyllium (METAMUCIL) packet, Take 1 packet by mouth daily Do not start before November 11, 2023., Disp: , Rfl: 0    apixaban (ELIQUIS) 5 mg, Take 1 tablet (5 mg total) by mouth 2 (two) times a day, Disp: 60 tablet, Rfl: 0    calcium carbonate (TUMS) 500 mg chewable tablet, Chew 1 tablet (500 mg total) daily as needed for indigestion or heartburn (Patient not taking: Reported on  "1/24/2024), Disp: , Rfl:     furosemide (LASIX) 20 mg tablet, Take 1 tablet (20 mg total) by mouth daily Do not start before November 18, 2023., Disp: 30 tablet, Rfl: 0    lisinopril (ZESTRIL) 5 mg tablet, Take 1 tablet (5 mg total) by mouth daily Do not start before November 18, 2023., Disp: 30 tablet, Rfl: 0    potassium chloride (K-DUR,KLOR-CON) 20 mEq tablet, Take 2 tablets (40 mEq total) by mouth daily Do not start before November 18, 2023. (Patient not taking: Reported on 12/11/2023), Disp: 60 tablet, Rfl: 0    Allergies    No Known Allergies    Physical Exam    Vitals:  Blood pressure 130/82, pulse 94, temperature 98 °F (36.7 °C), temperature source Temporal, resp. rate 18, height 5' 8\" (1.727 m), weight 105 kg (232 lb), SpO2 96%.,Body mass index is 35.28 kg/m².    Physical Exam  Neurologic Exam  Awake and alert  Oriented and appropriate  Grossly 5/5 throughout  Ambulating with a cane  "

## 2024-02-21 NOTE — PROGRESS NOTES
Office Note - Neurosurgery   Jack Singleton 74 y.o. male MRN: [id number]      Assessment/Plan:    Jack Singleton is a 73 year old male with a history of cervical stenosis and myelopathy status post C3-6 PCDF 10/25/24. He returns to clinic today after trialing a month on Gabapentin 100 TID for his neuropathy. He does endorse continued slow improvement in the numbness in his hands and feet, and also reports that the shaking he would get in his feet is gone. He did reduce his Gabapentin dosing over the past few weeks as he developed a cold and worried this would interfere with his cold medication, but overall he does feel that he has benefited from the Gabapentin. As such I thinks it's reasonable to continue and am happy to provide ongoing refills or he can reach out to his PCP if he would like to consolidate prescribers. I reviewed the results of his lumbar spine flex/ex. There is no dynamic instability, he does have diffuse arthritic changes and some degenerated discs in the lower lumbar spine. However he has no redflag lumbar symptoms on exam, as such I don't think escalating to an MRI would be appropriate at this time. I did recommend continuing to work with PT, which he has already made good strides with them. Going forward I am available on an as needed basis.      Subjective/Objective     Chief Complaint    Follow-up (4 WEEK FOLLOW UP XRAY SPINE /VA PATIENT /)       HPI    Jack Singleton is a 73 year old male with a history of cervical stenosis and myelopathy status post C3-6 PCDF 10/25/24.    ROS  ROS personally reviewed and updated.    Review of Systems   Constitutional: Negative.    HENT: Negative.     Eyes: Negative.    Respiratory: Negative.     Cardiovascular:  Positive for leg swelling (constant swelling in left leg).   Gastrointestinal: Negative.    Endocrine: Negative.    Genitourinary: Negative.    Musculoskeletal:  Positive for gait problem (uses cane for support).   Skin: Negative.     Allergic/Immunologic: Negative.    Neurological:  Positive for weakness (legs, arms and hands are not as strong as they use to be) and numbness (in both fett and toes// hands still n/t, has improved some Left is better than Right).   Hematological: Negative.    Psychiatric/Behavioral: Negative.         Family History    History reviewed. No pertinent family history.    Social History    Social History     Socioeconomic History    Marital status: Single     Spouse name: Not on file    Number of children: Not on file    Years of education: Not on file    Highest education level: Not on file   Occupational History    Not on file   Tobacco Use    Smoking status: Never    Smokeless tobacco: Never   Vaping Use    Vaping status: Never Used   Substance and Sexual Activity    Alcohol use: Yes     Alcohol/week: 6.0 standard drinks of alcohol     Types: 6 Cans of beer per week     Comment: beer 4-5 days a week    Drug use: Never    Sexual activity: Not on file   Other Topics Concern    Not on file   Social History Narrative    Not on file     Social Determinants of Health     Financial Resource Strain: Not on file   Food Insecurity: No Food Insecurity (10/22/2023)    Hunger Vital Sign     Worried About Running Out of Food in the Last Year: Never true     Ran Out of Food in the Last Year: Never true   Transportation Needs: No Transportation Needs (10/22/2023)    PRAPARE - Transportation     Lack of Transportation (Medical): No     Lack of Transportation (Non-Medical): No   Physical Activity: Not on file   Stress: Not on file   Social Connections: Not on file   Intimate Partner Violence: Not on file   Housing Stability: Low Risk  (10/22/2023)    Housing Stability Vital Sign     Unable to Pay for Housing in the Last Year: No     Number of Places Lived in the Last Year: 1     Unstable Housing in the Last Year: No       Past Medical History    Past Medical History:   Diagnosis Date    GERD (gastroesophageal reflux disease)      Hyperlipidemia     Hypertension        Surgical History    Past Surgical History:   Procedure Laterality Date    COLONOSCOPY      HERNIA REPAIR      [place] ARTHRD PST/PSTLAT TQ 1NTRSPC CRV BELW C2 SEGMENT N/A 10/25/2023    Procedure: C3-6 PCDF;  Surgeon: Richy Saldaña MD;  [facility];  Service: Neurosurgery    FL COLONOSCOPY FLX DX W/COLLJ SPEC WHEN PFRMD N/A 4/5/2019    Procedure: COLONOSCOPY;  Surgeon: Krishna Maguire DO;  Location: [facility];  Service: Gastroenterology    ROTATOR CUFF REPAIR Left     TONSILLECTOMY         Medications      Current Outpatient Medications:     acetaminophen (TYLENOL) 325 mg tablet, Take 2 tablets (650 mg total) by mouth every 6 (six) hours as needed (mild pain,headaches,fever), Disp: , Rfl:     albuterol (PROVENTIL HFA,VENTOLIN HFA) 90 mcg/act inhaler, INHALE 2 PUFFS BY MOUTH EVERY 6 HOURS AS NEEDED FOR WHEEZING AND SHORTNESS OF BREATH, Disp: , Rfl:     atorvastatin (LIPITOR) 20 mg tablet, Take 10 mg by mouth daily, Disp: , Rfl:     finasteride (PROSCAR) 5 mg tablet, Take 1 tablet (5 mg total) by mouth daily, Disp: 30 tablet, Rfl: 3    fluticasone (FLONASE) 50 mcg/act nasal spray, into each nostril, Disp: , Rfl:     folic acid (FOLVITE) 800 MCG tablet, Take 400 mcg by mouth daily, Disp: , Rfl:     gabapentin (Neurontin) 300 mg capsule, Take 1 capsule (300 mg total) by mouth 3 (three) times a day, Disp: 100 capsule, Rfl: 0    loratadine (CLARITIN) 10 mg tablet, Take 10 mg by mouth daily, Disp: , Rfl:     polyethylene glycol (GLYCOLAX) 17 GM/SCOOP powder, Take 17 g by mouth daily, Disp: 578 g, Rfl: 3    psyllium (METAMUCIL) packet, Take 1 packet by mouth daily Do not start before November 11, 2023., Disp: , Rfl: 0    apixaban (ELIQUIS) 5 mg, Take 1 tablet (5 mg total) by mouth 2 (two) times a day, Disp: 60 tablet, Rfl: 0    calcium carbonate (TUMS) 500 mg chewable tablet, Chew 1 tablet (500 mg total) daily as needed for indigestion or heartburn (Patient not taking: Reported on  "1/24/2024), Disp: , Rfl:     furosemide (LASIX) 20 mg tablet, Take 1 tablet (20 mg total) by mouth daily Do not start before November 18, 2023., Disp: 30 tablet, Rfl: 0    lisinopril (ZESTRIL) 5 mg tablet, Take 1 tablet (5 mg total) by mouth daily Do not start before November 18, 2023., Disp: 30 tablet, Rfl: 0    potassium chloride (K-DUR,KLOR-CON) 20 mEq tablet, Take 2 tablets (40 mEq total) by mouth daily Do not start before November 18, 2023. (Patient not taking: Reported on 12/11/2023), Disp: 60 tablet, Rfl: 0    Allergies    No Known Allergies    Physical Exam    Vitals:  Blood pressure 130/82, pulse 94, temperature 98 °F (36.7 °C), temperature source Temporal, resp. rate 18, height 5' 8\" (1.727 m), weight 105 kg (232 lb), SpO2 96%.,Body mass index is 35.28 kg/m².    Physical Exam  Neurologic Exam  Awake and alert  Oriented and appropriate  Grossly 5/5 throughout  Ambulating with a cane  "

## 2024-02-22 ENCOUNTER — APPOINTMENT (OUTPATIENT)
Dept: PHYSICAL THERAPY | Facility: CLINIC | Age: 74
End: 2024-02-22
Payer: COMMERCIAL

## 2024-02-27 ENCOUNTER — APPOINTMENT (OUTPATIENT)
Dept: PHYSICAL THERAPY | Facility: CLINIC | Age: 74
End: 2024-02-27
Payer: COMMERCIAL

## 2024-02-29 ENCOUNTER — APPOINTMENT (OUTPATIENT)
Dept: PHYSICAL THERAPY | Facility: CLINIC | Age: 74
End: 2024-02-29
Payer: COMMERCIAL

## 2024-02-29 ENCOUNTER — EVALUATION (OUTPATIENT)
Dept: PHYSICAL THERAPY | Facility: CLINIC | Age: 74
End: 2024-02-29
Payer: COMMERCIAL

## 2024-02-29 DIAGNOSIS — Z78.9 IMPAIRED MOBILITY AND ACTIVITIES OF DAILY LIVING: ICD-10-CM

## 2024-02-29 DIAGNOSIS — Z74.09 IMPAIRED MOBILITY AND ACTIVITIES OF DAILY LIVING: ICD-10-CM

## 2024-02-29 DIAGNOSIS — M47.12 CERVICAL SPONDYLOSIS WITH MYELOPATHY: Primary | ICD-10-CM

## 2024-02-29 PROCEDURE — 97110 THERAPEUTIC EXERCISES: CPT

## 2024-02-29 NOTE — PROGRESS NOTES
PT Re-Evaluation     Collection of Subjective/Objective data performed by Idalmis Chapa PTA. Assessment, POC, and Goal attainment performed by Ferny Gutierrez DPT.       Today's date: 2024  Patient name: Jack Singleton  : 1950  MRN: [id number]  Referring provider: Chaitanya Rush MD  Dx:   Encounter Diagnosis     ICD-10-CM    1. Cervical spondylosis with myelopathy  M47.12       2. Impaired mobility and activities of daily living  Z74.09     Z78.9             Start Time: 1150  Stop Time: 1230  Total time in clinic (min): 40 minutes    Assessment  Assessment details: Patient is a 74 y.o. male s/p PCDF on 10/25/23 and has completed 16 visits to date with improved FOTO score of 47 to 72 since IE. Patient demonstrates notable subjective/objective improvement since enrolling in PT to include walking tolerance, improved strength/endurance, and improving UE mobility. Patient continues to exhibit lingering deficits to include decreased OH strength/endurance, decreased walking endurance, decreased balance, and limited activity tolerance that continues to impact ability to perform ADLs and recreational activities. Patient will benefit from continued skilled PT intervention to address the aforementioned impairments, achieve goals, maximize function, and improve quality of life. Pt is in agreement with this plan.          Impairments: activity intolerance, impaired balance, impaired physical strength and pain with function    Goals  ST weeks  Pt will demonstrate good understanding and compliance with HEP --met  Pt will demonstrate improved postural awareness and ability to self-correct without reliance on external cues --progressing     LT weeks  Pt will improve FOTO score to > or = to expected score to indicate improved functional abilities --met  Pt will increase shoulder strength to 4+/5 for improved tolerance/independence with ADLs --progressing  Pt will increase PROM to WNL to allow for  return of AROM of affected shoulder --progressing  Pt will increase shoulder flexion/abduction to 170 degrees to increase independence with ADLs --progressing  Pt will improve TUG score to 12 seconds to decrease fall risk. --met   Pt will ambulate with least restrictive assistive device safely. --progressing  Pt will improve LE strength by 1 muscle grade to improve safety with gait. -progressing    Plan  Plan details: Cont to tx per POC  Patient would benefit from: PT eval and skilled physical therapy  Planned modality interventions: cryotherapy, thermotherapy: hydrocollator packs and TENS  Planned therapy interventions: ADL retraining, activity modification, balance, manual therapy, graded activity, graded exercise, graded motor, flexibility, functional ROM exercises, gait training, strengthening, stretching, therapeutic activities, therapeutic exercise, therapeutic training and home exercise program  Frequency: 2x week  Duration in weeks: 8  Plan of Care beginning date: 2/29/2024  Plan of Care expiration date: 4/25/2024  Treatment plan discussed with: patient      Subjective Evaluation    History of Present Illness  Mechanism of injury: RE (2/29/24):  Pt is returning to therapy after a two week lapse in care due to insurance issues. Pt has been seen for s/p cervical fusion and notes 70-75% improvement since beginning therapy. Improvements reported include strength, mobility, and functionality. Pt notes improvement w/ lifting a gallon of milk w/ RUE. Difficulties include lifting heavier objects, lift/reaching overhead, prolonged activities, and balance. Pt notes he is pleased with progress this far but would like to continue to work on current deficits.     RE (12/27):  Pt is a 74 y/o male who presents to therapy s/p cervical fusion done on October 25th. He notes improvements with LE strength, endurance, UE mobility. Pt notes he is now using a cane instead of a walker. He notes he has been using the cane around the  house for 2 weeks. Pt reports he started driving recently and he uses the cane when he walks to and from the car. He notes some pain when driving in his neck. He notes some difficulty with getting out of the car due to the low seat. He notes his arm mobility is not where he would like it to be, and notes he needs to improve on it. He has difficulty with shaving his face, lifting the arm over head. He notes balance has gotten better, he needs to steady himself when he stands up right away. Pt notes numbness in the L UE has improved, but the R UE and b/l LE are still numb.     IE ()  Pt is a 72 y/o male who presents to therapy s/p cervical fusion on . He was in the hospital doing PT for 1 week, but he was in the hospital for about a month. He notes he lost range of motion in his arms since surgery. He notes back in 2023 he fell off a plastic chair outside. 2-3 weeks after that he started getting numbness in his toes and he told his PCP. He notes it got worse over the few weeks after that. Then he had an MRI and XRAY and he had compression on the spinal cord that started to affect his gait and ability to move his arms. The left was worse than the right and it continued to get worse. On  he fell twice attempting to go to the bathroom. He went to [facility] where he was transferred to [place] for surgery. He has numbness still in both feet and hands with the left being worse than the right. He has difficulty with balance, gait, and reaching with his arms. He has a cleaning lady that helps with laundry, cleaning, and getting groceries. He presents today with a FWW. Prior to everything he was using no device to get around. He notes his endurance is low. He gets tired quickly when cooking.       Quality of life: poor    Pain  Current pain ratin  At best pain ratin  At worst pain ratin  Location: neck  Quality: dull ache        Objective     Functional  Assessment        Comments  5x STS: 11 seconds     TU seconds with SPC     LE strength:  Left:  Hip- flex- 4/5 abd- 4/5  Knee- 5/5  Ankle- 5/5    Right:  Hip- flex- 4/5 abd- 4/5  Knee- 5/5  Ankle- 5/5     UE strength:   Left: flex: 3+/5; abd: 3+/5; ER: 3/5; IR: 4/5  Right: Flex/abd deferred due to active range; ER: 2+/5; IR: 3+/5    L shoulder AROM:  flex-130 abd- 125    R shoulder AROM: flex-  80 abd- 80 (with moderate upper trap compensation)                    Diagnosis: s/p C3-6 PCDF   Precautions: hx DVT, fall risk, cervical spine precautions    POC Expires: 24   Re-evaluation Date: 3/28/24   FOTO Scores/Date: Goal - 58;  -    Visit Count 1/10 2/10      Manuals 2/15 2/29                              Ther Ex 2/15 2/29      Rec.bike L2 x 6 min 6 min      UBE standing        Ball roll up wall 5# x10 8# NV      Leg Press DL/SL        Standing scaption w/ cane assist 10x Flexion/scaption NV      Pt edu  FOTO; updated measurements               Neuro Re-Ed 2/15       S/l abduction  2# L 2x10; 0# R x10 D/c      S/l ER 2# L 2x10; 1# R 2x10 D/c      Supine punches 2# L 2x10; 2# R 2x10 W/ ball against wall NV      Tandem walking  NV      Side stepping  BTB NV       rows/ext Btb 2x10 [place] NV       Ther Act        STS  10# NV      Step ups        Functional reach w/ cones 4 cones top weight rack x 5 NV      Resisted walking fwd/back/lat                                  Gait        SPC

## 2024-03-05 ENCOUNTER — OFFICE VISIT (OUTPATIENT)
Dept: PHYSICAL THERAPY | Facility: CLINIC | Age: 74
End: 2024-03-05
Payer: COMMERCIAL

## 2024-03-05 DIAGNOSIS — Z78.9 IMPAIRED MOBILITY AND ACTIVITIES OF DAILY LIVING: ICD-10-CM

## 2024-03-05 DIAGNOSIS — M47.12 CERVICAL SPONDYLOSIS WITH MYELOPATHY: Primary | ICD-10-CM

## 2024-03-05 DIAGNOSIS — Z74.09 IMPAIRED MOBILITY AND ACTIVITIES OF DAILY LIVING: ICD-10-CM

## 2024-03-05 PROCEDURE — 97112 NEUROMUSCULAR REEDUCATION: CPT

## 2024-03-05 PROCEDURE — 97110 THERAPEUTIC EXERCISES: CPT

## 2024-03-05 NOTE — PROGRESS NOTES
Daily Note     Today's date: 3/5/2024  Patient name: Jack Singleton  : 1950  MRN: [id number]  Referring provider: Chaitanya Rush MD  Dx:   Encounter Diagnosis     ICD-10-CM    1. Cervical spondylosis with myelopathy  M47.12       2. Impaired mobility and activities of daily living  Z74.09     Z78.9           Start Time: 09  Stop Time: 1008  Total time in clinic (min): 38 minutes    Subjective: pt offers no new complaints.       Objective: See treatment diary below      Assessment: Progressed POC as shown below with cueing for correct form. Warm up done on arm bike this session to help with shoulder mobility and strength. Tolerated treatment well. Patient would benefit from continued PT      Plan: Continue per plan of care.      Diagnosis: s/p C3-6 PCDF   Precautions: hx DVT, fall risk, cervical spine precautions    POC Expires: 24   Re-evaluation Date: 3/28/24   FOTO Scores/Date: Goal - 58;  -    Visit Count 1/10 210      Manuals 2/15 2/29 3/5                             Ther Ex 2/15 2/29 3/5     Rec.bike L2 x 6 min 6 min      UBE standing   3'/3'     Ball roll up wall 5# x10 8# NV 8# x10      Leg Press DL/SL        Standing scaption w/ cane assist 10x Flexion/scaption NV X10       Pt edu  FOTO; updated measurements               Neuro Re-Ed 2/15  3/5     S/l abduction  2# L 2x10; 0# R x10 D/c      S/l ER 2# L 2x10; 1# R 2x10 D/c      Supine punches 2# L 2x10; 2# R 2x10 W/ ball against wall NV W/ ball against wall 2x10 ea     Tandem walking  NV      Side stepping  BTB NV 4 laps btb       rows/ext Btb 2x10 [place] 10# 2x10       Ther Act   3/5     STS  10# NV 10# 2x10      Step ups        Functional reach w/ cones 4 cones top weight rack x 5 NV 4 cones top of weight rack x5     Resisted walking fwd/back/lat                                  Gait        SPC

## 2024-03-06 ENCOUNTER — PREP FOR PROCEDURE (OUTPATIENT)
Age: 74
End: 2024-03-06

## 2024-03-06 ENCOUNTER — TELEPHONE (OUTPATIENT)
Age: 74
End: 2024-03-06

## 2024-03-06 DIAGNOSIS — Z86.010 HISTORY OF COLON POLYPS: Primary | ICD-10-CM

## 2024-03-06 NOTE — TELEPHONE ENCOUNTER
Scheduled date of colonoscopy (as of today): 4/23/24    Physician performing colonoscopy: Andrez    Location of colonoscopy: [facility]    Bowel prep reviewed with patient: Nabil/Dul    Instructions reviewed with patient by: jalen    Clearances: NA    Recall  OV in December  [place]    PT ON ELIQUIS followed by PCP

## 2024-03-07 ENCOUNTER — OFFICE VISIT (OUTPATIENT)
Dept: PHYSICAL THERAPY | Facility: CLINIC | Age: 74
End: 2024-03-07
Payer: COMMERCIAL

## 2024-03-07 DIAGNOSIS — Z78.9 IMPAIRED MOBILITY AND ACTIVITIES OF DAILY LIVING: ICD-10-CM

## 2024-03-07 DIAGNOSIS — M47.12 CERVICAL SPONDYLOSIS WITH MYELOPATHY: Primary | ICD-10-CM

## 2024-03-07 DIAGNOSIS — Z74.09 IMPAIRED MOBILITY AND ACTIVITIES OF DAILY LIVING: ICD-10-CM

## 2024-03-07 PROCEDURE — 97112 NEUROMUSCULAR REEDUCATION: CPT

## 2024-03-07 PROCEDURE — 97110 THERAPEUTIC EXERCISES: CPT

## 2024-03-07 NOTE — PROGRESS NOTES
Daily Note     Today's date: 3/7/2024  Patient name: Jack Singleton  : 1950  MRN: [id number]  Referring provider: Chaitanya Rush MD  Dx:   Encounter Diagnosis     ICD-10-CM    1. Cervical spondylosis with myelopathy  M47.12       2. Impaired mobility and activities of daily living  Z74.09     Z78.9           Start Time: 1020  Stop Time: 1100  Total time in clinic (min): 40 minutes    Subjective: pt offers no new complaints.       Objective: See treatment diary below      Assessment: Pt able to perform the cone stack with better mechanics this session compared to previous sessions. Added in tandem walking this session to challenge lower extremity balance Tolerated treatment well. Patient would benefit from continued PT      Plan: Continue per plan of care.      Diagnosis: s/p C3-6 PCDF   Precautions: hx DVT, fall risk, cervical spine precautions    POC Expires: 24   Re-evaluation Date: 3/28/24   FOTO Scores/Date: Goal - 58;  -    Visit Count 1/10 2/10  3/10    Manuals 2/15 2/29 3/5 3/7                            Ther Ex 2/15 2/29 3/5 3/7    Rec.bike L2 x 6 min 6 min      UBE standing   3'/3' 3'/3'     Ball roll up wall 5# x10 8# NV 8# x10  8# x10     Leg Press DL/SL        Standing scaption w/ cane assist 10x Flexion/scaption NV X10   X10     Pt edu  FOTO; updated measurements               Neuro Re-Ed 2/15  3/5 3/7     S/l abduction  2# L 2x10; 0# R x10 D/c      S/l ER 2# L 2x10; 1# R 2x10 D/c      Supine punches 2# L 2x10; 2# R 2x10 W/ ball against wall NV W/ ball against wall 2x10 ea W/ ball against wall 2x10 ea    Tandem walking  NV  2 laps     Side stepping  BTB NV 4 laps btb  4 laps btb      rows/ext Btb 2x10 [place] 10# 2x10  Alex 10# 2x10      Ther Act   3/5 3/7    STS  10# NV 10# 2x10  10# 2x10     Step ups        Functional reach w/ cones 4 cones top weight rack x 5 NV 4 cones top of weight rack x5 4 cones top of weight rack 5x     Resisted walking fwd/back/lat                                   Gait        SPC

## 2024-03-12 ENCOUNTER — OFFICE VISIT (OUTPATIENT)
Dept: PHYSICAL THERAPY | Facility: CLINIC | Age: 74
End: 2024-03-12
Payer: COMMERCIAL

## 2024-03-12 DIAGNOSIS — M47.12 CERVICAL SPONDYLOSIS WITH MYELOPATHY: Primary | ICD-10-CM

## 2024-03-12 DIAGNOSIS — Z78.9 IMPAIRED MOBILITY AND ACTIVITIES OF DAILY LIVING: ICD-10-CM

## 2024-03-12 DIAGNOSIS — Z74.09 IMPAIRED MOBILITY AND ACTIVITIES OF DAILY LIVING: ICD-10-CM

## 2024-03-12 PROCEDURE — 97112 NEUROMUSCULAR REEDUCATION: CPT

## 2024-03-12 PROCEDURE — 97530 THERAPEUTIC ACTIVITIES: CPT

## 2024-03-14 ENCOUNTER — OFFICE VISIT (OUTPATIENT)
Dept: PHYSICAL THERAPY | Facility: CLINIC | Age: 74
End: 2024-03-14
Payer: COMMERCIAL

## 2024-03-14 DIAGNOSIS — Z78.9 IMPAIRED MOBILITY AND ACTIVITIES OF DAILY LIVING: ICD-10-CM

## 2024-03-14 DIAGNOSIS — M47.12 CERVICAL SPONDYLOSIS WITH MYELOPATHY: Primary | ICD-10-CM

## 2024-03-14 DIAGNOSIS — Z74.09 IMPAIRED MOBILITY AND ACTIVITIES OF DAILY LIVING: ICD-10-CM

## 2024-03-14 PROCEDURE — 97110 THERAPEUTIC EXERCISES: CPT

## 2024-03-14 PROCEDURE — 97530 THERAPEUTIC ACTIVITIES: CPT

## 2024-03-14 PROCEDURE — 97112 NEUROMUSCULAR REEDUCATION: CPT

## 2024-03-14 NOTE — PROGRESS NOTES
"Daily Note     Today's date: 3/14/2024  Patient name: Jack Singleton  : 1950  MRN: [id number]  Referring provider: Chaitanya Rush MD  Dx:   Encounter Diagnosis     ICD-10-CM    1. Cervical spondylosis with myelopathy  M47.12       2. Impaired mobility and activities of daily living  Z74.09     Z78.9                      Subjective: pt reports no new major complaints upon arrival.       Objective: See treatment diary below      Assessment: Tolerated treatment well. Patient would benefit from continued PT. Issued HEP for shoulder isometrics.       Plan: Continue per plan of care.      Diagnosis: s/p C3-6 PCDF   Precautions: hx DVT, fall risk, cervical spine precautions    POC Expires: 24   Re-evaluation Date: 3/28/24   FOTO Scores/Date:  - 58;    Visit Count 5/10   3/10 4/10   Manuals 3/14  3/5 3/7 3/12                           Ther Ex 3/14  3/5 3/7 3/12   Rec.bike        UBE standing Standing 3'/3'  3'/3' 3'/3'  Standing 3'/3'   Ball roll up wall 10# x5  8# x10  8# x10  10# x5           Standing scaption w/ cane assist nv  X10   X10  X10    Pt edu         press Assist maintaining good form on R side x10    Assist maintaining good form on R side x10   Neuro Re-Ed 3/14  3/5 3/7  3/12   Sitting ER AROM 2x10       Shoulder isometrics 5\" x20       Supine punches nv  W/ ball against wall 2x10 ea W/ ball against wall 2x10 ea W/ ball against wall 2x10 ea   Tandem walking    2 laps     Side stepping   4 laps btb  4 laps btb  TA    rows/ext [place] 10# 2x10 ea  [place] 10# 2x10  [place] 10# 2x10  [place] 10# 2x10 ea    Ther Act 3/14  3/5 3/7    STS 15# 2x10  10# 2x10  10# 2x10  15# 2x10   Step ups        Functional reach w/ cones 4 cones top of weight rack x5  4 cones top of weight rack x5 4 cones top of weight rack 5x  4 cones top of weight rack x5   Resisted walking fwd/back/lat 7#/5#/5# 4 laps ea    7#/5#/5# 4 laps ea   Stair ambulation  nv    5# x4    box carry nv    16# 2 laps         "    Gait        SPC

## 2024-03-19 ENCOUNTER — OFFICE VISIT (OUTPATIENT)
Dept: PHYSICAL THERAPY | Facility: CLINIC | Age: 74
End: 2024-03-19
Payer: COMMERCIAL

## 2024-03-19 DIAGNOSIS — M47.12 CERVICAL SPONDYLOSIS WITH MYELOPATHY: Primary | ICD-10-CM

## 2024-03-19 DIAGNOSIS — Z78.9 IMPAIRED MOBILITY AND ACTIVITIES OF DAILY LIVING: ICD-10-CM

## 2024-03-19 DIAGNOSIS — Z74.09 IMPAIRED MOBILITY AND ACTIVITIES OF DAILY LIVING: ICD-10-CM

## 2024-03-19 PROCEDURE — 97530 THERAPEUTIC ACTIVITIES: CPT

## 2024-03-19 PROCEDURE — 97112 NEUROMUSCULAR REEDUCATION: CPT

## 2024-03-19 PROCEDURE — 97110 THERAPEUTIC EXERCISES: CPT

## 2024-03-19 NOTE — PROGRESS NOTES
"Daily Note     Today's date: 3/19/2024  Patient name: Jack Singleton  : 1950  MRN: [id number]  Referring provider: Chaitanya Rush MD  Dx:   Encounter Diagnosis     ICD-10-CM    1. Cervical spondylosis with myelopathy  M47.12       2. Impaired mobility and activities of daily living  Z74.09     Z78.9           Start Time: 1010  Stop Time: 1102  Total time in clinic (min): 52 minutes    Subjective: pt offers no new complaints.      Objective: See treatment diary below      Assessment: Cueing provided for correct form with shoulder external rotation isometrics. Fatigue in the shoulder with isometric exercises. Cueing and positioning needed for active external rotation. Cueing for form with resisted walking, improved posture and performance after initial cueing. Tolerated treatment well. Patient would benefit from continued PT      Plan: Continue per plan of care.      Diagnosis: s/p C3-6 PCDF   Precautions: hx DVT, fall risk, cervical spine precautions    POC Expires: 24   Re-evaluation Date: 3/28/24   FOTO Scores/Date: Goal - 58;    Visit Count 5/10 6/10  3/10 4/10   Manuals 3/14 3/19 3/5 3/7 3/12                           Ther Ex 3/14 3/19 3/5 3/7 3/12   Rec.bike        UBE standing Standing 3'/3' Standing 3'/3'  3'/3' 3'/3'  Standing 3'/3'   Ball roll up wall 10# x5 10# 5x  8# x10  8# x10  10# x5           Standing scaption w/ cane assist nv X10  X10   X10  X10    Pt edu         press Assist maintaining good form on R side x10 Assist maintaining good form on R side 2x10   Assist maintaining good form on R side x10   Neuro Re-Ed 3/14 3/19 3/5 3/7  3/12   Sitting ER AROM 2x10 AROM 2x10      Shoulder isometrics 5\" x20 5\" 20x      Supine punches nv  W/ ball against wall 2x10 ea W/ ball against wall 2x10 ea W/ ball against wall 2x10 ea   Tandem walking    2 laps     Side stepping   4 laps btb  4 laps btb  TA    rows/ext [place] 10# 2x10 ea [place] 10# 2x10, inc nv  [place] 10# 2x10  " [place] 10# 2x10  [place] 10# 2x10 ea    Ther Act 3/14 3/19 3/5 3/7    STS 15# 2x10 15# 2x10  10# 2x10  10# 2x10  15# 2x10   Step ups        Functional reach w/ cones 4 cones top of weight rack x5 4 cones top of weight rack x5 4 cones top of weight rack x5 4 cones top of weight rack 5x  4 cones top of weight rack x5   Resisted walking fwd/back/lat 7#/5#/5# 4 laps ea 7#/7#/5# 5 laps ea    7#/5#/5# 4 laps ea   Stair ambulation  nv 5# x2    5# x4    box carry nv 16# 3 laps    16# 2 laps   Step downs  nv               Gait        SPC

## 2024-03-21 ENCOUNTER — APPOINTMENT (OUTPATIENT)
Dept: PHYSICAL THERAPY | Facility: CLINIC | Age: 74
End: 2024-03-21
Payer: COMMERCIAL

## 2024-03-26 ENCOUNTER — OFFICE VISIT (OUTPATIENT)
Dept: PHYSICAL THERAPY | Facility: CLINIC | Age: 74
End: 2024-03-26
Payer: COMMERCIAL

## 2024-03-26 DIAGNOSIS — Z74.09 IMPAIRED MOBILITY AND ACTIVITIES OF DAILY LIVING: ICD-10-CM

## 2024-03-26 DIAGNOSIS — M47.12 CERVICAL SPONDYLOSIS WITH MYELOPATHY: Primary | ICD-10-CM

## 2024-03-26 DIAGNOSIS — Z78.9 IMPAIRED MOBILITY AND ACTIVITIES OF DAILY LIVING: ICD-10-CM

## 2024-03-26 PROCEDURE — 97530 THERAPEUTIC ACTIVITIES: CPT

## 2024-03-26 PROCEDURE — 97110 THERAPEUTIC EXERCISES: CPT

## 2024-03-26 PROCEDURE — 97112 NEUROMUSCULAR REEDUCATION: CPT

## 2024-03-26 NOTE — PROGRESS NOTES
"Daily Note     Today's date: 3/26/2024  Patient name: Jack Singleton  : 1950  MRN: [id number]  Referring provider: Chaitanya Rush MD  Dx:   Encounter Diagnosis     ICD-10-CM    1. Cervical spondylosis with myelopathy  M47.12       2. Impaired mobility and activities of daily living  Z74.09     Z78.9                      Subjective: pt reports no new major complaints upon arrival.       Objective: See treatment diary below      Assessment: Tolerated treatment well. Patient would benefit from continued PT. Progressed functional strengthening w/ good tolerance.       Plan: Continue per plan of care.      Diagnosis: s/p C3-6 PCDF   Precautions: hx DVT, fall risk, cervical spine precautions    POC Expires: 24   Re-evaluation Date: 3/28/24   FOTO Scores/Date:  - 58;    Visit Count 5/10 6/10 7/10     Manuals 3/14 3/19 3/26                             Ther Ex 3/14 3/19 3/26     Rec.bike        UBE standing Standing 3'/3' Standing 3'/3'  Standing 3'/3'     Ball roll up wall 10# x5 10# 5x  10\" 5x             Standing scaption w/ cane assist nv X10  X15      Pt edu         press Assist maintaining good form on R side x10 Assist maintaining good form on R side 2x10 Assist maintaining good form on R side 2x10     Neuro Re-Ed 3/14 3/19 3/26     Sitting ER AROM 2x10 AROM 2x10 AROM 2x10     Shoulder isometrics 5\" x20 5\" 20x 5\" 20x     Supine punches nv       Tandem walking        Side stepping         rows/ext [place] 10# 2x10 ea [place] 10# 2x10, inc nv  [place] 15# 2x10 ea      Ther Act 3/14 3/19 3/26     STS 15# 2x10 15# 2x10  15# 2x10     Step ups        Functional reach w/ cones 4 cones top of weight rack x5 4 cones top of weight rack x5 nv     Resisted walking fwd/back/lat 7#/5#/5# 4 laps ea 7#/7#/5# 5 laps ea  10#/10#/5# 5 laps ea     Stair ambulation  nv 5# x2  Walk up to stairs go up stairs and walk back 5# x2 ea side      box carry nv 16# 3 laps       Step downs  nv 4\" 10x            "   Gait        SPC                                     n/a

## 2024-03-28 ENCOUNTER — APPOINTMENT (OUTPATIENT)
Dept: PHYSICAL THERAPY | Facility: CLINIC | Age: 74
End: 2024-03-28
Payer: COMMERCIAL

## 2024-04-02 ENCOUNTER — OFFICE VISIT (OUTPATIENT)
Dept: PHYSICAL THERAPY | Facility: CLINIC | Age: 74
End: 2024-04-02
Payer: COMMERCIAL

## 2024-04-02 DIAGNOSIS — Z74.09 IMPAIRED MOBILITY AND ACTIVITIES OF DAILY LIVING: ICD-10-CM

## 2024-04-02 DIAGNOSIS — Z78.9 IMPAIRED MOBILITY AND ACTIVITIES OF DAILY LIVING: ICD-10-CM

## 2024-04-02 DIAGNOSIS — M47.12 CERVICAL SPONDYLOSIS WITH MYELOPATHY: Primary | ICD-10-CM

## 2024-04-02 PROCEDURE — 97530 THERAPEUTIC ACTIVITIES: CPT

## 2024-04-02 PROCEDURE — 97110 THERAPEUTIC EXERCISES: CPT

## 2024-04-02 NOTE — PROGRESS NOTES
"Daily Note     Today's date: 2024  Patient name: Jack Singleton  : 1950  MRN: [id number]  Referring provider: Chaitanya Rush MD  Dx:   Encounter Diagnosis     ICD-10-CM    1. Cervical spondylosis with myelopathy  M47.12       2. Impaired mobility and activities of daily living  Z74.09     Z78.9                      Subjective: pt reports no new major complaints pertaining to his neck or shoulders.       Objective: See treatment diary below      Assessment: Tolerated treatment well. Patient would benefit from continued PT. Assistance required for wall slides scaption. Improved ability to reach OH for cone reaches.       Plan: Continue per plan of care.      Diagnosis: s/p C3-6 PCDF   Precautions: hx DVT, fall risk, cervical spine precautions    POC Expires: 24   Re-evaluation Date: 3/28/24   FOTO Scores/Date:  - 58;    Visit Count 5/10 6/10 7/10 8/10    Manuals 3/14 3/19 3/26 4/2                            Ther Ex 3/14 3/19 3/26 4/2    Wall slides flex/scap    x10    UBE standing Standing 3'/3' Standing 3'/3'  Standing 3'/3' Standing 3'/3'    Ball roll up wall 10# x5 10# 5x  10\" 5x 10# x8            Standing scaption w/ cane assist nv X10  X15  x20    Pt edu         press Assist maintaining good form on R side x10 Assist maintaining good form on R side 2x10 Assist maintaining good form on R side 2x10     Neuro Re-Ed 3/14 3/19 3/26 4/2    Sitting ER AROM 2x10 AROM 2x10 AROM 2x10     Shoulder isometrics 5\" x20 5\" 20x 5\" 20x 5\" 20x    Supine punches nv       Tandem walking        Side stepping         rows/ext [place] 10# 2x10 ea [place] 10# 2x10, inc nv  [place] 15# 2x10 ea [place] 15# 2x10 ea     Ther Act 3/14 3/19 3/26 4/2    STS 15# 2x10 15# 2x10  15# 2x10 20# 2x10    Step ups        Functional reach w/ cones 4 cones top of weight rack x5 4 cones top of weight rack x5 nv 4 cones top of weight rack x5    Resisted walking fwd/back/lat 7#/5#/5# 4 laps ea 7#/7#/5# 5 laps ea  " "10#/10#/5# 5 laps ea 10#/10#/5# 5 laps ea    Stair ambulation  nv 5# x2  Walk up to stairs go up stairs and walk back 5# x2 ea side      box carry nv 16# 3 laps       Step downs  nv 4\" 10x 4\" x10             Gait        SPC                                      "

## 2024-04-04 ENCOUNTER — EVALUATION (OUTPATIENT)
Dept: PHYSICAL THERAPY | Facility: CLINIC | Age: 74
End: 2024-04-04
Payer: COMMERCIAL

## 2024-04-04 DIAGNOSIS — Z74.09 IMPAIRED MOBILITY AND ACTIVITIES OF DAILY LIVING: ICD-10-CM

## 2024-04-04 DIAGNOSIS — M47.12 CERVICAL SPONDYLOSIS WITH MYELOPATHY: Primary | ICD-10-CM

## 2024-04-04 DIAGNOSIS — Z78.9 IMPAIRED MOBILITY AND ACTIVITIES OF DAILY LIVING: ICD-10-CM

## 2024-04-04 PROCEDURE — 97110 THERAPEUTIC EXERCISES: CPT

## 2024-04-04 NOTE — PROGRESS NOTES
PT Re-Evaluation     Collection of Subjective/Objective data performed by Idalmis Chapa PTA. Assessment, POC, and Goal attainment performed by Sherry Gonzalez DPT.       Today's date: 2024  Patient name: Jack Singleton  : 1950  MRN: [id number]  Referring provider: Chaitanya Rush MD  Dx:   Encounter Diagnosis     ICD-10-CM    1. Cervical spondylosis with myelopathy  M47.12       2. Impaired mobility and activities of daily living  Z74.09     Z78.9               Start Time: 930  Stop Time: 1000  Total time in clinic (min): 30 minutes    Assessment  Assessment details: Patient is a 73 y/o male who presents to therapy s/p PCDF on 10/25/23 and has completed 24 visits to date with improved FOTO score of 70 since IE. Patient demonstrates subjective/objective improvement since starting PT such as improved gait, balance, lower extremity strength, upper extremity range of motion and strength. Patient continues to present with deficits that include tolerance to overhead activities, active shoulder range of motion, and shoulder strength. Patient will benefit from continued skilled PT intervention to address the aforementioned impairments, achieve goals, maximize function, and improve quality of life. Pt is in agreement with this plan.   Impairments: activity intolerance, impaired balance, impaired physical strength and pain with function    Goals  ST weeks  Pt will demonstrate good understanding and compliance with HEP --met  Pt will demonstrate improved postural awareness and ability to self-correct without reliance on external cues --progressing     LT weeks  Pt will improve FOTO score to > or = to expected score to indicate improved functional abilities --met  Pt will increase shoulder strength to 4+/5 for improved tolerance/independence with ADLs --progressing  Pt will increase PROM to WNL to allow for return of AROM of affected shoulder --progressing  Pt will increase shoulder  flexion/abduction to 170 degrees to increase independence with ADLs --progressing  Pt will improve TUG score to 12 seconds to decrease fall risk. --met   Pt will ambulate with least restrictive assistive device safely. --progressing  Pt will improve LE strength by 1 muscle grade to improve safety with gait. -progressing    Plan  Plan details: Cont to tx per POC  Patient would benefit from: PT eval and skilled physical therapy  Planned modality interventions: cryotherapy, thermotherapy: hydrocollator packs and TENS  Planned therapy interventions: ADL retraining, activity modification, balance, manual therapy, graded activity, graded exercise, graded motor, flexibility, functional ROM exercises, gait training, strengthening, stretching, therapeutic activities, therapeutic exercise, therapeutic training and home exercise program  Frequency: 2x week  Duration in weeks: 8  Plan of Care beginning date: 2/29/2024  Plan of Care expiration date: 4/25/2024  Treatment plan discussed with: patient      Subjective Evaluation    History of Present Illness  Mechanism of injury: RE (4/4/24):  Pt has been seen for s/p cervical fusion and notes 80-85% improvement since beginning therapy. Pt notes improvement in strength and overall mobility. Stated he still struggles w/ reaching overhead and overhead strength, however, this is slowly improving. Pt notes his walking is improving. Balance is good as long as he goes slow, when he goes faster he tends to be more off balance.       RE (2/29/24):  Pt is returning to therapy after a two week lapse in care due to insurance issues. Pt has been seen for s/p cervical fusion and notes 70-75% improvement since beginning therapy. Improvements reported include strength, mobility, and functionality. Pt notes improvement w/ lifting a gallon of milk w/ RUE. Difficulties include lifting heavier objects, lift/reaching overhead, prolonged activities, and balance. Pt notes he is pleased with progress this  far but would like to continue to work on current deficits.     RE (12/27):  Pt is a 74 y/o male who presents to therapy s/p cervical fusion done on October 25th. He notes improvements with LE strength, endurance, UE mobility. Pt notes he is now using a cane instead of a walker. He notes he has been using the cane around the house for 2 weeks. Pt reports he started driving recently and he uses the cane when he walks to and from the car. He notes some pain when driving in his neck. He notes some difficulty with getting out of the car due to the low seat. He notes his arm mobility is not where he would like it to be, and notes he needs to improve on it. He has difficulty with shaving his face, lifting the arm over head. He notes balance has gotten better, he needs to steady himself when he stands up right away. Pt notes numbness in the L UE has improved, but the R UE and b/l LE are still numb.     IE (11/29)  Pt is a 74 y/o male who presents to therapy s/p cervical fusion on October 25th. He was in the hospital doing PT for 1 week, but he was in the hospital for about a month. He notes he lost range of motion in his arms since surgery. He notes back in March of 2023 he fell off a plastic chair outside. 2-3 weeks after that he started getting numbness in his toes and he told his PCP. He notes it got worse over the few weeks after that. Then he had an MRI and XRAY and he had compression on the spinal cord that started to affect his gait and ability to move his arms. The left was worse than the right and it continued to get worse. On October 20th he fell twice attempting to go to the bathroom. He went to [facility] where he was transferred to [place] for surgery. He has numbness still in both feet and hands with the left being worse than the right. He has difficulty with balance, gait, and reaching with his arms. He has a cleaning lady that helps with laundry, cleaning, and getting groceries. He presents  "today with a FWW. Prior to everything he was using no device to get around. He notes his endurance is low. He gets tired quickly when cooking.       Quality of life: poor    Pain  Current pain ratin  At best pain ratin  At worst pain ratin  Location: neck  Quality: dull ache        Objective     Functional Assessment        Comments  5x STS: 10 seconds     TU seconds w/o SPC     LE strength:  Left:  Hip- flex- 4+/5 abd- 4+/5  Knee- 5/5  Ankle- 5/5    Right:  Hip- flex- 4+/5 abd- 4+/5  Knee- 5/5  Ankle- 5/5     UE strength:   Left: flex: 4-/5; abd: 4-/5; ER: 4+/5; IR: 4+/5  Right: flex: 2/5; 2-/5; ER: 2+/5; IR: 3+/5    L shoulder AROM:  flex-135 abd- 130    R shoulder AROM: flex-  108 abd- 100 (with moderate upper trap compensation)         Flowsheet Rows      Flowsheet Row Most Recent Value   PT/OT G-Codes    Current Score 47   Projected Score 58            Diagnosis: s/p C3-6 PCDF   Precautions: hx DVT, fall risk, cervical spine precautions    POC Expires: 24   Re-evaluation Date: 24   FOTO Scores/Date: Goal - 58;  - 72;  -    Visit Count 5/10 6/10 7/10 8/10 9/10   Manuals 3/14 3/19 3/26 4/2 4/                           Ther Ex 3/14 3/19 3/26 4/2 4/   Wall slides flex/scap    x10    UBE standing Standing 3'/3' Standing 3'/3'  Standing 3'/3' Standing 3'/3' Standing 3'/3'   Ball roll up wall 10# x5 10# 5x  10\" 5x 10# x8            Standing scaption w/ cane assist nv X10  X15  x20    Pt edu     FOTO; updated measurements     press Assist maintaining good form on R side x10 Assist maintaining good form on R side 2x10 Assist maintaining good form on R side 2x10     Neuro Re-Ed 3/14 3/19 3/26 4/2    Sitting ER AROM 2x10 AROM 2x10 AROM 2x10     Shoulder isometrics 5\" x20 5\" 20x 5\" 20x 5\" 20x    Supine punches nv       Tandem walking        Side stepping         rows/ext [place] 10# 2x10 ea [place] 10# 2x10, inc nv  [place] 15# 2x10 ea [place] 15# 2x10 ea     Ther Act 3/14 3/19 3/26 " "4/2    STS 15# 2x10 15# 2x10  15# 2x10 20# 2x10    Step ups        Functional reach w/ cones 4 cones top of weight rack x5 4 cones top of weight rack x5 nv 4 cones top of weight rack x5    Resisted walking fwd/back/lat 7#/5#/5# 4 laps ea 7#/7#/5# 5 laps ea  10#/10#/5# 5 laps ea 10#/10#/5# 5 laps ea    Stair ambulation  nv 5# x2  Walk up to stairs go up stairs and walk back 5# x2 ea side      box carry nv 16# 3 laps       Step downs  nv 4\" 10x 4\" x10             Gait        SPC                                 "

## 2024-04-09 ENCOUNTER — OFFICE VISIT (OUTPATIENT)
Dept: PHYSICAL THERAPY | Facility: CLINIC | Age: 74
End: 2024-04-09
Payer: COMMERCIAL

## 2024-04-09 DIAGNOSIS — Z78.9 IMPAIRED MOBILITY AND ACTIVITIES OF DAILY LIVING: ICD-10-CM

## 2024-04-09 DIAGNOSIS — Z74.09 IMPAIRED MOBILITY AND ACTIVITIES OF DAILY LIVING: ICD-10-CM

## 2024-04-09 DIAGNOSIS — M47.12 CERVICAL SPONDYLOSIS WITH MYELOPATHY: Primary | ICD-10-CM

## 2024-04-09 PROCEDURE — 97110 THERAPEUTIC EXERCISES: CPT

## 2024-04-09 PROCEDURE — 97112 NEUROMUSCULAR REEDUCATION: CPT

## 2024-04-09 PROCEDURE — 97530 THERAPEUTIC ACTIVITIES: CPT

## 2024-04-09 NOTE — PROGRESS NOTES
"Daily Note     Today's date: 2024  Patient name: Jack Singleton  : 1950  MRN: [id number]  Referring provider: Chaitanya Rush MD  Dx:   Encounter Diagnosis     ICD-10-CM    1. Cervical spondylosis with myelopathy  M47.12       2. Impaired mobility and activities of daily living  Z74.09     Z78.9           Start Time: 09  Stop Time: 1015  Total time in clinic (min): 45 minutes    Subjective: pt offers no new complaints      Objective: See treatment diary below      Assessment: Cueing provided for correct mechanics with active external rotation. Improved form with overhead presses without therapist assistance. Increased fatigue during the exercise. Tolerated treatment well. Patient would benefit from continued PT      Plan: Continue per plan of care.      Diagnosis: s/p C3-6 PCDF   Precautions: hx DVT, fall risk, cervical spine precautions    POC Expires: 24   Re-evaluation Date: 24   FOTO Scores/Date:  - 58;  - ;  -    Visit Count 2/10 6/10 7/10 8/10 9/10   Manuals 4/9 3/19 3/26 4/2 4/4                           Ther Ex 4/9 3/19 3/26 4/2 4/4   Wall slides flex/scap    x10    UBE standing Standing 3'/3'  Standing 3'/3'  Standing 3'/3' Standing 3'/3' Standing 3'/3'   Ball roll up wall 10# 7x 10# 5x  10\" 5x 10# x8            Standing scaption w/ cane assist X20  X10  X15  x20    Pt edu     FOTO; updated measurements     press  Assist maintaining good form on R side 2x10 Assist maintaining good form on R side 2x10     Neuro Re-Ed 4/9  3/19 3/26 4/2    Sitting ER AROM 2x10  AROM 2x10 AROM 2x10     Shoulder isometrics 5\" 20x 5\" 20x 5\" 20x 5\" 20x     rows/ext  [place] 10# 2x10, inc nv  [place] 15# 2x10 ea [place] 15# 2x10 ea     Ther Act 4/9 3/19 3/26 4/2    STS  15# 2x10  15# 2x10 20# 2x10    Step ups        Functional reach w/ cones 4 cones top of weight rack x5  4 cones top of weight rack x5 nv 4 cones top of weight rack x5    Resisted walking fwd/back/lat 10#/10#/5# 5 " "laps ea  7#/7#/5# 5 laps ea  10#/10#/5# 5 laps ea 10#/10#/5# 5 laps ea    Stair ambulation   5# x2  Walk up to stairs go up stairs and walk back 5# x2 ea side      box carry 16# 3 laps  16# 3 laps       Step downs  nv 4\" 10x 4\" x10             Gait        SPC                             "

## 2024-04-11 ENCOUNTER — OFFICE VISIT (OUTPATIENT)
Dept: PHYSICAL THERAPY | Facility: CLINIC | Age: 74
End: 2024-04-11
Payer: COMMERCIAL

## 2024-04-11 DIAGNOSIS — Z78.9 IMPAIRED MOBILITY AND ACTIVITIES OF DAILY LIVING: ICD-10-CM

## 2024-04-11 DIAGNOSIS — M47.12 CERVICAL SPONDYLOSIS WITH MYELOPATHY: Primary | ICD-10-CM

## 2024-04-11 DIAGNOSIS — Z74.09 IMPAIRED MOBILITY AND ACTIVITIES OF DAILY LIVING: ICD-10-CM

## 2024-04-11 PROCEDURE — 97110 THERAPEUTIC EXERCISES: CPT

## 2024-04-11 PROCEDURE — 97530 THERAPEUTIC ACTIVITIES: CPT

## 2024-04-11 PROCEDURE — 97112 NEUROMUSCULAR REEDUCATION: CPT

## 2024-04-11 NOTE — PROGRESS NOTES
"Daily Note     Today's date: 2024  Patient name: Jack Singleton  : 1950  MRN: [id number]  Referring provider: Chaitanya Rush MD  Dx:   Encounter Diagnosis     ICD-10-CM    1. Cervical spondylosis with myelopathy  M47.12       2. Impaired mobility and activities of daily living  Z74.09     Z78.9           Start Time: 09  Stop Time: 1010  Total time in clinic (min): 40 minutes    Subjective: pt offers no new complaints.       Objective: See treatment diary below      Assessment: Increased weight tolerated with lateral walks on the alex with good control and form. Able to complete external rotation of the right shoulder with resistance this session. Unable to complete overhead press due to shoulder fatigue, will resume next session. . Tolerated treatment well. Patient would benefit from continued PT      Plan: Continue per plan of care.      Diagnosis: s/p C3-6 PCDF   Precautions: hx DVT, fall risk, cervical spine precautions    POC Expires: 24   Re-evaluation Date: 24   FOTO Scores/Date:  - 58;  - 72;  -    Visit Count 2/10 3/10 7/10 8/10 9/10   Manuals 4/9 4/11 3/26 4/2 4/4                           Ther Ex 4/9 4/11 3/26 4/2 4/4   Wall slides flex/scap    x10    UBE standing Standing 3'/3'  Standing 3'/3'  Standing 3'/3' Standing 3'/3' Standing 3'/3'   Ball roll up wall 10# 7x 10# 9.5x  10\" 5x 10# x8            Standing scaption w/ cane assist X20    X15  x20    Pt edu     FOTO; updated measurements     press  Held  Assist maintaining good form on R side 2x10     Neuro Re-Ed 4/9  4/11  3/26 4/2    Sitting ER AROM 2x10  2x10 ytb AROM 2x10     Shoulder isometrics 5\" 20x 5\"20x  5\" 20x 5\" 20x     rows/ext   [place] 15# 2x10 ea [place] 15# 2x10 ea     Ther Act 4/9  3/26 4/2    STS   15# 2x10 20# 2x10    Step ups        Functional reach w/ cones 4 cones top of weight rack x5  4 cones top of weight rack x5  nv 4 cones top of weight rack x5    Resisted walking fwd/back/lat " "10#/10#/5# 5 laps ea  10# 5 laps ea 10#/10#/5# 5 laps ea 10#/10#/5# 5 laps ea    Stair ambulation    Walk up to stairs go up stairs and walk back 5# x2 ea side      box carry 16# 3 laps  16# 5 laps       Step downs   4\" 10x 4\" x10             Gait        SPC                               "

## 2024-04-16 ENCOUNTER — OFFICE VISIT (OUTPATIENT)
Dept: PHYSICAL THERAPY | Facility: CLINIC | Age: 74
End: 2024-04-16
Payer: COMMERCIAL

## 2024-04-16 DIAGNOSIS — Z74.09 IMPAIRED MOBILITY AND ACTIVITIES OF DAILY LIVING: ICD-10-CM

## 2024-04-16 DIAGNOSIS — M47.12 CERVICAL SPONDYLOSIS WITH MYELOPATHY: Primary | ICD-10-CM

## 2024-04-16 DIAGNOSIS — Z78.9 IMPAIRED MOBILITY AND ACTIVITIES OF DAILY LIVING: ICD-10-CM

## 2024-04-16 PROCEDURE — 97110 THERAPEUTIC EXERCISES: CPT

## 2024-04-16 PROCEDURE — 97530 THERAPEUTIC ACTIVITIES: CPT

## 2024-04-16 PROCEDURE — 97112 NEUROMUSCULAR REEDUCATION: CPT

## 2024-04-16 NOTE — PROGRESS NOTES
"Daily Note     Today's date: 2024  Patient name: Jack Singleton  : 1950  MRN: [id number]  Referring provider: Chaitanya Rush MD  Dx:   Encounter Diagnosis     ICD-10-CM    1. Cervical spondylosis with myelopathy  M47.12       2. Impaired mobility and activities of daily living  Z74.09     Z78.9                      Subjective: pt reports no new major complaints upon arrival.       Objective: See treatment diary below      Assessment: Tolerated treatment well. Patient would benefit from continued PT.  Progressed shoulder strengthening w/ good tolerance. Fatigue towards the end. Updated HEP.       Plan: Continue per plan of care.      Diagnosis: s/p C3-6 PCDF   Precautions: hx DVT, fall risk, cervical spine precautions    POC Expires: 24   Re-evaluation Date: 24   FOTO Scores/Date:  - 58;  - ;    Visit Count 2/10 3/10 4/10  9/10   Manuals                            Ther Ex    Wall slides flex/scap        UBE standing Standing 3'/3'  Standing 3'/3'  Standing 3'/3'  Standing 3'/3'   Ball roll up wall 10# 7x 10# 9.5x               Standing scaption w/ cane assist X20         Pt edu   HEP updated  FOTO; updated measurements     press  Held  x10     Neuro Re-Ed      Sitting ER AROM 2x10  2x10 ytb      Shoulder isometrics 5\" 20x 5\"20x  GTB Ext, IR x20; YTB ER/flex 2x10      rows/ext         Ther Act      STS   18\" 2x10     Step ups        Functional reach w/ cones 4 cones top of weight rack x5  4 cones top of weight rack x5  4 cones top of weight rack x5     Resisted walking fwd/back/lat 10#/10#/5# 5 laps ea  10# 5 laps ea 10# 5 laps ea      Stair ambulation          box carry 16# 3 laps  16# 5 laps  16# 5 laps     Step downs                 Gait        SPC                                 "

## 2024-04-18 ENCOUNTER — OFFICE VISIT (OUTPATIENT)
Dept: PHYSICAL THERAPY | Facility: CLINIC | Age: 74
End: 2024-04-18
Payer: COMMERCIAL

## 2024-04-18 DIAGNOSIS — Z78.9 IMPAIRED MOBILITY AND ACTIVITIES OF DAILY LIVING: ICD-10-CM

## 2024-04-18 DIAGNOSIS — M47.12 CERVICAL SPONDYLOSIS WITH MYELOPATHY: Primary | ICD-10-CM

## 2024-04-18 DIAGNOSIS — Z74.09 IMPAIRED MOBILITY AND ACTIVITIES OF DAILY LIVING: ICD-10-CM

## 2024-04-18 PROCEDURE — 97530 THERAPEUTIC ACTIVITIES: CPT

## 2024-04-18 PROCEDURE — 97112 NEUROMUSCULAR REEDUCATION: CPT

## 2024-04-18 NOTE — PROGRESS NOTES
"Daily Note     Today's date: 2024  Patient name: Jack Singelton  : 1950  MRN: [id number]  Referring provider: Chaitanya Rush MD  Dx:   Encounter Diagnosis     ICD-10-CM    1. Cervical spondylosis with myelopathy  M47.12       2. Impaired mobility and activities of daily living  Z74.09     Z78.9           Start Time: 926  Stop Time: 1005  Total time in clinic (min): 39 minutes    Subjective: pt offers no new complaints.      Objective: See treatment diary below      Assessment: Continued difficulty with overhead mobility on the right shoulder. Assistance needed to perform  press with correct form. Faitgue reported with theraband resisted shoulder exercises. Tolerated treatment well. Patient would benefit from continued PT      Plan: Continue per plan of care.      Diagnosis: s/p C3-6 PCDF   Precautions: hx DVT, fall risk, cervical spine precautions    POC Expires: 24   Re-evaluation Date: 24   FOTO Scores/Date: Goal - 58;  - 72;    Visit Count 2/10 3/10 4/10 5/10 910   Manuals                            Ther Ex  4   Wall slides flex/scap        UBE standing Standing 3'/3'  Standing 3'/3'  Standing 3'/3' Standing 3'/3' L2 Standing 3'/3'   Ball roll up wall 10# 7x 10# 9.5x               Standing scaption w/ cane assist X20         Pt edu   HEP updated  FOTO; updated measurements     press  Held  x10 x10    Neuro Re-Ed     Sitting ER AROM 2x10  2x10 ytb      Shoulder isometrics 5\" 20x 5\"20x  GTB Ext, IR x20; YTB ER/flex 2x10 Gtb ext, IR x20:  YTB ER/flex 2x10      rows/ext         Ther Act     STS   18\" 2x10 18\" 2x10     Step ups        Functional reach w/ cones 4 cones top of weight rack x5  4 cones top of weight rack x5  4 cones top of weight rack x5 4 cones top of weight rack 5x     Resisted walking fwd/back/lat 10#/10#/5# 5 laps ea  10# 5 laps ea 10# 5 laps ea  10# 5 laps ea   "   Stair ambulation          box carry 16# 3 laps  16# 5 laps  16# 5 laps 16# 5 laps     Step downs                 Gait        SPC

## 2024-04-22 ENCOUNTER — NURSE TRIAGE (OUTPATIENT)
Age: 74
End: 2024-04-22

## 2024-04-22 NOTE — TELEPHONE ENCOUNTER
"Pt scheduled for colonoscopy 04/23 calling to inquire if procedure has been approved his [employer] insurance. I informed pt procedure is approved.    Referral # [id number]     AUTH# [id number]  VALID 12/11/2023 - 06/08/2024    Pt requests copy of approved referral day of procedure.      Reason for Disposition   Information only question and nurse able to answer    Answer Assessment - Initial Assessment Questions  1. REASON FOR CALL or QUESTION: \"What is your reason for calling today?\" or \"How can I best help you?\" or \"What question do you have that I can help answer?\"      Please see note    Protocols used: Information Only Call - No Triage-ADULT-[place]    "

## 2024-04-23 ENCOUNTER — ANESTHESIA EVENT (OUTPATIENT)
Dept: GASTROENTEROLOGY | Facility: HOSPITAL | Age: 74
End: 2024-04-23

## 2024-04-23 ENCOUNTER — HOSPITAL ENCOUNTER (OUTPATIENT)
Dept: GASTROENTEROLOGY | Facility: HOSPITAL | Age: 74
Setting detail: OUTPATIENT SURGERY
Discharge: HOME | End: 2024-04-23
Attending: INTERNAL MEDICINE
Payer: COMMERCIAL

## 2024-04-23 ENCOUNTER — TREATMENT (OUTPATIENT)
Dept: GASTROENTEROLOGY | Facility: CLINIC | Age: 74
End: 2024-04-23

## 2024-04-23 ENCOUNTER — ANESTHESIA (OUTPATIENT)
Dept: GASTROENTEROLOGY | Facility: HOSPITAL | Age: 74
End: 2024-04-23

## 2024-04-23 VITALS
TEMPERATURE: 97.5 F | OXYGEN SATURATION: 100 % | BODY MASS INDEX: 33.31 KG/M2 | HEART RATE: 81 BPM | HEIGHT: 69 IN | SYSTOLIC BLOOD PRESSURE: 110 MMHG | WEIGHT: 224.87 LBS | DIASTOLIC BLOOD PRESSURE: 64 MMHG | RESPIRATION RATE: 18 BRPM

## 2024-04-23 DIAGNOSIS — K21.9 GASTROESOPHAGEAL REFLUX DISEASE WITHOUT ESOPHAGITIS: Primary | ICD-10-CM

## 2024-04-23 DIAGNOSIS — Z86.010 HISTORY OF COLON POLYPS: ICD-10-CM

## 2024-04-23 PROCEDURE — 45385 COLONOSCOPY W/LESION REMOVAL: CPT | Performed by: INTERNAL MEDICINE

## 2024-04-23 PROCEDURE — 88305 TISSUE EXAM BY PATHOLOGIST: CPT | Performed by: PATHOLOGY

## 2024-04-23 RX ORDER — FAMOTIDINE 20 MG/1
20 TABLET, FILM COATED ORAL 2 TIMES DAILY
Qty: 62 TABLET | Refills: 6 | Status: SHIPPED | OUTPATIENT
Start: 2024-04-23

## 2024-04-23 RX ORDER — PROPOFOL 10 MG/ML
INJECTION, EMULSION INTRAVENOUS AS NEEDED
Status: DISCONTINUED | OUTPATIENT
Start: 2024-04-23 | End: 2024-04-23

## 2024-04-23 RX ORDER — SODIUM CHLORIDE, SODIUM LACTATE, POTASSIUM CHLORIDE, CALCIUM CHLORIDE 600; 310; 30; 20 MG/100ML; MG/100ML; MG/100ML; MG/100ML
INJECTION, SOLUTION INTRAVENOUS CONTINUOUS PRN
Status: DISCONTINUED | OUTPATIENT
Start: 2024-04-23 | End: 2024-04-23

## 2024-04-23 RX ORDER — LIDOCAINE HYDROCHLORIDE 10 MG/ML
INJECTION, SOLUTION EPIDURAL; INFILTRATION; INTRACAUDAL; PERINEURAL AS NEEDED
Status: DISCONTINUED | OUTPATIENT
Start: 2024-04-23 | End: 2024-04-23

## 2024-04-23 RX ADMIN — SODIUM CHLORIDE, SODIUM LACTATE, POTASSIUM CHLORIDE, AND CALCIUM CHLORIDE: .6; .31; .03; .02 INJECTION, SOLUTION INTRAVENOUS at 09:01

## 2024-04-23 RX ADMIN — PROPOFOL 85 MG: 10 INJECTION, EMULSION INTRAVENOUS at 09:07

## 2024-04-23 RX ADMIN — PROPOFOL 75 MG: 10 INJECTION, EMULSION INTRAVENOUS at 09:06

## 2024-04-23 RX ADMIN — LIDOCAINE HYDROCHLORIDE 50 MG: 10 INJECTION, SOLUTION EPIDURAL; INFILTRATION; INTRACAUDAL; PERINEURAL at 09:05

## 2024-04-23 RX ADMIN — PROPOFOL 35 MG: 10 INJECTION, EMULSION INTRAVENOUS at 09:17

## 2024-04-23 RX ADMIN — PROPOFOL 35 MG: 10 INJECTION, EMULSION INTRAVENOUS at 09:14

## 2024-04-23 RX ADMIN — PROPOFOL 35 MG: 10 INJECTION, EMULSION INTRAVENOUS at 09:10

## 2024-04-23 NOTE — ANESTHESIA POSTPROCEDURE EVALUATION
Post-Op Assessment Note    CV Status:  Stable  Pain Score: 0    Pain management: adequate       Mental Status:  Alert and awake   Hydration Status:  Euvolemic   PONV Controlled:  Controlled   Airway Patency:  Patent     Post Op Vitals Reviewed: Yes    No anethesia notable event occurred.    Staff: CRNA               BP 97/66 (04/23/24 0925)    Temp 98.8 °F (37.1 °C) (04/23/24 0925)    Pulse 88 (04/23/24 0925)   Resp 18 (04/23/24 0925)    SpO2 98 % (04/23/24 0925)

## 2024-04-23 NOTE — H&P
"History and Physical - [facility]  Jack Singleton 74 y.o. male MRN: [id number]      HPI: Jack Singleton is a 74 y.o. year old male who presents for history of colon polyps      REVIEW OF SYSTEMS: Per the HPI, and otherwise unremarkable.    Historical Information   Past Medical History:   Diagnosis Date    GERD (gastroesophageal reflux disease)     Hyperlipidemia     Hypertension      Past Surgical History:   Procedure Laterality Date    COLONOSCOPY      HERNIA REPAIR      WA ARTHRD PST/PSTLAT TQ 1NTRSPC CRV BELW C2 SEGMENT N/A 10/25/2023    Procedure: C3-6 PCDF;  Surgeon: Richy Saldaña MD;  Location: [facility];  Service: Neurosurgery    WA COLONOSCOPY FLX DX W/COLLJ SPEC WHEN PFRMD N/A 4/5/2019    Procedure: COLONOSCOPY;  Surgeon: Krishna Maguire DO;  Location: [facility];  Service: Gastroenterology    ROTATOR CUFF REPAIR Left     TONSILLECTOMY       Social History   Social History     Substance and Sexual Activity   Alcohol Use Yes    Alcohol/week: 6.0 standard drinks of alcohol    Types: 6 Cans of beer per week    Comment: beer 4-5 days a week     Social History     Substance and Sexual Activity   Drug Use Never     Social History     Tobacco Use   Smoking Status Never   Smokeless Tobacco Never     History reviewed. No pertinent family history.    Meds/Allergies     (Not in a hospital admission)      No Known Allergies    Objective     Blood pressure 130/76, pulse 104, temperature 97.6 °F (36.4 °C), temperature source Temporal, resp. rate 20, height 5' 9\" (1.753 m), weight 102 kg (224 lb 13.9 oz), SpO2 99%.      PHYSICAL EXAM    Gen: NAD  CV: RRR  CHEST: Clear  ABD: soft, NT/ND  EXT: no edema      ASSESSMENT/PLAN:  This is a 74 y.o. year old male here for colonoscopy, and he is stable and optimized for his procedure.          "

## 2024-04-23 NOTE — ANESTHESIA PREPROCEDURE EVALUATION
Review of Systems/Medical History  Patient summary reviewed.  Chart reviewed.      Cardiovascular   Exercise tolerance (METS): >4 METS. Hyperlipidemia, Hypertension     Pulmonary  Negative pulmonary ROS        GI/Hepatic     GERD        Negative  ROS        Endo/Other  Negative endo/other ROS       GYN  Negative gynecology ROS          Hematology  Negative hematology ROS ,     Musculoskeletal  Negative musculoskeletal ROS         Neurology  Negative neurology ROS ,     Psychology   Negative psychology ROS                 Physical Exam    Airway    Mallampati score: II  TM Distance: >3 FB  Neck ROM: full     Dental   Comment: Numerous worn and decayed teeth.     Cardiovascular  Rhythm: regular, Rate: normal, Cardiovascular exam normal    Pulmonary  Pulmonary exam normal Breath sounds clear to auscultation    Other Findings        Anesthesia Plan  ASA Score- 2     Anesthesia Type- IV sedation with anesthesia with ASA Monitors.         Additional Monitors:     Airway Plan:            Plan Factors-Exercise tolerance (METS): >4 METS.    Chart reviewed.    Patient summary reviewed.                  Induction- intravenous.    Postoperative Plan-     Informed Consent- Anesthetic plan and risks discussed with patient.  I personally reviewed this patient with the CRNA. Discussed and agreed on the Anesthesia Plan with the CRNA..

## 2024-04-25 ENCOUNTER — APPOINTMENT (OUTPATIENT)
Dept: PHYSICAL THERAPY | Facility: CLINIC | Age: 74
End: 2024-04-25
Payer: COMMERCIAL

## 2024-04-26 PROCEDURE — 88305 TISSUE EXAM BY PATHOLOGIST: CPT | Performed by: PATHOLOGY

## 2024-04-30 ENCOUNTER — EVALUATION (OUTPATIENT)
Dept: PHYSICAL THERAPY | Facility: CLINIC | Age: 74
End: 2024-04-30
Payer: COMMERCIAL

## 2024-04-30 DIAGNOSIS — Z74.09 IMPAIRED MOBILITY AND ACTIVITIES OF DAILY LIVING: ICD-10-CM

## 2024-04-30 DIAGNOSIS — Z78.9 IMPAIRED MOBILITY AND ACTIVITIES OF DAILY LIVING: ICD-10-CM

## 2024-04-30 DIAGNOSIS — M47.12 CERVICAL SPONDYLOSIS WITH MYELOPATHY: Primary | ICD-10-CM

## 2024-04-30 PROCEDURE — 97110 THERAPEUTIC EXERCISES: CPT

## 2024-04-30 NOTE — PROGRESS NOTES
PT Re-Evaluation  and PT Discharge    Today's date: 2024  Patient name: Jack Singleton  : 1950  MRN: [id number]  Referring provider: Chaitanya Rush MD  Dx:   Encounter Diagnosis     ICD-10-CM    1. Cervical spondylosis with myelopathy  M47.12       2. Impaired mobility and activities of daily living  Z74.09     Z78.9                 Start Time: 930  Stop Time: 1000  Total time in clinic (min): 30 minutes    Assessment  Assessment details: Patient is a 75 y/o male who presents to therapy s/p PCDF on 10/25/23 and has completed 29 visits to date with improved FOTO score of 70 since IE. Patient demonstrates subjective/objective improvement since starting PT such as shoulder mobility, shoulder strength, lower extremity strength, balance, and overall activity tolerance. Patient continues to present with deficits that include shoulder mobility and strength. Due to insurance restrictions patient would like to discharge to HEP at this time. HEP updated and reviewed with the patient. Pt educated on importance of HEP.   Impairments: activity intolerance, impaired balance, impaired physical strength and pain with function    Goals  ST weeks  Pt will demonstrate good understanding and compliance with HEP --met  Pt will demonstrate improved postural awareness and ability to self-correct without reliance on external cues --met     LT weeks  Pt will improve FOTO score to > or = to expected score to indicate improved functional abilities --met  Pt will increase shoulder strength to 4+/5 for improved tolerance/independence with ADLs --not met  Pt will increase PROM to WNL to allow for return of AROM of affected shoulder --met  Pt will increase shoulder flexion/abduction to 170 degrees to increase independence with ADLs --not met  Pt will improve TUG score to 12 seconds to decrease fall risk. --met   Pt will ambulate with least restrictive assistive device safely. --met  Pt will improve LE strength by 1  muscle grade to improve safety with gait. --met    Plan  Plan details: D/c to HEP  Patient would benefit from: PT eval and skilled physical therapy  Planned modality interventions: cryotherapy, thermotherapy: hydrocollator packs and TENS  Planned therapy interventions: ADL retraining, activity modification, balance, manual therapy, graded activity, graded exercise, graded motor, flexibility, functional ROM exercises, gait training, strengthening, stretching, therapeutic activities, therapeutic exercise, therapeutic training and home exercise program  Frequency: 2x week  Plan of Care beginning date: 4/30/2024  Plan of Care expiration date: 4/30/2024  Treatment plan discussed with: patient      Subjective Evaluation    History of Present Illness  Mechanism of injury: RE (4/30/24):   Pt is a 73 y/o male who has been seen s/p cervical fusion since November of 2023. Pt reports 85% improvement since starting therapy. He notes he still has problems with balance/strength when he first stands. He notes improvements in strength, mobility, balance, and overall functional improvement since starting therapy. He still has difficulty with reaching his right shoulder overhead, but he knows that will never be 100%. He is no longer using the cane,he hasn't used it in a couple weeks. He will take the cane when he goes unfamiliar places just incase the stairs don't have rails.     RE (4/4/24):  Pt has been seen for s/p cervical fusion and notes 80-85% improvement since beginning therapy. Pt notes improvement in strength and overall mobility. Stated he still struggles w/ reaching overhead and overhead strength, however, this is slowly improving. Pt notes his walking is improving. Balance is good as long as he goes slow, when he goes faster he tends to be more off balance.       RE (2/29/24):  Pt is returning to therapy after a two week lapse in care due to insurance issues. Pt has been seen for s/p cervical fusion and notes 70-75%  improvement since beginning therapy. Improvements reported include strength, mobility, and functionality. Pt notes improvement w/ lifting a gallon of milk w/ RUE. Difficulties include lifting heavier objects, lift/reaching overhead, prolonged activities, and balance. Pt notes he is pleased with progress this far but would like to continue to work on current deficits.     RE (12/27):  Pt is a 74 y/o male who presents to therapy s/p cervical fusion done on October 25th. He notes improvements with LE strength, endurance, UE mobility. Pt notes he is now using a cane instead of a walker. He notes he has been using the cane around the house for 2 weeks. Pt reports he started driving recently and he uses the cane when he walks to and from the car. He notes some pain when driving in his neck. He notes some difficulty with getting out of the car due to the low seat. He notes his arm mobility is not where he would like it to be, and notes he needs to improve on it. He has difficulty with shaving his face, lifting the arm over head. He notes balance has gotten better, he needs to steady himself when he stands up right away. Pt notes numbness in the L UE has improved, but the R UE and b/l LE are still numb.     IE (11/29)  Pt is a 74 y/o male who presents to therapy s/p cervical fusion on October 25th. He was in the hospital doing PT for 1 week, but he was in the hospital for about a month. He notes he lost range of motion in his arms since surgery. He notes back in March of 2023 he fell off a plastic chair outside. 2-3 weeks after that he started getting numbness in his toes and he told his PCP. He notes it got worse over the few weeks after that. Then he had an MRI and XRAY and he had compression on the spinal cord that started to affect his gait and ability to move his arms. The left was worse than the right and it continued to get worse. On October 20th he fell twice attempting to go to the bathroom. He went to [facility] [place] where he was transferred to [facility] for surgery. He has numbness still in both feet and hands with the left being worse than the right. He has difficulty with balance, gait, and reaching with his arms. He has a cleaning lady that helps with laundry, cleaning, and getting groceries. He presents today with a FWW. Prior to everything he was using no device to get around. He notes his endurance is low. He gets tired quickly when cooking.       Quality of life: poor    Pain  Current pain ratin  At best pain ratin  At worst pain ratin  Quality: dull ache        Objective     Functional Assessment        Comments  5x STS: 10 seconds     TU seconds w/o SPC     LE strength:  Left:  Hip- flex- 5/5 abd- 5/5  Knee- 5/5  Ankle- 5/5    Right:  Hip- flex- 5/5 abd- 5/5  Knee- 5/5  Ankle- 5/5     UE strength:   Left: flex: 4/5; abd: 4/5; ER: 4+/5; IR: 4+/5  Right: flex: 2/5; 2-/5; ER: 3+/5; IR: 4/5    L shoulder AROM:  flex-180 abd- 155    R shoulder AROM: flex-  140 abd- 100 (with moderate upper trap compensation)         Flowsheet Rows      Flowsheet Row Most Recent Value   PT/OT G-Codes    Current Score 47   Projected Score 58                     Diagnosis: s/p C3-6 PCDF   Precautions: hx DVT, fall risk, cervical spine precautions    POC Expires: 24   Re-evaluation Date: 24   FOTO Scores/Date: Goal - 58;  - 72;    Visit Count 2/10 3/10 4/10 5/10 1/10   Manuals                            Ther Ex    Wall slides flex/scap        UBE standing Standing 3'/3'  Standing 3'/3'  Standing 3'/3' Standing 3'/3' L2    Ball roll up wall 10# 7x 10# 9.5x            Updated FOTO/measurements    Standing scaption w/ cane assist X20         Pt edu   HEP updated      press  Held  x10 x10    Neuro Re-Ed     Sitting ER AROM 2x10  2x10 ytb      Shoulder isometrics 5\" 20x 5\"20x  GTB Ext, IR x20; YTB ER/flex 2x10 Gtb ext, IR " "x20:  YTB ER/flex 2x10      rows/ext         Ther Act 4/9 4/16 4/18    STS   18\" 2x10 18\" 2x10     Step ups        Functional reach w/ cones 4 cones top of weight rack x5  4 cones top of weight rack x5  4 cones top of weight rack x5 4 cones top of weight rack 5x     Resisted walking fwd/back/lat 10#/10#/5# 5 laps ea  10# 5 laps ea 10# 5 laps ea  10# 5 laps ea     Stair ambulation          box carry 16# 3 laps  16# 5 laps  16# 5 laps 16# 5 laps     Step downs                 Gait        SPC                          "

## 2024-05-01 ENCOUNTER — NURSE TRIAGE (OUTPATIENT)
Age: 74
End: 2024-05-01

## 2024-05-01 NOTE — TELEPHONE ENCOUNTER
"Please advise  Colonoscopy- 4/23/24  Last OV: 12/14/23     Pt calling in, reports he received a new medication Pepcid 20 mg BID in the mail from Dr. Maguire today but he is not sure why.   Pt is on omeprazole 20 mg daily- recently refilled by Dr. Sotomayor and does not have any break through symptoms.     Please advise if pt should be on both omeprazole 20 mg daily and Pepcid 20 mg.     If no answer pt would like detailed VM.       Reason for Disposition   Caller has NON-URGENT medicine question about med that PCP or specialist prescribed and triager unable to answer question    Answer Assessment - Initial Assessment Questions  1. NAME of MEDICATION: \"What medicine are you calling about?\"      Pepcid   2. QUESTION: \"What is your question?\" (e.g., medication refill, side effect)  See notes    Protocols used: Medication Question Call-ADULT-[place]    "

## 2024-07-01 NOTE — PROGRESS NOTES
CARDIOLOGY FOLLOW-UP     Patient:  Surendra Mckeon MD  YOB: 1963  Date of Visit:  07/02/24    Referring Provider :   Pcp, No    Chief Complaint   Patient presents with   • Follow-up   • Office Visit     Dx:HTN, pt feeling fine, no c/o     Last cardiovascular follow-up visit: April 2023  Summary from that visit.  Renal insufficiency  Stable,     History of aplastic anemia  Platelet count, is stable on dual antiplatelet therapy     Family history of premature CAD  Normal lipoprotein a     Essential hypertension  Hypertension is controlled.  We will decrease amlodipine to 5 mg a day.     Mixed hyperlipidemia  Lipid abnormalities are to be reviewed in the summer.  LDL goal should be less than 70.  We will continue with low-dose rosuvastatin     Stented coronary artery  Participating in cardiac rehabilitation and doing well.  We will plan on a myocardial perfusion study in the summer        Summary:     He is doing well from a cardiovascular standpoint.  I will stop aspirin.  We will continue with Plavix for at least 3 months, and then if improved, will start aspirin and off Plavix as an antiplatelet medication lifelong.  I will cut down BP medication to 5 mg of amlodipine.  We will continue with rosuvastatin with LDL goal under 70.  Laboratory data to be done very soon.  Stress test, myocardial perfusion post intervention and also to be scheduled.    HPI:  Coronary calcium score significantly elevated over 700 in the LAD territory which led to a CTA which suggested more than 80% discrete stenosis, this led to coronary angiography which now has 2 stents proximal LAD, in early mid.  Normal lipoprotein a  History of CKD.  Stable creatinine.  Aplastic anemia history-treated with Amphotericin when he was 27 years old.  Treated with Amphotericin when he was 27 years old.  Family history of coronary artery disease.  History of hyperlipidemia, and hypertension.  On GLP-1.  He is doing well.  He denies any  Internal Medicine Progress Note  Patient: Jyoti Rollins  Age/sex: 68 y.o. male  Medical Record #: [id number]      ASSESSMENT/PLAN: (Interval History)  Jyoti Rollins is seen and examined and management for following issues:    Cervical spine stenosis with radiculopathy  Had a fall PTA 2/2 bilateral leg pain with numbness, tingling and weakness that had been worsening. He had been to see Neurosurgery as an outpatient and was scheduled for a PCDF on 10/30/23.   S/p C3-6 PCDF 10/25/23  Staples and sutures are out = NS saw 11/8 for his 2 weeks followup     Hyponatremia  Per patient is chronic and present for quite a while although there is no labs to review since PCP not in system  [place] likely SIADH when in the hospital the first time  Hasn't really improved since back in the hospital   Stable at 128-130  Will continue to watch     HTN  Home:  Lisinopril 20mg qd/HCTZ 25mg qd  Here:  Lisinopril 5 mg qd (dec 11/12)  HCTZ was stopped 2/2 hyponatremia when he was hospitalized for his neck surgery     Anasarca  Resolved other than some mild ankle edema  On Lasix 40mg qd/Kdur 40 meq BID but since BPs were soft and edema is almost totally resolved, cut Lasix dose back to 20mg qd and decreased the Kdur to 40 meq qd 11/12  No changes today     Ileus  Tolerating diet but still having loose watery stools  Fecal leukocytes 11/9 = showed moderate amt of WBCs  Stool enteric panel was negative  KUB 11/12 = persistent gaseous distention of small and large bowel  On 11/13 Simethicone QID scheduled was added     B/L LE DVTs  New discovered upon return to the hospital  DVT in LLE one of the paired peroneals; RLE in mid PTV  On Eliquis which is new for him (he was cleared by NS to have)     HLD  Continue statin     Urine retention/fountain  VT here per PMR  Continue Proscar     ABLA  No transfusion given  Stable 11/11/23     Irregular HR  EKG today 11/14 shows ST with PACs     Discharge date:  Team    The above assessment and plan was chest pain, shortness of breath or palpitations.  He has maintained his weight loss.  He is on Mounjaro.  He tells me that he walks a lot, even on weekends for exercise.  They have a home in [place], and he is doing a lot of the yard work, and repairs.    Cardiovascular complaints of shortness of breath, lightheadedness,  Syncopal events, palpitations and chest pain: None    Cardiovascular risk factors: Hypertension, hyperlipidemia, age over 45, known CAD with LAD stent  Exercise: Exercises.  Fitness center  CTA December 2022  1. There is a large calcified plaque seen in the mid LAD causing about  70-80% luminal stenosis. There is a mixed plaque seen in the mid to distal  LAD causing about 80% luminal stenosis. There is Left coronary artery  dominance.  2. Calcium score is calculated at 790  which is at 96th percentile compared  to subjects with similar age, gender and ethnicity.     Past Medical History:   Diagnosis Date   • Essential hypertension    • Family history of premature CAD    • Renal insufficiency      April 2023, stress myocardial perfusion study that was normal.    Results for orders placed or performed in visit on 07/02/24   Electrocardiogram 12-Lead   Result Value Ref Range    Ventricular Rate EKG/Min (BPM) 71     Atrial Rate (BPM) 71     IN-Interval (MSEC) 176     QRS-Interval (MSEC) 90     QT-Interval (MSEC) 406     QTc 441     P Axis (Degrees) -6     R Axis (Degrees) 12     T Axis (Degrees) 30     REPORT TEXT       Normal sinus rhythm  Normal ECG  When compared with ECG of  04-JAN-2023 10:54,  PREVIOUS ECG IS PRESENT  Confirmed by VANESSA CESPEDES MD ([id number]) on 7/2/2024 2:57:55 PM     Carotid duplex examination 2022     1. No evidence of subclavian steal syndrome.  2. Antegrade flow noted in the right vertebral artery.  3. Antegrade flow noted in the left vertebral artery.  4. Normal CCA and ECA . The proximal ICA bilaterally is free of any     significant calcified plaque . The waveforms and  reviewed and updated as determined by my evaluation of the patient on 11/14/2023. Labs:   Results from last 7 days   Lab Units 11/11/23  0523   WBC Thousand/uL 5.76   HEMOGLOBIN g/dL 11.6*   HEMATOCRIT % 33.9*   PLATELETS Thousands/uL 379     Results from last 7 days   Lab Units 11/14/23  0627 11/11/23  0523   SODIUM mmol/L 130* 128*   POTASSIUM mmol/L 3.5 3.8   CHLORIDE mmol/L 94* 92*   CO2 mmol/L 28 29   BUN mg/dL 15 26*   CREATININE mg/dL 0.85 0.89   CALCIUM mg/dL 8.9 8.5             Results from last 7 days   Lab Units 11/07/23  2102   POC GLUCOSE mg/dl 86       Review of Scheduled Meds:  Current Facility-Administered Medications   Medication Dose Route Frequency Provider Last Rate    acetaminophen  650 mg Oral Q6H PRN Amanda Mtz MD      albuterol  2 puff Inhalation Q6H PRN Amanda Mtz MD      apixaban  5 mg Oral BID Amanda Mtz MD      atorvastatin  10 mg Oral Daily Amanda Mtz MD      bismuth subsalicylate  984 mg Oral Q6H PRN Amanda Mtz MD      calcium carbonate  500 mg Oral Daily PRN Amanda Mtz MD      finasteride  5 mg Oral Daily Amanda Mtz MD      fluticasone  1 spray Each Nare BID Amanda Mtz MD      folic acid  504 mcg Oral Daily Amanda Mtz MD      furosemide  20 mg Oral Daily AAKASH Priest      lisinopril  5 mg Oral Daily AAKASH Priest      loratadine  10 mg Oral Daily Amanda Mtz MD      melatonin  3 mg Oral HS Amanda Mtz MD      ondansetron  4 mg Oral Q6H PRN Amanda Mtz MD      oxyCODONE  2.5 mg Oral Q4H PRN Amanda Mtz MD      potassium chloride  40 mEq Oral Daily AAKASH rPiest      psyllium  1 packet Oral Daily Amanda Mtz MD      simethicone  80 mg Oral 4x Daily (with meals and at bedtime) AAKASH Brooks      tiZANidine  2 mg Oral Q8H PRN Amanda tMz MD         Subjective/ HPI: Patient seen and examined.  Patients overnight issues or events were reviewed with nursing or staff during rounds or morning huddle session. New or overnight issues include the following:     No new or overnight issues. Offers no complaints    ROS:   A 10 point ROS was performed; negative except as noted above. Imaging:     XR abdomen 1 view kub   Final Result by Mercy Reed MD (11/13 1045)      Persistent gaseous distention of small and large bowel. Workstation performed: [id number]             *Labs /Radiology studies Reviewed, no new imaging  *Medications  reviewed and reconciled as needed  *Please refer to order section for additional ordered labs studies  *Case discussed with primary attending during morning huddle case rounds    Physical Examination:  Vitals:   Vitals:    11/13/23 1410 11/13/23 2040 11/14/23 0621 11/14/23 0900   BP: 118/80 121/84 120/69 120/65   BP Location: Right arm Left arm Right arm    Pulse: 98 99 82    Resp: 18 20 18    Temp: (!) 97.1 °F (36.2 °C) 98 °F (36.7 °C) 97.5 °F (36.4 °C)    TempSrc: Oral Oral Oral    SpO2: 97% 100% 97%    Weight:   105 kg (231 lb 14.8 oz)    Height:           General Appearance: no distress, nontoxic appearing  HEENT:  External ear normal.  Nose normal w/o drainage. Mucous membranes are moist. Oropharynx is clear. Conjunctiva clear w/o icterus or redness. Neck:  Supple, normal ROM  Lungs: BBS without crackles/wheeze/rhonchi; respirations unlabored with normal inspiratory/expiratory effort. No retractions noted. On RA  CV: regular rate and rhythm; no rubs/murmurs/gallops, PMI normal   ABD: Abdomen large, soft and mildly distended; nontender. Bowel sounds all quadrants. EXT: has some left foot edema and tr pretibial  Skin: normal turgor, normal texture, no rashes  Psych: affect normal, mood normal  Neuro: AAO     The above physical exam was reviewed and updated as determined by my evaluation of the patient on 11/14/2023.     Invasive Devices       Peripheral Intravenous Line  Duration             Peripheral IV 11/10/23 Dorsal velpcities     Coronary calcium score October 2022     [place] Coronary Calcium Score  LMA: 3  LAD: 669  LCX: 56  RCA: 0  Other: 17  Other: 1  Other: 1  TOTAL: 747  Percentile: 90 - 100%     Great Vessels  Ascending Aorta: 34 mm  Descending Aorta: 30 mm  Main Pulmonary Artery: 33 mm     Extra-coronary Calcification: None.  Heart/Pericardium: Normal.     Other Findings: 3 mm nodule in the left lower lobe (series 2, image 51).  : No additional finding.  ALLERGIES:  No Known Allergies    Current Outpatient Medications   Medication Sig   • rosuvastatin (CRESTOR) 5 MG tablet TAKE 1 TABLET BY MOUTH DAILY   • clopidogrel (PLAVIX) 75 MG tablet Take 1 tablet by mouth daily.   • amLODIPine (NORVASC) 5 MG tablet Take 1 tablet by mouth daily.   • Mounjaro 5 MG/0.5ML Solution Pen-injector 7.5 mg.     No current facility-administered medications for this visit.        Review of Systems   Constitutional:  Negative for activity change, appetite change, fever and unexpected weight change.   HENT:  Negative for dental problem, ear pain, facial swelling, postnasal drip and sinus pain.    Eyes:  Negative for pain and discharge.   Respiratory:  Negative for apnea, chest tightness, shortness of breath and wheezing.    Cardiovascular:  Negative for chest pain, palpitations and leg swelling.   Gastrointestinal:  Negative for abdominal distention, abdominal pain, blood in stool, constipation, diarrhea and vomiting.   Endocrine: Negative for cold intolerance, heat intolerance and polyuria.   Genitourinary:  Negative for decreased urine volume, difficulty urinating and flank pain.   Musculoskeletal:  Negative for arthralgias, back pain, joint swelling and neck pain.        Thigh cramping.  Usually at the end of the day.   Skin:  Negative for color change.   Allergic/Immunologic: Negative for environmental allergies and immunocompromised state.   Neurological:  Negative for syncope, facial asymmetry, speech difficulty, weakness,  (posterior); Left Wrist 4 days              Drain  Duration             Urethral Catheter 16 Fr. 15 days                       VTE Pharmacologic Prophylaxis: Eliquis  Code Status: Level 1 - Full Code  Current Length of Stay: 4 day(s)      Total time spent:  30 minutes with more than 50% spent counseling/coordinating care. Counseling includes discussion with patient re: progress  and discussion with patient of his/her current medical state/information. Coordination of patient's care was performed in conjunction with primary service. Time invested included review of patient's labs, vitals, and management of their comorbidities with continued monitoring. In addition, this patient was discussed with medical team including physician and advanced extenders. The care of the patient was extensively discussed and appropriate treatment plan was formulated unique for this patient. Medical decision making for the day was made by supervising physician unless otherwise noted in their attestation statement. ** Please Note:  voice to text software may have been used in the creation of this document.  Although proof errors in transcription or interpretation are a potential of such software** light-headedness, numbness and headaches.   Psychiatric/Behavioral:  Negative for confusion, hallucinations and sleep disturbance.    His EKG today showed sinus rhythm with normal axis and intervals.    Vitals:    07/02/24 1343 07/02/24 1345   BP: 104/70 116/74   BP Location: LUE - Left upper extremity LUE - Left upper extremity   Patient Position: Sitting Standing   Cuff Size: Regular Regular   Pulse: 69    Resp: 16    Temp: 97.5 °F (36.4 °C)    SpO2: 97%    Weight: 116.6 kg (257 lb)    Height: 6' 4\" (1.93 m)           Physical Exam   Constitutional: No distress.   HENT:   Mouth/Throat: Oropharynx is clear. Pharynx is normal.   Eyes: Pupils are equal, round, and reactive to light. Conjunctivae are normal.   Neck: No JVD present. No neck adenopathy. No thyromegaly present.   Cardiovascular: Normal rate, regular rhythm, S1 normal, S2 normal, normal heart sounds, intact distal pulses and normal pulses. PMI is not displaced.   No murmur heard.  Pulses:       Carotid pulses are 2+ on the right side and 2+ on the left side.       Radial pulses are 2+ on the right side and 2+ on the left side.        Dorsalis pedis pulses are 2+ on the right side and 2+ on the left side.        Posterior tibial pulses are 2+ on the right side and 2+ on the left side.    I did not appreciate any carotid bruit.   Pulmonary/Chest: Effort normal and breath sounds normal. No stridor. He has no wheezes. He has no rales. He exhibits no tenderness.   Abdominal: Soft. Bowel sounds are normal. He exhibits no distension and no mass. There is no hepatomegaly. There is no abdominal tenderness.   Musculoskeletal:         General: No edema.   Neurological: He is alert and oriented to person, place, and time.   Skin: Skin is warm.        Laboratory Results:  No components found for: \"64W\"  Total cholesterol was 148 triglyceride 72 HDL 55 LDL 79  His lab work for 2024 is pending  Assessment/Plan:  Renal insufficiency  Has been stable and will await  creatinine.    Family history of premature CAD  Normal lipoprotein a.  Brother apparently had nonobstructive CAD.    History of aplastic anemia  Concerned about aspirin.  Hence, on Plavix.    Essential hypertension  Hypertension is reviewed.  Controlled.  Will continue with amlodipine.  Very minimal lower extremity edema.    Coronary stent patent  Reassured patency of stent especially since asymptomatic.  Will plan on a myocardial perfusion study next year.        Summary:    Stable for cardiovascular disease.  Reassured, in the absence of symptoms, and increasing physical ability, that the stents are open.  Blood pressure is controlled, will continue with amlodipine.  Unfortunately concerned about aspirin with aplastic anemia history, and hence we will continue with lifelong Plavix at 75 mg daily.  Will evaluate, lipid panel today along with chemistry, and health maintenance labs/PSA and determine Crestor dose.    Return in about 1 year (around 7/2/2025).        Marcella Zamora MD      Greater than 50% of the visit was spent counseling regarding above issues; total time spent 30 minutes.

## 2025-03-06 NOTE — CASE MANAGEMENT
Team dc summary - pt made good progress and returned home w/contd outpt physical therapy. [facility]. Prior to dc pt did receive confirmation his walker was being delivered to his home on Monday 11/20. Pt was educated to [facility]'t get a call about therapy. Pt had a friend provide transport home. attending ruddy - pt with  abd pain at recently changed drain site. concern for infection vs displaced drain. will need ct scan. labs, fluids, analgesia, reassess. discuss with surgery.

## (undated) DEVICE — BETHLEHEM UNIVERSAL SPINE, KIT: Brand: CARDINAL HEALTH

## (undated) DEVICE — GLOVE INDICATOR PI UNDERGLOVE SZ 7.5 BLUE

## (undated) DEVICE — DRAPE C-ARMOUR

## (undated) DEVICE — SNAP KOVER: Brand: UNBRANDED

## (undated) DEVICE — HEMOSTATIC MATRIX SURGIFLO 8ML W/THROMBIN

## (undated) DEVICE — PENCIL ELECTROSURG E-Z CLEAN -0035H

## (undated) DEVICE — MONITORING SPINAL IMPULSE CASE FEE

## (undated) DEVICE — DRAPE SURGIKIT SADDLE BAG

## (undated) DEVICE — GLOVE SRG BIOGEL 7.5

## (undated) DEVICE — SURGIFOAM 8.5 X 12.5

## (undated) DEVICE — ANTIBACTERIAL VIOLET BRAIDED (POLYGLACTIN 910), SYNTHETIC ABSORBABLE SUTURE: Brand: COATED VICRYL

## (undated) DEVICE — ELECTRODE BLADE MOD E-Z CLEAN 4IN -0014AM

## (undated) DEVICE — BIPOLAR SEALER 23-113-1 AQM 2.3: Brand: AQUAMANTYS™

## (undated) DEVICE — MAYFIELD® DISPOSABLE ADULT SKULL PIN (PLASTIC BASE): Brand: MAYFIELD®

## (undated) DEVICE — SUPPLY FEE STD

## (undated) DEVICE — SUT ETHILON 3-0 FS-1 18 IN 663G

## (undated) DEVICE — CHLORAPREP HI-LITE 26ML ORANGE

## (undated) DEVICE — DRESSING MEPILEX AG BORDER POST-OP 4 X 6 IN

## (undated) DEVICE — TRAY FOLEY 16FR URIMETER SURESTEP

## (undated) DEVICE — ELECTRODE BLADE MOD E-Z CLEAN 2.5IN 6.4CM -0012M

## (undated) DEVICE — BETADINE OINTMENT FOIL PACK

## (undated) DEVICE — MINOR PROCEDURE DRAPE: Brand: CONVERTORS

## (undated) DEVICE — DRAPE SHEET X-LG

## (undated) DEVICE — SPECIMEN CONTAINER STERILE PEEL PACK

## (undated) DEVICE — DRILL BIT G3606010 2.4MM

## (undated) DEVICE — BOWL ASSY BM210 DUAL BLADE DISPOSABLE: Brand: MIDAS REX™

## (undated) DEVICE — SPONGE SCRUB 4 PCT CHLORHEXIDINE

## (undated) DEVICE — DRAIN SPONGES,6 PLY: Brand: EXCILON